# Patient Record
Sex: FEMALE | Race: BLACK OR AFRICAN AMERICAN | Employment: OTHER | URBAN - METROPOLITAN AREA
[De-identification: names, ages, dates, MRNs, and addresses within clinical notes are randomized per-mention and may not be internally consistent; named-entity substitution may affect disease eponyms.]

---

## 2017-01-05 ENCOUNTER — OFFICE VISIT (OUTPATIENT)
Dept: PAIN MANAGEMENT | Age: 62
End: 2017-01-05

## 2017-01-05 VITALS
WEIGHT: 166 LBS | BODY MASS INDEX: 30.36 KG/M2 | DIASTOLIC BLOOD PRESSURE: 75 MMHG | SYSTOLIC BLOOD PRESSURE: 118 MMHG | HEART RATE: 62 BPM

## 2017-01-05 DIAGNOSIS — M96.1 POSTLAMINECTOMY SYNDROME, CERVICAL REGION: ICD-10-CM

## 2017-01-05 DIAGNOSIS — M25.50 PAIN IN JOINT, MULTIPLE SITES: ICD-10-CM

## 2017-01-05 DIAGNOSIS — M54.2 CERVICALGIA: ICD-10-CM

## 2017-01-05 DIAGNOSIS — M51.26 DISPLACEMENT OF LUMBAR INTERVERTEBRAL DISC WITHOUT MYELOPATHY: ICD-10-CM

## 2017-01-05 DIAGNOSIS — M51.36 ANNULAR TEAR OF LUMBAR DISC: Primary | ICD-10-CM

## 2017-01-05 DIAGNOSIS — G89.4 CHRONIC PAIN SYNDROME: ICD-10-CM

## 2017-01-05 DIAGNOSIS — M51.37 DEGENERATION OF LUMBAR OR LUMBOSACRAL INTERVERTEBRAL DISC: ICD-10-CM

## 2017-01-05 RX ORDER — OXYCODONE HYDROCHLORIDE 10 MG/1
10 TABLET ORAL
Qty: 90 TAB | Refills: 0 | Status: SHIPPED | OUTPATIENT
Start: 2017-01-06 | End: 2017-02-23 | Stop reason: SDUPTHER

## 2017-01-05 RX ORDER — MORPHINE SULFATE 60 MG/1
60 TABLET, FILM COATED, EXTENDED RELEASE ORAL EVERY 12 HOURS
Qty: 60 TAB | Refills: 0 | Status: SHIPPED | OUTPATIENT
Start: 2017-01-06 | End: 2017-02-23 | Stop reason: SDUPTHER

## 2017-01-05 RX ORDER — OXYCODONE HYDROCHLORIDE 10 MG/1
10 TABLET ORAL
Qty: 90 TAB | Refills: 0 | Status: SHIPPED | OUTPATIENT
Start: 2017-02-05 | End: 2017-02-23 | Stop reason: SDUPTHER

## 2017-01-05 RX ORDER — MORPHINE SULFATE 60 MG/1
60 TABLET, FILM COATED, EXTENDED RELEASE ORAL EVERY 12 HOURS
Qty: 60 TAB | Refills: 0 | Status: SHIPPED | OUTPATIENT
Start: 2017-02-05 | End: 2017-02-23 | Stop reason: SDUPTHER

## 2017-01-05 NOTE — PATIENT INSTRUCTIONS
1. Continue current plan. Stable on current medication without adverse events. 2. Refill morphine ER 60 mg 2 times daily. 3. Refill oxycodone 10 mg up to 3 times daily as needed for pain. 4. Continue baclofen 10 mg. Up to 3 times daily as needed. 5. Discussed risks of addiction, dependency, and opioid induced hyperalgesia.    6. Return to clinic in 2 months

## 2017-01-05 NOTE — PROGRESS NOTES
HISTORY OF PRESENT ILLNESS  Ammon Caldera is a 57 y.o. female    HPI: Ms. Champ Muse  returns today for f/u of chronic severe pain involving the lumbar spine, which has been attributed to a lumbar degenerative disc disease with spondylosis. In addition, she has neck pain attributed to a post cervical laminectomy syndrome. No h/o lumbar surgery. PT and injections in the past with no improvement. She reports some mild improvement since her last visit with myself. She recently switched her morphine prescription to Opana but was disrupting her sleep. She has been switched back to morphine. She reports that she is using her morphine more on a set schedule and her oxycodone on an as-needed basis only which has been a moderate improvement for her. She continues to follow-up with Dr. Rosa Bal regarding her left knee. She is otherwise doing well with no other complaints today. I will have her follow-up in 2 months for further evaluation and recommendation. She was recently seen in the emergency room due to a UTI and received a prescription for hydrocodone filled 12/22/2016 that was disclosed to our practice. She has recovered well and feeling much better today. Current medication management consists of MS Contin 60 mg BID,  Oxycodone IR 10 mg BID PRN, and Baclofen 10 mg TID PRN. Medications are helping with pain control and quality of life. Her pain is 2-3/10 with medication and 10/10 without. Pt describes pain as aching. Aggravating factors include standing, sitting, walking. Relieved with rest, sitting, lying down, and avoiding painful activities. Current treatment is helping to improve general activity, mood, walking, sleep, enjoyment of life    She  is otherwise doing well with no other complaints today. She denies any adverse events including nausea, vomiting, dizziness, constipation, hallucinations, or seizures. Medications were brought to visit today.  Pill counts were appropriate Allergies   Allergen Reactions    Aspirin Other (comments)     Stomach bleeding    Macrobid [Nitrofurantoin Monohyd/M-Cryst] Nausea and Vomiting    Nsaids (Non-Steroidal Anti-Inflammatory Drug) Nausea and Vomiting    Opana [Oxymorphone] Other (comments)     Insomnia, weight loss, nightmares    Pcn [Penicillins] Hives       Past Surgical History   Procedure Laterality Date    Hx cervical fusion      Hx hysterectomy      Hx orthopaedic       ganglion cyst removed         Review of Systems   Constitutional: Negative for chills, fever and weight loss. HENT: Negative for congestion, ear discharge and sore throat. Eyes: Negative for blurred vision and double vision. Respiratory: Negative for cough, shortness of breath and wheezing. Cardiovascular: Negative for chest pain and palpitations. Gastrointestinal: Negative for abdominal pain, constipation, diarrhea, heartburn, nausea and vomiting. Genitourinary: Positive for dysuria, frequency, hematuria and urgency. Musculoskeletal: Positive for back pain and joint pain (left knee  ). Negative for falls and neck pain. Skin: Negative for itching and rash. Neurological: Negative for dizziness, seizures, loss of consciousness and headaches. Endo/Heme/Allergies: Does not bruise/bleed easily. Psychiatric/Behavioral: Positive for depression. Negative for suicidal ideas. The patient has insomnia. The patient is not nervous/anxious. Physical Exam   Constitutional: She is oriented to person, place, and time and well-developed, well-nourished, and in no distress. No distress. HENT:   Head: Normocephalic and atraumatic. Eyes: EOM are normal.   Neck: Normal range of motion. Pulmonary/Chest: Effort normal.   Musculoskeletal: Normal range of motion. Well healing surgical incision on left knee from recent surgery. Neurological: She is alert and oriented to person, place, and time. Gait abnormal.   Skin: Skin is warm and dry. No rash noted. She is not diaphoretic. No erythema. Psychiatric: Memory and judgment normal.   Nursing note and vitals reviewed. ASSESSMENT:    1. Annular tear of lumbar disc    2. Postlaminectomy syndrome, cervical region    3. Cervicalgia    4. Pain in joint, multiple sites    5. Displacement of lumbar intervertebral disc without myelopathy    6. Degeneration of lumbar or lumbosacral intervertebral disc    7. Chronic pain syndrome           [place] Prescription Monitoring Program was reviewed which does not demonstrate aberrancies and/or inconsistencies with regard to the historical prescribing of controlled medications to this patient by other providers. NOTE: Disclosed hydrocodone from ER visit filled 12/22/2016    PLAN / Pt Instructions:  1. Continue current plan. Stable on current medication without adverse events. 2. Refill morphine ER 60 mg 2 times daily. 3. Refill oxycodone 10 mg up to 3 times daily as needed for pain. 4. Continue baclofen 10 mg. Up to 3 times daily as needed. 5. Discussed risks of addiction, dependency, and opioid induced hyperalgesia. 6. Return to clinic in 2 months     Medications Ordered Today   Medications    morphine CR (MS CONTIN) 60 mg CR tablet     Sig: Take 1 Tab by mouth every twelve (12) hours. Max Daily Amount: 120 mg. Dispense:  60 Tab     Refill:  0    morphine CR (MS CONTIN) 60 mg CR tablet     Sig: Take 1 Tab by mouth every twelve (12) hours. Max Daily Amount: 120 mg. Dispense:  60 Tab     Refill:  0    oxyCODONE IR (ROXICODONE) 10 mg tab immediate release tablet     Sig: Take 1 Tab by mouth three (3) times daily as needed. Max Daily Amount: 30 mg. Dispense:  90 Tab     Refill:  0    oxyCODONE IR (ROXICODONE) 10 mg tab immediate release tablet     Sig: Take 1 Tab by mouth three (3) times daily as needed. Max Daily Amount: 30 mg.      Dispense:  90 Tab     Refill:  0       Pain medications prescribed with the objective of pain relief and improved physical and psychosocial function in this patient. Spent 25 minutes with patient today reviewing the treatment plan, goals of treatment plan, and limitations of the treatment plan, to include the potential for side effects from medications and procedures. Merton Kanner, [address] 1/5/2017      Note: Please excuse any typographical errors. Voice recognition software was used for this note and may cause mistakes.

## 2017-01-05 NOTE — MR AVS SNAPSHOT
Visit Information Date & Time Provider Department Dept. Phone Encounter #  
 1/5/2017 10:00 AM Tarik Lawson [address] [facility] [phone] Follow-up Instructions Return in about 2 months (around 3/5/2017). Your Appointments 3/2/2017  9:20 AM  
Follow Up with BETO Lawson [address] [facility] (RICHAR SCHEDULING) Appt Note: return in 2  
 [address] [address]  
[phone] [facility] [address] [id number] Upcoming Health Maintenance Date Due DTaP/Tdap/Td series (1 - Tdap) 3/30/1975 BREAST CANCER SCRN MAMMOGRAM 12/2/2017 COLONOSCOPY 6/6/2019 PAP AKA CERVICAL CYTOLOGY 6/21/2019 Allergies as of 1/5/2017  Review Complete On: 1/5/2017 By: BETO Lawson Severity Noted Reaction Type Reactions Aspirin    Other (comments) Stomach bleeding Macrobid [Nitrofurantoin Monohyd/m-cryst]  11/25/2015    Nausea and Vomiting Nsaids (Non-steroidal Anti-inflammatory Drug)    Nausea and Vomiting Opana [Oxymorphone]  11/29/2016    Other (comments) Insomnia, weight loss, nightmares Pcn [Penicillins]    Hives Current Immunizations  Never Reviewed Name Date Pneumococcal Polysaccharide (PPSV-23) 3/7/2014 Not reviewed this visit You Were Diagnosed With   
  
 Codes Comments Annular tear of lumbar disc    -  Primary ICD-10-CM: M51.36 
ICD-9-CM: 722.52 Postlaminectomy syndrome, cervical region     ICD-10-CM: M96.1 ICD-9-CM: 722.81 Cervicalgia     ICD-10-CM: M54.2 ICD-9-CM: 723.1 Pain in joint, multiple sites     ICD-10-CM: M25.50 ICD-9-CM: 719.49 Displacement of lumbar intervertebral disc without myelopathy     ICD-10-CM: M51.26 
ICD-9-CM: 722.10  Degeneration of lumbar or lumbosacral intervertebral disc     ICD-10-CM: M51.37 
ICD-9-CM: 722.52   
 Chronic pain syndrome     ICD-10-CM: G89.4 ICD-9-CM: 338. 4 Vitals BP Pulse Weight(growth percentile) BMI OB Status Smoking Status 118/75 (BP 1 Location: Left arm, BP Patient Position: Sitting) 62 166 lb (75.3 kg) 30.36 kg/m2 Hysterectomy Former Smoker Vitals History BMI and BSA Data Body Mass Index Body Surface Area  
 30.36 kg/m 2 1.81 m 2 Preferred Pharmacy Pharmacy Name Phone [id number] - [address] [phone] Your Updated Medication List  
  
   
This list is accurate as of: 1/5/17 10:19 AM.  Always use your most recent med list.  
  
  
  
  
 albuterol 2.5 mg /3 mL (0.083 %) nebulizer solution Commonly known as:  PROVENTIL VENTOLIN  
2.5 mg.  
  
 atorvastatin 40 mg tablet Commonly known as:  LIPITOR Take 1 Tab by mouth daily. baclofen 10 mg tablet Commonly known as:  LIORESAL  
TAKE ONE TABLET BY MOUTH THREE TIMES A DAY  
  
 clindamycin 300 mg capsule Commonly known as:  CLEOCIN  
  
 DEXILANT 60 mg Cpdb Generic drug:  Dexlansoprazole 60 mg daily. ergocalciferol 50,000 unit capsule Commonly known as:  ERGOCALCIFEROL Take 1 Cap by mouth every seven (7) days. FLONASE 50 mcg/actuation nasal spray Generic drug:  fluticasone 2 Sprays by Both Nostrils route as needed. gabapentin 400 mg capsule Commonly known as:  NEURONTIN  
400 mg two (2) times a day. GEODON 80 mg capsule Generic drug:  ziprasidone Take 160 mg by mouth two (2) times daily (with meals). HYDROcodone-acetaminophen 5-325 mg per tablet Commonly known as:  NORCO  
  
 ipratropium 0.02 % nebulizer solution Commonly known as:  ATROVENT  
0.5 mg. LORazepam 1 mg tablet Commonly known as:  ATIVAN Take 1 mg by mouth two (2) times daily as needed. losartan 25 mg tablet Commonly known as:  COZAAR  
TAKE ONE TABLET BY MOUTH EVERY DAY  
  
 * morphine CR 60 mg CR tablet Commonly known as:  MS CONTIN Take 1 Tab by mouth every twelve (12) hours. Max Daily Amount: 120 mg.  
Start taking on:  1/6/2017 * morphine CR 60 mg CR tablet Commonly known as:  MS CONTIN Take 1 Tab by mouth every twelve (12) hours. Max Daily Amount: 120 mg.  
Start taking on:  2/5/2017  
  
 neomycin-colist-hydrocortisone-thonzonium otic suspension Commonly known as:  CORTISPORIN TC,COLY-MYCIN S Administer 2 Drops into each ear four (4) times daily. neomycin-polymyxin-hydrocortisone (buffered) 3.5-10,000-1 mg/mL-unit/mL-% otic suspension Commonly known as:  Myra [place] Administer 3 Drops in left ear four (4) times daily. * oxyCODONE IR 10 mg Tab immediate release tablet Commonly known as:  Dondra Latisha Take 1 Tab by mouth three (3) times daily as needed. Max Daily Amount: 30 mg. Start taking on:  1/6/2017 * oxyCODONE IR 10 mg Tab immediate release tablet Commonly known as:  Dondra Latisha Take 1 Tab by mouth three (3) times daily as needed. Max Daily Amount: 30 mg. Start taking on:  2/5/2017  
  
 oxyMORphone 10 mg tablet Commonly known as:  OPANA SR  
  
 polyethylene glycol 17 gram packet Commonly known as:  Shameka Kamla Take 1 Packet by mouth daily. sertraline 100 mg tablet Commonly known as:  ZOLOFT Take  by mouth daily. sucralfate 1 gram tablet Commonly known as:  CARAFATE  
  
 temazepam 15 mg capsule Commonly known as:  RESTORIL Take  by mouth nightly as needed for Sleep.  
  
 traZODone 100 mg tablet Commonly known as:  [place] Take 100 mg by mouth nightly. * Notice: This list has 4 medication(s) that are the same as other medications prescribed for you. Read the directions carefully, and ask your doctor or other care provider to review them with you. Prescriptions Printed Refills  
 morphine CR (MS CONTIN) 60 mg CR tablet 0 Starting on: 1/6/2017 Sig: Take 1 Tab by mouth every twelve (12) hours. Max Daily Amount: 120 mg.  
 Class: Print Route: Oral  
 morphine CR (MS CONTIN) 60 mg CR tablet 0 Starting on: 2/5/2017 Sig: Take 1 Tab by mouth every twelve (12) hours. Max Daily Amount: 120 mg.  
 Class: Print Route: Oral  
 oxyCODONE IR (ROXICODONE) 10 mg tab immediate release tablet 0 Starting on: 1/6/2017 Sig: Take 1 Tab by mouth three (3) times daily as needed. Max Daily Amount: 30 mg.  
 Class: Print Route: Oral  
 oxyCODONE IR (ROXICODONE) 10 mg tab immediate release tablet 0 Starting on: 2/5/2017 Sig: Take 1 Tab by mouth three (3) times daily as needed. Max Daily Amount: 30 mg.  
 Class: Print Route: Oral  
  
Follow-up Instructions Return in about 2 months (around 3/5/2017). Patient Instructions 1. Continue current plan. Stable on current medication without adverse events. 2. Refill morphine ER 60 mg 2 times daily. 3. Refill oxycodone 10 mg up to 3 times daily as needed for pain. 4. Continue baclofen 10 mg. Up to 3 times daily as needed. 5. Discussed risks of addiction, dependency, and opioid induced hyperalgesia. 6. Return to clinic in 2 months Introducing [facility] SERVICES! Adelina Azar introduces [employer] patient portal. Now you can access parts of your medical record, email your doctor's office, and request medication refills online. 1. In your internet browser, go to [email or web address] 2. Click on the First Time User? Click Here link in the Sign In box. You will see the New Member Sign Up page. 3. Enter your [employer] Access Code exactly as it appears below. You will not need to use this code after youve completed the sign-up process. If you do not sign up before the expiration date, you must request a new code. · [employer] Access Code: [email or web address] Expires: 2/28/2017  3:55 PM 
 
4.  Enter the last four digits of your Social Security Number (xxxx) and Date of Birth (mm/dd/yyyy) as indicated and click Submit. You will be taken to the next sign-up page. 5. Create a [employer] ID. This will be your [employer] login ID and cannot be changed, so think of one that is secure and easy to remember. 6. Create a [employer] password. You can change your password at any time. 7. Enter your Password Reset Question and Answer. This can be used at a later time if you forget your password. 8. Enter your e-mail address. You will receive e-mail notification when new information is available in [address] 9. Click Sign Up. You can now view and download portions of your medical record. 10. Click the Download Summary menu link to download a portable copy of your medical information. If you have questions, please visit the Frequently Asked Questions section of the [employer] website. Remember, [employer] is NOT to be used for urgent needs. For medical emergencies, dial 911. Now available from your iPhone and Android! Please provide this summary of care documentation to your next provider. Your primary care clinician is listed as [address]. If you have any questions after today's visit, please call [phone].

## 2017-01-05 NOTE — PROGRESS NOTES
Nursing Notes    Patient presents to the office today in follow-up. Patient rates her pain at 8/10 on the numerical pain scale. Reviewed medications with counts as follows:    Rx Date filled Qty Dispensed Pill Count Last Dose Short     Morphine sulfate 60mg ER 12/7/16 60 6 This am  No    Oxycodone 10mg 12/7/16 90 13 Last night  No                                     Comments:     POC UDS was not performed in office today    Any new labs or imaging since last appointment? YES; labwork at emergency dept : MRI of leg     Have you been to an emergency room (ER) or urgent care clinic since your last visit? YES  ; [facility] emergency dept           Have you been hospitalized since your last visit? NO     If yes, where, when, and reason for visit? Have you seen or consulted any other health care providers outside of the [address] [place] [address]  since your last visit? YES     If yes, where, when, and reason for visit? Emergency dept physicians       HM deferred to pcp.

## 2017-01-20 ENCOUNTER — OFFICE VISIT (OUTPATIENT)
Dept: FAMILY MEDICINE CLINIC | Age: 62
End: 2017-01-20

## 2017-01-20 VITALS
SYSTOLIC BLOOD PRESSURE: 132 MMHG | HEIGHT: 62 IN | BODY MASS INDEX: 30.55 KG/M2 | DIASTOLIC BLOOD PRESSURE: 80 MMHG | WEIGHT: 166 LBS | HEART RATE: 71 BPM | RESPIRATION RATE: 18 BRPM | OXYGEN SATURATION: 97 % | TEMPERATURE: 98.1 F

## 2017-01-20 DIAGNOSIS — R10.13 EPIGASTRIC PAIN: Primary | ICD-10-CM

## 2017-01-20 DIAGNOSIS — K80.20 CALCULUS OF GALLBLADDER WITHOUT CHOLECYSTITIS WITHOUT OBSTRUCTION: ICD-10-CM

## 2017-01-20 RX ORDER — SUCRALFATE 1 G/1
1 TABLET ORAL
COMMUNITY
Start: 2017-01-13 | End: 2018-02-21

## 2017-01-20 NOTE — PATIENT INSTRUCTIONS

## 2017-01-20 NOTE — PROGRESS NOTES
Laure Gr is a 57 y.o. female  presents for follow up from ER. Allergies   Allergen Reactions    Aspirin Other (comments)     Stomach bleeding    Macrobid [Nitrofurantoin Monohyd/M-Cryst] Nausea and Vomiting    Nsaids (Non-Steroidal Anti-Inflammatory Drug) Nausea and Vomiting    Opana [Oxymorphone] Other (comments)     Insomnia, weight loss, nightmares    Pcn [Penicillins] Hives     Outpatient Prescriptions Marked as Taking for the 1/20/17 encounter (Office Visit) with Alton Farnsworth MD   Medication Sig Dispense Refill    morphine CR (MS CONTIN) 60 mg CR tablet Take 1 Tab by mouth every twelve (12) hours. Max Daily Amount: 120 mg. 60 Tab 0    [START ON 2/5/2017] morphine CR (MS CONTIN) 60 mg CR tablet Take 1 Tab by mouth every twelve (12) hours. Max Daily Amount: 120 mg. 60 Tab 0    oxyCODONE IR (ROXICODONE) 10 mg tab immediate release tablet Take 1 Tab by mouth three (3) times daily as needed. Max Daily Amount: 30 mg. 90 Tab 0    [START ON 2/5/2017] oxyCODONE IR (ROXICODONE) 10 mg tab immediate release tablet Take 1 Tab by mouth three (3) times daily as needed. Max Daily Amount: 30 mg. 90 Tab 0    polyethylene glycol (MIRALAX) 17 gram packet Take 1 Packet by mouth daily. 1 Packet 3    ergocalciferol (ERGOCALCIFEROL) 50,000 unit capsule Take 1 Cap by mouth every seven (7) days. 4 Cap 1    losartan (COZAAR) 25 mg tablet TAKE ONE TABLET BY MOUTH EVERY DAY 30 Tab 1    baclofen (LIORESAL) 10 mg tablet TAKE ONE TABLET BY MOUTH THREE TIMES A DAY 90 Tab 3    atorvastatin (LIPITOR) 40 mg tablet Take 1 Tab by mouth daily. 30 Tab 5    neomycin-polymyxin-hydrocortisone, buffered, (PEDIOTIC) 3.5-10,000-1 mg/mL-unit/mL-% otic suspension Administer 3 Drops in left ear four (4) times daily.  3 mL 0    clindamycin (CLEOCIN) 300 mg capsule   0    HYDROcodone-acetaminophen (NORCO) 5-325 mg per tablet   0    OxyMORPhone (OPANA SR) 10 mg tablet   0    neomycin-colist-hydrocortisone-thonzonium (CORTISPORIN TC,COLY-MYCIN S) otic suspension Administer 2 Drops into each ear four (4) times daily. 5 mL 0    albuterol (PROVENTIL VENTOLIN) 2.5 mg /3 mL (0.083 %) nebulizer solution 2.5 mg.      Dexlansoprazole (DEXILANT) 60 mg CpDB 60 mg daily.  gabapentin (NEURONTIN) 400 mg capsule 400 mg two (2) times a day.  ipratropium (ATROVENT) 0.02 % nebulizer solution 0.5 mg.      temazepam (RESTORIL) 15 mg capsule Take  by mouth nightly as needed for Sleep.  fluticasone (FLONASE) 50 mcg/actuation nasal spray 2 Sprays by Both Nostrils route as needed.  trazodone (DESYREL) 100 mg tablet Take 100 mg by mouth nightly.  ziprasidone hcl (GEODON) 80 mg capsule Take 160 mg by mouth two (2) times daily (with meals).  lorazepam (ATIVAN) 1 mg tablet Take 1 mg by mouth two (2) times daily as needed.  sertraline (ZOLOFT) 100 mg tablet Take  by mouth daily.        Patient Active Problem List   Diagnosis Code    Myalgia and myositis [id number]    Lumbago M54.5    Other affections of shoulder region, not elsewhere classified M75.80    Other chronic pain G89.29    Bipolar 1 disorder ([facility] [address] F31.9    Chronic pain syndrome G89.4    Carpal tunnel syndrome G56.00    Degeneration of lumbar or lumbosacral intervertebral disc M51.37    Displacement of lumbar intervertebral disc without myelopathy M51.26    Pain in joint, multiple sites M25.50    Cervicalgia M54.2    Postlaminectomy syndrome, cervical region M96.1    Annular tear of lumbar disc M51.36    Lung nodule seen on imaging study R91.1    ACP (advance care planning) Z71.89    Hypercholesterolemia E78.00     Past Medical History   Diagnosis Date    Annular tear of lumbar disc 11/10/2014    Asthma      Bronchitis    Benign tumor of throat     Bone spur      right ankle    Cervicalgia 11/10/2014    Chronic pain      back    Degeneration of lumbar or lumbosacral intervertebral disc 11/10/2014    Degenerative disc disease     Displacement of lumbar intervertebral disc without myelopathy 11/10/2014    Elevated white blood cell count     Fibromyalgia     GERD (gastroesophageal reflux disease)     Hypertension     Insomnia     Nausea & vomiting     Pain in joint, multiple sites 11/10/2014    Postlaminectomy syndrome, cervical region 11/10/2014    Sinus infection 10/5/15    Ulcer ([facility] [address]      Social History     Social History    Marital status:      Spouse name: N/A    Number of children: N/A    Years of education: N/A     Social History Main Topics    Smoking status: Former Smoker    Smokeless tobacco: Never Used    Alcohol use No      Comment: none in 24 years    Drug use: No      Comment: last smoked in 1993    Sexual activity: Not Asked     Other Topics Concern    None     Social History Narrative     Family History   Problem Relation Age of Onset    Hypertension Other     Heart Attack Other     Heart Disease Other     Stroke Other     Headache Other     Seizures Other         Review of Systems   Constitutional: Negative for chills and fever. Cardiovascular: Negative for chest pain and palpitations. Gastrointestinal: Negative for constipation, diarrhea, nausea and vomiting. Vitals:    01/20/17 1124   BP: 132/80   Pulse: 71   Resp: 18   Temp: 98.1 °F (36.7 °C)   TempSrc: Temporal   SpO2: 97%   Weight: 166 lb (75.3 kg)   Height: 5' 2\" (1.575 m)   PainSc:   4   PainLoc: Abdomen       Physical Exam   Constitutional: She is oriented to person, place, and time and well-developed, well-nourished, and in no distress. Cardiovascular: Normal rate, regular rhythm and normal heart sounds. Pulmonary/Chest: Effort normal and breath sounds normal.   Abdominal: Soft. Bowel sounds are normal. She exhibits no distension and no mass. There is no tenderness. There is no rebound and no guarding. Neurological: She is alert and oriented to person, place, and time. Gait normal.   Skin: Skin is warm and dry.    Nursing note and vitals reviewed. Assessment/Plan      ICD-10-CM ICD-9-CM    1. Epigastric pain R10.13 789.06    2. Calculus of gallbladder without cholecystitis without obstruction K80.20 574.20 REFERRAL TO GENERAL SURGERY     Will continue current meds and follow up with surgery    I have discussed the diagnosis with the patient and the intended plan of care as seen in the above orders. The patient has received an after-visit summary and questions were answered concerning future plans. I have discussed medication, side effects, and warnings with the patient in detail. The patient verbalized understanding and is in agreement with the plan of care. The patient will contact the office with any additional concerns. Follow-up Disposition:  Return in about 1 month (around 2/20/2017).   lab results and schedule of future lab studies reviewed with patient    Nichole Diaz MD

## 2017-01-20 NOTE — PROGRESS NOTES
Chief Complaint   Patient presents with    Ulcer     was seen at [facility]      1. Have you been to the ER, urgent care clinic since your last visit? Hospitalized since your last visit?see note    2. Have you seen or consulted any other health care providers outside of the [address] [place] [address] since your last visit? Include any pap smears or colon screening.  No

## 2017-01-20 NOTE — MR AVS SNAPSHOT
Visit Information Date & Time Provider Department Dept. Phone Encounter #  
 1/20/2017 11:00 AM Tomas Sellers MD [facility] [phone] [id number] Follow-up Instructions Return in about 1 month (around 2/20/2017). Your Appointments 3/2/2017  9:20 AM  
Follow Up with BETO Estrada [address] [place] [address] for Pain Management (RICHAR SCHEDULING) Appt Note: return in 2 30 [facility] [id number] [phone]  [phone] Upcoming Health Maintenance Date Due DTaP/Tdap/Td series (1 - Tdap) 3/30/1975 BREAST CANCER SCRN MAMMOGRAM 12/2/2017 COLONOSCOPY 6/6/2019 PAP AKA CERVICAL CYTOLOGY 6/21/2019 Allergies as of 1/20/2017  Review Complete On: 1/20/2017 By: Tomas Sellers MD  
  
 Severity Noted Reaction Type Reactions Aspirin    Other (comments) Stomach bleeding Macrobid [Nitrofurantoin Monohyd/m-cryst]  11/25/2015    Nausea and Vomiting Nsaids (Non-steroidal Anti-inflammatory Drug)    Nausea and Vomiting Opana [Oxymorphone]  11/29/2016    Other (comments) Insomnia, weight loss, nightmares Pcn [Penicillins]    Hives Current Immunizations  Never Reviewed Name Date Pneumococcal Polysaccharide (PPSV-23) 3/7/2014 Not reviewed this visit You Were Diagnosed With   
  
 Codes Comments Epigastric pain    -  Primary ICD-10-CM: R10.13 ICD-9-CM: 789.06 Calculus of gallbladder without cholecystitis without obstruction     ICD-10-CM: K80.20 ICD-9-CM: 574.20 Vitals BP Pulse Temp Resp Height(growth percentile) 132/80 (BP 1 Location: Left arm, BP Patient Position: Sitting) 71 98.1 °F (36.7 °C) (Temporal) 18 5' 2\" (1.575 m) Weight(growth percentile) SpO2 BMI OB Status Smoking Status 166 lb (75.3 kg) 97% 30.36 kg/m2 Hysterectomy Former Smoker Vitals History BMI and BSA Data Body Mass Index Body Surface Area 30.36 kg/m 2 1.81 m 2 Preferred Pharmacy Pharmacy Name Phone [id number] - [address] [address], [address] [phone] Your Updated Medication List  
  
   
This list is accurate as of: 1/20/17 12:12 PM.  Always use your most recent med list.  
  
  
  
  
 albuterol 2.5 mg /3 mL (0.083 %) nebulizer solution Commonly known as:  PROVENTIL VENTOLIN  
2.5 mg.  
  
 atorvastatin 40 mg tablet Commonly known as:  LIPITOR Take 1 Tab by mouth daily. baclofen 10 mg tablet Commonly known as:  LIORESAL  
TAKE ONE TABLET BY MOUTH THREE TIMES A DAY  
  
 clindamycin 300 mg capsule Commonly known as:  CLEOCIN  
  
 DEXILANT 60 mg Cpdb Generic drug:  Dexlansoprazole 60 mg daily. ergocalciferol 50,000 unit capsule Commonly known as:  ERGOCALCIFEROL Take 1 Cap by mouth every seven (7) days. FLONASE 50 mcg/actuation nasal spray Generic drug:  fluticasone 2 Sprays by Both Nostrils route as needed. gabapentin 400 mg capsule Commonly known as:  NEURONTIN  
400 mg two (2) times a day. GEODON 80 mg capsule Generic drug:  ziprasidone Take 160 mg by mouth two (2) times daily (with meals). HYDROcodone-acetaminophen 5-325 mg per tablet Commonly known as:  NORCO  
  
 ipratropium 0.02 % nebulizer solution Commonly known as:  ATROVENT  
0.5 mg. LORazepam 1 mg tablet Commonly known as:  ATIVAN Take 1 mg by mouth two (2) times daily as needed. losartan 25 mg tablet Commonly known as:  COZAAR  
TAKE ONE TABLET BY MOUTH EVERY DAY  
  
 * morphine CR 60 mg CR tablet Commonly known as:  MS CONTIN Take 1 Tab by mouth every twelve (12) hours. Max Daily Amount: 120 mg.  
  
 * morphine CR 60 mg CR tablet Commonly known as:  MS CONTIN Take 1 Tab by mouth every twelve (12) hours. Max Daily Amount: 120 mg.  
Start taking on:  2/5/2017  
  
 neomycin-colist-hydrocortisone-thonzonium otic suspension Commonly known as:  CORTISPORIN TC,COLY-MYCIN S Administer 2 Drops into each ear four (4) times daily. neomycin-polymyxin-hydrocortisone (buffered) 3.5-10,000-1 mg/mL-unit/mL-% otic suspension Commonly known as:  Kathrene Sep Administer 3 Drops in left ear four (4) times daily. * oxyCODONE IR 10 mg Tab immediate release tablet Commonly known as:  Chares Betzy Take 1 Tab by mouth three (3) times daily as needed. Max Daily Amount: 30 mg.  
  
 * oxyCODONE IR 10 mg Tab immediate release tablet Commonly known as:  Chares Betzy Take 1 Tab by mouth three (3) times daily as needed. Max Daily Amount: 30 mg. Start taking on:  2/5/2017  
  
 oxyMORphone 10 mg tablet Commonly known as:  OPANA SR  
  
 polyethylene glycol 17 gram packet Commonly known as:  Tonna Nidhi Take 1 Packet by mouth daily. sertraline 100 mg tablet Commonly known as:  ZOLOFT Take  by mouth daily. * sucralfate 1 gram tablet Commonly known as:  Raisa [place] * sucralfate 1 gram tablet Commonly known as:  CARAFATE  
1 g.  
  
 temazepam 15 mg capsule Commonly known as:  RESTORIL Take  by mouth nightly as needed for Sleep.  
  
 traZODone 100 mg tablet Commonly known as:  Cornefrances Haymaker Take 100 mg by mouth nightly. * Notice: This list has 6 medication(s) that are the same as other medications prescribed for you. Read the directions carefully, and ask your doctor or other care provider to review them with you. We Performed the Following REFERRAL TO GENERAL SURGERY [REF27 Custom] Comments:  
 Please evaluate patient for gallstones Follow-up Instructions Return in about 1 month (around 2/20/2017). Referral Information Referral ID Referred By Referred To  
  
 [id number] Trent Wong, [address] [facility] MD [place]   
   [address] [place] [address] [address] [place], [address] Phone: [phone] Fax: [phone] Visits Status Start Date End Date 1 New Request 1/20/17 1/20/18 If your referral has a status of pending review or denied, additional information will be sent to support the outcome of this decision. Patient Instructions Abdominal Pain: Care Instructions Your Care Instructions Abdominal pain has many possible causes. Some aren't serious and get better on their own in a few days. Others need more testing and treatment. If your pain continues or gets worse, you need to be rechecked and may need more tests to find out what is wrong. You may need surgery to correct the problem. Don't ignore new symptoms, such as fever, nausea and vomiting, urination problems, pain that gets worse, and dizziness. These may be signs of a more serious problem. Your doctor may have recommended a follow-up visit in the next 8 to 12 hours. If you are not getting better, you may need more tests or treatment. The doctor has checked you carefully, but problems can develop later. If you notice any problems or new symptoms, get medical treatment right away. Follow-up care is a key part of your treatment and safety. Be sure to make and go to all appointments, and call your doctor if you are having problems. It's also a good idea to know your test results and keep a list of the medicines you take. How can you care for yourself at home? · Rest until you feel better. · To prevent dehydration, drink plenty of fluids, enough so that your urine is light yellow or clear like water. Choose water and other caffeine-free clear liquids until you feel better. If you have kidney, heart, or liver disease and have to limit fluids, talk with your doctor before you increase the amount of fluids you drink. · If your stomach is upset, eat mild foods, such as rice, dry toast or crackers, bananas, and applesauce. Try eating several small meals instead of two or three large ones.  
· Wait until 48 hours after all symptoms have gone away before you have spicy foods, alcohol, and drinks that contain caffeine. · Do not eat foods that are high in fat. · Avoid anti-inflammatory medicines such as aspirin, ibuprofen (Advil, Motrin), and naproxen (Aleve). These can cause stomach upset. Talk to your doctor if you take daily aspirin for another health problem. When should you call for help? Call 911 anytime you think you may need emergency care. For example, call if: 
· You passed out (lost consciousness). · You pass maroon or very bloody stools. · You vomit blood or what looks like coffee grounds. · You have new, severe belly pain. Call your doctor now or seek immediate medical care if: 
· Your pain gets worse, especially if it becomes focused in one area of your belly. · You have a new or higher fever. · Your stools are black and look like tar, or they have streaks of blood. · You have unexpected vaginal bleeding. · You have symptoms of a urinary tract infection. These may include: 
¨ Pain when you urinate. ¨ Urinating more often than usual. 
¨ Blood in your urine. · You are dizzy or lightheaded, or you feel like you may faint. Watch closely for changes in your health, and be sure to contact your doctor if: 
· You are not getting better after 1 day (24 hours). Where can you learn more? Go to http://diana-lashonda.info/. Enter M010 in the search box to learn more about \"Abdominal Pain: Care Instructions. \" Current as of: May 27, 2016 Content Version: 11.1 © 1044-3230 Healthwise, Incorporated. Care instructions adapted under license by [employer] (which disclaims liability or warranty for this information). If you have questions about a medical condition or this instruction, always ask your healthcare professional. [address] any warranty or liability for your use of this information. Introducing [facility]!    
 Malachi Shanks introduces [email or web address] patient portal. Now you can access parts of your medical record, email your doctor's office, and request medication refills online. 1. In your internet browser, go to [email or web address] 2. Click on the First Time User? Click Here link in the Sign In box. You will see the New Member Sign Up page. 3. Enter your [employer] Access Code exactly as it appears below. You will not need to use this code after youve completed the sign-up process. If you do not sign up before the expiration date, you must request a new code. · [employer] Access Code: [email or web address] Expires: 2/28/2017  3:55 PM 
 
4. Enter the last four digits of your Social Security Number (xxxx) and Date of Birth (mm/dd/yyyy) as indicated and click Submit. You will be taken to the next sign-up page. 5. Create a [employer] ID. This will be your [employer] login ID and cannot be changed, so think of one that is secure and easy to remember. 6. Create a [employer] password. You can change your password at any time. 7. Enter your Password Reset Question and Answer. This can be used at a later time if you forget your password. 8. Enter your e-mail address. You will receive e-mail notification when new information is available in [address] 9. Click Sign Up. You can now view and download portions of your medical record. 10. Click the Download Summary menu link to download a portable copy of your medical information. If you have questions, please visit the Frequently Asked Questions section of the [employer] website. Remember, [employer] is NOT to be used for urgent needs. For medical emergencies, dial 911. Now available from your iPhone and Android! Please provide this summary of care documentation to your next provider. Your primary care clinician is listed as [address]. If you have any questions after today's visit, please call [phone].

## 2017-02-01 DIAGNOSIS — R82.90 BAD ODOR OF URINE: ICD-10-CM

## 2017-02-02 RX ORDER — LOSARTAN POTASSIUM 25 MG/1
TABLET ORAL
Qty: 30 TAB | Refills: 0 | Status: SHIPPED | OUTPATIENT
Start: 2017-02-02 | End: 2017-03-02 | Stop reason: SDUPTHER

## 2017-02-02 RX ORDER — ERGOCALCIFEROL 1.25 MG/1
50000 CAPSULE ORAL
Qty: 4 CAP | Refills: 1 | Status: SHIPPED | OUTPATIENT
Start: 2017-02-02 | End: 2017-04-14 | Stop reason: SDUPTHER

## 2017-02-08 ENCOUNTER — OFFICE VISIT (OUTPATIENT)
Dept: SURGERY | Age: 62
End: 2017-02-08

## 2017-02-08 VITALS
BODY MASS INDEX: 28 KG/M2 | WEIGHT: 164 LBS | DIASTOLIC BLOOD PRESSURE: 80 MMHG | TEMPERATURE: 98.5 F | HEART RATE: 68 BPM | HEIGHT: 64 IN | RESPIRATION RATE: 16 BRPM | SYSTOLIC BLOOD PRESSURE: 128 MMHG

## 2017-02-08 DIAGNOSIS — K80.20 GALLSTONES: Primary | ICD-10-CM

## 2017-02-08 DIAGNOSIS — K80.10 CHRONIC CHOLECYSTITIS WITH CALCULUS: ICD-10-CM

## 2017-02-08 RX ORDER — ESZOPICLONE 1 MG/1
TABLET, FILM COATED ORAL
COMMUNITY
End: 2017-03-17 | Stop reason: ALTCHOICE

## 2017-02-08 NOTE — PATIENT INSTRUCTIONS
Pt may shower. Allow soap and water to run over the incision. No driving or operating heavy machinery while on narcotic pain medications. No strenuous activity or contact sports for two weeks. No lifting greater than 15 lbs for 2 weeks. Call MD for any redness, swelling, bleeding or pus at the incision. Also call for any nausea, vomiting, increased pain or pain uncontrolled by pain medicine. Low-Fat Diet for Gallbladder Disease: After Your Visit  Your Care Instructions  When you eat, the gallbladder releases bile, which helps you digest the fat in food. If you have an inflamed gallbladder, this may cause pain. A low-fat diet may give your gallbladder a rest so you can start to heal. Your doctor and dietitian can help you make an eating plan that does not irritate your digestive system. Always talk with your doctor or dietitian before you make changes in your diet. Follow-up care is a key part of your treatment and safety. Be sure to make and go to all appointments, and call your doctor if you are having problems. Its also a good idea to know your test results and keep a list of the medicines you take. How can you care for yourself at home? · Eat many small meals and snacks each day instead of three large meals. · Choose lean meats. ¨ Eat no more than 5 to 6½ ounces of meat a day. ¨ Cut off all fat you can see. ¨ Eat chicken and turkey without the skin. ¨ Many types of fish, such as salmon, lake trout, tuna, and herring, provide healthy omega-3 fat. But, avoid fish canned in oil, such as sardines in olive oil. ¨ Bake, broil, or grill meats, fowl, or fish instead of frying them in butter or fat. · Drink or eat nonfat or low-fat milk, yogurt, cheese, or other milk products each day. ¨ Read the labels on cheeses, and choose those with less than 5 grams of fat an ounce. ¨ Try fat-free sour cream, cream cheese, or yogurt. ¨ Avoid cream soups and cream sauces on pasta.   ¨ Eat low-fat ice cream, frozen yogurt, or sorbet. Avoid regular ice cream.  · Eat whole-grain cereals, breads, crackers, rice, or pasta. Avoid high-fat foods such as croissants, scones, biscuits, waffles, doughnuts, muffins, granola, and high-fat breads. · Flavor your foods with herbs and spices (such as basil, tarragon, or mint), fat-free sauces, or lemon juice instead of butter. You can also use butter substitutes, fat-free mayonnaise, or fat-free dressing. · Try applesauce, prune puree, or mashed bananas to replace some or all of the fat when you bake. · Limit fats and oils, such as butter, margarine, mayonnaise, and salad dressing, to no more than 1 tablespoon a meal.  · Avoid high-fat foods, such as:  ¨ Chocolate, whole milk, ice cream, processed cheese, and egg yolks. ¨ Fried or buttered foods. ¨ Ham, salami, and hernandez. ¨ Cinnamon rolls, cakes, pies, cookies, and other pastries. ¨ Prepared snack foods, such as potato chips, nut and granola bars, and mixed nuts. ¨ Coconut and avocado. · Learn how to read food labels for serving sizes and ingredients. Fast-food and convenience-food meals often have lots of fat. Where can you learn more? Go to [employer].be  Enter N679 in the search box to learn more about \"Low-Fat Diet for Gallbladder Disease: After Your Visit. \"   © 5954-8598 Healthwise, Incorporated. Care instructions adapted under license by [employer] (which disclaims liability or warranty for this information). This care instruction is for use with your licensed healthcare professional. If you have questions about a medical condition or this instruction, always ask your healthcare professional. [place] [address] any warranty or liability for your use of this information. Content Version: 9.4.406989; Last Revised: August 31, 2011                 Cholecystectomy: Before Your Surgery  What is cholecystectomy? Cholecystectomy (gx-ugi-loj-EDA-tuh-salome) is a type of surgery.  It removes a diseased gallbladder. This surgery is usually done as a laparoscopic surgery. The doctor puts a lighted tube and other surgical tools through small cuts (incisions) in your belly. The tube is called a scope. It lets your doctor see your organs so he or she can do the surgery. The incisions leave scars that fade with time. Most people go home the same day. You probably will feel better each day. Most people have only a small amount of pain after 1 week. If you have a desk job, you can probably go back to work in 1 to 2 weeks. If you lift heavy objects or have a very active job, it may take up to 4 weeks. In some cases, open surgery is the best choice. Your doctor may choose open surgery in advance. Or he or she may choose it in the middle of laparoscopic surgery. In open surgery, the doctor makes a larger incision in your upper belly. If you have open surgery, you will probably stay in the hospital for 2 to 4 days. And it may take 4 to 6 weeks to get back to your normal routine. Follow-up care is a key part of your treatment and safety. Be sure to make and go to all appointments, and call your doctor if you are having problems. It's also a good idea to know your test results and keep a list of the medicines you take. What happens before surgery? Surgery can be stressful. This information will help you understand what you can expect. And it will help you safely prepare for surgery. Preparing for surgery  · Understand exactly what surgery is planned, along with the risks, benefits, and other options. · Tell your doctors ALL the medicines, vitamins, supplements, and herbal remedies you take. Some of these can increase the risk of bleeding or interact with anesthesia. · If you take blood thinners, such as warfarin (Coumadin), clopidogrel (Plavix), or aspirin, be sure to talk to your doctor. He or she will tell you if you should stop taking these medicines before your surgery.  Make sure that you understand exactly what your doctor wants you to do. · Your doctor will tell you which medicines to take or stop before your surgery. You may need to stop taking certain medicines a week or more before surgery. So talk to your doctor as soon as you can. · If you have an advance directive, let your doctor know. It may include a living will and a durable power of  for health care. Bring a copy to the hospital. If you don't have one, you may want to prepare one. It lets your doctor and loved ones know your health care wishes. Doctors advise that everyone prepare these papers before any type of surgery or procedure. · You may need to empty your colon with an enema or laxative. Your doctor will tell you how to do this. What happens on the day of surgery? · Follow the instructions exactly about when to stop eating and drinking. If you don't, your surgery may be canceled. If your doctor told you to take your medicines on the day of surgery, take them with only a sip of water. · Take a bath or shower before you come in for your surgery. Do not apply lotions, perfumes, deodorants, or nail polish. · Do not shave the surgical site yourself. · Take off all jewelry and piercings. And take out contact lenses, if you wear them. At the hospital or surgery center  · Bring a picture ID. · The area for surgery is often marked to make sure there are no errors. · You will be kept comfortable and safe by your anesthesia provider. You will be asleep during the surgery. · The surgery usually takes 1 to 2 hours. Going home  · Be sure you have someone to drive you home. Anesthesia and pain medicine make it unsafe for you to drive. · You will be given more specific instructions about recovering from your surgery. They will cover things like diet, wound care, follow-up care, driving, and getting back to your normal routine. When should you call your doctor? · You have questions or concerns.   · You don't understand how to prepare for your surgery. · You become ill before the surgery (such as fever, flu, or a cold). · You need to reschedule or have changed your mind about having the surgery. Where can you learn more? Go to http://diana-lashonda.info/. Enter K193 in the search box to learn more about \"Cholecystectomy: Before Your Surgery. \"  Current as of: August 9, 2016  Content Version: 11.1  © [id number] [employer]. Care instructions adapted under license by [employer] (which disclaims liability or warranty for this information). If you have questions about a medical condition or this instruction, always ask your healthcare professional. [address] any warranty or liability for your use of this information.

## 2017-02-08 NOTE — PROGRESS NOTES
HISTORY OF PRESENT ILLNESS  Nicky Sheffield is a 57 y.o. female. HPI    Review of Systems   Constitutional: Negative for chills, diaphoresis, fever, malaise/fatigue and weight loss. HENT: Negative for congestion, ear discharge, ear pain, hearing loss, nosebleeds, sore throat and tinnitus. Eyes: Negative for blurred vision, double vision, photophobia, pain, discharge and redness. Respiratory: Negative for cough, hemoptysis, sputum production, shortness of breath, wheezing and stridor. Cardiovascular: Negative for chest pain, palpitations, orthopnea, claudication, leg swelling and PND. Gastrointestinal: Positive for abdominal pain. Negative for blood in stool, constipation, diarrhea, heartburn, melena, nausea and vomiting. Genitourinary: Negative for dysuria, flank pain, frequency, hematuria and urgency. Musculoskeletal: Positive for back pain, joint pain and myalgias. Negative for falls and neck pain. Skin: Negative for itching and rash. Neurological: Positive for tremors. Negative for dizziness, tingling, sensory change, speech change, focal weakness, seizures, loss of consciousness, weakness and headaches. Endo/Heme/Allergies: Negative for environmental allergies and polydipsia. Does not bruise/bleed easily. Psychiatric/Behavioral: Positive for depression. Negative for hallucinations, memory loss, substance abuse and suicidal ideas. The patient has insomnia. The patient is not nervous/anxious.         Physical Exam

## 2017-02-09 NOTE — PROGRESS NOTES
General Surgery Consult    Subjective:      HPI: Patient is a very pleasant 57-year-old female with past medical history that is remarkable for hypercholesterolemia, hypertension, degenerative lumbar intervertebral discs, cervicalgia, post laminectomy syndrome in the cervical region, myalgia and myositis and bipolar 1 disorder. She is referred by Dr. Tabatha Fishman for evaluation and management of the patient had an ultrasound of the right upper quadrant was performed at [facility] which reveals gallstones without gallbladder wall thickening. This was performed on January 13, 2017. The patient gives a history of being treated for reflux disease of the past 5 years. Over the past year she has been suffering with pain in the right upper quadrant which does not radiate. She describes as a crampy pain which is present in the evenings after fatty meals. It is sometimes accompanied by nausea. She does eat a lot of fatty food. We discussed the option of taking fatty food out of her diet as a first line management but she stated that this would be very difficult. She has had dry heaves but no emesis.     Patient Active Problem List    Diagnosis Date Noted    Hypercholesterolemia 11/07/2016    ACP (advance care planning) 10/05/2016    Lung nodule seen on imaging study 05/20/2016    Degeneration of lumbar or lumbosacral intervertebral disc 11/10/2014    Displacement of lumbar intervertebral disc without myelopathy 11/10/2014    Pain in joint, multiple sites 11/10/2014    Cervicalgia 11/10/2014    Postlaminectomy syndrome, cervical region 11/10/2014    Annular tear of lumbar disc 11/10/2014    Chronic pain syndrome 02/21/2014    Carpal tunnel syndrome 02/21/2014    [facility] 01/04/2012    Myalgia and myositis     Lumbago     Other affections of shoulder region, not elsewhere classified     Other chronic pain      Past Medical History   Diagnosis Date    Annular tear of lumbar disc 11/10/2014    Asthma      Bronchitis    Benign tumor of throat     Bone spur      right ankle    Cervicalgia 11/10/2014    Chronic pain      back    Degeneration of lumbar or lumbosacral intervertebral disc 11/10/2014    Degenerative disc disease     Displacement of lumbar intervertebral disc without myelopathy 11/10/2014    Elevated white blood cell count     Fibromyalgia     GERD (gastroesophageal reflux disease)     Hypertension     Insomnia     Nausea & vomiting     Pain in joint, multiple sites 11/10/2014    Postlaminectomy syndrome, cervical region 11/10/2014    Sinus infection 10/5/15    Ulcer ([address]       Past Surgical History   Procedure Laterality Date    Hx cervical fusion      Hx hysterectomy      Hx orthopaedic       ganglion cyst removed      Family History   Problem Relation Age of Onset    Hypertension Other     Heart Attack Other     Heart Disease Other     Stroke Other     Headache Other     Seizures Other       Social History   Substance Use Topics    Smoking status: Former Smoker    Smokeless tobacco: Never Used    Alcohol use No      Comment: none in 24 years      Allergies   Allergen Reactions    Aspirin Other (comments)     Stomach bleeding    Macrobid [Nitrofurantoin Monohyd/M-Cryst] Nausea and Vomiting    Nsaids (Non-Steroidal Anti-Inflammatory Drug) Nausea and Vomiting    Opana [Oxymorphone] Other (comments)     Insomnia, weight loss, nightmares    Pcn [Penicillins] Hives       Prior to Admission medications    Medication Sig Start Date End Date Taking? Authorizing Provider   eszopiclone (LUNESTA) 1 mg tablet Take  by mouth nightly. Yes Historical Provider   losartan (COZAAR) 25 mg tablet TAKE 1 TABLET BY MOUTH EVERY DAY 2/2/17  Yes Gail Abernathy MD   ergocalciferol (ERGOCALCIFEROL) 50,000 unit capsule Take 1 Cap by mouth every seven (7) days.  2/2/17  Yes Gail Abernathy MD   morphine CR (MS CONTIN) 60 mg CR tablet Take 1 Tab by mouth every twelve (12) hours. Max Daily Amount: 120 mg. 1/6/17  Yes BETO Jackson   oxyCODONE IR (ROXICODONE) 10 mg tab immediate release tablet Take 1 Tab by mouth three (3) times daily as needed. Max Daily Amount: 30 mg. 1/6/17  Yes BETO Jackson   oxyCODONE IR (ROXICODONE) 10 mg tab immediate release tablet Take 1 Tab by mouth three (3) times daily as needed. Max Daily Amount: 30 mg. 2/5/17  Yes BETO Jackson   polyethylene glycol [facility]) 17 gram packet Take 1 Packet by mouth daily. 12/2/16  Yes Nichole Diaz MD   baclofen (LIORESAL) 10 mg tablet TAKE ONE TABLET BY MOUTH THREE TIMES A DAY 11/8/16  Yes Pili Burgos MD   atorvastatin (LIPITOR) 40 mg tablet Take 1 Tab by mouth daily. 11/7/16  Yes Nichole Diaz MD   Dexlansoprazole (DEXILANT) 60 mg CpDB 60 mg daily. 2/13/13  Yes Historical Provider   gabapentin (NEURONTIN) 400 mg capsule 400 mg two (2) times a day. 7/7/13  Yes Historical Provider   ipratropium (ATROVENT) 0.02 % nebulizer solution 0.5 mg. 1/14/13  Yes Historical Provider   fluticasone (FLONASE) 50 mcg/actuation nasal spray 2 Sprays by Both Nostrils route as needed. Yes Historical Provider   lorazepam (ATIVAN) 1 mg tablet Take 1 mg by mouth two (2) times daily as needed. Yes Historical Provider   sertraline (ZOLOFT) 100 mg tablet Take  by mouth daily. Yes Historical Provider   sucralfate (CARAFATE) 1 gram tablet 1 g. 1/13/17   Historical Provider   morphine CR (MS CONTIN) 60 mg CR tablet Take 1 Tab by mouth every twelve (12) hours. Max Daily Amount: 120 mg. 2/5/17   BETO Jackson   neomycin-polymyxin-hydrocortisone, buffered, (PEDIOTIC) 3.5-10,000-1 mg/mL-unit/mL-% otic suspension Administer 3 Drops in left ear four (4) times daily.  11/7/16   Nichole Diaz MD   clindamycin (CLEOCIN) 300 mg capsule  9/30/16   Historical Provider   HYDROcodone-acetaminophen (NORCO) 5-325 mg per tablet  9/30/16   Historical Provider   OxyMORPhone (OPANA SR) 10 mg tablet  9/15/16   Historical Provider sucralfate (CARAFATE) 1 gram tablet  8/15/16   Historical Provider   neomycin-colist-hydrocortisone-thonzonium (CORTISPORIN TC,COLY-MYCIN S) otic suspension Administer 2 Drops into each ear four (4) times daily. 7/19/16   Octavio Valladares MD   albuterol (PROVENTIL VENTOLIN) 2.5 mg /3 mL (0.083 %) nebulizer solution 2.5 mg. 1/14/13   Historical Provider   temazepam (RESTORIL) 15 mg capsule Take  by mouth nightly as needed for Sleep. Historical Provider   trazodone (DESYREL) 100 mg tablet Take 100 mg by mouth nightly. Historical Provider   ziprasidone hcl (GEODON) 80 mg capsule Take 160 mg by mouth two (2) times daily (with meals). Historical Provider       Review of Systems:    14 systems were reviewed. The results are as above in the HPI and otherwise negative. Objective:     Vitals:    02/08/17 0948   BP: 128/80   Pulse: 68   Resp: 16   Temp: 98.5 °F (36.9 °C)   TempSrc: Oral   Weight: 74.4 kg (164 lb)   Height: 5' 4\" (1.626 m)       Physical Exam:  GENERAL: alert, cooperative, no distress, appears stated age,   EYE: conjunctivae/corneas clear. PERRL, EOM's intact. Fundi benign,  THROAT & NECK: normal and no erythema or exudates noted. ,    LYMPHATIC: Cervical, supraclavicular, and axillary nodes normal. ,   LUNG: clear to auscultation bilaterally,   HEART: regular rate and rhythm, S1, S2 normal, no murmur, click, rub or gallop,   ABDOMEN: soft, non-distended, right upper quadrant tender with negative Gallardo sign. No rebound or guarding. . Bowel sounds normal. No masses,  no organomegaly,   EXTREMITIES:  extremities normal, atraumatic, no cyanosis or edema,   SKIN: Normal.,   NEUROLOGIC: AOx3. Gait normal. Reflexes and motor strength normal and symmetric. Cranial nerves 2-12 and sensation grossly intact. ,     Data Review:  to be done    Impression:     · Patient with chronic calculus cholecystitis.     Plan:     · Robot-assisted laparoscopic cholecystectomy  · Consent on chart  · Preoperative orders written  · The patient was given information on low-fat diet verbally and in a handout form and her after visit summary.     Signed By: Garima Morillo MD     February 9, 2017

## 2017-02-14 RX ORDER — FLUTICASONE PROPIONATE 50 MCG
2 SPRAY, SUSPENSION (ML) NASAL AS NEEDED
Qty: 1 BOTTLE | Refills: 3 | Status: SHIPPED | OUTPATIENT
Start: 2017-02-14 | End: 2017-10-12 | Stop reason: SDUPTHER

## 2017-02-14 NOTE — TELEPHONE ENCOUNTER
LOV 01/20/2017  F/U 02/17/2017  Please send Flonase in to [facility] [place] as stated above.

## 2017-02-15 RX ORDER — METRONIDAZOLE 500 MG/1
500 TABLET ORAL 3 TIMES DAILY
Qty: 42 TAB | Refills: 0 | Status: SHIPPED | OUTPATIENT
Start: 2017-02-15 | End: 2017-03-17 | Stop reason: ALTCHOICE

## 2017-02-15 RX ORDER — LEVOFLOXACIN 500 MG/1
500 TABLET, FILM COATED ORAL DAILY
Qty: 14 TAB | Refills: 0 | Status: SHIPPED | OUTPATIENT
Start: 2017-02-15 | End: 2017-03-17 | Stop reason: ALTCHOICE

## 2017-02-22 ENCOUNTER — HOSPITAL ENCOUNTER (OUTPATIENT)
Dept: PREADMISSION TESTING | Age: 62
Discharge: HOME | End: 2017-02-22
Payer: MEDICARE

## 2017-02-22 ENCOUNTER — HOSPITAL ENCOUNTER (OUTPATIENT)
Dept: GENERAL RADIOLOGY | Age: 62
Discharge: HOME | End: 2017-02-22
Payer: MEDICARE

## 2017-02-22 DIAGNOSIS — K80.20 GALLSTONES: ICD-10-CM

## 2017-02-22 DIAGNOSIS — K80.10 CHRONIC CHOLECYSTITIS WITH CALCULUS: ICD-10-CM

## 2017-02-22 LAB
ALBUMIN SERPL BCP-MCNC: 4 G/DL (ref 3.4–5)
ALBUMIN/GLOB SERPL: 1.3 {RATIO} (ref 0.8–1.7)
ALP SERPL-CCNC: 99 U/L (ref 45–117)
ALT SERPL-CCNC: 32 U/L (ref 13–56)
ANION GAP BLD CALC-SCNC: 4 MMOL/L (ref 3–18)
AST SERPL W P-5'-P-CCNC: 33 U/L (ref 15–37)
ATRIAL RATE: 60 BPM
BASOPHILS # BLD AUTO: 0 K/UL (ref 0–0.06)
BASOPHILS # BLD: 0 % (ref 0–2)
BILIRUB DIRECT SERPL-MCNC: 0.2 MG/DL (ref 0–0.2)
BILIRUB SERPL-MCNC: 0.5 MG/DL (ref 0.2–1)
BUN SERPL-MCNC: 9 MG/DL (ref 7–18)
BUN/CREAT SERPL: 10 (ref 12–20)
CALCIUM SERPL-MCNC: 9.7 MG/DL (ref 8.5–10.1)
CALCULATED P AXIS, ECG09: 31 DEGREES
CALCULATED R AXIS, ECG10: 30 DEGREES
CALCULATED T AXIS, ECG11: -5 DEGREES
CHLORIDE SERPL-SCNC: 105 MMOL/L (ref 100–108)
CO2 SERPL-SCNC: 31 MMOL/L (ref 21–32)
CREAT SERPL-MCNC: 0.9 MG/DL (ref 0.6–1.3)
DIAGNOSIS, 93000: NORMAL
DIFFERENTIAL METHOD BLD: NORMAL
EOSINOPHIL # BLD: 0.1 K/UL (ref 0–0.4)
EOSINOPHIL NFR BLD: 1 % (ref 0–5)
ERYTHROCYTE [DISTWIDTH] IN BLOOD BY AUTOMATED COUNT: 13.3 % (ref 11.6–14.5)
GLOBULIN SER CALC-MCNC: 3.1 G/DL (ref 2–4)
GLUCOSE SERPL-MCNC: 105 MG/DL (ref 74–99)
HCT VFR BLD AUTO: 38.6 % (ref 35–45)
HGB BLD-MCNC: 12.7 G/DL (ref 12–16)
LYMPHOCYTES # BLD AUTO: 47 % (ref 21–52)
LYMPHOCYTES # BLD: 3.1 K/UL (ref 0.9–3.6)
MCH RBC QN AUTO: 28.7 PG (ref 24–34)
MCHC RBC AUTO-ENTMCNC: 32.9 G/DL (ref 31–37)
MCV RBC AUTO: 87.1 FL (ref 74–97)
MONOCYTES # BLD: 0.3 K/UL (ref 0.05–1.2)
MONOCYTES NFR BLD AUTO: 4 % (ref 3–10)
NEUTS SEG # BLD: 3.1 K/UL (ref 1.8–8)
NEUTS SEG NFR BLD AUTO: 48 % (ref 40–73)
P-R INTERVAL, ECG05: 116 MS
PLATELET # BLD AUTO: 243 K/UL (ref 135–420)
PMV BLD AUTO: 10.1 FL (ref 9.2–11.8)
POTASSIUM SERPL-SCNC: 4.4 MMOL/L (ref 3.5–5.5)
PROT SERPL-MCNC: 7.1 G/DL (ref 6.4–8.2)
Q-T INTERVAL, ECG07: 424 MS
QRS DURATION, ECG06: 78 MS
QTC CALCULATION (BEZET), ECG08: 424 MS
RBC # BLD AUTO: 4.43 M/UL (ref 4.2–5.3)
SODIUM SERPL-SCNC: 140 MMOL/L (ref 136–145)
VENTRICULAR RATE, ECG03: 60 BPM
WBC # BLD AUTO: 6.6 K/UL (ref 4.6–13.2)

## 2017-02-22 PROCEDURE — 80076 HEPATIC FUNCTION PANEL: CPT | Performed by: SURGERY

## 2017-02-22 PROCEDURE — 71020 XR CHEST PA LAT: CPT

## 2017-02-22 PROCEDURE — 85025 COMPLETE CBC W/AUTO DIFF WBC: CPT | Performed by: SURGERY

## 2017-02-22 PROCEDURE — 80048 BASIC METABOLIC PNL TOTAL CA: CPT | Performed by: SURGERY

## 2017-02-22 PROCEDURE — 36415 COLL VENOUS BLD VENIPUNCTURE: CPT | Performed by: SURGERY

## 2017-02-22 PROCEDURE — 93005 ELECTROCARDIOGRAM TRACING: CPT

## 2017-02-23 ENCOUNTER — OFFICE VISIT (OUTPATIENT)
Dept: PAIN MANAGEMENT | Age: 62
End: 2017-02-23

## 2017-02-23 VITALS — DIASTOLIC BLOOD PRESSURE: 81 MMHG | RESPIRATION RATE: 17 BRPM | SYSTOLIC BLOOD PRESSURE: 124 MMHG | HEART RATE: 87 BPM

## 2017-02-23 DIAGNOSIS — M51.26 DISPLACEMENT OF LUMBAR INTERVERTEBRAL DISC WITHOUT MYELOPATHY: ICD-10-CM

## 2017-02-23 DIAGNOSIS — M51.37 DEGENERATION OF LUMBAR OR LUMBOSACRAL INTERVERTEBRAL DISC: ICD-10-CM

## 2017-02-23 DIAGNOSIS — M51.36 ANNULAR TEAR OF LUMBAR DISC: ICD-10-CM

## 2017-02-23 DIAGNOSIS — Z79.899 ENCOUNTER FOR LONG-TERM (CURRENT) DRUG USE: Primary | ICD-10-CM

## 2017-02-23 DIAGNOSIS — M96.1 POSTLAMINECTOMY SYNDROME, CERVICAL REGION: ICD-10-CM

## 2017-02-23 DIAGNOSIS — G89.4 CHRONIC PAIN SYNDROME: ICD-10-CM

## 2017-02-23 DIAGNOSIS — M54.2 CERVICALGIA: ICD-10-CM

## 2017-02-23 DIAGNOSIS — M25.50 PAIN IN JOINT, MULTIPLE SITES: ICD-10-CM

## 2017-02-23 DIAGNOSIS — G89.29 CHRONIC BILATERAL LOW BACK PAIN WITHOUT SCIATICA: ICD-10-CM

## 2017-02-23 DIAGNOSIS — G89.29 OTHER CHRONIC PAIN: ICD-10-CM

## 2017-02-23 DIAGNOSIS — M54.50 CHRONIC BILATERAL LOW BACK PAIN WITHOUT SCIATICA: ICD-10-CM

## 2017-02-23 LAB
ALCOHOL UR POC: NORMAL
AMPHETAMINES UR POC: NEGATIVE
BARBITURATES UR POC: NEGATIVE
BENZODIAZEPINES UR POC: NORMAL
BUPRENORPHINE UR POC: NORMAL
CANNABINOIDS UR POC: NEGATIVE
CARISOPRODOL UR POC: NORMAL
COCAINE UR POC: NEGATIVE
FENTANYL UR POC: NORMAL
MDMA/ECSTASY UR POC: NEGATIVE
METHADONE UR POC: NEGATIVE
METHAMPHETAMINE UR POC: NEGATIVE
METHYLPHENIDATE UR POC: NEGATIVE
OPIATES UR POC: NORMAL
OXYCODONE UR POC: NORMAL
PHENCYCLIDINE UR POC: NEGATIVE
PROPOXYPHENE UR POC: NORMAL
TRAMADOL UR POC: NORMAL
TRICYCLICS UR POC: NEGATIVE

## 2017-02-23 RX ORDER — MORPHINE SULFATE 60 MG/1
60 TABLET, FILM COATED, EXTENDED RELEASE ORAL EVERY 12 HOURS
Qty: 60 TAB | Refills: 0 | Status: SHIPPED | OUTPATIENT
Start: 2017-04-06 | End: 2017-02-24 | Stop reason: SDUPTHER

## 2017-02-23 RX ORDER — MORPHINE SULFATE 60 MG/1
60 TABLET, FILM COATED, EXTENDED RELEASE ORAL EVERY 12 HOURS
Qty: 60 TAB | Refills: 0 | Status: SHIPPED | OUTPATIENT
Start: 2017-03-07 | End: 2017-04-24 | Stop reason: SDUPTHER

## 2017-02-23 RX ORDER — OXYCODONE HYDROCHLORIDE 10 MG/1
10 TABLET ORAL
Qty: 90 TAB | Refills: 0 | Status: SHIPPED | OUTPATIENT
Start: 2017-04-06 | End: 2017-02-27

## 2017-02-23 RX ORDER — OXYCODONE HYDROCHLORIDE 10 MG/1
10 TABLET ORAL
Qty: 90 TAB | Refills: 0 | Status: SHIPPED | OUTPATIENT
Start: 2017-03-07 | End: 2017-02-27

## 2017-02-23 NOTE — PROGRESS NOTES
Nursing Notes    Patient presents to the office today in follow-up. Reviewed medications with counts as follows:    Rx Date filled Qty Dispensed Pill Count Last Dose Short   Oxycodone 10 mg 2/5/17 90 36 Last night no   Morphine er 60 mg 2/5/17 60 20 This am. 1 dose no   Ms. Hutchinson has a reminder for a \"due or due soon\" health maintenance. I have asked that she contact her primary care provider for follow-up on this health maintenance. Comments: reports she will have her gallbladder removed 2/27/17 at [facility]     POC UDS was performed in office today    Any new labs or imaging since last appointment? YES  Labs  Chest xray   EKG    Have you been to an emergency room (ER) or urgent care clinic since your last visit? NO            Have you been hospitalized since your last visit? NO     If yes, where, when, and reason for visit? Have you seen or consulted any other health care providers outside of the [address] [place] [address]  since your last visit? NO     If yes, where, when, and reason for visit?

## 2017-02-23 NOTE — MR AVS SNAPSHOT
Visit Information Date & Time Provider Department Dept. Phone Encounter #  
 2/23/2017 12:00 PM Natali Mcmahon, [facility] [phone] [id number] Follow-up Instructions Return in about 2 months (around 4/23/2017). Your Appointments 3/28/2017 10:00 AM  
POST OP with MD PEDRO [facility] ([facility]) Appt Note: Re: Post-op [address] [place] [id number] [address] [facility] Upcoming Health Maintenance Date Due DTaP/Tdap/Td series (1 - Tdap) 3/30/1975 BREAST CANCER SCRN MAMMOGRAM 12/2/2017 COLONOSCOPY 6/6/2019 PAP AKA CERVICAL CYTOLOGY 6/21/2019 Allergies as of 2/23/2017  Review Complete On: 2/23/2017 By: BETO Crockett Severity Noted Reaction Type Reactions Aspirin    Other (comments) Stomach bleeding Macrobid [Nitrofurantoin Monohyd/m-cryst]  11/25/2015    Nausea and Vomiting Nsaids (Non-steroidal Anti-inflammatory Drug)    Nausea and Vomiting Opana [Oxymorphone]  11/29/2016    Other (comments) Insomnia, weight loss, nightmares Pcn [Penicillins]    Hives Current Immunizations  Never Reviewed Name Date Pneumococcal Polysaccharide (PPSV-23) 3/7/2014 Not reviewed this visit You Were Diagnosed With   
  
 Codes Comments Encounter for long-term (current) drug use    -  Primary ICD-10-CM: M15.670 ICD-9-CM: V58.69 Annular tear of lumbar disc     ICD-10-CM: M51.36 
ICD-9-CM: 722.52 Cervicalgia     ICD-10-CM: M54.2 ICD-9-CM: 723.1 Chronic pain syndrome     ICD-10-CM: G89.4 ICD-9-CM: 338.4 Degeneration of lumbar or lumbosacral intervertebral disc     ICD-10-CM: M51.37 
ICD-9-CM: 722.52 Displacement of lumbar intervertebral disc without myelopathy     ICD-10-CM: M51.26 
ICD-9-CM: 722.10 Chronic bilateral low back pain without sciatica     ICD-10-CM: M54.5, G89.29 ICD-9-CM: 724.2, 338.29 Pain in joint, multiple sites     ICD-10-CM: M25.50 ICD-9-CM: 719.49 Other chronic pain     ICD-10-CM: G89.29 ICD-9-CM: 338.29 Postlaminectomy syndrome, cervical region     ICD-10-CM: M96.1 ICD-9-CM: 722.81 Vitals BP  
  
  
  
  
  
 124/81 Vitals History Preferred Pharmacy Pharmacy Name Phone [id number] - [address] [phone] Your Updated Medication List  
  
   
This list is accurate as of: 2/23/17 12:18 PM.  Always use your most recent med list.  
  
  
  
  
 albuterol 2.5 mg /3 mL (0.083 %) nebulizer solution Commonly known as:  PROVENTIL VENTOLIN  
2.5 mg.  
  
 atorvastatin 40 mg tablet Commonly known as:  LIPITOR Take 1 Tab by mouth daily. baclofen 10 mg tablet Commonly known as:  LIORESAL  
TAKE ONE TABLET BY MOUTH THREE TIMES A DAY  
  
 clindamycin 300 mg capsule Commonly known as:  CLEOCIN  
  
 DEXILANT 60 mg Cpdb Generic drug:  Dexlansoprazole 60 mg daily. ergocalciferol 50,000 unit capsule Commonly known as:  ERGOCALCIFEROL Take 1 Cap by mouth every seven (7) days. fluticasone 50 mcg/actuation nasal spray Commonly known as:  Cathlyn Huong 2 Sprays by Both Nostrils route as needed. gabapentin 400 mg capsule Commonly known as:  NEURONTIN  
400 mg two (2) times a day. GEODON 80 mg capsule Generic drug:  ziprasidone Take 160 mg by mouth two (2) times daily (with meals). HYDROcodone-acetaminophen 5-325 mg per tablet Commonly known as:  NORCO  
  
 ipratropium 0.02 % nebulizer solution Commonly known as:  ATROVENT  
0.5 mg.  
  
 levoFLOXacin 500 mg tablet Commonly known as:  Thersia Bring Take 1 Tab by mouth daily. LORazepam 1 mg tablet Commonly known as:  ATIVAN Take 1 mg by mouth two (2) times daily as needed. losartan 25 mg tablet Commonly known as:  COZAAR  
TAKE 1 TABLET BY MOUTH EVERY DAY  
  
 LUNESTA 1 mg tablet Generic drug:  eszopiclone Take  by mouth nightly. metroNIDAZOLE 500 mg tablet Commonly known as:  FLAGYL Take 1 Tab by mouth three (3) times daily. * morphine CR 60 mg CR tablet Commonly known as:  MS CONTIN Take 1 Tab by mouth every twelve (12) hours. Max Daily Amount: 120 mg.  
Start taking on:  3/7/2017 * morphine CR 60 mg CR tablet Commonly known as:  MS CONTIN Take 1 Tab by mouth every twelve (12) hours. Max Daily Amount: 120 mg.  
Start taking on:  4/6/2017  
  
 neomycin-colist-hydrocortisone-thonzonium otic suspension Commonly known as:  CORTISPORIN TC,COLY-MYCIN S Administer 2 Drops into each ear four (4) times daily. neomycin-polymyxin-hydrocortisone (buffered) 3.5-10,000-1 mg/mL-unit/mL-% otic suspension Commonly known as:  Christelle Petersen Administer 3 Drops in left ear four (4) times daily. * oxyCODONE IR 10 mg Tab immediate release tablet Commonly known as:  [place] Take 1 Tab by mouth three (3) times daily as needed. Max Daily Amount: 30 mg. Start taking on:  3/7/2017 * oxyCODONE IR 10 mg Tab immediate release tablet Commonly known as:  [place] Take 1 Tab by mouth three (3) times daily as needed. Max Daily Amount: 30 mg. Start taking on:  4/6/2017  
  
 oxyMORphone 10 mg tablet Commonly known as:  OPANA SR  
  
 polyethylene glycol 17 gram packet Commonly known as:  Ronne Clotilde Take 1 Packet by mouth daily. sertraline 100 mg tablet Commonly known as:  ZOLOFT Take  by mouth daily. * sucralfate 1 gram tablet Commonly known as:  Benna Merlin * sucralfate 1 gram tablet Commonly known as:  CARAFATE  
1 g.  
  
 temazepam 15 mg capsule Commonly known as:  RESTORIL Take  by mouth nightly as needed for Sleep.  
  
 traZODone 100 mg tablet Commonly known as:  Luis F Lewis Take 100 mg by mouth nightly. * Notice: This list has 6 medication(s) that are the same as other medications prescribed for you. Read the directions carefully, and ask your doctor or other care provider to review them with you. Prescriptions Printed Refills  
 morphine CR (MS CONTIN) 60 mg CR tablet 0 Starting on: 3/7/2017 Sig: Take 1 Tab by mouth every twelve (12) hours. Max Daily Amount: 120 mg.  
 Class: Print Route: Oral  
 morphine CR (MS CONTIN) 60 mg CR tablet 0 Starting on: 4/6/2017 Sig: Take 1 Tab by mouth every twelve (12) hours. Max Daily Amount: 120 mg.  
 Class: Print Route: Oral  
 oxyCODONE IR (ROXICODONE) 10 mg tab immediate release tablet 0 Starting on: 3/7/2017 Sig: Take 1 Tab by mouth three (3) times daily as needed. Max Daily Amount: 30 mg.  
 Class: Print Route: Oral  
 oxyCODONE IR (ROXICODONE) 10 mg tab immediate release tablet 0 Starting on: 4/6/2017 Sig: Take 1 Tab by mouth three (3) times daily as needed. Max Daily Amount: 30 mg.  
 Class: Print Route: Oral  
  
We Performed the Following AMB POC DRUG SCREEN () [ \[facility]\""] DRUG SCREEN [[id number] Custom] Follow-up Instructions Return in about 2 months (around 4/23/2017). Patient Instructions 1. Continue current plan. Stable on current medication without adverse events. 2. Refill morphine ER 60 mg 2 times daily. 3. Refill oxycodone 10 mg up to 3 times daily as needed for pain. 4. Continue baclofen 10 mg. Up to 3 times daily as needed. 5. Discussed risks of addiction, dependency, and opioid induced hyperalgesia. 6. Return to clinic in 2 months Please provide this summary of care documentation to your next provider. Your primary care clinician is listed as [id number] [facility]. If you have any questions after today's visit, please call [phone].

## 2017-02-23 NOTE — PROGRESS NOTES
HISTORY OF PRESENT ILLNESS  Salome Talamantes is a 57 y.o. female    HPI: Ms. Glen Seth  returns today for f/u of chronic severe pain involving the lumbar spine, which has been attributed to a lumbar degenerative disc disease with spondylosis. In addition, she has neck pain attributed to a post cervical laminectomy syndrome. No h/o lumbar surgery. PT and injections in the past with no improvement. She reports some mild worsening back pain. She says that she has been doing well since last visit but bent forward while she was at the lab today and exacerbated her back pain. I have recommended that we add home exercise program.  Exercise/stretching were demonstrated in office today. She reports that she is otherwise doing well with no other complaints today. We will have her follow-up in 2 months for further assessment. Current medication management consists of MS Contin 60 mg BID,  Oxycodone IR 10 mg BID PRN, and Baclofen 10 mg TID PRN. Medications are helping with pain control and quality of life. Her pain is 2-3/10 with medication and 10/10 without. Pt describes pain as aching. Aggravating factors include standing, sitting, walking. Relieved with rest, sitting, lying down, and avoiding painful activities. Current treatment is helping to improve general activity, mood, walking, sleep, enjoyment of life    She  is otherwise doing well with no other complaints today. She denies any adverse events including nausea, vomiting, dizziness, constipation, hallucinations, or seizures. POC UDS today. Confirmation pending.          Allergies   Allergen Reactions    Aspirin Other (comments)     Stomach bleeding    Macrobid [Nitrofurantoin Monohyd/M-Cryst] Nausea and Vomiting    Nsaids (Non-Steroidal Anti-Inflammatory Drug) Nausea and Vomiting    Opana [Oxymorphone] Other (comments)     Insomnia, weight loss, nightmares    Pcn [Penicillins] Hives       Past Surgical History:   Procedure Laterality Date    HX CERVICAL FUSION      HX HYSTERECTOMY      HX ORTHOPAEDIC      ganglion cyst removed         Review of Systems   Constitutional: Negative for chills, fever and weight loss. HENT: Negative for congestion, ear discharge and sore throat. Eyes: Negative for blurred vision and double vision. Respiratory: Negative for cough, shortness of breath and wheezing. Cardiovascular: Negative for chest pain and palpitations. Gastrointestinal: Negative for abdominal pain, constipation, diarrhea, heartburn, nausea and vomiting. Genitourinary: Positive for dysuria, frequency, hematuria and urgency. Musculoskeletal: Positive for back pain and joint pain (left knee  ). Negative for falls and neck pain. Skin: Negative for itching and rash. Neurological: Negative for dizziness, seizures, loss of consciousness and headaches. Endo/Heme/Allergies: Does not bruise/bleed easily. Psychiatric/Behavioral: Positive for depression. Negative for suicidal ideas. The patient has insomnia. The patient is not nervous/anxious. Physical Exam   Constitutional: She is oriented to person, place, and time and well-developed, well-nourished, and in no distress. No distress. HENT:   Head: Normocephalic and atraumatic. Eyes: EOM are normal.   Neck: Normal range of motion. Pulmonary/Chest: Effort normal.   Musculoskeletal: Normal range of motion. surgical scar on left knee from prior surgery. Neurological: She is alert and oriented to person, place, and time. Gait abnormal.   Skin: Skin is warm and dry. No rash noted. She is not diaphoretic. No erythema. Psychiatric: Memory and judgment normal.   Nursing note and vitals reviewed. ASSESSMENT:    1. Encounter for long-term (current) drug use    2. Annular tear of lumbar disc    3. Cervicalgia    4. Chronic pain syndrome    5. Degeneration of lumbar or lumbosacral intervertebral disc    6. Displacement of lumbar intervertebral disc without myelopathy    7.  Chronic bilateral low back pain without sciatica    8. Pain in joint, multiple sites    9. Other chronic pain    10. Postlaminectomy syndrome, cervical region           [facility] Program was reviewed which does not demonstrate aberrancies and/or inconsistencies with regard to the historical prescribing of controlled medications to this patient by other providers. NOTE: Disclosed hydrocodone from ER visit filled 12/22/2016    PLAN / Pt Instructions:  1. Continue current plan. Stable on current medication without adverse events. 2. Refill morphine ER 60 mg 2 times daily. 3. Refill oxycodone 10 mg up to 3 times daily as needed for pain. 4. Continue baclofen 10 mg. Up to 3 times daily as needed. 5. Add home exercise program.  Exercise/stretching were demonstrated in office today. 6. Discussed risks of addiction, dependency, and opioid induced hyperalgesia. 7. Return to clinic in 2 months     Medications Ordered Today   Medications    morphine CR (MS CONTIN) 60 mg CR tablet     Sig: Take 1 Tab by mouth every twelve (12) hours. Max Daily Amount: 120 mg. Dispense:  60 Tab     Refill:  0    morphine CR (MS CONTIN) 60 mg CR tablet     Sig: Take 1 Tab by mouth every twelve (12) hours. Max Daily Amount: 120 mg. Dispense:  60 Tab     Refill:  0    oxyCODONE IR (ROXICODONE) 10 mg tab immediate release tablet     Sig: Take 1 Tab by mouth three (3) times daily as needed. Max Daily Amount: 30 mg. Dispense:  90 Tab     Refill:  0    oxyCODONE IR (ROXICODONE) 10 mg tab immediate release tablet     Sig: Take 1 Tab by mouth three (3) times daily as needed. Max Daily Amount: 30 mg. Dispense:  90 Tab     Refill:  0       Pain medications prescribed with the objective of pain relief and improved physical and psychosocial function in this patient.     Spent 25 minutes with patient today reviewing the treatment plan, goals of treatment plan, and limitations of the treatment plan, to include the potential for side effects from medications and procedures. Issa Tinoco, [address] 2/23/2017      Note: Please excuse any typographical errors. Voice recognition software was used for this note and may cause mistakes.

## 2017-02-23 NOTE — PATIENT INSTRUCTIONS
1. Continue current plan. Stable on current medication without adverse events. 2. Refill morphine ER 60 mg 2 times daily. 3. Refill oxycodone 10 mg up to 3 times daily as needed for pain. 4. Continue baclofen 10 mg. Up to 3 times daily as needed. 5. Add home exercise program.  Exercise/stretching were demonstrated in office today. 6. Discussed risks of addiction, dependency, and opioid induced hyperalgesia.    7. Return to clinic in 2 months

## 2017-02-24 ENCOUNTER — ANESTHESIA EVENT (OUTPATIENT)
Dept: SURGERY | Age: 62
End: 2017-02-24
Payer: MEDICARE

## 2017-02-27 ENCOUNTER — HOSPITAL ENCOUNTER (OUTPATIENT)
Age: 62
Setting detail: OUTPATIENT SURGERY
Discharge: HOME | End: 2017-02-27
Attending: SURGERY | Admitting: SURGERY
Payer: MEDICARE

## 2017-02-27 ENCOUNTER — TELEPHONE (OUTPATIENT)
Dept: PAIN MANAGEMENT | Age: 62
End: 2017-02-27

## 2017-02-27 ENCOUNTER — SURGERY (OUTPATIENT)
Age: 62
End: 2017-02-27

## 2017-02-27 ENCOUNTER — DOCUMENTATION ONLY (OUTPATIENT)
Dept: PAIN MANAGEMENT | Age: 62
End: 2017-02-27

## 2017-02-27 ENCOUNTER — ANESTHESIA (OUTPATIENT)
Dept: SURGERY | Age: 62
End: 2017-02-27
Payer: MEDICARE

## 2017-02-27 VITALS
HEIGHT: 64 IN | HEART RATE: 75 BPM | WEIGHT: 162.13 LBS | BODY MASS INDEX: 27.68 KG/M2 | RESPIRATION RATE: 20 BRPM | OXYGEN SATURATION: 98 % | TEMPERATURE: 97.6 F | SYSTOLIC BLOOD PRESSURE: 124 MMHG | DIASTOLIC BLOOD PRESSURE: 77 MMHG

## 2017-02-27 LAB
AMPHET UR QL SCN: NEGATIVE
BARBITURATES UR QL SCN: NEGATIVE
BENZODIAZ UR QL: POSITIVE
CANNABINOIDS UR QL SCN: NEGATIVE
COCAINE UR QL SCN: NEGATIVE
HDSCOM,HDSCOM: ABNORMAL
METHADONE UR QL: NEGATIVE
OPIATES UR QL: POSITIVE
PCP UR QL: NEGATIVE

## 2017-02-27 PROCEDURE — 74011250636 HC RX REV CODE- 250/636: Performed by: NURSE ANESTHETIST, CERTIFIED REGISTERED

## 2017-02-27 PROCEDURE — 77030035488 HC SEAL UNIV DISP INTU -C: Performed by: SURGERY

## 2017-02-27 PROCEDURE — 74011250636 HC RX REV CODE- 250/636: Performed by: ANESTHESIOLOGY

## 2017-02-27 PROCEDURE — 74011000250 HC RX REV CODE- 250

## 2017-02-27 PROCEDURE — 74011000250 HC RX REV CODE- 250: Performed by: SURGERY

## 2017-02-27 PROCEDURE — 74011250636 HC RX REV CODE- 250/636

## 2017-02-27 PROCEDURE — 77030008683 HC TU ET CUF COVD -A: Performed by: ANESTHESIOLOGY

## 2017-02-27 PROCEDURE — 88304 TISSUE EXAM BY PATHOLOGIST: CPT | Performed by: SURGERY

## 2017-02-27 PROCEDURE — 77030035048 HC TRCR ENDOSC OPTCL COVD -B: Performed by: SURGERY

## 2017-02-27 PROCEDURE — 74011000254 HC RX REV CODE- 254: Performed by: SURGERY

## 2017-02-27 PROCEDURE — 77030032490 HC SLV COMPR SCD KNE COVD -B: Performed by: SURGERY

## 2017-02-27 PROCEDURE — 77030010939 HC CLP LIG TELE -B: Performed by: SURGERY

## 2017-02-27 PROCEDURE — 77030003028 HC SUT VCRL J&J -A: Performed by: SURGERY

## 2017-02-27 PROCEDURE — 77030008771 HC TU NG SALEM SUMP -A: Performed by: ANESTHESIOLOGY

## 2017-02-27 PROCEDURE — 77030010507 HC ADH SKN DERMBND J&J -B: Performed by: SURGERY

## 2017-02-27 PROCEDURE — 77030011256 HC DRSG MEPILEX <16IN NO BORD MOLN -A: Performed by: SURGERY

## 2017-02-27 PROCEDURE — 74011250636 HC RX REV CODE- 250/636: Performed by: SURGERY

## 2017-02-27 PROCEDURE — 77030020782 HC GWN BAIR PAWS FLX 3M -B: Performed by: SURGERY

## 2017-02-27 PROCEDURE — 74011250637 HC RX REV CODE- 250/637: Performed by: NURSE ANESTHETIST, CERTIFIED REGISTERED

## 2017-02-27 PROCEDURE — 76210000016 HC OR PH I REC 1 TO 1.5 HR: Performed by: SURGERY

## 2017-02-27 PROCEDURE — 77030011640 HC PAD GRND REM COVD -A: Performed by: SURGERY

## 2017-02-27 PROCEDURE — 76210000021 HC REC RM PH II 0.5 TO 1 HR: Performed by: SURGERY

## 2017-02-27 PROCEDURE — 76060000033 HC ANESTHESIA 1 TO 1.5 HR: Performed by: SURGERY

## 2017-02-27 PROCEDURE — 77030031492 HC PRT ACC BLNT AIRSEAL CNMD -B: Performed by: SURGERY

## 2017-02-27 PROCEDURE — 77030033188 HC TBNG FLTRD BIIFUR DISP CNMD -C: Performed by: SURGERY

## 2017-02-27 PROCEDURE — 76010000934 HC OR TIME 1 TO 1.5HR INTENSV - TIER 2: Performed by: SURGERY

## 2017-02-27 PROCEDURE — 77030018836 HC SOL IRR NACL ICUM -A: Performed by: SURGERY

## 2017-02-27 PROCEDURE — 77030018706 HC CORD MPLR COVD -A: Performed by: SURGERY

## 2017-02-27 PROCEDURE — 77030002933 HC SUT MCRYL J&J -A: Performed by: SURGERY

## 2017-02-27 PROCEDURE — 80307 DRUG TEST PRSMV CHEM ANLYZR: CPT | Performed by: ANESTHESIOLOGY

## 2017-02-27 RX ORDER — LIDOCAINE HYDROCHLORIDE 20 MG/ML
INJECTION, SOLUTION EPIDURAL; INFILTRATION; INTRACAUDAL; PERINEURAL AS NEEDED
Status: DISCONTINUED | OUTPATIENT
Start: 2017-02-27 | End: 2017-02-27 | Stop reason: HOSPADM

## 2017-02-27 RX ORDER — DEXAMETHASONE SODIUM PHOSPHATE 4 MG/ML
INJECTION, SOLUTION INTRA-ARTICULAR; INTRALESIONAL; INTRAMUSCULAR; INTRAVENOUS; SOFT TISSUE AS NEEDED
Status: DISCONTINUED | OUTPATIENT
Start: 2017-02-27 | End: 2017-02-27 | Stop reason: HOSPADM

## 2017-02-27 RX ORDER — ROCURONIUM BROMIDE 10 MG/ML
INJECTION, SOLUTION INTRAVENOUS AS NEEDED
Status: DISCONTINUED | OUTPATIENT
Start: 2017-02-27 | End: 2017-02-27 | Stop reason: HOSPADM

## 2017-02-27 RX ORDER — METRONIDAZOLE 500 MG/100ML
500 INJECTION, SOLUTION INTRAVENOUS
Status: COMPLETED | OUTPATIENT
Start: 2017-02-27 | End: 2017-02-27

## 2017-02-27 RX ORDER — PROPOFOL 10 MG/ML
INJECTION, EMULSION INTRAVENOUS AS NEEDED
Status: DISCONTINUED | OUTPATIENT
Start: 2017-02-27 | End: 2017-02-27 | Stop reason: HOSPADM

## 2017-02-27 RX ORDER — SODIUM CHLORIDE, SODIUM LACTATE, POTASSIUM CHLORIDE, CALCIUM CHLORIDE 600; 310; 30; 20 MG/100ML; MG/100ML; MG/100ML; MG/100ML
25 INJECTION, SOLUTION INTRAVENOUS CONTINUOUS
Status: DISCONTINUED | OUTPATIENT
Start: 2017-02-27 | End: 2017-02-27 | Stop reason: HOSPADM

## 2017-02-27 RX ORDER — SODIUM CHLORIDE, SODIUM LACTATE, POTASSIUM CHLORIDE, CALCIUM CHLORIDE 600; 310; 30; 20 MG/100ML; MG/100ML; MG/100ML; MG/100ML
50 INJECTION, SOLUTION INTRAVENOUS CONTINUOUS
Status: DISCONTINUED | OUTPATIENT
Start: 2017-02-27 | End: 2017-02-27 | Stop reason: HOSPADM

## 2017-02-27 RX ORDER — ONDANSETRON 2 MG/ML
4 INJECTION INTRAMUSCULAR; INTRAVENOUS AS NEEDED
Status: DISCONTINUED | OUTPATIENT
Start: 2017-02-27 | End: 2017-02-27 | Stop reason: HOSPADM

## 2017-02-27 RX ORDER — MIDAZOLAM HYDROCHLORIDE 1 MG/ML
INJECTION, SOLUTION INTRAMUSCULAR; INTRAVENOUS AS NEEDED
Status: DISCONTINUED | OUTPATIENT
Start: 2017-02-27 | End: 2017-02-27 | Stop reason: HOSPADM

## 2017-02-27 RX ORDER — OXYCODONE AND ACETAMINOPHEN 10; 325 MG/1; MG/1
1 TABLET ORAL
Status: DISCONTINUED | OUTPATIENT
Start: 2017-02-27 | End: 2017-02-27 | Stop reason: HOSPADM

## 2017-02-27 RX ORDER — HYDROMORPHONE HYDROCHLORIDE 2 MG/ML
0.5 INJECTION, SOLUTION INTRAMUSCULAR; INTRAVENOUS; SUBCUTANEOUS
Status: COMPLETED | OUTPATIENT
Start: 2017-02-27 | End: 2017-02-27

## 2017-02-27 RX ORDER — DOCUSATE SODIUM 100 MG/1
100 CAPSULE, LIQUID FILLED ORAL 2 TIMES DAILY
Qty: 60 CAP | Refills: 2 | Status: SHIPPED | OUTPATIENT
Start: 2017-02-27 | End: 2017-03-29

## 2017-02-27 RX ORDER — INDOCYANINE GREEN AND WATER 25 MG
3 KIT INJECTION
Status: COMPLETED | OUTPATIENT
Start: 2017-02-27 | End: 2017-02-27

## 2017-02-27 RX ORDER — FAMOTIDINE 20 MG/1
20 TABLET, FILM COATED ORAL ONCE
Status: COMPLETED | OUTPATIENT
Start: 2017-02-27 | End: 2017-02-27

## 2017-02-27 RX ORDER — SUCCINYLCHOLINE CHLORIDE 20 MG/ML
INJECTION INTRAMUSCULAR; INTRAVENOUS AS NEEDED
Status: DISCONTINUED | OUTPATIENT
Start: 2017-02-27 | End: 2017-02-27 | Stop reason: HOSPADM

## 2017-02-27 RX ORDER — GLYCOPYRROLATE 0.2 MG/ML
INJECTION INTRAMUSCULAR; INTRAVENOUS AS NEEDED
Status: DISCONTINUED | OUTPATIENT
Start: 2017-02-27 | End: 2017-02-27 | Stop reason: HOSPADM

## 2017-02-27 RX ORDER — SODIUM CHLORIDE, SODIUM LACTATE, POTASSIUM CHLORIDE, CALCIUM CHLORIDE 600; 310; 30; 20 MG/100ML; MG/100ML; MG/100ML; MG/100ML
75 INJECTION, SOLUTION INTRAVENOUS CONTINUOUS
Status: DISCONTINUED | OUTPATIENT
Start: 2017-02-27 | End: 2017-02-27 | Stop reason: HOSPADM

## 2017-02-27 RX ORDER — LEVOFLOXACIN 5 MG/ML
500 INJECTION, SOLUTION INTRAVENOUS
Status: COMPLETED | OUTPATIENT
Start: 2017-02-27 | End: 2017-02-27

## 2017-02-27 RX ORDER — FENTANYL CITRATE 50 UG/ML
INJECTION, SOLUTION INTRAMUSCULAR; INTRAVENOUS AS NEEDED
Status: DISCONTINUED | OUTPATIENT
Start: 2017-02-27 | End: 2017-02-27 | Stop reason: HOSPADM

## 2017-02-27 RX ORDER — FAMOTIDINE 10 MG/ML
20 INJECTION INTRAVENOUS EVERY 12 HOURS
Status: DISCONTINUED | OUTPATIENT
Start: 2017-02-27 | End: 2017-02-27 | Stop reason: HOSPADM

## 2017-02-27 RX ORDER — SODIUM CHLORIDE 0.9 % (FLUSH) 0.9 %
5-10 SYRINGE (ML) INJECTION AS NEEDED
Status: DISCONTINUED | OUTPATIENT
Start: 2017-02-27 | End: 2017-02-27 | Stop reason: HOSPADM

## 2017-02-27 RX ORDER — OXYCODONE AND ACETAMINOPHEN 10; 325 MG/1; MG/1
1 TABLET ORAL
Qty: 50 TAB | Refills: 0 | Status: SHIPPED | OUTPATIENT
Start: 2017-02-27 | End: 2017-04-10 | Stop reason: ALTCHOICE

## 2017-02-27 RX ORDER — ONDANSETRON 2 MG/ML
INJECTION INTRAMUSCULAR; INTRAVENOUS AS NEEDED
Status: DISCONTINUED | OUTPATIENT
Start: 2017-02-27 | End: 2017-02-27 | Stop reason: HOSPADM

## 2017-02-27 RX ORDER — NEOSTIGMINE METHYLSULFATE 5 MG/5 ML
SYRINGE (ML) INTRAVENOUS AS NEEDED
Status: DISCONTINUED | OUTPATIENT
Start: 2017-02-27 | End: 2017-02-27 | Stop reason: HOSPADM

## 2017-02-27 RX ORDER — BUPIVACAINE HYDROCHLORIDE AND EPINEPHRINE 2.5; 5 MG/ML; UG/ML
INJECTION, SOLUTION EPIDURAL; INFILTRATION; INTRACAUDAL; PERINEURAL AS NEEDED
Status: DISCONTINUED | OUTPATIENT
Start: 2017-02-27 | End: 2017-02-27 | Stop reason: HOSPADM

## 2017-02-27 RX ADMIN — Medication 3 MG: at 08:53

## 2017-02-27 RX ADMIN — MEPERIDINE HYDROCHLORIDE 12.5 MG: 25 INJECTION INTRAMUSCULAR; INTRAVENOUS; SUBCUTANEOUS at 09:28

## 2017-02-27 RX ADMIN — HYDROMORPHONE HYDROCHLORIDE 0.5 MG: 2 INJECTION, SOLUTION INTRAMUSCULAR; INTRAVENOUS; SUBCUTANEOUS at 09:30

## 2017-02-27 RX ADMIN — SODIUM CHLORIDE, SODIUM LACTATE, POTASSIUM CHLORIDE, AND CALCIUM CHLORIDE: 600; 310; 30; 20 INJECTION, SOLUTION INTRAVENOUS at 07:13

## 2017-02-27 RX ADMIN — INDOCYANINE GREEN AND WATER 3 MG: KIT at 03:00

## 2017-02-27 RX ADMIN — FAMOTIDINE 20 MG: 20 TABLET ORAL at 06:59

## 2017-02-27 RX ADMIN — SUCCINYLCHOLINE CHLORIDE 120 MG: 20 INJECTION INTRAMUSCULAR; INTRAVENOUS at 07:54

## 2017-02-27 RX ADMIN — ONDANSETRON 4 MG: 2 INJECTION INTRAMUSCULAR; INTRAVENOUS at 09:19

## 2017-02-27 RX ADMIN — SODIUM CHLORIDE, SODIUM LACTATE, POTASSIUM CHLORIDE, AND CALCIUM CHLORIDE 50 ML/HR: 600; 310; 30; 20 INJECTION, SOLUTION INTRAVENOUS at 07:13

## 2017-02-27 RX ADMIN — PROPOFOL 150 MG: 10 INJECTION, EMULSION INTRAVENOUS at 07:54

## 2017-02-27 RX ADMIN — GLYCOPYRROLATE 0.2 MG: 0.2 INJECTION INTRAMUSCULAR; INTRAVENOUS at 08:05

## 2017-02-27 RX ADMIN — METRONIDAZOLE 500 MG: 500 SOLUTION INTRAVENOUS at 07:43

## 2017-02-27 RX ADMIN — ROCURONIUM BROMIDE 10 MG: 10 INJECTION, SOLUTION INTRAVENOUS at 07:54

## 2017-02-27 RX ADMIN — ROCURONIUM BROMIDE 20 MG: 10 INJECTION, SOLUTION INTRAVENOUS at 08:00

## 2017-02-27 RX ADMIN — HYDROMORPHONE HYDROCHLORIDE 0.5 MG: 2 INJECTION, SOLUTION INTRAMUSCULAR; INTRAVENOUS; SUBCUTANEOUS at 09:40

## 2017-02-27 RX ADMIN — LEVOFLOXACIN 500 MG: 500 INJECTION, SOLUTION INTRAVENOUS at 07:50

## 2017-02-27 RX ADMIN — HYDROMORPHONE HYDROCHLORIDE 0.5 MG: 2 INJECTION, SOLUTION INTRAMUSCULAR; INTRAVENOUS; SUBCUTANEOUS at 09:50

## 2017-02-27 RX ADMIN — LIDOCAINE HYDROCHLORIDE 60 MG: 20 INJECTION, SOLUTION EPIDURAL; INFILTRATION; INTRACAUDAL; PERINEURAL at 07:54

## 2017-02-27 RX ADMIN — FENTANYL CITRATE 100 MCG: 50 INJECTION, SOLUTION INTRAMUSCULAR; INTRAVENOUS at 07:54

## 2017-02-27 RX ADMIN — DEXAMETHASONE SODIUM PHOSPHATE 4 MG: 4 INJECTION, SOLUTION INTRA-ARTICULAR; INTRALESIONAL; INTRAMUSCULAR; INTRAVENOUS; SOFT TISSUE at 08:05

## 2017-02-27 RX ADMIN — GLYCOPYRROLATE 0.6 MG: 0.2 INJECTION INTRAMUSCULAR; INTRAVENOUS at 08:53

## 2017-02-27 RX ADMIN — HYDROMORPHONE HYDROCHLORIDE 0.5 MG: 2 INJECTION, SOLUTION INTRAMUSCULAR; INTRAVENOUS; SUBCUTANEOUS at 09:20

## 2017-02-27 RX ADMIN — FENTANYL CITRATE 25 MCG: 50 INJECTION, SOLUTION INTRAMUSCULAR; INTRAVENOUS at 09:03

## 2017-02-27 RX ADMIN — MIDAZOLAM HYDROCHLORIDE 1 MG: 1 INJECTION, SOLUTION INTRAMUSCULAR; INTRAVENOUS at 07:43

## 2017-02-27 RX ADMIN — ONDANSETRON 4 MG: 2 INJECTION INTRAMUSCULAR; INTRAVENOUS at 08:58

## 2017-02-27 RX ADMIN — BUPIVACAINE HYDROCHLORIDE AND EPINEPHRINE BITARTRATE 30 ML: 2.5; .0091 INJECTION, SOLUTION EPIDURAL; INFILTRATION; INTRACAUDAL; PERINEURAL at 08:48

## 2017-02-27 RX ADMIN — MEPERIDINE HYDROCHLORIDE 12.5 MG: 25 INJECTION INTRAMUSCULAR; INTRAVENOUS; SUBCUTANEOUS at 09:18

## 2017-02-27 NOTE — PERIOP NOTES
I have reviewed discharge instructions with the patient and daughter. The patient and daughter verbalized understanding. Patient armband removed and shredded.

## 2017-02-27 NOTE — ANESTHESIA PREPROCEDURE EVALUATION
Anesthetic History     PONV          Review of Systems / Medical History  Patient summary reviewed, nursing notes reviewed and pertinent labs reviewed    Pulmonary            Asthma : well controlled       Neuro/Psych   Within defined limits           Cardiovascular    Hypertension: well controlled                   GI/Hepatic/Renal     GERD           Endo/Other        Arthritis     Other Findings   Comments: Current Smoker? NO       Elective Surgery? Yes       Abstained from smoking 24 hours prior to anesthesia? N/A    Risk Factors for Postoperative nausea/vomiting:       History of postoperative nausea/vomiting? YES       Female? YES       Motion sickness? NO       Intended opioid administration for postoperative analgesia?   YES           Physical Exam    Airway  Mallampati: II  TM Distance: 4 - 6 cm  Neck ROM: normal range of motion   Mouth opening: Normal     Cardiovascular  Regular rate and rhythm,  S1 and S2 normal,  no murmur, click, rub, or gallop             Dental    Dentition: Poor dentition     Pulmonary  Breath sounds clear to auscultation               Abdominal  GI exam deferred       Other Findings            Anesthetic Plan    ASA: 3  Anesthesia type: general          Induction: Intravenous  Anesthetic plan and risks discussed with: Patient

## 2017-02-27 NOTE — OP NOTES
[place] [address] [address] [facility]                                 OPERATIVE REPORT    PATIENT:    Navin Aldana  MRN:           [id number]   DATE:   2017  BILLIN  ROOM:       OR/PL  ATTENDING:   Kym Avery MD  DICTATING:   Kym Avery MD      PREOPERATIVE DIAGNOSIS:  Chronic calculus cholecystitis    POSTOPERATIVE DIAGNOSIS: same    PROCEDURES PERFORMED: Robotic assisted laparoscopic cholecystectomy    SURGEON: Fay Burgos MD    ASSISTANT: Landon Rincon SA    ANESTHESIA: General and local 0.25% Marcaine plain. FINDINGS: cholecystitis    SPECIMENS REMOVED: Gallbladder. ESTIMATED BLOOD LOSS: 10 mL    FLUIDS:  600 mL    OPERATIVE INDICATION: chronic calculus cholecystitis    DESCRIPTION OF PROCEDURE: The patient was identified in the holding area, where consent for laparoscopic, possible open, cholecystectomy was verified. Once in the operating room, the patient was placed supine and general anesthesia was induced. The abdomen was prepped and draped in sterile fashion using chlorhexidine solution and sterile drapes. Patient had 3 mg of indocyanine green injected in the holding area. The robotic and laparoscopic equipment were assembled and proper functioning was visualized prior to making the initial incision. The local anesthetic was infiltrated into the skin and deep dermal tissues at each incision prior to the incision being made. The initial trocar was placed at the umbilicus using the optiview technique. The 5 mm 0 degree scope was assembled to the 8 mm blunt tipped trocar. Safe entry into the peritoneal cavity was visualized. The insufflation was obtained using carbon dioxide gas to 15 mmHg. The 8 mm blunt tipped trocar was then placed in the left upper quadrant. An 8 mm trocar in the right lower quadrant.   A 5 mm trocar was placed in the right subcostal position. The patient was placed in reverse Trendelenburg with the right side up. The surgical cart was guided into position and the trocars were docked. The fundus of the gallbladder was identified and retracted anterior,  cephalad, and lateral.  Blunt dissection was employed to visualize the cystic duct infundibulum junction. Firefly was used visualize the cystic duct and common bile duct. The cystic duct was dissected free circumferentially and clipped and ligated in standard fashion. The cystic artery was identified and clipped and ligated in standard fashion. The gallbladder was removed from the gallbladder fossa using electrocautery for both hemostasis and dissection. The gallbladder was removed from the peritoneal cavity at the umbilical trocar. Trocar was then reinserted. The area of the clips was inspected and found to be free of any bleeding or bile leak. The pneumoperitoneum was allowed to deflate. 4-0 Monocryl suture was used to close the incision sites in interrupted stitches. Dermabond was used as a wound sealant. The patient tolerated the procedure very well. DISPOSITION: She was extubated and stable upon transport to the recovery  room.       Cari Alcala MD, FACS

## 2017-02-27 NOTE — PROGRESS NOTES
conducted a pre-surgery visit [facility], who is a 57 y.o.,female. The  provided the following Interventions:  Initiated a relationship of care and support. Plan:  Chaplains will continue to follow and will provide pastoral care on an as needed/requested basis.  recommends bedside caregivers page  on duty if patient shows signs of acute spiritual or emotional distress.     [address] [facility] Certified [address] [place] [address]   [phone]

## 2017-02-27 NOTE — TELEPHONE ENCOUNTER
Patient left a voicemail reporting that she had surgery today. Was discharged home with hydrocodone.  Was advised to stop her roxicodone from her surgeon while taking hydrocodone

## 2017-02-27 NOTE — H&P (VIEW-ONLY)
General Surgery Consult    Subjective:      HPI: Patient is a very pleasant 50-year-old female with past medical history that is remarkable for hypercholesterolemia, hypertension, degenerative lumbar intervertebral discs, cervicalgia, post laminectomy syndrome in the cervical region, myalgia and myositis and bipolar 1 disorder. She is referred by Dr. Aura Ocampo for evaluation and management of the patient had an ultrasound of the right upper quadrant was performed at [facility] which reveals gallstones without gallbladder wall thickening. This was performed on January 13, 2017. The patient gives a history of being treated for reflux disease of the past 5 years. Over the past year she has been suffering with pain in the right upper quadrant which does not radiate. She describes as a crampy pain which is present in the evenings after fatty meals. It is sometimes accompanied by nausea. She does eat a lot of fatty food. We discussed the option of taking fatty food out of her diet as a first line management but she stated that this would be very difficult. She has had dry heaves but no emesis.     Patient Active Problem List    Diagnosis Date Noted    Hypercholesterolemia 11/07/2016    ACP (advance care planning) 10/05/2016    Lung nodule seen on imaging study 05/20/2016    Degeneration of lumbar or lumbosacral intervertebral disc 11/10/2014    Displacement of lumbar intervertebral disc without myelopathy 11/10/2014    Pain in joint, multiple sites 11/10/2014    Cervicalgia 11/10/2014    Postlaminectomy syndrome, cervical region 11/10/2014    Annular tear of lumbar disc 11/10/2014    Chronic pain syndrome 02/21/2014    Carpal tunnel syndrome 02/21/2014    [facility] 01/04/2012    Myalgia and myositis     Lumbago     Other affections of shoulder region, not elsewhere classified     Other chronic pain      Past Medical History   Diagnosis Date    Annular tear of lumbar disc 11/10/2014    Asthma      Bronchitis    Benign tumor of throat     Bone spur      right ankle    Cervicalgia 11/10/2014    Chronic pain      back    Degeneration of lumbar or lumbosacral intervertebral disc 11/10/2014    Degenerative disc disease     Displacement of lumbar intervertebral disc without myelopathy 11/10/2014    Elevated white blood cell count     Fibromyalgia     GERD (gastroesophageal reflux disease)     Hypertension     Insomnia     Nausea & vomiting     Pain in joint, multiple sites 11/10/2014    Postlaminectomy syndrome, cervical region 11/10/2014    Sinus infection 10/5/15    Ulcer ([address]       Past Surgical History   Procedure Laterality Date    Hx cervical fusion      Hx hysterectomy      Hx orthopaedic       ganglion cyst removed      Family History   Problem Relation Age of Onset    Hypertension Other     Heart Attack Other     Heart Disease Other     Stroke Other     Headache Other     Seizures Other       Social History   Substance Use Topics    Smoking status: Former Smoker    Smokeless tobacco: Never Used    Alcohol use No      Comment: none in 24 years      Allergies   Allergen Reactions    Aspirin Other (comments)     Stomach bleeding    Macrobid [Nitrofurantoin Monohyd/M-Cryst] Nausea and Vomiting    Nsaids (Non-Steroidal Anti-Inflammatory Drug) Nausea and Vomiting    Opana [Oxymorphone] Other (comments)     Insomnia, weight loss, nightmares    Pcn [Penicillins] Hives       Prior to Admission medications    Medication Sig Start Date End Date Taking? Authorizing Provider   eszopiclone (LUNESTA) 1 mg tablet Take  by mouth nightly. Yes Historical Provider   losartan (COZAAR) 25 mg tablet TAKE 1 TABLET BY MOUTH EVERY DAY 2/2/17  Yes Indira Lilly MD   ergocalciferol (ERGOCALCIFEROL) 50,000 unit capsule Take 1 Cap by mouth every seven (7) days.  2/2/17  Yes Indira Lilly MD   morphine CR (MS CONTIN) 60 mg CR tablet Take 1 Tab by mouth every twelve (12) hours. Max Daily Amount: 120 mg. 1/6/17  Yes BETO Cotto   oxyCODONE IR (ROXICODONE) 10 mg tab immediate release tablet Take 1 Tab by mouth three (3) times daily as needed. Max Daily Amount: 30 mg. 1/6/17  Yes BETO Cotto   oxyCODONE IR (ROXICODONE) 10 mg tab immediate release tablet Take 1 Tab by mouth three (3) times daily as needed. Max Daily Amount: 30 mg. 2/5/17  Yes BETO Cotto   polyethylene glycol [facility]) 17 gram packet Take 1 Packet by mouth daily. 12/2/16  Yes Citlali Vargas MD   baclofen (LIORESAL) 10 mg tablet TAKE ONE TABLET BY MOUTH THREE TIMES A DAY 11/8/16  Yes Kory Grfifin MD   atorvastatin (LIPITOR) 40 mg tablet Take 1 Tab by mouth daily. 11/7/16  Yes Citlali Vargas MD   Dexlansoprazole (DEXILANT) 60 mg CpDB 60 mg daily. 2/13/13  Yes Historical Provider   gabapentin (NEURONTIN) 400 mg capsule 400 mg two (2) times a day. 7/7/13  Yes Historical Provider   ipratropium (ATROVENT) 0.02 % nebulizer solution 0.5 mg. 1/14/13  Yes Historical Provider   fluticasone (FLONASE) 50 mcg/actuation nasal spray 2 Sprays by Both Nostrils route as needed. Yes Historical Provider   lorazepam (ATIVAN) 1 mg tablet Take 1 mg by mouth two (2) times daily as needed. Yes Historical Provider   sertraline (ZOLOFT) 100 mg tablet Take  by mouth daily. Yes Historical Provider   sucralfate (CARAFATE) 1 gram tablet 1 g. 1/13/17   Historical Provider   morphine CR (MS CONTIN) 60 mg CR tablet Take 1 Tab by mouth every twelve (12) hours. Max Daily Amount: 120 mg. 2/5/17   BETO Cotto   neomycin-polymyxin-hydrocortisone, buffered, (PEDIOTIC) 3.5-10,000-1 mg/mL-unit/mL-% otic suspension Administer 3 Drops in left ear four (4) times daily.  11/7/16   Citlali Vargas MD   clindamycin (CLEOCIN) 300 mg capsule  9/30/16   Historical Provider   HYDROcodone-acetaminophen (NORCO) 5-325 mg per tablet  9/30/16   Historical Provider   OxyMORPhone (OPANA SR) 10 mg tablet  9/15/16   Historical Provider sucralfate (CARAFATE) 1 gram tablet  8/15/16   Historical Provider   neomycin-colist-hydrocortisone-thonzonium (CORTISPORIN TC,COLY-MYCIN S) otic suspension Administer 2 Drops into each ear four (4) times daily. 7/19/16   Nel Gutiérrez MD   albuterol (PROVENTIL VENTOLIN) 2.5 mg /3 mL (0.083 %) nebulizer solution 2.5 mg. 1/14/13   Historical Provider   temazepam (RESTORIL) 15 mg capsule Take  by mouth nightly as needed for Sleep. Historical Provider   trazodone (DESYREL) 100 mg tablet Take 100 mg by mouth nightly. Historical Provider   ziprasidone hcl (GEODON) 80 mg capsule Take 160 mg by mouth two (2) times daily (with meals). Historical Provider       Review of Systems:    14 systems were reviewed. The results are as above in the HPI and otherwise negative. Objective:     Vitals:    02/08/17 0948   BP: 128/80   Pulse: 68   Resp: 16   Temp: 98.5 °F (36.9 °C)   TempSrc: Oral   Weight: 74.4 kg (164 lb)   Height: 5' 4\" (1.626 m)       Physical Exam:  GENERAL: alert, cooperative, no distress, appears stated age,   EYE: conjunctivae/corneas clear. PERRL, EOM's intact. Fundi benign,  THROAT & NECK: normal and no erythema or exudates noted. ,    LYMPHATIC: Cervical, supraclavicular, and axillary nodes normal. ,   LUNG: clear to auscultation bilaterally,   HEART: regular rate and rhythm, S1, S2 normal, no murmur, click, rub or gallop,   ABDOMEN: soft, non-distended, right upper quadrant tender with negative Gallardo sign. No rebound or guarding. . Bowel sounds normal. No masses,  no organomegaly,   EXTREMITIES:  extremities normal, atraumatic, no cyanosis or edema,   SKIN: Normal.,   NEUROLOGIC: AOx3. Gait normal. Reflexes and motor strength normal and symmetric. Cranial nerves 2-12 and sensation grossly intact. ,     Data Review:  to be done    Impression:     · Patient with chronic calculus cholecystitis.     Plan:     · Robot-assisted laparoscopic cholecystectomy  · Consent on chart  · Preoperative orders written  · The patient was given information on low-fat diet verbally and in a handout form and her after visit summary.     Signed By: Cande Bess MD     February 9, 2017

## 2017-02-27 NOTE — ANESTHESIA POSTPROCEDURE EVALUATION
Post-Anesthesia Evaluation & Assessment    Visit Vitals    /57    Pulse 66    Temp 37.2 °C (98.9 °F)    Resp 19    Ht 5' 4\" (1.626 m)    Wt 73.5 kg (162 lb 2 oz)    SpO2 100%    BMI 27.83 kg/m2       Post-operative hydration adequate. Pain score (VAS): 0 Pain Scale 1: Numeric (0 - 10) (02/27/17 0958)  Pain Intensity 1: 4 (States pain is tolerable) (02/27/17 0958)   Managed. Mental status & Level of consciousness: alert and oriented x 3    Neurological status: moves all extremities, sensation grossly intact    Pulmonary status: airway patent, no supplemental oxygen required    Complications related to anesthesia: none    Patient has met all discharge requirements.     Additional comments:        Tomy Ferreira MD  February 27, 2017

## 2017-02-27 NOTE — DISCHARGE INSTRUCTIONS
Cholecystectomy: What to Expect at [address] [place] [address]  After your surgery, it is normal to feel weak and tired for several days after you return home. Your belly may be swollen. If you had laparoscopic surgery, you may also have pain in your shoulder for about 24 hours. You may have gas or need to burp a lot at first, and a few people get diarrhea. The diarrhea usually goes away in 2 to 4 weeks, but it may last longer. How quickly you recover depends on whether you had a laparoscopic or open surgery. · For a laparoscopic surgery, most people can go back to work or their normal routine in 1 to 2 weeks, but it may take longer, depending on the type of work you do. · For an open surgery, it will probably take 4 to 6 weeks before you get back to your normal routine. This care sheet gives you a general idea about how long it will take for you to recover. However, each person recovers at a different pace. Follow the steps below to get better as quickly as possible. How can you care for yourself at home? Activity  · Rest when you feel tired. Getting enough sleep will help you recover. · Try to walk each day. Start out by walking a little more than you did the day before. Gradually increase the amount you walk. Walking boosts blood flow and helps prevent pneumonia and constipation. · For about 2 to 4 weeks, avoid lifting anything that would make you strain. This may include a child, heavy grocery bags and milk containers, a heavy briefcase or backpack, cat litter or dog food bags, or a vacuum . · Avoid strenuous activities, such as biking, jogging, weightlifting, and aerobic exercise, until your doctor says it is okay. · You may shower 24 to 48 hours after surgery, if your doctor okays it. Pat the cut (incision) dry. Do not take a bath for the first 2 weeks, or until your doctor tells you it is okay.   · You may drive when you are no longer taking pain medicine and can quickly move your foot from the gas pedal to the brake. You must also be able to sit comfortably for a long period of time, even if you do not plan to go far. You might get caught in traffic. · For a laparoscopic surgery, most people can go back to work or their normal routine in 1 to 2 weeks, but it may take longer. For an open surgery, it will probably take 4 to 6 weeks before you get back to your normal routine. · Your doctor will tell you when you can have sex again. Diet  · Eat smaller meals more often instead of fewer larger meals. You can eat a normal diet, but avoid eating fatty foods for about 1 month. Fatty foods include hamburger, whole milk, cheese, and many snack foods. If your stomach is upset, try bland, low-fat foods like plain rice, broiled chicken, toast, and yogurt. · Drink plenty of fluids (unless your doctor tells you not to). · If you have diarrhea, try avoiding spicy foods, dairy products, fatty foods, and alcohol. You can also watch to see if specific foods cause it, and stop eating them. If the diarrhea continues for more than 2 weeks, talk to your doctor. · You may notice that your bowel movements are not regular right after your surgery. This is common. Try to avoid constipation and straining with bowel movements. You may want to take a fiber supplement every day. If you have not had a bowel movement after a couple of days, ask your doctor about taking a mild laxative. Medicines  · Your doctor will tell you if and when you can restart your medicines. He or she will also give you instructions about taking any new medicines. · If you take blood thinners, such as warfarin (Coumadin), clopidogrel (Plavix), or aspirin, be sure to talk to your doctor. He or she will tell you if and when to start taking those medicines again. Make sure that you understand exactly what your doctor wants you to do. · Take pain medicines exactly as directed.   ¨ If the doctor gave you a prescription medicine for pain, take it as prescribed. ¨ If you are not taking a prescription pain medicine, take an over-the-counter medicine such as acetaminophen (Tylenol), ibuprofen (Advil, Motrin), or naproxen (Aleve). Read and follow all instructions on the label. ¨ Do not take two or more pain medicines at the same time unless the doctor told you to. Many pain medicines contain acetaminophen, which is Tylenol. Too much Tylenol can be harmful. · If you think your pain medicine is making you sick to your stomach:  ¨ Take your medicine after meals (unless your doctor tells you not to). ¨ Ask your doctor for a different pain medicine. · If your doctor prescribed antibiotics, take them as directed. Do not stop taking them just because you feel better. You need to take the full course of antibiotics. Incision care  · If you have strips of tape on the incision, or cut, leave the tape on for a week or until it falls off. · After 24 to 48 hours, wash the area daily with warm, soapy water, and pat it dry. · You may have staples to hold the cut together. Keep them dry until your doctor takes them out. This is usually in 7 to 10 days. · Keep the area clean and dry. You may cover it with a gauze bandage if it weeps or rubs against clothing. Change the bandage every day. Ice  · To reduce swelling and pain, put ice or a cold pack on your belly for 10 to 20 minutes at a time. Do this every 1 to 2 hours. Put a thin cloth between the ice and your skin. Follow-up care is a key part of your treatment and safety. Be sure to make and go to all appointments, and call your doctor if you are having problems. It's also a good idea to know your test results and keep a list of the medicines you take. When should you call for help? Call 911 anytime you think you may need emergency care. For example, call if:  · You passed out (lost consciousness). · You have severe trouble breathing. · You have sudden chest pain and shortness of breath, or you cough up blood.   Call your doctor now or seek immediate medical care if:  · You are sick to your stomach and cannot drink fluids. · You have pain that does not get better when you take your pain medicine. · You have signs of infection, such as:  ¨ Increased pain, swelling, warmth, or redness. ¨ Red streaks leading from the incision. ¨ Pus draining from the incision. ¨ Swollen lymph nodes in your neck, armpits, or groin. ¨ A fever. · Your urine turns dark brown or your stool is light-colored or mona-colored. · Your skin or the whites of your eyes turn yellow. · Bright red blood has soaked through a large bandage over your incision. · You have signs of a blood clot, such as:  ¨ Pain in your calf, back of knee, thigh, or groin. ¨ Redness and swelling in your leg or groin. · You have trouble passing urine or stool, especially if you have mild pain or swelling in your lower belly. Watch closely for any changes in your health, and be sure to contact your doctor if:  · You had a laparoscopic surgery and your shoulder pain lasts more than 24 hours. · You do not have a bowel movement after taking a laxative. Where can you learn more? Go to http://diana-lashonda.info/. Enter [phone] in the search box to learn more about \"Cholecystectomy: What to Expect at Home. \"  Current as of: August 9, 2016  Content Version: 11.1  © 2325-1969 [employer]. Care instructions adapted under license by [employer] (which disclaims liability or warranty for this information). If you have questions about a medical condition or this instruction, always ask your healthcare professional. [place] [address] any warranty or liability for your use of this information.     DISCHARGE SUMMARY from Nurse    The following personal items are in your possession at time of discharge:    Dental Appliances: None  Visual Aid: None     Home Medications: None  Jewelry: None  Clothing: Jacket/Coat, Footwear, Pants, Shirt, Socks, Undergarments  Other Valuables: None   PATIENT INSTRUCTIONS:    After general anesthesia or intravenous sedation, for 24 hours or while taking prescription Narcotics:  · Limit your activities  · Do not drive and operate hazardous machinery  · Do not make important personal or business decisions  · Do  not drink alcoholic beverages  · If you have not urinated within 8 hours after discharge, please contact your surgeon on call. Report the following to your surgeon:  · Excessive pain, swelling, redness or odor of or around the surgical area  · Temperature over 100.5  · Nausea and vomiting lasting longer than 4 hours or if unable to take medications  · Any signs of decreased circulation or nerve impairment to extremity: change in color, persistent  numbness, tingling, coldness or increase pain  · Any questions    *  Please give a list of your current medications to your Primary Care Provider. *  Please update this list whenever your medications are discontinued, doses are      changed, or new medications (including over-the-counter products) are added. *  Please carry medication information at all times in case of emergency situations. These are general instructions for a healthy lifestyle:    No smoking/ No tobacco products/ Avoid exposure to second hand smoke    Surgeon General's Warning:  Quitting smoking now greatly reduces serious risk to your health. Obesity, smoking, and sedentary lifestyle greatly increases your risk for illness    A healthy diet, regular physical exercise & weight monitoring are important for maintaining a healthy lifestyle    You may be retaining fluid if you have a history of heart failure or if you experience any of the following symptoms:  Weight gain of 3 pounds or more overnight or 5 pounds in a week, increased swelling in our hands or feet or shortness of breath while lying flat in bed.   Please call your doctor as soon as you notice any of these symptoms; do not wait until your next office visit. Recognize signs and symptoms of STROKE:    F-face looks uneven    A-arms unable to move or move unevenly    S-speech slurred or non-existent    T-time-call 911 as soon as signs and symptoms begin-DO NOT go       Back to bed or wait to see if you get better-TIME IS BRAIN. Warning Signs of HEART ATTACK     Call 911 if you have these symptoms:   Chest discomfort. Most heart attacks involve discomfort in the center of the chest that lasts more than a few minutes, or that goes away and comes back. It can feel like uncomfortable pressure, squeezing, fullness, or pain.  Discomfort in other areas of the upper body. Symptoms can include pain or discomfort in one or both arms, the back, neck, jaw, or stomach.  Shortness of breath with or without chest discomfort.  Other signs may include breaking out in a cold sweat, nausea, or lightheadedness. Don't wait more than five minutes to call 911 - MINUTES MATTER! Fast action can save your life. Calling 911 is almost always the fastest way to get lifesaving treatment. Emergency Medical Services staff can begin treatment when they arrive -- up to an hour sooner than if someone gets to the hospital by car. The discharge information has been reviewed with the patient and family. The patient and family verbalized understanding. Discharge medications reviewed with the patient and family and appropriate educational materials and side effects teaching were provided. Oxycodone/Acetaminophen (By mouth)   Acetaminophen (l-sbto-v-MIN-oh-fen), Oxycodone Hydrochloride (aj-z-YEM-done heather-droe-KLOR-keyshawn)  Treats moderate to moderately severe pain. This medicine is a narcotic pain reliever. Brand Name(s):Endocet, Percocet, Primlev, Roxicet, Xartemis XR   There may be other brand names for this medicine. When This Medicine Should Not Be Used: This medicine is not right for everyone.  Do not use it if you had an allergic reaction to acetaminophen or oxycodone, or if you have serious breathing problems (such as severe asthma, hypercarbia, respiratory depression) or paralytic ileus. How to Use This Medicine:   Capsule, Liquid, Tablet, Long Acting Tablet  · Your doctor will tell you how much medicine to use. Do not use more than directed. · Measure the oral liquid medicine with a marked measuring spoon, oral syringe, or medicine cup. · Swallow the extended-release tablet whole. Do not crush, break, or chew it. Do not lick or wet the tablet before placing it in your mouth. Do not give this medicine through a feeding tube. · This medicine should come with a Medication Guide. Ask your pharmacist for a copy if you do not have one. · Store the medicine in a closed container at room temperature, away from heat, moisture, and direct light. Ask your pharmacist about the best way to dispose of medicine you do not use. Drugs and Foods to Avoid:   Ask your doctor or pharmacist before using any other medicine, including over-the-counter medicines, vitamins, and herbal products. · Do not use Xartemis XR if you have used an MAO inhibitor in the past 14 days. · Some medicines and foods can affect how this medicine works. Tell your doctor if you are taking any of the following:   ¨ Butorphanol, lamotrigine, nalbuphine, naltrexone, pentazocine, propranolol, zidovudine  ¨ Birth control pills, a diuretic (water pill), muscle relaxants, a phenothiazine medicine (chlorpromazine, perphenazine, promethazine, prochlorperazine, thioridazine)  · Do not drink alcohol while you are using this medicine. Acetaminophen can damage your liver, and alcohol can increase this risk. Do not take acetaminophen without asking your doctor if you have 3 or more drinks of alcohol every day. · Tell your doctor if you are using any medicine that makes you sleepy, such as allergy medicine or other narcotic pain medicine.   Warnings While Using This Medicine:   · Tell your doctor if you are pregnant, or if you have kidney disease, liver disease, heart disease, low blood pressure, breathing problems or lung disease, especially if you have asthma, COPD, cor pulmonale, or kyphoscoliosis (curved spine that causes breathing trouble). Tell your doctor if you have urination problems, an underactive thyroid, Rai disease, pancreas or prostate problems, or a stomach disorder. Tell your doctor if you have a history of head injury or brain damage, seizures, or alcohol or drug abuse. Tell your doctor if you are allergic to codeine. · Do not breastfeed while you are using this medicine, unless otherwise directed by your doctor. · This medicine may cause the following problems:  ¨ Liver problems  ¨ Serious skin reactions  ¨ Low blood pressure  · If you take this medicine for more than a few weeks, do not stop taking it suddenly. Your doctor will need to slowly decrease your dose before you stop it completely. · Get emergency help immediately if you think you may have taken too much of this medicine. Signs of an overdose include shallow breathing, fainting, confusion, nausea, vomiting, pinpoint pupils of the eyes, or pale or blue lips, fingernails, or skin. · This medicine may make you dizzy or drowsy. Do not drive or do anything that could be dangerous until you know how this medicine affects you. · This medicine contains acetaminophen. Read the labels of all other medicines you are using to see if they also contain acetaminophen, or ask your doctor or pharmacist. Kalyani Amel not use more than 4 grams (4,000 milligrams) total of acetaminophen in one day. · This medicine can be habit-forming. Do not use more than your prescribed dose. Call your doctor if you think your medicine is not working. · This medicine may cause constipation, especially with long-term use. Ask your doctor if you should use a laxative to prevent and treat constipation. · Keep all medicine out of the reach of children.  Never share your medicine with anyone. Possible Side Effects While Using This Medicine:   Call your doctor right away if you notice any of these side effects:  · Allergic reaction: Itching or hives, swelling in your face or hands, swelling or tingling in your mouth or throat, chest tightness, trouble breathing  · Dark urine or pale stools, nausea, vomiting, loss of appetite, stomach pain, yellow skin or eyes  · Extreme weakness, shallow breathing, uneven heartbeat, seizures, sweating, or cold or clammy skin  · Lightheadedness, dizziness, or fainting  · Trouble breathing  If you notice these less serious side effects, talk with your doctor:   · Constipation  · Headache  · Mild lightheadedness, sleepiness, or drowsiness  · Mild nausea or vomiting  If you notice other side effects that you think are caused by this medicine, tell your doctor. Call your doctor for medical advice about side effects. You may report side effects to FDA at 5-470-FDA-9566  © 2016 [address] is for End User's use only and may not be sold, redistributed or otherwise used for commercial purposes. The above information is an  only. It is not intended as medical advice for individual conditions or treatments. Talk to your doctor, nurse or pharmacist before following any medical regimen to see if it is safe and effective for you. Laxative, Stool Softeners (By mouth)   Treats constipation by helping you have a bowel movement. Brand Name(s):Col-Rite, Colace, Colace Clear, DSS, Diocto, Diocto Liquid, Doc-Q-Lace, Docu Liquid, Docu-Soft, Docucal, Doculax, Docuprene, Docusil, Dok, Dulcolax   There may be other brand names for this medicine. When This Medicine Should Not Be Used: You should not use this medicine if you have severe stomach pain, nausea, or vomiting. Stool softeners should not be used if you have severe stomach pain and do not know the cause.    How to Use This Medicine:   Capsule, Tablet, Liquid, Liquid Filled Capsule  · Your doctor will tell you how much medicine to use. Do not use more than directed. · Follow the instructions on the medicine label if you are using this medicine without a prescription. · Drink 6 to 8 glasses of water daily while using any laxative. · To make the oral liquid taste better, you may mix it with one-half glass of milk or fruit juice. · Measure the oral liquid medicine with a marked measuring spoon, oral syringe, medicine cup, or medicine dropper. If a dose is missed:   · Use the missed dose as soon as possible. · If you do not remember the missed dose until the next day, skip the missed dose and go back to your regular dosing schedule. · You should not use two doses at the same time. How to Store and Dispose of This Medicine:   · Store the medicine in a tightly closed container at room temperature, away from heat and moisture. Do not store liquid-filled capsules in the refrigerator. · Keep all medicine out of the reach of children. Drugs and Foods to Avoid:   Ask your doctor or pharmacist before using any other medicine, including over-the-counter medicines, vitamins, and herbal products. · You should not use mineral oil while you are using a stool softener. · You should not use a stool softener within 2 hours before or after taking any other medicines. Laxatives can keep other medicines from working correctly. Warnings While Using This Medicine:   · If you are pregnant or breastfeeding, talk to your doctor before taking this medicine. · Do not give laxatives to children under 10years old unless you talk to your doctor. · You should not use this laxative for longer than 1 week unless approved by your doctor. Laxatives may be habit-forming and can harm your bowels if you use them too long. · Stool softeners usually work in 1 to 2 days, but for some people, results can take as long as 3 to 5 days. Possible Side Effects While Using This Medicine:    If you notice these less serious side effects, talk with your doctor:  · Nausea  · Sore throat  · Skin rash  If you notice other side effects that you think are caused by this medicine, tell your doctor. Call your doctor for medical advice about side effects. You may report side effects to FDA at 3-481-BHQ-0933  © 2016 [address] is for End User's use only and may not be sold, redistributed or otherwise used for commercial purposes. The above information is an  only. It is not intended as medical advice for individual conditions or treatments. Talk to your doctor, nurse or pharmacist before following any medical regimen to see if it is safe and effective for you.

## 2017-02-27 NOTE — INTERVAL H&P NOTE
H&P Update:  Denise Rios was seen and examined. History and physical has been reviewed. The patient has been examined.  There have been no significant clinical changes since the completion of the originally dated History and Physical.    Signed By: Cande Bess MD     February 27, 2017 7:26 AM

## 2017-02-27 NOTE — IP AVS SNAPSHOT
Heather Siu 
 
 
 [address] Patient: Kobe Bryant MRN: [id number] GPA:[id number] You are allergic to the following Allergen Reactions Aspirin Other (comments) Stomach bleeding Macrobid (Nitrofurantoin Monohyd/M-Cryst) Nausea and Vomiting Nsaids (Non-Steroidal Anti-Inflammatory Drug) Nausea and Vomiting Opana (Oxymorphone) Other (comments) Insomnia, weight loss, nightmares Pcn (Penicillins) Hives Recent Documentation Height  
  
  
  
  
  
 1.626 m Emergency Contacts Name Discharge Info Relation Home Work Mobile Joe Hutchinson DISCHARGE CAREGIVER [3] Child [2] [phone] GregoryRyan DISCHARGE CAREGIVER [3] Child [2] [phone] About your hospitalization You were admitted on:  February 27, 2017 You last received care in the:  [facility] PAC[facility] You were discharged on:  February 27, 2017 Unit phone number:  [phone] Why you were hospitalized Your primary diagnosis was:  Not on File Providers Seen During Your Hospitalizations Provider Role Specialty Primary office phone Valeria Becker MD Attending Provider General Surgery [phone] Your Primary Care Physician (PCP) Primary Care Physician Office Phone Office Fax Giuliano Shelton [phone] Follow-up Information Follow up With Details Comments Contact Info Dayanna Seth MD   [address] [facility] [address] [facility] 
[phone] Valeria Becker MD Schedule an appointment as soon as possible for a visit in 2 week(s)  [address] [facility] [address] 266 [facility] 
[phone] Your Appointments Tuesday March 28, 2017 10:00 AM EDT  
POST OP with Valeria Becker MD  
[facility] ([address]) [address] [facility] 240 986 [facility]  
[phone] Thursday April 13, 2017 COLONOSCOPY with Perla Lorenzo MD  
[facility] ([facility]) [address] [address] [phone] Current Discharge Medication List  
  
START taking these medications Dose & Instructions Dispensing Information Comments Morning Noon Evening Bedtime  
 docusate sodium 100 mg capsule Commonly known as:  Dorthey Matsu Your next dose is: Today, Tomorrow Other:  _________ Dose:  100 mg Take 1 Cap by mouth two (2) times a day for 30 days. Quantity:  60 Cap Refills:  2  
     
   
   
   
  
 oxyCODONE-acetaminophen  mg per tablet Commonly known as:  PERCOCET 10 Your next dose is: Today, Tomorrow Other:  _________ Dose:  1 Tab Take 1 Tab by mouth every six (6) hours as needed for Pain. Max Daily Amount: 4 Tabs. Quantity:  50 Tab Refills:  0 CONTINUE these medications which have NOT CHANGED Dose & Instructions Dispensing Information Comments Morning Noon Evening Bedtime  
 albuterol 2.5 mg /3 mL (0.083 %) nebulizer solution Commonly known as:  PROVENTIL VENTOLIN Your next dose is: Today, Tomorrow Other:  _________ Dose:  2.5 mg  
2.5 mg. Refills:  0  
     
   
   
   
  
 atorvastatin 40 mg tablet Commonly known as:  LIPITOR Your next dose is: Today, Tomorrow Other:  _________ Dose:  40 mg Take 1 Tab by mouth daily. Quantity:  30 Tab Refills:  5  
     
   
   
   
  
 baclofen 10 mg tablet Commonly known as:  LIORESAL Your next dose is: Today, Tomorrow Other:  _________ TAKE ONE TABLET BY MOUTH THREE TIMES A DAY Quantity:  90 Tab Refills:  3 DEXILANT 60 mg Cpdb Generic drug:  Dexlansoprazole Your next dose is: Today, Tomorrow Other:  _________ Dose:  60 mg  
60 mg daily. Refills:  0  
     
   
   
   
  
 ergocalciferol 50,000 unit capsule Commonly known as:  ERGOCALCIFEROL Your next dose is: Today, Tomorrow Other:  _________ Dose:  18606 Units Take 1 Cap by mouth every seven (7) days. Quantity:  4 Cap Refills:  1  
     
   
   
   
  
 fluticasone 50 mcg/actuation nasal spray Commonly known as:  Russel Hatch Your next dose is: Today, Tomorrow Other:  _________ Dose:  2 Spray 2 Sprays by Both Nostrils route as needed. Quantity:  1 Bottle Refills:  3  
     
   
   
   
  
 gabapentin 400 mg capsule Commonly known as:  NEURONTIN Your next dose is: Today, Tomorrow Other:  _________ Dose:  400 mg  
400 mg two (2) times a day. Refills:  0  
     
   
   
   
  
 GEODON 80 mg capsule Generic drug:  ziprasidone Your next dose is: Today, Tomorrow Other:  _________ Dose:  160 mg Take 160 mg by mouth two (2) times daily (with meals). Refills:  0  
     
   
   
   
  
 ipratropium 0.02 % nebulizer solution Commonly known as:  ATROVENT Your next dose is: Today, Tomorrow Other:  _________ Dose:  0.5 mg  
0.5 mg. Refills:  0  
     
   
   
   
  
 levoFLOXacin 500 mg tablet Commonly known as:  Ghanshyam Back Your next dose is: Today, Tomorrow Other:  _________ Dose:  500 mg Take 1 Tab by mouth daily. Quantity:  14 Tab Refills:  0 LORazepam 1 mg tablet Commonly known as:  ATIVAN Your next dose is: Today, Tomorrow Other:  _________ Dose:  1 mg Take 1 mg by mouth two (2) times daily as needed. Refills:  0  
     
   
   
   
  
 losartan 25 mg tablet Commonly known as:  COZAAR Your next dose is: Today, Tomorrow Other:  _________ TAKE 1 TABLET BY MOUTH EVERY DAY Quantity:  30 Tab Refills:  0 LUNESTA 1 mg tablet Generic drug:  eszopiclone Your next dose is: Today, Tomorrow Other:  _________ Take  by mouth nightly. Refills:  0  
     
   
   
   
  
 metroNIDAZOLE 500 mg tablet Commonly known as:  FLAGYL Your next dose is: Today, Tomorrow Other:  _________ Dose:  500 mg Take 1 Tab by mouth three (3) times daily. Quantity:  42 Tab Refills:  0  
     
   
   
   
  
 morphine CR 60 mg CR tablet Commonly known as:  MS CONTIN Start taking on:  3/7/2017 Your next dose is: Today, Tomorrow Other:  _________ Dose:  60 mg Take 1 Tab by mouth every twelve (12) hours. Max Daily Amount: 120 mg.  
 Quantity:  60 Tab Refills:  0  
     
   
   
   
  
 neomycin-colist-hydrocortisone-thonzonium otic suspension Commonly known as:  CORTISPORIN TC,COLY-MYCIN S Your next dose is: Today, Tomorrow Other:  _________ Dose:  2 Drop Administer 2 Drops into each ear four (4) times daily. Quantity:  5 mL Refills:  0  
     
   
   
   
  
 neomycin-polymyxin-hydrocortisone (buffered) 3.5-10,000-1 mg/mL-unit/mL-% otic suspension Commonly known as:  Sonazekim Newman Your next dose is: Today, Tomorrow Other:  _________ Dose:  3 Drop Administer 3 Drops in left ear four (4) times daily. Quantity:  3 mL Refills:  0  
     
   
   
   
  
 polyethylene glycol 17 gram packet Commonly known as:  Beola Athens Your next dose is: Today, Tomorrow Other:  _________ Dose:  17 g Take 1 Packet by mouth daily. Quantity:  1 Packet Refills:  3  
     
   
   
   
  
 sertraline 100 mg tablet Commonly known as:  ZOLOFT Your next dose is: Today, Tomorrow Other:  _________ Take  by mouth daily. Refills:  0  
     
   
   
   
  
 sucralfate 1 gram tablet Commonly known as:  Noé Bryan Your next dose is: Today, Tomorrow Other:  _________ Dose:  1 g  
1 g. Refills:  0  
     
   
   
   
  
 traZODone 100 mg tablet Commonly known as:  Saniya Mercer Your next dose is: Today, Tomorrow Other:  _________ Dose:  100 mg Take 100 mg by mouth nightly. Refills:  0 STOP taking these medications   
 oxyCODONE IR 10 mg Tab immediate release tablet Commonly known as:  Ramona Rivas Where to Get Your Medications Information on where to get these meds will be given to you by the nurse or doctor. ! Ask your nurse or doctor about these medications  
  docusate sodium 100 mg capsule  
 oxyCODONE-acetaminophen  mg per tablet Discharge Instructions Cholecystectomy: What to Expect at [facility] Your Recovery After your surgery, it is normal to feel weak and tired for several days after you return home. Your belly may be swollen. If you had laparoscopic surgery, you may also have pain in your shoulder for about 24 hours. You may have gas or need to burp a lot at first, and a few people get diarrhea. The diarrhea usually goes away in 2 to 4 weeks, but it may last longer. How quickly you recover depends on whether you had a laparoscopic or open surgery. · For a laparoscopic surgery, most people can go back to work or their normal routine in 1 to 2 weeks, but it may take longer, depending on the type of work you do. · For an open surgery, it will probably take 4 to 6 weeks before you get back to your normal routine. This care sheet gives you a general idea about how long it will take for you to recover. However, each person recovers at a different pace. Follow the steps below to get better as quickly as possible. How can you care for yourself at home? Activity · Rest when you feel tired. Getting enough sleep will help you recover. · Try to walk each day. Start out by walking a little more than you did the day before. Gradually increase the amount you walk. Walking boosts blood flow and helps prevent pneumonia and constipation. · For about 2 to 4 weeks, avoid lifting anything that would make you strain. This may include a child, heavy grocery bags and milk containers, a heavy briefcase or backpack, cat litter or dog food bags, or a vacuum . · Avoid strenuous activities, such as biking, jogging, weightlifting, and aerobic exercise, until your doctor says it is okay. · You may shower 24 to 48 hours after surgery, if your doctor okays it. Pat the cut (incision) dry. Do not take a bath for the first 2 weeks, or until your doctor tells you it is okay. · You may drive when you are no longer taking pain medicine and can quickly move your foot from the gas pedal to the brake. You must also be able to sit comfortably for a long period of time, even if you do not plan to go far. You might get caught in traffic. · For a laparoscopic surgery, most people can go back to work or their normal routine in 1 to 2 weeks, but it may take longer. For an open surgery, it will probably take 4 to 6 weeks before you get back to your normal routine. · Your doctor will tell you when you can have sex again. Diet · Eat smaller meals more often instead of fewer larger meals. You can eat a normal diet, but avoid eating fatty foods for about 1 month. Fatty foods include hamburger, whole milk, cheese, and many snack foods. If your stomach is upset, try bland, low-fat foods like plain rice, broiled chicken, toast, and yogurt. · Drink plenty of fluids (unless your doctor tells you not to). · If you have diarrhea, try avoiding spicy foods, dairy products, fatty foods, and alcohol. You can also watch to see if specific foods cause it, and stop eating them. If the diarrhea continues for more than 2 weeks, talk to your doctor. · You may notice that your bowel movements are not regular right after your surgery. This is common. Try to avoid constipation and straining with bowel movements. You may want to take a fiber supplement every day. If you have not had a bowel movement after a couple of days, ask your doctor about taking a mild laxative. Medicines · Your doctor will tell you if and when you can restart your medicines. He or she will also give you instructions about taking any new medicines. · If you take blood thinners, such as warfarin (Coumadin), clopidogrel (Plavix), or aspirin, be sure to talk to your doctor. He or she will tell you if and when to start taking those medicines again. Make sure that you understand exactly what your doctor wants you to do. · Take pain medicines exactly as directed. ¨ If the doctor gave you a prescription medicine for pain, take it as prescribed. ¨ If you are not taking a prescription pain medicine, take an over-the-counter medicine such as acetaminophen (Tylenol), ibuprofen (Advil, Motrin), or naproxen (Aleve). Read and follow all instructions on the label. ¨ Do not take two or more pain medicines at the same time unless the doctor told you to. Many pain medicines contain acetaminophen, which is Tylenol. Too much Tylenol can be harmful. · If you think your pain medicine is making you sick to your stomach: 
¨ Take your medicine after meals (unless your doctor tells you not to). ¨ Ask your doctor for a different pain medicine. · If your doctor prescribed antibiotics, take them as directed. Do not stop taking them just because you feel better. You need to take the full course of antibiotics. Incision care · If you have strips of tape on the incision, or cut, leave the tape on for a week or until it falls off. · After 24 to 48 hours, wash the area daily with warm, soapy water, and pat it dry. · You may have staples to hold the cut together.  Keep them dry until your doctor takes them out. This is usually in 7 to 10 days. · Keep the area clean and dry. You may cover it with a gauze bandage if it weeps or rubs against clothing. Change the bandage every day. Ice · To reduce swelling and pain, put ice or a cold pack on your belly for 10 to 20 minutes at a time. Do this every 1 to 2 hours. Put a thin cloth between the ice and your skin. Follow-up care is a key part of your treatment and safety. Be sure to make and go to all appointments, and call your doctor if you are having problems. It's also a good idea to know your test results and keep a list of the medicines you take. When should you call for help? Call 911 anytime you think you may need emergency care. For example, call if: 
· You passed out (lost consciousness). · You have severe trouble breathing. · You have sudden chest pain and shortness of breath, or you cough up blood. Call your doctor now or seek immediate medical care if: 
· You are sick to your stomach and cannot drink fluids. · You have pain that does not get better when you take your pain medicine. · You have signs of infection, such as: 
¨ Increased pain, swelling, warmth, or redness. ¨ Red streaks leading from the incision. ¨ Pus draining from the incision. ¨ Swollen lymph nodes in your neck, armpits, or groin. ¨ A fever. · Your urine turns dark brown or your stool is light-colored or mona-colored. · Your skin or the whites of your eyes turn yellow. · Bright red blood has soaked through a large bandage over your incision. · You have signs of a blood clot, such as: 
¨ Pain in your calf, back of knee, thigh, or groin. ¨ Redness and swelling in your leg or groin. · You have trouble passing urine or stool, especially if you have mild pain or swelling in your lower belly. Watch closely for any changes in your health, and be sure to contact your doctor if: 
· You had a laparoscopic surgery and your shoulder pain lasts more than 24 hours. · You do not have a bowel movement after taking a laxative. Where can you learn more? Go to http://diana-lashonda.info/. Enter 802 93 653 in the search box to learn more about \"Cholecystectomy: What to Expect at Home. \" Current as of: August 9, 2016 Content Version: 11.1 © 6250-4382 [employer]. Care instructions adapted under license by [employer] (which disclaims liability or warranty for this information). If you have questions about a medical condition or this instruction, always ask your healthcare professional. [place] [address] any warranty or liability for your use of this information. DISCHARGE SUMMARY from Nurse The following personal items are in your possession at time of discharge: 
 
Dental Appliances: None Visual Aid: None Home Medications: None Jewelry: None Clothing: Jacket/Coat, Footwear, Pants, Shirt, Socks, Undergarments Other Valuables: None PATIENT INSTRUCTIONS: 
 
After general anesthesia or intravenous sedation, for 24 hours or while taking prescription Narcotics: · Limit your activities · Do not drive and operate hazardous machinery · Do not make important personal or business decisions · Do  not drink alcoholic beverages · If you have not urinated within 8 hours after discharge, please contact your surgeon on call. Report the following to your surgeon: 
· Excessive pain, swelling, redness or odor of or around the surgical area · Temperature over 100.5 · Nausea and vomiting lasting longer than 4 hours or if unable to take medications · Any signs of decreased circulation or nerve impairment to extremity: change in color, persistent  numbness, tingling, coldness or increase pain · Any questions *  Please give a list of your current medications to your Primary Care Provider.  
 
*  Please update this list whenever your medications are discontinued, doses are 
 changed, or new medications (including over-the-counter products) are added. *  Please carry medication information at all times in case of emergency situations. These are general instructions for a healthy lifestyle: No smoking/ No tobacco products/ Avoid exposure to second hand smoke Surgeon General's Warning:  Quitting smoking now greatly reduces serious risk to your health. Obesity, smoking, and sedentary lifestyle greatly increases your risk for illness A healthy diet, regular physical exercise & weight monitoring are important for maintaining a healthy lifestyle You may be retaining fluid if you have a history of heart failure or if you experience any of the following symptoms:  Weight gain of 3 pounds or more overnight or 5 pounds in a week, increased swelling in our hands or feet or shortness of breath while lying flat in bed. Please call your doctor as soon as you notice any of these symptoms; do not wait until your next office visit. Recognize signs and symptoms of STROKE: 
 
F-face looks uneven A-arms unable to move or move unevenly S-speech slurred or non-existent T-time-call 911 as soon as signs and symptoms begin-DO NOT go Back to bed or wait to see if you get better-TIME IS BRAIN. Warning Signs of HEART ATTACK Call 911 if you have these symptoms: 
? Chest discomfort. Most heart attacks involve discomfort in the center of the chest that lasts more than a few minutes, or that goes away and comes back. It can feel like uncomfortable pressure, squeezing, fullness, or pain. ? Discomfort in other areas of the upper body. Symptoms can include pain or discomfort in one or both arms, the back, neck, jaw, or stomach. ? Shortness of breath with or without chest discomfort. ? Other signs may include breaking out in a cold sweat, nausea, or lightheadedness. Don't wait more than five minutes to call [address]!  Fast action can save your life. Calling 911 is almost always the fastest way to get lifesaving treatment. Emergency Medical Services staff can begin treatment when they arrive  up to an hour sooner than if someone gets to the hospital by car. The discharge information has been reviewed with the patient and family. The patient and family verbalized understanding. Discharge medications reviewed with the patient and family and appropriate educational materials and side effects teaching were provided. Oxycodone/Acetaminophen (By mouth) Acetaminophen (p-aktq-s-MIN-oh-fen), Oxycodone Hydrochloride (fz-r-YOB-done heather-droe-KLOR-keyshawn) Treats moderate to moderately severe pain. This medicine is a narcotic pain reliever. Brand Name(s):Endocet, Percocet, Primlev, Roxicet, Xartemis XR There may be other brand names for this medicine. When This Medicine Should Not Be Used: This medicine is not right for everyone. Do not use it if you had an allergic reaction to acetaminophen or oxycodone, or if you have serious breathing problems (such as severe asthma, hypercarbia, respiratory depression) or paralytic ileus. How to Use This Medicine:  
Capsule, Liquid, Tablet, Long Acting Tablet · Your doctor will tell you how much medicine to use. Do not use more than directed. · Measure the oral liquid medicine with a marked measuring spoon, oral syringe, or medicine cup. · Swallow the extended-release tablet whole. Do not crush, break, or chew it. Do not lick or wet the tablet before placing it in your mouth. Do not give this medicine through a feeding tube. · This medicine should come with a Medication Guide. Ask your pharmacist for a copy if you do not have one. · Store the medicine in a closed container at room temperature, away from heat, moisture, and direct light. Ask your pharmacist about the best way to dispose of medicine you do not use. Drugs and Foods to Avoid: Ask your doctor or pharmacist before using any other medicine, including over-the-counter medicines, vitamins, and herbal products. · Do not use Xartemis XR if you have used an MAO inhibitor in the past 14 days. · Some medicines and foods can affect how this medicine works. Tell your doctor if you are taking any of the following: ¨ Butorphanol, lamotrigine, nalbuphine, naltrexone, pentazocine, propranolol, zidovudine ¨ Birth control pills, a diuretic (water pill), muscle relaxants, a phenothiazine medicine (chlorpromazine, perphenazine, promethazine, prochlorperazine, thioridazine) · Do not drink alcohol while you are using this medicine. Acetaminophen can damage your liver, and alcohol can increase this risk. Do not take acetaminophen without asking your doctor if you have 3 or more drinks of alcohol every day. · Tell your doctor if you are using any medicine that makes you sleepy, such as allergy medicine or other narcotic pain medicine. Warnings While Using This Medicine: · Tell your doctor if you are pregnant, or if you have kidney disease, liver disease, heart disease, low blood pressure, breathing problems or lung disease, especially if you have asthma, COPD, cor pulmonale, or kyphoscoliosis (curved spine that causes breathing trouble). Tell your doctor if you have urination problems, an underactive thyroid, Rai disease, pancreas or prostate problems, or a stomach disorder. Tell your doctor if you have a history of head injury or brain damage, seizures, or alcohol or drug abuse. Tell your doctor if you are allergic to codeine. · Do not breastfeed while you are using this medicine, unless otherwise directed by your doctor. · This medicine may cause the following problems: 
¨ Liver problems ¨ Serious skin reactions ¨ Low blood pressure · If you take this medicine for more than a few weeks, do not stop taking it suddenly.  Your doctor will need to slowly decrease your dose before you stop it completely. · Get emergency help immediately if you think you may have taken too much of this medicine. Signs of an overdose include shallow breathing, fainting, confusion, nausea, vomiting, pinpoint pupils of the eyes, or pale or blue lips, fingernails, or skin. · This medicine may make you dizzy or drowsy. Do not drive or do anything that could be dangerous until you know how this medicine affects you. · This medicine contains acetaminophen. Read the labels of all other medicines you are using to see if they also contain acetaminophen, or ask your doctor or pharmacist. Alyce Zapien not use more than 4 grams (4,000 milligrams) total of acetaminophen in one day. · This medicine can be habit-forming. Do not use more than your prescribed dose. Call your doctor if you think your medicine is not working. · This medicine may cause constipation, especially with long-term use. Ask your doctor if you should use a laxative to prevent and treat constipation. · Keep all medicine out of the reach of children. Never share your medicine with anyone. Possible Side Effects While Using This Medicine:  
Call your doctor right away if you notice any of these side effects: · Allergic reaction: Itching or hives, swelling in your face or hands, swelling or tingling in your mouth or throat, chest tightness, trouble breathing · Dark urine or pale stools, nausea, vomiting, loss of appetite, stomach pain, yellow skin or eyes · Extreme weakness, shallow breathing, uneven heartbeat, seizures, sweating, or cold or clammy skin · Lightheadedness, dizziness, or fainting · Trouble breathing If you notice these less serious side effects, talk with your doctor: · Constipation · Headache · Mild lightheadedness, sleepiness, or drowsiness · Mild nausea or vomiting If you notice other side effects that you think are caused by this medicine, tell your doctor. Call your doctor for medical advice about side effects.  You may report side effects to FDA at 5-139-FDA-9948 © 2016 [address] is for End User's use only and may not be sold, redistributed or otherwise used for commercial purposes. The above information is an  only. It is not intended as medical advice for individual conditions or treatments. Talk to your doctor, nurse or pharmacist before following any medical regimen to see if it is safe and effective for you. Laxative, Stool Softeners (By mouth) Treats constipation by helping you have a bowel movement. Brand Name(s):Col-Rite, Colace, Colace Clear, DSS, Diocto, Diocto Liquid, Doc-Q-Lace, Docu Liquid, Docu-Soft, Docucal, Doculax, Docuprene, Docusil, Dok, Dulcolax There may be other brand names for this medicine. When This Medicine Should Not Be Used: You should not use this medicine if you have severe stomach pain, nausea, or vomiting. Stool softeners should not be used if you have severe stomach pain and do not know the cause. How to Use This Medicine:  
Capsule, Tablet, Liquid, Liquid Filled Capsule · Your doctor will tell you how much medicine to use. Do not use more than directed. · Follow the instructions on the medicine label if you are using this medicine without a prescription. · Drink 6 to 8 glasses of water daily while using any laxative. · To make the oral liquid taste better, you may mix it with one-half glass of milk or fruit juice. · Measure the oral liquid medicine with a marked measuring spoon, oral syringe, medicine cup, or medicine dropper. If a dose is missed: · Use the missed dose as soon as possible. · If you do not remember the missed dose until the next day, skip the missed dose and go back to your regular dosing schedule. · You should not use two doses at the same time. How to Store and Dispose of This Medicine: · Store the medicine in a tightly closed container at room temperature, away from heat and moisture. Do not store liquid-filled capsules in the refrigerator. · Keep all medicine out of the reach of children. Drugs and Foods to Avoid: Ask your doctor or pharmacist before using any other medicine, including over-the-counter medicines, vitamins, and herbal products. · You should not use mineral oil while you are using a stool softener. · You should not use a stool softener within 2 hours before or after taking any other medicines. Laxatives can keep other medicines from working correctly. Warnings While Using This Medicine: · If you are pregnant or breastfeeding, talk to your doctor before taking this medicine. · Do not give laxatives to children under 10years old unless you talk to your doctor. · You should not use this laxative for longer than 1 week unless approved by your doctor. Laxatives may be habit-forming and can harm your bowels if you use them too long. · Stool softeners usually work in 1 to 2 days, but for some people, results can take as long as 3 to 5 days. Possible Side Effects While Using This Medicine: If you notice these less serious side effects, talk with your doctor: · Nausea · Sore throat · Skin rash If you notice other side effects that you think are caused by this medicine, tell your doctor. Call your doctor for medical advice about side effects. You may report side effects to FDA at 5-112-FDA-1299 © 2016 [address] is for End User's use only and may not be sold, redistributed or otherwise used for commercial purposes. The above information is an  only. It is not intended as medical advice for individual conditions or treatments. Talk to your doctor, nurse or pharmacist before following any medical regimen to see if it is safe and effective for you. Discharge Orders None General Information Please provide this summary of care documentation to your next provider. Patient Signature:  ____________________________________________________________ Date:  ____________________________________________________________  
  
Jacqlyn Newcomer Provider Signature:  ____________________________________________________________ Date:  ____________________________________________________________

## 2017-03-02 DIAGNOSIS — R82.90 BAD ODOR OF URINE: ICD-10-CM

## 2017-03-02 RX ORDER — LOSARTAN POTASSIUM 25 MG/1
TABLET ORAL
Qty: 30 TAB | Refills: 0 | Status: SHIPPED | OUTPATIENT
Start: 2017-03-02 | End: 2017-04-01 | Stop reason: SDUPTHER

## 2017-03-02 NOTE — TELEPHONE ENCOUNTER
This patient contacted office for the following prescriptions to be filled:    Medication requested :   Requested Prescriptions     Pending Prescriptions Disp Refills    losartan (COZAAR) 25 mg tablet 30 Tab 0       PCP: Dr. Shaniqua Puckett or Print: Pharmacy  Mail order or Local pharmacy: [address] [place] [address]. Scheduled appointment if not seen by current providers in office: last seen 1/20/17, no future appointment has been scheduled with PCP at this time. Documentation:  Patient also wanted to inform Dr. Alejandro Rico that she has had her gallbladder surgery done.

## 2017-03-09 ENCOUNTER — TELEPHONE (OUTPATIENT)
Dept: FAMILY MEDICINE CLINIC | Age: 62
End: 2017-03-09

## 2017-03-09 NOTE — TELEPHONE ENCOUNTER
Temazepam 15mg, take 1-2 capsules by mouth at bedtime. Original Presriber: Ilene Vanegas 336. She cannot get in touch with the doctors office. She would like to know if you could fill this, this time? She states she has been calling them for three days, still has no answer.

## 2017-03-14 NOTE — TELEPHONE ENCOUNTER
Contacted patient and informed her that she needs to schedule an appointment. She stated she is still recovering from surgery, and she will make one following that. She finally got in touch with [facility], they advised the same, to schedule an appointment. Patient expressed understanding and will call back.

## 2017-03-15 ENCOUNTER — OFFICE VISIT (OUTPATIENT)
Dept: SURGERY | Age: 62
End: 2017-03-15

## 2017-03-15 VITALS
RESPIRATION RATE: 17 BRPM | DIASTOLIC BLOOD PRESSURE: 64 MMHG | OXYGEN SATURATION: 93 % | TEMPERATURE: 98.8 F | HEART RATE: 79 BPM | SYSTOLIC BLOOD PRESSURE: 106 MMHG | BODY MASS INDEX: 27.31 KG/M2 | HEIGHT: 64 IN | WEIGHT: 160 LBS

## 2017-03-15 DIAGNOSIS — Z09 POSTOPERATIVE EXAMINATION: ICD-10-CM

## 2017-03-15 DIAGNOSIS — R11.0 NAUSEA: Primary | ICD-10-CM

## 2017-03-15 RX ORDER — ONDANSETRON 4 MG/1
4 TABLET, ORALLY DISINTEGRATING ORAL
Qty: 30 TAB | Refills: 1 | Status: SHIPPED | OUTPATIENT
Start: 2017-03-15 | End: 2017-03-25

## 2017-03-15 NOTE — PATIENT INSTRUCTIONS
Please take zofran as needed and make appointment to be seen in our office in 2 weeks. Nausea and Vomiting: Care Instructions  Your Care Instructions    When you are nauseated, you may feel weak and sweaty and notice a lot of saliva in your mouth. Nausea often leads to vomiting. Most of the time you do not need to worry about nausea and vomiting, but they can be signs of other illnesses. Two common causes of nausea and vomiting are stomach flu and food poisoning. Nausea and vomiting from viral stomach flu will usually start to improve within 24 hours. Nausea and vomiting from food poisoning may last from 12 to 48 hours. The doctor has checked you carefully, but problems can develop later. If you notice any problems or new symptoms, get medical treatment right away. Follow-up care is a key part of your treatment and safety. Be sure to make and go to all appointments, and call your doctor if you are having problems. It's also a good idea to know your test results and keep a list of the medicines you take. How can you care for yourself at home? · To prevent dehydration, drink plenty of fluids, enough so that your urine is light yellow or clear like water. Choose water and other caffeine-free clear liquids until you feel better. If you have kidney, heart, or liver disease and have to limit fluids, talk with your doctor before you increase the amount of fluids you drink. · Rest in bed until you feel better. · When you are able to eat, try clear soups, mild foods, and liquids until all symptoms are gone for 12 to 48 hours. Other good choices include dry toast, crackers, cooked cereal, and gelatin dessert, such as Jell-O. When should you call for help? Call 911 anytime you think you may need emergency care. For example, call if:  · You passed out (lost consciousness).   Call your doctor now or seek immediate medical care if:  · You have symptoms of dehydration, such as:  ¨ Dry eyes and a dry mouth.  ¨ Passing only a little dark urine. ¨ Feeling thirstier than usual.  · You have new or worsening belly pain. · You have a new or higher fever. · You vomit blood or what looks like coffee grounds. Watch closely for changes in your health, and be sure to contact your doctor if:  · You have ongoing nausea and vomiting. · Your vomiting is getting worse. · Your vomiting lasts longer than 2 days. · You are not getting better as expected. Where can you learn more? Go to http://diana-lashnoda.info/. Enter 25 [id number] in the search box to learn more about \"Nausea and Vomiting: Care Instructions. \"  Current as of: May 27, 2016  Content Version: 11.1  © 2083-2899 [employer]. Care instructions adapted under license by [employer] (which disclaims liability or warranty for this information). If you have questions about a medical condition or this instruction, always ask your healthcare professional. [place] [address] any warranty or liability for your use of this information.

## 2017-03-15 NOTE — MR AVS SNAPSHOT
Visit Information Date & Time Provider Department Dept. Phone Encounter #  
 3/15/2017 10:45 AM MD PEDRO Bernard [facility] [phone] [id number] Follow-up Instructions Return in about 2 weeks (around 3/29/2017). Your Appointments 3/17/2017  1:00 PM  
Follow Up with Patsy Mercer MD  
[facility] [address]) Appt Note: f/u for medication refill [facility] [address] [facility] [address] [phone]  
  
   
 [address] [address]  
  
    
 3/29/2017 10:45 AM  
Follow Up with MD PEDRO Bernard [facility] ([address]) Appt Note: 2 wk f/up [address] [facility] [address] [id number]  
  
    
 4/20/2017  9:40 AM  
Follow Up with BETO Malave [facility] (RICHAR SCHEDULING) Appt Note: return in 2 months [address] [id number]  
[phone] [facility] [id number] Upcoming Health Maintenance Date Due DTaP/Tdap/Td series (1 - Tdap) 3/30/1975 BREAST CANCER SCRN MAMMOGRAM 12/2/2017 COLONOSCOPY 6/6/2019 PAP AKA CERVICAL CYTOLOGY 6/21/2019 Allergies as of 3/15/2017  Review Complete On: 3/15/2017 By: Neri Deaalondra, NP Severity Noted Reaction Type Reactions Aspirin    Other (comments) Stomach bleeding Macrobid [Nitrofurantoin Monohyd/m-cryst]  11/25/2015    Nausea and Vomiting Nsaids (Non-steroidal Anti-inflammatory Drug)    Nausea and Vomiting Opana [Oxymorphone]  11/29/2016    Other (comments) Insomnia, weight loss, nightmares Pcn [Penicillins]    Hives Current Immunizations  Never Reviewed Name Date Pneumococcal Polysaccharide (PPSV-23) 3/7/2014 Not reviewed this visit You Were Diagnosed With   
  
 Codes Comments Nausea    -  Primary ICD-10-CM: R11.0 ICD-9-CM: 787.02 Vitals BP Pulse Temp Resp Height(growth percentile) Weight(growth percentile) 106/64 (BP 1 Location: Left arm, BP Patient Position: Sitting) 79 98.8 °F (37.1 °C) (Oral) 17 5' 4\" (1.626 m) 160 lb (72.6 kg) SpO2 BMI OB Status Smoking Status 93% 27.46 kg/m2 Hysterectomy Former Smoker Vitals History BMI and BSA Data Body Mass Index Body Surface Area  
 27.46 kg/m 2 1.81 m 2 Preferred Pharmacy Pharmacy Name Phone [id number] - [facility], [address] [phone] Your Updated Medication List  
  
   
This list is accurate as of: 3/15/17 12:14 PM.  Always use your most recent med list.  
  
  
  
  
 albuterol 2.5 mg /3 mL (0.083 %) nebulizer solution Commonly known as:  PROVENTIL VENTOLIN  
2.5 mg.  
  
 atorvastatin 40 mg tablet Commonly known as:  LIPITOR Take 1 Tab by mouth daily. baclofen 10 mg tablet Commonly known as:  LIORESAL  
TAKE ONE TABLET BY MOUTH THREE TIMES A DAY DEXILANT 60 mg Cpdb Generic drug:  Dexlansoprazole 60 mg daily. docusate sodium 100 mg capsule Commonly known as:  Ethyl Hoit Take 1 Cap by mouth two (2) times a day for 30 days. ergocalciferol 50,000 unit capsule Commonly known as:  ERGOCALCIFEROL Take 1 Cap by mouth every seven (7) days. fluticasone 50 mcg/actuation nasal spray Commonly known as:  Alric Eaves 2 Sprays by Both Nostrils route as needed. gabapentin 400 mg capsule Commonly known as:  NEURONTIN  
400 mg two (2) times a day. GEODON 80 mg capsule Generic drug:  ziprasidone Take 160 mg by mouth two (2) times daily (with meals). ipratropium 0.02 % nebulizer solution Commonly known as:  ATROVENT  
0.5 mg.  
  
 levoFLOXacin 500 mg tablet Commonly known as:  Colton Solis  
 Take 1 Tab by mouth daily. LORazepam 1 mg tablet Commonly known as:  ATIVAN Take 1 mg by mouth two (2) times daily as needed. losartan 25 mg tablet Commonly known as:  COZAAR  
TAKE 1 TABLET BY MOUTH EVERY DAY  
  
 LUNESTA 1 mg tablet Generic drug:  eszopiclone Take  by mouth nightly. metroNIDAZOLE 500 mg tablet Commonly known as:  FLAGYL Take 1 Tab by mouth three (3) times daily. morphine CR 60 mg CR tablet Commonly known as:  MS CONTIN Take 1 Tab by mouth every twelve (12) hours. Max Daily Amount: 120 mg.  
  
 neomycin-colist-hydrocortisone-thonzonium otic suspension Commonly known as:  CORTISPORIN TC,COLY-MYCIN S Administer 2 Drops into each ear four (4) times daily. neomycin-polymyxin-hydrocortisone (buffered) 3.5-10,000-1 mg/mL-unit/mL-% otic suspension Commonly known as:  Allayne Kandis Administer 3 Drops in left ear four (4) times daily. ondansetron 4 mg disintegrating tablet Commonly known as:  ZOFRAN ODT Take 1 Tab by mouth every eight (8) hours as needed for Nausea for up to 10 days. oxyCODONE-acetaminophen  mg per tablet Commonly known as:  PERCOCET 10 Take 1 Tab by mouth every six (6) hours as needed for Pain. Max Daily Amount: 4 Tabs. polyethylene glycol 17 gram packet Commonly known as:  Jenna Mt Take 1 Packet by mouth daily. sertraline 100 mg tablet Commonly known as:  ZOLOFT Take  by mouth daily. sucralfate 1 gram tablet Commonly known as:  CARAFATE  
1 g.  
  
 traZODone 100 mg tablet Commonly known as:  Madolyn Saas Take 100 mg by mouth nightly. Prescriptions Printed Refills  
 ondansetron (ZOFRAN ODT) 4 mg disintegrating tablet 1 Sig: Take 1 Tab by mouth every eight (8) hours as needed for Nausea for up to 10 days. Class: Print Route: Oral  
  
Follow-up Instructions Return in about 2 weeks (around 3/29/2017). Please provide this summary of care documentation to your next provider. Your primary care clinician is listed as [id number] [facility]. If you have any questions after today's visit, please call [phone].

## 2017-03-15 NOTE — PROGRESS NOTES
Ramin Lofton. Anila Quinn, FAIZAN-C  PROGRESS NOTE    Name: Karina Montanez MRN: [id number]   : 1955 Hospital: [facility]   Date: 3/15/2017 Admission Date: No admission date for patient encounter. Subjective:  Ms. Porsche Madsen is a very pleasant AA female presenting today for a post operative evaluation. She is also under the care of pain management. She is complaining today of nausea and pain with no appetite. She has no complaints about her wounds and states they have healed with no drainage or complaints. During our visit, she admits to not eating before she takes her morphine and oxycodone from pain management. She also states that she has maintained her normal routine of cleaning her house and cooking for multiple family members since her surgery . Objective:  Vitals:    03/15/17 1056   BP: 106/64   Pulse: 79   Resp: 17   Temp: 98.8 °F (37.1 °C)   TempSrc: Oral   SpO2: 93%   Weight: 72.6 kg (160 lb)   Height: 5' 4\" (1.626 m)        Physical Exam:   General:  Well developed well nourished female in no apparent distress. She is quiet and appears to be in some pain, but also cooperative. Abdomen: abdomen is soft with no tenderness. No masses, organomegaly or   guarding    Incision:   healing well, no drainage, no erythema, no hernia, no seroma, no swelling, , no dehiscence, incision well approximated. Healed with some adhesive remaining from dermabond. Current Medications:  Current Outpatient Prescriptions   Medication Sig Dispense Refill    ondansetron (ZOFRAN ODT) 4 mg disintegrating tablet Take 1 Tab by mouth every eight (8) hours as needed for Nausea for up to 10 days. 30 Tab 1    losartan (COZAAR) 25 mg tablet TAKE 1 TABLET BY MOUTH EVERY DAY 30 Tab 0    oxyCODONE-acetaminophen (PERCOCET 10)  mg per tablet Take 1 Tab by mouth every six (6) hours as needed for Pain. Max Daily Amount: 4 Tabs.  50 Tab 0    docusate sodium (COLACE) 100 mg capsule Take 1 Cap by mouth two (2) times a day for 30 days. 60 Cap 2    morphine CR (MS CONTIN) 60 mg CR tablet Take 1 Tab by mouth every twelve (12) hours. Max Daily Amount: 120 mg. 60 Tab 0    levoFLOXacin (LEVAQUIN) 500 mg tablet Take 1 Tab by mouth daily. 14 Tab 0    metroNIDAZOLE (FLAGYL) 500 mg tablet Take 1 Tab by mouth three (3) times daily. 42 Tab 0    fluticasone (FLONASE) 50 mcg/actuation nasal spray 2 Sprays by Both Nostrils route as needed. 1 Bottle 3    eszopiclone (LUNESTA) 1 mg tablet Take  by mouth nightly.  ergocalciferol (ERGOCALCIFEROL) 50,000 unit capsule Take 1 Cap by mouth every seven (7) days. 4 Cap 1    sucralfate (CARAFATE) 1 gram tablet 1 g.  polyethylene glycol (MIRALAX) 17 gram packet Take 1 Packet by mouth daily. 1 Packet 3    baclofen (LIORESAL) 10 mg tablet TAKE ONE TABLET BY MOUTH THREE TIMES A DAY 90 Tab 3    atorvastatin (LIPITOR) 40 mg tablet Take 1 Tab by mouth daily. 30 Tab 5    neomycin-polymyxin-hydrocortisone, buffered, (PEDIOTIC) 3.5-10,000-1 mg/mL-unit/mL-% otic suspension Administer 3 Drops in left ear four (4) times daily. 3 mL 0    neomycin-colist-hydrocortisone-thonzonium (CORTISPORIN TC,COLY-MYCIN S) otic suspension Administer 2 Drops into each ear four (4) times daily. 5 mL 0    albuterol (PROVENTIL VENTOLIN) 2.5 mg /3 mL (0.083 %) nebulizer solution 2.5 mg.      Dexlansoprazole (DEXILANT) 60 mg CpDB 60 mg daily.  gabapentin (NEURONTIN) 400 mg capsule 400 mg two (2) times a day.  ipratropium (ATROVENT) 0.02 % nebulizer solution 0.5 mg.      trazodone (DESYREL) 100 mg tablet Take 100 mg by mouth nightly.  ziprasidone hcl (GEODON) 80 mg capsule Take 160 mg by mouth two (2) times daily (with meals).  lorazepam (ATIVAN) 1 mg tablet Take 1 mg by mouth two (2) times daily as needed.  sertraline (ZOLOFT) 100 mg tablet Take  by mouth daily. Chart and notes reviewed. Data reviewed. I have evaluated and examined the patient.         IMPRESSION:   · Patient is 2 weeks out from lap cholecystectomy and is doing well from a surgical standpoint, but she does complain of severe nausea related to taking high dose narcotics from pain management without eating. PLAN:/DISCUSION:   · Zofran given for nausea. Strict instructions provided to rest and recover adequately from surgery. Will see her back in 2 weeks for evaluation of nausea. Ms. Yokasta Patel has a reminder for a \"due or due soon\" health maintenance. I have asked that she contact her primary care provider for follow-up on this health maintenance. Birtha Ink.  Wes Mary, FNP-C

## 2017-03-17 ENCOUNTER — OFFICE VISIT (OUTPATIENT)
Dept: FAMILY MEDICINE CLINIC | Age: 62
End: 2017-03-17

## 2017-03-17 VITALS
TEMPERATURE: 98.1 F | HEIGHT: 64 IN | RESPIRATION RATE: 18 BRPM | OXYGEN SATURATION: 98 % | WEIGHT: 161 LBS | HEART RATE: 71 BPM | SYSTOLIC BLOOD PRESSURE: 138 MMHG | DIASTOLIC BLOOD PRESSURE: 84 MMHG | BODY MASS INDEX: 27.49 KG/M2

## 2017-03-17 DIAGNOSIS — G47.09 OTHER INSOMNIA: Primary | ICD-10-CM

## 2017-03-17 RX ORDER — TEMAZEPAM 15 MG/1
15 CAPSULE ORAL
Qty: 30 CAP | Refills: 1 | Status: SHIPPED | OUTPATIENT
Start: 2017-03-17 | End: 2019-01-04

## 2017-03-17 NOTE — MR AVS SNAPSHOT
Visit Information Date & Time Provider Department Dept. Phone Encounter #  
 3/17/2017  1:00 PM Melina Tolliver MD [facility] [phone] [id number] Follow-up Instructions Return in about 2 months (around 5/17/2017), or if symptoms worsen or fail to improve. Your Appointments 3/29/2017 10:45 AM  
Follow Up with MD PEDRO Zheng [facility] (Annie Cox) Appt Note: 2 wk f/up [address] [facility] [address] [facility] [address] [id number]  
  
    
 4/20/2017  9:40 AM  
Follow Up with BETO Morillo [facility] (RICHAR SCHEDULING) Appt Note: return in 2 months [address] [facility] [address]  
[phone]  [id number] Upcoming Health Maintenance Date Due DTaP/Tdap/Td series (1 - Tdap) 3/30/1975 BREAST CANCER SCRN MAMMOGRAM 12/2/2017 COLONOSCOPY 6/6/2019 PAP AKA CERVICAL CYTOLOGY 6/21/2019 Allergies as of 3/17/2017  Review Complete On: 3/17/2017 By: Edwin Galicia LPN Severity Noted Reaction Type Reactions Aspirin    Other (comments) Stomach bleeding Macrobid [Nitrofurantoin Monohyd/m-cryst]  11/25/2015    Nausea and Vomiting Nsaids (Non-steroidal Anti-inflammatory Drug)    Nausea and Vomiting Opana [Oxymorphone]  11/29/2016    Other (comments) Insomnia, weight loss, nightmares Pcn [Penicillins]    Hives Current Immunizations  Never Reviewed Name Date Pneumococcal Polysaccharide (PPSV-23) 3/7/2014 Not reviewed this visit You Were Diagnosed With   
  
 Codes Comments Other insomnia    -  Primary ICD-10-CM: G47.09 
ICD-9-CM: 780.52 Vitals BP Pulse Temp Resp Height(growth percentile) Weight(growth percentile) 138/84 71 98.1 °F (36.7 °C) (Temporal) 18 5' 4\" (1.626 m) 161 lb (73 kg) SpO2 BMI OB Status Smoking Status 98% 27.64 kg/m2 Hysterectomy Former Smoker BMI and BSA Data Body Mass Index Body Surface Area  
 27.64 kg/m 2 1.82 m 2 Preferred Pharmacy Pharmacy Name Phone FARM [id number] - [address], [phone] [phone] Your Updated Medication List  
  
   
This list is accurate as of: 3/17/17  1:33 PM.  Always use your most recent med list.  
  
  
  
  
 albuterol 2.5 mg /3 mL (0.083 %) nebulizer solution Commonly known as:  PROVENTIL VENTOLIN  
2.5 mg.  
  
 atorvastatin 40 mg tablet Commonly known as:  LIPITOR Take 1 Tab by mouth daily. baclofen 10 mg tablet Commonly known as:  LIORESAL  
TAKE ONE TABLET BY MOUTH THREE TIMES A DAY DEXILANT 60 mg Cpdb Generic drug:  Dexlansoprazole 60 mg daily. docusate sodium 100 mg capsule Commonly known as:  Myriam Naqvi Take 1 Cap by mouth two (2) times a day for 30 days. ergocalciferol 50,000 unit capsule Commonly known as:  ERGOCALCIFEROL Take 1 Cap by mouth every seven (7) days. fluticasone 50 mcg/actuation nasal spray Commonly known as:  Lella Solum 2 Sprays by Both Nostrils route as needed. gabapentin 400 mg capsule Commonly known as:  NEURONTIN  
400 mg two (2) times a day. GEODON 80 mg capsule Generic drug:  ziprasidone Take 160 mg by mouth two (2) times daily (with meals). ipratropium 0.02 % nebulizer solution Commonly known as:  ATROVENT  
0.5 mg.  
  
 losartan 25 mg tablet Commonly known as:  COZAAR  
TAKE 1 TABLET BY MOUTH EVERY DAY  
  
 morphine CR 60 mg CR tablet Commonly known as:  MS CONTIN Take 1 Tab by mouth every twelve (12) hours. Max Daily Amount: 120 mg.  
  
 neomycin-colist-hydrocortisone-thonzonium otic suspension Commonly known as:  CORTISPORIN TC,COLY-MYCIN S  
 Administer 2 Drops into each ear four (4) times daily. neomycin-polymyxin-hydrocortisone (buffered) 3.5-10,000-1 mg/mL-unit/mL-% otic suspension Commonly known as:  Andriy Trent Administer 3 Drops in left ear four (4) times daily. ondansetron 4 mg disintegrating tablet Commonly known as:  ZOFRAN ODT Take 1 Tab by mouth every eight (8) hours as needed for Nausea for up to 10 days. oxyCODONE-acetaminophen  mg per tablet Commonly known as:  PERCOCET 10 Take 1 Tab by mouth every six (6) hours as needed for Pain. Max Daily Amount: 4 Tabs. polyethylene glycol 17 gram packet Commonly known as:  Beola Miami Take 1 Packet by mouth daily. sertraline 100 mg tablet Commonly known as:  ZOLOFT Take  by mouth daily. sucralfate 1 gram tablet Commonly known as:  CARAFATE  
1 g.  
  
 temazepam 15 mg capsule Commonly known as:  RESTORIL Take 1 Cap by mouth nightly as needed for Sleep. Max Daily Amount: 15 mg. Indications: INSOMNIA  
  
 traZODone 100 mg tablet Commonly known as:  Alma Dessert Take 100 mg by mouth nightly. Prescriptions Printed Refills  
 temazepam (RESTORIL) 15 mg capsule 1 Sig: Take 1 Cap by mouth nightly as needed for Sleep. Max Daily Amount: 15 mg. Indications: INSOMNIA Class: Print Route: Oral  
  
Follow-up Instructions Return in about 2 months (around 5/17/2017), or if symptoms worsen or fail to improve. Patient Instructions Insomnia: Care Instructions Your Care Instructions Insomnia is the inability to sleep well. It is a common problem for most people at some time. Insomnia may make it hard for you to get to sleep, stay asleep, or sleep as long as you need to. This can make you tired and grouchy during the day. It can also make you forgetful, less effective at work, and unhappy. Insomnia can be caused by conditions such as depression or anxiety.  Pain can also affect your ability to sleep. When these problems are solved, the insomnia usually clears up. But sometimes bad sleep habits can cause insomnia. If insomnia is affecting your work or your enjoyment of life, you can take steps to improve your sleep. Follow-up care is a key part of your treatment and safety. Be sure to make and go to all appointments, and call your doctor if you are having problems. It's also a good idea to know your test results and keep a list of the medicines you take. How can you care for yourself at home? What to avoid · Do not have drinks with caffeine, such as coffee or black tea, for 8 hours before bed. · Do not smoke or use other types of tobacco near bedtime. Nicotine is a stimulant and can keep you awake. · Avoid drinking alcohol late in the evening, because it can cause you to wake in the middle of the night. · Do not eat a big meal close to bedtime. If you are hungry, eat a light snack. · Do not drink a lot of water close to bedtime, because the need to urinate may wake you up during the night. · Do not read or watch TV in bed. Use the bed only for sleeping and sexual activity. What to try · Go to bed at the same time every night, and wake up at the same time every morning. Do not take naps during the day. · Keep your bedroom quiet, dark, and cool. · Sleep on a comfortable pillow and mattress. · If watching the clock makes you anxious, turn it facing away from you so you cannot see the time. · If you worry when you lie down, start a worry book. Well before bedtime, write down your worries, and then set the book and your concerns aside. · Try meditation or other relaxation techniques before you go to bed. · If you cannot fall asleep, get up and go to another room until you feel sleepy. Do something relaxing. Repeat your bedtime routine before you go to bed again. · Make your house quiet and calm about an hour before bedtime.  Turn down the lights, turn off the TV, log off the computer, and turn down the volume on music. This can help you relax after a busy day. When should you call for help? Watch closely for changes in your health, and be sure to contact your doctor if: 
· Your efforts to improve your sleep do not work. · Your insomnia gets worse. · You have been feeling down, depressed, or hopeless or have lost interest in things that you usually enjoy. Where can you learn more? Go to http://diana-lashonda.info/. Enter P513 in the search box to learn more about \"Insomnia: Care Instructions. \" Current as of: July 26, 2016 Content Version: 11.1 © 8575-4185 [employer]. Care instructions adapted under license by [employer] (which disclaims liability or warranty for this information). If you have questions about a medical condition or this instruction, always ask your healthcare professional. [address] any warranty or liability for your use of this information. Please provide this summary of care documentation to your next provider. Your primary care clinician is listed as [address]. If you have any questions after today's visit, please call [phone].

## 2017-03-17 NOTE — PROGRESS NOTES
Kobe Bryant is a 57 y.o. female  presents for follow up. She has had gallbladder surgery. She is having insomnia. She needs to be restarted on Restoril. Allergies   Allergen Reactions    Aspirin Other (comments)     Stomach bleeding    Macrobid [Nitrofurantoin Monohyd/M-Cryst] Nausea and Vomiting    Nsaids (Non-Steroidal Anti-Inflammatory Drug) Nausea and Vomiting    Opana [Oxymorphone] Other (comments)     Insomnia, weight loss, nightmares    Pcn [Penicillins] Hives     Outpatient Prescriptions Marked as Taking for the 3/17/17 encounter (Office Visit) with Dayanna Seth MD   Medication Sig Dispense Refill    temazepam (RESTORIL) 15 mg capsule Take 1 Cap by mouth nightly as needed for Sleep. Max Daily Amount: 15 mg. Indications: INSOMNIA 30 Cap 1    ondansetron (ZOFRAN ODT) 4 mg disintegrating tablet Take 1 Tab by mouth every eight (8) hours as needed for Nausea for up to 10 days. 30 Tab 1    losartan (COZAAR) 25 mg tablet TAKE 1 TABLET BY MOUTH EVERY DAY 30 Tab 0    oxyCODONE-acetaminophen (PERCOCET 10)  mg per tablet Take 1 Tab by mouth every six (6) hours as needed for Pain. Max Daily Amount: 4 Tabs. 50 Tab 0    docusate sodium (COLACE) 100 mg capsule Take 1 Cap by mouth two (2) times a day for 30 days. 60 Cap 2    morphine CR (MS CONTIN) 60 mg CR tablet Take 1 Tab by mouth every twelve (12) hours. Max Daily Amount: 120 mg. 60 Tab 0    fluticasone (FLONASE) 50 mcg/actuation nasal spray 2 Sprays by Both Nostrils route as needed. 1 Bottle 3    ergocalciferol (ERGOCALCIFEROL) 50,000 unit capsule Take 1 Cap by mouth every seven (7) days. 4 Cap 1    sucralfate (CARAFATE) 1 gram tablet 1 g.  polyethylene glycol (MIRALAX) 17 gram packet Take 1 Packet by mouth daily. 1 Packet 3    baclofen (LIORESAL) 10 mg tablet TAKE ONE TABLET BY MOUTH THREE TIMES A DAY 90 Tab 3    atorvastatin (LIPITOR) 40 mg tablet Take 1 Tab by mouth daily.  30 Tab 5    neomycin-polymyxin-hydrocortisone, buffered, (PEDIOTIC) 3.5-10,000-1 mg/mL-unit/mL-% otic suspension Administer 3 Drops in left ear four (4) times daily. 3 mL 0    neomycin-colist-hydrocortisone-thonzonium (CORTISPORIN TC,COLY-MYCIN S) otic suspension Administer 2 Drops into each ear four (4) times daily. 5 mL 0    albuterol (PROVENTIL VENTOLIN) 2.5 mg /3 mL (0.083 %) nebulizer solution 2.5 mg.      Dexlansoprazole (DEXILANT) 60 mg CpDB 60 mg daily.  gabapentin (NEURONTIN) 400 mg capsule 400 mg two (2) times a day.  ipratropium (ATROVENT) 0.02 % nebulizer solution 0.5 mg.      trazodone (DESYREL) 100 mg tablet Take 100 mg by mouth nightly.  ziprasidone hcl (GEODON) 80 mg capsule Take 160 mg by mouth two (2) times daily (with meals).  sertraline (ZOLOFT) 100 mg tablet Take  by mouth daily.        Patient Active Problem List   Diagnosis Code    Myalgia and myositis [id number]    Lumbago M54.5    Other affections of shoulder region, not elsewhere classified M75.80    Other chronic pain G89.29    Bipolar 1 disorder (HCC) F31.9    Chronic pain syndrome G89.4    Carpal tunnel syndrome G56.00    Degeneration of lumbar or lumbosacral intervertebral disc M51.37    Displacement of lumbar intervertebral disc without myelopathy M51.26    Pain in joint, multiple sites M25.50    Cervicalgia M54.2    Postlaminectomy syndrome, cervical region M96.1    Annular tear of lumbar disc M51.36    Lung nodule seen on imaging study R91.1    ACP (advance care planning) Z71.89    Hypercholesterolemia E78.00    Nausea R11.0     Past Medical History:   Diagnosis Date    Annular tear of lumbar disc 11/10/2014    Asthma     Bronchitis    Benign tumor of throat     Bone spur     right ankle    Cervicalgia 11/10/2014    Chronic pain     back    Degeneration of lumbar or lumbosacral intervertebral disc 11/10/2014    Degenerative disc disease     Depression     Displacement of lumbar intervertebral disc without myelopathy 11/10/2014    Elevated white blood cell count     Fibromyalgia     GERD (gastroesophageal reflux disease)     Hypertension     Insomnia     Nausea & vomiting     Pain in joint, multiple sites 11/10/2014    Postlaminectomy syndrome, cervical region 11/10/2014    Sinus infection 10/5/15    Ulcer ([facility]      Social History     Social History    Marital status:      Spouse name: N/A    Number of children: N/A    Years of education: N/A     Social History Main Topics    Smoking status: Former Smoker    Smokeless tobacco: Never Used    Alcohol use No      Comment: none in 24 years    Drug use: No      Comment: last smoked in 1993    Sexual activity: Not Asked     Other Topics Concern    None     Social History Narrative     Family History   Problem Relation Age of Onset    Hypertension Other     Heart Attack Other     Heart Disease Other     Stroke Other     Headache Other     Seizures Other         Review of Systems   Constitutional: Negative for chills and fever. Cardiovascular: Negative for chest pain and palpitations. Gastrointestinal: Negative for constipation, diarrhea, nausea and vomiting. Psychiatric/Behavioral: Negative for depression, hallucinations, memory loss, substance abuse and suicidal ideas. The patient has insomnia. The patient is not nervous/anxious. Vitals:    03/17/17 1314   BP: 138/84   Pulse: 71   Resp: 18   Temp: 98.1 °F (36.7 °C)   TempSrc: Temporal   SpO2: 98%   Weight: 161 lb (73 kg)   Height: 5' 4\" (1.626 m)   PainSc:   0 - No pain       Physical Exam   Constitutional: She is oriented to person, place, and time and well-developed, well-nourished, and in no distress. Cardiovascular: Normal rate, regular rhythm and normal heart sounds. Pulmonary/Chest: Effort normal and breath sounds normal.   Neurological: She is alert and oriented to person, place, and time. Skin: Skin is warm and dry.    Psychiatric: Mood, memory, affect and judgment normal.   Nursing note and vitals reviewed. Assessment/Plan      ICD-10-CM ICD-9-CM    1. Other insomnia G47.09 780.52 temazepam (RESTORIL) 15 mg capsule     I have discussed the diagnosis with the patient and the intended plan of care as seen in the above orders. The patient has received an after-visit summary and questions were answered concerning future plans. I have discussed medication, side effects, and warnings with the patient in detail. The patient verbalized understanding and is in agreement with the plan of care. The patient will contact the office with any additional concerns.       Follow-up Disposition:  Return in about 1 month (around 4/17/2017), or if symptoms worsen or fail to improve.  lab results and schedule of future lab studies reviewed with patient    Christie Reynolds MD

## 2017-03-17 NOTE — PATIENT INSTRUCTIONS

## 2017-03-29 ENCOUNTER — HOSPITAL ENCOUNTER (OUTPATIENT)
Dept: LAB | Age: 62
Discharge: HOME | End: 2017-03-29
Payer: MEDICARE

## 2017-03-29 ENCOUNTER — OFFICE VISIT (OUTPATIENT)
Dept: SURGERY | Age: 62
End: 2017-03-29

## 2017-03-29 VITALS
TEMPERATURE: 99.6 F | BODY MASS INDEX: 27.49 KG/M2 | DIASTOLIC BLOOD PRESSURE: 70 MMHG | WEIGHT: 161 LBS | HEART RATE: 61 BPM | OXYGEN SATURATION: 99 % | RESPIRATION RATE: 16 BRPM | SYSTOLIC BLOOD PRESSURE: 118 MMHG | HEIGHT: 64 IN

## 2017-03-29 DIAGNOSIS — R10.11 ABDOMINAL PAIN, RIGHT UPPER QUADRANT: ICD-10-CM

## 2017-03-29 DIAGNOSIS — R10.11 ABDOMINAL PAIN, RIGHT UPPER QUADRANT: Primary | ICD-10-CM

## 2017-03-29 LAB
ALBUMIN SERPL BCP-MCNC: 3.8 G/DL (ref 3.4–5)
ALBUMIN/GLOB SERPL: 1 {RATIO} (ref 0.8–1.7)
ALP SERPL-CCNC: 98 U/L (ref 45–117)
ALT SERPL-CCNC: 32 U/L (ref 13–56)
ANION GAP BLD CALC-SCNC: 12 MMOL/L (ref 3–18)
AST SERPL W P-5'-P-CCNC: 30 U/L (ref 15–37)
BASOPHILS # BLD AUTO: 0 K/UL (ref 0–0.06)
BASOPHILS # BLD: 1 % (ref 0–2)
BILIRUB DIRECT SERPL-MCNC: 0.1 MG/DL (ref 0–0.2)
BILIRUB SERPL-MCNC: 0.4 MG/DL (ref 0.2–1)
BUN SERPL-MCNC: 10 MG/DL (ref 7–18)
BUN/CREAT SERPL: 11 (ref 12–20)
CALCIUM SERPL-MCNC: 9.4 MG/DL (ref 8.5–10.1)
CHLORIDE SERPL-SCNC: 106 MMOL/L (ref 100–108)
CO2 SERPL-SCNC: 26 MMOL/L (ref 21–32)
CREAT SERPL-MCNC: 0.92 MG/DL (ref 0.6–1.3)
DIFFERENTIAL METHOD BLD: NORMAL
EOSINOPHIL # BLD: 0 K/UL (ref 0–0.4)
EOSINOPHIL NFR BLD: 0 % (ref 0–5)
ERYTHROCYTE [DISTWIDTH] IN BLOOD BY AUTOMATED COUNT: 12.9 % (ref 11.6–14.5)
GLOBULIN SER CALC-MCNC: 3.7 G/DL (ref 2–4)
GLUCOSE SERPL-MCNC: 118 MG/DL (ref 74–99)
HCT VFR BLD AUTO: 38.2 % (ref 35–45)
HGB BLD-MCNC: 12.5 G/DL (ref 12–16)
LYMPHOCYTES # BLD AUTO: 39 % (ref 21–52)
LYMPHOCYTES # BLD: 3.3 K/UL (ref 0.9–3.6)
MCH RBC QN AUTO: 28.2 PG (ref 24–34)
MCHC RBC AUTO-ENTMCNC: 32.7 G/DL (ref 31–37)
MCV RBC AUTO: 86 FL (ref 74–97)
MONOCYTES # BLD: 0.3 K/UL (ref 0.05–1.2)
MONOCYTES NFR BLD AUTO: 4 % (ref 3–10)
NEUTS SEG # BLD: 4.7 K/UL (ref 1.8–8)
NEUTS SEG NFR BLD AUTO: 56 % (ref 40–73)
PLATELET # BLD AUTO: 247 K/UL (ref 135–420)
PMV BLD AUTO: 9.9 FL (ref 9.2–11.8)
POTASSIUM SERPL-SCNC: 3.8 MMOL/L (ref 3.5–5.5)
PROT SERPL-MCNC: 7.5 G/DL (ref 6.4–8.2)
RBC # BLD AUTO: 4.44 M/UL (ref 4.2–5.3)
SODIUM SERPL-SCNC: 144 MMOL/L (ref 136–145)
WBC # BLD AUTO: 8.4 K/UL (ref 4.6–13.2)

## 2017-03-29 PROCEDURE — 36415 COLL VENOUS BLD VENIPUNCTURE: CPT | Performed by: NURSE PRACTITIONER

## 2017-03-29 PROCEDURE — 85025 COMPLETE CBC W/AUTO DIFF WBC: CPT | Performed by: NURSE PRACTITIONER

## 2017-03-29 PROCEDURE — 80048 BASIC METABOLIC PNL TOTAL CA: CPT | Performed by: NURSE PRACTITIONER

## 2017-03-29 PROCEDURE — 80076 HEPATIC FUNCTION PANEL: CPT | Performed by: NURSE PRACTITIONER

## 2017-03-29 NOTE — PATIENT INSTRUCTIONS
· CBC w/ diff, LFT's, BMP, Ultrasound of RUQ-STAT  · Continue pain meds and zofran as needed  Follow up in 1 week     Ultrasound of the Abdomen: About This Test  What is it? An abdominal ultrasound uses reflected sound waves to produce a picture of the organs and blood vessels in the upper belly. These include your liver, gallbladder, spleen, pancreas, kidneys, and major blood vessels like the aorta. The sound waves create a picture on a video monitor. Why is this test done? An ultrasound can be done to:  · Find out what's causing belly pain--a gallstone, for example. · Look at a lump or mass that was found in a prior exam and, in some cases, tell what it is. · Check for problems in the liver and kidneys. · Look for changes in an aortic aneurysm, a bulging section in the wall of the large artery that carries blood out of the heart. · Find fluid in the belly. · Guide needles or other medical instruments in treatment. How can you prepare for the test?  · Depending on the part of your belly your doctor will be looking at, you may need to eat a fat-free meal the night before your test. Or you may need to avoid eating for 4 to 12 hours before the test. Your doctor will tell you what to do. What happens before the test?  · You will need to take off any jewelry that might interfere with the ultrasound test.  · You will need to take off your clothes around the area to be scanned. You will get a cloth or paper covering to use during the test.  What happens during the test?  · You lie down on your back or your side on an exam table. · The doctor or technologist spreads some warm gel on your belly to improve the transmission of the sound waves. A small handheld unit called a transducer is pressed against your belly and is moved back and forth. A picture of the organs and blood vessels can be seen on a video monitor. · You need to lie still.  You may be asked to take a breath and hold it for several seconds during the scanning. What else should you know about the test?  · The ultrasound is a very safe procedure. · There is no discomfort from the test itself. If your belly hurts already, you will feel some pain from the probe being pressed down on it. · You will not hear or feel the sound waves. How long does the test take? · The test will take about 30 minutes. · You may be asked to wait until the radiologist has reviewed the scan. He or she may want to do more ultrasound views of some areas of your belly. What happens after the test?  · You will probably be able to go home right away. · You can go back to your usual activities right away. Follow-up care is a key part of your treatment and safety. Be sure to make and go to all appointments, and call your doctor if you are having problems. It's also a good idea to keep a list of the medicines you take. Ask your doctor when you can expect to have your test results. Where can you learn more? Go to http://diana-lashonda.info/. Enter T376 in the search box to learn more about \"Ultrasound of the Abdomen: About This Test.\"  Current as of: October 14, 2016  Content Version: 11.2  © 8065-9150 [employer]. Care instructions adapted under license by [employer] (which disclaims liability or warranty for this information). If you have questions about a medical condition or this instruction, always ask your healthcare professional. [address] [address] any warranty or liability for your use of this information.

## 2017-03-29 NOTE — PROGRESS NOTES
Ramin Lofton. Render Kai, FNP-C  PROGRESS NOTE    Name: Karina Montanez MRN: [id number]   : 1955 Hospital: [facility]   Date: 3/29/2017 Admission Date: No admission date for patient encounter. Subjective:  Ms. Porsche Madsen is a very pleasant 58 yo AA female who is one month out from robot assisted laparoscopic cholecystectomy. She complained of severe nausea with little appetite at our last visit 2 weeks ago. She is currently taking morphine and oxycodone from another provider for chronic pain and today she states that these are doing nothing to control her pain. She has a new onset pain in the right upper quadrant that started yesterday. She describes it as nagging with intermittent sharp, shooting pain. She denies any fever or chills at home. She does endorse having children in the home who have been sick as well as a visiting neighbor who had the flu. She continues to take the zofran daily for nausea and states this is ongoing. She does not need more zofran at this time. She is making herself eat and reports having normal bowel movements. Objective:  Vitals:    17 1104   BP: 118/70   Pulse: 61   Resp: 16   Temp: 99.6 °F (37.6 °C)   TempSrc: Oral   SpO2: 99%   Weight: 73 kg (161 lb)   Height: 5' 4\" (1.626 m)        Physical Exam:   General:  Well developed well nourished female in no apparent distress   Abdomen: abdomen is soft with severe tenderness to RUQ. No masses, organomegaly. Severe guarding with palpation. Incisions: C/D/I    Labs:  No results found for this or any previous visit (from the past 24 hour(s)). Current Medications:  Current Outpatient Prescriptions   Medication Sig Dispense Refill    temazepam (RESTORIL) 15 mg capsule Take 1 Cap by mouth nightly as needed for Sleep. Max Daily Amount: 15 mg.  Indications: INSOMNIA 30 Cap 1    losartan (COZAAR) 25 mg tablet TAKE 1 TABLET BY MOUTH EVERY DAY 30 Tab 0    oxyCODONE-acetaminophen (PERCOCET 10)  mg per tablet Take 1 Tab by mouth every six (6) hours as needed for Pain. Max Daily Amount: 4 Tabs. 50 Tab 0    docusate sodium (COLACE) 100 mg capsule Take 1 Cap by mouth two (2) times a day for 30 days. 60 Cap 2    morphine CR (MS CONTIN) 60 mg CR tablet Take 1 Tab by mouth every twelve (12) hours. Max Daily Amount: 120 mg. 60 Tab 0    fluticasone (FLONASE) 50 mcg/actuation nasal spray 2 Sprays by Both Nostrils route as needed. 1 Bottle 3    ergocalciferol (ERGOCALCIFEROL) 50,000 unit capsule Take 1 Cap by mouth every seven (7) days. 4 Cap 1    sucralfate (CARAFATE) 1 gram tablet 1 g.  polyethylene glycol (MIRALAX) 17 gram packet Take 1 Packet by mouth daily. 1 Packet 3    baclofen (LIORESAL) 10 mg tablet TAKE ONE TABLET BY MOUTH THREE TIMES A DAY 90 Tab 3    atorvastatin (LIPITOR) 40 mg tablet Take 1 Tab by mouth daily. 30 Tab 5    neomycin-polymyxin-hydrocortisone, buffered, (PEDIOTIC) 3.5-10,000-1 mg/mL-unit/mL-% otic suspension Administer 3 Drops in left ear four (4) times daily. 3 mL 0    neomycin-colist-hydrocortisone-thonzonium (CORTISPORIN TC,COLY-MYCIN S) otic suspension Administer 2 Drops into each ear four (4) times daily. 5 mL 0    albuterol (PROVENTIL VENTOLIN) 2.5 mg /3 mL (0.083 %) nebulizer solution 2.5 mg.      Dexlansoprazole (DEXILANT) 60 mg CpDB 60 mg daily.  gabapentin (NEURONTIN) 400 mg capsule 400 mg two (2) times a day.  ipratropium (ATROVENT) 0.02 % nebulizer solution 0.5 mg.      trazodone (DESYREL) 100 mg tablet Take 100 mg by mouth nightly.  ziprasidone hcl (GEODON) 80 mg capsule Take 160 mg by mouth two (2) times daily (with meals).  sertraline (ZOLOFT) 100 mg tablet Take  by mouth daily. Chart and notes reviewed. Data reviewed. I have evaluated and examined the patient. IMPRESSION:   · Patient who is one month out from robot assisted laparoscopic cholecystectomy complaining of new onset RUQ pain 10/10.            PLAN:/DISCUSION:   · CBC w/ diff, LFT's, BMP, Ultrasound of RUQ-STAT  · Continue pain meds and zofran as needed  · Follow up in 1 week     Ms. Hutchinson has a reminder for a \"due or due soon\" health maintenance. I have asked that she contact her primary care provider for follow-up on this health maintenance. Cloteal Pillar.  Chelita Gomez, FNP-C

## 2017-03-29 NOTE — MR AVS SNAPSHOT
Visit Information Date & Time Provider Department Dept. Phone Encounter #  
 3/29/2017 10:45 AM Therese Hagan  [address] [id number] Your Appointments 4/5/2017 10:45 AM  
POST OP with [facility] ([facility]) Appt Note: 1 week f/up//2nd [address] [place] [address] [phone]  
  
    
 4/20/2017  9:40 AM  
Follow Up with BETO Morillo [facility] (RICHAR SCHEDULING) Appt Note: return in 2 months 30 [facility] [address] [phone] [facility] [phone] [id number] Upcoming Health Maintenance Date Due DTaP/Tdap/Td series (1 - Tdap) 3/30/1975 BREAST CANCER SCRN MAMMOGRAM 12/2/2017 COLONOSCOPY 6/6/2019 PAP AKA CERVICAL CYTOLOGY 6/21/2019 Allergies as of 3/29/2017  Review Complete On: 3/29/2017 By: Alicia Tolliver LPN Severity Noted Reaction Type Reactions Aspirin    Other (comments) Stomach bleeding Macrobid [Nitrofurantoin Monohyd/m-cryst]  11/25/2015    Nausea and Vomiting Nsaids (Non-steroidal Anti-inflammatory Drug)    Nausea and Vomiting Opana [Oxymorphone]  11/29/2016    Other (comments) Insomnia, weight loss, nightmares Pcn [Penicillins]    Hives Current Immunizations  Never Reviewed Name Date Pneumococcal Polysaccharide (PPSV-23) 3/7/2014 Not reviewed this visit Vitals BP Pulse Temp Resp Height(growth percentile) Weight(growth percentile) 118/70 61 99.6 °F (37.6 °C) (Oral) 16 5' 4\" (1.626 m) 161 lb (73 kg) SpO2 BMI OB Status Smoking Status 99% 27.64 kg/m2 Hysterectomy Former Smoker Vitals History BMI and BSA Data  Body Mass Index Body Surface Area  
 27.64 kg/m 2 1.82 m 2  
  
 Preferred Pharmacy Pharmacy Name Phone [id number] - [address] [phone] Your Updated Medication List  
  
   
This list is accurate as of: 3/29/17 11:49 AM.  Always use your most recent med list.  
  
  
  
  
 albuterol 2.5 mg /3 mL (0.083 %) nebulizer solution Commonly known as:  PROVENTIL VENTOLIN  
2.5 mg.  
  
 atorvastatin 40 mg tablet Commonly known as:  LIPITOR Take 1 Tab by mouth daily. baclofen 10 mg tablet Commonly known as:  LIORESAL  
TAKE ONE TABLET BY MOUTH THREE TIMES A DAY DEXILANT 60 mg Cpdb Generic drug:  Dexlansoprazole 60 mg daily. docusate sodium 100 mg capsule Commonly known as:  Clydie Pedersen Take 1 Cap by mouth two (2) times a day for 30 days. ergocalciferol 50,000 unit capsule Commonly known as:  ERGOCALCIFEROL Take 1 Cap by mouth every seven (7) days. fluticasone 50 mcg/actuation nasal spray Commonly known as:  Marsha Princeizzard 2 Sprays by Both Nostrils route as needed. gabapentin 400 mg capsule Commonly known as:  NEURONTIN  
400 mg two (2) times a day. GEODON 80 mg capsule Generic drug:  ziprasidone Take 160 mg by mouth two (2) times daily (with meals). ipratropium 0.02 % nebulizer solution Commonly known as:  ATROVENT  
0.5 mg.  
  
 losartan 25 mg tablet Commonly known as:  COZAAR  
TAKE 1 TABLET BY MOUTH EVERY DAY  
  
 morphine CR 60 mg CR tablet Commonly known as:  MS CONTIN Take 1 Tab by mouth every twelve (12) hours. Max Daily Amount: 120 mg.  
  
 neomycin-colist-hydrocortisone-thonzonium otic suspension Commonly known as:  CORTISPORIN TC,COLY-MYCIN S Administer 2 Drops into each ear four (4) times daily. neomycin-polymyxin-hydrocortisone (buffered) 3.5-10,000-1 mg/mL-unit/mL-% otic suspension Commonly known as:  Marv Whitney Administer 3 Drops in left ear four (4) times daily. oxyCODONE-acetaminophen  mg per tablet Commonly known as:  PERCOCET 10 Take 1 Tab by mouth every six (6) hours as needed for Pain. Max Daily Amount: 4 Tabs. polyethylene glycol 17 gram packet Commonly known as:  Tonna Nidhi Take 1 Packet by mouth daily. sertraline 100 mg tablet Commonly known as:  ZOLOFT Take  by mouth daily. sucralfate 1 gram tablet Commonly known as:  CARAFATE  
1 g.  
  
 temazepam 15 mg capsule Commonly known as:  RESTORIL Take 1 Cap by mouth nightly as needed for Sleep. Max Daily Amount: 15 mg. Indications: INSOMNIA  
  
 traZODone 100 mg tablet Commonly known as:  Alex Haymaker Take 100 mg by mouth nightly. Please provide this summary of care documentation to your next provider. Your primary care clinician is listed as [id number] [facility]. If you have any questions after today's visit, please call [phone].

## 2017-03-30 ENCOUNTER — HOSPITAL ENCOUNTER (OUTPATIENT)
Dept: ULTRASOUND IMAGING | Age: 62
Discharge: HOME | End: 2017-03-30
Attending: NURSE PRACTITIONER
Payer: MEDICARE

## 2017-03-30 DIAGNOSIS — R10.11 ABDOMINAL PAIN, RIGHT UPPER QUADRANT: ICD-10-CM

## 2017-03-30 PROCEDURE — 76705 ECHO EXAM OF ABDOMEN: CPT

## 2017-04-01 DIAGNOSIS — R82.90 BAD ODOR OF URINE: ICD-10-CM

## 2017-04-03 ENCOUNTER — APPOINTMENT (OUTPATIENT)
Dept: CT IMAGING | Age: 62
End: 2017-04-03
Attending: EMERGENCY MEDICINE
Payer: MEDICARE

## 2017-04-03 ENCOUNTER — HOSPITAL ENCOUNTER (EMERGENCY)
Age: 62
Discharge: HOME | End: 2017-04-03
Attending: EMERGENCY MEDICINE
Payer: MEDICARE

## 2017-04-03 VITALS
OXYGEN SATURATION: 98 % | RESPIRATION RATE: 16 BRPM | DIASTOLIC BLOOD PRESSURE: 69 MMHG | WEIGHT: 158 LBS | TEMPERATURE: 100 F | HEART RATE: 53 BPM | HEIGHT: 64 IN | SYSTOLIC BLOOD PRESSURE: 146 MMHG | BODY MASS INDEX: 26.98 KG/M2

## 2017-04-03 DIAGNOSIS — R10.31 ABDOMINAL PAIN, RIGHT LOWER QUADRANT: Primary | ICD-10-CM

## 2017-04-03 DIAGNOSIS — K59.00 CONSTIPATION, UNSPECIFIED CONSTIPATION TYPE: ICD-10-CM

## 2017-04-03 DIAGNOSIS — K52.9 ENTERITIS: ICD-10-CM

## 2017-04-03 LAB
ALBUMIN SERPL BCP-MCNC: 3.5 G/DL (ref 3.4–5)
ALBUMIN/GLOB SERPL: 1 {RATIO} (ref 0.8–1.7)
ALP SERPL-CCNC: 87 U/L (ref 45–117)
ALT SERPL-CCNC: 29 U/L (ref 13–56)
ANION GAP BLD CALC-SCNC: 7 MMOL/L (ref 3–18)
APPEARANCE UR: CLEAR
AST SERPL W P-5'-P-CCNC: 22 U/L (ref 15–37)
BACTERIA URNS QL MICRO: NEGATIVE /HPF
BASOPHILS # BLD AUTO: 0 K/UL (ref 0–0.06)
BASOPHILS # BLD: 0 % (ref 0–2)
BILIRUB SERPL-MCNC: 0.4 MG/DL (ref 0.2–1)
BILIRUB UR QL: NEGATIVE
BUN SERPL-MCNC: 7 MG/DL (ref 7–18)
BUN/CREAT SERPL: 9 (ref 12–20)
CALCIUM SERPL-MCNC: 9 MG/DL (ref 8.5–10.1)
CHLORIDE SERPL-SCNC: 106 MMOL/L (ref 100–108)
CO2 SERPL-SCNC: 30 MMOL/L (ref 21–32)
COLOR UR: YELLOW
CREAT SERPL-MCNC: 0.75 MG/DL (ref 0.6–1.3)
DIFFERENTIAL METHOD BLD: NORMAL
EOSINOPHIL # BLD: 0 K/UL (ref 0–0.4)
EOSINOPHIL NFR BLD: 0 % (ref 0–5)
EPITH CASTS URNS QL MICRO: NORMAL /LPF (ref 0–5)
ERYTHROCYTE [DISTWIDTH] IN BLOOD BY AUTOMATED COUNT: 12.7 % (ref 11.6–14.5)
GLOBULIN SER CALC-MCNC: 3.5 G/DL (ref 2–4)
GLUCOSE SERPL-MCNC: 111 MG/DL (ref 74–99)
GLUCOSE UR STRIP.AUTO-MCNC: NEGATIVE MG/DL
HCT VFR BLD AUTO: 36.4 % (ref 35–45)
HGB BLD-MCNC: 12 G/DL (ref 12–16)
HGB UR QL STRIP: NEGATIVE
KETONES UR QL STRIP.AUTO: NEGATIVE MG/DL
LEUKOCYTE ESTERASE UR QL STRIP.AUTO: ABNORMAL
LYMPHOCYTES # BLD AUTO: 40 % (ref 21–52)
LYMPHOCYTES # BLD: 3.4 K/UL (ref 0.9–3.6)
MCH RBC QN AUTO: 28.2 PG (ref 24–34)
MCHC RBC AUTO-ENTMCNC: 33 G/DL (ref 31–37)
MCV RBC AUTO: 85.6 FL (ref 74–97)
MONOCYTES # BLD: 0.5 K/UL (ref 0.05–1.2)
MONOCYTES NFR BLD AUTO: 6 % (ref 3–10)
NEUTS SEG # BLD: 4.5 K/UL (ref 1.8–8)
NEUTS SEG NFR BLD AUTO: 54 % (ref 40–73)
NITRITE UR QL STRIP.AUTO: NEGATIVE
PH UR STRIP: 8 [PH] (ref 5–8)
PLATELET # BLD AUTO: 233 K/UL (ref 135–420)
PMV BLD AUTO: 9.9 FL (ref 9.2–11.8)
POTASSIUM SERPL-SCNC: 3.9 MMOL/L (ref 3.5–5.5)
PROT SERPL-MCNC: 7 G/DL (ref 6.4–8.2)
PROT UR STRIP-MCNC: NEGATIVE MG/DL
RBC # BLD AUTO: 4.25 M/UL (ref 4.2–5.3)
RBC #/AREA URNS HPF: NEGATIVE /HPF (ref 0–5)
SODIUM SERPL-SCNC: 143 MMOL/L (ref 136–145)
SP GR UR REFRACTOMETRY: 1.01 (ref 1–1.03)
UROBILINOGEN UR QL STRIP.AUTO: 1 EU/DL (ref 0.2–1)
WBC # BLD AUTO: 8.4 K/UL (ref 4.6–13.2)
WBC URNS QL MICRO: NORMAL /HPF (ref 0–4)

## 2017-04-03 PROCEDURE — 74011250636 HC RX REV CODE- 250/636: Performed by: EMERGENCY MEDICINE

## 2017-04-03 PROCEDURE — 85025 COMPLETE CBC W/AUTO DIFF WBC: CPT | Performed by: EMERGENCY MEDICINE

## 2017-04-03 PROCEDURE — 74177 CT ABD & PELVIS W/CONTRAST: CPT

## 2017-04-03 PROCEDURE — 74011636320 HC RX REV CODE- 636/320: Performed by: EMERGENCY MEDICINE

## 2017-04-03 PROCEDURE — 80053 COMPREHEN METABOLIC PANEL: CPT | Performed by: EMERGENCY MEDICINE

## 2017-04-03 PROCEDURE — 96374 THER/PROPH/DIAG INJ IV PUSH: CPT

## 2017-04-03 PROCEDURE — 99284 EMERGENCY DEPT VISIT MOD MDM: CPT

## 2017-04-03 PROCEDURE — 81001 URINALYSIS AUTO W/SCOPE: CPT | Performed by: EMERGENCY MEDICINE

## 2017-04-03 RX ORDER — MAGNESIUM CITRATE
SOLUTION, ORAL ORAL
Qty: 1 BOTTLE | Refills: 0 | Status: SHIPPED | OUTPATIENT
Start: 2017-04-03 | End: 2017-10-18

## 2017-04-03 RX ORDER — MORPHINE SULFATE 4 MG/ML
4 INJECTION, SOLUTION INTRAMUSCULAR; INTRAVENOUS
Status: COMPLETED | OUTPATIENT
Start: 2017-04-03 | End: 2017-04-03

## 2017-04-03 RX ORDER — POLYETHYLENE GLYCOL 3350 17 G/17G
POWDER, FOR SOLUTION ORAL
Qty: 30 PACKET | Refills: 2 | Status: SHIPPED | OUTPATIENT
Start: 2017-04-03 | End: 2017-06-27 | Stop reason: SDUPTHER

## 2017-04-03 RX ORDER — LOSARTAN POTASSIUM 25 MG/1
TABLET ORAL
Qty: 30 TAB | Refills: 0 | Status: SHIPPED | OUTPATIENT
Start: 2017-04-03 | End: 2017-04-30 | Stop reason: SDUPTHER

## 2017-04-03 RX ADMIN — IOPAMIDOL 100 ML: 612 INJECTION, SOLUTION INTRAVENOUS at 13:41

## 2017-04-03 RX ADMIN — Medication 4 MG: at 12:23

## 2017-04-03 NOTE — ED PROVIDER NOTES
HPI Comments: 11:40 AM Sharmila Moya is a 60 y.o. female with a h/o Fibromyalgia, GERD, HTN, HYST, JESSE, and Smoking presents to the ED via EMS with c/o aching RLQ abd pain onset 1 week ago. Per daughter states that the pt saw PCP last week where blood work, and a US was done. Pt states she took her Morphine, and Oxycodone today. Pt reports chest spasms, and right flank pain. Pt denies dysuria, constipation, nausea, vomiting, diarrhea, chest pain, or cough. All other sx denied. No other complaints at this time. Daniel Lucero MD      Patient is a 60 y.o. female presenting with flank pain and groin pain. The history is provided by the patient. Flank Pain    Associated symptoms include abdominal pain (RLQ). Pertinent negatives include no chest pain, no fever, no headaches, no dysuria and no weakness. Groin Pain   Associated symptoms include abdominal pain (RLQ). Pertinent negatives include no chest pain, no headaches and no shortness of breath.         Past Medical History:   Diagnosis Date    Annular tear of lumbar disc 11/10/2014    Asthma     Bronchitis    Benign tumor of throat     Bone spur     right ankle    Cervicalgia 11/10/2014    Chronic pain     back    Degeneration of lumbar or lumbosacral intervertebral disc 11/10/2014    Degenerative disc disease     Depression     Displacement of lumbar intervertebral disc without myelopathy 11/10/2014    Elevated white blood cell count     Fibromyalgia     GERD (gastroesophageal reflux disease)     Hypertension     Insomnia     Nausea & vomiting     Pain in joint, multiple sites 11/10/2014    Postlaminectomy syndrome, cervical region 11/10/2014    Sinus infection 10/5/15    Ulcer ([address]        Past Surgical History:   Procedure Laterality Date    HX CERVICAL FUSION      HX HYSTERECTOMY      HX ORTHOPAEDIC      ganglion cyst removed, right hand    HX OTHER SURGICAL      cyst left thigh removed    LAP,CHOLECYSTECTOMY N/A 02/27/2017 Dr. Paresh Guthrie         Family History:   Problem Relation Age of Onset    Hypertension Other     Heart Attack Other     Heart Disease Other     Stroke Other     Headache Other     Seizures Other        Social History     Social History    Marital status:      Spouse name: N/A    Number of children: N/A    Years of education: N/A     Occupational History    Not on file. Social History Main Topics    Smoking status: Former Smoker    Smokeless tobacco: Never Used    Alcohol use No      Comment: none in 24 years    Drug use: No      Comment: last smoked in 1993    Sexual activity: Not on file     Other Topics Concern    Not on file     Social History Narrative         ALLERGIES: Aspirin; Macrobid [nitrofurantoin monohyd/m-cryst]; Nsaids (non-steroidal anti-inflammatory drug); Opana [oxymorphone]; and Pcn [penicillins]    Review of Systems   Constitutional: Negative for chills and fever. HENT: Negative for congestion and sneezing. Eyes: Negative for visual disturbance. Respiratory: Negative for cough and shortness of breath. Cardiovascular: Negative for chest pain. Gastrointestinal: Positive for abdominal pain (RLQ). Negative for nausea and vomiting. Genitourinary: Positive for flank pain. Negative for difficulty urinating, dysuria and vaginal discharge. Musculoskeletal: Negative for back pain. Skin: Negative for rash. Neurological: Negative for weakness and headaches. All other systems reviewed and are negative. Vitals:    04/03/17 1113 04/03/17 1200 04/03/17 1230   BP: 140/78 139/70 135/65   Pulse: (!) 53     Resp: 16     Temp: 100 °F (37.8 °C)     SpO2: 98% 97% 96%   Weight: 71.7 kg (158 lb)     Height: 5' 4\" (1.626 m)              Physical Exam   Constitutional: She is oriented to person, place, and time. She appears well-developed and well-nourished. HENT:   Head: Normocephalic and atraumatic. Neck: Neck supple. No JVD present.    Cardiovascular: Normal rate and regular rhythm. Pulmonary/Chest: Effort normal and breath sounds normal. No respiratory distress. Abdominal: Soft. She exhibits no distension. There is tenderness. There is no rebound and no guarding. Mild RLQ and RUQ tenderness   Musculoskeletal: She exhibits no edema. No CVA tenderness   Neurological: She is alert and oriented to person, place, and time. Skin: Skin is warm and dry. No erythema. Psychiatric: Judgment normal.   Nursing note and vitals reviewed.        MDM  Number of Diagnoses or Management Options  Abdominal pain, right lower quadrant:   Constipation, unspecified constipation type:   Enteritis:   Diagnosis management comments: 61 y/o female presents with abd and flank pain  Check ua and basic labs    ua unremarkable, ct to eval for appe, ureteral stone  Hx does not seem consistent with dissection       Amount and/or Complexity of Data Reviewed  Clinical lab tests: ordered and reviewed  Tests in the radiology section of CPT®: ordered and reviewed      ED Course       Procedures      Vitals:  Patient Vitals for the past 12 hrs:   Temp Pulse Resp BP SpO2   04/03/17 1230 - - - 135/65 96 %   04/03/17 1200 - - - 139/70 97 %   04/03/17 1113 100 °F (37.8 °C) (!) 53 16 140/78 98 %         Medications ordered:   Medications   morphine injection 4 mg (4 mg IntraVENous Given 4/3/17 1223)   iopamidol (ISOVUE 300) 61 % contrast injection 100-500 mL (100 mL IntraVENous Given 4/3/17 1341)         Lab findings:  Recent Results (from the past 12 hour(s))   URINALYSIS W/ RFLX MICROSCOPIC    Collection Time: 04/03/17 11:40 AM   Result Value Ref Range    Color YELLOW      Appearance CLEAR      Specific gravity 1.013 1.005 - 1.030      pH (UA) 8.0 5.0 - 8.0      Protein NEGATIVE  NEG mg/dL    Glucose NEGATIVE  NEG mg/dL    Ketone NEGATIVE  NEG mg/dL    Bilirubin NEGATIVE  NEG      Blood NEGATIVE  NEG      Urobilinogen 1.0 0.2 - 1.0 EU/dL    Nitrites NEGATIVE  NEG      Leukocyte Esterase TRACE (A) NEG URINE MICROSCOPIC ONLY    Collection Time: 04/03/17 11:40 AM   Result Value Ref Range    WBC 0 to 3 0 - 4 /hpf    RBC NEGATIVE  0 - 5 /hpf    Epithelial cells FEW 0 - 5 /lpf    Bacteria NEGATIVE  NEG /hpf   CBC WITH AUTOMATED DIFF    Collection Time: 04/03/17  1:06 PM   Result Value Ref Range    WBC 8.4 4.6 - 13.2 K/uL    RBC 4.25 4.20 - 5.30 M/uL    HGB 12.0 12.0 - 16.0 g/dL    HCT 36.4 35.0 - 45.0 %    MCV 85.6 74.0 - 97.0 FL    MCH 28.2 24.0 - 34.0 PG    MCHC 33.0 31.0 - 37.0 g/dL    RDW 12.7 11.6 - 14.5 %    PLATELET 797 509 - 191 K/uL    MPV 9.9 9.2 - 11.8 FL    NEUTROPHILS 54 40 - 73 %    LYMPHOCYTES 40 21 - 52 %    MONOCYTES 6 3 - 10 %    EOSINOPHILS 0 0 - 5 %    BASOPHILS 0 0 - 2 %    ABS. NEUTROPHILS 4.5 1.8 - 8.0 K/UL    ABS. LYMPHOCYTES 3.4 0.9 - 3.6 K/UL    ABS. MONOCYTES 0.5 0.05 - 1.2 K/UL    ABS. EOSINOPHILS 0.0 0.0 - 0.4 K/UL    ABS. BASOPHILS 0.0 0.0 - 0.06 K/UL    DF AUTOMATED     METABOLIC PANEL, COMPREHENSIVE    Collection Time: 04/03/17  1:06 PM   Result Value Ref Range    Sodium 143 136 - 145 mmol/L    Potassium 3.9 3.5 - 5.5 mmol/L    Chloride 106 100 - 108 mmol/L    CO2 30 21 - 32 mmol/L    Anion gap 7 3.0 - 18 mmol/L    Glucose 111 (H) 74 - 99 mg/dL    BUN 7 7.0 - 18 MG/DL    Creatinine 0.75 0.6 - 1.3 MG/DL    BUN/Creatinine ratio 9 (L) 12 - 20      GFR est AA >60 >60 ml/min/1.73m2    GFR est non-AA >60 >60 ml/min/1.73m2    Calcium 9.0 8.5 - 10.1 MG/DL    Bilirubin, total 0.4 0.2 - 1.0 MG/DL    ALT (SGPT) 29 13 - 56 U/L    AST (SGOT) 22 15 - 37 U/L    Alk.  phosphatase 87 45 - 117 U/L    Protein, total 7.0 6.4 - 8.2 g/dL    Albumin 3.5 3.4 - 5.0 g/dL    Globulin 3.5 2.0 - 4.0 g/dL    A-G Ratio 1.0 0.8 - 1.7         EKG interpretation by ED Physician:      X-Ray, CT or other radiology findings or impressions:  CT ABD PELV W CONT   Final Result   IMPRESSION:     Moderately prominent retrocardiac hiatal hernia.     Status post cholecystectomy and status post hysterectomy.     Common bile duct is mildly prominent in size with a diameter of 9 mm due to  reservoir effect, secondary to previous cholecystectomy.     No evidence of urinary calculus or obstructive uropathy.     Mild diverticulosis of descending colon and moderate diverticulosis of sigmoid  colon without evidence of diverticulitis. Moderate stool present in proximal and  mid colon.     Nonspecific small amount of gas scattered in small bowel without surrounding  inflammatory process. Acute enteropathy is not excluded       Progress notes, Consult notes or additional Procedure notes:   Pt noted to have elevated BP. Pt is asymptomatic and was referred to PCP for follow up. Reevaluation of patient:   Discussed results with pt, possible enteritis and constipation. Pt on chronic pain meds at home, will give short course of laxatives. Discussed return precautions with pt. Stable for dc home    Disposition:  Diagnosis:   1. Abdominal pain, right lower quadrant    2. Constipation, unspecified constipation type    3. Enteritis        Disposition: discharge    Follow-up Information     Follow up With Details Comments Ania Alexis MD Go in 2 days For follow up [address] [place] [address]  [phone]      [facility] EMERGENCY DEPT Go to As needed, If symptoms worsen [address] [place] [id number] [phone]            Patient's Medications   Start Taking    No medications on file   Continue Taking    ALBUTEROL (PROVENTIL VENTOLIN) 2.5 MG /3 ML (0.083 %) NEBULIZER SOLUTION    2.5 mg.    ATORVASTATIN (LIPITOR) 40 MG TABLET    Take 1 Tab by mouth daily. BACLOFEN (LIORESAL) 10 MG TABLET    TAKE ONE TABLET BY MOUTH THREE TIMES A DAY    DEXLANSOPRAZOLE (DEXILANT) 60 MG CPDB    60 mg daily. ERGOCALCIFEROL (ERGOCALCIFEROL) 50,000 UNIT CAPSULE    Take 1 Cap by mouth every seven (7) days. FLUTICASONE (FLONASE) 50 MCG/ACTUATION NASAL SPRAY    2 Sprays by Both Nostrils route as needed. GABAPENTIN (NEURONTIN) 400 MG CAPSULE    400 mg two (2) times a day. IPRATROPIUM (ATROVENT) 0.02 % NEBULIZER SOLUTION    0.5 mg. LOSARTAN (COZAAR) 25 MG TABLET    TAKE 1 TABLET BY MOUTH EVERY DAY    MORPHINE CR (MS CONTIN) 60 MG CR TABLET    Take 1 Tab by mouth every twelve (12) hours. Max Daily Amount: 120 mg. NEOMYCIN-COLIST-HYDROCORTISONE-THONZONIUM (CORTISPORIN TC,COLY-MYCIN S) OTIC SUSPENSION    Administer 2 Drops into each ear four (4) times daily. NEOMYCIN-POLYMYXIN-HYDROCORTISONE, BUFFERED, (PEDIOTIC) 3.5-10,000-1 MG/ML-UNIT/ML-% OTIC SUSPENSION    Administer 3 Drops in left ear four (4) times daily. OXYCODONE-ACETAMINOPHEN (PERCOCET 10)  MG PER TABLET    Take 1 Tab by mouth every six (6) hours as needed for Pain. Max Daily Amount: 4 Tabs. POLYETHYLENE GLYCOL (MIRALAX) 17 GRAM PACKET    Take 1 Packet by mouth daily. SERTRALINE (ZOLOFT) 100 MG TABLET    Take  by mouth daily. SUCRALFATE (CARAFATE) 1 GRAM TABLET    1 g.    TEMAZEPAM (RESTORIL) 15 MG CAPSULE    Take 1 Cap by mouth nightly as needed for Sleep. Max Daily Amount: 15 mg. Indications: INSOMNIA    TRAZODONE (DESYREL) 100 MG TABLET    Take 100 mg by mouth nightly. ZIPRASIDONE HCL (GEODON) 80 MG CAPSULE    Take 160 mg by mouth two (2) times daily (with meals). These Medications have changed    No medications on file   Stop Taking    No medications on file     [address] for and in the presence of Armstead Merlin, DO 11:49 AM, 04/03/17. Physician Attestation  I personally performed the services described in the documentation, reviewed the documentation, as recorded by the scribe in my presence, and it accurately and completely records my words and actions.     Armstead Merlin, DO  04/03/17        Signed by : Logan Mckinney, 04/03/17 at 11:49 AM

## 2017-04-03 NOTE — TELEPHONE ENCOUNTER
This patient contacted office for the following prescriptions to be filled: (Patient also states she has been released from the doctor who did her gallbladder surgery but is requesting something to help boost her appetite. I told her that if it is a new medication he will need to see her for this and scheduled her an appt for 4/10 but I told her i would let him know she is requesting this)  Medication requested :   Requested Prescriptions     Pending Prescriptions Disp Refills    losartan (COZAAR) 25 mg tablet [Pharmacy Med Name: Losartan Potassium Oral Tablet 25 MG] 30 Tab 0     Sig: TAKE ONE TABLET BY MOUTH ONE TIME DAILY    polyethylene glycol (MIRALAX) 17 gram packet [Pharmacy Med Name: Polyethylene Glycol 3350 Oral Packet]  2     Sig: TAKE 1 PACKET BY MOUTH DAILY.      PCP: Dr. Lauren Costello or [employer] [employer]  Mail order or Local pharmacy: [phone]    Scheduled appointment if not seen by current providers in office: LOV 3/17/17, next appt 4/10/17

## 2017-04-03 NOTE — ED NOTES
Ruiz Ware is a 60 y.o. female that was discharged in stable condition. The patients diagnosis, condition and treatment were explained to  patient and aftercare instructions were given. The patient verbalized understanding. Patient armband removed and shredded.

## 2017-04-03 NOTE — DISCHARGE INSTRUCTIONS
Abdominal Pain: Care Instructions  Your Care Instructions    Abdominal pain has many possible causes. Some aren't serious and get better on their own in a few days. Others need more testing and treatment. If your pain continues or gets worse, you need to be rechecked and may need more tests to find out what is wrong. You may need surgery to correct the problem. Don't ignore new symptoms, such as fever, nausea and vomiting, urination problems, pain that gets worse, and dizziness. These may be signs of a more serious problem. Your doctor may have recommended a follow-up visit in the next 8 to 12 hours. If you are not getting better, you may need more tests or treatment. The doctor has checked you carefully, but problems can develop later. If you notice any problems or new symptoms, get medical treatment right away. Follow-up care is a key part of your treatment and safety. Be sure to make and go to all appointments, and call your doctor if you are having problems. It's also a good idea to know your test results and keep a list of the medicines you take. How can you care for yourself at home? · Rest until you feel better. · To prevent dehydration, drink plenty of fluids, enough so that your urine is light yellow or clear like water. Choose water and other caffeine-free clear liquids until you feel better. If you have kidney, heart, or liver disease and have to limit fluids, talk with your doctor before you increase the amount of fluids you drink. · If your stomach is upset, eat mild foods, such as rice, dry toast or crackers, bananas, and applesauce. Try eating several small meals instead of two or three large ones. · Wait until 48 hours after all symptoms have gone away before you have spicy foods, alcohol, and drinks that contain caffeine. · Do not eat foods that are high in fat. · Avoid anti-inflammatory medicines such as aspirin, ibuprofen (Advil, Motrin), and naproxen (Aleve).  These can cause stomach upset. Talk to your doctor if you take daily aspirin for another health problem. When should you call for help? Call 911 anytime you think you may need emergency care. For example, call if:  · You passed out (lost consciousness). · You pass maroon or very bloody stools. · You vomit blood or what looks like coffee grounds. · You have new, severe belly pain. Call your doctor now or seek immediate medical care if:  · Your pain gets worse, especially if it becomes focused in one area of your belly. · You have a new or higher fever. · Your stools are black and look like tar, or they have streaks of blood. · You have unexpected vaginal bleeding. · You have symptoms of a urinary tract infection. These may include:  ¨ Pain when you urinate. ¨ Urinating more often than usual.  ¨ Blood in your urine. · You are dizzy or lightheaded, or you feel like you may faint. Watch closely for changes in your health, and be sure to contact your doctor if:  · You are not getting better after 1 day (24 hours). Where can you learn more? Go to http://dianaHobzylashonda.info/. Enter Q752 in the search box to learn more about \"Abdominal Pain: Care Instructions. \"  Current as of: May 27, 2016  Content Version: 11.2  © 1288-9918 [employer]. Care instructions adapted under license by [employer] (which disclaims liability or warranty for this information). If you have questions about a medical condition or this instruction, always ask your healthcare professional. [place] [address] any warranty or liability for your use of this information. Constipation: Care Instructions  Your Care Instructions  Constipation means that you have a hard time passing stools (bowel movements). People pass stools from 3 times a day to once every 3 days. What is normal for you may be different. Constipation may occur with pain in the rectum and cramping.  The pain may get worse when you try to pass stools. Sometimes there are small amounts of bright red blood on toilet paper or the surface of stools. This is because of enlarged veins near the rectum (hemorrhoids). A few changes in your diet and lifestyle may help you avoid ongoing constipation. Your doctor may also prescribe medicine to help loosen your stool. Some medicines can cause constipation. These include pain medicines and antidepressants. Tell your doctor about all the medicines you take. Your doctor may want to make a medicine change to ease your symptoms. Follow-up care is a key part of your treatment and safety. Be sure to make and go to all appointments, and call your doctor if you are having problems. It's also a good idea to know your test results and keep a list of the medicines you take. How can you care for yourself at home? · Drink plenty of fluids, enough so that your urine is light yellow or clear like water. If you have kidney, heart, or liver disease and have to limit fluids, talk with your doctor before you increase the amount of fluids you drink. · Include high-fiber foods in your diet each day. These include fruits, vegetables, beans, and whole grains. · Get at least 30 minutes of exercise on most days of the week. Walking is a good choice. You also may want to do other activities, such as running, swimming, cycling, or playing tennis or team sports. · Take a fiber supplement, such as Citrucel or Metamucil, every day. Read and follow all instructions on the label. · Schedule time each day for a bowel movement. A daily routine may help. Take your time having your bowel movement. · Support your feet with a small step stool when you sit on the toilet. This helps flex your hips and places your pelvis in a squatting position. · Your doctor may recommend an over-the-counter laxative to relieve your constipation. Examples are Milk of Magnesia and MiraLax. Read and follow all instructions on the label.  Do not use laxatives on a long-term basis. When should you call for help? Call your doctor now or seek immediate medical care if:  · You have new or worse belly pain. · You have new or worse nausea or vomiting. · You have blood in your stools. Watch closely for changes in your health, and be sure to contact your doctor if:  · Your constipation is getting worse. · You do not get better as expected. Where can you learn more? Go to http://diana-lashonda.info/. Enter 21 [id number] in the search box to learn more about \"Constipation: Care Instructions. \"  Current as of: May 27, 2016  Content Version: 11.2  © 3213-7240 [employer]. Care instructions adapted under license by [employer] (which disclaims liability or warranty for this information). If you have questions about a medical condition or this instruction, always ask your healthcare professional. [address] any warranty or liability for your use of this information.

## 2017-04-04 ENCOUNTER — TELEPHONE (OUTPATIENT)
Dept: PAIN MANAGEMENT | Age: 62
End: 2017-04-04

## 2017-04-04 ENCOUNTER — PATIENT OUTREACH (OUTPATIENT)
Dept: FAMILY MEDICINE CLINIC | Age: 62
End: 2017-04-04

## 2017-04-04 NOTE — PATIENT INSTRUCTIONS
Constipation: Care Instructions, Abdominal Pain: Care Instructions reviewed with patient; she verbalizes understanding.

## 2017-04-04 NOTE — TELEPHONE ENCOUNTER
The pt called the office to report that she had been to the ER yesterday at [address] [place] [address]. She was given a shot of morphine while she was there. No take home prescriptions. Encounter documented in [facility].

## 2017-04-04 NOTE — PROGRESS NOTES
NNTOCED        Date/Time:  4/4/2017 1:19 PM    Patient listed on discharge (Daily Census) report on 4/4/17. Patient discharged from [facility] for RLQ abdominal pain. .    ED Provider Notes by Althea Pryor DO at 04/03/17 1140   Expand All Collapse All    HPI Comments: 11:40 AM Carson Spears is a 60 y.o. female with a h/o Fibromyalgia, GERD, HTN, HYST, JESSE, and Smoking presents to the ED via EMS with c/o aching RLQ abd pain onset 1 week ago. Per daughter states that the pt saw PCP last week where blood work, and a US was done. Pt states she took her Morphine, and Oxycodone today. Pt reports chest spasms, and right flank pain. Pt denies dysuria, constipation, nausea, vomiting, diarrhea, chest pain, or cough. All other sx denied. No other complaints at this time.   China Burrell MD     Patient is a 60 y.o. female presenting with flank pain and groin pain. The history is provided by the patient. Flank Pain    Associated symptoms include abdominal pain (RLQ). Pertinent negatives include no chest pain, no fever, no headaches, no dysuria and no weakness. Groin Pain   Associated symptoms include abdominal pain (RLQ). Pertinent negatives include no chest pain, no headaches and no shortness of breath.               RRAT score: 9 Low    Medical History:     Past Medical History:   Diagnosis Date    Annular tear of lumbar disc 11/10/2014    Asthma     Bronchitis    Benign tumor of throat     Bone spur     right ankle    Cervicalgia 11/10/2014    Chronic pain     back    Degeneration of lumbar or lumbosacral intervertebral disc 11/10/2014    Degenerative disc disease     Depression     Displacement of lumbar intervertebral disc without myelopathy 11/10/2014    Elevated white blood cell count     Fibromyalgia     GERD (gastroesophageal reflux disease)     Hypertension     Insomnia     Nausea & vomiting     Pain in joint, multiple sites 11/10/2014    Postlaminectomy syndrome, cervical region 11/10/2014    Sinus infection 10/5/15    Ulcer ([facility] [address]        Nurse Navigator(NN) contacted the patient by telephone to perform post ED discharge assessment. Verified  and address with patient as identifiers. Provided introduction to self, and explanation of the Nurses Navigator role. Diet:   Patient reports: Regular Diet    Activity:    Patient reports: as toloerated    Medication:   Performed medication reconciliation with patient, and patient verbalizes understanding of administration of home medications. There were no barriers to obtaining medications identified at this time. Support system:  Children; daughter is her personal care aide    Discharge Instructions :  Reviewed discharge instructions with patient. Patient verbalizes understanding of discharge instructions and follow-up care. Red Flags:  Bloody stool  Nausea/vomiting  Severe abdominal pain      Labs Reviewed:  Recent Labs      17   1306   WBC  8.4   HGB  12.0   HCT  36.4   PLT  233     Recent Labs      17   1306   NA  143   K  3.9   CL  106   CO2  30   GLU  111*   BUN  7   CREA  0.75   CA  9.0   ALB  3.5   SGOT  22   ALT  29       PCP/Specialist follow up: Patient scheduled to follow up with Karen Hernandez MD on 4/10/17. Reviewed red flags with patient, and patient verbalizes understanding. Patient given an opportunity to ask questions. No other clinical/social/functional needs noted. The patient agrees to contact the PCP office for questions related to their healthcare. The patient expressed thanks, offered no additional questions and ended the call. Case management plan: Will continue to follow as necessary for the next 30 days. Will reassess for case management needs prior to discharge from case management service on or about 30 days. Will continue to teach/ review ways to prevent constipation.         Linda Davey RN / Virtual Nurse Navigator

## 2017-04-10 ENCOUNTER — OFFICE VISIT (OUTPATIENT)
Dept: FAMILY MEDICINE CLINIC | Age: 62
End: 2017-04-10

## 2017-04-10 VITALS
RESPIRATION RATE: 18 BRPM | HEIGHT: 64 IN | HEART RATE: 79 BPM | OXYGEN SATURATION: 98 % | DIASTOLIC BLOOD PRESSURE: 70 MMHG | WEIGHT: 154 LBS | TEMPERATURE: 98.3 F | BODY MASS INDEX: 26.29 KG/M2 | SYSTOLIC BLOOD PRESSURE: 126 MMHG

## 2017-04-10 DIAGNOSIS — R10.11 RIGHT UPPER QUADRANT ABDOMINAL PAIN: Primary | ICD-10-CM

## 2017-04-10 RX ORDER — TRAMADOL HYDROCHLORIDE 50 MG/1
50 TABLET ORAL
Qty: 40 TAB | Refills: 0 | Status: SHIPPED | OUTPATIENT
Start: 2017-04-10 | End: 2017-10-18

## 2017-04-10 RX ORDER — MEGESTROL ACETATE 20 MG/1
20 TABLET ORAL DAILY
Qty: 30 TAB | Refills: 0 | Status: SHIPPED | OUTPATIENT
Start: 2017-04-10 | End: 2017-05-08 | Stop reason: SDUPTHER

## 2017-04-10 NOTE — PROGRESS NOTES
Verónica Jefferson is a 60 y.o. female  presents for follow up after gall bladder surgery. She continues to have RUQ pain. No fever or chills. She has assoc decrease in appetite. No weakness or numbness. No nausea or vomiting. Allergies   Allergen Reactions    Aspirin Other (comments)     Stomach bleeding    Macrobid [Nitrofurantoin Monohyd/M-Cryst] Nausea and Vomiting    Nsaids (Non-Steroidal Anti-Inflammatory Drug) Nausea and Vomiting    Opana [Oxymorphone] Other (comments)     Insomnia, weight loss, nightmares    Pcn [Penicillins] Hives     Outpatient Prescriptions Marked as Taking for the 4/10/17 encounter (Office Visit) with Karuna Gama MD   Medication Sig Dispense Refill    losartan (COZAAR) 25 mg tablet TAKE ONE TABLET BY MOUTH ONE TIME DAILY  30 Tab 0    polyethylene glycol (MIRALAX) 17 gram packet TAKE 1 PACKET BY MOUTH DAILY. 30 Packet 2    magnesium citrate solution Take 1/3 of bottle every 8 hours until finished or until you have a bowel movement. 1 Bottle 0    temazepam (RESTORIL) 15 mg capsule Take 1 Cap by mouth nightly as needed for Sleep. Max Daily Amount: 15 mg. Indications: INSOMNIA 30 Cap 1    morphine CR (MS CONTIN) 60 mg CR tablet Take 1 Tab by mouth every twelve (12) hours. Max Daily Amount: 120 mg. 60 Tab 0    fluticasone (FLONASE) 50 mcg/actuation nasal spray 2 Sprays by Both Nostrils route as needed. 1 Bottle 3    ergocalciferol (ERGOCALCIFEROL) 50,000 unit capsule Take 1 Cap by mouth every seven (7) days. 4 Cap 1    sucralfate (CARAFATE) 1 gram tablet 1 g.      baclofen (LIORESAL) 10 mg tablet TAKE ONE TABLET BY MOUTH THREE TIMES A DAY 90 Tab 3    atorvastatin (LIPITOR) 40 mg tablet Take 1 Tab by mouth daily. 30 Tab 5    neomycin-polymyxin-hydrocortisone, buffered, (PEDIOTIC) 3.5-10,000-1 mg/mL-unit/mL-% otic suspension Administer 3 Drops in left ear four (4) times daily.  3 mL 0    neomycin-colist-hydrocortisone-thonzonium (CORTISPORIN TC,COLY-MYCIN S) otic suspension Administer 2 Drops into each ear four (4) times daily. 5 mL 0    albuterol (PROVENTIL VENTOLIN) 2.5 mg /3 mL (0.083 %) nebulizer solution 2.5 mg.      Dexlansoprazole (DEXILANT) 60 mg CpDB 60 mg daily.  gabapentin (NEURONTIN) 400 mg capsule 400 mg two (2) times a day.  ipratropium (ATROVENT) 0.02 % nebulizer solution 0.5 mg.      trazodone (DESYREL) 100 mg tablet Take 100 mg by mouth nightly.  sertraline (ZOLOFT) 100 mg tablet Take  by mouth daily.        Patient Active Problem List   Diagnosis Code    Myalgia and myositis [id number]    Lumbago M54.5    Other affections of shoulder region, not elsewhere classified M75.80    Other chronic pain G89.29    [facility] F31.9    Chronic pain syndrome G89.4    Carpal tunnel syndrome G56.00    Degeneration of lumbar or lumbosacral intervertebral disc M51.37    Displacement of lumbar intervertebral disc without myelopathy M51.26    Pain in joint, multiple sites M25.50    Cervicalgia M54.2    Postlaminectomy syndrome, cervical region M96.1    Annular tear of lumbar disc M51.36    Lung nodule seen on imaging study R91.1    ACP (advance care planning) Z71.89    Hypercholesterolemia E78.00    Nausea R11.0     Past Medical History:   Diagnosis Date    Annular tear of lumbar disc 11/10/2014    Asthma     Bronchitis    Benign tumor of throat     Bone spur     right ankle    Cervicalgia 11/10/2014    Chronic pain     back    Degeneration of lumbar or lumbosacral intervertebral disc 11/10/2014    Degenerative disc disease     Depression     Displacement of lumbar intervertebral disc without myelopathy 11/10/2014    Elevated white blood cell count     Fibromyalgia     GERD (gastroesophageal reflux disease)     Hypertension     Insomnia     Nausea & vomiting     Pain in joint, multiple sites 11/10/2014    Postlaminectomy syndrome, cervical region 11/10/2014    Sinus infection 10/5/15    Ulcer ([facility]      Social History     Social History    Marital status:      Spouse name: N/A    Number of children: N/A    Years of education: N/A     Social History Main Topics    Smoking status: Former Smoker    Smokeless tobacco: Never Used    Alcohol use No      Comment: none in 24 years    Drug use: No      Comment: last smoked in 1993    Sexual activity: Not Asked     Other Topics Concern    None     Social History Narrative     Family History   Problem Relation Age of Onset    Hypertension Other     Heart Attack Other     Heart Disease Other     Stroke Other     Headache Other     Seizures Other         Review of Systems   Constitutional: Negative for chills and fever. Respiratory: Negative for cough, hemoptysis, shortness of breath and wheezing. Cardiovascular: Negative for chest pain and palpitations. Gastrointestinal: Positive for abdominal pain. Negative for constipation, diarrhea, nausea and vomiting. Vitals:    04/10/17 1315   BP: 126/70   Pulse: 79   Resp: 18   Temp: 98.3 °F (36.8 °C)   TempSrc: Temporal   SpO2: 98%   Weight: 154 lb (69.9 kg)   Height: 5' 4\" (1.626 m)   PainSc:   5   PainLoc: Abdomen       Physical Exam   Constitutional: She is oriented to person, place, and time and well-developed, well-nourished, and in no distress. Neck: Normal range of motion. Neck supple. Cardiovascular: Normal rate, regular rhythm and normal heart sounds. Pulmonary/Chest: Effort normal and breath sounds normal.   Abdominal: Soft. Bowel sounds are normal. She exhibits no distension and no mass. There is tenderness. There is no rebound and no guarding. Neurological: She is alert and oriented to person, place, and time. Skin: Skin is warm and dry. Nursing note and vitals reviewed. Assessment/Plan      ICD-10-CM ICD-9-CM    1. Right upper quadrant abdominal pain R10.11 789.01 traMADol (ULTRAM) 50 mg tablet      megestrol (MEGACE) 20 mg tablet     She is to use Ultram for mild pain.   She has Oxycodone for mod to severe pain    I have discussed the diagnosis with the patient and the intended plan of care as seen in the above orders. The patient has received an after-visit summary and questions were answered concerning future plans. I have discussed medication, side effects, and warnings with the patient in detail. The patient verbalized understanding and is in agreement with the plan of care. The patient will contact the office with any additional concerns.  ran and no concerns. Follow-up Disposition:  Return in about 10 days (around 4/20/2017).   lab results and schedule of future lab studies reviewed with patient    Ameya Breswter MD

## 2017-04-10 NOTE — PROGRESS NOTES
Chief Complaint   Patient presents with    Abdominal Pain     discomfort in abdomen and would like to have an appetite enhancer to help her 'eat'. 1. Have you been to the ER, urgent care clinic since your last visit? Hospitalized since your last visit? No    2. Have you seen or consulted any other health care providers outside of the [address] [place] [address] since your last visit? Include any pap smears or colon screening.  No

## 2017-04-10 NOTE — MR AVS SNAPSHOT
Visit Information Date & Time Provider Department Dept. Phone Encounter #  
 4/10/2017  1:15 PM Mine Thakkar MD [facility] [phone] [id number] Follow-up Instructions Return in about 10 days (around 4/20/2017). Your Appointments 4/20/2017  9:40 AM  
Follow Up with BETO Lujan [address] [place] [address] for Pain Management (RICHAR SCHEDULING) Appt Note: return in 2 months [address] [place] [address]  
[phone] [facility] [phone] Upcoming Health Maintenance Date Due DTaP/Tdap/Td series (1 - Tdap) 3/30/1975 BREAST CANCER SCRN MAMMOGRAM 12/2/2017 COLONOSCOPY 6/6/2019 PAP AKA CERVICAL CYTOLOGY 6/21/2019 Allergies as of 4/10/2017  Review Complete On: 4/10/2017 By: Mine Thakkar MD  
  
 Severity Noted Reaction Type Reactions Aspirin    Other (comments) Stomach bleeding Macrobid [Nitrofurantoin Monohyd/m-cryst]  11/25/2015    Nausea and Vomiting Nsaids (Non-steroidal Anti-inflammatory Drug)    Nausea and Vomiting Opana [Oxymorphone]  11/29/2016    Other (comments) Insomnia, weight loss, nightmares Pcn [Penicillins]    Hives Current Immunizations  Never Reviewed Name Date Pneumococcal Polysaccharide (PPSV-23) 3/7/2014 Not reviewed this visit You Were Diagnosed With   
  
 Codes Comments Right upper quadrant abdominal pain    -  Primary ICD-10-CM: R10.11 ICD-9-CM: 789.01 Vitals BP Pulse Temp Resp Height(growth percentile) Weight(growth percentile) 126/70 79 98.3 °F (36.8 °C) (Temporal) 18 5' 4\" (1.626 m) 154 lb (69.9 kg) SpO2 BMI OB Status Smoking Status 98% 26.43 kg/m2 Hysterectomy Former Smoker Vitals History BMI and BSA Data Body Mass Index Body Surface Area  
 26.43 kg/m 2 1.78 m 2 Preferred Pharmacy Pharmacy Name Phone [id number] - [address], [phone] [phone] Your Updated Medication List  
  
   
This list is accurate as of: 4/10/17  1:32 PM.  Always use your most recent med list.  
  
  
  
  
 albuterol 2.5 mg /3 mL (0.083 %) nebulizer solution Commonly known as:  PROVENTIL VENTOLIN  
2.5 mg.  
  
 atorvastatin 40 mg tablet Commonly known as:  LIPITOR Take 1 Tab by mouth daily. baclofen 10 mg tablet Commonly known as:  LIORESAL  
TAKE ONE TABLET BY MOUTH THREE TIMES A DAY DEXILANT 60 mg Cpdb Generic drug:  Dexlansoprazole 60 mg daily. ergocalciferol 50,000 unit capsule Commonly known as:  ERGOCALCIFEROL Take 1 Cap by mouth every seven (7) days. fluticasone 50 mcg/actuation nasal spray Commonly known as:  Angelita Six 2 Sprays by Both Nostrils route as needed. gabapentin 400 mg capsule Commonly known as:  NEURONTIN  
400 mg two (2) times a day. GEODON 80 mg capsule Generic drug:  ziprasidone Take 160 mg by mouth two (2) times daily (with meals). ipratropium 0.02 % nebulizer solution Commonly known as:  ATROVENT  
0.5 mg.  
  
 losartan 25 mg tablet Commonly known as:  COZAAR  
TAKE ONE TABLET BY MOUTH ONE TIME DAILY  
  
 magnesium citrate solution Take 1/3 of bottle every 8 hours until finished or until you have a bowel movement. megestrol 20 mg tablet Commonly known as:  MEGACE Take 1 Tab by mouth daily. morphine CR 60 mg CR tablet Commonly known as:  MS CONTIN Take 1 Tab by mouth every twelve (12) hours. Max Daily Amount: 120 mg.  
  
 neomycin-colist-hydrocortisone-thonzonium otic suspension Commonly known as:  CORTISPORIN TC,COLY-MYCIN S Administer 2 Drops into each ear four (4) times daily. neomycin-polymyxin-hydrocortisone (buffered) 3.5-10,000-1 mg/mL-unit/mL-% otic suspension Commonly known as:  Ne Dilling Administer 3 Drops in left ear four (4) times daily. polyethylene glycol 17 gram packet Commonly known as:  Harriet Alfonso TAKE 1 PACKET BY MOUTH DAILY. sertraline 100 mg tablet Commonly known as:  ZOLOFT Take  by mouth daily. sucralfate 1 gram tablet Commonly known as:  CARAFATE  
1 g.  
  
 temazepam 15 mg capsule Commonly known as:  RESTORIL Take 1 Cap by mouth nightly as needed for Sleep. Max Daily Amount: 15 mg. Indications: INSOMNIA  
  
 traMADol 50 mg tablet Commonly known as:  ULTRAM  
Take 1 Tab by mouth every six (6) hours as needed for Pain. Max Daily Amount: 200 mg.  
  
 traZODone 100 mg tablet Commonly known as:  Luellen Loft Take 100 mg by mouth nightly. Prescriptions Printed Refills  
 traMADol (ULTRAM) 50 mg tablet 0 Sig: Take 1 Tab by mouth every six (6) hours as needed for Pain. Max Daily Amount: 200 mg. Class: Print Route: Oral  
 megestrol (MEGACE) 20 mg tablet 0 Sig: Take 1 Tab by mouth daily. Class: Print Route: Oral  
  
Follow-up Instructions Return in about 10 days (around 4/20/2017). Patient Instructions Abdominal Pain: Care Instructions Your Care Instructions Abdominal pain has many possible causes. Some aren't serious and get better on their own in a few days. Others need more testing and treatment. If your pain continues or gets worse, you need to be rechecked and may need more tests to find out what is wrong. You may need surgery to correct the problem. Don't ignore new symptoms, such as fever, nausea and vomiting, urination problems, pain that gets worse, and dizziness. These may be signs of a more serious problem. Your doctor may have recommended a follow-up visit in the next 8 to 12 hours. If you are not getting better, you may need more tests or treatment. The doctor has checked you carefully, but problems can develop later. If you notice any problems or new symptoms, get medical treatment right away. Follow-up care is a key part of your treatment and safety. Be sure to make and go to all appointments, and call your doctor if you are having problems. It's also a good idea to know your test results and keep a list of the medicines you take. How can you care for yourself at home? · Rest until you feel better. · To prevent dehydration, drink plenty of fluids, enough so that your urine is light yellow or clear like water. Choose water and other caffeine-free clear liquids until you feel better. If you have kidney, heart, or liver disease and have to limit fluids, talk with your doctor before you increase the amount of fluids you drink. · If your stomach is upset, eat mild foods, such as rice, dry toast or crackers, bananas, and applesauce. Try eating several small meals instead of two or three large ones. · Wait until 48 hours after all symptoms have gone away before you have spicy foods, alcohol, and drinks that contain caffeine. · Do not eat foods that are high in fat. · Avoid anti-inflammatory medicines such as aspirin, ibuprofen (Advil, Motrin), and naproxen (Aleve). These can cause stomach upset. Talk to your doctor if you take daily aspirin for another health problem. When should you call for help? Call 911 anytime you think you may need emergency care. For example, call if: 
· You passed out (lost consciousness). · You pass maroon or very bloody stools. · You vomit blood or what looks like coffee grounds. · You have new, severe belly pain. Call your doctor now or seek immediate medical care if: 
· Your pain gets worse, especially if it becomes focused in one area of your belly. · You have a new or higher fever. · Your stools are black and look like tar, or they have streaks of blood. · You have unexpected vaginal bleeding. · You have symptoms of a urinary tract infection. These may include: 
¨ Pain when you urinate. ¨ Urinating more often than usual. 
¨ Blood in your urine. · You are dizzy or lightheaded, or you feel like you may faint. Watch closely for changes in your health, and be sure to contact your doctor if: 
· You are not getting better after 1 day (24 hours). Where can you learn more? Go to http://diana-lashonda.info/. Enter L460 in the search box to learn more about \"Abdominal Pain: Care Instructions. \" Current as of: May 27, 2016 Content Version: 11.2 © 8440-6670 [employer]. Care instructions adapted under license by [employer] (which disclaims liability or warranty for this information). If you have questions about a medical condition or this instruction, always ask your healthcare professional. [place] [address] any warranty or liability for your use of this information. Please provide this summary of care documentation to your next provider. Your primary care clinician is listed as [id number] [facility]. If you have any questions after today's visit, please call [phone].

## 2017-04-12 ENCOUNTER — ANESTHESIA EVENT (OUTPATIENT)
Dept: ENDOSCOPY | Age: 62
End: 2017-04-12
Payer: MEDICARE

## 2017-04-13 ENCOUNTER — SURGERY (OUTPATIENT)
Age: 62
End: 2017-04-13

## 2017-04-13 ENCOUNTER — ANESTHESIA (OUTPATIENT)
Dept: ENDOSCOPY | Age: 62
End: 2017-04-13
Payer: MEDICARE

## 2017-04-13 ENCOUNTER — DOCUMENTATION ONLY (OUTPATIENT)
Dept: PAIN MANAGEMENT | Age: 62
End: 2017-04-13

## 2017-04-13 ENCOUNTER — HOSPITAL ENCOUNTER (OUTPATIENT)
Age: 62
Setting detail: OUTPATIENT SURGERY
Discharge: HOME | End: 2017-04-13
Attending: INTERNAL MEDICINE | Admitting: INTERNAL MEDICINE
Payer: MEDICARE

## 2017-04-13 VITALS
TEMPERATURE: 98.7 F | HEIGHT: 64 IN | OXYGEN SATURATION: 100 % | SYSTOLIC BLOOD PRESSURE: 126 MMHG | RESPIRATION RATE: 15 BRPM | HEART RATE: 59 BPM | BODY MASS INDEX: 26.29 KG/M2 | WEIGHT: 154 LBS | DIASTOLIC BLOOD PRESSURE: 66 MMHG

## 2017-04-13 PROCEDURE — 74011000250 HC RX REV CODE- 250

## 2017-04-13 PROCEDURE — 76040000019: Performed by: INTERNAL MEDICINE

## 2017-04-13 PROCEDURE — 76060000031 HC ANESTHESIA FIRST 0.5 HR: Performed by: INTERNAL MEDICINE

## 2017-04-13 PROCEDURE — 74011250636 HC RX REV CODE- 250/636

## 2017-04-13 PROCEDURE — 74011250636 HC RX REV CODE- 250/636: Performed by: NURSE ANESTHETIST, CERTIFIED REGISTERED

## 2017-04-13 RX ORDER — FAMOTIDINE 10 MG/ML
20 INJECTION INTRAVENOUS ONCE
Status: DISCONTINUED | OUTPATIENT
Start: 2017-04-13 | End: 2017-04-13 | Stop reason: HOSPADM

## 2017-04-13 RX ORDER — SODIUM CHLORIDE, SODIUM LACTATE, POTASSIUM CHLORIDE, CALCIUM CHLORIDE 600; 310; 30; 20 MG/100ML; MG/100ML; MG/100ML; MG/100ML
50 INJECTION, SOLUTION INTRAVENOUS CONTINUOUS
Status: DISCONTINUED | OUTPATIENT
Start: 2017-04-13 | End: 2017-04-13 | Stop reason: HOSPADM

## 2017-04-13 RX ORDER — LIDOCAINE HYDROCHLORIDE 20 MG/ML
INJECTION, SOLUTION EPIDURAL; INFILTRATION; INTRACAUDAL; PERINEURAL AS NEEDED
Status: DISCONTINUED | OUTPATIENT
Start: 2017-04-13 | End: 2017-04-13 | Stop reason: HOSPADM

## 2017-04-13 RX ORDER — PROPOFOL 10 MG/ML
INJECTION, EMULSION INTRAVENOUS AS NEEDED
Status: DISCONTINUED | OUTPATIENT
Start: 2017-04-13 | End: 2017-04-13 | Stop reason: HOSPADM

## 2017-04-13 RX ADMIN — PROPOFOL 100 MG: 10 INJECTION, EMULSION INTRAVENOUS at 09:10

## 2017-04-13 RX ADMIN — LIDOCAINE HYDROCHLORIDE 50 MG: 20 INJECTION, SOLUTION EPIDURAL; INFILTRATION; INTRACAUDAL; PERINEURAL at 09:10

## 2017-04-13 RX ADMIN — PROPOFOL 100 MG: 10 INJECTION, EMULSION INTRAVENOUS at 09:15

## 2017-04-13 RX ADMIN — LIDOCAINE HYDROCHLORIDE 50 MG: 20 INJECTION, SOLUTION EPIDURAL; INFILTRATION; INTRACAUDAL; PERINEURAL at 09:15

## 2017-04-13 RX ADMIN — SODIUM CHLORIDE, SODIUM LACTATE, POTASSIUM CHLORIDE, AND CALCIUM CHLORIDE 50 ML/HR: 600; 310; 30; 20 INJECTION, SOLUTION INTRAVENOUS at 08:52

## 2017-04-13 NOTE — DISCHARGE INSTRUCTIONS
Hiatal Hernia: Care Instructions  Your Care Instructions  A hiatal hernia occurs when part of the stomach bulges into the chest cavity. A hiatal hernia may allow stomach acid and juices to back up into the esophagus (acid reflux). This can cause a feeling of burning, warmth, heat, or pain behind the breastbone. This feeling may often occur after you eat, soon after you lie down, or when you bend forward, and it may come and go. You also may have a sour taste in your mouth. These symptoms are commonly known as heartburn or reflux. But not all hiatal hernias cause symptoms. Follow-up care is a key part of your treatment and safety. Be sure to make and go to all appointments, and call your doctor if you are having problems. Its also a good idea to know your test results and keep a list of the medicines you take. How can you care for yourself at home? · Take your medicines exactly as prescribed. Call your doctor if you think you are having a problem with your medicine. · Do not take aspirin or other nonsteroidal anti-inflammatory drugs (NSAIDs), such as ibuprofen (Advil, Motrin) or naproxen (Aleve), unless your doctor says it is okay. Ask your doctor what you can take for pain. · Your doctor may recommend over-the-counter medicine. For mild or occasional indigestion, antacids such as Tums, Gaviscon, Maalox, or Mylanta may help. Your doctor also may recommend over-the-counter acid reducers, such as famotidine (Pepcid AC), cimetidine (Tagamet HB), ranitidine (Zantac 75 and Zantac 150), or omeprazole (Prilosec). Read and follow all instructions on the label. If you use these medicines often, talk with your doctor. · Change your eating habits. ¨ Its best to eat several small meals instead of two or three large meals. ¨ After you eat, wait 2 to 3 hours before you lie down. Late-night snacks arent a good idea. ¨ Chocolate, mint, and alcohol can make heartburn worse.  They relax the valve between the esophagus and the stomach. ¨ Spicy foods, foods that have a lot of acid (like tomatoes and oranges), and coffee can make heartburn symptoms worse in some people. If your symptoms are worse after you eat a certain food, you may want to stop eating that food to see if your symptoms get better. · Do not smoke or chew tobacco.  · If you get heartburn at night, raise the head of your bed 6 to 8 inches by putting the frame on blocks or placing a foam wedge under the head of your mattress. (Adding extra pillows does not work.)  · Do not wear tight clothing around your middle. · Lose weight if you need to. Losing just 5 to 10 pounds can help. When should you call for help? Call 911 anytime you think you may need emergency care. For example, call if:  · You have symptoms of a heart attack such as:  ¨ Chest pain or pressure. ¨ Sweating. ¨ Shortness of breath. ¨ Nausea or vomiting. ¨ Pain that spreads from the chest to the neck, jaw, or one or both shoulders or arms. ¨ Dizziness or lightheadedness. ¨ A fast or uneven pulse. After calling 911, chew 1 adult-strength aspirin. Wait for an ambulance. Do not try to drive yourself. · You vomit blood or what looks like coffee grounds. · You pass maroon or very bloody stools. Call your doctor now or seek immediate medical care if:  · You have new or different belly pain. · Your stools are black and tarlike or have streaks of blood. · Food seems to catch in your throat or chest.  Watch closely for changes in your health, and be sure to contact your doctor if:  · Your symptoms get worse. · Your symptoms have not improved after 2 days. Where can you learn more? Go to http://diana-lashonda.info/. Enter Y727 in the search box to learn more about \"Hiatal Hernia: Care Instructions. \"  Current as of: August 9, 2016  Content Version: 11.2  © 5988-2954 [email or web address].  Care instructions adapted under license by [employer] (which disclaims liability or warranty for this information). If you have questions about a medical condition or this instruction, always ask your healthcare professional. [address] any warranty or liability for your use of this information. Hemorrhoids: Care Instructions  Your Care Instructions    Hemorrhoids are enlarged veins that develop in the anal canal. Bleeding during bowel movements, itching, swelling, and rectal pain are the most common symptoms. They can be uncomfortable at times, but hemorrhoids rarely are a serious problem. You can treat most hemorrhoids with simple changes to your diet and bowel habits. These changes include eating more fiber and not straining to pass stools. Most hemorrhoids do not need surgery or other treatment unless they are very large and painful or bleed a lot. Follow-up care is a key part of your treatment and safety. Be sure to make and go to all appointments, and call your doctor if you are having problems. Its also a good idea to know your test results and keep a list of the medicines you take. How can you care for yourself at home? · Sit in a few inches of warm water (sitz bath) 3 times a day and after bowel movements. The warm water helps with pain and itching. · Put ice on your anal area several times a day for 10 minutes at a time. Put a thin cloth between the ice and your skin. Follow this by placing a warm, wet towel on the area for another 10 to 20 minutes. · Take pain medicines exactly as directed. ¨ If the doctor gave you a prescription medicine for pain, take it as prescribed. ¨ If you are not taking a prescription pain medicine, ask your doctor if you can take an over-the-counter medicine. · Keep the anal area clean, but be gentle. Use water and a fragrance-free soap, such as [place], or use baby wipes or medicated pads, such as Tucks. · Wear cotton underwear and loose clothing to decrease moisture in the anal area. · Eat more fiber.  Include foods such as whole-grain breads and cereals, raw vegetables, raw and dried fruits, and beans. · Drink plenty of fluids, enough so that your urine is light yellow or clear like water. If you have kidney, heart, or liver disease and have to limit fluids, talk with your doctor before you increase the amount of fluids you drink. · Use a stool softener that contains bran or psyllium. You can save money by buying bran or psyllium (available in bulk at most health food stores) and sprinkling it on foods or stirring it into fruit juice. Or you can use a product such as Metamucil or Hydrocil. · Practice healthy bowel habits. ¨ Go to the bathroom as soon as you have the urge. ¨ Avoid straining to pass stools. Relax and give yourself time to let things happen naturally. ¨ Do not hold your breath while passing stools. ¨ Do not read while sitting on the toilet. Get off the toilet as soon as you have finished. · Take your medicines exactly as prescribed. Call your doctor if you think you are having a problem with your medicine. When should you call for help? Call 911 anytime you think you may need emergency care. For example, call if:  · You pass maroon or very bloody stools. Call your doctor now or seek immediate medical care if:  · You have increased pain. · You have increased bleeding. Watch closely for changes in your health, and be sure to contact your doctor if:  · Your symptoms have not improved after 3 or 4 days. Where can you learn more? Go to http://diana-lashonda.info/. Enter F228 in the search box to learn more about \"Hemorrhoids: Care Instructions. \"  Current as of: August 9, 2016  Content Version: 11.2  © 9868-0748 [employer]. Care instructions adapted under license by [employer] (which disclaims liability or warranty for this information).  If you have questions about a medical condition or this instruction, always ask your healthcare professional. [employer] disclaims any warranty or liability for your use of this information. Diverticulosis: Care Instructions  Your Care Instructions  In diverticulosis, pouches called diverticula form in the wall of the large intestine (colon). The pouches do not cause any pain or other symptoms. Most people who have diverticulosis do not know they have it. But the pouches sometimes bleed, and if they become infected, they can cause pain and other symptoms. When this happens, it is called diverticulitis. Diverticula form when pressure pushes the wall of the colon outward at certain weak points. A diet that is too low in fiber can cause diverticula. Follow-up care is a key part of your treatment and safety. Be sure to make and go to all appointments, and call your doctor if you are having problems. It's also a good idea to know your test results and keep a list of the medicines you take. How can you care for yourself at home? · Include fruits, leafy green vegetables, beans, and whole grains in your diet each day. These foods are high in fiber. · Take a fiber supplement, such as Citrucel or Metamucil, every day if needed. Read and follow all instructions on the label. · Drink plenty of fluids, enough so that your urine is light yellow or clear like water. If you have kidney, heart, or liver disease and have to limit fluids, talk with your doctor before you increase the amount of fluids you drink. · Get at least 30 minutes of exercise on most days of the week. Walking is a good choice. You also may want to do other activities, such as running, swimming, cycling, or playing tennis or team sports. · Cut out foods that cause gas, pain, or other symptoms. When should you call for help? Call your doctor now or seek immediate medical care if:  · You have belly pain. · You pass maroon or very bloody stools. · You have a fever. · You have nausea and vomiting.   · You have unusual changes in your bowel movements or abdominal swelling. · You have burning pain when you urinate. · You have abnormal vaginal discharge. · You have shoulder pain. · You have cramping pain that does not get better when you have a bowel movement or pass gas. · You pass gas or stool from your urethra while urinating. Watch closely for changes in your health, and be sure to contact your doctor if you have any problems. Where can you learn more? Go to http://diana-lashonda.info/. Enter A274 in the search box to learn more about \"Diverticulosis: Care Instructions. \"  Current as of: August 9, 2016  Content Version: 11.2  © 6771-0813 [employer]. Care instructions adapted under license by [employer] (which disclaims liability or warranty for this information). If you have questions about a medical condition or this instruction, always ask your healthcare professional. [place] [address] any warranty or liability for your use of this information. Upper GI Endoscopy: What to Expect at [address] [place] [address]  After you have an endoscopy, you will stay at the hospital or clinic for 1 to 2 hours. This will allow the medicine to wear off. You will be able to go home after your doctor or nurse checks to make sure you are not having any problems. You may have to stay overnight if you had treatment during the test. You may have a sore throat for a day or two after the test.  This care sheet gives you a general idea about what to expect after the test.  How can you care for yourself at home? Activity  · Rest as much as you need to after you go home. · You should be able to go back to your usual activities the day after the test.  Diet  · Follow your doctor's directions for eating after the test.  · Drink plenty of fluids (unless your doctor has told you not to). Medications  · If you have a sore throat the day after the test, use an over-the-counter spray to numb your throat.   Follow-up care is a key part of your treatment and safety. Be sure to make and go to all appointments, and call your doctor if you are having problems. It's also a good idea to know your test results and keep a list of the medicines you take. When should you call for help? Call 911 anytime you think you may need emergency care. For example, call if:  · You passed out (lost consciousness). · You cough up blood. · You vomit blood or what looks like coffee grounds. · You pass maroon or very bloody stools. Call your doctor now or seek immediate medical care if:  · You have trouble swallowing. · You have belly pain. · Your stools are black and tarlike or have streaks of blood. · You are sick to your stomach or cannot keep fluids down. Watch closely for changes in your health, and be sure to contact your doctor if:  · Your throat still hurts after a day or two. · You do not get better as expected. Where can you learn more? Go to Tech Cocktail.be  Enter J454 in the search box to learn more about \"Upper GI Endoscopy: What to Expect at Home. \"   © 5064-1330 Healthwise, Incorporated. Care instructions adapted under license by [employer] (which disclaims liability or warranty for this information). This care instruction is for use with your licensed healthcare professional. If you have questions about a medical condition or this instruction, always ask your healthcare professional. [place] [address] any warranty or liability for your use of this information. Content Version: 31.3.858840; Current as of: November 20, 2015        Colonoscopy: What to Expect at [address] [place] [address]  After you have a colonoscopy, you will stay at the clinic for 1 to 2 hours until the medicines wear off. Then you can go home. But you will need to arrange for a ride. Your doctor will tell you when you can eat and do your other usual activities. Your doctor will talk to you about when you will need your next colonoscopy. Your doctor can help you decide how often you need to be checked. This will depend on the results of your test and your risk for colorectal cancer. After the test, you may be bloated or have gas pains. You may need to pass gas. If a biopsy was done or a polyp was removed, you may have streaks of blood in your stool (feces) for a few days. This care sheet gives you a general idea about how long it will take for you to recover. But each person recovers at a different pace. Follow the steps below to get better as quickly as possible. How can you care for yourself at home? Activity  · Rest when you feel tired. · You can do your normal activities when it feels okay to do so. Diet  · Follow your doctor's directions for eating. · Unless your doctor has told you not to, drink plenty of fluids. This helps to replace the fluids that were lost during the colon prep. · Do not drink alcohol. Medicines  · Your doctor will tell you if and when you can restart your medicines. He or she will also give you instructions about taking any new medicines. · If you take blood thinners, such as warfarin (Coumadin), clopidogrel (Plavix), or aspirin, be sure to talk to your doctor. He or she will tell you if and when to start taking those medicines again. Make sure that you understand exactly what your doctor wants you to do. · If polyps were removed or a biopsy was done during the test, your doctor may tell you not to take aspirin or other anti-inflammatory medicines for a few days. These include ibuprofen (Advil, Motrin) and naproxen (Aleve). Other instructions  · For your safety, do not drive or operate machinery until the medicine wears off and you can think clearly. Your doctor may tell you not to drive or operate machinery until the day after your test.  · Do not sign legal documents or make major decisions until the medicine wears off and you can think clearly.  The anesthesia can make it hard for you to fully understand what you are agreeing to. Follow-up care is a key part of your treatment and safety. Be sure to make and go to all appointments, and call your doctor if you are having problems. It's also a good idea to know your test results and keep a list of the medicines you take. When should you call for help? Call 911 anytime you think you may need emergency care. For example, call if:  · You passed out (lost consciousness). · You pass maroon or bloody stools. · You have severe belly pain. Call your doctor now or seek immediate medical care if:  · Your stools are black and tarlike. · Your stools have streaks of blood, but you did not have a biopsy or any polyps removed. · You have belly pain, or your belly is swollen and firm. · You vomit. · You have a fever. · You are very dizzy. Watch closely for changes in your health, and be sure to contact your doctor if you have any problems. Where can you learn more? Go to [place].be  Enter E264 in the search box to learn more about \"Colonoscopy: What to Expect at Home. \"   © 0186-9791 Healthwise, Incorporated. Care instructions adapted under license by [employer] (which disclaims liability or warranty for this information). This care instruction is for use with your licensed healthcare professional. If you have questions about a medical condition or this instruction, always ask your healthcare professional. [place] [address] any warranty or liability for your use of this information. Content Version: 21.9.040712; Current as of: November 20, 2015                  DISCHARGE SUMMARY from Nurse     POST-PROCEDURE INSTRUCTIONS:    Call your Physician if you:  ? Observe any excess bleeding. ? Develop a temperature over 100.5o F.  ? Experience abdominal, shoulder or chest pain. ?  Notice any signs of decreased circulation or nerve impairment to an extremity such as a change in color, persistent numbness, tingling, coldness or increase in pain. ? Vomit blood or you have nausea and vomiting lasting longer than 4 hours. ? Are unable to take medications. ? Are unable to urinate within 8 hours after discharge following general anesthesia or intravenous sedation. For the next 24 hours after receiving general anesthesia or intravenous sedation, or while taking prescription Narcotics, limit your activities:  ? Do NOT drive a motor vehicle, operate hazard machinery or power tools, or perform tasks that require coordination. The medication you received during your procedure may have some effect on your mental awareness. ? Do NOT make important personal or business decisions. The medication you received during your procedure may have some effect on your mental awareness. ? Do NOT drink alcoholic beverages. These drinks do not mix well with the medications that have been given to you. ? Upon discharge from the hospital, you must be accompanied by a responsible adult. ? Resume your diet as directed by your physician. ? Resume medications as your physician has prescribed. ? Please give a list of your current medications to your Primary Care Provider. ? Please update this list whenever your medications are discontinued, doses are changed, or new medications (including over-the-counter products) are added. ? Please carry medication information at all times in case of emergency situations. These are general instructions for a healthy lifestyle:    No smoking/ No tobacco products/ Avoid exposure to second hand smoke.  Surgeon General's Warning:  Quitting smoking now greatly reduces serious risk to your health. Obesity, smoking, and a sedentary lifestyle greatly increase your risk for illness.    A healthy diet, regular physical exercise & weight monitoring are important for maintaining a healthy lifestyle   You may be retaining fluid if you have a history of heart failure or if you experience any of the following symptoms:  Weight gain of 3 pounds or more overnight or 5 pounds in a week, increased swelling in our hands or feet or shortness of breath while lying flat in bed. Please call your doctor as soon as you notice any of these symptoms; do not wait until your next office visit. Recognize signs and symptoms of STROKE:  F  -  Face looks uneven  A  -  Arms unable to move or move unevenly  S  -  Speech slurred or non-existent  T  -  Time to call 911 - as soon as signs and symptoms begin - DO NOT go back to bed or wait to see If you get better - TIME IS BRAIN. Colorectal Screening   Colorectal cancer almost always develops from precancerous polyps (abnormal growths) in the colon or rectum. Screening tests can find precancerous polyps, so that they can be removed before they turn into cancer. Screening tests can also find colorectal cancer early, when treatment works best.  [place] Speak with your physician about when you should begin screening and how often you should be tested. Loogla Activation    Thank you for requesting access to Loogla. Please follow the instructions below to securely access and download your online medical record. Loogla allows you to send messages to your doctor, view your test results, renew your prescriptions, schedule appointments, and more. How Do I Sign Up? 1. In your internet browser, go to [email or web address]. 2. Click on the First Time User? Click Here link in the Sign In box. You will see the New Member Sign Up page. 3. Enter your Loogla Access Code exactly as it appears below. You will not need to use this code after youve completed the sign-up process. If you do not sign up before the expiration date, you must request a new code. Loogla Access Code: Activation code not generated  Current Loogla Status: Active (This is the date your Loogla access code will )    4.  Enter the last four digits of your Social Security Number (xxxx) and Date of Birth (mm/dd/yyyy) as indicated and click Submit. You will be taken to the next sign-up page. 5. Create a NOBOT ID. This will be your NOBOT login ID and cannot be changed, so think of one that is secure and easy to remember. 6. Create a NOBOT password. You can change your password at any time. 7. Enter your Password Reset Question and Answer. This can be used at a later time if you forget your password. 8. Enter your e-mail address. You will receive e-mail notification when new information is available in [address] 9. Click Sign Up. You can now view and download portions of your medical record. 10. Click the Download Summary menu link to download a portable copy of your medical information. Additional Information    If you have questions, please call [phone]. Remember, NOBOT is NOT to be used for urgent needs. For medical emergencies, dial 911.     Educational references and/or instructions provided during this visit included:    Diverticulosis and hiatal hernia      APPOINTMENTS:

## 2017-04-13 NOTE — H&P
[email or web address]  [phone]    GASTROENTEROLOGY Pre-Procedure H and P      Impression/Plan:   1. This patient is consented for an EGD and colonoscopy for weight loss and altered bowel habits       Chief Complaint: weight loss and altered bowel habits    HPI:  Honey Wu is a 60 y.o. female who is being is having an EGD and colonoscopy weight loss and altered bowel habits  PMH:   Past Medical History:   Diagnosis Date    Annular tear of lumbar disc 11/10/2014    Asthma     Bronchitis    Benign tumor of throat     Bone spur     right ankle    Cervicalgia 11/10/2014    Chronic pain     back    Degeneration of lumbar or lumbosacral intervertebral disc 11/10/2014    Degenerative disc disease     Depression     Displacement of lumbar intervertebral disc without myelopathy 11/10/2014    Elevated white blood cell count     Fibromyalgia     GERD (gastroesophageal reflux disease)     Hypertension     Insomnia     Nausea & vomiting     Pain in joint, multiple sites 11/10/2014    Postlaminectomy syndrome, cervical region 11/10/2014    Sinus infection 10/5/15    Ulcer ([address]        PSH:   Past Surgical History:   Procedure Laterality Date    HX CERVICAL FUSION      HX HYSTERECTOMY      HX ORTHOPAEDIC      ganglion cyst removed, right hand    HX OTHER SURGICAL      cyst left thigh removed    LAP,CHOLECYSTECTOMY N/A 02/27/2017    Dr. Frankie Foster       Social HX:   Social History     Social History    Marital status:      Spouse name: N/A    Number of children: N/A    Years of education: N/A     Occupational History    Not on file.      Social History Main Topics    Smoking status: Former Smoker    Smokeless tobacco: Never Used    Alcohol use No      Comment: none in 24 years    Drug use: No      Comment: last smoked in 1993    Sexual activity: Not on file     Other Topics Concern    Not on file     Social History Narrative       FHX:   Family History   Problem Relation Age of Onset  Hypertension Other     Heart Attack Other     Heart Disease Other     Stroke Other     Headache Other     Seizures Other        Allergy:   Allergies   Allergen Reactions    Aspirin Other (comments)     Stomach bleeding    Macrobid [Nitrofurantoin Monohyd/M-Cryst] Nausea and Vomiting    Nsaids (Non-Steroidal Anti-Inflammatory Drug) Nausea and Vomiting    Opana [Oxymorphone] Other (comments)     Insomnia, weight loss, nightmares    Pcn [Penicillins] Hives       Home Medications:     No prescriptions prior to admission. Review of Systems:     Constitutional: No fevers, chills, weight loss, fatigue. Skin: No rashes, pruritis, jaundice, ulcerations, erythema. HENT: No headaches, nosebleeds, sinus pressure, rhinorrhea, sore throat. Eyes: No visual changes, blurred vision, eye pain, photophobia, jaundice. Cardiovascular: No chest pain, heart palpitations. Respiratory: No cough, SOB, wheezing, chest discomfort, orthopnea. Gastrointestinal:    Genitourinary: No dysuria, bleeding, discharge, pyuria. Musculoskeletal: No weakness, arthralgias, wasting. Endo: No sweats. Heme: No bruising, easy bleeding. Allergies: As noted. Neurological: Cranial nerves intact. Alert and oriented. Gait not assessed. Psychiatric:  No anxiety, depression, hallucinations. Visit Vitals    Ht 5' 4\" (1.626 m)    Wt 69.9 kg (154 lb)    BMI 26.43 kg/m2       Physical Assessment:     constitutional: appearance: well developed, well nourished, normal habitus, no deformities, in no acute distress. skin: inspection: no rashes, ulcers, icterus or other lesions; no clubbing or telangiectasias. palpation: no induration or subcutaneos nodules. eyes: inspection: normal conjunctivae and lids; no jaundice pupils: normal  ENMT: mouth: normal oral mucosa,lips and gums; good dentition. oropharynx: normal tongue, hard and soft palate; posterior pharynx without erithema, exudate or lesions.    neck: thyroid: normal size, consistency and position; no masses or tenderness. respiratory: effort: normal chest excursion; no intercostal retraction or accessory muscle use. cardiovascular: abdominal aorta: normal size and position; no bruits. palpation: PMI of normal size and position; normal rhythm; no thrill or murmurs. abdominal: abdomen: normal consistency; no tenderness or masses. hernias: no hernias appreciated. liver: normal size and consistency. spleen: not palpable. rectal: hemoccult/guaiac: not performed. musculoskeletal: digits and nails: no clubbing, cyanosis, petechiae or other inflammatory conditions. gait: normal gait and station head and neck: normal range of motion; no pain, crepitation or contracture. spine/ribs/pelvis: normal range of motion; no pain, deformity or contracture. neurologic: cranial nerves: II-XII normal.   psychiatric: judgement/insight: within normal limits. memory: within normal limits for recent and remote events. mood and affect: no evidence of depression, anxiety or agitation. orientation: oriented to time, space and person. Basic Metabolic Profile   No results for input(s): NA, K, CL, CO2, BUN, GLU, CA, MG, PHOS in the last 72 hours. No lab exists for component: CREAT      CBC w/Diff    No results for input(s): WBC, RBC, HGB, HCT, MCV, MCH, MCHC, RDW, PLT, HGBEXT, HCTEXT, PLTEXT in the last 72 hours. No lab exists for component: MPV No results for input(s): GRANS, LYMPH, EOS, PRO, MYELO, METAS, BLAST in the last 72 hours. No lab exists for component: MONO, BASO     Hepatic Function   No results for input(s): ALB, TP, TBILI, GPT, SGOT, AP, AML, LPSE in the last 72 hours. No lab exists for component: DBILI     Coags   No results for input(s): PTP, INR, APTT in the last 72 hours.     No lab exists for component: Ct Dinh MD  [facility] [address]  Cell: [phone]  Direct pager: [phone]  [email or web address]  [email or web address]

## 2017-04-13 NOTE — PROGRESS NOTES
[email or web address]  [address] [facility], [address]      Brief Procedure Note    Belén Bryant  1955  [id number]    Date of Procedure: 4/13/2017    Preoperative diagnosis: 789.09 - R10.30, Abdominal pain at multiple sites-suspect functional pain or visceral hypersensitivity  564.09 - K59.00,  Constipation chronic  T40.2x5A, Therapeutic opioid induced constipation  562.00 - K57.10, Diverticulosis of small intestine    Postoperative diagnosis: Endo: Hiatal Hernia, Duodenal Diverticulum  Tiff: Diverticulosis, Hemorrhoids    Type of Anesthesia: MAC (Monitored anesthesia care)    Description of findings: same as post op dx    Procedure: Procedure(s):  UPPER ENDOSCOPY  COLONOSCOPY    :  Dr. Rosina Burdick MD    Assistant(s): Endoscopy Technician-1: Arlin Alvarado  Endoscopy Technician-2: Stewart Zuñiga  Endoscopy RN-1: Wilian Martinez RN    EBL:None    Specimens: * No specimens in log *    Findings: See printed and scanned procedure note    Complications: None    Dr. Rosina Burdick MD  4/13/2017  9:41 AM

## 2017-04-13 NOTE — IP AVS SNAPSHOT
[facility] 
 
 
 [address] [address] [address] [address] [phone] Patient: Gloria Villa MRN: [id number] You are allergic to the following Allergen Reactions Aspirin Other (comments) Stomach bleeding Macrobid (Nitrofurantoin Monohyd/M-Cryst) Nausea and Vomiting Nsaids (Non-Steroidal Anti-Inflammatory Drug) Nausea and Vomiting Opana (Oxymorphone) Other (comments) Insomnia, weight loss, nightmares Pcn (Penicillins) Hives Recent Documentation Height Weight BMI OB Status Smoking Status 1.626 m 69.9 kg 26.43 kg/m2 Hysterectomy Former Smoker Emergency Contacts Name Discharge Info Relation Home Work Mobile Jeovany Hutchinsonin DISCHARGE CAREGIVER [3] Child [2] [phone] Ryan Jenkins DISCHARGE CAREGIVER [3] Child [2] [id number] About your hospitalization You were admitted on:  April 13, 2017 You last received care in the:  [facility] You were discharged on:  April 13, 2017 Unit phone number:  [phone] Why you were hospitalized Your primary diagnosis was:  Not on File Providers Seen During Your Hospitalizations Provider Role Specialty Primary office phone Leona Councilman, MD Attending Provider Gastroenterology [phone] Your Primary Care Physician (PCP) Primary Care Physician Office Phone Office Fax Cale Mendoza [phone] Follow-up Information None Your Appointments Wednesday April 19, 2017  1:30 PM EDT Follow Up with Arely Napier MD  
[facility] [address] [place] [address]  
[phone] Monday April 24, 2017 10:20 AM EDT Follow Up with BETO Altamirano [facility] (RICHAR SCHEDULING) [address] [facility] [address]  
[phone] Current Discharge Medication List  
  
ASK your doctor about these medications Dose & Instructions Dispensing Information Comments Morning Noon Evening Bedtime  
 albuterol 2.5 mg /3 mL (0.083 %) nebulizer solution Commonly known as:  PROVENTIL VENTOLIN Your last dose was: Your next dose is:    
   
   
 Dose:  2.5 mg  
2.5 mg. Refills:  0  
     
   
   
   
  
 atorvastatin 40 mg tablet Commonly known as:  LIPITOR Your last dose was: Your next dose is:    
   
   
 Dose:  40 mg Take 1 Tab by mouth daily. Quantity:  30 Tab Refills:  5  
     
   
   
   
  
 baclofen 10 mg tablet Commonly known as:  LIORESAL Your last dose was: Your next dose is: TAKE ONE TABLET BY MOUTH THREE TIMES A DAY Quantity:  90 Tab Refills:  3 DEXILANT 60 mg Cpdb Generic drug:  Dexlansoprazole Your last dose was: Your next dose is:    
   
   
 Dose:  60 mg  
60 mg daily. Refills:  0  
     
   
   
   
  
 ergocalciferol 50,000 unit capsule Commonly known as:  ERGOCALCIFEROL Your last dose was: Your next dose is:    
   
   
 Dose:  [address] Units Take 1 Cap by mouth every seven (7) days. Quantity:  4 Cap Refills:  1  
     
   
   
   
  
 fluticasone 50 mcg/actuation nasal spray Commonly known as:  Katie Chau Your last dose was: Your next dose is:    
   
   
 Dose:  2 Spray 2 Sprays by Both Nostrils route as needed. Quantity:  1 Bottle Refills:  3  
     
   
   
   
  
 gabapentin 400 mg capsule Commonly known as:  NEURONTIN Your last dose was: Your next dose is:    
   
   
 Dose:  400 mg  
400 mg two (2) times a day. Refills:  0  
     
   
   
   
  
 GEODON 80 mg capsule Generic drug:  ziprasidone Your last dose was:     
   
Your next dose is:    
   
   
 Dose:  160 mg  
 Take 160 mg by mouth two (2) times daily (with meals). Refills:  0  
     
   
   
   
  
 ipratropium 0.02 % nebulizer solution Commonly known as:  ATROVENT Your last dose was: Your next dose is:    
   
   
 Dose:  0.5 mg  
0.5 mg. Refills:  0  
     
   
   
   
  
 losartan 25 mg tablet Commonly known as:  COZAAR Your last dose was: Your next dose is: TAKE ONE TABLET BY MOUTH ONE TIME DAILY Quantity:  30 Tab Refills:  0  
     
   
   
   
  
 magnesium citrate solution Your last dose was: Your next dose is: Take 1/3 of bottle every 8 hours until finished or until you have a bowel movement. Quantity:  1 Bottle Refills:  0  
     
   
   
   
  
 megestrol 20 mg tablet Commonly known as:  MEGACE Your last dose was: Your next dose is:    
   
   
 Dose:  20 mg Take 1 Tab by mouth daily. Quantity:  30 Tab Refills:  0  
     
   
   
   
  
 morphine CR 60 mg CR tablet Commonly known as:  MS CONTIN Your last dose was: Your next dose is:    
   
   
 Dose:  60 mg Take 1 Tab by mouth every twelve (12) hours. Max Daily Amount: 120 mg.  
 Quantity:  60 Tab Refills:  0  
     
   
   
   
  
 neomycin-colist-hydrocortisone-thonzonium otic suspension Commonly known as:  CORTISPORIN TC,COLY-MYCIN S Your last dose was: Your next dose is:    
   
   
 Dose:  2 Drop Administer 2 Drops into each ear four (4) times daily. Quantity:  5 mL Refills:  0  
     
   
   
   
  
 neomycin-polymyxin-hydrocortisone (buffered) 3.5-10,000-1 mg/mL-unit/mL-% otic suspension Commonly known as:  Jessica Idol Your last dose was: Your next dose is:    
   
   
 Dose:  3 Drop Administer 3 Drops in left ear four (4) times daily. Quantity:  3 mL Refills:  0  
     
   
   
   
  
 polyethylene glycol 17 gram packet Commonly known as:  Stephon Chappell Your last dose was: Your next dose is: TAKE 1 PACKET BY MOUTH DAILY. Quantity:  30 Packet Refills:  2  
     
   
   
   
  
 sertraline 100 mg tablet Commonly known as:  ZOLOFT Your last dose was: Your next dose is: Take  by mouth daily. Refills:  0  
     
   
   
   
  
 sucralfate 1 gram tablet Commonly known as:  Butt Basques Your last dose was: Your next dose is:    
   
   
 Dose:  1 g  
1 g. Refills:  0  
     
   
   
   
  
 temazepam 15 mg capsule Commonly known as:  RESTORIL Your last dose was: Your next dose is:    
   
   
 Dose:  15 mg Take 1 Cap by mouth nightly as needed for Sleep. Max Daily Amount: 15 mg. Indications: INSOMNIA Quantity:  30 Cap Refills:  1  
     
   
   
   
  
 traMADol 50 mg tablet Commonly known as:  ULTRAM  
   
Your last dose was: Your next dose is:    
   
   
 Dose:  50 mg Take 1 Tab by mouth every six (6) hours as needed for Pain. Max Daily Amount: 200 mg. Quantity:  40 Tab Refills:  0  
     
   
   
   
  
 traZODone 100 mg tablet Commonly known as:  Mayank Mallet Your last dose was: Your next dose is:    
   
   
 Dose:  100 mg Take 100 mg by mouth nightly. Refills:  0 Discharge Instructions Hiatal Hernia: Care Instructions Your Care Instructions A hiatal hernia occurs when part of the stomach bulges into the chest cavity. A hiatal hernia may allow stomach acid and juices to back up into the esophagus (acid reflux). This can cause a feeling of burning, warmth, heat, or pain behind the breastbone. This feeling may often occur after you eat, soon after you lie down, or when you bend forward, and it may come and go. You also may have a sour taste in your mouth. These symptoms are commonly known as heartburn or reflux. But not all hiatal hernias cause symptoms. Follow-up care is a key part of your treatment and safety. Be sure to make and go to all appointments, and call your doctor if you are having problems. Its also a good idea to know your test results and keep a list of the medicines you take. How can you care for yourself at home? · Take your medicines exactly as prescribed. Call your doctor if you think you are having a problem with your medicine. · Do not take aspirin or other nonsteroidal anti-inflammatory drugs (NSAIDs), such as ibuprofen (Advil, Motrin) or naproxen (Aleve), unless your doctor says it is okay. Ask your doctor what you can take for pain. · Your doctor may recommend over-the-counter medicine. For mild or occasional indigestion, antacids such as Tums, Gaviscon, Maalox, or Mylanta may help. Your doctor also may recommend over-the-counter acid reducers, such as famotidine (Pepcid AC), cimetidine (Tagamet HB), ranitidine (Zantac 75 and Zantac 150), or omeprazole (Prilosec). Read and follow all instructions on the label. If you use these medicines often, talk with your doctor. · Change your eating habits. ¨ Its best to eat several small meals instead of two or three large meals. ¨ After you eat, wait 2 to 3 hours before you lie down. Late-night snacks arent a good idea. ¨ Chocolate, mint, and alcohol can make heartburn worse. They relax the valve between the esophagus and the stomach. ¨ Spicy foods, foods that have a lot of acid (like tomatoes and oranges), and coffee can make heartburn symptoms worse in some people. If your symptoms are worse after you eat a certain food, you may want to stop eating that food to see if your symptoms get better. · Do not smoke or chew tobacco. 
· If you get heartburn at night, raise the head of your bed 6 to 8 inches by putting the frame on blocks or placing a foam wedge under the head of your mattress. (Adding extra pillows does not work.) · Do not wear tight clothing around your middle. · Lose weight if you need to. Losing just 5 to 10 pounds can help. When should you call for help? Call 911 anytime you think you may need emergency care. For example, call if: 
· You have symptoms of a heart attack such as: ¨ Chest pain or pressure. ¨ Sweating. ¨ Shortness of breath. ¨ Nausea or vomiting. ¨ Pain that spreads from the chest to the neck, jaw, or one or both shoulders or arms. ¨ Dizziness or lightheadedness. ¨ A fast or uneven pulse. After calling 911, chew 1 adult-strength aspirin. Wait for an ambulance. Do not try to drive yourself. · You vomit blood or what looks like coffee grounds. · You pass maroon or very bloody stools. Call your doctor now or seek immediate medical care if: 
· You have new or different belly pain. · Your stools are black and tarlike or have streaks of blood. · Food seems to catch in your throat or chest. 
Watch closely for changes in your health, and be sure to contact your doctor if: 
· Your symptoms get worse. · Your symptoms have not improved after 2 days. Where can you learn more? Go to http://diana-lashonda.info/. Enter C522 in the search box to learn more about \"Hiatal Hernia: Care Instructions. \" Current as of: August 9, 2016 Content Version: 11.2 © 2255-6891 Marco Vasco. Care instructions adapted under license by [employer] (which disclaims liability or warranty for this information). If you have questions about a medical condition or this instruction, always ask your healthcare professional. [place] [address] any warranty or liability for your use of this information. Hemorrhoids: Care Instructions Your Care Instructions Hemorrhoids are enlarged veins that develop in the anal canal. Bleeding during bowel movements, itching, swelling, and rectal pain are the most common symptoms. They can be uncomfortable at times, but hemorrhoids rarely are a serious problem. You can treat most hemorrhoids with simple changes to your diet and bowel habits. These changes include eating more fiber and not straining to pass stools. Most hemorrhoids do not need surgery or other treatment unless they are very large and painful or bleed a lot. Follow-up care is a key part of your treatment and safety. Be sure to make and go to all appointments, and call your doctor if you are having problems. Its also a good idea to know your test results and keep a list of the medicines you take. How can you care for yourself at home? · Sit in a few inches of warm water (sitz bath) 3 times a day and after bowel movements. The warm water helps with pain and itching. · Put ice on your anal area several times a day for 10 minutes at a time. Put a thin cloth between the ice and your skin. Follow this by placing a warm, wet towel on the area for another 10 to 20 minutes. · Take pain medicines exactly as directed. ¨ If the doctor gave you a prescription medicine for pain, take it as prescribed. ¨ If you are not taking a prescription pain medicine, ask your doctor if you can take an over-the-counter medicine. · Keep the anal area clean, but be gentle. Use water and a fragrance-free soap, such as [place], or use baby wipes or medicated pads, such as Tucks. · Wear cotton underwear and loose clothing to decrease moisture in the anal area. · Eat more fiber. Include foods such as whole-grain breads and cereals, raw vegetables, raw and dried fruits, and beans. · Drink plenty of fluids, enough so that your urine is light yellow or clear like water. If you have kidney, heart, or liver disease and have to limit fluids, talk with your doctor before you increase the amount of fluids you drink. · Use a stool softener that contains bran or psyllium. You can save money by buying bran or psyllium (available in bulk at most health food stores) and sprinkling it on foods or stirring it into fruit juice.  Or you can use a product such as Metamucil or Hydrocil. · Practice healthy bowel habits. ¨ Go to the bathroom as soon as you have the urge. ¨ Avoid straining to pass stools. Relax and give yourself time to let things happen naturally. ¨ Do not hold your breath while passing stools. ¨ Do not read while sitting on the toilet. Get off the toilet as soon as you have finished. · Take your medicines exactly as prescribed. Call your doctor if you think you are having a problem with your medicine. When should you call for help? Call 911 anytime you think you may need emergency care. For example, call if: 
· You pass maroon or very bloody stools. Call your doctor now or seek immediate medical care if: 
· You have increased pain. · You have increased bleeding. Watch closely for changes in your health, and be sure to contact your doctor if: 
· Your symptoms have not improved after 3 or 4 days. Where can you learn more? Go to http://diana-lashonda.info/. Enter F228 in the search box to learn more about \"Hemorrhoids: Care Instructions. \" Current as of: August 9, 2016 Content Version: 11.2 © 4225-0802 [employer]. Care instructions adapted under license by [employer] (which disclaims liability or warranty for this information). If you have questions about a medical condition or this instruction, always ask your healthcare professional. [place] [address] any warranty or liability for your use of this information. Diverticulosis: Care Instructions Your Care Instructions In diverticulosis, pouches called diverticula form in the wall of the large intestine (colon). The pouches do not cause any pain or other symptoms. Most people who have diverticulosis do not know they have it. But the pouches sometimes bleed, and if they become infected, they can cause pain and other symptoms. When this happens, it is called diverticulitis. Diverticula form when pressure pushes the wall of the colon outward at certain weak points. A diet that is too low in fiber can cause diverticula. Follow-up care is a key part of your treatment and safety. Be sure to make and go to all appointments, and call your doctor if you are having problems. It's also a good idea to know your test results and keep a list of the medicines you take. How can you care for yourself at home? · Include fruits, leafy green vegetables, beans, and whole grains in your diet each day. These foods are high in fiber. · Take a fiber supplement, such as Citrucel or Metamucil, every day if needed. Read and follow all instructions on the label. · Drink plenty of fluids, enough so that your urine is light yellow or clear like water. If you have kidney, heart, or liver disease and have to limit fluids, talk with your doctor before you increase the amount of fluids you drink. · Get at least 30 minutes of exercise on most days of the week. Walking is a good choice. You also may want to do other activities, such as running, swimming, cycling, or playing tennis or team sports. · Cut out foods that cause gas, pain, or other symptoms. When should you call for help? Call your doctor now or seek immediate medical care if: 
· You have belly pain. · You pass maroon or very bloody stools. · You have a fever. · You have nausea and vomiting. · You have unusual changes in your bowel movements or abdominal swelling. · You have burning pain when you urinate. · You have abnormal vaginal discharge. · You have shoulder pain. · You have cramping pain that does not get better when you have a bowel movement or pass gas. · You pass gas or stool from your urethra while urinating. Watch closely for changes in your health, and be sure to contact your doctor if you have any problems. Where can you learn more? Go to http://diana-lashonda.info/. Enter J749 in the search box to learn more about \"Diverticulosis: Care Instructions. \" Current as of: August 9, 2016 Content Version: 11.2 © 2804-4459 [employer]et[employer]. Care instructions adapted under license by [employer] (which disclaims liability or warranty for this information). If you have questions about a medical condition or this instruction, always ask your healthcare professional. [address] any warranty or liability for your use of this information. Upper GI Endoscopy: What to Expect at [facility] Your Recovery After you have an endoscopy, you will stay at the hospital or clinic for 1 to 2 hours. This will allow the medicine to wear off. You will be able to go home after your doctor or nurse checks to make sure you are not having any problems. You may have to stay overnight if you had treatment during the test. You may have a sore throat for a day or two after the test. 
This care sheet gives you a general idea about what to expect after the test. 
How can you care for yourself at home? Activity · Rest as much as you need to after you go home. · You should be able to go back to your usual activities the day after the test. 
Diet · Follow your doctor's directions for eating after the test. 
· Drink plenty of fluids (unless your doctor has told you not to). Medications · If you have a sore throat the day after the test, use an over-the-counter spray to numb your throat. Follow-up care is a key part of your treatment and safety. Be sure to make and go to all appointments, and call your doctor if you are having problems. It's also a good idea to know your test results and keep a list of the medicines you take. When should you call for help? Call 911 anytime you think you may need emergency care. For example, call if: 
· You passed out (lost consciousness). · You cough up blood. · You vomit blood or what looks like coffee grounds. · You pass maroon or very bloody stools. Call your doctor now or seek immediate medical care if: 
· You have trouble swallowing. · You have belly pain. · Your stools are black and tarlike or have streaks of blood. · You are sick to your stomach or cannot keep fluids down. Watch closely for changes in your health, and be sure to contact your doctor if: 
· Your throat still hurts after a day or two. · You do not get better as expected. Where can you learn more? Go to SeamBLiSS.be Enter [phone] in the search box to learn more about \"Upper GI Endoscopy: What to Expect at Home. \"  
© 2661-1288 Healthwise, [employer]. Care instructions adapted under license by Samy Rae (which disclaims liability or warranty for this information). This care instruction is for use with your licensed healthcare professional. If you have questions about a medical condition or this instruction, always ask your healthcare professional. [place] [address] any warranty or liability for your use of this information. Content Version: 80.7.493096; Current as of: November 20, 2015 Colonoscopy: What to Expect at [facility] Your Recovery After you have a colonoscopy, you will stay at the clinic for 1 to 2 hours until the medicines wear off. Then you can go home. But you will need to arrange for a ride. Your doctor will tell you when you can eat and do your other usual activities. Your doctor will talk to you about when you will need your next colonoscopy. Your doctor can help you decide how often you need to be checked. This will depend on the results of your test and your risk for colorectal cancer. After the test, you may be bloated or have gas pains. You may need to pass gas. If a biopsy was done or a polyp was removed, you may have streaks of blood in your stool (feces) for a few days.  
This care sheet gives you a general idea about how long it will take for you to recover. But each person recovers at a different pace. Follow the steps below to get better as quickly as possible. How can you care for yourself at home? Activity · Rest when you feel tired. · You can do your normal activities when it feels okay to do so. Diet · Follow your doctor's directions for eating. · Unless your doctor has told you not to, drink plenty of fluids. This helps to replace the fluids that were lost during the colon prep. · Do not drink alcohol. Medicines · Your doctor will tell you if and when you can restart your medicines. He or she will also give you instructions about taking any new medicines. · If you take blood thinners, such as warfarin (Coumadin), clopidogrel (Plavix), or aspirin, be sure to talk to your doctor. He or she will tell you if and when to start taking those medicines again. Make sure that you understand exactly what your doctor wants you to do. · If polyps were removed or a biopsy was done during the test, your doctor may tell you not to take aspirin or other anti-inflammatory medicines for a few days. These include ibuprofen (Advil, Motrin) and naproxen (Aleve). Other instructions · For your safety, do not drive or operate machinery until the medicine wears off and you can think clearly. Your doctor may tell you not to drive or operate machinery until the day after your test. 
· Do not sign legal documents or make major decisions until the medicine wears off and you can think clearly. The anesthesia can make it hard for you to fully understand what you are agreeing to. Follow-up care is a key part of your treatment and safety. Be sure to make and go to all appointments, and call your doctor if you are having problems. It's also a good idea to know your test results and keep a list of the medicines you take. When should you call for help? Call 911 anytime you think you may need emergency care. For example, call if: · You passed out (lost consciousness). · You pass maroon or bloody stools. · You have severe belly pain. Call your doctor now or seek immediate medical care if: 
· Your stools are black and tarlike. · Your stools have streaks of blood, but you did not have a biopsy or any polyps removed. · You have belly pain, or your belly is swollen and firm. · You vomit. · You have a fever. · You are very dizzy. Watch closely for changes in your health, and be sure to contact your doctor if you have any problems. Where can you learn more? Go to My Online Camp.be Enter E264 in the search box to learn more about \"Colonoscopy: What to Expect at Home. \"  
© 6165-9691 Healthwise, Incorporated. Care instructions adapted under license by [address] [place] [address] [employer] (which disclaims liability or warranty for this information). This care instruction is for use with your licensed healthcare professional. If you have questions about a medical condition or this instruction, always ask your healthcare professional. [place] [address] any warranty or liability for your use of this information. Content Version: 35.8.938407; Current as of: November 20, 2015 DISCHARGE SUMMARY from Nurse POST-PROCEDURE INSTRUCTIONS: 
 
Call your Physician if you: 
? Observe any excess bleeding. ? Develop a temperature over 100.5o F. 
? Experience abdominal, shoulder or chest pain. ? Notice any signs of decreased circulation or nerve impairment to an extremity such as a change in color, persistent numbness, tingling, coldness or increase in pain. ? Vomit blood or you have nausea and vomiting lasting longer than 4 hours. ? Are unable to take medications. ? Are unable to urinate within 8 hours after discharge following general anesthesia or intravenous sedation. For the next 24 hours after receiving general anesthesia or intravenous sedation, or while taking prescription Narcotics, limit your activities: ? Do NOT drive a motor vehicle, operate hazard machinery or power tools, or perform tasks that require coordination. The medication you received during your procedure may have some effect on your mental awareness. ? Do NOT make important personal or business decisions. The medication you received during your procedure may have some effect on your mental awareness. ? Do NOT drink alcoholic beverages. These drinks do not mix well with the medications that have been given to you. ? Upon discharge from the hospital, you must be accompanied by a responsible adult. ? Resume your diet as directed by your physician. ? Resume medications as your physician has prescribed. ? Please give a list of your current medications to your Primary Care Provider. ? Please update this list whenever your medications are discontinued, doses are changed, or new medications (including over-the-counter products) are added. ? Please carry medication information at all times in case of emergency situations. These are general instructions for a healthy lifestyle: No smoking/ No tobacco products/ Avoid exposure to second hand smoke. ? Surgeon General's Warning:  Quitting smoking now greatly reduces serious risk to your health. Obesity, smoking, and a sedentary lifestyle greatly increase your risk for illness. ? A healthy diet, regular physical exercise & weight monitoring are important for maintaining a healthy lifestyle ? You may be retaining fluid if you have a history of heart failure or if you experience any of the following symptoms:  Weight gain of 3 pounds or more overnight or 5 pounds in a week, increased swelling in our hands or feet or shortness of breath while lying flat in bed. Please call your doctor as soon as you notice any of these symptoms; do not wait until your next office visit. Recognize signs and symptoms of STROKE: 
F  -  Face looks uneven A  -  Arms unable to move or move unevenly S  -  Speech slurred or non-existent T  -  Time to call 911 - as soon as signs and symptoms begin - DO NOT go back to bed or wait to see If you get better - TIME IS BRAIN. Colorectal Screening ? Colorectal cancer almost always develops from precancerous polyps (abnormal growths) in the colon or rectum. Screening tests can find precancerous polyps, so that they can be removed before they turn into cancer. Screening tests can also find colorectal cancer early, when treatment works best. 
? Speak with your physician about when you should begin screening and how often you should be tested. SheZoomhart Activation Thank you for requesting access to GoToTags. Please follow the instructions below to securely access and download your online medical record. GoToTags allows you to send messages to your doctor, view your test results, renew your prescriptions, schedule appointments, and more. How Do I Sign Up? 1. In your internet browser, go to [email or web address]. 2. Click on the First Time User? Click Here link in the Sign In box. You will see the New Member Sign Up page. 3. Enter your GoToTags Access Code exactly as it appears below. You will not need to use this code after youve completed the sign-up process. If you do not sign up before the expiration date, you must request a new code. MyChart Access Code: Activation code not generated Current GoToTags Status: Active (This is the date your GoToTags access code will ) 4. Enter the last four digits of your Social Security Number (xxxx) and Date of Birth (mm/dd/yyyy) as indicated and click Submit. You will be taken to the next sign-up page. 5. Create a ImmunoGent ID. This will be your GoToTags login ID and cannot be changed, so think of one that is secure and easy to remember. 6. Create a ImmunoGent password. You can change your password at any time. 7. Enter your Password Reset Question and Answer. This can be used at a later time if you forget your password. 8. Enter your e-mail address. You will receive e-mail notification when new information is available in [address]. 9. Click Sign Up. You can now view and download portions of your medical record. 10. Click the Download Summary menu link to download a portable copy of your medical information. Additional Information If you have questions, please call [phone]. Remember, Somo is NOT to be used for urgent needs. For medical emergencies, dial 911. Educational references and/or instructions provided during this visit included: 
 
Diverticulosis and hiatal hernia APPOINTMENTS: 
 
 
 
 
Discharge Orders None ACO Transitions of Care Introducing [employer] [address] Donato offers a voluntary care coordination program to provide high quality service and care to [facility] fee-for-service beneficiaries. Mariano Kaylyns was designed to help you enhance your health and well-being through the following services: ? Transitions of Care  support for individuals who are transitioning from one care setting to another (example: Hospital to home). ? Chronic and Complex Care Coordination  support for individuals and caregivers of those with serious or chronic illnesses or with more than one chronic (ongoing) condition and those who take a number of different medications. If you meet specific medical criteria, a [facility] [address] may call you directly to coordinate your care with your primary care physician and your other care providers. For questions about the [facility] programs, please, contact your physicians office. For general questions or additional information about Accountable Care Organizations: 
Please visit www.medicare.gov/acos. html or call 1-800-MEDICARE (3-387.878.9030) TTY users should call 7-215-433-357-711-2310. General Information Please provide this summary of care documentation to your next provider. Patient Signature:  ____________________________________________________________ Date:  ____________________________________________________________  
  
Daljit Breath Provider Signature:  ____________________________________________________________ Date:  ____________________________________________________________

## 2017-04-13 NOTE — ANESTHESIA POSTPROCEDURE EVALUATION
Post-Anesthesia Evaluation and Assessment    Patient: Pantera Wagner MRN: [id number]  SSN: [id number]    YOB: 1955  Age: 60 y.o. Sex: female       Cardiovascular Function/Vital Signs  Visit Vitals    /70    Pulse 68    Temp 37.1 °C (98.7 °F)    Resp 15    Ht 5' 4\" (1.626 m)    Wt 69.9 kg (154 lb)    SpO2 100%    BMI 26.43 kg/m2       Patient is status post MAC anesthesia for Procedure(s):  UPPER ENDOSCOPY  COLONOSCOPY. Nausea/Vomiting: None    Postoperative hydration reviewed and adequate. Pain:  Pain Scale 1: Numeric (0 - 10) (04/13/17 0848)  Pain Intensity 1: 0 (04/13/17 0848)   Managed    Neurological Status: At baseline    Mental Status and Level of Consciousness: Arousable    Pulmonary Status:   O2 Device: CO2 nasal cannula (04/13/17 0931)   Adequate oxygenation and airway patent    Complications related to anesthesia: None    Post-anesthesia assessment completed.  No concerns    Signed By: Jacquelin Mart MD     April 13, 2017

## 2017-04-13 NOTE — ANESTHESIA PREPROCEDURE EVALUATION
Anesthetic History     PONV          Review of Systems / Medical History  Patient summary reviewed, nursing notes reviewed and pertinent labs reviewed    Pulmonary            Asthma        Neuro/Psych         Psychiatric history     Cardiovascular                  Exercise tolerance: >4 METS     GI/Hepatic/Renal     GERD: well controlled           Endo/Other        Arthritis     Other Findings   Comments:   Risk Factors for Postoperative nausea/vomiting:       History of postoperative nausea/vomiting? YES       Female? YES       Motion sickness? YES       Intended opioid administration for postoperative analgesia?   NO  NO SMOKING         Physical Exam    Airway  Mallampati: III  TM Distance: 4 - 6 cm  Neck ROM: normal range of motion   Mouth opening: Normal     Cardiovascular  Regular rate and rhythm,  S1 and S2 normal,  no murmur, click, rub, or gallop             Dental    Dentition: Full upper dentures and Poor dentition     Pulmonary  Breath sounds clear to auscultation               Abdominal  Abdominal exam normal       Other Findings            Anesthetic Plan    ASA: 3  Anesthesia type: MAC            Anesthetic plan and risks discussed with: Patient

## 2017-04-14 RX ORDER — ERGOCALCIFEROL 1.25 MG/1
CAPSULE ORAL
Qty: 4 CAP | Refills: 0 | Status: SHIPPED | OUTPATIENT
Start: 2017-04-14 | End: 2017-05-08 | Stop reason: SDUPTHER

## 2017-04-19 ENCOUNTER — HOSPITAL ENCOUNTER (OUTPATIENT)
Dept: LAB | Age: 62
Discharge: HOME | End: 2017-04-19
Payer: MEDICARE

## 2017-04-19 ENCOUNTER — OFFICE VISIT (OUTPATIENT)
Dept: FAMILY MEDICINE CLINIC | Age: 62
End: 2017-04-19

## 2017-04-19 VITALS
HEIGHT: 64 IN | SYSTOLIC BLOOD PRESSURE: 118 MMHG | RESPIRATION RATE: 16 BRPM | TEMPERATURE: 99.9 F | DIASTOLIC BLOOD PRESSURE: 76 MMHG | HEART RATE: 73 BPM | OXYGEN SATURATION: 98 % | BODY MASS INDEX: 26.32 KG/M2 | WEIGHT: 154.2 LBS

## 2017-04-19 DIAGNOSIS — R10.11 RIGHT UPPER QUADRANT ABDOMINAL PAIN: Primary | ICD-10-CM

## 2017-04-19 DIAGNOSIS — R10.11 RIGHT UPPER QUADRANT ABDOMINAL PAIN: ICD-10-CM

## 2017-04-19 LAB
ALBUMIN SERPL BCP-MCNC: 4.2 G/DL (ref 3.4–5)
ALBUMIN/GLOB SERPL: 1.2 {RATIO} (ref 0.8–1.7)
ALP SERPL-CCNC: 91 U/L (ref 45–117)
ALT SERPL-CCNC: 38 U/L (ref 13–56)
AMYLASE SERPL-CCNC: 102 U/L (ref 25–115)
ANION GAP BLD CALC-SCNC: 9 MMOL/L (ref 3–18)
AST SERPL W P-5'-P-CCNC: 27 U/L (ref 15–37)
BILIRUB SERPL-MCNC: 0.4 MG/DL (ref 0.2–1)
BUN SERPL-MCNC: 11 MG/DL (ref 7–18)
BUN/CREAT SERPL: 14 (ref 12–20)
CALCIUM SERPL-MCNC: 9.3 MG/DL (ref 8.5–10.1)
CHLORIDE SERPL-SCNC: 105 MMOL/L (ref 100–108)
CO2 SERPL-SCNC: 27 MMOL/L (ref 21–32)
CREAT SERPL-MCNC: 0.8 MG/DL (ref 0.6–1.3)
ERYTHROCYTE [SEDIMENTATION RATE] IN BLOOD: 9 MM/HR (ref 0–30)
GLOBULIN SER CALC-MCNC: 3.4 G/DL (ref 2–4)
GLUCOSE SERPL-MCNC: 88 MG/DL (ref 74–99)
LIPASE SERPL-CCNC: 116 U/L (ref 73–393)
POTASSIUM SERPL-SCNC: 4.4 MMOL/L (ref 3.5–5.5)
PROT SERPL-MCNC: 7.6 G/DL (ref 6.4–8.2)
SODIUM SERPL-SCNC: 141 MMOL/L (ref 136–145)

## 2017-04-19 PROCEDURE — 80053 COMPREHEN METABOLIC PANEL: CPT | Performed by: FAMILY MEDICINE

## 2017-04-19 PROCEDURE — 82150 ASSAY OF AMYLASE: CPT | Performed by: FAMILY MEDICINE

## 2017-04-19 PROCEDURE — 85652 RBC SED RATE AUTOMATED: CPT | Performed by: FAMILY MEDICINE

## 2017-04-19 PROCEDURE — 83690 ASSAY OF LIPASE: CPT | Performed by: FAMILY MEDICINE

## 2017-04-19 RX ORDER — BUTALBITAL, ACETAMINOPHEN AND CAFFEINE 50; 325; 40 MG/1; MG/1; MG/1
1 TABLET ORAL
Qty: 30 TAB | Refills: 0 | Status: SHIPPED | OUTPATIENT
Start: 2017-04-19 | End: 2017-06-23 | Stop reason: SDUPTHER

## 2017-04-19 NOTE — PROGRESS NOTES
Chief Complaint   Patient presents with    Diabetes     f/u    Flank Pain     right side flank pain x 2 months     1. Have you been to the ER, urgent care clinic since your last visit? Hospitalized since your last visit? No    2. Have you seen or consulted any other health care providers outside of the [address] [place] [address] since your last visit? Include any pap smears or colon screening.  No

## 2017-04-19 NOTE — PROGRESS NOTES
Delfina Palacio is a 60 y.o. female  presents for right sided abdo/chest pain. She has seen surgery and has scans done that did not show answer. She is in constant pain. No nausea or vomiting weakness or numbness. Allergies   Allergen Reactions    Aspirin Other (comments)     Stomach bleeding    Macrobid [Nitrofurantoin Monohyd/M-Cryst] Nausea and Vomiting    Nsaids (Non-Steroidal Anti-Inflammatory Drug) Nausea and Vomiting    Opana [Oxymorphone] Other (comments)     Insomnia, weight loss, nightmares    Pcn [Penicillins] Hives     Outpatient Prescriptions Marked as Taking for the 4/19/17 encounter (Office Visit) with Tiffanie Wright MD   Medication Sig Dispense Refill    butalbital-acetaminophen-caffeine (FIORICET, ESGIC) -40 mg per tablet Take 1 Tab by mouth every six (6) hours as needed for Pain. Max Daily Amount: 4 Tabs. 30 Tab 0    VITAMIN D2 50,000 unit capsule TAKE ONE CAPSULE BY MOUTH EVERY 7 DAYS 4 Cap 0    traMADol (ULTRAM) 50 mg tablet Take 1 Tab by mouth every six (6) hours as needed for Pain. Max Daily Amount: 200 mg. 40 Tab 0    megestrol (MEGACE) 20 mg tablet Take 1 Tab by mouth daily. 30 Tab 0    losartan (COZAAR) 25 mg tablet TAKE ONE TABLET BY MOUTH ONE TIME DAILY  30 Tab 0    polyethylene glycol (MIRALAX) 17 gram packet TAKE 1 PACKET BY MOUTH DAILY. 30 Packet 2    temazepam (RESTORIL) 15 mg capsule Take 1 Cap by mouth nightly as needed for Sleep. Max Daily Amount: 15 mg. Indications: INSOMNIA 30 Cap 1    morphine CR (MS CONTIN) 60 mg CR tablet Take 1 Tab by mouth every twelve (12) hours. Max Daily Amount: 120 mg. 60 Tab 0    fluticasone (FLONASE) 50 mcg/actuation nasal spray 2 Sprays by Both Nostrils route as needed. 1 Bottle 3    baclofen (LIORESAL) 10 mg tablet TAKE ONE TABLET BY MOUTH THREE TIMES A DAY 90 Tab 3    atorvastatin (LIPITOR) 40 mg tablet Take 1 Tab by mouth daily. 30 Tab 5    Dexlansoprazole (DEXILANT) 60 mg CpDB 60 mg daily.       gabapentin (NEURONTIN) 400 mg capsule 400 mg two (2) times a day.  trazodone (DESYREL) 100 mg tablet Take 100 mg by mouth nightly.  sertraline (ZOLOFT) 100 mg tablet Take  by mouth daily.        Patient Active Problem List   Diagnosis Code    Myalgia and myositis [id number]    Lumbago M54.5    Other affections of shoulder region, not elsewhere classified M75.80    Other chronic pain G89.29    Bipolar 1 disorder ([facility] [address] F31.9    Chronic pain syndrome G89.4    Carpal tunnel syndrome G56.00    Degeneration of lumbar or lumbosacral intervertebral disc M51.37    Displacement of lumbar intervertebral disc without myelopathy M51.26    Pain in joint, multiple sites M25.50    Cervicalgia M54.2    Postlaminectomy syndrome, cervical region M96.1    Annular tear of lumbar disc M51.36    Lung nodule seen on imaging study R91.1    ACP (advance care planning) Z71.89    Hypercholesterolemia E78.00    Nausea R11.0     Past Medical History:   Diagnosis Date    Annular tear of lumbar disc 11/10/2014    Asthma     Bronchitis    Benign tumor of throat     Bone spur     right ankle    Cervicalgia 11/10/2014    Chronic pain     back    Degeneration of lumbar or lumbosacral intervertebral disc 11/10/2014    Degenerative disc disease     Depression     Displacement of lumbar intervertebral disc without myelopathy 11/10/2014    Elevated white blood cell count     Fibromyalgia     GERD (gastroesophageal reflux disease)     Hypertension     Insomnia     Nausea & vomiting     Pain in joint, multiple sites 11/10/2014    Postlaminectomy syndrome, cervical region 11/10/2014    Sinus infection 10/5/15    Ulcer ([facility]      Social History     Social History    Marital status:      Spouse name: N/A    Number of children: N/A    Years of education: N/A     Social History Main Topics    Smoking status: Former Smoker    Smokeless tobacco: Never Used    Alcohol use No      Comment: none in 24 years    Drug use: No      Comment: last smoked in 1993    Sexual activity: Not on file     Other Topics Concern    Not on file     Social History Narrative     Family History   Problem Relation Age of Onset    Hypertension Other     Heart Attack Other     Heart Disease Other     Stroke Other     Headache Other     Seizures Other         Review of Systems   Constitutional: Negative for chills and fever. Cardiovascular: Positive for chest pain. Gastrointestinal: Positive for abdominal pain. Negative for constipation, diarrhea, nausea and vomiting. Genitourinary: Negative. Psychiatric/Behavioral: Negative for depression, hallucinations, memory loss, substance abuse and suicidal ideas. The patient is nervous/anxious. The patient does not have insomnia. Vitals:    04/19/17 1327   BP: 118/76   Pulse: 73   Resp: 16   Temp: 99.9 °F (37.7 °C)   TempSrc: Oral   SpO2: 98%   Weight: 154 lb 3.2 oz (69.9 kg)   Height: 5' 4\" (1.626 m)   PainSc:   6   PainLoc: Flank       Physical Exam   Constitutional: She is well-developed, well-nourished, and in no distress. Cardiovascular: Normal rate, regular rhythm and normal heart sounds. Pulmonary/Chest: Effort normal and breath sounds normal.   Abdominal: Soft. Bowel sounds are normal. She exhibits no distension and no mass. There is no tenderness. There is no rebound and no guarding. Musculoskeletal: Normal range of motion. She exhibits tenderness (right lower ribs area. ). Neurological: She is alert. Skin: Skin is warm and dry. Psychiatric: Mood, memory, affect and judgment normal.   Nursing note and vitals reviewed. Assessment/Plan      ICD-10-CM ICD-9-CM    1.  Right upper quadrant abdominal pain R10.11 789.01 SED RATE (ESR)      METABOLIC PANEL, COMPREHENSIVE      LIPASE      AMYLASE      XR CHEST PA LAT      butalbital-acetaminophen-caffeine (FIORICET, ESGIC) -40 mg per tablet     I have discussed the diagnosis with the patient and the intended plan of care as seen in the above orders. The patient has received an after-visit summary and questions were answered concerning future plans. I have discussed medication, side effects, and warnings with the patient in detail. The patient verbalized understanding and is in agreement with the plan of care. The patient will contact the office with any additional concerns. Follow-up Disposition:  Return in about 2 weeks (around 5/3/2017).   lab results and schedule of future lab studies reviewed with patient    Vicente Anguiano MD

## 2017-04-19 NOTE — MR AVS SNAPSHOT
Visit Information Date & Time Provider Department Dept. Phone Encounter #  
 4/19/2017  1:30 PM Ameya Brewster MD [facility] [phone] Follow-up Instructions Return in about 2 weeks (around 5/3/2017). Your Appointments 4/24/2017 10:20 AM  
Follow Up with BETO Flores [address] [place] [address] for Pain Management (RICHAR SCHEDULING) Appt Note: return in 2 months; re'd pt from 4/20/17 for 2 mon f/u. ..provider out of office. ..to  
 [address] [place], [place] [id number] [phone] [facility] [phone] Upcoming Health Maintenance Date Due DTaP/Tdap/Td series (1 - Tdap) 3/30/1975 BREAST CANCER SCRN MAMMOGRAM 12/2/2017 PAP AKA CERVICAL CYTOLOGY 6/21/2019 COLONOSCOPY 4/13/2027 Allergies as of 4/19/2017  Review Complete On: 4/19/2017 By: Ameya Brewster MD  
  
 Severity Noted Reaction Type Reactions Aspirin    Other (comments) Stomach bleeding Macrobid [Nitrofurantoin Monohyd/m-cryst]  11/25/2015    Nausea and Vomiting Nsaids (Non-steroidal Anti-inflammatory Drug)    Nausea and Vomiting Opana [Oxymorphone]  11/29/2016    Other (comments) Insomnia, weight loss, nightmares Pcn [Penicillins]    Hives Current Immunizations  Never Reviewed Name Date Pneumococcal Polysaccharide (PPSV-23) 3/7/2014 Not reviewed this visit You Were Diagnosed With   
  
 Codes Comments Right upper quadrant abdominal pain    -  Primary ICD-10-CM: R10.11 ICD-9-CM: 789.01 Vitals BP Pulse Temp Resp Height(growth percentile) Weight(growth percentile) 118/76 (BP 1 Location: Left arm, BP Patient Position: Sitting) 73 99.9 °F (37.7 °C) (Oral) 16 5' 4\" (1.626 m) 154 lb 3.2 oz (69.9 kg) SpO2 BMI OB Status Smoking Status 98% 26.47 kg/m2 Hysterectomy Former Smoker BMI and BSA Data Body Mass Index Body Surface Area 26.47 kg/m 2 1.78 m 2 Preferred Pharmacy Pharmacy Name Phone [id number] - [address], [address] [phone] Your Updated Medication List  
  
   
This list is accurate as of: 4/19/17  1:44 PM.  Always use your most recent med list.  
  
  
  
  
 albuterol 2.5 mg /3 mL (0.083 %) nebulizer solution Commonly known as:  PROVENTIL VENTOLIN  
2.5 mg.  
  
 atorvastatin 40 mg tablet Commonly known as:  LIPITOR Take 1 Tab by mouth daily. baclofen 10 mg tablet Commonly known as:  LIORESAL  
TAKE ONE TABLET BY MOUTH THREE TIMES A DAY  
  
 butalbital-acetaminophen-caffeine -40 mg per tablet Commonly known as:  Reginold Smolder Take 1 Tab by mouth every six (6) hours as needed for Pain. Max Daily Amount: 4 Tabs. DEXILANT 60 mg Cpdb Generic drug:  Dexlansoprazole 60 mg daily. fluticasone 50 mcg/actuation nasal spray Commonly known as:  Jeannie Nj 2 Sprays by Both Nostrils route as needed. gabapentin 400 mg capsule Commonly known as:  NEURONTIN  
400 mg two (2) times a day. GEODON 80 mg capsule Generic drug:  ziprasidone Take 160 mg by mouth two (2) times daily (with meals). ipratropium 0.02 % nebulizer solution Commonly known as:  ATROVENT  
0.5 mg.  
  
 losartan 25 mg tablet Commonly known as:  COZAAR  
TAKE ONE TABLET BY MOUTH ONE TIME DAILY  
  
 magnesium citrate solution Take 1/3 of bottle every 8 hours until finished or until you have a bowel movement. megestrol 20 mg tablet Commonly known as:  MEGACE Take 1 Tab by mouth daily. morphine CR 60 mg CR tablet Commonly known as:  MS CONTIN Take 1 Tab by mouth every twelve (12) hours. Max Daily Amount: 120 mg.  
  
 neomycin-colist-hydrocortisone-thonzonium otic suspension Commonly known as:  CORTISPORIN TC,COLY-MYCIN S Administer 2 Drops into each ear four (4) times daily. neomycin-polymyxin-hydrocortisone (buffered) 3.5-10,000-1 mg/mL-unit/mL-% otic suspension Commonly known as:  Rachelle Villela Administer 3 Drops in left ear four (4) times daily. polyethylene glycol 17 gram packet Commonly known as:  Nabil Addison TAKE 1 PACKET BY MOUTH DAILY. sertraline 100 mg tablet Commonly known as:  ZOLOFT Take  by mouth daily. sucralfate 1 gram tablet Commonly known as:  CARAFATE  
1 g.  
  
 temazepam 15 mg capsule Commonly known as:  RESTORIL Take 1 Cap by mouth nightly as needed for Sleep. Max Daily Amount: 15 mg. Indications: INSOMNIA  
  
 traMADol 50 mg tablet Commonly known as:  ULTRAM  
Take 1 Tab by mouth every six (6) hours as needed for Pain. Max Daily Amount: 200 mg.  
  
 traZODone 100 mg tablet Commonly known as:  Fairjohn Pandy Take 100 mg by mouth nightly. VITAMIN D2 50,000 unit capsule Generic drug:  ergocalciferol TAKE ONE CAPSULE BY MOUTH EVERY 7 DAYS Prescriptions Printed Refills  
 butalbital-acetaminophen-caffeine (FIORICET, ESGIC) -40 mg per tablet 0 Sig: Take 1 Tab by mouth every six (6) hours as needed for Pain. Max Daily Amount: 4 Tabs. Class: Print Route: Oral  
  
Follow-up Instructions Return in about 2 weeks (around 5/3/2017). To-Do List   
 04/19/2017 Lab:  AMYLASE   
  
 04/19/2017 Lab:  LIPASE   
  
 04/19/2017 Lab:  METABOLIC PANEL, COMPREHENSIVE   
  
 04/19/2017 Lab:  SED RATE (ESR)   
  
 04/19/2017 Imaging:  XR CHEST PA LAT Patient Instructions Abdominal Pain: Care Instructions Your Care Instructions Abdominal pain has many possible causes. Some aren't serious and get better on their own in a few days. Others need more testing and treatment. If your pain continues or gets worse, you need to be rechecked and may need more tests to find out what is wrong. You may need surgery to correct the problem. Don't ignore new symptoms, such as fever, nausea and vomiting, urination problems, pain that gets worse, and dizziness. These may be signs of a more serious problem. Your doctor may have recommended a follow-up visit in the next 8 to 12 hours. If you are not getting better, you may need more tests or treatment. The doctor has checked you carefully, but problems can develop later. If you notice any problems or new symptoms, get medical treatment right away. Follow-up care is a key part of your treatment and safety. Be sure to make and go to all appointments, and call your doctor if you are having problems. It's also a good idea to know your test results and keep a list of the medicines you take. How can you care for yourself at home? · Rest until you feel better. · To prevent dehydration, drink plenty of fluids, enough so that your urine is light yellow or clear like water. Choose water and other caffeine-free clear liquids until you feel better. If you have kidney, heart, or liver disease and have to limit fluids, talk with your doctor before you increase the amount of fluids you drink. · If your stomach is upset, eat mild foods, such as rice, dry toast or crackers, bananas, and applesauce. Try eating several small meals instead of two or three large ones. · Wait until 48 hours after all symptoms have gone away before you have spicy foods, alcohol, and drinks that contain caffeine. · Do not eat foods that are high in fat. · Avoid anti-inflammatory medicines such as aspirin, ibuprofen (Advil, Motrin), and naproxen (Aleve). These can cause stomach upset. Talk to your doctor if you take daily aspirin for another health problem. When should you call for help? Call 911 anytime you think you may need emergency care. For example, call if: 
· You passed out (lost consciousness). · You pass maroon or very bloody stools. · You vomit blood or what looks like coffee grounds. · You have new, severe belly pain. Call your doctor now or seek immediate medical care if: 
· Your pain gets worse, especially if it becomes focused in one area of your belly. · You have a new or higher fever. · Your stools are black and look like tar, or they have streaks of blood. · You have unexpected vaginal bleeding. · You have symptoms of a urinary tract infection. These may include: 
¨ Pain when you urinate. ¨ Urinating more often than usual. 
¨ Blood in your urine. · You are dizzy or lightheaded, or you feel like you may faint. Watch closely for changes in your health, and be sure to contact your doctor if: 
· You are not getting better after 1 day (24 hours). Where can you learn more? Go to http://diana-lashonda.info/. Enter L259 in the search box to learn more about \"Abdominal Pain: Care Instructions. \" Current as of: May 27, 2016 Content Version: 11.2 © 1175-8431 [employer]. Care instructions adapted under license by [employer] (which disclaims liability or warranty for this information). If you have questions about a medical condition or this instruction, always ask your healthcare professional. [place] [address] any warranty or liability for your use of this information. Please provide this summary of care documentation to your next provider. Your primary care clinician is listed as [address]. If you have any questions after today's visit, please call [phone].

## 2017-04-20 ENCOUNTER — TELEPHONE (OUTPATIENT)
Dept: FAMILY MEDICINE CLINIC | Age: 62
End: 2017-04-20

## 2017-04-20 ENCOUNTER — TELEPHONE (OUTPATIENT)
Dept: PAIN MANAGEMENT | Age: 62
End: 2017-04-20

## 2017-04-24 ENCOUNTER — OFFICE VISIT (OUTPATIENT)
Dept: PAIN MANAGEMENT | Age: 62
End: 2017-04-24

## 2017-04-24 VITALS
DIASTOLIC BLOOD PRESSURE: 76 MMHG | HEIGHT: 64 IN | SYSTOLIC BLOOD PRESSURE: 128 MMHG | BODY MASS INDEX: 26.29 KG/M2 | HEART RATE: 66 BPM | WEIGHT: 154 LBS

## 2017-04-24 DIAGNOSIS — G89.4 CHRONIC PAIN SYNDROME: ICD-10-CM

## 2017-04-24 DIAGNOSIS — M79.10 MYALGIA: ICD-10-CM

## 2017-04-24 DIAGNOSIS — R10.11 RIGHT UPPER QUADRANT ABDOMINAL PAIN: Primary | ICD-10-CM

## 2017-04-24 DIAGNOSIS — M54.50 CHRONIC BILATERAL LOW BACK PAIN WITHOUT SCIATICA: ICD-10-CM

## 2017-04-24 DIAGNOSIS — M25.50 PAIN IN JOINT, MULTIPLE SITES: ICD-10-CM

## 2017-04-24 DIAGNOSIS — G89.29 CHRONIC BILATERAL LOW BACK PAIN WITHOUT SCIATICA: ICD-10-CM

## 2017-04-24 DIAGNOSIS — M96.1 POSTLAMINECTOMY SYNDROME, CERVICAL REGION: ICD-10-CM

## 2017-04-24 DIAGNOSIS — M54.2 CERVICALGIA: ICD-10-CM

## 2017-04-24 DIAGNOSIS — M51.26 DISPLACEMENT OF LUMBAR INTERVERTEBRAL DISC WITHOUT MYELOPATHY: ICD-10-CM

## 2017-04-24 DIAGNOSIS — M51.37 DEGENERATION OF LUMBAR OR LUMBOSACRAL INTERVERTEBRAL DISC: ICD-10-CM

## 2017-04-24 RX ORDER — MORPHINE SULFATE 60 MG/1
60 TABLET, FILM COATED, EXTENDED RELEASE ORAL EVERY 12 HOURS
Qty: 60 TAB | Refills: 0 | Status: SHIPPED | OUTPATIENT
Start: 2017-05-01 | End: 2017-06-21 | Stop reason: SDUPTHER

## 2017-04-24 RX ORDER — DICYCLOMINE HYDROCHLORIDE 10 MG/1
10 CAPSULE ORAL 4 TIMES DAILY
Qty: 40 CAP | Refills: 1 | Status: SHIPPED | OUTPATIENT
Start: 2017-04-24 | End: 2017-10-18

## 2017-04-24 RX ORDER — OXYCODONE HYDROCHLORIDE 10 MG/1
10 TABLET ORAL
Qty: 90 TAB | Refills: 0 | Status: SHIPPED | OUTPATIENT
Start: 2017-05-06 | End: 2017-06-21 | Stop reason: SDUPTHER

## 2017-04-24 RX ORDER — OXYCODONE HYDROCHLORIDE 10 MG/1
10 TABLET ORAL
Qty: 90 TAB | Refills: 0 | Status: SHIPPED | OUTPATIENT
Start: 2017-06-05 | End: 2017-06-21 | Stop reason: SDUPTHER

## 2017-04-24 RX ORDER — NALOXONE HYDROCHLORIDE 4 MG/.1ML
4 SPRAY NASAL AS NEEDED
Qty: 1 PACKAGE | Refills: 0 | Status: SHIPPED | OUTPATIENT
Start: 2017-04-24 | End: 2017-10-18

## 2017-04-24 RX ORDER — MORPHINE SULFATE 60 MG/1
60 TABLET, FILM COATED, EXTENDED RELEASE ORAL EVERY 12 HOURS
Qty: 60 TAB | Refills: 0 | Status: SHIPPED | OUTPATIENT
Start: 2017-05-03 | End: 2017-06-21 | Stop reason: SDUPTHER

## 2017-04-24 RX ORDER — BACLOFEN 10 MG/1
TABLET ORAL
Qty: 90 TAB | Refills: 3 | Status: SHIPPED | OUTPATIENT
Start: 2017-04-24 | End: 2017-06-21 | Stop reason: SDUPTHER

## 2017-04-24 NOTE — MR AVS SNAPSHOT
Visit Information Date & Time Provider Department Dept. Phone Encounter #  
 4/24/2017 10:20 AM BETO Fischer [facility] [id number] Follow-up Instructions Return in about 2 months (around 6/24/2017). Your Appointments 4/24/2017 10:20 AM  
Follow Up with BETO Fischer [facility] (RICHAR SCHEDULING) Appt Note: return in 2 months; re'd pt from 4/20/17 for 2 mon f/u. ..provider out of office. ..to  
 [address] [place], [place] [id number]  
[phone] [address]  
  
    
 5/5/2017  1:30 PM  
ROUTINE CARE with Pollo Bernard MD  
[facility]) Appt Note: 2 week f/u  
 [address] [facility] [address] [place] [phone]  
  
   
 [address] Upcoming Health Maintenance Date Due DTaP/Tdap/Td series (1 - Tdap) 3/30/1975 BREAST CANCER SCRN MAMMOGRAM 12/2/2017 PAP AKA CERVICAL CYTOLOGY 6/21/2019 COLONOSCOPY 4/13/2027 Allergies as of 4/24/2017  Review Complete On: 4/24/2017 By: BETO Fischer Severity Noted Reaction Type Reactions Aspirin    Other (comments) Stomach bleeding Macrobid [Nitrofurantoin Monohyd/m-cryst]  11/25/2015    Nausea and Vomiting Nsaids (Non-steroidal Anti-inflammatory Drug)    Nausea and Vomiting Opana [Oxymorphone]  11/29/2016    Other (comments) Insomnia, weight loss, nightmares Pcn [Penicillins]    Hives Current Immunizations  Never Reviewed Name Date Pneumococcal Polysaccharide (PPSV-23) 3/7/2014 Not reviewed this visit You Were Diagnosed With   
  
 Codes Comments Myalgia and myositis     ICD-10-CM: Lorene Spina ICD-9-CM: 729.1  Chronic bilateral low back pain without sciatica     ICD-10-CM: M54.5, G89.29 
 ICD-9-CM: 724.2, 338.29 Chronic pain syndrome     ICD-10-CM: G89.4 ICD-9-CM: 338.4 Degeneration of lumbar or lumbosacral intervertebral disc     ICD-10-CM: M51.37 
ICD-9-CM: 722.52 Displacement of lumbar intervertebral disc without myelopathy     ICD-10-CM: M51.26 
ICD-9-CM: 722.10 Pain in joint, multiple sites     ICD-10-CM: M25.50 ICD-9-CM: 719.49 Cervicalgia     ICD-10-CM: M54.2 ICD-9-CM: 723.1 Postlaminectomy syndrome, cervical region     ICD-10-CM: M96.1 ICD-9-CM: 722.81 Vitals BP Pulse Height(growth percentile) Weight(growth percentile) BMI OB Status 128/76 66 5' 4\" (1.626 m) 154 lb (69.9 kg) 26.43 kg/m2 Hysterectomy Smoking Status Former Smoker Vitals History BMI and BSA Data Body Mass Index Body Surface Area  
 26.43 kg/m 2 1.78 m 2 Preferred Pharmacy Pharmacy Name Phone [id number] - [place] [address] [phone] Your Updated Medication List  
  
   
This list is accurate as of: 4/24/17 10:16 AM.  Always use your most recent med list.  
  
  
  
  
 albuterol 2.5 mg /3 mL (0.083 %) nebulizer solution Commonly known as:  PROVENTIL VENTOLIN  
2.5 mg.  
  
 atorvastatin 40 mg tablet Commonly known as:  LIPITOR Take 1 Tab by mouth daily. baclofen 10 mg tablet Commonly known as:  LIORESAL  
TAKE ONE TABLET BY MOUTH THREE TIMES A DAY  
  
 butalbital-acetaminophen-caffeine -40 mg per tablet Commonly known as:  Waynetta Booze Take 1 Tab by mouth every six (6) hours as needed for Pain. Max Daily Amount: 4 Tabs. DEXILANT 60 mg Cpdb Generic drug:  Dexlansoprazole 60 mg daily. fluticasone 50 mcg/actuation nasal spray Commonly known as:  Emmette Settles 2 Sprays by Both Nostrils route as needed. gabapentin 400 mg capsule Commonly known as:  NEURONTIN  
400 mg two (2) times a day. GEODON 80 mg capsule Generic drug:  ziprasidone Take 160 mg by mouth two (2) times daily (with meals). ipratropium 0.02 % nebulizer solution Commonly known as:  ATROVENT  
0.5 mg.  
  
 losartan 25 mg tablet Commonly known as:  COZAAR  
TAKE ONE TABLET BY MOUTH ONE TIME DAILY  
  
 magnesium citrate solution Take 1/3 of bottle every 8 hours until finished or until you have a bowel movement. megestrol 20 mg tablet Commonly known as:  MEGACE Take 1 Tab by mouth daily. * morphine CR 60 mg CR tablet Commonly known as:  MS CONTIN Take 1 Tab by mouth every twelve (12) hours for 30 days. Max Daily Amount: 120 mg.  
Start taking on:  5/1/2017 * morphine CR 60 mg CR tablet Commonly known as:  MS CONTIN Take 1 Tab by mouth every twelve (12) hours. Max Daily Amount: 120 mg.  
Start taking on:  5/3/2017  
  
 naloxone 4 mg/actuation Spry 4 mg by Nasal route as needed. For emergency use only  Indications: OPIATE-INDUCED RESPIRATORY DEPRESSION  
  
 neomycin-colist-hydrocortisone-thonzonium otic suspension Commonly known as:  CORTISPORIN TC,COLY-MYCIN S Administer 2 Drops into each ear four (4) times daily. neomycin-polymyxin-hydrocortisone (buffered) 3.5-10,000-1 mg/mL-unit/mL-% otic suspension Commonly known as:  Maribeth Morillo Administer 3 Drops in left ear four (4) times daily. * oxyCODONE IR 10 mg Tab immediate release tablet Commonly known as:  Robb Maya Take 1 Tab by mouth three (3) times daily as needed. Max Daily Amount: 30 mg. Start taking on:  5/6/2017 * oxyCODONE IR 10 mg Tab immediate release tablet Commonly known as:  Robb Maya Take 1 Tab by mouth three (3) times daily as needed. Max Daily Amount: 30 mg. Start taking on:  6/5/2017 polyethylene glycol 17 gram packet Commonly known as:  Artice Daunt TAKE 1 PACKET BY MOUTH DAILY. sertraline 100 mg tablet Commonly known as:  ZOLOFT Take  by mouth daily. sucralfate 1 gram tablet Commonly known as:  CARAFATE  
1 g.  
  
 temazepam 15 mg capsule Commonly known as:  RESTORIL Take 1 Cap by mouth nightly as needed for Sleep. Max Daily Amount: 15 mg. Indications: INSOMNIA  
  
 traMADol 50 mg tablet Commonly known as:  ULTRAM  
Take 1 Tab by mouth every six (6) hours as needed for Pain. Max Daily Amount: 200 mg.  
  
 traZODone 100 mg tablet Commonly known as:  Luellen Loft Take 100 mg by mouth nightly. VITAMIN D2 50,000 unit capsule Generic drug:  ergocalciferol TAKE ONE CAPSULE BY MOUTH EVERY 7 DAYS * Notice: This list has 4 medication(s) that are the same as other medications prescribed for you. Read the directions carefully, and ask your doctor or other care provider to review them with you. Prescriptions Printed Refills  
 morphine CR (MS CONTIN) 60 mg CR tablet 0 Starting on: 5/3/2017 Sig: Take 1 Tab by mouth every twelve (12) hours. Max Daily Amount: 120 mg.  
 Class: Print Route: Oral  
 morphine CR (MS CONTIN) 60 mg CR tablet 0 Starting on: 2017 Sig: Take 1 Tab by mouth every twelve (12) hours for 30 days. Max Daily Amount: 120 mg.  
 Class: Print Route: Oral  
 oxyCODONE IR (ROXICODONE) 10 mg tab immediate release tablet 0 Starting on: 2017 Sig: Take 1 Tab by mouth three (3) times daily as needed. Max Daily Amount: 30 mg.  
 Class: Print Route: Oral  
 oxyCODONE IR (ROXICODONE) 10 mg tab immediate release tablet 0 Starting on: 2017 Sig: Take 1 Tab by mouth three (3) times daily as needed. Max Daily Amount: 30 mg.  
 Class: Print Route: Oral  
  
Prescriptions Sent to Pharmacy Refills  
 naloxone 4 mg/actuation spry 0 Si mg by Nasal route as needed. For emergency use only  Indications: OPIATE-INDUCED RESPIRATORY DEPRESSION Class: Normal  
 Pharmacy: [facility] [address], [address] [place] [address] #: [phone]  Route: Nasal  
 baclofen (LIORESAL) 10 mg tablet 3  
 Sig: TAKE ONE TABLET BY MOUTH THREE TIMES A DAY Class: Normal  
 Pharmacy: [facility] [address], [address] [place] [address] #: [phone] Follow-up Instructions Return in about 2 months (around 6/24/2017). Patient Instructions 1. Continue current plan. Stable on current medication without adverse events. 2. Refill morphine ER 60 mg 2 times daily. 3. Refill oxycodone 10 mg up to 3 times daily as needed for pain. 4. Refill baclofen 10 mg. Up to 3 times daily as needed. 5. Add naloxone 4 mg nasal spray for opioid induced respiratory depression emergency only. Extensive counseling was provided regarding this medication. Instructions were given to take home 6. Continue to f/u with gastroenterologist.  
7. Continue home exercise program.  Exercise/stretching were demonstrated in office today. 8. Discussed risks of addiction, dependency, and opioid induced hyperalgesia. 9. Return to clinic in 2 months Please provide this summary of care documentation to your next provider. Your primary care clinician is listed as [address] [facility]. If you have any questions after today's visit, please call [phone].

## 2017-04-24 NOTE — PROGRESS NOTES
Nursing Notes    Patient presents to the office today in follow-up. Patient rates her pain at 0/10 on the numerical pain scale. Reviewed medications with counts as follows:    Rx Date filled Qty Dispensed Pill Count Last Dose Short   Morphine sulfate ER 60 mg  04/04/17 60 22 This a.m. no   Oxycodone 10 mg  04/06/17 90 32 today no                           POC UDS was not performed in office today    Any new labs or imaging since last appointment? NO    Have you been to an emergency room (ER) or urgent care clinic since your last visit? NO            Have you been hospitalized since your last visit? NO     If yes, where, when, and reason for visit? Have you seen or consulted any other health care providers outside of the [address] [place] [address]  since your last visit? NO     If yes, where, when, and reason for visit? HM deferred to pcp.

## 2017-04-24 NOTE — PROGRESS NOTES
HISTORY OF PRESENT ILLNESS  Yadiel Horn is a 60 y.o. female    HPI: Ms. Shama Miller  returns today for f/u of chronic severe pain involving the lumbar spine, which has been attributed to a lumbar degenerative disc disease with spondylosis. In addition, she has neck pain attributed to a post cervical laminectomy syndrome. No h/o lumbar surgery. PT and injections in the past with no improvement. She continues with low back pain unchanged since last visit. She did have her gallbladder removed on 2/27/2017. She was given hydrocodone for postsurgical pain which was disclosed to our practice. She was improving since her surgery but then started having worsening right upper quadrant pain. She was seen in the emergency room on 4/4 why she was given IV morphine. Full workup was done with no findings. She was given no prescriptions during discharge. She has followed up with her PCP. PCP has tried tramadol and then switch her to Fioricet. Both of these medications were disclosed to our practice. She says that she is no longer taking either of these medication because they are not working and she does not like the idea of adding caffeine. She is continuing to follow-up with her gastroenterologist.  She is undergone colonoscopy and endoscopy recently with no findings. She is planning to follow-up with her gastroenterologist soon for further workup. She is otherwise doing well with no other complaints today. Because the patient's current regimen places him/her at increased risk for possible overdose, a prescription for naloxone nasal spray is being provided. The patient understands that this medication is only to be used in the setting of a possible overdose and that inadvertent use of this medication could precipitate overt withdrawal.    Current medication management consists of MS Contin 60 mg BID,  Oxycodone IR 10 mg BID PRN, and Baclofen 10 mg TID PRN. Lorazepam and Temazepam by her PCP.   Medications are helping with pain control and quality of life. Her pain is 2-3/10 with medication and 10/10 without. Pt describes pain as aching. Aggravating factors include standing, sitting, walking. Relieved with rest, sitting, lying down, and avoiding painful activities. Current treatment is helping to improve general activity, mood, walking, sleep, enjoyment of life    She  is otherwise doing well with no other complaints today. She denies any adverse events including nausea, vomiting, dizziness, constipation, hallucinations, or seizures. Allergies   Allergen Reactions    Aspirin Other (comments)     Stomach bleeding    Macrobid [Nitrofurantoin Monohyd/M-Cryst] Nausea and Vomiting    Nsaids (Non-Steroidal Anti-Inflammatory Drug) Nausea and Vomiting    Opana [Oxymorphone] Other (comments)     Insomnia, weight loss, nightmares    Pcn [Penicillins] Hives       Past Surgical History:   Procedure Laterality Date    COLONOSCOPY N/A 4/13/2017    COL[facility] Colonel Raymond [facility]    HX CERVICAL FUSION      HX HYSTERECTOMY      HX ORTHOPAEDIC      ganglion cyst removed, right hand    HX OTHER SURGICAL      cyst left thigh removed    LAP,CHOLECYSTECTOMY N/A 02/27/2017    Dr. Renny Benítez         Review of Systems   Constitutional: Negative for chills, fever and weight loss. HENT: Negative for congestion, ear discharge and sore throat. Eyes: Negative for blurred vision and double vision. Respiratory: Negative for cough, shortness of breath and wheezing. Cardiovascular: Negative for chest pain and palpitations. Gastrointestinal: Positive for abdominal pain ( Right upper quadrant pain). Negative for constipation, diarrhea, heartburn, nausea and vomiting. Genitourinary: Positive for dysuria, frequency, hematuria and urgency. Musculoskeletal: Positive for back pain and joint pain (left knee  ). Negative for falls and neck pain. Skin: Negative for itching and rash. Neurological: Negative for dizziness, seizures, loss of consciousness and headaches. Endo/Heme/Allergies: Does not bruise/bleed easily. Psychiatric/Behavioral: Positive for depression. Negative for suicidal ideas. The patient has insomnia. The patient is not nervous/anxious. Physical Exam   Constitutional: She is oriented to person, place, and time and well-developed, well-nourished, and in no distress. No distress. HENT:   Head: Normocephalic and atraumatic. Eyes: EOM are normal.   Neck: Normal range of motion. Pulmonary/Chest: Effort normal.   Musculoskeletal: Normal range of motion. surgical scar on left knee from prior surgery. Neurological: She is alert and oriented to person, place, and time. Gait abnormal.   Skin: Skin is warm and dry. No rash noted. She is not diaphoretic. No erythema. Psychiatric: Memory and judgment normal.   Nursing note and vitals reviewed. ASSESSMENT:    1. Myalgia    2. Chronic bilateral low back pain without sciatica    3. Chronic pain syndrome    4. Degeneration of lumbar or lumbosacral intervertebral disc    5. Displacement of lumbar intervertebral disc without myelopathy    6. Pain in joint, multiple sites    7. Cervicalgia    8. Postlaminectomy syndrome, cervical region           [facility] Program was reviewed which does not demonstrate aberrancies and/or inconsistencies with regard to the historical prescribing of controlled medications to this patient by other providers. NOTE: Disclosed hydrocodone from ER visit filled 12/22/2016   Hydrocodone for postsurgical pain disclosed on 2/27/2017, IV morphine disclosed on 4/4/2017 tramadol was disclosed for gallbladder pain  by her PCP on 4/13/2017. This medication was then switched to Fioricet on 4/20/2017. PLAN / Pt Instructions:  1. Continue current plan. Stable on current medication without adverse events. 2. Refill morphine ER 60 mg 2 times daily.   3. Refill oxycodone 10 mg up to 3 times daily as needed for pain. 4. Refill baclofen 10 mg. Up to 3 times daily as needed. 5. Add naloxone 4 mg nasal spray for opioid induced respiratory depression emergency only. Extensive counseling was provided regarding this medication. Instructions were given to take home  6. Continue to f/u with gastroenterologist.   7. Continue home exercise program.  Exercise/stretching were demonstrated in office today. 8. Discussed risks of addiction, dependency, and opioid induced hyperalgesia. 9. Return to clinic in 2 months     Medications Ordered Today   Medications    DISCONTD: morphine CR (MS CONTIN) 60 mg CR tablet     Sig: Take 1 Tab by mouth every twelve (12) hours. Max Daily Amount: 120 mg. Dispense:  60 Tab     Refill:  0    DISCONTD: morphine CR (MS CONTIN) 60 mg CR tablet     Sig: Take 1 Tab by mouth every twelve (12) hours for 30 days. Max Daily Amount: 120 mg. Dispense:  60 Tab     Refill:  0    DISCONTD: oxyCODONE IR (ROXICODONE) 10 mg tab immediate release tablet     Sig: Take 1 Tab by mouth three (3) times daily as needed. Max Daily Amount: 30 mg. Dispense:  90 Tab     Refill:  0    DISCONTD: oxyCODONE IR (ROXICODONE) 10 mg tab immediate release tablet     Sig: Take 1 Tab by mouth three (3) times daily as needed. Max Daily Amount: 30 mg. Dispense:  90 Tab     Refill:  0    naloxone 4 mg/actuation spry     Si mg by Nasal route as needed. For emergency use only  Indications: OPIATE-INDUCED RESPIRATORY DEPRESSION     Dispense:  1 Package     Refill:  0    DISCONTD: baclofen (LIORESAL) 10 mg tablet     Sig: TAKE ONE TABLET BY MOUTH THREE TIMES A DAY     Dispense:  90 Tab     Refill:  3       Pain medications prescribed with the objective of pain relief and improved physical and psychosocial function in this patient.     Spent 25 minutes with patient today reviewing the treatment plan, goals of treatment plan, and limitations of the treatment plan, to include the potential for side effects from medications and procedures. Eboni Walton 4/24/2017      Note: Please excuse any typographical errors. Voice recognition software was used for this note and may cause mistakes.

## 2017-04-24 NOTE — TELEPHONE ENCOUNTER
Patient calling again for results of xray and labs. Patient made aware that the nurse was in a room and I knew she had called previously for results. I assured her the nurse would call her back today.

## 2017-04-24 NOTE — TELEPHONE ENCOUNTER
Pt is aware of lab results. But she has questions about her xray. Would like to speak with dr Tressa Jose.

## 2017-04-24 NOTE — PATIENT INSTRUCTIONS
1. Continue current plan. Stable on current medication without adverse events. 2. Refill morphine ER 60 mg 2 times daily. 3. Refill oxycodone 10 mg up to 3 times daily as needed for pain. 4. Refill baclofen 10 mg. Up to 3 times daily as needed. 5. Add naloxone 4 mg nasal spray for opioid induced respiratory depression emergency only. Extensive counseling was provided regarding this medication. Instructions were given to take home  6. Continue to f/u with gastroenterologist.   7. Continue home exercise program.  Exercise/stretching were demonstrated in office today. 8. Discussed risks of addiction, dependency, and opioid induced hyperalgesia.    9. Return to clinic in 2 months

## 2017-04-25 ENCOUNTER — PATIENT OUTREACH (OUTPATIENT)
Dept: FAMILY MEDICINE CLINIC | Age: 62
End: 2017-04-25

## 2017-04-25 NOTE — PATIENT INSTRUCTIONS
Preventing constipation:   Increase fluids to 6-8 glasses per day (Avoid caffeine)  Introduce more fiber into diet  Walk 3 times a week as ability allows    Patient verbalizes understanding.

## 2017-04-25 NOTE — PROGRESS NOTES
[address]                      JORY Follow up        Follow up Dx:   [facility] ED on 4/3/17 for RLQ abdominal pain    Nurse Navigator(DILIP) contacted the patient by telephone to perform follow up assessment. Verified  and address with patient as identifiers. Patient reports that she is \"doing great\", having 'normal' bowel movements without difficulty even though she continues to have 'stomach' pain. Patient reports that she has had her colonoscopy and upper endo on 17. Patient attended appointments with Dr. Martita Valadez on 4/10/17, 17 and with [facility] on 17. Hospitalizations/ ED visits :  None    Diet:   Regular    Activity:    As tolerated    Medication:   Performed medication reconciliation with patient, and patient verbalizes understanding of administration of home medications. There were no barriers to obtaining medications identified at this time. Instructions :  Incorporate more fiber and force fluids to 6-8 glasses per day to prevent constipation        Red Flags:  Severe abdominal pain or pain not relieved with medications  Blood in stool  Nausea/vomiting  No bowel movement in > 2 days          PCP/Specialist follow up:  Dr. Martita Valadez 17    Reviewed red flags with patient, and patient verbalizes understanding. Patient given an opportunity to ask questions. No other clinical/social/functional needs noted. The patient agrees to contact the PCP office for questions related to their healthcare. The patient expressed thanks, offered no additional questions and ended the call. Case management plan Will continue to follow as necessary until 17 Will reassess for case management needs prior to discharge from case management service.            Parker Rosas RN / Virtual Nurse Navigator

## 2017-04-26 NOTE — TELEPHONE ENCOUNTER
Everlena Harts, MD Elvera Lombard, LPN        Caller: Unspecified (6 days ago, 10:08 AM)                     I discussed xray results with patient.

## 2017-04-30 DIAGNOSIS — E78.00 HYPERCHOLESTEROLEMIA: ICD-10-CM

## 2017-04-30 DIAGNOSIS — R82.90 BAD ODOR OF URINE: ICD-10-CM

## 2017-05-01 RX ORDER — ATORVASTATIN CALCIUM 40 MG/1
TABLET, FILM COATED ORAL
Qty: 30 TAB | Refills: 4 | Status: SHIPPED | OUTPATIENT
Start: 2017-05-01 | End: 2017-08-25 | Stop reason: SDUPTHER

## 2017-05-01 RX ORDER — LOSARTAN POTASSIUM 25 MG/1
TABLET ORAL
Qty: 30 TAB | Refills: 0 | Status: SHIPPED | OUTPATIENT
Start: 2017-05-01 | End: 2017-05-29 | Stop reason: SDUPTHER

## 2017-05-08 DIAGNOSIS — R10.11 RIGHT UPPER QUADRANT ABDOMINAL PAIN: ICD-10-CM

## 2017-05-08 RX ORDER — MEGESTROL ACETATE 20 MG/1
20 TABLET ORAL DAILY
Qty: 30 TAB | Refills: 0 | Status: SHIPPED | OUTPATIENT
Start: 2017-05-08 | End: 2017-05-22 | Stop reason: SDUPTHER

## 2017-05-08 RX ORDER — ERGOCALCIFEROL 1.25 MG/1
CAPSULE ORAL
Qty: 4 CAP | Refills: 0 | Status: SHIPPED | OUTPATIENT
Start: 2017-05-08 | End: 2017-06-04 | Stop reason: SDUPTHER

## 2017-05-08 NOTE — TELEPHONE ENCOUNTER
Patient called again. I let her know it had not yet been approved and that it is normally a 24 to 48 hrs turnaround time on medication. I asked if she was completely out and she stated no but it is difficult to get a ride to the pharmacy and she was going to have one this evening and could  the meds. I told her I would let the nurse know the situation and see if she could talk to Dr. Arnel Clifford.

## 2017-05-12 ENCOUNTER — OFFICE VISIT (OUTPATIENT)
Dept: FAMILY MEDICINE CLINIC | Age: 62
End: 2017-05-12

## 2017-05-12 VITALS
HEART RATE: 70 BPM | OXYGEN SATURATION: 98 % | SYSTOLIC BLOOD PRESSURE: 110 MMHG | HEIGHT: 64 IN | TEMPERATURE: 99.2 F | WEIGHT: 154 LBS | BODY MASS INDEX: 26.29 KG/M2 | RESPIRATION RATE: 12 BRPM | DIASTOLIC BLOOD PRESSURE: 80 MMHG

## 2017-05-12 DIAGNOSIS — R82.90 BAD ODOR OF URINE: Primary | ICD-10-CM

## 2017-05-12 DIAGNOSIS — R63.4 WEIGHT LOSS: ICD-10-CM

## 2017-05-12 LAB
BILIRUB UR QL STRIP: NEGATIVE
GLUCOSE UR-MCNC: NEGATIVE MG/DL
KETONES P FAST UR STRIP-MCNC: NEGATIVE MG/DL
PH UR STRIP: 7 [PH] (ref 4.6–8)
PROT UR QL STRIP: NEGATIVE MG/DL
SP GR UR STRIP: 1.01 (ref 1–1.03)
UA UROBILINOGEN AMB POC: NORMAL (ref 0.2–1)
URINALYSIS CLARITY POC: CLEAR
URINALYSIS COLOR POC: YELLOW
URINE BLOOD POC: NEGATIVE
URINE LEUKOCYTES POC: NEGATIVE
URINE NITRITES POC: NEGATIVE

## 2017-05-12 NOTE — PROGRESS NOTES
Bart Schilling is a 60 y.o. female  presents for follow up. She has vaginal odor. No discharge. No fever or chills. She states that she wants to gain her weight back to 168. She is at 154 now. Allergies   Allergen Reactions    Aspirin Other (comments)     Stomach bleeding    Macrobid [Nitrofurantoin Monohyd/M-Cryst] Nausea and Vomiting    Nsaids (Non-Steroidal Anti-Inflammatory Drug) Nausea and Vomiting    Opana [Oxymorphone] Other (comments)     Insomnia, weight loss, nightmares    Pcn [Penicillins] Hives     Outpatient Prescriptions Marked as Taking for the 5/12/17 encounter (Office Visit) with Pollo Bernard MD   Medication Sig Dispense Refill    ergocalciferol (VITAMIN D2) 50,000 unit capsule TAKE ONE CAPSULE BY MOUTH EVERY 7 DAYS 4 Cap 0    atorvastatin (LIPITOR) 40 mg tablet TAKE ONE TABLET BY MOUTH ONE TIME DAILY  30 Tab 4    losartan (COZAAR) 25 mg tablet TAKE ONE TABLET BY MOUTH ONE TIME DAILY  30 Tab 0    morphine CR (MS CONTIN) 60 mg CR tablet Take 1 Tab by mouth every twelve (12) hours. Max Daily Amount: 120 mg. 60 Tab 0    morphine CR (MS CONTIN) 60 mg CR tablet Take 1 Tab by mouth every twelve (12) hours for 30 days. Max Daily Amount: 120 mg. 60 Tab 0    oxyCODONE IR (ROXICODONE) 10 mg tab immediate release tablet Take 1 Tab by mouth three (3) times daily as needed. Max Daily Amount: 30 mg. 90 Tab 0    [START ON 6/5/2017] oxyCODONE IR (ROXICODONE) 10 mg tab immediate release tablet Take 1 Tab by mouth three (3) times daily as needed. Max Daily Amount: 30 mg. 90 Tab 0    baclofen (LIORESAL) 10 mg tablet TAKE ONE TABLET BY MOUTH THREE TIMES A DAY 90 Tab 3    traMADol (ULTRAM) 50 mg tablet Take 1 Tab by mouth every six (6) hours as needed for Pain. Max Daily Amount: 200 mg. 40 Tab 0    polyethylene glycol (MIRALAX) 17 gram packet TAKE 1 PACKET BY MOUTH DAILY. 30 Packet 2    temazepam (RESTORIL) 15 mg capsule Take 1 Cap by mouth nightly as needed for Sleep.  Max Daily Amount: 15 mg. Indications: INSOMNIA 30 Cap 1    fluticasone (FLONASE) 50 mcg/actuation nasal spray 2 Sprays by Both Nostrils route as needed. 1 Bottle 3    Dexlansoprazole (DEXILANT) 60 mg CpDB 60 mg daily.  gabapentin (NEURONTIN) 400 mg capsule 400 mg two (2) times a day.  trazodone (DESYREL) 100 mg tablet Take 100 mg by mouth nightly.  sertraline (ZOLOFT) 100 mg tablet Take  by mouth daily.        Patient Active Problem List   Diagnosis Code    Myalgia and myositis [id number]    Lumbago M54.5    Other affections of shoulder region, not elsewhere classified M75.80    Other chronic pain G89.29    Bipolar 1 disorder ([facility] [address] F31.9    Chronic pain syndrome G89.4    Carpal tunnel syndrome G56.00    Degeneration of lumbar or lumbosacral intervertebral disc M51.37    Displacement of lumbar intervertebral disc without myelopathy M51.26    Pain in joint, multiple sites M25.50    Cervicalgia M54.2    Postlaminectomy syndrome, cervical region M96.1    Annular tear of lumbar disc M51.36    Lung nodule seen on imaging study R91.1    ACP (advance care planning) Z71.89    Hypercholesterolemia E78.00    Nausea R11.0     Past Medical History:   Diagnosis Date    Annular tear of lumbar disc 11/10/2014    Asthma     Bronchitis    Benign tumor of throat     Bone spur     right ankle    Cervicalgia 11/10/2014    Chronic pain     back    Degeneration of lumbar or lumbosacral intervertebral disc 11/10/2014    Degenerative disc disease     Depression     Displacement of lumbar intervertebral disc without myelopathy 11/10/2014    Elevated white blood cell count     Fibromyalgia     GERD (gastroesophageal reflux disease)     Hypertension     Insomnia     Nausea & vomiting     Pain in joint, multiple sites 11/10/2014    Postlaminectomy syndrome, cervical region 11/10/2014    Sinus infection 10/5/15    Ulcer ([facility] [address]      Social History     Social History    Marital status:      Spouse name: N/A    Number of children: N/A    Years of education: N/A     Social History Main Topics    Smoking status: Former Smoker    Smokeless tobacco: Never Used    Alcohol use No      Comment: none in 24 years    Drug use: No      Comment: last smoked in 1993    Sexual activity: Not Asked     Other Topics Concern    None     Social History Narrative     Family History   Problem Relation Age of Onset    Hypertension Other     Heart Attack Other     Heart Disease Other     Stroke Other     Headache Other     Seizures Other         Review of Systems   Constitutional: Negative for chills, fever and weight loss. Gastrointestinal: Negative for nausea. Psychiatric/Behavioral: Positive for depression. Negative for hallucinations, memory loss, substance abuse and suicidal ideas. The patient is not nervous/anxious and does not have insomnia. Vitals:    05/12/17 1310   BP: 110/80   Pulse: 70   Resp: 12   Temp: 99.2 °F (37.3 °C)   TempSrc: Oral   SpO2: 98%   Weight: 154 lb (69.9 kg)   Height: 5' 4\" (1.626 m)   PainSc:   0 - No pain       Physical Exam   Constitutional: She is oriented to person, place, and time and well-developed, well-nourished, and in no distress. Cardiovascular: Normal rate, regular rhythm and normal heart sounds. Pulmonary/Chest: Effort normal and breath sounds normal.   Neurological: She is alert and oriented to person, place, and time. Gait normal.   Skin: Skin is warm and dry. Psychiatric: Mood, memory, affect and judgment normal.   Nursing note and vitals reviewed. Assessment/Plan      ICD-10-CM ICD-9-CM    1. Bad odor of urine R82.90 791.9 AMB POC URINALYSIS DIP STICK AUTO W/O MICRO   2. Weight loss R63.4 783.21      Will increase megace to 2 pills a day    I have discussed the diagnosis with the patient and the intended plan of care as seen in the above orders. The patient has received an after-visit summary and questions were answered concerning future plans.  I have discussed medication, side effects, and warnings with the patient in detail. The patient verbalized understanding and is in agreement with the plan of care. The patient will contact the office with any additional concerns. Follow-up Disposition:  Return in about 2 weeks (around 5/26/2017).   lab results and schedule of future lab studies reviewed with patient      Megan Alexis MD

## 2017-05-12 NOTE — MR AVS SNAPSHOT
Visit Information Date & Time Provider Department Dept. Phone Encounter #  
 5/12/2017  1:30 PM Eileen Costello MD [facility] [phone] [id number] Follow-up Instructions Return in about 2 weeks (around 5/26/2017). Your Appointments 6/21/2017 10:20 AM  
Follow Up with BETO Byrne [facility] (RICHAR SCHEDULING) Appt Note: return in 2 months [address] [id number] [phone] [facility] [id number] Upcoming Health Maintenance Date Due DTaP/Tdap/Td series (1 - Tdap) 3/30/1975 INFLUENZA AGE 9 TO ADULT 8/1/2017 BREAST CANCER SCRN MAMMOGRAM 12/2/2017 PAP AKA CERVICAL CYTOLOGY 6/21/2019 COLONOSCOPY 4/13/2027 Allergies as of 5/12/2017  Review Complete On: 5/12/2017 By: Eileen Costello MD  
  
 Severity Noted Reaction Type Reactions Aspirin    Other (comments) Stomach bleeding Macrobid [Nitrofurantoin Monohyd/m-cryst]  11/25/2015    Nausea and Vomiting Nsaids (Non-steroidal Anti-inflammatory Drug)    Nausea and Vomiting Opana [Oxymorphone]  11/29/2016    Other (comments) Insomnia, weight loss, nightmares Pcn [Penicillins]    Hives Current Immunizations  Never Reviewed Name Date Pneumococcal Polysaccharide (PPSV-23) 3/7/2014 Not reviewed this visit You Were Diagnosed With   
  
 Codes Comments Bad odor of urine    -  Primary ICD-10-CM: R82.90 ICD-9-CM: 791.9 Weight loss     ICD-10-CM: R63.4 ICD-9-CM: 783.21 Vitals BP Pulse Temp Resp Height(growth percentile) Weight(growth percentile) 110/80 (BP 1 Location: Left arm, BP Patient Position: Sitting) 70 99.2 °F (37.3 °C) (Oral) 12 5' 4\" (1.626 m) 154 lb (69.9 kg) SpO2 BMI OB Status Smoking Status 98% 26.43 kg/m2 Hysterectomy Former Smoker Vitals History BMI and BSA Data Body Mass Index Body Surface Area 26.43 kg/m 2 1.78 m 2 Preferred Pharmacy Pharmacy Name [id number] - [address] [phone] Your Updated Medication List  
  
   
This list is accurate as of: 5/12/17  1:38 PM.  Always use your most recent med list.  
  
  
  
  
 albuterol 2.5 mg /3 mL (0.083 %) nebulizer solution Commonly known as:  PROVENTIL VENTOLIN  
2.5 mg.  
  
 atorvastatin 40 mg tablet Commonly known as:  LIPITOR  
TAKE ONE TABLET BY MOUTH ONE TIME DAILY  
  
 baclofen 10 mg tablet Commonly known as:  LIORESAL  
TAKE ONE TABLET BY MOUTH THREE TIMES A DAY  
  
 butalbital-acetaminophen-caffeine -40 mg per tablet Commonly known as:  Bettyann Silvius Take 1 Tab by mouth every six (6) hours as needed for Pain. Max Daily Amount: 4 Tabs. DEXILANT 60 mg Cpdb Generic drug:  Dexlansoprazole 60 mg daily. dicyclomine 10 mg capsule Commonly known as:  BENTYL Take 1 Cap by mouth four (4) times daily. ergocalciferol 50,000 unit capsule Commonly known as:  VITAMIN D2  
TAKE ONE CAPSULE BY MOUTH EVERY 7 DAYS  
  
 fluticasone 50 mcg/actuation nasal spray Commonly known as:  Liana Rad 2 Sprays by Both Nostrils route as needed. gabapentin 400 mg capsule Commonly known as:  NEURONTIN  
400 mg two (2) times a day. GEODON 80 mg capsule Generic drug:  ziprasidone Take 160 mg by mouth two (2) times daily (with meals). ipratropium 0.02 % nebulizer solution Commonly known as:  ATROVENT  
0.5 mg.  
  
 losartan 25 mg tablet Commonly known as:  COZAAR  
TAKE ONE TABLET BY MOUTH ONE TIME DAILY  
  
 magnesium citrate solution Take 1/3 of bottle every 8 hours until finished or until you have a bowel movement. megestrol 20 mg tablet Commonly known as:  MEGACE Take 1 Tab by mouth daily. * morphine CR 60 mg CR tablet Commonly known as:  MS CONTIN  
 Take 1 Tab by mouth every twelve (12) hours for 30 days. Max Daily Amount: 120 mg.  
  
 * morphine CR 60 mg CR tablet Commonly known as:  MS CONTIN Take 1 Tab by mouth every twelve (12) hours. Max Daily Amount: 120 mg.  
  
 naloxone 4 mg/actuation Spry 4 mg by Nasal route as needed. For emergency use only  Indications: OPIATE-INDUCED RESPIRATORY DEPRESSION  
  
 * oxyCODONE IR 10 mg Tab immediate release tablet Commonly known as:  Shaggy Ivory Take 1 Tab by mouth three (3) times daily as needed. Max Daily Amount: 30 mg.  
  
 * oxyCODONE IR 10 mg Tab immediate release tablet Commonly known as:  Shaggy Ivory Take 1 Tab by mouth three (3) times daily as needed. Max Daily Amount: 30 mg. Start taking on:  6/5/2017 polyethylene glycol 17 gram packet Commonly known as:  Gwenyth Space TAKE 1 PACKET BY MOUTH DAILY. sertraline 100 mg tablet Commonly known as:  ZOLOFT Take  by mouth daily. sucralfate 1 gram tablet Commonly known as:  CARAFATE  
1 g.  
  
 temazepam 15 mg capsule Commonly known as:  RESTORIL Take 1 Cap by mouth nightly as needed for Sleep. Max Daily Amount: 15 mg. Indications: INSOMNIA  
  
 traMADol 50 mg tablet Commonly known as:  ULTRAM  
Take 1 Tab by mouth every six (6) hours as needed for Pain. Max Daily Amount: 200 mg.  
  
 traZODone 100 mg tablet Commonly known as:  Blondell Poet Take 100 mg by mouth nightly. * Notice: This list has 4 medication(s) that are the same as other medications prescribed for you. Read the directions carefully, and ask your doctor or other care provider to review them with you. We Performed the Following AMB POC URINALYSIS DIP STICK AUTO W/O MICRO [63585 CPT(R)] Follow-up Instructions Return in about 2 weeks (around 5/26/2017). Please provide this summary of care documentation to your next provider. Your primary care clinician is listed as [id number] [facility].  If you have any questions after today's visit, please call [phone].

## 2017-05-12 NOTE — PROGRESS NOTES
Patient here for f/u on her abdominal pain. She c/o odor to her urine ans is asking that her urine be checked. Continues to have abdominal pain. 1. Have you been to the ER, urgent care clinic since your last visit? Hospitalized since your last visit? No    2. Have you seen or consulted any other health care providers outside of the [address] [place] [address] since your last visit? Include any pap smears or colon screening.  No

## 2017-05-22 DIAGNOSIS — R10.11 RIGHT UPPER QUADRANT ABDOMINAL PAIN: ICD-10-CM

## 2017-05-22 RX ORDER — MEGESTROL ACETATE 20 MG/1
20 TABLET ORAL DAILY
Qty: 30 TAB | Refills: 0 | Status: SHIPPED | OUTPATIENT
Start: 2017-05-22 | End: 2017-10-18

## 2017-05-22 NOTE — TELEPHONE ENCOUNTER
This patient contacted office for the following prescriptions to be filled:    Medication requested :   Requested Prescriptions     Pending Prescriptions Disp Refills    megestrol (MEGACE) 20 mg tablet 30 Tab 0     Sig: Take 1 Tab by mouth daily.    (Patient states she takes 2 daily, a total of 40mg, she is requesting a 40mg tab be called in or if the 20mg tabs are called in to giver her enough to last a month taking 2 daily)  PCP: Dr. Ne Rivas or [facility]  Mail order or Local pharmacy: [phone]    Scheduled appointment if not seen by current providers in office: LOV 5/12/17

## 2017-05-23 ENCOUNTER — PATIENT OUTREACH (OUTPATIENT)
Dept: FAMILY MEDICINE CLINIC | Age: 62
End: 2017-05-23

## 2017-05-23 NOTE — PROGRESS NOTES
[address]                              Follow up Dx:   [facility] ED on 4/3/17 for RLQ abdominal pain    Nurse Navigator(NN) contacted the patient by telephone to perform follow up assessment. Verified  and address with patient as identifiers. Patient attended 17 appointment with Dr. Martita Valadez. Patient reports that she has abdominal pain \"every blue moon\". Patient states that she is going to ophthalmologist on 17 to discuss scheduling cataract surgery of both eyes. Hospitalizations/ ED visits :  None    Diet:  Regular, patient rports that she has increased fiber intake by increasing her fruit intake    Activity:   As tolerated. Patient states that she is walking more, has even begun taking stairs instead of elevator. Medication:   Performed medication reconciliation with patient, and patient verbalizes understanding of administration of home medications. There were no barriers to obtaining medications identified at this time. Red Flags:  Severe abdominal pain or pain not relieved with medications  Blood in stool  Nausea/vomiting  No bowel movement in > 2 days        Reviewed red flags with patient, and patient verbalizes understanding. Patient given an opportunity to ask questions. No other clinical/social/functional needs noted. The patient agrees to contact the PCP office for questions related to their healthcare. The patient expressed thanks, offered no additional questions and ended the call. Case management plan  Patient attended follow up appointments and has not had any ED/hospitalizations in past 30 days. Resolving this episode.      Parker Rosas RN / Virtual Nurse Navigator

## 2017-05-26 NOTE — TELEPHONE ENCOUNTER
This patient contacted office for the following prescriptions to be filled:    Medication requested : Dulcolax (not previously prescribed by Dr. Skylar Valentin)  PCP: Dr. Ayesha Marcano or [facility]  Mail order or Local pharmacy: [phone]    Scheduled appointment if not seen by current providers in office: LOV 5/12/17

## 2017-05-29 DIAGNOSIS — R82.90 BAD ODOR OF URINE: ICD-10-CM

## 2017-05-30 RX ORDER — LOSARTAN POTASSIUM 25 MG/1
TABLET ORAL
Qty: 30 TAB | Refills: 0 | Status: SHIPPED | OUTPATIENT
Start: 2017-05-30 | End: 2017-06-27 | Stop reason: SDUPTHER

## 2017-05-30 NOTE — TELEPHONE ENCOUNTER
Patient also called requesting a refill of the Cozaar. I let her know the pharmacy had already sent request, it was awaiting the doctor's approval. She is asking for refills so she doesn't have to call each month for refills. She was recently seen 5/12/17 but for an acute visit.

## 2017-06-05 RX ORDER — ERGOCALCIFEROL 1.25 MG/1
CAPSULE ORAL
Qty: 4 CAP | Refills: 0 | Status: SHIPPED | OUTPATIENT
Start: 2017-06-05 | End: 2017-07-03 | Stop reason: SDUPTHER

## 2017-06-09 ENCOUNTER — TELEPHONE (OUTPATIENT)
Dept: FAMILY MEDICINE CLINIC | Age: 62
End: 2017-06-09

## 2017-06-09 NOTE — TELEPHONE ENCOUNTER
This patient contacted office for the following prescriptions to be filled:    Medication requested : Valtrex (pt is requesting a script for this for a cold sore, she states she has taken this before many times)  PCP: Dr. Lauren Costello or [facility]  Mail order or Local pharmacy: [phone]    Scheduled appointment if not seen by current providers in office: LOV 5/12/17

## 2017-06-21 ENCOUNTER — OFFICE VISIT (OUTPATIENT)
Dept: PAIN MANAGEMENT | Age: 62
End: 2017-06-21

## 2017-06-21 VITALS
HEART RATE: 66 BPM | BODY MASS INDEX: 26.98 KG/M2 | DIASTOLIC BLOOD PRESSURE: 77 MMHG | SYSTOLIC BLOOD PRESSURE: 112 MMHG | HEIGHT: 64 IN | WEIGHT: 158 LBS

## 2017-06-21 DIAGNOSIS — M25.50 PAIN IN JOINT, MULTIPLE SITES: ICD-10-CM

## 2017-06-21 DIAGNOSIS — M96.1 POSTLAMINECTOMY SYNDROME, CERVICAL REGION: ICD-10-CM

## 2017-06-21 DIAGNOSIS — G89.4 CHRONIC PAIN SYNDROME: ICD-10-CM

## 2017-06-21 DIAGNOSIS — M54.2 CERVICALGIA: ICD-10-CM

## 2017-06-21 DIAGNOSIS — G89.29 CHRONIC BILATERAL LOW BACK PAIN WITHOUT SCIATICA: ICD-10-CM

## 2017-06-21 DIAGNOSIS — M54.50 CHRONIC BILATERAL LOW BACK PAIN WITHOUT SCIATICA: ICD-10-CM

## 2017-06-21 DIAGNOSIS — M51.37 DEGENERATION OF LUMBAR OR LUMBOSACRAL INTERVERTEBRAL DISC: ICD-10-CM

## 2017-06-21 DIAGNOSIS — M51.26 DISPLACEMENT OF LUMBAR INTERVERTEBRAL DISC WITHOUT MYELOPATHY: ICD-10-CM

## 2017-06-21 RX ORDER — OXYCODONE HYDROCHLORIDE 10 MG/1
10 TABLET ORAL
Qty: 90 TAB | Refills: 0 | Status: SHIPPED | OUTPATIENT
Start: 2017-08-03 | End: 2017-08-15 | Stop reason: SDUPTHER

## 2017-06-21 RX ORDER — OXYCODONE HYDROCHLORIDE 10 MG/1
10 TABLET ORAL
Qty: 90 TAB | Refills: 0 | Status: SHIPPED | OUTPATIENT
Start: 2017-07-04 | End: 2017-08-15 | Stop reason: SDUPTHER

## 2017-06-21 RX ORDER — MORPHINE SULFATE 60 MG/1
60 TABLET, FILM COATED, EXTENDED RELEASE ORAL EVERY 12 HOURS
Qty: 60 TAB | Refills: 0 | Status: SHIPPED | OUTPATIENT
Start: 2017-07-04 | End: 2017-08-15 | Stop reason: SDUPTHER

## 2017-06-21 RX ORDER — MORPHINE SULFATE 60 MG/1
60 TABLET, FILM COATED, EXTENDED RELEASE ORAL EVERY 12 HOURS
Qty: 60 TAB | Refills: 0 | Status: SHIPPED | OUTPATIENT
Start: 2017-08-03 | End: 2017-08-15 | Stop reason: SDUPTHER

## 2017-06-21 RX ORDER — BACLOFEN 10 MG/1
TABLET ORAL
Qty: 90 TAB | Refills: 5 | Status: SHIPPED | OUTPATIENT
Start: 2017-08-14 | End: 2018-01-09 | Stop reason: SDUPTHER

## 2017-06-21 NOTE — PROGRESS NOTES
HISTORY OF PRESENT ILLNESS  Malachi Stone is a 60 y.o. female    HPI: Ms. Sonu Schaffer  returns today for f/u of chronic severe pain involving the lumbar spine, which has been attributed to a lumbar degenerative disc disease with spondylosis. In addition, she has neck pain attributed to a post cervical laminectomy syndrome. No h/o lumbar surgery. PT and injections in the past with no improvement. She reports good improvement since her last visit. She had a gallbladder removed on 2/27/2017. She was reporting continued right upper quadrant pain last visit but has improved significantly since that time. She is doing well with her current treatment plan which is offering good pain control for her low back pain. We will continue with her current treatment plan with no changes today. Current medication management consists of MS Contin 60 mg BID,  Oxycodone IR 10 mg BID PRN, and Baclofen 10 mg TID PRN. Lorazepam and Temazepam by her PCP. Medications are helping with pain control and quality of life. Her pain is 2-3/10 with medication and 10/10 without. Pt describes pain as aching. Aggravating factors include standing, sitting, walking. Relieved with rest, sitting, lying down, and avoiding painful activities. Current treatment is helping to improve general activity, mood, walking, sleep, enjoyment of life    She  is otherwise doing well with no other complaints today. She denies any adverse events including nausea, vomiting, dizziness, constipation, hallucinations, or seizures. Because the patient's current regimen places him/her at increased risk for possible overdose, a prescription for naloxone nasal spray has been provided.   The patient understands that this medication is only to be used in the setting of a possible overdose and that inadvertent use of this medication could precipitate overt withdrawal.         Allergies   Allergen Reactions    Aspirin Other (comments)     Stomach bleeding    Macrobid [Nitrofurantoin Monohyd/M-Cryst] Nausea and Vomiting    Nsaids (Non-Steroidal Anti-Inflammatory Drug) Nausea and Vomiting    Opana [Oxymorphone] Other (comments)     Insomnia, weight loss, nightmares    Pcn [Penicillins] Hives       Past Surgical History:   Procedure Laterality Date    COLONOSCOPY N/A 4/13/2017    COLON[facility]COPY performed by [facility]key Caldwell [facility] at [facility] ENDOSCOPY    HX CERVICAL FUSION      HX HYSTERECTOMY      HX ORTHOPAEDIC      ganglion cyst removed, right hand    HX OTHER SURGICAL      cyst left thigh removed    LAP,CHOLECYSTECTOMY N/A 02/27/2017    Dr. Edouard Feeling         Review of Systems   Constitutional: Negative for chills, fever and weight loss. HENT: Negative for congestion, ear discharge and sore throat. Eyes: Negative for blurred vision and double vision. Respiratory: Negative for cough, shortness of breath and wheezing. Cardiovascular: Negative for chest pain and palpitations. Gastrointestinal: Positive for abdominal pain ( Right upper quadrant pain). Negative for constipation, diarrhea, heartburn, nausea and vomiting. Genitourinary: Positive for dysuria, frequency, hematuria and urgency. Musculoskeletal: Positive for back pain and joint pain (left knee  ). Negative for falls and neck pain. Skin: Negative for itching and rash. Neurological: Negative for dizziness, seizures, loss of consciousness and headaches. Endo/Heme/Allergies: Does not bruise/bleed easily. Psychiatric/Behavioral: Positive for depression. Negative for suicidal ideas. The patient has insomnia. The patient is not nervous/anxious. Physical Exam   Constitutional: She is oriented to person, place, and time and well-developed, well-nourished, and in no distress. No distress. HENT:   Head: Normocephalic and atraumatic. Eyes: EOM are normal.   Neck: Normal range of motion. Pulmonary/Chest: Effort normal.   Musculoskeletal: Normal range of motion.     surgical scar on left knee from prior surgery. Neurological: She is alert and oriented to person, place, and time. Gait abnormal.   Skin: Skin is warm and dry. No rash noted. She is not diaphoretic. No erythema. Psychiatric: Memory and judgment normal.   Nursing note and vitals reviewed. ASSESSMENT:    1. Chronic bilateral low back pain without sciatica    2. Chronic pain syndrome    3. Degeneration of lumbar or lumbosacral intervertebral disc    4. Displacement of lumbar intervertebral disc without myelopathy    5. Pain in joint, multiple sites    6. Cervicalgia    7. Postlaminectomy syndrome, cervical region           [facility] Program was reviewed which does not demonstrate aberrancies and/or inconsistencies with regard to the historical prescribing of controlled medications to this patient by other providers. NOTE: Disclosed hydrocodone from ER visit filled 12/22/2016   Hydrocodone for postsurgical pain disclosed on 2/27/2017, IV morphine disclosed on 4/4/2017 tramadol was disclosed for gallbladder pain  by her PCP on 4/13/2017. This medication was then switched to Fioricet on 4/20/2017. PLAN / Pt Instructions:  1. Continue current plan. Stable on current medication without adverse events. 2. Refill morphine ER 60 mg 2 times daily. 3. Refill oxycodone 10 mg up to 3 times daily as needed for pain. 4. Refill baclofen 10 mg. Up to 3 times daily as needed. 5. Naloxone 4 mg nasal spray for opioid induced respiratory depression emergency only. 6. Continue home exercise program.  Exercise/stretching were demonstrated in office today. 7. Discussed risks of addiction, dependency, and opioid induced hyperalgesia. 8. Return to clinic in 2 months     Medications Ordered Today   Medications    morphine CR (MS CONTIN) 60 mg CR tablet     Sig: Take 1 Tab by mouth every twelve (12) hours. Max Daily Amount: 120 mg.      Dispense:  60 Tab     Refill:  0    morphine CR (MS CONTIN) 60 mg CR tablet     Sig: Take 1 Tab by mouth every twelve (12) hours for 30 days. Max Daily Amount: 120 mg. Dispense:  60 Tab     Refill:  0    oxyCODONE IR (ROXICODONE) 10 mg tab immediate release tablet     Sig: Take 1 Tab by mouth three (3) times daily as needed. Max Daily Amount: 30 mg. Dispense:  90 Tab     Refill:  0    oxyCODONE IR (ROXICODONE) 10 mg tab immediate release tablet     Sig: Take 1 Tab by mouth three (3) times daily as needed. Max Daily Amount: 30 mg. Dispense:  90 Tab     Refill:  0    baclofen (LIORESAL) 10 mg tablet     Sig: TAKE ONE TABLET BY MOUTH THREE TIMES A DAY     Dispense:  90 Tab     Refill:  5       Pain medications prescribed with the objective of pain relief and improved physical and psychosocial function in this patient. Spent 25 minutes with patient today reviewing the treatment plan, goals of treatment plan, and limitations of the treatment plan, to include the potential for side effects from medications and procedures. Jeremias Cunningham, [address] 6/21/2017      Note: Please excuse any typographical errors. Voice recognition software was used for this note and may cause mistakes.

## 2017-06-21 NOTE — MR AVS SNAPSHOT
Visit Information Date & Time Provider Department Dept. Phone Encounter #  
 6/21/2017 10:20 AM Merline Maas, PA [facility] [id number] [phone] Upcoming Health Maintenance Date Due DTaP/Tdap/Td series (1 - Tdap) 3/30/1975 INFLUENZA AGE 9 TO ADULT 8/1/2017 BREAST CANCER SCRN MAMMOGRAM 12/2/2017 PAP AKA CERVICAL CYTOLOGY 6/21/2019 COLONOSCOPY 4/13/2027 Allergies as of 6/21/2017  Review Complete On: 6/21/2017 By: Merline Maas, PA Severity Noted Reaction Type Reactions Aspirin    Other (comments) Stomach bleeding Macrobid [Nitrofurantoin Monohyd/m-cryst]  11/25/2015    Nausea and Vomiting Nsaids (Non-steroidal Anti-inflammatory Drug)    Nausea and Vomiting Opana [Oxymorphone]  11/29/2016    Other (comments) Insomnia, weight loss, nightmares Pcn [Penicillins]    Hives Current Immunizations  Never Reviewed Name Date Pneumococcal Polysaccharide (PPSV-23) 3/7/2014 Not reviewed this visit You Were Diagnosed With   
  
 Codes Comments Chronic bilateral low back pain without sciatica     ICD-10-CM: M54.5, G89.29 ICD-9-CM: 724.2, 338.29 Chronic pain syndrome     ICD-10-CM: G89.4 ICD-9-CM: 338.4 Degeneration of lumbar or lumbosacral intervertebral disc     ICD-10-CM: M51.37 
ICD-9-CM: 722.52 Displacement of lumbar intervertebral disc without myelopathy     ICD-10-CM: M51.26 
ICD-9-CM: 722.10 Pain in joint, multiple sites     ICD-10-CM: M25.50 ICD-9-CM: 719.49 Cervicalgia     ICD-10-CM: M54.2 ICD-9-CM: 723.1 Postlaminectomy syndrome, cervical region     ICD-10-CM: M96.1 ICD-9-CM: 722.81 Vitals BP Pulse Height(growth percentile) Weight(growth percentile) BMI OB Status 112/77 66 5' 4\" (1.626 m) 158 lb (71.7 kg) 27.12 kg/m2 Hysterectomy Smoking Status Former Smoker Vitals History BMI and BSA Data Body Mass Index Body Surface Area  
 27.12 kg/m 2 1.8 m 2 Preferred Pharmacy Pharmacy Name Phone [id number] - [address], [address] [address] [phone] Your Updated Medication List  
  
   
This list is accurate as of: 6/21/17 10:55 AM.  Always use your most recent med list.  
  
  
  
  
 albuterol 2.5 mg /3 mL (0.083 %) nebulizer solution Commonly known as:  PROVENTIL VENTOLIN  
2.5 mg.  
  
 atorvastatin 40 mg tablet Commonly known as:  LIPITOR  
TAKE ONE TABLET BY MOUTH ONE TIME DAILY  
  
 baclofen 10 mg tablet Commonly known as:  LIORESAL  
TAKE ONE TABLET BY MOUTH THREE TIMES A DAY Start taking on:  8/14/2017  
  
 butalbital-acetaminophen-caffeine -40 mg per tablet Commonly known as:  Eulis Susana Take 1 Tab by mouth every six (6) hours as needed for Pain. Max Daily Amount: 4 Tabs. DEXILANT 60 mg Cpdb Generic drug:  Dexlansoprazole 60 mg daily. dicyclomine 10 mg capsule Commonly known as:  BENTYL Take 1 Cap by mouth four (4) times daily. fluticasone 50 mcg/actuation nasal spray Commonly known as:  Morena Smack 2 Sprays by Both Nostrils route as needed. gabapentin 400 mg capsule Commonly known as:  NEURONTIN  
400 mg two (2) times a day. GEODON 80 mg capsule Generic drug:  ziprasidone Take 160 mg by mouth two (2) times daily (with meals). ipratropium 0.02 % nebulizer solution Commonly known as:  ATROVENT  
0.5 mg.  
  
 losartan 25 mg tablet Commonly known as:  COZAAR  
TAKE ONE TABLET BY MOUTH ONE TIME DAILY  
  
 magnesium citrate solution Take 1/3 of bottle every 8 hours until finished or until you have a bowel movement. megestrol 20 mg tablet Commonly known as:  MEGACE Take 1 Tab by mouth daily. * morphine CR 60 mg CR tablet Commonly known as:  MS CONTIN Take 1 Tab by mouth every twelve (12) hours. Max Daily Amount: 120 mg.  
Start taking on:  7/4/2017 * morphine CR 60 mg CR tablet Commonly known as:  MS CONTIN Take 1 Tab by mouth every twelve (12) hours for 30 days. Max Daily Amount: 120 mg.  
Start taking on:  8/3/2017  
  
 naloxone 4 mg/actuation Spry 4 mg by Nasal route as needed. For emergency use only  Indications: OPIATE-INDUCED RESPIRATORY DEPRESSION  
  
 * oxyCODONE IR 10 mg Tab immediate release tablet Commonly known as:  Fay Comforth Take 1 Tab by mouth three (3) times daily as needed. Max Daily Amount: 30 mg. Start taking on:  7/4/2017 * oxyCODONE IR 10 mg Tab immediate release tablet Commonly known as:  Fay Beath Take 1 Tab by mouth three (3) times daily as needed. Max Daily Amount: 30 mg. Start taking on:  8/3/2017 polyethylene glycol 17 gram packet Commonly known as:  Rodriguez Spotted TAKE 1 PACKET BY MOUTH DAILY. sertraline 100 mg tablet Commonly known as:  ZOLOFT Take  by mouth daily. sucralfate 1 gram tablet Commonly known as:  CARAFATE  
1 g.  
  
 temazepam 15 mg capsule Commonly known as:  RESTORIL Take 1 Cap by mouth nightly as needed for Sleep. Max Daily Amount: 15 mg. Indications: INSOMNIA  
  
 traMADol 50 mg tablet Commonly known as:  ULTRAM  
Take 1 Tab by mouth every six (6) hours as needed for Pain. Max Daily Amount: 200 mg.  
  
 traZODone 100 mg tablet Commonly known as:  Ignacio Jasson Take 100 mg by mouth nightly. VITAMIN D2 50,000 unit capsule Generic drug:  ergocalciferol TAKE 1 CAPSULE BY MOUTH EVERY 7 DAYS * Notice: This list has 4 medication(s) that are the same as other medications prescribed for you. Read the directions carefully, and ask your doctor or other care provider to review them with you. Prescriptions Printed Refills  
 morphine CR (MS CONTIN) 60 mg CR tablet 0 Starting on: 7/4/2017 Sig: Take 1 Tab by mouth every twelve (12) hours. Max Daily Amount: 120 mg.  
 Class: Print  Route: Oral  
 morphine CR (MS CONTIN) 60 mg CR tablet 0 Starting on: 8/3/2017 Sig: Take 1 Tab by mouth every twelve (12) hours for 30 days. Max Daily Amount: 120 mg.  
 Class: Print Route: Oral  
 oxyCODONE IR (ROXICODONE) 10 mg tab immediate release tablet 0 Starting on: 7/4/2017 Sig: Take 1 Tab by mouth three (3) times daily as needed. Max Daily Amount: 30 mg.  
 Class: Print Route: Oral  
 oxyCODONE IR (ROXICODONE) 10 mg tab immediate release tablet 0 Starting on: 8/3/2017 Sig: Take 1 Tab by mouth three (3) times daily as needed. Max Daily Amount: 30 mg.  
 Class: Print Route: Oral  
  
Prescriptions Sent to Pharmacy Refills  
 baclofen (LIORESAL) 10 mg tablet 5 Starting on: 8/14/2017 Sig: TAKE ONE TABLET BY MOUTH THREE TIMES A DAY Class: Normal  
 Pharmacy: [address], [address] [place] [address] #: [phone] Patient Instructions 1. Continue current plan. Stable on current medication without adverse events. 2. Refill morphine ER 60 mg 2 times daily. 3. Refill oxycodone 10 mg up to 3 times daily as needed for pain. 4. Refill baclofen 10 mg. Up to 3 times daily as needed. 5. Naloxone 4 mg nasal spray for opioid induced respiratory depression emergency only. 6. Continue home exercise program.  Exercise/stretching were demonstrated in office today. 7. Discussed risks of addiction, dependency, and opioid induced hyperalgesia. 8. Return to clinic in 2 months Please provide this summary of care documentation to your next provider. Your primary care clinician is listed as [id number] [facility]. If you have any questions after today's visit, please call [phone].

## 2017-06-21 NOTE — PROGRESS NOTES
Nursing Notes    Patient presents to the office today in follow-up. Patient rates her pain at 5/10 on the numerical pain scale. Reviewed medications with counts as follows:    Rx Date filled Qty Dispensed Pill Count Last Dose Short   roxicodone 10mg 06/05/17 90 45 today no   MS Contin ER 60mg 06/05/17 60 29 today no                                  Comments:     POC UDS was not performed in office today    Any new labs or imaging since last appointment? NO    Have you been to an emergency room (ER) or urgent care clinic since your last visit? [address] for ear             Have you been hospitalized since your last visit? NO     If yes, where, when, and reason for visit? Have you seen or consulted any other health care providers outside of the [facility]  since your last visit? YES     If yes, where, when, and reason for visit? [facility] deferred to pcp.

## 2017-06-21 NOTE — PATIENT INSTRUCTIONS
1. Continue current plan. Stable on current medication without adverse events. 2. Refill morphine ER 60 mg 2 times daily. 3. Refill oxycodone 10 mg up to 3 times daily as needed for pain. 4. Refill baclofen 10 mg. Up to 3 times daily as needed. 5. Naloxone 4 mg nasal spray for opioid induced respiratory depression emergency only. 6. Continue home exercise program.  Exercise/stretching were demonstrated in office today. 7. Discussed risks of addiction, dependency, and opioid induced hyperalgesia.    8. Return to clinic in 2 months

## 2017-06-23 ENCOUNTER — OFFICE VISIT (OUTPATIENT)
Dept: FAMILY MEDICINE CLINIC | Age: 62
End: 2017-06-23

## 2017-06-23 ENCOUNTER — TELEPHONE (OUTPATIENT)
Dept: FAMILY MEDICINE CLINIC | Age: 62
End: 2017-06-23

## 2017-06-23 VITALS
DIASTOLIC BLOOD PRESSURE: 86 MMHG | HEART RATE: 73 BPM | TEMPERATURE: 98 F | OXYGEN SATURATION: 96 % | WEIGHT: 158 LBS | RESPIRATION RATE: 16 BRPM | BODY MASS INDEX: 26.98 KG/M2 | HEIGHT: 64 IN | SYSTOLIC BLOOD PRESSURE: 142 MMHG

## 2017-06-23 DIAGNOSIS — R10.11 RIGHT UPPER QUADRANT ABDOMINAL PAIN: ICD-10-CM

## 2017-06-23 DIAGNOSIS — B00.9 HERPES: Primary | ICD-10-CM

## 2017-06-23 DIAGNOSIS — R51.9 HEADACHE, UNSPECIFIED HEADACHE TYPE: ICD-10-CM

## 2017-06-23 DIAGNOSIS — H60.501 ACUTE OTITIS EXTERNA OF RIGHT EAR, UNSPECIFIED TYPE: Primary | ICD-10-CM

## 2017-06-23 RX ORDER — VALACYCLOVIR HYDROCHLORIDE 500 MG/1
500 TABLET, FILM COATED ORAL 2 TIMES DAILY
Qty: 20 TAB | Refills: 1 | Status: SHIPPED | OUTPATIENT
Start: 2017-06-23 | End: 2017-07-03

## 2017-06-23 RX ORDER — BUTALBITAL, ACETAMINOPHEN AND CAFFEINE 50; 325; 40 MG/1; MG/1; MG/1
1 TABLET ORAL
Qty: 24 TAB | Refills: 0 | Status: SHIPPED | OUTPATIENT
Start: 2017-06-23 | End: 2017-10-18

## 2017-06-23 RX ORDER — SULFAMETHOXAZOLE AND TRIMETHOPRIM 800; 160 MG/1; MG/1
1 TABLET ORAL 2 TIMES DAILY
Qty: 20 TAB | Refills: 0 | Status: SHIPPED | OUTPATIENT
Start: 2017-06-23 | End: 2017-07-03

## 2017-06-23 RX ORDER — DOXYCYCLINE 100 MG/1
100 TABLET ORAL 2 TIMES DAILY
Qty: 20 TAB | Refills: 0 | Status: SHIPPED | OUTPATIENT
Start: 2017-06-23 | End: 2017-07-03

## 2017-06-23 NOTE — PATIENT INSTRUCTIONS

## 2017-06-23 NOTE — PROGRESS NOTES
Alysia Barreto is a 60 y.o. female  presents for congestion and sinus drainage. She needs refills. Allergies   Allergen Reactions    Aspirin Other (comments)     Stomach bleeding    Macrobid [Nitrofurantoin Monohyd/M-Cryst] Nausea and Vomiting    Nsaids (Non-Steroidal Anti-Inflammatory Drug) Nausea and Vomiting    Opana [Oxymorphone] Other (comments)     Insomnia, weight loss, nightmares    Pcn [Penicillins] Hives     Outpatient Prescriptions Marked as Taking for the 6/23/17 encounter (Office Visit) with Pranav Hidalgo MD   Medication Sig Dispense Refill    valACYclovir (VALTREX) 500 mg tablet Take 1 Tab by mouth two (2) times a day for 10 days. 20 Tab 1    butalbital-acetaminophen-caffeine (FIORICET, ESGIC) -40 mg per tablet Take 1 Tab by mouth every six (6) hours as needed for Pain. Max Daily Amount: 4 Tabs. 24 Tab 0    doxycycline (ADOXA) 100 mg tablet Take 1 Tab by mouth two (2) times a day for 10 days. 20 Tab 0    [START ON 7/4/2017] morphine CR (MS CONTIN) 60 mg CR tablet Take 1 Tab by mouth every twelve (12) hours. Max Daily Amount: 120 mg. 60 Tab 0    [START ON 8/3/2017] morphine CR (MS CONTIN) 60 mg CR tablet Take 1 Tab by mouth every twelve (12) hours for 30 days. Max Daily Amount: 120 mg. 60 Tab 0    [START ON 7/4/2017] oxyCODONE IR (ROXICODONE) 10 mg tab immediate release tablet Take 1 Tab by mouth three (3) times daily as needed. Max Daily Amount: 30 mg. 90 Tab 0    [START ON 8/3/2017] oxyCODONE IR (ROXICODONE) 10 mg tab immediate release tablet Take 1 Tab by mouth three (3) times daily as needed. Max Daily Amount: 30 mg. 90 Tab 0    [START ON 8/14/2017] baclofen (LIORESAL) 10 mg tablet TAKE ONE TABLET BY MOUTH THREE TIMES A DAY 90 Tab 5    VITAMIN D2 50,000 unit capsule TAKE 1 CAPSULE BY MOUTH EVERY 7 DAYS 4 Cap 0    losartan (COZAAR) 25 mg tablet TAKE ONE TABLET BY MOUTH ONE TIME DAILY  30 Tab 0    megestrol (MEGACE) 20 mg tablet Take 1 Tab by mouth daily.  30 Tab 0    atorvastatin (LIPITOR) 40 mg tablet TAKE ONE TABLET BY MOUTH ONE TIME DAILY  30 Tab 4    naloxone 4 mg/actuation spry 4 mg by Nasal route as needed. For emergency use only  Indications: OPIATE-INDUCED RESPIRATORY DEPRESSION 1 Package 0    dicyclomine (BENTYL) 10 mg capsule Take 1 Cap by mouth four (4) times daily. 40 Cap 1    traMADol (ULTRAM) 50 mg tablet Take 1 Tab by mouth every six (6) hours as needed for Pain. Max Daily Amount: 200 mg. 40 Tab 0    polyethylene glycol (MIRALAX) 17 gram packet TAKE 1 PACKET BY MOUTH DAILY. 30 Packet 2    magnesium citrate solution Take 1/3 of bottle every 8 hours until finished or until you have a bowel movement. 1 Bottle 0    temazepam (RESTORIL) 15 mg capsule Take 1 Cap by mouth nightly as needed for Sleep. Max Daily Amount: 15 mg. Indications: INSOMNIA 30 Cap 1    fluticasone (FLONASE) 50 mcg/actuation nasal spray 2 Sprays by Both Nostrils route as needed. 1 Bottle 3    sucralfate (CARAFATE) 1 gram tablet 1 g.      albuterol (PROVENTIL VENTOLIN) 2.5 mg /3 mL (0.083 %) nebulizer solution 2.5 mg.      Dexlansoprazole (DEXILANT) 60 mg CpDB 60 mg daily.  gabapentin (NEURONTIN) 400 mg capsule 400 mg two (2) times a day.  ipratropium (ATROVENT) 0.02 % nebulizer solution 0.5 mg.      trazodone (DESYREL) 100 mg tablet Take 100 mg by mouth nightly.  ziprasidone hcl (GEODON) 80 mg capsule Take 160 mg by mouth two (2) times daily (with meals).  sertraline (ZOLOFT) 100 mg tablet Take  by mouth daily.        Patient Active Problem List   Diagnosis Code    Myalgia and myositis [id number]    Lumbago M54.5    Other affections of shoulder region, not elsewhere classified M75.80    Other chronic pain G89.29    [facility] F31.9    Chronic pain syndrome G89.4    Carpal tunnel syndrome G56.00    Degeneration of lumbar or lumbosacral intervertebral disc M51.37    Displacement of lumbar intervertebral disc without myelopathy M51.26    Pain in joint, multiple sites M25.50    Cervicalgia M54.2    Postlaminectomy syndrome, cervical region M96.1    Annular tear of lumbar disc M51.36    Lung nodule seen on imaging study R91.1    ACP (advance care planning) Z71.89    Hypercholesterolemia E78.00    Nausea R11.0     Past Medical History:   Diagnosis Date    Annular tear of lumbar disc 11/10/2014    Asthma     Bronchitis    Benign tumor of throat     Bone spur     right ankle    Cervicalgia 11/10/2014    Chronic pain     back    Degeneration of lumbar or lumbosacral intervertebral disc 11/10/2014    Degenerative disc disease     Depression     Displacement of lumbar intervertebral disc without myelopathy 11/10/2014    Elevated white blood cell count     Fibromyalgia     GERD (gastroesophageal reflux disease)     Hypertension     Insomnia     Nausea & vomiting     Pain in joint, multiple sites 11/10/2014    Postlaminectomy syndrome, cervical region 11/10/2014    Sinus infection 10/5/15    Ulcer ([address]      Social History     Social History    Marital status:      Spouse name: N/A    Number of children: N/A    Years of education: N/A     Social History Main Topics    Smoking status: Former Smoker    Smokeless tobacco: Never Used    Alcohol use No      Comment: none in 24 years    Drug use: No      Comment: last smoked in 1993    Sexual activity: Not on file     Other Topics Concern    Not on file     Social History Narrative     Family History   Problem Relation Age of Onset    Hypertension Other     Heart Attack Other     Heart Disease Other     Stroke Other     Headache Other     Seizures Other         Review of Systems   Constitutional: Negative for chills and fever. Respiratory: Positive for cough and shortness of breath. Cardiovascular: Negative for chest pain. Gastrointestinal: Negative for nausea and vomiting. Neurological: Positive for headaches. Negative for dizziness, tingling, tremors and sensory change. Vitals:    06/23/17 1537   BP: 142/86   Pulse: 73   Resp: 16   Temp: 98 °F (36.7 °C)   TempSrc: Temporal   SpO2: 96%   Weight: 158 lb (71.7 kg)   Height: 5' 4\" (1.626 m)   PainSc:   6   PainLoc: Head       Physical Exam   Constitutional: She is oriented to person, place, and time and well-developed, well-nourished, and in no distress. HENT:   Right Ear: External ear normal.   Left Ear: External ear normal.   Nose: Nose normal.   Mouth/Throat: Oropharynx is clear and moist.   Eyes: Conjunctivae are normal. Pupils are equal, round, and reactive to light. Neck: Normal range of motion. Neck supple. Cardiovascular: Normal rate, regular rhythm and normal heart sounds. Neurological: She is alert and oriented to person, place, and time. Skin: Skin is warm and dry. Nursing note and vitals reviewed. Assessment/Plan      ICD-10-CM ICD-9-CM    1. Acute otitis externa of right ear, unspecified type H60.501 380.10 doxycycline (ADOXA) 100 mg tablet   2. Headache, unspecified headache type R51 784.0    3. Right upper quadrant abdominal pain R10.11 789.01 butalbital-acetaminophen-caffeine (FIORICET, ESGIC) -40 mg per tablet     I have discussed the diagnosis with the patient and the intended plan of care as seen in the above orders. The patient has received an after-visit summary and questions were answered concerning future plans. I have discussed medication, side effects, and warnings with the patient in detail. The patient verbalized understanding and is in agreement with the plan of care. The patient will contact the office with any additional concerns.       Follow-up Disposition:  Return if symptoms worsen or fail to improve.  lab results and schedule of future lab studies reviewed with patient    Dimitris Hoyt MD

## 2017-06-23 NOTE — PROGRESS NOTES
Chief Complaint   Patient presents with    Headache    Ear Pain    Mouth Lesions     1. Have you been to the ER, urgent care clinic since your last visit? Hospitalized since your last visit? No    2. Have you seen or consulted any other health care providers outside of the [address] [place] [address] since your last visit? Include any pap smears or colon screening.  No

## 2017-06-23 NOTE — TELEPHONE ENCOUNTER
Patient states she can't take the Doxycycline, causes her heart to race and wants to know if something different can be called in. Call her at [phone] if there are any questions.

## 2017-06-27 DIAGNOSIS — R82.90 BAD ODOR OF URINE: ICD-10-CM

## 2017-06-27 RX ORDER — LOSARTAN POTASSIUM 25 MG/1
TABLET ORAL
Qty: 30 TAB | Refills: 0 | Status: SHIPPED | OUTPATIENT
Start: 2017-06-27 | End: 2017-07-25 | Stop reason: SDUPTHER

## 2017-06-28 RX ORDER — POLYETHYLENE GLYCOL 3350 17 G/17G
POWDER, FOR SOLUTION ORAL
Qty: 30 EACH | Refills: 1 | Status: SHIPPED | OUTPATIENT
Start: 2017-06-28 | End: 2017-08-22 | Stop reason: SDUPTHER

## 2017-07-05 RX ORDER — ERGOCALCIFEROL 1.25 MG/1
CAPSULE ORAL
Qty: 4 CAP | Refills: 0 | Status: SHIPPED | OUTPATIENT
Start: 2017-07-05 | End: 2017-10-11

## 2017-07-06 RX ORDER — ERGOCALCIFEROL 1.25 MG/1
CAPSULE ORAL
Qty: 4 CAP | Refills: 0 | Status: SHIPPED | OUTPATIENT
Start: 2017-07-06 | End: 2017-09-05 | Stop reason: SDUPTHER

## 2017-07-25 DIAGNOSIS — R82.90 BAD ODOR OF URINE: ICD-10-CM

## 2017-07-26 RX ORDER — LOSARTAN POTASSIUM 25 MG/1
TABLET ORAL
Qty: 30 TAB | Refills: 0 | Status: SHIPPED | OUTPATIENT
Start: 2017-07-26 | End: 2017-08-22 | Stop reason: SDUPTHER

## 2017-08-06 PROBLEM — M79.10 MYALGIA: Status: ACTIVE | Noted: 2017-08-06

## 2017-08-15 ENCOUNTER — OFFICE VISIT (OUTPATIENT)
Dept: PAIN MANAGEMENT | Age: 62
End: 2017-08-15

## 2017-08-15 ENCOUNTER — DOCUMENTATION ONLY (OUTPATIENT)
Dept: PAIN MANAGEMENT | Age: 62
End: 2017-08-15

## 2017-08-15 VITALS
TEMPERATURE: 98.3 F | DIASTOLIC BLOOD PRESSURE: 78 MMHG | SYSTOLIC BLOOD PRESSURE: 122 MMHG | RESPIRATION RATE: 20 BRPM | HEART RATE: 52 BPM | WEIGHT: 158 LBS | BODY MASS INDEX: 27.12 KG/M2

## 2017-08-15 DIAGNOSIS — G89.4 CHRONIC PAIN SYNDROME: ICD-10-CM

## 2017-08-15 DIAGNOSIS — G89.29 CHRONIC BILATERAL LOW BACK PAIN WITHOUT SCIATICA: ICD-10-CM

## 2017-08-15 DIAGNOSIS — M54.50 CHRONIC BILATERAL LOW BACK PAIN WITHOUT SCIATICA: ICD-10-CM

## 2017-08-15 DIAGNOSIS — M96.1 POSTLAMINECTOMY SYNDROME, CERVICAL REGION: ICD-10-CM

## 2017-08-15 DIAGNOSIS — M25.50 PAIN IN JOINT, MULTIPLE SITES: ICD-10-CM

## 2017-08-15 DIAGNOSIS — M54.2 CERVICALGIA: ICD-10-CM

## 2017-08-15 DIAGNOSIS — Z79.899 ENCOUNTER FOR LONG-TERM (CURRENT) USE OF HIGH-RISK MEDICATION: Primary | ICD-10-CM

## 2017-08-15 LAB
ALCOHOL UR POC: NORMAL
AMPHETAMINES UR POC: NEGATIVE
BARBITURATES UR POC: NEGATIVE
BENZODIAZEPINES UR POC: NORMAL
BUPRENORPHINE UR POC: NORMAL
CANNABINOIDS UR POC: NEGATIVE
CARISOPRODOL UR POC: NORMAL
COCAINE UR POC: NEGATIVE
FENTANYL UR POC: NORMAL
MDMA/ECSTASY UR POC: NEGATIVE
METHADONE UR POC: NEGATIVE
METHAMPHETAMINE UR POC: NEGATIVE
METHYLPHENIDATE UR POC: NEGATIVE
OPIATES UR POC: NORMAL
OXYCODONE UR POC: NORMAL
PHENCYCLIDINE UR POC: NORMAL
PROPOXYPHENE UR POC: NORMAL
TRAMADOL UR POC: NORMAL
TRICYCLICS UR POC: NEGATIVE

## 2017-08-15 RX ORDER — MORPHINE SULFATE 60 MG/1
60 TABLET, FILM COATED, EXTENDED RELEASE ORAL EVERY 12 HOURS
Qty: 60 TAB | Refills: 0 | Status: SHIPPED | OUTPATIENT
Start: 2017-10-01 | End: 2017-10-11 | Stop reason: SDUPTHER

## 2017-08-15 RX ORDER — OXYCODONE HYDROCHLORIDE 10 MG/1
10 TABLET ORAL
Qty: 90 TAB | Refills: 0 | Status: SHIPPED | OUTPATIENT
Start: 2017-09-02 | End: 2017-10-11 | Stop reason: SDUPTHER

## 2017-08-15 RX ORDER — OXYCODONE HYDROCHLORIDE 10 MG/1
10 TABLET ORAL
Qty: 90 TAB | Refills: 0 | Status: SHIPPED | OUTPATIENT
Start: 2017-10-01 | End: 2017-10-11

## 2017-08-15 RX ORDER — MORPHINE SULFATE 60 MG/1
60 TABLET, FILM COATED, EXTENDED RELEASE ORAL EVERY 12 HOURS
Qty: 60 TAB | Refills: 0 | Status: SHIPPED | OUTPATIENT
Start: 2017-09-02 | End: 2017-10-11 | Stop reason: SDUPTHER

## 2017-08-15 NOTE — MR AVS SNAPSHOT
Visit Information Date & Time Provider Department Dept. Phone Encounter #  
 8/15/2017 10:40 AM Georgi Marin, [facility] [phone] Follow-up Instructions Return in about 2 months (around 10/15/2017). Upcoming Health Maintenance Date Due DTaP/Tdap/Td series (1 - Tdap) 3/30/1975 INFLUENZA AGE 9 TO ADULT 8/1/2017 BREAST CANCER SCRN MAMMOGRAM 12/2/2017 PAP AKA CERVICAL CYTOLOGY 6/21/2019 COLONOSCOPY 4/13/2027 Allergies as of 8/15/2017  Review Complete On: 8/15/2017 By: BETO Aponte Severity Noted Reaction Type Reactions Aspirin    Other (comments) Stomach bleeding Macrobid [Nitrofurantoin Monohyd/m-cryst]  11/25/2015    Nausea and Vomiting Nsaids (Non-steroidal Anti-inflammatory Drug)    Nausea and Vomiting Opana [Oxymorphone]  11/29/2016    Other (comments) Insomnia, weight loss, nightmares Pcn [Penicillins]    Hives Current Immunizations  Never Reviewed Name Date Pneumococcal Polysaccharide (PPSV-23) 3/7/2014 Not reviewed this visit You Were Diagnosed With   
  
 Codes Comments Encounter for long-term (current) use of high-risk medication    -  Primary ICD-10-CM: Z47.955 ICD-9-CM: V58.69 Chronic pain syndrome     ICD-10-CM: G89.4 ICD-9-CM: 338.4 Cervicalgia     ICD-10-CM: M54.2 ICD-9-CM: 723.1 Chronic bilateral low back pain without sciatica     ICD-10-CM: M54.5, G89.29 ICD-9-CM: 724.2, 338.29 Pain in joint, multiple sites     ICD-10-CM: M25.50 ICD-9-CM: 719.49 Postlaminectomy syndrome, cervical region     ICD-10-CM: M96.1 ICD-9-CM: 722.81 Vitals BP Pulse Temp Resp Weight(growth percentile) BMI  
 122/78 (BP 1 Location: Left arm, BP Patient Position: Sitting) (!) 52 98.3 °F (36.8 °C) (Oral) 20 158 lb (71.7 kg) 27.12 kg/m2 OB Status Smoking Status Hysterectomy Former Smoker Vitals History BMI and BSA Data Body Mass Index Body Surface Area  
 27.12 kg/m 2 1.8 m 2 Preferred Pharmacy Pharmacy Name Phone [id number] - [place] [address], [address] [address] [phone] Your Updated Medication List  
  
   
This list is accurate as of: 8/15/17 11:41 AM.  Always use your most recent med list.  
  
  
  
  
 albuterol 2.5 mg /3 mL (0.083 %) nebulizer solution Commonly known as:  PROVENTIL VENTOLIN  
2.5 mg.  
  
 atorvastatin 40 mg tablet Commonly known as:  LIPITOR  
TAKE ONE TABLET BY MOUTH ONE TIME DAILY  
  
 baclofen 10 mg tablet Commonly known as:  LIORESAL  
TAKE ONE TABLET BY MOUTH THREE TIMES A DAY  
  
 butalbital-acetaminophen-caffeine -40 mg per tablet Commonly known as:  Vernette Agent Take 1 Tab by mouth every six (6) hours as needed for Pain. Max Daily Amount: 4 Tabs. DEXILANT 60 mg Cpdb Generic drug:  Dexlansoprazole 60 mg daily. dicyclomine 10 mg capsule Commonly known as:  BENTYL Take 1 Cap by mouth four (4) times daily. fluticasone 50 mcg/actuation nasal spray Commonly known as:  [employer] 2 Sprays by Both Nostrils route as needed. gabapentin 400 mg capsule Commonly known as:  NEURONTIN  
400 mg two (2) times a day. GEODON 80 mg capsule Generic drug:  ziprasidone Take 160 mg by mouth two (2) times daily (with meals). ipratropium 0.02 % nebulizer solution Commonly known as:  ATROVENT  
0.5 mg.  
  
 losartan 25 mg tablet Commonly known as:  COZAAR  
TAKE ONE TABLET BY MOUTH ONE TIME DAILY  
  
 magnesium citrate solution Take 1/3 of bottle every 8 hours until finished or until you have a bowel movement. megestrol 20 mg tablet Commonly known as:  MEGACE Take 1 Tab by mouth daily. * morphine CR 60 mg CR tablet Commonly known as:  MS CONTIN Take 1 Tab by mouth every twelve (12) hours for 30 days. Max Daily Amount: 120 mg.  
Start taking on:  9/2/2017 * morphine CR 60 mg CR tablet Commonly known as:  MS CONTIN Take 1 Tab by mouth every twelve (12) hours. Max Daily Amount: 120 mg.  
Start taking on:  10/1/2017  
  
 naloxone 4 mg/actuation Spry 4 mg by Nasal route as needed. For emergency use only  Indications: OPIATE-INDUCED RESPIRATORY DEPRESSION  
  
 * oxyCODONE IR 10 mg Tab immediate release tablet Commonly known as:  Wyvonnia Fletcher Take 1 Tab by mouth three (3) times daily as needed. Max Daily Amount: 30 mg. Start taking on:  9/2/2017 * oxyCODONE IR 10 mg Tab immediate release tablet Commonly known as:  Wyvonnia Fletcher Take 1 Tab by mouth three (3) times daily as needed. Max Daily Amount: 30 mg. Start taking on:  10/1/2017 polyethylene glycol 17 gram packet Commonly known as:  Dennis Malin DISSOLVE ONE PACKET IN BEVERAGE OF CHOICE AND DRINK BY MOUTH DAILY  
  
 sertraline 100 mg tablet Commonly known as:  ZOLOFT Take  by mouth daily. sucralfate 1 gram tablet Commonly known as:  CARAFATE  
1 g.  
  
 temazepam 15 mg capsule Commonly known as:  RESTORIL Take 1 Cap by mouth nightly as needed for Sleep. Max Daily Amount: 15 mg. Indications: INSOMNIA  
  
 traMADol 50 mg tablet Commonly known as:  ULTRAM  
Take 1 Tab by mouth every six (6) hours as needed for Pain. Max Daily Amount: 200 mg.  
  
 traZODone 100 mg tablet Commonly known as:  Ben Moron Take 100 mg by mouth nightly. * VITAMIN D2 50,000 unit capsule Generic drug:  ergocalciferol TAKE ONE CAPSULE BY MOUTH EVERY 7 DAYS  
  
 * VITAMIN D2 50,000 unit capsule Generic drug:  ergocalciferol TAKE ONE CAPSULE BY MOUTH EVERY 7 DAYS * Notice: This list has 6 medication(s) that are the same as other medications prescribed for you. Read the directions carefully, and ask your doctor or other care provider to review them with you. Prescriptions Printed Refills  
 morphine CR (MS CONTIN) 60 mg CR tablet 0 Starting on: 9/2/2017 Sig: Take 1 Tab by mouth every twelve (12) hours for 30 days. Max Daily Amount: 120 mg.  
 Class: Print Route: Oral  
 morphine CR (MS CONTIN) 60 mg CR tablet 0 Starting on: 10/1/2017 Sig: Take 1 Tab by mouth every twelve (12) hours. Max Daily Amount: 120 mg.  
 Class: Print Route: Oral  
 oxyCODONE IR (ROXICODONE) 10 mg tab immediate release tablet 0 Starting on: 9/2/2017 Sig: Take 1 Tab by mouth three (3) times daily as needed. Max Daily Amount: 30 mg.  
 Class: Print Route: Oral  
 oxyCODONE IR (ROXICODONE) 10 mg tab immediate release tablet 0 Starting on: 10/1/2017 Sig: Take 1 Tab by mouth three (3) times daily as needed. Max Daily Amount: 30 mg.  
 Class: Print Route: Oral  
  
We Performed the Following AMB POC DRUG SCREEN () [ [facility]] DRUG SCREEN [[id number] Custom] Follow-up Instructions Return in about 2 months (around 10/15/2017). To-Do List   
 08/16/2017 3:00 PM  
  Appointment with Renetta Saha PT at [facility]  [address] ([phone]) Patient Instructions 1. Continue current plan. Stable on current medication without adverse events. 2. Refill morphine ER 60 mg 2 times daily. 3. Refill oxycodone 10 mg up to 3 times daily as needed for pain. 4. Continue baclofen 10 mg. Up to 3 times daily as needed. 5. Naloxone 4 mg nasal spray for opioid induced respiratory depression emergency only. 6. Continue home exercise program.  Exercise/stretching were demonstrated in office today. 7. Discussed risks of addiction, dependency, and opioid induced hyperalgesia. 8. Return to clinic in 2 months Please provide this summary of care documentation to your next provider. Your primary care clinician is listed as [address]. If you have any questions after today's visit, please call [phone].

## 2017-08-15 NOTE — PATIENT INSTRUCTIONS
1. Continue current plan. Stable on current medication without adverse events. 2. Refill morphine ER 60 mg 2 times daily. 3. Refill oxycodone 10 mg up to 3 times daily as needed for pain. 4. Continue baclofen 10 mg. Up to 3 times daily as needed. 5. Naloxone 4 mg nasal spray for opioid induced respiratory depression emergency only. 6. Continue home exercise program.  Exercise/stretching were demonstrated in office today. 7. Discussed risks of addiction, dependency, and opioid induced hyperalgesia.    8. Return to clinic in 2 months

## 2017-08-15 NOTE — PROGRESS NOTES
HISTORY OF PRESENT ILLNESS  Pricilla Ayala is a 60 y.o. female    HPI: Ms. Carol Mejia  returns today for f/u of chronic severe pain involving the lumbar spine, which has been attributed to a lumbar degenerative disc disease with spondylosis. In addition, she has neck pain attributed to a post cervical laminectomy syndrome. No h/o lumbar surgery. PT and injections in the past with no improvement. She continues with back pain unchanged since last visit. She does report that she was in the emergency room yesterday for abdominal pain. She says she was diagnosed with a gastric ulcer. She was  treated and released and doing better currently. She does report that she was given a prescription of oxycodone 5/325 mg that was filled yesterday. She has the medication with her today to disclose the medication. She is otherwise doing well with no other complaints today. We will continue with her current treatment plan and have her follow-up in 2 months for further evaluation. Current medication management consists of MS Contin 60 mg BID,  Oxycodone IR 10 mg BID PRN, and Baclofen 10 mg TID PRN. Lorazepam and Temazepam by her PCP. Medications are helping with pain control and quality of life. Her pain is 2-3/10 with medication and 10/10 without. Pt describes pain as aching. Aggravating factors include standing, sitting, walking. Relieved with rest, sitting, lying down, and avoiding painful activities. Current treatment is helping to improve general activity, mood, walking, sleep, enjoyment of life    She  is otherwise doing well with no other complaints today. She denies any adverse events including nausea, vomiting, dizziness, constipation, hallucinations, or seizures. Because the patient's current regimen places him/her at increased risk for possible overdose, a prescription for naloxone nasal spray has been provided.   The patient understands that this medication is only to be used in the setting of a possible overdose and that inadvertent use of this medication could precipitate overt withdrawal.         Allergies   Allergen Reactions    Aspirin Other (comments)     Stomach bleeding    Macrobid [Nitrofurantoin Monohyd/M-Cryst] Nausea and Vomiting    Nsaids (Non-Steroidal Anti-Inflammatory Drug) Nausea and Vomiting    Opana [Oxymorphone] Other (comments)     Insomnia, weight loss, nightmares    Pcn [Penicillins] Hives       Past Surgical History:   Procedure Laterality Date    COLONOSCOPY N/A 4/13/2017    COLONOSCOPY performed by Marco Antonio Ruby MD at [facility] ENDOSCOPY    HX CERVICAL FUSION      HX HYSTERECTOMY      HX ORTHOPAEDIC      ganglion cyst removed, right hand    HX OTHER SURGICAL      cyst left thigh removed    LAP,CHOLECYSTECTOMY N/A 02/27/2017    Dr. [facility]         Review of Systems   Constitutional: Negative for chills, fever and weight loss. HENT: Negative for congestion, ear discharge and sore throat. Eyes: Negative for blurred vision and double vision. Respiratory: Negative for cough, shortness of breath and wheezing. Cardiovascular: Negative for chest pain and palpitations. Gastrointestinal: Positive for abdominal pain ( Right upper quadrant pain). Negative for constipation, diarrhea, heartburn, nausea and vomiting. Genitourinary: Positive for dysuria, frequency, hematuria and urgency. Musculoskeletal: Positive for back pain and joint pain (left knee  ). Negative for falls and neck pain. Skin: Negative for itching and rash. Neurological: Negative for dizziness, seizures, loss of consciousness and headaches. Endo/Heme/Allergies: Does not bruise/bleed easily. Psychiatric/Behavioral: Positive for depression. Negative for suicidal ideas. The patient has insomnia. The patient is not nervous/anxious. Physical Exam   Constitutional: She is oriented to person, place, and time and well-developed, well-nourished, and in no distress. No distress.    HENT:   Head: Normocephalic and atraumatic. Eyes: EOM are normal.   Neck: Normal range of motion. Pulmonary/Chest: Effort normal.   Musculoskeletal: Normal range of motion. surgical scar on left knee from prior surgery. Neurological: She is alert and oriented to person, place, and time. Gait abnormal.   Skin: Skin is warm and dry. No rash noted. She is not diaphoretic. No erythema. Psychiatric: Memory and judgment normal.   Nursing note and vitals reviewed. ASSESSMENT:    1. Encounter for long-term (current) use of high-risk medication    2. Chronic pain syndrome    3. Cervicalgia    4. Chronic bilateral low back pain without sciatica    5. Pain in joint, multiple sites    6. Postlaminectomy syndrome, cervical region           [facility] Program was reviewed which does not demonstrate aberrancies and/or inconsistencies with regard to the historical prescribing of controlled medications to this patient by other providers. NOTE: Disclosed hydrocodone from ER visit filled 12/22/2016   Hydrocodone for postsurgical pain disclosed on 2/27/2017, IV morphine disclosed on 4/4/2017 tramadol was disclosed for gallbladder pain  by her PCP on 4/13/2017. This medication was then switched to Fioricet on 4/20/2017. Disclosed oxycodone 5/325 mg received from the emergency room filled on 8/14/2017. PLAN / Pt Instructions:  1. Continue current plan. Stable on current medication without adverse events. 2. Refill morphine ER 60 mg 2 times daily. 3. Refill oxycodone 10 mg up to 3 times daily as needed for pain. 4. Continue baclofen 10 mg. Up to 3 times daily as needed. 5. Naloxone 4 mg nasal spray for opioid induced respiratory depression emergency only. 6. Continue home exercise program.  Exercise/stretching were demonstrated in office today. 7. Discussed risks of addiction, dependency, and opioid induced hyperalgesia.    8. Return to clinic in 2 months     Medications Ordered Today Medications    morphine CR (MS CONTIN) 60 mg CR tablet     Sig: Take 1 Tab by mouth every twelve (12) hours for 30 days. Max Daily Amount: 120 mg. Dispense:  60 Tab     Refill:  0    morphine CR (MS CONTIN) 60 mg CR tablet     Sig: Take 1 Tab by mouth every twelve (12) hours. Max Daily Amount: 120 mg. Dispense:  60 Tab     Refill:  0    oxyCODONE IR (ROXICODONE) 10 mg tab immediate release tablet     Sig: Take 1 Tab by mouth three (3) times daily as needed. Max Daily Amount: 30 mg. Dispense:  90 Tab     Refill:  0    oxyCODONE IR (ROXICODONE) 10 mg tab immediate release tablet     Sig: Take 1 Tab by mouth three (3) times daily as needed. Max Daily Amount: 30 mg. Dispense:  90 Tab     Refill:  0       Pain medications prescribed with the objective of pain relief and improved physical and psychosocial function in this patient. Spent 25 minutes with patient today reviewing the treatment plan, goals of treatment plan, and limitations of the treatment plan, to include the potential for side effects from medications and procedures. Eboni Diaz 8/15/2017      Note: Please excuse any typographical errors. Voice recognition software was used for this note and may cause mistakes.

## 2017-08-15 NOTE — PROGRESS NOTES
Nursing Notes    Patient presents to the office today in follow-up. Patient rates her pain at 9/10 on the numerical pain scale. Reviewed medications with counts as follows:    Rx Date filled Qty Dispensed Pill Count Last Dose Short     Oxycodone 10mg  08/03/17 90 58 This am  No    Morphine sulfate 60mg ER 08/03/17 60 36 This am  No                                     Comments: patient disclosed receiving percocet 5/325mg (#15) from emergency dept due to ulcers. POC UDS was performed in office today    Any new labs or imaging since last appointment? YES    Have you been to an emergency room (ER) or urgent care clinic since your last visit? YES            Have you been hospitalized since your last visit? NO     If yes, where, when, and reason for visit? Have you seen or consulted any other health care providers outside of the [address] [place] [address]  since your last visit? YES     If yes, where, when, and reason for visit? Emergency dept physicians and eye doctor     HM deferred to pcp.

## 2017-08-22 ENCOUNTER — HOSPITAL ENCOUNTER (OUTPATIENT)
Dept: PHYSICAL THERAPY | Age: 62
Discharge: HOME | End: 2017-08-22
Payer: COMMERCIAL

## 2017-08-22 DIAGNOSIS — R82.90 BAD ODOR OF URINE: ICD-10-CM

## 2017-08-22 PROCEDURE — 97110 THERAPEUTIC EXERCISES: CPT

## 2017-08-22 PROCEDURE — G8982 BODY POS GOAL STATUS: HCPCS

## 2017-08-22 PROCEDURE — 97535 SELF CARE MNGMENT TRAINING: CPT

## 2017-08-22 PROCEDURE — G8981 BODY POS CURRENT STATUS: HCPCS

## 2017-08-22 PROCEDURE — 97162 PT EVAL MOD COMPLEX 30 MIN: CPT

## 2017-08-22 RX ORDER — LOSARTAN POTASSIUM 25 MG/1
TABLET ORAL
Qty: 30 TAB | Refills: 0 | Status: SHIPPED | OUTPATIENT
Start: 2017-08-22 | End: 2017-08-25 | Stop reason: SDUPTHER

## 2017-08-22 RX ORDER — POLYETHYLENE GLYCOL 3350 17 G/17G
POWDER, FOR SOLUTION ORAL
Qty: 30 EACH | Refills: 1 | Status: SHIPPED | OUTPATIENT
Start: 2017-08-22 | End: 2017-10-18

## 2017-08-22 NOTE — PROGRESS NOTES
Request for use of Dry Needling/Intramuscular Manual Therapy  Patient: Meena Yoder     Referral Source: Neal Ramos MD  Diagnosis: TMJ arthritis [M26.69]      : 1955  Date of initial visit: 2017   Attended visits: 1  Missed Visits: 0    Based on findings from the physical therapy examination and evaluation, the evaluating therapist believes the patient, Meena Yoder  would benefit from including Dry Needling as part of the plan of care. Dry needling is a treatment technique utilized in conjunction with other PT interventions to inactivate myofascial trigger points and the pain and dysfunction they cause. Dry Needling is an advanced procedure that requires additional training including greater than 54 hours of intensive course work. Physical Therapists at [address] [place] [address] are trained and/or certified through [employer] for their education. PROCEDURE:   Solid filament sterile needle (typically 0.3mm/30 gauge) inserted into a trigger point   Repeated movements inactivate the trigger points, taking 30-60 seconds per site   Typically consists of 1 dry needling session per week and a possible second treatment including muscle re-education, flexibility, strengthening and other manual techniques to facilitate the benefits of dry needling     BENEFITS:   Inactivation of trigger points   Decreased pain   Increased muscle length   Improved movement patterns   Restoration of function POTENTIAL RISKS:   Post-needling soreness   Infection   Bruising/bleeding   Penetration of a nerve   Pneumothorax   All treating PTs have been thoroughly educated in avoiding adverse reactions    If you agree with this recommendation, please sign this form and fax it to us at [phone]. If you have questions or concerns regarding dry needling or any other treatment we may be providing, please contact us at [phone].     Thank you for allowing us to assist in the care of your patient. Nii Healy, PT, DPT, ATC, CSCS    8/22/2017 3:58 PM     NOTE TO PHYSICIAN:  PLEASE COMPLETE THE ORDERS BELOW AND   FAX TO [facility]: [phone]  If you are unable to process this request in 24 hours please contact our office:   [phone]    I have read the above request and AGREE to the recommendation of including dry needling as part of the plan of care.       Physicians signature: _________________________Date: _________Time:________

## 2017-08-22 NOTE — PROGRESS NOTES
PT DAILY TREATMENT NOTE/TMJ EVAL 3-16    Patient Name: Cherelle Vargas  Date:2017  : 1955  [x]  Patient  Verified  Payor: [place] MEDICARE / Plan: [place] MEDICARE PART A & B / Product Type: Medicare /    In time:12:03pm  Out time:12:55pm  Total Treatment Time (min): 52  Total Timed Codes (min): 24  1:1 Treatment Time ( [address] only): 52   Visit #: 1 of 8    Treatment Area: TMJ arthritis [M26.69]    SUBJECTIVE  Pain Level (0-10 scale): 5 R ear  []constant [x]intermittent []improving [x]worsening []no change since onset    Any medication changes, allergies to medications, adverse drug reactions, diagnosis change, or new procedure performed?: [x] No    [] Yes (see summary sheet for update)  Subjective functional status/changes:    Pt reports R sided ear pain that began 2-3 years ago, but this morning reports her L ear began hurting. Denies mechanism of injury. She reports 2 years ago she would have pain with clamping down when chewing, but that has abated and no longer occurs. She reports pain is sharp and occurs when she talks on the phone, loud noises, or walking in front of a fan (that blows air in her ears), but otherwise reports it tends to be random. She reports declining hearing function in her R ear per her ENT. She reports her PCP advised her of a possible ear infection and prescribed medication, but she was diagnosed with TMJ dysfunction per her ENT. Denies imaging, lab cultures, only physical exam. Denies pain with talking, eating. PLOF: sedentary d/t back pain  Limitations to PLOF: limited ease of talking on phone per pt report  Mechanism of Injury: insidious onset  Current symptoms/Complaints: see above  Previous Treatment/Compliance: antibiotics with relief until 4 days after completion, then it returned.   PMHx/Surgical Hx: chronic back pain, OA, hiatal hernia, cataracts, HTN, cervical surgery d/t herniated disc (pt unsure what was done) \"many years ago\"  Work Hx: disability d/t chronic back pain  Living Situation:   Pt Goals: see intake  Barriers: []pain []financial []time []transportation []other  Motivation: appears well motivated  Substance use: []Alcohol []Tobacco []other:   FABQ Score: []low []elevate  Cognition: A & O x 4    Other:    OBJECTIVE/EXAMINATION    28 min [x]Eval                  []Re-Eval     11 min Therapeutic Exercise:  [] See flow sheet : HEP creation and instruction   Rationale: increase ROM and improve coordination to improve the patients ability to improve ease of ADLs. Self Care: 13 minutes pt education regarding relevant anatomy, diagnosis, prognosis, plan of care, referral discussion to refer to ENT to r/o underlying possibility of ear pathology to facilitate pt compliance and self management.     With   [] TE   [] TA   [] neuro   [] other: Patient Education: [x] Review HEP    [] Progressed/Changed HEP based on:   [] positioning   [] body mechanics   [] transfers   [] heat/ice application    [] other:      Other Objective/Functional Measures:    TMJ Evaluation  History:  Chief Complaint: see above  Pain Location : R ear  []Constant [x]Intermittent  Pain increases with : talking on phone when listening on the R, air blowing in R ear, otherwise random  Pain decreases with: nothing  Onset: 2 years ago  [x]Difficulty opening mouth  [x]Any clicking/popping  [x]Does Jaw get stuck or locked (used to)  [x]Pain with chewing, yawning, wide opening   [x]Pain in ears or cheeks, ringing in ears  []Any injury to jaw, head, or neck: no  [x]Arthritis  Diet: []Hard [x]Soft  []Previous hx of TMJ problems: no    Objective:   TMJ resting position: neutral  []Mouth vs [x]Nose breather  Posture: lordotic cervical posture  C-Spine ROM: flex 50, ext 54 R ear discomfort, R rotation 63, L 53 with R ear pain, L SB 32 with pull on R neck, R SB 38  C-Spine Strength : 5/5 in all planes   UE ROM: WNL bilat  UE Strength: 4+/5 void of pain bilat  Mandible ROM: 4 cm opening with R sided ear/jaw pain, deviation 7 mm bilat with pain to B sides, protrusion 3 mm with R jaw pain, retrusion 2 mm  Resisted mandible motions  Loading of TMJ: pain in R ear with R loading  Dental Roll Bite     Palpation:  Posterior Capsule  Lateral capsule  Temporalis (+) R  Masseter (-)  Pterygoids  SCM (+) R  Scalenes (-)  Suboccipital (-)  Trapezius (-)    Pain Level (0-10 scale) post treatment: 8    ASSESSMENT/Changes in Function: Per POC. Patient will continue to benefit from skilled PT services to modify and progress therapeutic interventions, address functional mobility deficits, address ROM deficits, address strength deficits, analyze and address soft tissue restrictions, analyze and cue movement patterns, analyze and modify body mechanics/ergonomics, assess and modify postural abnormalities and instruct in home and community integration to attain remaining goals. [x]  See Plan of Care  []  See progress note/recertification  []  See Discharge Summary         Progress towards goals / Updated goals:  Per POC.     PLAN  []  Upgrade activities as tolerated     [x]  Continue plan of care  []  Update interventions per flow sheet       []  Discharge due to:_  []  Other:_      Jim Lange, PT, DPT, ATC, CSCS 8/22/2017  12:09 PM

## 2017-08-22 NOTE — PROGRESS NOTES
[facility]  [address] [facility] [address] [facility]  [place], [address]  [phone] fax    Plan of Care/ Statement of Necessity for Physical Therapy Services    Patient name: Jennifer Can Start of Care: 2017   Referral source: Chantal Hornre MD : [id number]    Medical Diagnosis: TMJ arthritis [M26.69]   Onset Date:2-3 years ago    Treatment Diagnosis: mechanical TMJ dysfunction   Prior Hospitalization: see medical history Provider#: [id number]   Medications: Verified on Patient summary List    Comorbidities: chronic back pain, OA, hiatal hernia, cataracts, HTN, cervical surgery d/t herniated disc (pt unsure what was done) \"many years ago\", R hearing loss   Prior Level of Function: sedentary d/t back pain, limited by R ear pain 2 years      The Plan of Care and following information is based on the information from the initial evaluation. Assessment/ key information: Pt is a 60year old female presenting with reports R sided ear pain that began 2-3 years ago, but this morning reports her L ear began hurting. Denies mechanism of injury. She reports 2 years ago she would have pain with clamping down when chewing, but that has abated and no longer occurs. She reports pain is sharp and occurs when she talks on the phone, loud noises, or walking in front of a fan (that blows air in her ears), but otherwise reports it tends to be random. She reports declining hearing function in her R ear per her ENT. She reports her PCP advised her of a possible ear infection and prescribed antibiotic medication, but she was diagnosed with TMJ dysfunction per her ENT, though she does report antibiotics made her pain ceasar for a short period of time (until 4 days post completing her prescription).  Pt presents with some signs and symptoms indicative of possible TMJ dysfunction with associated impairments consisting of pain with R sided TMJ compression, full jaw opening AROM but with pain towards end range, mild TMJ crepitation with movement, pain in R ear with bilat jaw deviation and protrusion, limited L cervical rotation and SB with R sided ear pain reports, and TTP R SCM and temporalis with reproduction of symptoms. She has been advised to discuss concerns of underlying R ear infection d/t her reports of prior relief with use of antibiotics to r/o this concern. Pt will benefit from skilled PT to include the use of dry needling at the discretion of the treating therapist to address her impairments and improve her level of function. Evaluation Complexity History MEDIUM  Complexity : 1-2 comorbidities / personal factors will impact the outcome/ POC ; Examination HIGH Complexity : 4+ Standardized tests and measures addressing body structure, function, activity limitation and / or participation in recreation  ;Presentation MEDIUM Complexity : Evolving with changing characteristics  ; Clinical Decision Making MEDIUM Complexity : FOTO score of 26-74  Overall Complexity Rating: MEDIUM  Problem List: pain affecting function, decrease ROM, decrease ADL/ functional abilitiies, decrease activity tolerance and decrease flexibility/ joint mobility   Treatment Plan may include any combination of the following: Therapeutic exercise, Therapeutic activities, Neuromuscular re-education, Physical agent/modality, Manual therapy, Patient education and Self Care training  Patient / Family readiness to learn indicated by: asking questions, trying to perform skills and interest  Persons(s) to be included in education: patient (P)  Barriers to Learning/Limitations: None  Patient Goal (s): Stop the pain and heal this TMJ  Patient Self Reported Health Status: fair  Rehabilitation Potential: fair    Short Term Goals: To be accomplished in 2 weeks:   1. Patient will report performance of HEP at least 2 times per day to facilitate improved outcomes and improved self management.    2. Pt will demonstrate cervical rotation AROM to 58 bilat or better to improve ease of ADLs. Long Term Goals: To be accomplished in 4 weeks:   1. Patient will report FOTO score of 53 or better to indicate significant improvement in functional status. 2. Pt will demonstrate ability to perform R TMJ compression with pain no grater than 2/10 to improve ease of ADLs and IADLs. 3. Pt will demonstrate ability to fully open and laterally deviate her jaw bilat with pain no greater than 2/10. Frequency / Duration: Patient to be seen 2 times per week for 4 weeks. Patient/ Caregiver education and instruction: Diagnosis, prognosis, self care, activity modification and exercises   [x]  Plan of care has been reviewed with DL    G-Codes (GP)    Position   Current  CK= 40-59%   Goal  CK= 40-59%    The severity rating is based on clinical judgment and the FOTO score. Certification Period: 8/22/2017 - 10/22/2017  Lily Antunez, PT, DPT, ATC, CSCS 8/22/2017 2:27 PM    ________________________________________________________________________    I certify that the above Therapy Services are being furnished while the patient is under my care. I agree with the treatment plan and certify that this therapy is necessary.     [de-identified] Signature:____________________  Date:____________Time: _________    Please sign and return to [facility] [address] [address] [facility]   [place], [address]  [phone] fax

## 2017-08-23 DIAGNOSIS — R82.90 BAD ODOR OF URINE: ICD-10-CM

## 2017-08-23 DIAGNOSIS — E78.00 HYPERCHOLESTEROLEMIA: ICD-10-CM

## 2017-08-25 RX ORDER — ATORVASTATIN CALCIUM 40 MG/1
TABLET, FILM COATED ORAL
Qty: 30 TAB | Refills: 3 | Status: SHIPPED | COMMUNITY
Start: 2017-08-25 | End: 2018-01-02 | Stop reason: SDUPTHER

## 2017-08-25 RX ORDER — LOSARTAN POTASSIUM 25 MG/1
TABLET ORAL
Qty: 30 TAB | Refills: 0 | Status: SHIPPED | COMMUNITY
Start: 2017-08-25 | End: 2017-10-15 | Stop reason: SDUPTHER

## 2017-09-06 ENCOUNTER — HOSPITAL ENCOUNTER (OUTPATIENT)
Age: 62
Discharge: HOME | End: 2017-09-06
Attending: OTOLARYNGOLOGY
Payer: MEDICARE

## 2017-09-06 DIAGNOSIS — H93.293 AUDITORY COMPLAINTS OF BOTH EARS: ICD-10-CM

## 2017-09-06 DIAGNOSIS — H93.3X1 DISORDER OF RIGHT ACOUSTIC NERVE: ICD-10-CM

## 2017-09-06 LAB — CREAT UR-MCNC: 0.9 MG/DL (ref 0.6–1.3)

## 2017-09-06 PROCEDURE — 70553 MRI BRAIN STEM W/O & W/DYE: CPT

## 2017-09-06 PROCEDURE — A9585 GADOBUTROL INJECTION: HCPCS | Performed by: OTOLARYNGOLOGY

## 2017-09-06 PROCEDURE — 82565 ASSAY OF CREATININE: CPT

## 2017-09-06 PROCEDURE — 74011250636 HC RX REV CODE- 250/636: Performed by: OTOLARYNGOLOGY

## 2017-09-06 RX ORDER — ERGOCALCIFEROL 1.25 MG/1
CAPSULE ORAL
Qty: 4 CAP | Refills: 0 | Status: SHIPPED | OUTPATIENT
Start: 2017-09-06 | End: 2017-10-07 | Stop reason: SDUPTHER

## 2017-09-06 RX ADMIN — GADOBUTROL 7.5 ML: 604.72 INJECTION INTRAVENOUS at 13:00

## 2017-09-07 ENCOUNTER — OFFICE VISIT (OUTPATIENT)
Dept: FAMILY MEDICINE CLINIC | Age: 62
End: 2017-09-07

## 2017-09-07 VITALS
SYSTOLIC BLOOD PRESSURE: 128 MMHG | HEIGHT: 64 IN | TEMPERATURE: 98 F | BODY MASS INDEX: 27.14 KG/M2 | OXYGEN SATURATION: 98 % | WEIGHT: 159 LBS | RESPIRATION RATE: 16 BRPM | DIASTOLIC BLOOD PRESSURE: 80 MMHG | HEART RATE: 87 BPM

## 2017-09-07 DIAGNOSIS — E78.00 HYPERCHOLESTEROLEMIA: ICD-10-CM

## 2017-09-07 DIAGNOSIS — F31.9 BIPOLAR 1 DISORDER (HCC): Primary | ICD-10-CM

## 2017-09-07 DIAGNOSIS — R82.90 ABNORMAL URINE ODOR: ICD-10-CM

## 2017-09-07 DIAGNOSIS — G89.29 OTHER CHRONIC PAIN: ICD-10-CM

## 2017-09-07 RX ORDER — METRONIDAZOLE 500 MG/1
500 TABLET ORAL 3 TIMES DAILY
Qty: 15 TAB | Refills: 0 | Status: SHIPPED | OUTPATIENT
Start: 2017-09-07 | End: 2017-09-12

## 2017-09-07 NOTE — PATIENT INSTRUCTIONS
Urine Test: About This Test  What is it? A urine test checks the color, clarity (clear or cloudy), odor, concentration, and acidity (pH) of your urine. It also checks your levels of protein, sugar, blood cells, or other substances in your urine. This test is sometimes called a urinalysis. Why is this test done? A urine test may be done:  · To check for a disease or infection of the urinary tract. The urinary tract includes the kidneys, the tubes that carry urine from the kidneys to the bladder (ureters), and the bladder. It also includes the tube that carries urine from the bladder to outside the body (urethra). · To check the treatment of conditions such as diabetes, kidney stones, a urinary tract infection (UTI), high blood pressure, or some kidney or liver diseases. How can you prepare for the test?  · Before the test, don't eat foods that can change the color of your urine. Examples of these include blackberries, beets, and rhubarb. · Don't do heavy exercise before the test.  · Tell your doctor if you are menstruating or close to starting your period. Your doctor may want to wait to do the test.  · Tell your doctor about all the nonprescription and prescription medicines and herbs or other supplements you take. Some of these can affect the results of this test.  What happens during the test?  A urine test can be done in your doctor's office, clinic, or lab. Or you may be asked to collect a urine sample at home. Then you can take it to the office or lab for testing. Clean-catch midstream urine collection  · Wash your hands before you start. · If the cup you are given has a lid, remove it carefully. Set it down with the inner surface up. Don't touch the inside of the cup with your fingers. · Clean the area around your genitals. ¨ For men: Pull back the foreskin, if present. Clean the head of your penis with medicated towelettes or swabs.   ¨ For women: Spread open the genital folds of skin with one hand. Then use medicated towelettes or swabs in your other hand to clean the area where urine comes out (the urethra). Wipe the area from front to back. · Start urinating into the toilet or urinal. A woman should hold apart the genital folds of skin while she urinates. · After the urine has flowed for several seconds, place the cup into the urine stream. Collect about 2 ounces of urine without stopping your flow of urine. · Don't touch the rim of the cup to your genital area. Don't get toilet paper, pubic hair, stool (feces), menstrual blood, or anything else in the urine sample. · Finish urinating into the toilet or urinal.  · Carefully replace and tighten the lid on the cup, and then return it to the lab. If you are collecting the urine at home and can't get it to the lab in an hour, refrigerate it. Double-voided urine sample collection  This method collects the urine your body is making right now. · Urinate into the toilet or urinal. Don't collect any of this urine. · Drink a large glass of water, and wait about 30 to 40 minutes. · Then get a urine sample. Follow the instructions above for collecting a clean-catch urine sample. · Take the urine sample to the lab. If you are collecting the urine at home and can't get it to the lab in an hour, refrigerate it. Follow-up care is a key part of your treatment and safety. Be sure to make and go to all appointments, and call your doctor if you are having problems. It's also a good idea to keep a list of the medicines you take. Ask your doctor when you can expect to have your test results. Where can you learn more? Go to http://diana-lashonda.info/. Enter R266 in the search box to learn more about \"Urine Test: About This Test.\"  Current as of: October 14, 2016  Content Version: 11.3  © 5548-0038 [employer].  Care instructions adapted under license by [employer] (which disclaims liability or warranty for this information). If you have questions about a medical condition or this instruction, always ask your healthcare professional. [place] [address] any warranty or liability for your use of this information.

## 2017-09-07 NOTE — MR AVS SNAPSHOT
Visit Information Date & Time Provider Department Dept. Phone Encounter #  
 9/7/2017  1:15 PM Esperanza Wolfe MD [facility] [phone] [id number] Follow-up Instructions Return in about 1 month (around 10/7/2017). Your Appointments 10/11/2017 10:20 AM  
Follow Up with BETO Darling [facility] (RICHAR SCHEDULING) Appt Note: return in 2 months [address] [address]  
[phone] [facility] [address] Upcoming Health Maintenance Date Due DTaP/Tdap/Td series (1 - Tdap) 3/30/1975 INFLUENZA AGE 9 TO ADULT 8/1/2017 BREAST CANCER SCRN MAMMOGRAM 12/2/2017 PAP AKA CERVICAL CYTOLOGY 6/21/2019 COLONOSCOPY 4/13/2027 Allergies as of 9/7/2017  Review Complete On: 9/7/2017 By: Esperanza Wolfe MD  
  
 Severity Noted Reaction Type Reactions Aspirin    Other (comments) Stomach bleeding Macrobid [Nitrofurantoin Monohyd/m-cryst]  11/25/2015    Nausea and Vomiting Nsaids (Non-steroidal Anti-inflammatory Drug)    Nausea and Vomiting Opana [Oxymorphone]  11/29/2016    Other (comments) Insomnia, weight loss, nightmares Pcn [Penicillins]    Hives Current Immunizations  Never Reviewed Name Date Pneumococcal Polysaccharide (PPSV-23) 3/7/2014 Not reviewed this visit You Were Diagnosed With   
  
 Codes Comments Bipolar 1 disorder ([facility]    -  Primary ICD-10-CM: F31.9 ICD-9-CM: 296.7 Other chronic pain     ICD-10-CM: G89.29 ICD-9-CM: 338.29 Hypercholesterolemia     ICD-10-CM: E78.00 ICD-9-CM: 272.0 Abnormal urine odor     ICD-10-CM: R82.90 ICD-9-CM: 791.9 Vitals BP Pulse Temp Resp Height(growth percentile) Weight(growth percentile) 128/80 (BP 1 Location: Left arm, BP Patient Position: Sitting) 87 98 °F (36.7 °C) 16 5' 4\" (1.626 m) 159 lb (72.1 kg) SpO2 BMI OB Status Smoking Status 98% 27.29 kg/m2 Hysterectomy Former Smoker BMI and BSA Data Body Mass Index Body Surface Area  
 27.29 kg/m 2 1.8 m 2 Preferred Pharmacy Pharmacy Name Phone [id number] - [address], [address] [phone] Your Updated Medication List  
  
   
This list is accurate as of: 9/7/17  1:36 PM.  Always use your most recent med list.  
  
  
  
  
 albuterol 2.5 mg /3 mL (0.083 %) nebulizer solution Commonly known as:  PROVENTIL VENTOLIN  
2.5 mg.  
  
 atorvastatin 40 mg tablet Commonly known as:  LIPITOR  
TAKE ONE TABLET BY MOUTH ONE TIME DAILY  
  
 baclofen 10 mg tablet Commonly known as:  LIORESAL  
TAKE ONE TABLET BY MOUTH THREE TIMES A DAY  
  
 butalbital-acetaminophen-caffeine -40 mg per tablet Commonly known as:  [place] Take 1 Tab by mouth every six (6) hours as needed for Pain. Max Daily Amount: 4 Tabs. DEXILANT 60 mg Cpdb Generic drug:  Dexlansoprazole 60 mg daily. dicyclomine 10 mg capsule Commonly known as:  BENTYL Take 1 Cap by mouth four (4) times daily. fluticasone 50 mcg/actuation nasal spray Commonly known as:  Ruthe Para 2 Sprays by Both Nostrils route as needed. gabapentin 400 mg capsule Commonly known as:  NEURONTIN  
400 mg two (2) times a day. GEODON 80 mg capsule Generic drug:  ziprasidone Take 160 mg by mouth two (2) times daily (with meals). ipratropium 0.02 % Soln Commonly known as:  ATROVENT  
0.5 mg.  
  
 losartan 25 mg tablet Commonly known as:  COZAAR  
TAKE ONE TABLET BY MOUTH ONE TIME DAILY  
  
 magnesium citrate solution Take 1/3 of bottle every 8 hours until finished or until you have a bowel movement. megestrol 20 mg tablet Commonly known as:  MEGACE Take 1 Tab by mouth daily. metroNIDAZOLE 500 mg tablet Commonly known as:  FLAGYL Take 1 Tab by mouth three (3) times daily for 5 days. * morphine CR 60 mg CR tablet Commonly known as:  MS CONTIN Take 1 Tab by mouth every twelve (12) hours for 30 days. Max Daily Amount: 120 mg.  
  
 * morphine CR 60 mg CR tablet Commonly known as:  MS CONTIN Take 1 Tab by mouth every twelve (12) hours. Max Daily Amount: 120 mg.  
Start taking on:  10/1/2017  
  
 naloxone 4 mg/actuation Spry 4 mg by Nasal route as needed. For emergency use only  Indications: OPIATE-INDUCED RESPIRATORY DEPRESSION  
  
 * oxyCODONE IR 10 mg Tab immediate release tablet Commonly known as:  Kamilah Magyar Take 1 Tab by mouth three (3) times daily as needed. Max Daily Amount: 30 mg.  
  
 * oxyCODONE IR 10 mg Tab immediate release tablet Commonly known as:  Kamilah Magyar Take 1 Tab by mouth three (3) times daily as needed. Max Daily Amount: 30 mg. Start taking on:  10/1/2017 polyethylene glycol 17 gram packet Commonly known as:  Orysia Lamar DISSOLVE 1 PACKET IN BEVERAGE OF CHOICE AND DRINK BY MOUTH DAILY  
  
 sertraline 100 mg tablet Commonly known as:  ZOLOFT Take  by mouth daily. sucralfate 1 gram tablet Commonly known as:  CARAFATE  
1 g.  
  
 temazepam 15 mg capsule Commonly known as:  RESTORIL Take 1 Cap by mouth nightly as needed for Sleep. Max Daily Amount: 15 mg. Indications: INSOMNIA  
  
 traMADol 50 mg tablet Commonly known as:  ULTRAM  
Take 1 Tab by mouth every six (6) hours as needed for Pain. Max Daily Amount: 200 mg.  
  
 traZODone 100 mg tablet Commonly known as:  Agueda Dart Take 100 mg by mouth nightly. * VITAMIN D2 50,000 unit capsule Generic drug:  ergocalciferol TAKE ONE CAPSULE BY MOUTH EVERY 7 DAYS  
  
 * VITAMIN D2 50,000 unit capsule Generic drug:  ergocalciferol TAKE ONE CAPSULE BY MOUTH EVERY 7 DAYS * Notice: This list has 6 medication(s) that are the same as other medications prescribed for you. Read the directions carefully, and ask your doctor or other care provider to review them with you. Prescriptions Sent to Pharmacy Refills  
 metroNIDAZOLE (FLAGYL) 500 mg tablet 0 Sig: Take 1 Tab by mouth three (3) times daily for 5 days. Class: Normal  
 Pharmacy: [place] [address] [address], [address] [place] [address] #: [phone] Route: Oral  
  
We Performed the Following AMB POC URINALYSIS DIP STICK AUTO W/O MICRO [94866 CPT(R)] Follow-up Instructions Return in about 1 month (around 10/7/2017). Patient Instructions Urine Test: About This Test 
What is it? A urine test checks the color, clarity (clear or cloudy), odor, concentration, and acidity (pH) of your urine. It also checks your levels of protein, sugar, blood cells, or other substances in your urine. This test is sometimes called a urinalysis. Why is this test done? A urine test may be done: · To check for a disease or infection of the urinary tract. The urinary tract includes the kidneys, the tubes that carry urine from the kidneys to the bladder (ureters), and the bladder. It also includes the tube that carries urine from the bladder to outside the body (urethra). · To check the treatment of conditions such as diabetes, kidney stones, a urinary tract infection (UTI), high blood pressure, or some kidney or liver diseases. How can you prepare for the test? 
· Before the test, don't eat foods that can change the color of your urine. Examples of these include blackberries, beets, and rhubarb. · Don't do heavy exercise before the test. 
· Tell your doctor if you are menstruating or close to starting your period. Your doctor may want to wait to do the test. 
· Tell your doctor about all the nonprescription and prescription medicines and herbs or other supplements you take. Some of these can affect the results of this test. 
What happens during the test? 
A urine test can be done in your doctor's office, clinic, or lab.  Or you may be asked to collect a urine sample at home. Then you can take it to the office or lab for testing. Clean-catch midstream urine collection · Wash your hands before you start. · If the cup you are given has a lid, remove it carefully. Set it down with the inner surface up. Don't touch the inside of the cup with your fingers. · Clean the area around your genitals. ¨ For men: Pull back the foreskin, if present. Clean the head of your penis with medicated towelettes or swabs. ¨ For women: Spread open the genital folds of skin with one hand. Then use medicated towelettes or swabs in your other hand to clean the area where urine comes out (the urethra). Wipe the area from front to back. · Start urinating into the toilet or urinal. A woman should hold apart the genital folds of skin while she urinates. · After the urine has flowed for several seconds, place the cup into the urine stream. Collect about 2 ounces of urine without stopping your flow of urine. · Don't touch the rim of the cup to your genital area. Don't get toilet paper, pubic hair, stool (feces), menstrual blood, or anything else in the urine sample. · Finish urinating into the toilet or urinal. 
· Carefully replace and tighten the lid on the cup, and then return it to the lab. If you are collecting the urine at home and can't get it to the lab in an hour, refrigerate it. Double-voided urine sample collection This method collects the urine your body is making right now. · Urinate into the toilet or urinal. Don't collect any of this urine. · Drink a large glass of water, and wait about 30 to 40 minutes. · Then get a urine sample. Follow the instructions above for collecting a clean-catch urine sample. · Take the urine sample to the lab. If you are collecting the urine at home and can't get it to the lab in an hour, refrigerate it. Follow-up care is a key part of your treatment and safety.  Be sure to make and go to all appointments, and call your doctor if you are having problems. It's also a good idea to keep a list of the medicines you take. Ask your doctor when you can expect to have your test results. Where can you learn more? Go to http://diana-lashonda.info/. Enter R266 in the search box to learn more about \"Urine Test: About This Test.\" Current as of: October 14, 2016 Content Version: 11.3 © 8791-2663 [employer]. Care instructions adapted under license by [employer] (which disclaims liability or warranty for this information). If you have questions about a medical condition or this instruction, always ask your healthcare professional. [address] any warranty or liability for your use of this information. Please provide this summary of care documentation to your next provider. Your primary care clinician is listed as [id number] [facility]. If you have any questions after today's visit, please call [phone].

## 2017-09-07 NOTE — PROGRESS NOTES
Rene Harris is a 60 y.o. female  presents for follow up. She has right sided ear pain. She had MRI that was normal.  She will follow up with specialist.     Allergies   Allergen Reactions    Aspirin Other (comments)     Stomach bleeding    Macrobid [Nitrofurantoin Monohyd/M-Cryst] Nausea and Vomiting    Nsaids (Non-Steroidal Anti-Inflammatory Drug) Nausea and Vomiting    Opana [Oxymorphone] Other (comments)     Insomnia, weight loss, nightmares    Pcn [Penicillins] Hives     Outpatient Prescriptions Marked as Taking for the 9/7/17 encounter (Office Visit) with Yanet Adams MD   Medication Sig Dispense Refill    VITAMIN D2 50,000 unit capsule TAKE ONE CAPSULE BY MOUTH EVERY 7 DAYS 4 Cap 0    losartan (COZAAR) 25 mg tablet TAKE ONE TABLET BY MOUTH ONE TIME DAILY  30 Tab 0    atorvastatin (LIPITOR) 40 mg tablet TAKE ONE TABLET BY MOUTH ONE TIME DAILY  30 Tab 3    polyethylene glycol (MIRALAX) 17 gram packet DISSOLVE 1 PACKET IN BEVERAGE OF CHOICE AND DRINK BY MOUTH DAILY 30 Each 1    morphine CR (MS CONTIN) 60 mg CR tablet Take 1 Tab by mouth every twelve (12) hours for 30 days. Max Daily Amount: 120 mg. 60 Tab 0    [START ON 10/1/2017] morphine CR (MS CONTIN) 60 mg CR tablet Take 1 Tab by mouth every twelve (12) hours. Max Daily Amount: 120 mg. 60 Tab 0    oxyCODONE IR (ROXICODONE) 10 mg tab immediate release tablet Take 1 Tab by mouth three (3) times daily as needed. Max Daily Amount: 30 mg. 90 Tab 0    [START ON 10/1/2017] oxyCODONE IR (ROXICODONE) 10 mg tab immediate release tablet Take 1 Tab by mouth three (3) times daily as needed. Max Daily Amount: 30 mg. 90 Tab 0    VITAMIN D2 50,000 unit capsule TAKE ONE CAPSULE BY MOUTH EVERY 7 DAYS 4 Cap 0    butalbital-acetaminophen-caffeine (FIORICET, ESGIC) -40 mg per tablet Take 1 Tab by mouth every six (6) hours as needed for Pain. Max Daily Amount: 4 Tabs.  24 Tab 0    baclofen (LIORESAL) 10 mg tablet TAKE ONE TABLET BY MOUTH THREE TIMES A DAY 90 Tab 5    megestrol (MEGACE) 20 mg tablet Take 1 Tab by mouth daily. 30 Tab 0    naloxone 4 mg/actuation spry 4 mg by Nasal route as needed. For emergency use only  Indications: OPIATE-INDUCED RESPIRATORY DEPRESSION 1 Package 0    dicyclomine (BENTYL) 10 mg capsule Take 1 Cap by mouth four (4) times daily. 40 Cap 1    magnesium citrate solution Take 1/3 of bottle every 8 hours until finished or until you have a bowel movement. 1 Bottle 0    temazepam (RESTORIL) 15 mg capsule Take 1 Cap by mouth nightly as needed for Sleep. Max Daily Amount: 15 mg. Indications: INSOMNIA 30 Cap 1    fluticasone (FLONASE) 50 mcg/actuation nasal spray 2 Sprays by Both Nostrils route as needed. 1 Bottle 3    sucralfate (CARAFATE) 1 gram tablet 1 g.      albuterol (PROVENTIL VENTOLIN) 2.5 mg /3 mL (0.083 %) nebulizer solution 2.5 mg.      Dexlansoprazole (DEXILANT) 60 mg CpDB 60 mg daily.  gabapentin (NEURONTIN) 400 mg capsule 400 mg two (2) times a day.  ipratropium (ATROVENT) 0.02 % nebulizer solution 0.5 mg.      trazodone (DESYREL) 100 mg tablet Take 100 mg by mouth nightly.  sertraline (ZOLOFT) 100 mg tablet Take  by mouth daily.        Patient Active Problem List   Diagnosis Code    Lumbago M54.5    Other affections of shoulder region, not elsewhere classified M75.80    Other chronic pain G89.29    Bipolar 1 disorder ([facility] [address] F31.9    Chronic pain syndrome G89.4    Carpal tunnel syndrome G56.00    Degeneration of lumbar or lumbosacral intervertebral disc M51.37    Displacement of lumbar intervertebral disc without myelopathy M51.26    Pain in joint, multiple sites M25.50    Cervicalgia M54.2    Postlaminectomy syndrome, cervical region M96.1    Annular tear of lumbar disc M51.36    Lung nodule seen on imaging study R91.1    ACP (advance care planning) Z71.89    Hypercholesterolemia E78.00    Nausea R11.0    Myalgia M79.1     Past Medical History:   Diagnosis Date    Annular tear of lumbar disc 11/10/2014    Asthma     Bronchitis    Benign tumor of throat     Bone spur     right ankle    Cervicalgia 11/10/2014    Chronic pain     back    Degeneration of lumbar or lumbosacral intervertebral disc 11/10/2014    Degenerative disc disease     Depression     Displacement of lumbar intervertebral disc without myelopathy 11/10/2014    Elevated white blood cell count     Fibromyalgia     GERD (gastroesophageal reflux disease)     Hypertension     Insomnia     Nausea & vomiting     Pain in joint, multiple sites 11/10/2014    Postlaminectomy syndrome, cervical region 11/10/2014    Sinus infection 10/5/15    Ulcer ([address]      Social History     Social History    Marital status:      Spouse name: N/A    Number of children: N/A    Years of education: N/A     Social History Main Topics    Smoking status: Former Smoker    Smokeless tobacco: Never Used    Alcohol use No      Comment: none in 24 years    Drug use: No      Comment: last smoked in 1993    Sexual activity: Not Asked     Other Topics Concern    None     Social History Narrative     Family History   Problem Relation Age of Onset    Hypertension Other     Heart Attack Other     Heart Disease Other     Stroke Other     Headache Other     Seizures Other         Review of Systems   Constitutional: Negative for chills and fever. Cardiovascular: Negative for chest pain and palpitations. Gastrointestinal: Negative for constipation, diarrhea, nausea and vomiting. Physical Exam   Constitutional: She is oriented to person, place, and time and well-developed, well-nourished, and in no distress. Neck: Normal range of motion. Neck supple. Cardiovascular: Normal rate and regular rhythm. Musculoskeletal: Normal range of motion. Neurological: She is alert and oriented to person, place, and time. Nursing note and vitals reviewed. Assessment/Plan      ICD-10-CM ICD-9-CM    1.  Bipolar 1 disorder (HCC) F31.9 296.7 Stable   2. Other chronic pain G89.29 338.29 Stable    3. Hypercholesterolemia E78.00 272.0    4. Abnormal urine odor R82.90 791.9 AMB POC URINALYSIS DIP STICK AUTO W/O MICRO      metroNIDAZOLE (FLAGYL) 500 mg tablet     I have discussed the diagnosis with the patient and the intended plan of care as seen in the above orders. The patient has received an after-visit summary and questions were answered concerning future plans. I have discussed medication, side effects, and warnings with the patient in detail. The patient verbalized understanding and is in agreement with the plan of care. The patient will contact the office with any additional concerns. Follow-up Disposition:  Return in about 1 month (around 10/7/2017).   lab results and schedule of future lab studies reviewed with patient    Dagmar Rodríguez MD

## 2017-09-28 ENCOUNTER — TELEPHONE (OUTPATIENT)
Dept: FAMILY MEDICINE CLINIC | Age: 62
End: 2017-09-28

## 2017-09-28 NOTE — TELEPHONE ENCOUNTER
Patient called stating she has an ulcer and needs a prescription for valtrex. She would like a return call with providers response.  Patient was advised provider is out of the office the rest of today and will return tomorrow morning after 8am.

## 2017-09-29 RX ORDER — VALACYCLOVIR HYDROCHLORIDE 500 MG/1
500 TABLET, FILM COATED ORAL 2 TIMES DAILY
Qty: 20 TAB | Refills: 0 | Status: SHIPPED | OUTPATIENT
Start: 2017-09-29 | End: 2018-03-07 | Stop reason: SDUPTHER

## 2017-09-29 NOTE — TELEPHONE ENCOUNTER
Message  Received: Today       MD Cinda Cosby, LPN       Caller: Unspecified (Yesterday,  3:34 PM)                     Please send in valtex 500 bid for 10 days.            Previous Messages          Above medication uploaded and pended for approval per dr Jean Matthew.

## 2017-09-29 NOTE — TELEPHONE ENCOUNTER
Message  Received: Today       MD Sherie Lua, LPN       Caller: Unspecified (Yesterday,  3:34 PM)                     Please send in valtex 500 bid for 10 days.            Previous Messages          Refill encounter sent to dr Mel Ceron.

## 2017-10-02 NOTE — PROGRESS NOTES
[facility]  [address]  [place], [address]  [phone] fax    Physical Therapy Discharge Summary  Patient name: Juana Fox Start of Care: 17   Referral source: Jimmie Tucker MD : [id number]   Medical/Treatment Diagnosis: TMJ arthritis [M26.69] Onset Date:2-3 years ago     Prior Hospitalization: see medical history Provider#: [id number]   Medications: Verified on Patient Summary List    Comorbidities: chronic back pain, OA, hiatal hernia, cataracts, HTN, cervical surgery d/t herniated disc (pt unsure what was done) \"many years ago\", R hearing loss   Prior Level of Function: sedentary d/t back pain, limited by R ear pain 2 years  Visits from Start of Care: 1    Missed Visits: 0  Reporting Period : 17 to 17      Summary of Care:  Short Term Goals: To be accomplished in 2 weeks:                        1. Patient will report performance of HEP at least 2 times per day to facilitate improved outcomes and improved self management. 2. Pt will demonstrate cervical rotation AROM to 62 bilat or better to improve ease of ADLs. Long Term Goals: To be accomplished in 4 weeks:                        1. Patient will report FOTO score of 53 or better to indicate significant improvement in functional status. 2. Pt will demonstrate ability to perform R TMJ compression with pain no grater than 2/10 to improve ease of ADLs and IADLs. 3. Pt will demonstrate ability to fully open and laterally deviate her jaw bilat with pain no greater than 2/10. Patient has not returned to PT since 17; unplanned D/C.   Attended eval only,      ASSESSMENT/RECOMMENDATIONS:  [x]Discontinue therapy: []Patient has reached or is progressing toward set goals      [x]Patient is non-compliant or has abdicated      []Due to lack of appreciable progress towards set goals    Kameron Soto, PT 10/2/2017 1:46 PM

## 2017-10-07 ENCOUNTER — HOSPITAL ENCOUNTER (EMERGENCY)
Age: 62
Discharge: HOME | End: 2017-10-07
Attending: EMERGENCY MEDICINE | Admitting: EMERGENCY MEDICINE
Payer: MEDICARE

## 2017-10-07 VITALS
WEIGHT: 166 LBS | SYSTOLIC BLOOD PRESSURE: 172 MMHG | RESPIRATION RATE: 20 BRPM | TEMPERATURE: 98.3 F | DIASTOLIC BLOOD PRESSURE: 92 MMHG | HEART RATE: 80 BPM | OXYGEN SATURATION: 99 % | HEIGHT: 64 IN | BODY MASS INDEX: 28.34 KG/M2

## 2017-10-07 DIAGNOSIS — F43.0 PANIC ATTACK AS REACTION TO STRESS: Primary | ICD-10-CM

## 2017-10-07 DIAGNOSIS — F41.0 PANIC ATTACK AS REACTION TO STRESS: Primary | ICD-10-CM

## 2017-10-07 PROCEDURE — 74011250637 HC RX REV CODE- 250/637: Performed by: EMERGENCY MEDICINE

## 2017-10-07 PROCEDURE — 99284 EMERGENCY DEPT VISIT MOD MDM: CPT

## 2017-10-07 RX ORDER — LORAZEPAM 1 MG/1
1 TABLET ORAL
Status: COMPLETED | OUTPATIENT
Start: 2017-10-07 | End: 2017-10-07

## 2017-10-07 RX ORDER — MAG HYDROX/ALUMINUM HYD/SIMETH 200-200-20
30 SUSPENSION, ORAL (FINAL DOSE FORM) ORAL
Status: DISCONTINUED | OUTPATIENT
Start: 2017-10-07 | End: 2017-10-08 | Stop reason: HOSPADM

## 2017-10-07 RX ADMIN — ALUMINUM HYDROXIDE, MAGNESIUM HYDROXIDE, AND SIMETHICONE 30 ML: 200; 200; 20 SUSPENSION ORAL at 23:12

## 2017-10-07 RX ADMIN — LORAZEPAM 1 MG: 1 TABLET ORAL at 22:29
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
## 2017-10-08 NOTE — DISCHARGE INSTRUCTIONS
Panic Attacks: Care Instructions  Your Care Instructions  During a panic attack, you may have a feeling of intense fear or terror, trouble breathing, chest pain or tightness, heartbeat changes, dizziness, sweating, and shaking. A panic attack starts suddenly and usually lasts from 5 to 20 minutes but may last even longer. You have the most anxiety about 10 minutes after the attack starts. An attack can begin with a stressful event, or it can happen without a cause. Although panic attacks can cause scary symptoms, you can learn to manage them with self-care, counseling, and medicine. Follow-up care is a key part of your treatment and safety. Be sure to make and go to all appointments, and call your doctor if you are having problems. It's also a good idea to know your test results and keep a list of the medicines you take. How can you care for yourself at home? · Take your medicine exactly as directed. Call your doctor if you think you are having a problem with your medicine. · Go to your counseling sessions and follow-up appointments. · Recognize and accept your anxiety. Then, when you are in a situation that makes you anxious, say to yourself, \"This is not an emergency. I feel uncomfortable, but I am not in danger. I can keep going even if I feel anxious. \"  · Be kind to your body:  ¨ Relieve tension with exercise or a massage. ¨ Get enough rest.  ¨ Avoid alcohol, caffeine, nicotine, and illegal drugs. They can increase your anxiety level, cause sleep problems, or trigger a panic attack. ¨ Learn and do relaxation techniques. See below for more about these techniques. · Engage your mind. Get out and do something you enjoy. Go to a funny movie, or take a walk or hike. Plan your day. Having too much or too little to do can make you anxious. · Keep a record of your symptoms. Discuss your fears with a good friend or family member, or join a support group for people with similar problems.  Talking to others sometimes relieves stress. · Get involved in social groups, or volunteer to help others. Being alone sometimes makes things seem worse than they are. · Get at least 30 minutes of exercise on most days of the week to relieve stress. Walking is a good choice. You also may want to do other activities, such as running, swimming, cycling, or playing tennis or team sports. Relaxation techniques  Do relaxation exercises for 10 to 20 minutes a day. You can play soothing, relaxing music while you do them, if you wish. · Tell others in your house that you are going to do your relaxation exercises. Ask them not to disturb you. · Find a comfortable place, away from all distractions and noise. · Lie down on your back, or sit with your back straight. · Focus on your breathing. Make it slow and steady. · Breathe in through your nose. Breathe out through either your nose or mouth. · Breathe deeply, filling up the area between your navel and your rib cage. Breathe so that your belly goes up and down. · Do not hold your breath. · Breathe like this for 5 to 10 minutes. Notice the feeling of calmness throughout your whole body. As you continue to breathe slowly and deeply, relax by doing the following for another 5 to 10 minutes:  · Tighten and relax each muscle group in your body. You can begin at your toes and work your way up to your head. · Imagine your muscle groups relaxing and becoming heavy. · Empty your mind of all thoughts. · Let yourself relax more and more deeply. · Become aware of the state of calmness that surrounds you. · When your relaxation time is over, you can bring yourself back to alertness by moving your fingers and toes and then your hands and feet and then stretching and moving your entire body. Sometimes people fall asleep during relaxation, but they usually wake up shortly afterward.   · Always give yourself time to return to full alertness before you drive a car or do anything that might cause an accident if you are not fully alert. Never play a relaxation tape while driving a car. When should you call for help? Call 911 anytime you think you may need emergency care. For example, call if:  · You feel you cannot stop from hurting yourself or someone else. Watch closely for changes in your health, and be sure to contact your doctor if:  · Your panic attacks get worse. · You have new or different anxiety. · You are not getting better as expected. Where can you learn more? Go to http://diana-lashonda.info/. Enter H601 in the search box to learn more about \"Panic Attacks: Care Instructions. \"  Current as of: July 26, 2016  Content Version: 11.3  © 1576-4349 [employer]. Care instructions adapted under license by [employer] (which disclaims liability or warranty for this information). If you have questions about a medical condition or this instruction, always ask your healthcare professional. [place] [address] any warranty or liability for your use of this information.

## 2017-10-08 NOTE — ED PROVIDER NOTES
HPI Comments: 10:21 PM Noni Hernandez is a 60 y.o. female presenting to the ED via EMS c/o anxiety attack after hearing her son had been hit by a car. Pt reports PMHx of anxiety and states that she usually takes Ativan and states that she has not taken it today. Pt also c/o headache and nausea. Denies any other symptoms or complaints. Patient is a 60 y.o. female presenting with anxiety and nausea. Anxiety    Associated symptoms include headaches and nausea. Nausea    Associated symptoms include headaches and headaches.         Past Medical History:   Diagnosis Date    Annular tear of lumbar disc 11/10/2014    Asthma     Bronchitis    Benign tumor of throat     Bone spur     right ankle    Cervicalgia 11/10/2014    Chronic pain     back    Degeneration of lumbar or lumbosacral intervertebral disc 11/10/2014    Degenerative disc disease     Depression     Displacement of lumbar intervertebral disc without myelopathy 11/10/2014    Elevated white blood cell count     Fibromyalgia     GERD (gastroesophageal reflux disease)     Hypertension     Insomnia     Nausea & vomiting     Pain in joint, multiple sites 11/10/2014    Postlaminectomy syndrome, cervical region 11/10/2014    Sinus infection 10/5/15    Ulcer ([address]        Past Surgical History:   Procedure Laterality Date    COLONOSCOPY N/A 4/13/2017    COLONOSCOPY performed by Vania Fothergill, MD at [facility] ENDOSCOPY    HX CERVICAL FUSION      HX HYSTERECTOMY      HX ORTHOPAEDIC      ganglion cyst removed, right hand    HX OTHER SURGICAL      cyst left thigh removed    LAP,CHOLECYSTECTOMY N/A 02/27/2017    Dr. Medeiros          Family History:   Problem Relation Age of Onset    Hypertension Other     Heart Attack Other     Heart Disease Other     Stroke Other     Headache Other     Seizures Other        Social History     Social History    Marital status:      Spouse name: N/A    Number of children: N/A    Years of education: N/A     Occupational History    Not on file. Social History Main Topics    Smoking status: Former Smoker    Smokeless tobacco: Never Used    Alcohol use No      Comment: none in 24 years    Drug use: No      Comment: last smoked in 1993    Sexual activity: Not on file     Other Topics Concern    Not on file     Social History Narrative         ALLERGIES: Aspirin; Macrobid [nitrofurantoin monohyd/m-cryst]; Nsaids (non-steroidal anti-inflammatory drug); Opana [oxymorphone]; and Pcn [penicillins]    Review of Systems   Constitutional: Negative. HENT: Negative. Eyes: Negative. Respiratory: Negative. Cardiovascular: Negative. Gastrointestinal: Positive for nausea. Endocrine: Negative. Genitourinary: Negative. Musculoskeletal: Negative. Skin: Negative. Allergic/Immunologic: Negative. Neurological: Positive for headaches. Hematological: Negative. Psychiatric/Behavioral: The patient is nervous/anxious. All other systems reviewed and are negative. Vitals:    10/07/17 2206 10/07/17 2238   BP: (!) 172/92    Pulse: 80    Resp: 20    Temp: 98.3 °F (36.8 °C)    SpO2: 99% 99%   Weight: 75.3 kg (166 lb)    Height: 5' 4\" (1.626 m)             Physical Exam   Constitutional: She is oriented to person, place, and time. She appears well-developed and well-nourished. No distress. Anxious. HENT:   Head: Normocephalic. Right Ear: External ear normal.   Left Ear: External ear normal.   Mouth/Throat: No oropharyngeal exudate. Eyes: Conjunctivae and EOM are normal. Pupils are equal, round, and reactive to light. Right eye exhibits no discharge. Left eye exhibits no discharge. No scleral icterus. Neck: Normal range of motion. Neck supple. No JVD present. No tracheal deviation present. No thyromegaly present. Cardiovascular: Normal rate, regular rhythm, normal heart sounds and intact distal pulses. Exam reveals no gallop and no friction rub.     No murmur heard.  Pulmonary/Chest: Effort normal and breath sounds normal. No stridor. No respiratory distress. She has no wheezes. She has no rales. She exhibits no tenderness. Abdominal: Soft. Bowel sounds are normal. She exhibits no distension and no mass. There is no tenderness. There is no rebound and no guarding. Musculoskeletal: Normal range of motion. She exhibits no edema or tenderness. Lymphadenopathy:     She has no cervical adenopathy. Neurological: She is alert and oriented to person, place, and time. She displays normal reflexes. No cranial nerve deficit. She exhibits normal muscle tone. Coordination normal.   Skin: Skin is warm and dry. No rash noted. She is not diaphoretic. No erythema. No pallor. Nursing note and vitals reviewed. MDM  Number of Diagnoses or Management Options  Panic attack as reaction to stress: minor  Risk of Complications, Morbidity, and/or Mortality  Presenting problems: low  Diagnostic procedures: low  Management options: low    Patient Progress  Patient progress: resolved    ED Course       Procedures      Vitals:  Patient Vitals for the past 12 hrs:   Temp Pulse Resp BP SpO2   10/07/17 2238 - - - - 99 %   10/07/17 2206 98.3 °F (36.8 °C) 80 20 (!) 172/92 99 %       Medications Ordered:  Medications   alum-mag hydroxide-simeth (MYLANTA) oral suspension 30 mL (30 mL Oral Given 10/7/17 2312)   LORazepam (ATIVAN) tablet 1 mg (1 mg Oral Given 10/7/17 2229)       Progress notes, consult notes, or additional procedure notes:  Patient remained stable during ER evaluation. Reevaluation of the patient: resolution of panic attack      Diagnosis:   1.  Panic attack as reaction to stress        Disposition: Discharged     Follow-up Information     Follow up With Details Comments Contact Jason Reynolds MD Call in 2 days For PCP follow up  [address] [facility]  [address] [address] [phone] [facility] EMERGENCY DEPT  As needed, If symptoms worsen [address] [facility] [address] [id number]  [phone]           Patient's Medications   Start Taking    No medications on file   Continue Taking    ALBUTEROL (PROVENTIL VENTOLIN) 2.5 MG /3 ML (0.083 %) NEBULIZER SOLUTION    2.5 mg.    ATORVASTATIN (LIPITOR) 40 MG TABLET    TAKE ONE TABLET BY MOUTH ONE TIME DAILY     BACLOFEN (LIORESAL) 10 MG TABLET    TAKE ONE TABLET BY MOUTH THREE TIMES A DAY    BUTALBITAL-ACETAMINOPHEN-CAFFEINE (FIORICET, ESGIC) -40 MG PER TABLET    Take 1 Tab by mouth every six (6) hours as needed for Pain. Max Daily Amount: 4 Tabs. DEXLANSOPRAZOLE (DEXILANT) 60 MG CPDB    60 mg daily. DICYCLOMINE (BENTYL) 10 MG CAPSULE    Take 1 Cap by mouth four (4) times daily. FLUTICASONE (FLONASE) 50 MCG/ACTUATION NASAL SPRAY    2 Sprays by Both Nostrils route as needed. GABAPENTIN (NEURONTIN) 400 MG CAPSULE    400 mg two (2) times a day. IPRATROPIUM (ATROVENT) 0.02 % NEBULIZER SOLUTION    0.5 mg. LOSARTAN (COZAAR) 25 MG TABLET    TAKE ONE TABLET BY MOUTH ONE TIME DAILY     MAGNESIUM CITRATE SOLUTION    Take 1/3 of bottle every 8 hours until finished or until you have a bowel movement. MEGESTROL (MEGACE) 20 MG TABLET    Take 1 Tab by mouth daily. MORPHINE CR (MS CONTIN) 60 MG CR TABLET    Take 1 Tab by mouth every twelve (12) hours. Max Daily Amount: 120 mg. NALOXONE 4 MG/ACTUATION SPRY    4 mg by Nasal route as needed. For emergency use only  Indications: OPIATE-INDUCED RESPIRATORY DEPRESSION    OXYCODONE IR (ROXICODONE) 10 MG TAB IMMEDIATE RELEASE TABLET    Take 1 Tab by mouth three (3) times daily as needed. Max Daily Amount: 30 mg. OXYCODONE IR (ROXICODONE) 10 MG TAB IMMEDIATE RELEASE TABLET    Take 1 Tab by mouth three (3) times daily as needed. Max Daily Amount: 30 mg. POLYETHYLENE GLYCOL (MIRALAX) 17 GRAM PACKET    DISSOLVE 1 PACKET IN BEVERAGE OF CHOICE AND DRINK BY MOUTH DAILY    SERTRALINE (ZOLOFT) 100 MG TABLET    Take  by mouth daily.     SUCRALFATE (CARAFATE) 1 GRAM TABLET    1 g.    TEMAZEPAM (RESTORIL) 15 MG CAPSULE    Take 1 Cap by mouth nightly as needed for Sleep. Max Daily Amount: 15 mg. Indications: INSOMNIA    TRAMADOL (ULTRAM) 50 MG TABLET    Take 1 Tab by mouth every six (6) hours as needed for Pain. Max Daily Amount: 200 mg. TRAZODONE (DESYREL) 100 MG TABLET    Take 100 mg by mouth nightly. VALACYCLOVIR (VALTREX) 500 MG TABLET    Take 1 Tab by mouth two (2) times a day. VITAMIN D2 50,000 UNIT CAPSULE    TAKE ONE CAPSULE BY MOUTH EVERY 7 DAYS    VITAMIN D2 50,000 UNIT CAPSULE    TAKE ONE CAPSULE BY MOUTH EVERY 7 DAYS    ZIPRASIDONE HCL (GEODON) 80 MG CAPSULE    Take 160 mg by mouth two (2) times daily (with meals). These Medications have changed    No medications on file   Stop Taking    No medications on file       Scribe Attestation     Masoud Castillo acting as a scribe for and in the presence of Louis Abraham MD      October 07, 2017 at 11:26 PM       Provider Attestation:      I personally performed the services described in the documentation, reviewed the documentation, as recorded by the scribe in my presence, and it accurately and completely records my words and actions.  October 07, 2017 at 11:26 PM - Louis Abraham MD

## 2017-10-09 RX ORDER — ERGOCALCIFEROL 1.25 MG/1
CAPSULE ORAL
Qty: 4 CAP | Refills: 0 | Status: SHIPPED | OUTPATIENT
Start: 2017-10-09 | End: 2017-11-08 | Stop reason: SDUPTHER

## 2017-10-10 ENCOUNTER — OFFICE VISIT (OUTPATIENT)
Dept: ORTHOPEDIC SURGERY | Age: 62
End: 2017-10-10

## 2017-10-10 VITALS
HEIGHT: 64 IN | DIASTOLIC BLOOD PRESSURE: 78 MMHG | RESPIRATION RATE: 16 BRPM | SYSTOLIC BLOOD PRESSURE: 130 MMHG | OXYGEN SATURATION: 97 % | BODY MASS INDEX: 28.48 KG/M2 | WEIGHT: 166.8 LBS | HEART RATE: 64 BPM

## 2017-10-10 DIAGNOSIS — M79.642 BILATERAL HAND PAIN: Primary | ICD-10-CM

## 2017-10-10 DIAGNOSIS — M79.641 BILATERAL HAND PAIN: Primary | ICD-10-CM

## 2017-10-10 DIAGNOSIS — M19.041 PRIMARY OSTEOARTHRITIS OF BOTH HANDS: ICD-10-CM

## 2017-10-10 DIAGNOSIS — M19.042 PRIMARY OSTEOARTHRITIS OF BOTH HANDS: ICD-10-CM

## 2017-10-10 DIAGNOSIS — M79.7 FIBROMYALGIA: ICD-10-CM

## 2017-10-10 NOTE — PATIENT INSTRUCTIONS
Please follow up in 4 weeks. You are advised to contact us if your condition worsens. Hand Pain: Care Instructions  Your Care Instructions  Common causes of hand pain are overuse and injuries, such as might happen during sports or home repair projects. Everyday wear and tear, especially as you get older, also can cause hand pain. Most minor hand injuries will heal on their own, and home treatment is usually all you need to do. If you have sudden and severe pain, you may need tests and treatment. Follow-up care is a key part of your treatment and safety. Be sure to make and go to all appointments, and call your doctor if you are having problems. Its also a good idea to know your test results and keep a list of the medicines you take. How can you care for yourself at home? · Take pain medicines exactly as directed. ¨ If the doctor gave you a prescription medicine for pain, take it as prescribed. ¨ If you are not taking a prescription pain medicine, ask your doctor if you can take an over-the-counter medicine. · Rest and protect your hand. Take a break from any activity that may cause pain. · Put ice or a cold pack on your hand for 10 to 20 minutes at a time. Put a thin cloth between the ice and your skin. · Prop up the sore hand on a pillow when you ice it or anytime you sit or lie down during the next 3 days. Try to keep it above the level of your heart. This will help reduce swelling. · If your doctor recommends a sling, splint, or elastic bandage to support your hand, wear it as directed. When should you call for help? Call 911 anytime you think you may need emergency care. For example, call if:  · Your hand turns cool or pale or changes color. Call your doctor now or seek immediate medical care if:  · You cannot move your hand. · Your hand pops, moves out of its normal position, and then returns to its normal position.   · You have signs of infection, such as:  ¨ Increased pain, swelling, warmth, or redness. ¨ Red streaks leading from the sore area. ¨ Pus draining from a place on your hand. ¨ A fever. · Your hand feels numb or tingly. Watch closely for changes in your health, and be sure to contact your doctor if:  · Your hand feels unstable when you try to use it. · You do not get better as expected. · You have any new symptoms, such as swelling. · Bruises from an injury to your hand last longer than 2 weeks. Where can you learn more? Go to http://diana-lashonda.info/. Enter R273 in the search box to learn more about \"Hand Pain: Care Instructions. \"  Current as of: March 20, 2017  Content Version: 11.3  © 1386-7139 [employer], [employer]. Care instructions adapted under license by [employer] (which disclaims liability or warranty for this information). If you have questions about a medical condition or this instruction, always ask your healthcare professional. [place] [address] any warranty or liability for your use of this information.

## 2017-10-10 NOTE — PROCEDURES
RADIOGRAPHIC INTERPRETATION    The impression for this/these radiograph(s) will be found in the office visit progress note for this encounter from 10/10/2017.     Wellington He MD  10/10/2017  5:57 PM

## 2017-10-10 NOTE — PROGRESS NOTES
Chief Complaint   Patient presents with    Hand Pain     bilateral     All orders verbally authorized by Dr Kd Sears

## 2017-10-10 NOTE — MR AVS SNAPSHOT
Visit Information Date & Time Provider Department Dept. Phone Encounter #  
 10/10/2017  3:30 PM Winifred Yusufsravanolamide, [address] [facility] [phone] Your Appointments 10/11/2017 10:20 AM  
Follow Up with BETO Cotto [address] for [facility] (RICHAR SCHEDULING) Appt Note: return in 2 months [address] [place] [address]  
[phone] [address]  
  
    
 10/12/2017  1:15 PM  
Office Visit with Citlali Vargas MD  
[facility]) Appt Note: MWV SUB   
 [facility] [address] [place] [address]  
[phone]  
  
   
 [facility] [address] [place] [address] Upcoming Health Maintenance Date Due DTaP/Tdap/Td series (1 - Tdap) 3/30/1975 INFLUENZA AGE 9 TO ADULT 8/1/2017 BREAST CANCER SCRN MAMMOGRAM 12/2/2017 PAP AKA CERVICAL CYTOLOGY 6/21/2019 COLONOSCOPY 4/13/2027 Allergies as of 10/10/2017  Review Complete On: 10/10/2017 By: Merlin Couch LPN Severity Noted Reaction Type Reactions Aspirin    Other (comments) Stomach bleeding Macrobid [Nitrofurantoin Monohyd/m-cryst]  11/25/2015    Nausea and Vomiting Nsaids (Non-steroidal Anti-inflammatory Drug)    Nausea and Vomiting Opana [Oxymorphone]  11/29/2016    Other (comments) Insomnia, weight loss, nightmares Pcn [Penicillins]    Hives Current Immunizations  Never Reviewed Name Date Pneumococcal Polysaccharide (PPSV-23) 3/7/2014 Not reviewed this visit You Were Diagnosed With   
  
 Codes Comments Bilateral hand pain    -  Primary ICD-10-CM: M79.641, [id number] ICD-9-CM: 729.5 Vitals  BP Pulse Resp Height(growth percentile) Weight(growth percentile) SpO2  
 130/78 (BP 1 Location: Left arm, BP Patient Position: Sitting) 64 16 5' 4\" (1.626 m) 166 lb 12.8 oz (75.7 kg) 97% BMI OB Status Smoking Status 28.63 kg/m2 Hysterectomy Former Smoker BMI and BSA Data Body Mass Index Body Surface Area  
 28.63 kg/m 2 1.85 m 2 Preferred Pharmacy Pharmacy Name Phone [id number] - [address] [phone] Your Updated Medication List  
  
   
This list is accurate as of: 10/10/17  5:19 PM.  Always use your most recent med list.  
  
  
  
  
 albuterol 2.5 mg /3 mL (0.083 %) nebulizer solution Commonly known as:  PROVENTIL VENTOLIN  
2.5 mg.  
  
 atorvastatin 40 mg tablet Commonly known as:  LIPITOR  
TAKE ONE TABLET BY MOUTH ONE TIME DAILY  
  
 baclofen 10 mg tablet Commonly known as:  LIORESAL  
TAKE ONE TABLET BY MOUTH THREE TIMES A DAY  
  
 butalbital-acetaminophen-caffeine -40 mg per tablet Commonly known as:  [place] Fresh Take 1 Tab by mouth every six (6) hours as needed for Pain. Max Daily Amount: 4 Tabs. DEXILANT 60 mg Cpdb Generic drug:  Dexlansoprazole 60 mg daily. dicyclomine 10 mg capsule Commonly known as:  BENTYL Take 1 Cap by mouth four (4) times daily. fluticasone 50 mcg/actuation nasal spray Commonly known as:  Alyssia Crowley 2 Sprays by Both Nostrils route as needed. gabapentin 400 mg capsule Commonly known as:  NEURONTIN  
400 mg two (2) times a day. GEODON 80 mg capsule Generic drug:  ziprasidone Take 160 mg by mouth two (2) times daily (with meals). ipratropium 0.02 % Soln Commonly known as:  ATROVENT  
0.5 mg.  
  
 losartan 25 mg tablet Commonly known as:  COZAAR  
TAKE ONE TABLET BY MOUTH ONE TIME DAILY  
  
 magnesium citrate solution Take 1/3 of bottle every 8 hours until finished or until you have a bowel movement. megestrol 20 mg tablet Commonly known as:  MEGACE Take 1 Tab by mouth daily. morphine CR 60 mg CR tablet Commonly known as:  MS CONTIN  
 Take 1 Tab by mouth every twelve (12) hours. Max Daily Amount: 120 mg.  
  
 naloxone 4 mg/actuation nasal spray Commonly known as:  NARCAN  
4 mg by Nasal route as needed. For emergency use only  Indications: OPIATE-INDUCED RESPIRATORY DEPRESSION  
  
 * oxyCODONE IR 10 mg Tab immediate release tablet Commonly known as:  Cheryln Records Take 1 Tab by mouth three (3) times daily as needed. Max Daily Amount: 30 mg.  
  
 * oxyCODONE IR 10 mg Tab immediate release tablet Commonly known as:  Cheryln Records Take 1 Tab by mouth three (3) times daily as needed. Max Daily Amount: 30 mg.  
  
 polyethylene glycol 17 gram packet Commonly known as:  Laura Look DISSOLVE 1 PACKET IN BEVERAGE OF CHOICE AND DRINK BY MOUTH DAILY  
  
 sertraline 100 mg tablet Commonly known as:  ZOLOFT Take  by mouth daily. sucralfate 1 gram tablet Commonly known as:  CARAFATE  
1 g.  
  
 temazepam 15 mg capsule Commonly known as:  RESTORIL Take 1 Cap by mouth nightly as needed for Sleep. Max Daily Amount: 15 mg. Indications: INSOMNIA  
  
 traMADol 50 mg tablet Commonly known as:  ULTRAM  
Take 1 Tab by mouth every six (6) hours as needed for Pain. Max Daily Amount: 200 mg.  
  
 traZODone 100 mg tablet Commonly known as:  Oletta Mehreen Take 100 mg by mouth nightly. valACYclovir 500 mg tablet Commonly known as:  VALTREX Take 1 Tab by mouth two (2) times a day. * VITAMIN D2 50,000 unit capsule Generic drug:  ergocalciferol TAKE ONE CAPSULE BY MOUTH EVERY 7 DAYS  
  
 * VITAMIN D2 50,000 unit capsule Generic drug:  ergocalciferol TAKE ONE CAPSULE BY MOUTH EVERY 7 DAYS * Notice: This list has 4 medication(s) that are the same as other medications prescribed for you. Read the directions carefully, and ask your doctor or other care provider to review them with you. We Performed the Following AMB POC XRAY, HAND; 3+ VIEWS [24639 CPT(R)] AMB POC XRAY, HAND; 3+ VIEWS [77587 CPT(R)] Patient Instructions Please follow up in 4 weeks. You are advised to contact us if your condition worsens. Hand Pain: Care Instructions Your Care Instructions Common causes of hand pain are overuse and injuries, such as might happen during sports or home repair projects. Everyday wear and tear, especially as you get older, also can cause hand pain. Most minor hand injuries will heal on their own, and home treatment is usually all you need to do. If you have sudden and severe pain, you may need tests and treatment. Follow-up care is a key part of your treatment and safety. Be sure to make and go to all appointments, and call your doctor if you are having problems. Its also a good idea to know your test results and keep a list of the medicines you take. How can you care for yourself at home? · Take pain medicines exactly as directed. ¨ If the doctor gave you a prescription medicine for pain, take it as prescribed. ¨ If you are not taking a prescription pain medicine, ask your doctor if you can take an over-the-counter medicine. · Rest and protect your hand. Take a break from any activity that may cause pain. · Put ice or a cold pack on your hand for 10 to 20 minutes at a time. Put a thin cloth between the ice and your skin. · Prop up the sore hand on a pillow when you ice it or anytime you sit or lie down during the next 3 days. Try to keep it above the level of your heart. This will help reduce swelling. · If your doctor recommends a sling, splint, or elastic bandage to support your hand, wear it as directed. When should you call for help? Call 911 anytime you think you may need emergency care. For example, call if: 
· Your hand turns cool or pale or changes color. Call your doctor now or seek immediate medical care if: 
· You cannot move your hand. · Your hand pops, moves out of its normal position, and then returns to its normal position. · You have signs of infection, such as: ¨ Increased pain, swelling, warmth, or redness. ¨ Red streaks leading from the sore area. ¨ Pus draining from a place on your hand. ¨ A fever. · Your hand feels numb or tingly. Watch closely for changes in your health, and be sure to contact your doctor if: 
· Your hand feels unstable when you try to use it. · You do not get better as expected. · You have any new symptoms, such as swelling. · Bruises from an injury to your hand last longer than 2 weeks. Where can you learn more? Go to http://diana-lashonda.info/. Enter R273 in the search box to learn more about \"Hand Pain: Care Instructions. \" Current as of: March 20, 2017 Content Version: 11.3 © 7761-1899 [employer]. Care instructions adapted under license by [employer] (which disclaims liability or warranty for this information). If you have questions about a medical condition or this instruction, always ask your healthcare professional. [place] [address] any warranty or liability for your use of this information. Please provide this summary of care documentation to your next provider. Your primary care clinician is listed as [id number] [facility]. If you have any questions after today's visit, please call [phone].

## 2017-10-10 NOTE — PROGRESS NOTES
AMBULATORY PROGRESS NOTE      Patient: Ruth Baires             MRN: [id number]     SSN: [id number] Body mass index is 28.63 kg/(m^2). YOB: 1955     AGE: 60 y.o. EX: female    PCP: Indira Lilly MD    IMPRESSION/DIAGNOSIS AND TREATMENT PLAN     DIAGNOSES  1. Bilateral hand pain    2. Primary osteoarthritis of both hands    3. Fibromyalgia        Orders Placed This Encounter    [99937] Hand Min 3V    [90246] Hand Min 3V    REFERRAL TO [address] [place] [address] understands her diagnoses and the proposed plan. Plan:    1) Referral for Occupational Therapy    RTO - 4 weeks    HPI AND R Prema 65 A 60 y.o. female who presents to my outpatient office complaining of bilateral hand pain. The patient, Ruth Baires, is a 69-year-old female who is seen here today complaining of right wrist pain and left wrist pain. She has had extensive occupational therapy in the remote past.  She states she has had different wrist guards and wrist splints in the past, and she is currently in pain management, for which she receives Morphine and Oxycodone, she states. She has a history of fibromyalgia, as well as degenerative osteoarthritic changes of the cervical spine. She reports having focal pain to the dorsal part of her left hand and right hand. She points to the dorsal part of each wrist.  She describes more of a catching and cramping to her hands, and she states she is currently on disability due to the severe pain and cramping of her hands. On examination, the patient is alert and follows commands. She is in no acute distress. Her affect and mood are appropriate. Each hand is examined. She has slow range of motion of her fingers of her right hand. She cannot fully bring down her ring finger into the palm of her hand. She has some slight tenderness to the A1 pulley of the right ring finger.   She has some mild tenderness to the dorsal part of the right intercarpal regions of her right wrist.  Her left hand is examined. She can move her left hand much better in terms of flexion and extension, although she moves it slowly, but she can still flex all of her fingers down to the distal palmar crease, proximal palmar crease. There is no clicking or popping of her left wrist and no instability of her left wrist.     X-rays of her left hand to include the wrist:  There appears to be some joint space narrowing at the index metacarpal to carpal CMC joint region. Otherwise, no fracture, subluxation, or dislocation. No osteolytic or osteoblastic lesions are seen. Three views of the right hand, AP, lateral, and oblique, shows some OA to the index metacarpal to carpsl joint. No instability and no osteolytic or osteoblastic lesions are seen to the right wrist.  No frx marlon seen to the right scaphoid. Cardiovascular/Peripheral Vascular: Normal Pulses to each hand and foot  Musculoskeletal:  LOCATION:   right wrist    HAND, WRIST, FOREARM: RIGHT     Integumentary: No rashes, skin patches, wounds, blisters, or abrasions    Warm and normal color. No regions of expressible drainage       Deformities:  Is not present       Swelling: Regions of abnormal swelling : Right wrist       Tenderness:  There is regions of tenderness : right wrist AT INDEX FINGER cmc region and A1 pulley ring finger (mild)       Motor/Strength/Tone Exam: WNL strength in all tested muscle groups: wrist extension / flexion       Sensory Exam:  no sensory deficits noted       Stability Testing: NONE       ROM:  full range of motion        Contractures:  None to affected region         Vascular : Normal capillary refill and digital coloration   No embolic phenomena to the toes or hands   Edema is not present         Neuro Exam:  Sensation : no focal            Motor function: WNL        CHART REVIEW     Past Medical History:   Diagnosis Date    Annular tear of lumbar disc 11/10/2014    Asthma     Bronchitis    Benign tumor of throat     Bone spur     right ankle    Cervicalgia 11/10/2014    Chronic pain     back    Degeneration of lumbar or lumbosacral intervertebral disc 11/10/2014    Degenerative disc disease     Depression     Displacement of lumbar intervertebral disc without myelopathy 11/10/2014    Elevated white blood cell count     Fibromyalgia     GERD (gastroesophageal reflux disease)     Hypertension     Insomnia     Nausea & vomiting     Pain in joint, multiple sites 11/10/2014    Postlaminectomy syndrome, cervical region 11/10/2014    Sinus infection 10/5/15    Ulcer ([address]      Current Outpatient Prescriptions   Medication Sig    VITAMIN D2 50,000 unit capsule TAKE ONE CAPSULE BY MOUTH EVERY 7 DAYS    valACYclovir (VALTREX) 500 mg tablet Take 1 Tab by mouth two (2) times a day.  losartan (COZAAR) 25 mg tablet TAKE ONE TABLET BY MOUTH ONE TIME DAILY     atorvastatin (LIPITOR) 40 mg tablet TAKE ONE TABLET BY MOUTH ONE TIME DAILY     polyethylene glycol (MIRALAX) 17 gram packet DISSOLVE 1 PACKET IN BEVERAGE OF CHOICE AND DRINK BY MOUTH DAILY    morphine CR (MS CONTIN) 60 mg CR tablet Take 1 Tab by mouth every twelve (12) hours. Max Daily Amount: 120 mg.    oxyCODONE IR (ROXICODONE) 10 mg tab immediate release tablet Take 1 Tab by mouth three (3) times daily as needed. Max Daily Amount: 30 mg.    oxyCODONE IR (ROXICODONE) 10 mg tab immediate release tablet Take 1 Tab by mouth three (3) times daily as needed. Max Daily Amount: 30 mg.    VITAMIN D2 50,000 unit capsule TAKE ONE CAPSULE BY MOUTH EVERY 7 DAYS    butalbital-acetaminophen-caffeine (FIORICET, ESGIC) -40 mg per tablet Take 1 Tab by mouth every six (6) hours as needed for Pain. Max Daily Amount: 4 Tabs.  baclofen (LIORESAL) 10 mg tablet TAKE ONE TABLET BY MOUTH THREE TIMES A DAY    megestrol (MEGACE) 20 mg tablet Take 1 Tab by mouth daily.     naloxone 4 mg/actuation spry 4 mg by Nasal route as needed. For emergency use only  Indications: OPIATE-INDUCED RESPIRATORY DEPRESSION    dicyclomine (BENTYL) 10 mg capsule Take 1 Cap by mouth four (4) times daily.  traMADol (ULTRAM) 50 mg tablet Take 1 Tab by mouth every six (6) hours as needed for Pain. Max Daily Amount: 200 mg.    magnesium citrate solution Take 1/3 of bottle every 8 hours until finished or until you have a bowel movement.  temazepam (RESTORIL) 15 mg capsule Take 1 Cap by mouth nightly as needed for Sleep. Max Daily Amount: 15 mg. Indications: INSOMNIA    fluticasone (FLONASE) 50 mcg/actuation nasal spray 2 Sprays by Both Nostrils route as needed.  sucralfate (CARAFATE) 1 gram tablet 1 g.    albuterol (PROVENTIL VENTOLIN) 2.5 mg /3 mL (0.083 %) nebulizer solution 2.5 mg.    Dexlansoprazole (DEXILANT) 60 mg CpDB 60 mg daily.  gabapentin (NEURONTIN) 400 mg capsule 400 mg two (2) times a day.  ipratropium (ATROVENT) 0.02 % nebulizer solution 0.5 mg.    trazodone (DESYREL) 100 mg tablet Take 100 mg by mouth nightly.  ziprasidone hcl (GEODON) 80 mg capsule Take 160 mg by mouth two (2) times daily (with meals).  sertraline (ZOLOFT) 100 mg tablet Take  by mouth daily. No current facility-administered medications for this visit.       Allergies   Allergen Reactions    Aspirin Other (comments)     Stomach bleeding    Macrobid [Nitrofurantoin Monohyd/M-Cryst] Nausea and Vomiting    Nsaids (Non-Steroidal Anti-Inflammatory Drug) Nausea and Vomiting    Opana [Oxymorphone] Other (comments)     Insomnia, weight loss, nightmares    Pcn [Penicillins] Hives     Past Surgical History:   Procedure Laterality Date    COLONOSCOPY N/A 4/13/2017    COLONOSCOPY performed [facility] Sydni [facility] at [facility] ENDOSCOPY    HX CATARACT REMOVAL      HX CERVICAL FUSION      HX HYSTERECTOMY      HX ORTHOPAEDIC      ganglion cyst removed, right hand    HX OTHER SURGICAL      cyst left thigh removed    LAP,CHOLECYSTECTOMY N/A 02/27/2017     Malika Poole     Social History     Occupational History    Not on file. Social History Main Topics    Smoking status: Former Smoker    Smokeless tobacco: Never Used    Alcohol use No      Comment: none in 24 years    Drug use: No      Comment: last smoked in 1993    Sexual activity: Not on file     Family History   Problem Relation Age of Onset    Hypertension Other     Heart Attack Other     Heart Disease Other     Stroke Other     Headache Other     Seizures Other        REVIEW OF SYSTEMS : 10/10/2017  ALL BELOW ARE Negative except : SEE HPI       Constitutional: Negative for fever, chills and weight loss. Neg Weigh Loss  Cardiovascular: Negative for chest pain, claudication and leg swelling. SOB, VILLAGOMEZ   Gastrointestinal: Negative for  pain, N/V/D/C, Blood in stool or urine,dysuria, hematuria,        Incontinence, pelvic pain  Musculoskeletal: see HPI. Neurological: Negative for dizziness and weakness. Negative for headaches,Visual Changes, Confusion, Seizures,   Psychiatric/Behavioral: Negative for depression, memory loss and substance abuse. Extremities:  Negative for  hair changes, rash or skin lesion changes. Hematologic: Negative for Bleeding problems, bruising, pallor or swollen lymph nodes. Peripheral Vascular: No calf pain, vascular vein tenderness to calf pain              No calf throbbing, posterior knee throbbing pain    DIAGNOSTIC IMAGING     RADIOGRAPHIC INTERPRETATION    The impression for this/these radiograph(s) will be found in the office visit progress note for this encounter from 10/10/2017. Mckenna Mauricio MD  10/10/2017  5:57 PM          Written by Shaniqua Puckett, as dictated by Martha Corbett MD. IDr., Martha Corbett MD, confirm that all documentation is accurate.

## 2017-10-11 ENCOUNTER — OFFICE VISIT (OUTPATIENT)
Dept: PAIN MANAGEMENT | Age: 62
End: 2017-10-11

## 2017-10-11 VITALS
BODY MASS INDEX: 28.34 KG/M2 | RESPIRATION RATE: 12 BRPM | HEART RATE: 63 BPM | WEIGHT: 166 LBS | SYSTOLIC BLOOD PRESSURE: 126 MMHG | DIASTOLIC BLOOD PRESSURE: 76 MMHG | HEIGHT: 64 IN | TEMPERATURE: 99 F

## 2017-10-11 DIAGNOSIS — G89.29 CHRONIC BILATERAL LOW BACK PAIN WITHOUT SCIATICA: ICD-10-CM

## 2017-10-11 DIAGNOSIS — G89.4 CHRONIC PAIN SYNDROME: ICD-10-CM

## 2017-10-11 DIAGNOSIS — M54.50 CHRONIC BILATERAL LOW BACK PAIN WITHOUT SCIATICA: ICD-10-CM

## 2017-10-11 DIAGNOSIS — M25.50 PAIN IN JOINT, MULTIPLE SITES: ICD-10-CM

## 2017-10-11 DIAGNOSIS — M51.37 DEGENERATION OF LUMBAR OR LUMBOSACRAL INTERVERTEBRAL DISC: ICD-10-CM

## 2017-10-11 DIAGNOSIS — M54.2 CERVICALGIA: ICD-10-CM

## 2017-10-11 DIAGNOSIS — M96.1 POSTLAMINECTOMY SYNDROME, CERVICAL REGION: ICD-10-CM

## 2017-10-11 RX ORDER — MORPHINE SULFATE 60 MG/1
60 TABLET, FILM COATED, EXTENDED RELEASE ORAL EVERY 12 HOURS
Qty: 60 TAB | Refills: 0 | Status: SHIPPED | OUTPATIENT
Start: 2017-10-30 | End: 2018-01-09 | Stop reason: SDUPTHER

## 2017-10-11 RX ORDER — MORPHINE SULFATE 60 MG/1
60 TABLET, FILM COATED, EXTENDED RELEASE ORAL EVERY 12 HOURS
Qty: 60 TAB | Refills: 0 | Status: SHIPPED | OUTPATIENT
Start: 2017-11-29 | End: 2018-01-09 | Stop reason: SDUPTHER

## 2017-10-11 RX ORDER — MORPHINE SULFATE 60 MG/1
60 TABLET, FILM COATED, EXTENDED RELEASE ORAL EVERY 12 HOURS
Qty: 60 TAB | Refills: 0 | Status: SHIPPED | OUTPATIENT
Start: 2017-12-28 | End: 2017-10-18

## 2017-10-11 RX ORDER — OXYCODONE HYDROCHLORIDE 10 MG/1
10 TABLET ORAL
Qty: 90 TAB | Refills: 0 | Status: SHIPPED | OUTPATIENT
Start: 2017-12-28 | End: 2017-10-18

## 2017-10-11 RX ORDER — OXYCODONE HYDROCHLORIDE 10 MG/1
10 TABLET ORAL
Qty: 90 TAB | Refills: 0 | Status: SHIPPED | OUTPATIENT
Start: 2017-11-29 | End: 2018-01-09 | Stop reason: SDUPTHER

## 2017-10-11 RX ORDER — OXYCODONE HYDROCHLORIDE 10 MG/1
10 TABLET ORAL
Qty: 90 TAB | Refills: 0 | Status: SHIPPED | OUTPATIENT
Start: 2017-10-30 | End: 2017-10-18

## 2017-10-11 NOTE — PROGRESS NOTES
Nursing Notes    Patient presents to the office today in follow-up. Patient rates her pain at 4/10 on the numerical pain scale. Reviewed medications with counts as follows:    Rx Date filled Qty Dispensed Pill Count Last Dose Short   Oxycodone 10mg 10/01/17 90 57 today    MS Contin ER 60mg 10/01/17 60 38 today                                   Comments:     POC UDS was not performed in office today    Any new labs or imaging since last appointment? NO    Have you been to an emergency room (ER) or urgent care clinic since your last visit? YES ,[facility] panic attach           Have you been hospitalized since your last visit? NO     If yes, where, when, and reason for visit? Have you seen or consulted any other health care providers outside of the [address] [place] [address]  since your last visit? NO     If yes, where, when, and reason for visit? HM deferred to pcp.

## 2017-10-11 NOTE — PROGRESS NOTES
HISTORY OF PRESENT ILLNESS  Ben Mcdonald is a 60 y.o. female    HPI: Ms. Benjamín Negro  returns today for f/u of chronic severe pain involving the lumbar spine, which has been attributed to a lumbar degenerative disc disease with spondylosis. In addition, she has neck pain attributed to a post cervical laminectomy syndrome. No h/o lumbar surgery. PT and injections in the past with no improvement. Prior treatment with Fentanyl and Opana with significant side effects. She reports a mild worsening back pain since her last visit. She reports no acute injury or fall. We discussed several different options today. She is not interested in switching or changing her treatment plan at this time. She says that her treatment plan has been offering significant pain control and she is afraid to change that at this time. She is not interested in updated MRI either as she is not interested in surgery or injections. She reports that her injections in the past were too painful. She does agree to MRI later if needed. I have recommended that she try glucosamine conjoint which she will also discuss with her family care doctor. She will add home exercises/stretching and moist heat as well. We will continue with her current treatment plan and have her follow-up in 3 months or sooner if needed. Current medication management consists of MS Contin 60 mg BID,  Oxycodone IR 10 mg TID PRN, and Baclofen 10 mg TID PRN. Lorazepam and Temazepam by her PCP. Medications are helping with pain control and quality of life. Her pain is 2-3/10 with medication and 10/10 without. Pt describes pain as aching. Aggravating factors include standing, sitting, walking. Relieved with rest, sitting, lying down, and avoiding painful activities. Current treatment is helping to improve general activity, mood, walking, sleep, enjoyment of life    She  is otherwise doing well with no other complaints today.  She denies any adverse events including nausea, vomiting, dizziness, constipation, hallucinations, or seizures. Because the patient's current regimen places him/her at increased risk for possible overdose, a prescription for naloxone nasal spray has been provided. The patient understands that this medication is only to be used in the setting of a possible overdose and that inadvertent use of this medication could precipitate overt withdrawal.         Allergies   Allergen Reactions    Aspirin Other (comments)     Stomach bleeding    Macrobid [Nitrofurantoin Monohyd/M-Cryst] Nausea and Vomiting    Nsaids (Non-Steroidal Anti-Inflammatory Drug) Nausea and Vomiting    Opana [Oxymorphone] Other (comments)     Insomnia, weight loss, nightmares    Pcn [Penicillins] Hives       Past Surgical History:   Procedure Laterality Date    COLONOSCOPY N/A 4/13/2017    COLONOSCOPY performed by Carlos Gutierrez [facility] at [facility] ENDOSCOPY    HX CATARACT REMOVAL      HX CERVICAL FUSION      HX HYSTERECTOMY      HX ORTHOPAEDIC      ganglion cyst removed, right hand    HX OTHER SURGICAL      cyst left thigh removed    LAP,CHOLECYSTECTOMY N/A 02/27/2017    Dr. Heriberto Jha         Review of Systems   Constitutional: Negative for chills, fever and weight loss. HENT: Negative for congestion, ear discharge and sore throat. Eyes: Negative for blurred vision and double vision. Respiratory: Negative for cough, shortness of breath and wheezing. Cardiovascular: Negative for chest pain and palpitations. Gastrointestinal: Positive for abdominal pain ( Right upper quadrant pain). Negative for constipation, diarrhea, heartburn, nausea and vomiting. Genitourinary: Positive for dysuria, frequency, hematuria and urgency. Musculoskeletal: Positive for back pain and joint pain (left knee  ). Negative for falls and neck pain. Skin: Negative for itching and rash. Neurological: Negative for dizziness, seizures, loss of consciousness and headaches.    Endo/Heme/Allergies: Does not bruise/bleed easily. Psychiatric/Behavioral: Positive for depression. Negative for suicidal ideas. The patient has insomnia. The patient is not nervous/anxious. Physical Exam   Constitutional: She is oriented to person, place, and time and well-developed, well-nourished, and in no distress. No distress. HENT:   Head: Normocephalic and atraumatic. Eyes: EOM are normal.   Neck: Normal range of motion. Pulmonary/Chest: Effort normal.   Musculoskeletal: Normal range of motion. surgical scar on left knee from prior surgery. Neurological: She is alert and oriented to person, place, and time. Gait abnormal.   Skin: Skin is warm and dry. No rash noted. She is not diaphoretic. No erythema. Psychiatric: Memory and judgment normal.   Nursing note and vitals reviewed. ASSESSMENT:    1. Chronic bilateral low back pain without sciatica    2. Chronic pain syndrome    3. Degeneration of lumbar or lumbosacral intervertebral disc    4. Pain in joint, multiple sites    5. Cervicalgia    6. Postlaminectomy syndrome, cervical region           [facility] Program was reviewed which does not demonstrate aberrancies and/or inconsistencies with regard to the historical prescribing of controlled medications to this patient by other providers. NOTE: Disclosed hydrocodone from ER visit filled 12/22/2016   Hydrocodone for postsurgical pain disclosed on 2/27/2017, IV morphine disclosed on 4/4/2017 tramadol was disclosed for gallbladder pain  by her PCP on 4/13/2017. This medication was then switched to Fioricet on 4/20/2017. Disclosed oxycodone 5/325 mg received from the emergency room filled on 8/14/2017. PLAN / Pt Instructions:  1. Continue current plan. Stable on current medication without adverse events. 2. Refill morphine ER 60 mg 2 times daily. 3. Refill oxycodone 10 mg up to 3 times daily as needed for pain. 4. Continue baclofen 10 mg. Up to 3 times daily as needed.   Has 3 refills left  5. Naloxone 4 mg nasal spray for opioid induced respiratory depression emergency only. 6. Continue home exercise program.  Exercise/stretching were demonstrated in office today. 7. Discussed risks of addiction, dependency, and opioid induced hyperalgesia. 8. Return to clinic in 3 months     Medications Ordered Today   Medications    morphine CR (MS CONTIN) 60 mg CR tablet     Sig: Take 1 Tab by mouth every twelve (12) hours. Max Daily Amount: 120 mg. Dispense:  60 Tab     Refill:  0    morphine CR (MS CONTIN) 60 mg CR tablet     Sig: Take 1 Tab by mouth every twelve (12) hours for 30 days. Max Daily Amount: 120 mg. Dispense:  60 Tab     Refill:  0    morphine CR (MS CONTIN) 60 mg CR tablet     Sig: Take 1 Tab by mouth every twelve (12) hours for 30 days. Max Daily Amount: 120 mg. Dispense:  60 Tab     Refill:  0    oxyCODONE IR (ROXICODONE) 10 mg tab immediate release tablet     Sig: Take 1 Tab by mouth three (3) times daily as needed. Max Daily Amount: 30 mg. Dispense:  90 Tab     Refill:  0    oxyCODONE IR (ROXICODONE) 10 mg tab immediate release tablet     Sig: Take 1 Tab by mouth three (3) times daily as needed. Max Daily Amount: 30 mg. Dispense:  90 Tab     Refill:  0    oxyCODONE IR (ROXICODONE) 10 mg tab immediate release tablet     Sig: Take 1 Tab by mouth three (3) times daily as needed. Max Daily Amount: 30 mg. Dispense:  90 Tab     Refill:  0       Pain medications prescribed with the objective of pain relief and improved physical and psychosocial function in this patient. Spent 25 minutes with patient today reviewing the treatment plan, goals of treatment plan, and limitations of the treatment plan, to include the potential for side effects from medications and procedures. Eboni Malave 10/11/2017      Note: Please excuse any typographical errors. Voice recognition software was used for this note and may cause mistakes.

## 2017-10-11 NOTE — MR AVS SNAPSHOT
Visit Information Date & Time Provider Department Dept. Phone Encounter #  
 10/11/2017 10:20 AM BETO Young [address] for [facility] [phone] Follow-up Instructions Return in about 3 months (around 1/11/2018). Your Appointments 10/12/2017  1:15 PM  
Office Visit with Susan Valle MD  
[facility]) Appt Note: MWV SUB   
 [facility] [address] [address]  
  
    
 1/9/2018 11:20 AM  
Follow Up with BETO Young [address] for Pain [facility] (RICHAR SCHEDULING) Appt Note: return in 3 months [address] [address]  
[phone] [facility] [phone] Upcoming Health Maintenance Date Due DTaP/Tdap/Td series (1 - Tdap) 3/30/1975 INFLUENZA AGE 9 TO ADULT 8/1/2017 BREAST CANCER SCRN MAMMOGRAM 12/2/2017 PAP AKA CERVICAL CYTOLOGY 6/21/2019 COLONOSCOPY 4/13/2027 Allergies as of 10/11/2017  Review Complete On: 10/11/2017 By: BETO Young Severity Noted Reaction Type Reactions Aspirin    Other (comments) Stomach bleeding Macrobid [Nitrofurantoin Monohyd/m-cryst]  11/25/2015    Nausea and Vomiting Nsaids (Non-steroidal Anti-inflammatory Drug)    Nausea and Vomiting Opana [Oxymorphone]  11/29/2016    Other (comments) Insomnia, weight loss, nightmares Pcn [Penicillins]    Hives Current Immunizations  Never Reviewed Name Date Pneumococcal Polysaccharide (PPSV-23) 3/7/2014 Not reviewed this visit Vitals BP Pulse Temp Resp Height(growth percentile) Weight(growth percentile) 126/76 63 99 °F (37.2 °C) 12 5' 4\" (1.626 m) 166 lb (75.3 kg) BMI OB Status Smoking Status 28.49 kg/m2 Hysterectomy Former Smoker BMI and BSA Data Body Mass Index Body Surface Area  
 28.49 kg/m 2 1.84 m 2 Preferred Pharmacy Pharmacy Name Phone [id number] - [address] [phone] Your Updated Medication List  
  
   
This list is accurate as of: 10/11/17 11:18 AM.  Always use your most recent med list.  
  
  
  
  
 albuterol 2.5 mg /3 mL (0.083 %) nebulizer solution Commonly known as:  PROVENTIL VENTOLIN  
2.5 mg.  
  
 atorvastatin 40 mg tablet Commonly known as:  LIPITOR  
TAKE ONE TABLET BY MOUTH ONE TIME DAILY  
  
 baclofen 10 mg tablet Commonly known as:  LIORESAL  
TAKE ONE TABLET BY MOUTH THREE TIMES A DAY  
  
 butalbital-acetaminophen-caffeine -40 mg per tablet Commonly known as:  Minerva Alaina Take 1 Tab by mouth every six (6) hours as needed for Pain. Max Daily Amount: 4 Tabs. DEXILANT 60 mg Cpdb Generic drug:  Dexlansoprazole 60 mg daily. dicyclomine 10 mg capsule Commonly known as:  BENTYL Take 1 Cap by mouth four (4) times daily. fluticasone 50 mcg/actuation nasal spray Commonly known as:  Pravin Riser 2 Sprays by Both Nostrils route as needed. gabapentin 400 mg capsule Commonly known as:  NEURONTIN  
400 mg two (2) times a day. GEODON 80 mg capsule Generic drug:  ziprasidone Take 160 mg by mouth two (2) times daily (with meals). ipratropium 0.02 % Soln Commonly known as:  ATROVENT  
0.5 mg.  
  
 losartan 25 mg tablet Commonly known as:  COZAAR  
TAKE ONE TABLET BY MOUTH ONE TIME DAILY  
  
 magnesium citrate solution Take 1/3 of bottle every 8 hours until finished or until you have a bowel movement. megestrol 20 mg tablet Commonly known as:  MEGACE Take 1 Tab by mouth daily. * morphine CR 60 mg CR tablet Commonly known as:  MS CONTIN Take 1 Tab by mouth every twelve (12) hours. Max Daily Amount: 120 mg. Start taking on:  10/30/2017 * morphine CR 60 mg CR tablet Commonly known as:  MS CONTIN Take 1 Tab by mouth every twelve (12) hours for 30 days. Max Daily Amount: 120 mg.  
Start taking on:  11/29/2017 * morphine CR 60 mg CR tablet Commonly known as:  MS CONTIN Take 1 Tab by mouth every twelve (12) hours for 30 days. Max Daily Amount: 120 mg.  
Start taking on:  12/28/2017  
  
 naloxone 4 mg/actuation nasal spray Commonly known as:  NARCAN  
4 mg by Nasal route as needed. For emergency use only  Indications: OPIATE-INDUCED RESPIRATORY DEPRESSION  
  
 * oxyCODONE IR 10 mg Tab immediate release tablet Commonly known as:  Jace Rattler Take 1 Tab by mouth three (3) times daily as needed. Max Daily Amount: 30 mg. Start taking on:  10/30/2017 * oxyCODONE IR 10 mg Tab immediate release tablet Commonly known as:  [place] Rattler Take 1 Tab by mouth three (3) times daily as needed. Max Daily Amount: 30 mg. Start taking on:  11/29/2017 * oxyCODONE IR 10 mg Tab immediate release tablet Commonly known as:  Jace Rattler Take 1 Tab by mouth three (3) times daily as needed. Max Daily Amount: 30 mg. Start taking on:  12/28/2017 polyethylene glycol 17 gram packet Commonly known as:  Kreg Patron DISSOLVE 1 PACKET IN BEVERAGE OF CHOICE AND DRINK BY MOUTH DAILY  
  
 sertraline 100 mg tablet Commonly known as:  ZOLOFT Take  by mouth daily. sucralfate 1 gram tablet Commonly known as:  CARAFATE  
1 g.  
  
 temazepam 15 mg capsule Commonly known as:  RESTORIL Take 1 Cap by mouth nightly as needed for Sleep. Max Daily Amount: 15 mg. Indications: INSOMNIA  
  
 traMADol 50 mg tablet Commonly known as:  ULTRAM  
Take 1 Tab by mouth every six (6) hours as needed for Pain. Max Daily Amount: 200 mg.  
  
 traZODone 100 mg tablet Commonly known as:  Rebbecca Bunde Take 100 mg by mouth nightly. valACYclovir 500 mg tablet Commonly known as:  VALTREX Take 1 Tab by mouth two (2) times a day. VITAMIN D2 50,000 unit capsule Generic drug:  ergocalciferol TAKE ONE CAPSULE BY MOUTH EVERY 7 DAYS * Notice: This list has 6 medication(s) that are the same as other medications prescribed for you. Read the directions carefully, and ask your doctor or other care provider to review them with you. Prescriptions Printed Refills  
 morphine CR (MS CONTIN) 60 mg CR tablet 0 Starting on: 10/30/2017 Sig: Take 1 Tab by mouth every twelve (12) hours. Max Daily Amount: 120 mg.  
 Class: Print Route: Oral  
 morphine CR (MS CONTIN) 60 mg CR tablet 0 Starting on: 11/29/2017 Sig: Take 1 Tab by mouth every twelve (12) hours for 30 days. Max Daily Amount: 120 mg.  
 Class: Print Route: Oral  
 morphine CR (MS CONTIN) 60 mg CR tablet 0 Starting on: 12/28/2017 Sig: Take 1 Tab by mouth every twelve (12) hours for 30 days. Max Daily Amount: 120 mg.  
 Class: Print Route: Oral  
 oxyCODONE IR (ROXICODONE) 10 mg tab immediate release tablet 0 Starting on: 10/30/2017 Sig: Take 1 Tab by mouth three (3) times daily as needed. Max Daily Amount: 30 mg.  
 Class: Print Route: Oral  
 oxyCODONE IR (ROXICODONE) 10 mg tab immediate release tablet 0 Starting on: 11/29/2017 Sig: Take 1 Tab by mouth three (3) times daily as needed. Max Daily Amount: 30 mg.  
 Class: Print Route: Oral  
 oxyCODONE IR (ROXICODONE) 10 mg tab immediate release tablet 0 Starting on: 12/28/2017 Sig: Take 1 Tab by mouth three (3) times daily as needed. Max Daily Amount: 30 mg.  
 Class: Print Route: Oral  
  
Follow-up Instructions Return in about 3 months (around 1/11/2018). Patient Instructions 1. Continue current plan. Stable on current medication without adverse events. 2. Refill morphine ER 60 mg 2 times daily. 3. Refill oxycodone 10 mg up to 3 times daily as needed for pain. 4. Continue baclofen 10 mg. Up to 3 times daily as needed. Has 3 refills left 5. Naloxone 4 mg nasal spray for opioid induced respiratory depression emergency only. 6. Continue home exercise program.  Exercise/stretching were demonstrated in office today. 7. Discussed risks of addiction, dependency, and opioid induced hyperalgesia. 8. Return to clinic in 3 months Please provide this summary of care documentation to your next provider. Your primary care clinician is listed as [id number] [facility]. If you have any questions after today's visit, please call [phone].

## 2017-10-11 NOTE — PATIENT INSTRUCTIONS
1. Continue current plan. Stable on current medication without adverse events. 2. Refill morphine ER 60 mg 2 times daily. 3. Refill oxycodone 10 mg up to 3 times daily as needed for pain. 4. Continue baclofen 10 mg. Up to 3 times daily as needed. Has 3 refills left  5. Naloxone 4 mg nasal spray for opioid induced respiratory depression emergency only. 6. Continue home exercise program.  Exercise/stretching were demonstrated in office today. 7. Discussed risks of addiction, dependency, and opioid induced hyperalgesia.    8. Return to clinic in 3 months

## 2017-10-12 ENCOUNTER — OFFICE VISIT (OUTPATIENT)
Dept: FAMILY MEDICINE CLINIC | Age: 62
End: 2017-10-12

## 2017-10-12 ENCOUNTER — HOSPITAL ENCOUNTER (OUTPATIENT)
Dept: LAB | Age: 62
Discharge: HOME | End: 2017-10-12
Payer: MEDICARE

## 2017-10-12 VITALS
OXYGEN SATURATION: 96 % | SYSTOLIC BLOOD PRESSURE: 100 MMHG | HEIGHT: 64 IN | RESPIRATION RATE: 10 BRPM | BODY MASS INDEX: 28.44 KG/M2 | DIASTOLIC BLOOD PRESSURE: 70 MMHG | HEART RATE: 78 BPM | WEIGHT: 166.6 LBS | TEMPERATURE: 98.1 F

## 2017-10-12 DIAGNOSIS — Z71.89 ACP (ADVANCE CARE PLANNING): ICD-10-CM

## 2017-10-12 DIAGNOSIS — Z00.00 MEDICARE ANNUAL WELLNESS VISIT, SUBSEQUENT: ICD-10-CM

## 2017-10-12 DIAGNOSIS — J30.89 CHRONIC NON-SEASONAL ALLERGIC RHINITIS, UNSPECIFIED TRIGGER: Primary | ICD-10-CM

## 2017-10-12 DIAGNOSIS — K13.70 ORAL MUCOSAL LESION: ICD-10-CM

## 2017-10-12 DIAGNOSIS — Z12.39 BREAST CANCER SCREENING: ICD-10-CM

## 2017-10-12 LAB
ALBUMIN SERPL-MCNC: 4 G/DL (ref 3.4–5)
ALBUMIN/GLOB SERPL: 1.1 {RATIO} (ref 0.8–1.7)
ALP SERPL-CCNC: 112 U/L (ref 45–117)
ALT SERPL-CCNC: 43 U/L (ref 13–56)
ANION GAP SERPL CALC-SCNC: 6 MMOL/L (ref 3–18)
AST SERPL-CCNC: 28 U/L (ref 15–37)
BASOPHILS # BLD: 0 K/UL (ref 0–0.06)
BASOPHILS NFR BLD: 0 % (ref 0–2)
BILIRUB SERPL-MCNC: 0.4 MG/DL (ref 0.2–1)
BUN SERPL-MCNC: 13 MG/DL (ref 7–18)
BUN/CREAT SERPL: 16 (ref 12–20)
CALCIUM SERPL-MCNC: 9.1 MG/DL (ref 8.5–10.1)
CHLORIDE SERPL-SCNC: 106 MMOL/L (ref 100–108)
CHOLEST SERPL-MCNC: 160 MG/DL
CO2 SERPL-SCNC: 29 MMOL/L (ref 21–32)
CREAT SERPL-MCNC: 0.8 MG/DL (ref 0.6–1.3)
DIFFERENTIAL METHOD BLD: ABNORMAL
EOSINOPHIL # BLD: 0.1 K/UL (ref 0–0.4)
EOSINOPHIL NFR BLD: 2 % (ref 0–5)
ERYTHROCYTE [DISTWIDTH] IN BLOOD BY AUTOMATED COUNT: 13.2 % (ref 11.6–14.5)
GLOBULIN SER CALC-MCNC: 3.6 G/DL (ref 2–4)
GLUCOSE SERPL-MCNC: 103 MG/DL (ref 74–99)
HCT VFR BLD AUTO: 39.3 % (ref 35–45)
HDLC SERPL-MCNC: 64 MG/DL (ref 40–60)
HDLC SERPL: 2.5 {RATIO} (ref 0–5)
HGB BLD-MCNC: 12.7 G/DL (ref 12–16)
LDLC SERPL CALC-MCNC: 72.4 MG/DL (ref 0–100)
LIPID PROFILE,FLP: ABNORMAL
LYMPHOCYTES # BLD: 3.8 K/UL (ref 0.9–3.6)
LYMPHOCYTES NFR BLD: 55 % (ref 21–52)
MCH RBC QN AUTO: 28.7 PG (ref 24–34)
MCHC RBC AUTO-ENTMCNC: 32.3 G/DL (ref 31–37)
MCV RBC AUTO: 88.7 FL (ref 74–97)
MONOCYTES # BLD: 0.4 K/UL (ref 0.05–1.2)
MONOCYTES NFR BLD: 6 % (ref 3–10)
NEUTS SEG # BLD: 2.5 K/UL (ref 1.8–8)
NEUTS SEG NFR BLD: 37 % (ref 40–73)
PLATELET # BLD AUTO: 272 K/UL (ref 135–420)
PMV BLD AUTO: 10.4 FL (ref 9.2–11.8)
POTASSIUM SERPL-SCNC: 4.3 MMOL/L (ref 3.5–5.5)
PROT SERPL-MCNC: 7.6 G/DL (ref 6.4–8.2)
RBC # BLD AUTO: 4.43 M/UL (ref 4.2–5.3)
SODIUM SERPL-SCNC: 141 MMOL/L (ref 136–145)
TRIGL SERPL-MCNC: 118 MG/DL (ref ?–150)
VLDLC SERPL CALC-MCNC: 23.6 MG/DL
WBC # BLD AUTO: 6.9 K/UL (ref 4.6–13.2)

## 2017-10-12 PROCEDURE — 85025 COMPLETE CBC W/AUTO DIFF WBC: CPT | Performed by: FAMILY MEDICINE

## 2017-10-12 PROCEDURE — 80061 LIPID PANEL: CPT | Performed by: FAMILY MEDICINE

## 2017-10-12 PROCEDURE — 80053 COMPREHEN METABOLIC PANEL: CPT | Performed by: FAMILY MEDICINE

## 2017-10-12 RX ORDER — FLUTICASONE PROPIONATE 50 MCG
2 SPRAY, SUSPENSION (ML) NASAL AS NEEDED
Qty: 1 BOTTLE | Refills: 3 | Status: SHIPPED | OUTPATIENT
Start: 2017-10-12 | End: 2017-12-19 | Stop reason: SDUPTHER

## 2017-10-12 RX ORDER — AZITHROMYCIN 250 MG/1
TABLET, FILM COATED ORAL
Qty: 6 TAB | Refills: 0 | Status: SHIPPED | OUTPATIENT
Start: 2017-10-12 | End: 2017-10-18

## 2017-10-12 NOTE — PROGRESS NOTES
Paresh Hassan is a 60 y.o. female  presents for lesion in mouth. She has had them about 2 weeks. She states that they are getting worse. No fever or chills. Allergies   Allergen Reactions    Aspirin Other (comments)     Stomach bleeding    Macrobid [Nitrofurantoin Monohyd/M-Cryst] Nausea and Vomiting    Nsaids (Non-Steroidal Anti-Inflammatory Drug) Nausea and Vomiting    Opana [Oxymorphone] Other (comments)     Insomnia, weight loss, nightmares    Pcn [Penicillins] Hives     Outpatient Prescriptions Marked as Taking for the 10/12/17 encounter (Office Visit) with Bernadine Tapia MD   Medication Sig Dispense Refill    fluticasone (FLONASE) 50 mcg/actuation nasal spray 2 Sprays by Both Nostrils route as needed. 1 Bottle 3    azithromycin (ZITHROMAX) 250 mg tablet Take 2 tablets today, then take 1 tablet daily 6 Tab 0    [START ON 10/30/2017] morphine CR (MS CONTIN) 60 mg CR tablet Take 1 Tab by mouth every twelve (12) hours. Max Daily Amount: 120 mg. 60 Tab 0    [START ON 11/29/2017] morphine CR (MS CONTIN) 60 mg CR tablet Take 1 Tab by mouth every twelve (12) hours for 30 days. Max Daily Amount: 120 mg. 60 Tab 0    [START ON 12/28/2017] morphine CR (MS CONTIN) 60 mg CR tablet Take 1 Tab by mouth every twelve (12) hours for 30 days. Max Daily Amount: 120 mg. 60 Tab 0    [START ON 10/30/2017] oxyCODONE IR (ROXICODONE) 10 mg tab immediate release tablet Take 1 Tab by mouth three (3) times daily as needed. Max Daily Amount: 30 mg. 90 Tab 0    [START ON 11/29/2017] oxyCODONE IR (ROXICODONE) 10 mg tab immediate release tablet Take 1 Tab by mouth three (3) times daily as needed. Max Daily Amount: 30 mg. 90 Tab 0    [START ON 12/28/2017] oxyCODONE IR (ROXICODONE) 10 mg tab immediate release tablet Take 1 Tab by mouth three (3) times daily as needed.  Max Daily Amount: 30 mg. 90 Tab 0    VITAMIN D2 50,000 unit capsule TAKE ONE CAPSULE BY MOUTH EVERY 7 DAYS 4 Cap 0    losartan (COZAAR) 25 mg tablet TAKE ONE TABLET BY MOUTH ONE TIME DAILY  30 Tab 0    atorvastatin (LIPITOR) 40 mg tablet TAKE ONE TABLET BY MOUTH ONE TIME DAILY  30 Tab 3    polyethylene glycol (MIRALAX) 17 gram packet DISSOLVE 1 PACKET IN BEVERAGE OF CHOICE AND DRINK BY MOUTH DAILY 30 Each 1    baclofen (LIORESAL) 10 mg tablet TAKE ONE TABLET BY MOUTH THREE TIMES A DAY 90 Tab 5    traMADol (ULTRAM) 50 mg tablet Take 1 Tab by mouth every six (6) hours as needed for Pain. Max Daily Amount: 200 mg. 40 Tab 0    temazepam (RESTORIL) 15 mg capsule Take 1 Cap by mouth nightly as needed for Sleep. Max Daily Amount: 15 mg. Indications: INSOMNIA 30 Cap 1    sucralfate (CARAFATE) 1 gram tablet 1 g.      Dexlansoprazole (DEXILANT) 60 mg CpDB 60 mg daily.  gabapentin (NEURONTIN) 400 mg capsule 400 mg two (2) times a day.  trazodone (DESYREL) 100 mg tablet Take 100 mg by mouth nightly.  sertraline (ZOLOFT) 100 mg tablet Take  by mouth daily.        Patient Active Problem List   Diagnosis Code    Lumbago M54.5    Other affections of shoulder region, not elsewhere classified M75.80    Other chronic pain G89.29    [facility] F31.9    Chronic pain syndrome G89.4    Carpal tunnel syndrome G56.00    Degeneration of lumbar or lumbosacral intervertebral disc M51.37    Displacement of lumbar intervertebral disc without myelopathy M51.26    Pain in joint, multiple sites M25.50    Cervicalgia M54.2    Postlaminectomy syndrome, cervical region M96.1    Annular tear of lumbar disc M51.36    Lung nodule seen on imaging study R91.1    ACP (advance care planning) Z71.89    Hypercholesterolemia E78.00    Nausea R11.0    Myalgia M79.1     Past Medical History:   Diagnosis Date    Annular tear of lumbar disc 11/10/2014    Asthma     Bronchitis    Benign tumor of throat     Bone spur     right ankle    Cervicalgia 11/10/2014    Chronic pain     back    Degeneration of lumbar or lumbosacral intervertebral disc 11/10/2014    Degenerative disc disease     Depression     Displacement of lumbar intervertebral disc without myelopathy 11/10/2014    Elevated white blood cell count     Fibromyalgia     GERD (gastroesophageal reflux disease)     Hypertension     Insomnia     Nausea & vomiting     Pain in joint, multiple sites 11/10/2014    Postlaminectomy syndrome, cervical region 11/10/2014    Sinus infection 10/5/15    Ulcer ([facility]      Social History     Social History    Marital status:      Spouse name: N/A    Number of children: N/A    Years of education: N/A     Social History Main Topics    Smoking status: Former Smoker    Smokeless tobacco: Never Used    Alcohol use No      Comment: none in 24 years    Drug use: No      Comment: last smoked in 1993    Sexual activity: Not Asked     Other Topics Concern    None     Social History Narrative     Family History   Problem Relation Age of Onset    Hypertension Other     Heart Attack Other     Heart Disease Other     Stroke Other     Headache Other     Seizures Other         Review of Systems   Constitutional: Negative for chills and fever. Gastrointestinal: Negative for constipation, diarrhea, nausea and vomiting. Skin: Negative for itching and rash. Vitals:    10/12/17 1307   BP: 100/70   Pulse: 78   Resp: 10   Temp: 98.1 °F (36.7 °C)   TempSrc: Oral   SpO2: 96%   Weight: 166 lb 9.6 oz (75.6 kg)   Height: 5' 4\" (1.626 m)   PainSc:   0 - No pain       Physical Exam   Constitutional: She is oriented to person, place, and time and well-developed, well-nourished, and in no distress. HENT:   Head: Normocephalic. Right Ear: External ear normal.   Nose: Nose normal.   Mouth/Throat: Oropharynx is clear and moist.   Small pinpoint lesion on buccal mucosa. Eyes: Conjunctivae are normal. Pupils are equal, round, and reactive to light. Neck: Normal range of motion. Neck supple. No thyromegaly present.    Cardiovascular: Normal rate, regular rhythm and normal heart sounds. Pulmonary/Chest: Effort normal and breath sounds normal.   Musculoskeletal: Normal range of motion. Neurological: She is alert and oriented to person, place, and time. Gait normal.   Skin: Skin is warm and dry. Psychiatric: Mood, memory, affect and judgment normal.   Nursing note and vitals reviewed. Assessment/Plan      ICD-10-CM ICD-9-CM    1. Chronic non-seasonal allergic rhinitis, unspecified trigger J30.89 477.9 fluticasone (FLONASE) 50 mcg/actuation nasal spray   2. Medicare annual wellness visit, subsequent F60.72 F39.5 METABOLIC PANEL, COMPREHENSIVE      CBC WITH AUTOMATED DIFF      LIPID PANEL   3. Oral mucosal lesion K13.70 528.9 azithromycin (ZITHROMAX) 250 mg tablet   4. ACP (advance care planning) Z71.89 V65.49    5. Breast cancer screening Z12.31 V76.10 [facility] BI SCREENING INCL CAD     I have discussed the diagnosis with the patient and the intended plan of care as seen in the above orders. The patient has received an after-visit summary and questions were answered concerning future plans. I have discussed medication, side effects, and warnings with the patient in detail. The patient verbalized understanding and is in agreement with the plan of care. The patient will contact the office with any additional concerns. Follow-up Disposition:  Return in about 2 months (around 12/12/2017).   lab results and schedule of future lab studies reviewed with patient    Patsy Mercer MD

## 2017-10-12 NOTE — ACP (ADVANCE CARE PLANNING)
Advance Care Planning (ACP) Provider Conversation Snapshot    Date of ACP Conversation: 10/12/17  Persons included in Conversation:  patient  Length of ACP Conversation in minutes:  16 minutes    Authorized Decision Maker (if patient is incapable of making informed decisions):    This person is:   Healthcare Agent/Medical Power of  under Advance Directive          For Patients with Decision Making Capacity:   Values/Goals: Exploration of values, goals, and preferences if recovery is not expected, even with continued medical treatment in the event of:  Imminent death  Severe, permanent brain injury    Conversation Outcomes / Follow-Up Plan:   Recommended completion of Advance Directive form after review of ACP materials and conversation with prospective healthcare agent

## 2017-10-12 NOTE — PROGRESS NOTES
1. Have you been to the ER, urgent care clinic since your last visit? Hospitalized since your last visit? Yes When: 10/7/2017 Where:  ER Reason for visit: panic attack    2. Have you seen or consulted any other health care providers outside of the [address] [place] [address] since your last visit? Include any pap smears or colon screening. Yes When: 10/5/17 Where: [address] Reason for visit: routine. This is a Subsequent Medicare Annual Wellness Exam (AWV) (Performed 12 months after IPPE or effective date of Medicare Part B enrollment, Once in a lifetime)    I have reviewed the patient's medical history in detail and updated the computerized patient record.      History     Past Medical History:   Diagnosis Date    Annular tear of lumbar disc 11/10/2014    Asthma     Bronchitis    Benign tumor of throat     Bone spur     right ankle    Cervicalgia 11/10/2014    Chronic pain     back    Degeneration of lumbar or lumbosacral intervertebral disc 11/10/2014    Degenerative disc disease     Depression     Displacement of lumbar intervertebral disc without myelopathy 11/10/2014    Elevated white blood cell count     Fibromyalgia     GERD (gastroesophageal reflux disease)     Hypertension     Insomnia     Nausea & vomiting     Pain in joint, multiple sites 11/10/2014    Postlaminectomy syndrome, cervical region 11/10/2014    Sinus infection 10/5/15    Ulcer ([address]       Past Surgical History:   Procedure Laterality Date    COLONOSCOPY N/A 4/13/2017    COLONOSCOPY performed by Maddie [facility] at [facility] HX CATARACT REMOVAL      HX CERVICAL FUSION      HX HYSTERECTOMY      HX ORTHOPAEDIC      ganglion cyst removed, right hand    HX OTHER SURGICAL      cyst left thigh removed    LAP,CHOLECYSTECTOMY N/A 02/27/2017    Dr. Malika Poole     Current Outpatient Prescriptions   Medication Sig Dispense Refill    [START ON 10/30/2017] morphine CR (MS CONTIN) 60 mg CR tablet Take 1 Tab by mouth every twelve (12) hours. Max Daily Amount: 120 mg. 60 Tab 0    [START ON 11/29/2017] morphine CR (MS CONTIN) 60 mg CR tablet Take 1 Tab by mouth every twelve (12) hours for 30 days. Max Daily Amount: 120 mg. 60 Tab 0    [START ON 12/28/2017] morphine CR (MS CONTIN) 60 mg CR tablet Take 1 Tab by mouth every twelve (12) hours for 30 days. Max Daily Amount: 120 mg. 60 Tab 0    [START ON 10/30/2017] oxyCODONE IR (ROXICODONE) 10 mg tab immediate release tablet Take 1 Tab by mouth three (3) times daily as needed. Max Daily Amount: 30 mg. 90 Tab 0    [START ON 11/29/2017] oxyCODONE IR (ROXICODONE) 10 mg tab immediate release tablet Take 1 Tab by mouth three (3) times daily as needed. Max Daily Amount: 30 mg. 90 Tab 0    [START ON 12/28/2017] oxyCODONE IR (ROXICODONE) 10 mg tab immediate release tablet Take 1 Tab by mouth three (3) times daily as needed. Max Daily Amount: 30 mg. 90 Tab 0    VITAMIN D2 50,000 unit capsule TAKE ONE CAPSULE BY MOUTH EVERY 7 DAYS 4 Cap 0    losartan (COZAAR) 25 mg tablet TAKE ONE TABLET BY MOUTH ONE TIME DAILY  30 Tab 0    atorvastatin (LIPITOR) 40 mg tablet TAKE ONE TABLET BY MOUTH ONE TIME DAILY  30 Tab 3    polyethylene glycol (MIRALAX) 17 gram packet DISSOLVE 1 PACKET IN BEVERAGE OF CHOICE AND DRINK BY MOUTH DAILY 30 Each 1    baclofen (LIORESAL) 10 mg tablet TAKE ONE TABLET BY MOUTH THREE TIMES A DAY 90 Tab 5    traMADol (ULTRAM) 50 mg tablet Take 1 Tab by mouth every six (6) hours as needed for Pain. Max Daily Amount: 200 mg. 40 Tab 0    temazepam (RESTORIL) 15 mg capsule Take 1 Cap by mouth nightly as needed for Sleep. Max Daily Amount: 15 mg. Indications: INSOMNIA 30 Cap 1    fluticasone (FLONASE) 50 mcg/actuation nasal spray 2 Sprays by Both Nostrils route as needed. 1 Bottle 3    sucralfate (CARAFATE) 1 gram tablet 1 g.      Dexlansoprazole (DEXILANT) 60 mg CpDB 60 mg daily.  gabapentin (NEURONTIN) 400 mg capsule 400 mg two (2) times a day.       trazodone (DESYREL) 100 mg tablet Take 100 mg by mouth nightly.  sertraline (ZOLOFT) 100 mg tablet Take  by mouth daily.  valACYclovir (VALTREX) 500 mg tablet Take 1 Tab by mouth two (2) times a day. 20 Tab 0    butalbital-acetaminophen-caffeine (FIORICET, ESGIC) -40 mg per tablet Take 1 Tab by mouth every six (6) hours as needed for Pain. Max Daily Amount: 4 Tabs. 24 Tab 0    megestrol (MEGACE) 20 mg tablet Take 1 Tab by mouth daily. 30 Tab 0    naloxone 4 mg/actuation spry 4 mg by Nasal route as needed. For emergency use only  Indications: OPIATE-INDUCED RESPIRATORY DEPRESSION 1 Package 0    dicyclomine (BENTYL) 10 mg capsule Take 1 Cap by mouth four (4) times daily. 40 Cap 1    magnesium citrate solution Take 1/3 of bottle every 8 hours until finished or until you have a bowel movement. 1 Bottle 0    albuterol (PROVENTIL VENTOLIN) 2.5 mg /3 mL (0.083 %) nebulizer solution 2.5 mg.      ipratropium (ATROVENT) 0.02 % nebulizer solution 0.5 mg.      ziprasidone hcl (GEODON) 80 mg capsule Take 160 mg by mouth two (2) times daily (with meals).        Allergies   Allergen Reactions    Aspirin Other (comments)     Stomach bleeding    Macrobid [Nitrofurantoin Monohyd/M-Cryst] Nausea and Vomiting    Nsaids (Non-Steroidal Anti-Inflammatory Drug) Nausea and Vomiting    Opana [Oxymorphone] Other (comments)     Insomnia, weight loss, nightmares    Pcn [Penicillins] Hives     Family History   Problem Relation Age of Onset    Hypertension Other     Heart Attack Other     Heart Disease Other     Stroke Other     Headache Other     Seizures Other      Social History   Substance Use Topics    Smoking status: Former Smoker    Smokeless tobacco: Never Used    Alcohol use No      Comment: none in 24 years     Patient Active Problem List   Diagnosis Code    Lumbago M54.5    Other affections of shoulder region, not elsewhere classified M75.80    Other chronic pain G89.29    Bipolar 1 disorder (HCC) F31.9    Chronic pain syndrome G89.4    Carpal tunnel syndrome G56.00    Degeneration of lumbar or lumbosacral intervertebral disc M51.37    Displacement of lumbar intervertebral disc without myelopathy M51.26    Pain in joint, multiple sites M25.50    Cervicalgia M54.2    Postlaminectomy syndrome, cervical region M96.1    Annular tear of lumbar disc M51.36    Lung nodule seen on imaging study R91.1    ACP (advance care planning) Z71.89    Hypercholesterolemia E78.00    Nausea R11.0    Myalgia M79.1       Depression Risk Factor Screening:     PHQ over the last two weeks 9/15/2014   PHQ Not Done -   Little interest or pleasure in doing things Not at all   Feeling down, depressed or hopeless Several days   Total Score PHQ 2 1     Alcohol Risk Factor Screening: You do not drink alcohol or very rarely. Functional Ability and Level of Safety:   Hearing LossHearing is good. Whisper Test done with normal results. Activities of Daily Living  The home contains: no safety equipment. Patient does total self care    Fall Risk  Fall Risk Assessment, last 12 mths 10/11/2017   Able to walk? Yes   Fall in past 12 months? Yes   Fall with injury? No   Number of falls in past 12 months 2   Fall Risk Score 2       Abuse Screen  Patient is not abused    Cognitive Screening   Evaluation of Cognitive Function:  Has your family/caregiver stated any concerns about your memory: no  Normal    Patient Care Team   Patient Care Team:  Carly Maria MD as PCP - General (Family Practice)  Neisha Cobb MD as Consulting Provider (Neurosurgery)  ROBYN Ball MD (Gastroenterology)  Rebecca Owen MD as Surgeon (General Surgery)  Chanell Herrera RN as Ambulatory Care Navigator    Assessment/Plan   Education and counseling provided:  Patient has declined information on ACP. Diagnoses and all orders for this visit:    1.  Chronic non-seasonal allergic rhinitis, unspecified trigger  -     fluticasone (FLONASE) 50 mcg/actuation nasal spray; 2 Sprays by Both Nostrils route as needed. 2. Medicare annual wellness visit, subsequent  -     METABOLIC PANEL, COMPREHENSIVE; Future  -     CBC WITH AUTOMATED DIFF; Future  -     LIPID PANEL; Future    3. Oral mucosal lesion  -     azithromycin (ZITHROMAX) 250 mg tablet; Take 2 tablets today, then take 1 tablet daily    4. ACP (advance care planning)    5. Breast cancer screening  -     [facility] MAMMO BI SCREENING INCL CAD;  Future        Health Maintenance Due   Topic Date Due    DTaP/Tdap/Td series (1 - Tdap) 03/30/1975    BREAST CANCER SCRN MAMMOGRAM  12/02/2017       Bernadine Tapia MD

## 2017-10-12 NOTE — MR AVS SNAPSHOT
Visit Information Date & Time Provider Department Dept. Phone Encounter #  
 10/12/2017  1:15 PM Nel Gutiérrez MD [facility] [phone] [id number] Follow-up Instructions Return in about 2 months (around 12/12/2017). Your Appointments 1/9/2018 11:20 AM  
Follow Up with BETO Crespo [facility] (RICHAR SCHEDULING) Appt Note: return in 3 months [address] [place] [id number]  
[phone] [facility] [phone] Upcoming Health Maintenance Date Due DTaP/Tdap/Td series (1 - Tdap) 3/30/1975 BREAST CANCER SCRN MAMMOGRAM 12/2/2017 PAP AKA CERVICAL CYTOLOGY 6/21/2019 COLONOSCOPY 4/13/2027 Allergies as of 10/12/2017  Review Complete On: 10/12/2017 By: Nel Gutiérrez MD  
  
 Severity Noted Reaction Type Reactions Aspirin    Other (comments) Stomach bleeding Macrobid [Nitrofurantoin Monohyd/m-cryst]  11/25/2015    Nausea and Vomiting Nsaids (Non-steroidal Anti-inflammatory Drug)    Nausea and Vomiting Opana [Oxymorphone]  11/29/2016    Other (comments) Insomnia, weight loss, nightmares Pcn [Penicillins]    Hives Current Immunizations  Never Reviewed Name Date Pneumococcal Polysaccharide (PPSV-23) 3/7/2014 Not reviewed this visit You Were Diagnosed With   
  
 Codes Comments Chronic non-seasonal allergic rhinitis, unspecified trigger    -  Primary ICD-10-CM: J30.89 ICD-9-CM: 477.9 Medicare annual wellness visit, subsequent     ICD-10-CM: Z00.00 ICD-9-CM: V70.0 Oral mucosal lesion     ICD-10-CM: K13.70 ICD-9-CM: 528.9 ACP (advance care planning)     ICD-10-CM: Z71.89 ICD-9-CM: V65.49 Vitals BP Pulse Temp Resp Height(growth percentile) Weight(growth percentile)  100/70 (BP 1 Location: Left arm, BP Patient Position: Sitting) 78 98.1 °F (36.7 °C) (Oral) 10 5' 4\" (1.626 m) 166 lb 9.6 oz (75.6 kg) SpO2 BMI OB Status Smoking Status 96% 28.6 kg/m2 Hysterectomy Former Smoker Vitals History BMI and BSA Data Body Mass Index Body Surface Area  
 28.6 kg/m 2 1.85 m 2 Preferred Pharmacy Pharmacy Name Phone FARM [id number] - [address] [phone] Your Updated Medication List  
  
   
This list is accurate as of: 10/12/17  1:33 PM.  Always use your most recent med list.  
  
  
  
  
 albuterol 2.5 mg /3 mL (0.083 %) nebulizer solution Commonly known as:  PROVENTIL VENTOLIN  
2.5 mg.  
  
 atorvastatin 40 mg tablet Commonly known as:  LIPITOR  
TAKE ONE TABLET BY MOUTH ONE TIME DAILY  
  
 azithromycin 250 mg tablet Commonly known as:  Elsworth Heedwin Take 2 tablets today, then take 1 tablet daily  
  
 baclofen 10 mg tablet Commonly known as:  LIORESAL  
TAKE ONE TABLET BY MOUTH THREE TIMES A DAY  
  
 butalbital-acetaminophen-caffeine -40 mg per tablet Commonly known as:  Gennett Madura Take 1 Tab by mouth every six (6) hours as needed for Pain. Max Daily Amount: 4 Tabs. DEXILANT 60 mg Cpdb Generic drug:  Dexlansoprazole 60 mg daily. dicyclomine 10 mg capsule Commonly known as:  BENTYL Take 1 Cap by mouth four (4) times daily. fluticasone 50 mcg/actuation nasal spray Commonly known as:  Russel Hatch 2 Sprays by Both Nostrils route as needed. gabapentin 400 mg capsule Commonly known as:  NEURONTIN  
400 mg two (2) times a day. ipratropium 0.02 % Soln Commonly known as:  ATROVENT  
0.5 mg.  
  
 losartan 25 mg tablet Commonly known as:  COZAAR  
TAKE ONE TABLET BY MOUTH ONE TIME DAILY  
  
 magnesium citrate solution Take 1/3 of bottle every 8 hours until finished or until you have a bowel movement. megestrol 20 mg tablet Commonly known as:  MEGACE Take 1 Tab by mouth daily. * morphine CR 60 mg CR tablet Commonly known as:  MS CONTIN Take 1 Tab by mouth every twelve (12) hours. Max Daily Amount: 120 mg.  
Start taking on:  10/30/2017 * morphine CR 60 mg CR tablet Commonly known as:  MS CONTIN Take 1 Tab by mouth every twelve (12) hours for 30 days. Max Daily Amount: 120 mg.  
Start taking on:  11/29/2017 * morphine CR 60 mg CR tablet Commonly known as:  MS CONTIN Take 1 Tab by mouth every twelve (12) hours for 30 days. Max Daily Amount: 120 mg.  
Start taking on:  12/28/2017  
  
 naloxone 4 mg/actuation nasal spray Commonly known as:  NARCAN  
4 mg by Nasal route as needed. For emergency use only  Indications: OPIATE-INDUCED RESPIRATORY DEPRESSION  
  
 * oxyCODONE IR 10 mg Tab immediate release tablet Commonly known as:  Franceen Ly Take 1 Tab by mouth three (3) times daily as needed. Max Daily Amount: 30 mg. Start taking on:  10/30/2017 * oxyCODONE IR 10 mg Tab immediate release tablet Commonly known as:  Franceen Ly Take 1 Tab by mouth three (3) times daily as needed. Max Daily Amount: 30 mg. Start taking on:  11/29/2017 * oxyCODONE IR 10 mg Tab immediate release tablet Commonly known as:  Franceen Ly Take 1 Tab by mouth three (3) times daily as needed. Max Daily Amount: 30 mg. Start taking on:  12/28/2017 polyethylene glycol 17 gram packet Commonly known as:  Scot Juan DISSOLVE 1 PACKET IN BEVERAGE OF CHOICE AND DRINK BY MOUTH DAILY  
  
 sertraline 100 mg tablet Commonly known as:  ZOLOFT Take  by mouth daily. sucralfate 1 gram tablet Commonly known as:  CARAFATE  
1 g.  
  
 temazepam 15 mg capsule Commonly known as:  RESTORIL Take 1 Cap by mouth nightly as needed for Sleep. Max Daily Amount: 15 mg. Indications: INSOMNIA  
  
 traMADol 50 mg tablet Commonly known as:  ULTRAM  
Take 1 Tab by mouth every six (6) hours as needed for Pain. Max Daily Amount: 200 mg.  
  
 traZODone 100 mg tablet Commonly known as:  Mac Fray Take 100 mg by mouth nightly. valACYclovir 500 mg tablet Commonly known as:  VALTREX Take 1 Tab by mouth two (2) times a day. VITAMIN D2 50,000 unit capsule Generic drug:  ergocalciferol TAKE ONE CAPSULE BY MOUTH EVERY 7 DAYS * Notice: This list has 6 medication(s) that are the same as other medications prescribed for you. Read the directions carefully, and ask your doctor or other care provider to review them with you. Prescriptions Sent to Pharmacy Refills  
 fluticasone (FLONASE) 50 mcg/actuation nasal spray 3 Si Sprays by Both Nostrils route as needed. Class: Normal  
 Pharmacy: [address] Ph #: [phone] Route: Both Nostrils  
 azithromycin (ZITHROMAX) 250 mg tablet 0 Sig: Take 2 tablets today, then take 1 tablet daily Class: Normal  
 Pharmacy: [address] Ph #: [phone] Follow-up Instructions Return in about 2 months (around 2017). To-Do List   
 10/12/2017 Lab:  CBC WITH AUTOMATED DIFF   
  
 10/12/2017 Lab:  LIPID PANEL   
  
 10/12/2017 Lab:  METABOLIC PANEL, COMPREHENSIVE Patient Instructions Medicare Wellness Visit, Female The best way to live healthy is to have a healthy lifestyle by eating a well-balanced diet, exercising regularly, limiting alcohol and stopping smoking. Regular physical exams and screening tests are another way to keep healthy. Preventive exams provided by your health care provider can find health problems before they become diseases or illnesses. Preventive services including immunizations, screening tests, monitoring and exams can help you take care of your own health. All people over age 72 should have a pneumovax  and and a prevnar shot to prevent pneumonia.  These are once in a lifetime unless you and your provider decide differently. All people over 65 should have a yearly flu shot and a tetanus vaccine every 10 years. A bone mass density to screen for osteoporosis or thinning of the bones should be done every 2 years after 65. Screening for diabetes mellitus with a blood sugar test should be done every year. Glaucoma is a disease of the eye due to increased ocular pressure that can lead to blindness and it should be done every year by an eye professional. 
 
Cardiovascular screening tests that check for elevated lipids (fatty part of blood) which can lead to heart disease and strokes should be done every 5 years. Colorectal screening that evaluates for blood or polyps in your colon should be done yearly as a stool test or every five years as a flexible sigmoidoscope or every 10 years as a colonoscopy up to age 76. Breast cancer screening with a mammogram is recommended biennially  for women age 54-69. Screening for cervical cancer with a pap smear and pelvic exam is recommended for women after age 72 years every 2 years up to age 79 or when the provider and patient decide to stop. If there is a history of cervical abnormalities or other increased risk for cancer then the test is recommended yearly. Hepatitis C screening is also recommended for anyone born between [birth year] through [address]. A shingles vaccine is also recommended once in a lifetime after age 61. Your Medicare Wellness Exam is recommended annually. Here is a list of your current Health Maintenance items with a due date: 
Health Maintenance Due Topic Date Due  
 DTaP/Tdap/Td  (1 - Tdap) 03/30/1975  Breast Cancer Screening  12/02/2017 Advance Directives: Care Instructions Your Care Instructions An advance directive is a legal way to state your wishes at the end of your life. It tells your family and your doctor what to do if you can no longer say what you want. There are two main types of advance directives. You can change them any time that your wishes change. · A living will tells your family and your doctor your wishes about life support and other treatment. · A durable power of  for health care lets you name a person to make treatment decisions for you when you can't speak for yourself. This person is called a health care agent. If you do not have an advance directive, decisions about your medical care may be made by a doctor or a  who doesn't know you. It may help to think of an advance directive as a gift to the people who care for you. If you have one, they won't have to make tough decisions by themselves. Follow-up care is a key part of your treatment and safety. Be sure to make and go to all appointments, and call your doctor if you are having problems. It's also a good idea to know your test results and keep a list of the medicines you take. How can you care for yourself at home? · Discuss your wishes with your loved ones and your doctor. This way, there are no surprises. · Many states have a unique form. Or you might use a universal form that has been approved by many states. This kind of form can sometimes be completed and stored online. Your electronic copy will then be available wherever you have a connection to the Internet. In most cases, doctors will respect your wishes even if you have a form from a different state. · You don't need a  to do an advance directive. But you may want to get legal advice. · Think about these questions when you prepare an advance directive: ¨ Who do you want to make decisions about your medical care if you are not able to? Many people choose a family member or close friend. ¨ Do you know enough about life support methods that might be used? If not, talk to your doctor so you understand. ¨ What are you most afraid of that might happen?  You might be afraid of having pain, losing your independence, or being kept alive by machines. ¨ Where would you prefer to die? Choices include your home, a hospital, or a nursing home. ¨ Would you like to have information about hospice care to support you and your family? ¨ Do you want to donate organs when you die? ¨ Do you want certain Jainism practices performed before you die? If so, put your wishes in the advance directive. · Read your advance directive every year, and make changes as needed. When should you call for help? Be sure to contact your doctor if you have any questions. Where can you learn more? Go to http://diana-lashonda.info/. Enter R264 in the search box to learn more about \"Advance Directives: Care Instructions. \" Current as of: November 17, 2016 Content Version: 11.3 © 1844-3332 [employer]. Care instructions adapted under license by [employer] (which disclaims liability or warranty for this information). If you have questions about a medical condition or this instruction, always ask your healthcare professional. [place] [address] any warranty or liability for your use of this information. Please provide this summary of care documentation to your next provider. Your primary care clinician is listed as [address]. If you have any questions after today's visit, please call [phone].

## 2017-10-12 NOTE — PATIENT INSTRUCTIONS
Medicare Wellness Visit, Female    The best way to live healthy is to have a healthy lifestyle by eating a well-balanced diet, exercising regularly, limiting alcohol and stopping smoking. Regular physical exams and screening tests are another way to keep healthy. Preventive exams provided by your health care provider can find health problems before they become diseases or illnesses. Preventive services including immunizations, screening tests, monitoring and exams can help you take care of your own health. All people over age 72 should have a pneumovax  and and a prevnar shot to prevent pneumonia. These are once in a lifetime unless you and your provider decide differently. All people over 65 should have a yearly flu shot and a tetanus vaccine every 10 years. A bone mass density to screen for osteoporosis or thinning of the bones should be done every 2 years after 65. Screening for diabetes mellitus with a blood sugar test should be done every year. Glaucoma is a disease of the eye due to increased ocular pressure that can lead to blindness and it should be done every year by an eye professional.    Cardiovascular screening tests that check for elevated lipids (fatty part of blood) which can lead to heart disease and strokes should be done every 5 years. Colorectal screening that evaluates for blood or polyps in your colon should be done yearly as a stool test or every five years as a flexible sigmoidoscope or every 10 years as a colonoscopy up to age 76. Breast cancer screening with a mammogram is recommended biennially  for women age 54-69. Screening for cervical cancer with a pap smear and pelvic exam is recommended for women after age 72 years every 2 years up to age 79 or when the provider and patient decide to stop. If there is a history of cervical abnormalities or other increased risk for cancer then the test is recommended yearly.     Hepatitis C screening is also recommended for anyone born between [birth year] through [address]. A shingles vaccine is also recommended once in a lifetime after age 61. Your Medicare Wellness Exam is recommended annually. Here is a list of your current Health Maintenance items with a due date:  Health Maintenance Due   Topic Date Due    DTaP/Tdap/Td  (1 - Tdap) 03/30/1975    Breast Cancer Screening  12/02/2017          Advance Directives: Care Instructions  Your Care Instructions  An advance directive is a legal way to state your wishes at the end of your life. It tells your family and your doctor what to do if you can no longer say what you want. There are two main types of advance directives. You can change them any time that your wishes change. · A living will tells your family and your doctor your wishes about life support and other treatment. · A durable power of  for health care lets you name a person to make treatment decisions for you when you can't speak for yourself. This person is called a health care agent. If you do not have an advance directive, decisions about your medical care may be made by a doctor or a  who doesn't know you. It may help to think of an advance directive as a gift to the people who care for you. If you have one, they won't have to make tough decisions by themselves. Follow-up care is a key part of your treatment and safety. Be sure to make and go to all appointments, and call your doctor if you are having problems. It's also a good idea to know your test results and keep a list of the medicines you take. How can you care for yourself at home? · Discuss your wishes with your loved ones and your doctor. This way, there are no surprises. · Many states have a unique form. Or you might use a universal form that has been approved by many states. This kind of form can sometimes be completed and stored online. Your electronic copy will then be available wherever you have a connection to the Internet.  In most cases, doctors will respect your wishes even if you have a form from a different state. · You don't need a  to do an advance directive. But you may want to get legal advice. · Think about these questions when you prepare an advance directive:  ¨ Who do you want to make decisions about your medical care if you are not able to? Many people choose a family member or close friend. ¨ Do you know enough about life support methods that might be used? If not, talk to your doctor so you understand. ¨ What are you most afraid of that might happen? You might be afraid of having pain, losing your independence, or being kept alive by machines. ¨ Where would you prefer to die? Choices include your home, a hospital, or a nursing home. ¨ Would you like to have information about hospice care to support you and your family? ¨ Do you want to donate organs when you die? ¨ Do you want certain Rastafarian practices performed before you die? If so, put your wishes in the advance directive. · Read your advance directive every year, and make changes as needed. When should you call for help? Be sure to contact your doctor if you have any questions. Where can you learn more? Go to http://diana-lashonda.info/. Enter R264 in the search box to learn more about \"Advance Directives: Care Instructions. \"  Current as of: November 17, 2016  Content Version: 11.3  © 3663-5401 [employer]. Care instructions adapted under license by [employer] (which disclaims liability or warranty for this information). If you have questions about a medical condition or this instruction, always ask your healthcare professional. [place] [address] any warranty or liability for your use of this information.

## 2017-10-15 DIAGNOSIS — R82.90 BAD ODOR OF URINE: ICD-10-CM

## 2017-10-16 RX ORDER — LOSARTAN POTASSIUM 25 MG/1
TABLET ORAL
Qty: 30 TAB | Refills: 0 | Status: SHIPPED | OUTPATIENT
Start: 2017-10-16 | End: 2017-11-08 | Stop reason: SDUPTHER

## 2017-10-18 ENCOUNTER — TELEPHONE (OUTPATIENT)
Dept: FAMILY MEDICINE CLINIC | Age: 62
End: 2017-10-18

## 2017-10-18 ENCOUNTER — HOSPITAL ENCOUNTER (EMERGENCY)
Age: 62
Discharge: HOME | End: 2017-10-18
Attending: EMERGENCY MEDICINE
Payer: COMMERCIAL

## 2017-10-18 VITALS
BODY MASS INDEX: 28.34 KG/M2 | TEMPERATURE: 98.5 F | HEART RATE: 62 BPM | OXYGEN SATURATION: 98 % | RESPIRATION RATE: 16 BRPM | SYSTOLIC BLOOD PRESSURE: 138 MMHG | DIASTOLIC BLOOD PRESSURE: 88 MMHG | WEIGHT: 166 LBS | HEIGHT: 64 IN

## 2017-10-18 DIAGNOSIS — H92.01 EARACHE ON RIGHT: Primary | ICD-10-CM

## 2017-10-18 DIAGNOSIS — H61.21 IMPACTED CERUMEN OF RIGHT EAR: ICD-10-CM

## 2017-10-18 PROCEDURE — 74011250637 HC RX REV CODE- 250/637: Performed by: EMERGENCY MEDICINE

## 2017-10-18 PROCEDURE — 99283 EMERGENCY DEPT VISIT LOW MDM: CPT

## 2017-10-18 RX ORDER — NALOXONE HYDROCHLORIDE 4 MG/.1ML
1 SPRAY NASAL
COMMUNITY

## 2017-10-18 RX ORDER — POLYETHYLENE GLYCOL 3350 17 G/17G
17 POWDER, FOR SOLUTION ORAL DAILY
COMMUNITY
End: 2018-07-23 | Stop reason: SDUPTHER

## 2017-10-18 RX ORDER — ACETAMINOPHEN 500 MG
1000 TABLET ORAL
Status: COMPLETED | OUTPATIENT
Start: 2017-10-18 | End: 2017-10-18

## 2017-10-18 RX ORDER — CLINDAMYCIN HYDROCHLORIDE 150 MG/1
300 CAPSULE ORAL 4 TIMES DAILY
Qty: 80 CAP | Refills: 0 | Status: SHIPPED | OUTPATIENT
Start: 2017-10-18 | End: 2017-11-08 | Stop reason: ALTCHOICE

## 2017-10-18 RX ADMIN — ACETAMINOPHEN 1000 MG: 500 TABLET ORAL at 17:37

## 2017-10-18 NOTE — ED TRIAGE NOTES
Patient states right ear pain and headache x 2 days. Patient states being dropped to ER by her daughter.

## 2017-10-18 NOTE — DISCHARGE INSTRUCTIONS
Earache: Care Instructions  Your Care Instructions  Even though infection is a common cause of ear pain, not all ear pain means an infection. If you have ear pain and don't have an infection, it could be because of a jaw problem, such as temporomandibular joint (TMJ) pain. Or it could be because of a neck problem. When ear discomfort or pain is mild or comes and goes without other symptoms, home treatment may be all you need. Follow-up care is a key part of your treatment and safety. Be sure to make and go to all appointments, and call your doctor if you are having problems. It's also a good idea to know your test results and keep a list of the medicines you take. How can you care for yourself at home? · Apply heat on the ear to ease pain. To apply heat, put a warm water bottle, a heating pad set on low, or a warm cloth on your ear. Do not go to sleep with a heating pad on your skin. · Take an over-the-counter pain medicine, such as acetaminophen (Tylenol), ibuprofen (Advil, Motrin), or naproxen (Aleve). Be safe with medicines. Read and follow all instructions on the label. · Do not take two or more pain medicines at the same time unless the doctor told you to. Many pain medicines have acetaminophen, which is Tylenol. Too much acetaminophen (Tylenol) can be harmful. · Never insert anything, such as a cotton swab or a lorena pin, into the ear. When should you call for help? Call your doctor now or seek immediate medical care if:  · You have new or worse symptoms of infection, such as:  ¨ Increased pain, swelling, warmth, or redness. ¨ Red streaks leading from the area. ¨ Pus draining from the area. ¨ A fever. Watch closely for changes in your health, and be sure to contact your doctor if:  · You have new or worse discharge coming from the ear. · You do not get better as expected. Where can you learn more? Go to http://diana-lashonda.info/.   Enter C833 in the search box to learn more about \"Earache: Care Instructions. \"  Current as of: July 29, 2016  Content Version: 11.3  © 8705-4126 [employer]. Care instructions adapted under license by [employer] (which disclaims liability or warranty for this information). If you have questions about a medical condition or this instruction, always ask your healthcare professional. [address] any warranty or liability for your use of this information.

## 2017-10-18 NOTE — ED NOTES
Devon Ozuna is a 60 y.o. female that was discharged in stable. Pt was accompanied by son. Pt is not driving. The patients diagnosis, condition and treatment were explained to  patient and aftercare instructions were given. The patient verbalized understanding. Patient armband removed and shredded.

## 2017-10-18 NOTE — ED PROVIDER NOTES
HPI Comments: 4:35 PM Karina Montanez is a 60 y.o. female with a history of HTN, chronic pain, and fibromyalgiawho presents to ED for the evaluation of constant right ear pain radiating down the right side of her neck that began two days. Rates pain 10/10. Associated Sx include severe HA. States taking Morphine and Oxycodone for her Sx with no relief. States was supposed to receive second opinion for her ear pain today but was unable to attend her appointment. Says her first episode of ear pain began three years ago. Denies swimming, fever, runny nose, cough, and pain while eating. Drug allergies include Asprin, PCN, N-saids, and Macrobid. No other complaints, associated symptoms or modifying factors at this time. PCP: Briseida Robles MD      The history is provided by the patient.         Past Medical History:   Diagnosis Date    Annular tear of lumbar disc 11/10/2014    Asthma     Bronchitis    Benign tumor of throat     Bone spur     right ankle    Cervicalgia 11/10/2014    Chronic pain     back    Degeneration of lumbar or lumbosacral intervertebral disc 11/10/2014    Degenerative disc disease     Depression     Displacement of lumbar intervertebral disc without myelopathy 11/10/2014    Elevated white blood cell count     Fibromyalgia     GERD (gastroesophageal reflux disease)     Hypertension     Insomnia     Nausea & vomiting     Pain in joint, multiple sites 11/10/2014    Postlaminectomy syndrome, cervical region 11/10/2014    Sinus infection 10/5/15    Ulcer ([address]        Past Surgical History:   Procedure Laterality Date    COLONOSCOPY N/A 4/13/2017    COLON[facility] ENDOSCOPY    HX CATARACT REMOVAL      HX CERVICAL FUSION      HX HYSTERECTOMY      HX ORTHOPAEDIC      ganglion cyst removed, right hand    HX OTHER SURGICAL      cyst left thigh removed    LAP,CHOLECYSTECTOMY N/A 02/27/2017    Dr. Alvaro Kruse         Family History:   Problem Relation Age of Onset    Hypertension Other     Heart Attack Other     Heart Disease Other     Stroke Other     Headache Other     Seizures Other        Social History     Social History    Marital status:      Spouse name: N/A    Number of children: N/A    Years of education: N/A     Occupational History    Not on file. Social History Main Topics    Smoking status: Former Smoker    Smokeless tobacco: Never Used    Alcohol use No      Comment: none in 24 years    Drug use: No      Comment: last smoked in 1993    Sexual activity: Not on file     Other Topics Concern    Not on file     Social History Narrative         ALLERGIES: Aspirin; Macrobid [nitrofurantoin monohyd/m-cryst]; Nsaids (non-steroidal anti-inflammatory drug); Opana [oxymorphone]; and Pcn [penicillins]    Review of Systems   Constitutional: Negative for chills and fever. HENT: Positive for ear pain. Negative for congestion and sore throat. Respiratory: Negative for cough and shortness of breath. Cardiovascular: Negative for chest pain. Gastrointestinal: Negative for diarrhea and vomiting. Musculoskeletal: Positive for arthralgias, back pain and myalgias. Skin: Negative for rash. Neurological: Positive for headaches. All other systems reviewed and are negative. Vitals:    10/18/17 1541   BP: 138/88   Pulse: 62   Resp: 16   Temp: 98.5 °F (36.9 °C)   SpO2: 98%   Weight: 75.3 kg (166 lb)   Height: 5' 4\" (1.626 m)            Physical Exam   Constitutional: She is oriented to person, place, and time. She appears well-developed and well-nourished. No distress. HENT:   Head: Normocephalic and atraumatic. R TM obscured by cerumen impaction. Tenderness TMJ, R mandible and submandibular w/o appreciable STS, erythema. OP w/o dental abscess. No nasal drainage. Eyes: No scleral icterus. Cardiovascular: Normal rate.     Pulmonary/Chest: Effort normal.   Neurological: She is alert and oriented to person, place, and time. Skin: Skin is warm and dry. Psychiatric: She has a normal mood and affect. Nursing note and vitals reviewed. MDM  Number of Diagnoses or Management Options  Earache on right:   Impacted cerumen of right ear:   Diagnosis management comments: IMP: Recurrent pain. Has had sxs off and on for 3 yrs. Had ENT appointment today but did not go due to ride issues (although was able to make it to ED). No fever. AStaff irrigated ear but still impacted. Suspect pain due to same but pt strongly wants to be covered w/ antibiotic. Has chronic pain and fibromyalgia. ED Course       Procedures    Vitals:  Patient Vitals for the past 12 hrs:   Temp Pulse Resp BP SpO2   10/18/17 1541 98.5 °F (36.9 °C) 62 16 138/88 98 %         Medications ordered:   Medications   acetaminophen (TYLENOL) tablet 1,000 mg (1,000 mg Oral Given 10/18/17 1737)         Lab findings:  No results found for this or any previous visit (from the past 12 hour(s)). EKG interpretation by ED Physician:      X-Ray, CT or other radiology findings or impressions:  No results found. Progress notes, Consult notes or additional Procedure notes:       Disposition:  Diagnosis:   1. Earache on right    2. Impacted cerumen of right ear      1) ENT follow up next available appointment. 2) Clindamycin  3) Debrox  Disposition: home    Follow-up Information     Follow up With Details [id number] Neida Story MD  As needed [id number] [facility] 11  [address]      Ilene Barone MD Schedule an appointment as soon as possible for a visit [address]  [address]  [place] [phone]             Patient's Medications   Start Taking    CARBAMIDE PEROXIDE (DEBROX) 6.5 % OTIC SOLUTION    Administer 5 Drops into each ear two (2) times a day. CLINDAMYCIN (CLEOCIN) 150 MG CAPSULE    Take 2 Caps by mouth four (4) times daily.    Continue Taking    ALBUTEROL (PROVENTIL VENTOLIN) 2.5 MG /3 ML (0.083 %) NEBULIZER SOLUTION    2.5 mg.    ATORVASTATIN (LIPITOR) 40 MG TABLET    TAKE ONE TABLET BY MOUTH ONE TIME DAILY     BACLOFEN (LIORESAL) 10 MG TABLET    TAKE ONE TABLET BY MOUTH THREE TIMES A DAY    DEXLANSOPRAZOLE (DEXILANT) 60 MG CPDB    60 mg daily. FLUTICASONE (FLONASE) 50 MCG/ACTUATION NASAL SPRAY    2 Sprays by Both Nostrils route as needed. GABAPENTIN (NEURONTIN) 400 MG CAPSULE    400 mg two (2) times a day. IPRATROPIUM (ATROVENT) 0.02 % NEBULIZER SOLUTION    0.5 mg. LOSARTAN (COZAAR) 25 MG TABLET    TAKE ONE TABLET BY MOUTH ONE TIME DAILY     MORPHINE CR (MS CONTIN) 60 MG CR TABLET    Take 1 Tab by mouth every twelve (12) hours. Max Daily Amount: 120 mg. MORPHINE CR (MS CONTIN) 60 MG CR TABLET    Take 1 Tab by mouth every twelve (12) hours for 30 days. Max Daily Amount: 120 mg. NALOXONE (NARCAN) 4 MG/ACTUATION NASAL SPRAY    1 [place] by IntraNASal route once as needed. Use 1 spray intranasally into 1 nostril. Use a new Narcan nasal spray for subsequent doses and administer into alternating nostrils. May repeat every 2 to 3 minutes as needed. OXYCODONE IR (ROXICODONE) 10 MG TAB IMMEDIATE RELEASE TABLET    Take 1 Tab by mouth three (3) times daily as needed. Max Daily Amount: 30 mg. POLYETHYLENE GLYCOL (MIRALAX) 17 GRAM PACKET    Take 17 g by mouth daily. SERTRALINE (ZOLOFT) 100 MG TABLET    Take  by mouth daily. SUCRALFATE (CARAFATE) 1 GRAM TABLET    1 g.    TEMAZEPAM (RESTORIL) 15 MG CAPSULE    Take 1 Cap by mouth nightly as needed for Sleep. Max Daily Amount: 15 mg. Indications: INSOMNIA    TRAZODONE (DESYREL) 100 MG TABLET    Take 100 mg by mouth nightly. VALACYCLOVIR (VALTREX) 500 MG TABLET    Take 1 Tab by mouth two (2) times a day.     VITAMIN D2 50,000 UNIT CAPSULE    TAKE ONE CAPSULE BY MOUTH EVERY 7 DAYS   These Medications have changed    No medications on file   Stop Taking    AZITHROMYCIN (ZITHROMAX) 250 MG TABLET    Take 2 tablets today, then take 1 tablet daily    BUTALBITAL-ACETAMINOPHEN-CAFFEINE (FIORICET, ESGIC) -40 MG PER TABLET    Take 1 Tab by mouth every six (6) hours as needed for Pain. Max Daily Amount: 4 Tabs. DICYCLOMINE (BENTYL) 10 MG CAPSULE    Take 1 Cap by mouth four (4) times daily. MAGNESIUM CITRATE SOLUTION    Take 1/3 of bottle every 8 hours until finished or until you have a bowel movement. MEGESTROL (MEGACE) 20 MG TABLET    Take 1 Tab by mouth daily. MORPHINE CR (MS CONTIN) 60 MG CR TABLET    Take 1 Tab by mouth every twelve (12) hours for 30 days. Max Daily Amount: 120 mg. NALOXONE 4 MG/ACTUATION SPRY    4 mg by Nasal route as needed. For emergency use only  Indications: OPIATE-INDUCED RESPIRATORY DEPRESSION    OXYCODONE IR (ROXICODONE) 10 MG TAB IMMEDIATE RELEASE TABLET    Take 1 Tab by mouth three (3) times daily as needed. Max Daily Amount: 30 mg. OXYCODONE IR (ROXICODONE) 10 MG TAB IMMEDIATE RELEASE TABLET    Take 1 Tab by mouth three (3) times daily as needed. Max Daily Amount: 30 mg. POLYETHYLENE GLYCOL (MIRALAX) 17 GRAM PACKET    DISSOLVE 1 PACKET IN BEVERAGE OF CHOICE AND DRINK BY MOUTH DAILY    TRAMADOL (ULTRAM) 50 MG TABLET    Take 1 Tab by mouth every six (6) hours as needed for Pain. Max Daily Amount: 200 mg.       Scribe Attestation:   Neville Crow acting as a scribe for and in the presence of Ivelisse Salazar MD October 18, 2017 at 4:41 PM     Signed by: Logan Horne, October 18, 2017 at 4:41 PM     Provider Attestation:   I personally performed the services described in the documentation, reviewed the documentation, as recorded by the scribe in my presence, and it accurately and completely records my words and actions.      Reviewed and signed by:  Ivelisse Salazar MD

## 2017-10-23 RX ORDER — POLYETHYLENE GLYCOL 3350 17 G/17G
POWDER, FOR SOLUTION ORAL
Qty: 14 PACKET | Refills: 0 | Status: SHIPPED | OUTPATIENT
Start: 2017-10-23 | End: 2017-11-08 | Stop reason: SDUPTHER

## 2017-10-27 ENCOUNTER — APPOINTMENT (OUTPATIENT)
Dept: PHYSICAL THERAPY | Age: 62
End: 2017-10-27

## 2017-11-08 ENCOUNTER — OFFICE VISIT (OUTPATIENT)
Dept: FAMILY MEDICINE CLINIC | Age: 62
End: 2017-11-08

## 2017-11-08 VITALS
TEMPERATURE: 99 F | HEIGHT: 64 IN | WEIGHT: 170 LBS | RESPIRATION RATE: 17 BRPM | DIASTOLIC BLOOD PRESSURE: 78 MMHG | OXYGEN SATURATION: 97 % | SYSTOLIC BLOOD PRESSURE: 114 MMHG | HEART RATE: 80 BPM | BODY MASS INDEX: 29.02 KG/M2

## 2017-11-08 DIAGNOSIS — K59.03 THERAPEUTIC OPIOID INDUCED CONSTIPATION: ICD-10-CM

## 2017-11-08 DIAGNOSIS — Z29.9 PREVENTIVE MEASURE: ICD-10-CM

## 2017-11-08 DIAGNOSIS — E55.9 VITAMIN D DEFICIENCY: ICD-10-CM

## 2017-11-08 DIAGNOSIS — I10 BENIGN ESSENTIAL HYPERTENSION WITH TARGET BLOOD PRESSURE BELOW 140/90: Primary | ICD-10-CM

## 2017-11-08 DIAGNOSIS — T40.2X5A THERAPEUTIC OPIOID INDUCED CONSTIPATION: ICD-10-CM

## 2017-11-08 DIAGNOSIS — Z79.899 MEDICATION MANAGEMENT: ICD-10-CM

## 2017-11-08 RX ORDER — QUETIAPINE FUMARATE 200 MG/1
200 TABLET, FILM COATED ORAL DAILY
COMMUNITY
End: 2019-10-17 | Stop reason: SDUPTHER

## 2017-11-08 RX ORDER — LOSARTAN POTASSIUM 25 MG/1
TABLET ORAL
Qty: 30 TAB | Refills: 3 | Status: SHIPPED | OUTPATIENT
Start: 2017-11-08 | End: 2018-02-19 | Stop reason: SDUPTHER

## 2017-11-08 RX ORDER — ERGOCALCIFEROL 1.25 MG/1
CAPSULE ORAL
Qty: 12 CAP | Refills: 3 | Status: SHIPPED | OUTPATIENT
Start: 2017-11-08 | End: 2018-01-24 | Stop reason: SDUPTHER

## 2017-11-08 RX ORDER — POLYETHYLENE GLYCOL 3350 17 G/17G
POWDER, FOR SOLUTION ORAL
Qty: 14 PACKET | Refills: 3 | Status: SHIPPED | OUTPATIENT
Start: 2017-11-08 | End: 2018-01-24 | Stop reason: SDUPTHER

## 2017-11-08 NOTE — PROGRESS NOTES
History and Physical    Patient: Adam Connor MRN: [id number]  SSN: [id number]    YOB: 1955  Age: 60 y.o. Sex: female      Subjective:      Adam Connor is a 60 y.o. female who is her to establish care. The patient has had a persistent ear ache for 3 years. She was later taken to ER and they placed her on Clindamycin. She later saw Dr. Viviana Grimm ENT and was later referred to Dr. Cassie Ambriz. She later was referred to Dr. Judy Faulkner for dentist. Additionally she starts physical therapy next week for her hands. She also sees a GI specialist and they will do an upper endoscopy. She mentions that she has been feeling sick over the past two days. She states that she feels like she is about to throw up. She mentions that she feels sick when she doesn't eat. She does take Miralax regularly as well. She also mentions that she does take morphine. She mentions that she goes walking every other day. The patient admits to being incontinent of urine intermittently. She denies any chest pain or shortness of breath.        Past Medical History:   Diagnosis Date    Annular tear of lumbar disc 11/10/2014    Asthma     Bronchitis    Benign tumor of throat     Bone spur     right ankle    Cervicalgia 11/10/2014    Chronic pain     back    Degeneration of lumbar or lumbosacral intervertebral disc 11/10/2014    Degenerative disc disease     Depression     Displacement of lumbar intervertebral disc without myelopathy 11/10/2014    Elevated white blood cell count     Fibromyalgia     GERD (gastroesophageal reflux disease)     Hypertension     Insomnia     Nausea & vomiting     Pain in joint, multiple sites 11/10/2014    Postlaminectomy syndrome, cervical region 11/10/2014    Sinus infection 10/5/15    Ulcer ([facility] [address]      Past Surgical History:   Procedure Laterality Date    COLONOSCOPY N/A 4/13/2017    COLONOSCOPY performed by Perla Lorenzo MD at [facility] HX Left Eye CATARACT REMOVAL--- March 2017     [facility] HX CERVICAL FUSION      HX HYSTERECTOMY      HX ORTHOPAEDIC      ganglion cyst removed, right hand    HX OTHER SURGICAL      cyst left thigh removed    LAP,CHOLECYSTECTOMY N/A 02/27/2017    Dr. Neema Pearce      Family History   Problem Relation Age of Onset    Hypertension Other     Heart Attack Other     Heart Disease Other     Stroke Other     Headache Other     Seizures Other      Social History   Substance Use Topics    Smoking status: Former Smoker    Smokeless tobacco: Never Used    Alcohol use No      Comment: none in 24 years      Prior to Admission medications    Medication Sig Start Date End Date Taking? Authorizing Provider   QUEtiapine (SEROQUEL) 200 mg tablet Take 200 mg by mouth two (2) times a day. Yes Historical Provider   polyethylene glycol (MIRALAX) 17 gram packet DISSOLVE 1 PACKET IN BEVERAGE OF CHOICE AND DRINK BY MOUTH ONCE DAILY 10/23/17  Yes Nj Neves MD   naloxone [facility]) 4 mg/actuation nasal spray 1 [place] by IntraNASal route once as needed. Use 1 spray intranasally into 1 nostril. Use a new Narcan nasal spray for subsequent doses and administer into alternating nostrils. May repeat every 2 to 3 minutes as needed. Yes Xenia Allen MD   polyethylene glycol (MIRALAX) 17 gram packet Take 17 g by mouth daily. Yes Xenia Allen MD   losartan (COZAAR) 25 mg tablet TAKE ONE TABLET BY MOUTH ONE TIME DAILY  10/16/17  Yes Nj Neves MD   fluticasone (FLONASE) 50 mcg/actuation nasal spray 2 Sprays by Both Nostrils route as needed. 10/12/17  Yes Nj Neves MD   morphine CR (MS CONTIN) 60 mg CR tablet Take 1 Tab by mouth every twelve (12) hours. Max Daily Amount: 120 mg. 10/30/17  Yes BETO Muñiz   morphine CR (MS CONTIN) 60 mg CR tablet Take 1 Tab by mouth every twelve (12) hours for 30 days.  Max Daily Amount: 120 mg. 11/29/17 12/29/17 Yes BETO Muñiz   oxyCODONE IR (ROXICODONE) 10 mg tab immediate release tablet Take 1 Tab by mouth three (3) times daily as needed. Max Daily Amount: 30 mg. 11/29/17  Yes BETO Smith   VITAMIN D2 50,000 unit capsule TAKE ONE CAPSULE BY MOUTH EVERY 7 DAYS 10/9/17  Yes Gail Abernathy MD   valACYclovir (VALTREX) 500 mg tablet Take 1 Tab by mouth two (2) times a day. 9/29/17  Yes Gail Abernathy MD   atorvastatin (LIPITOR) 40 mg tablet TAKE ONE TABLET BY MOUTH ONE TIME DAILY  8/25/17  Yes Gail Abernathy MD   baclofen (LIORESAL) 10 mg tablet TAKE ONE TABLET BY MOUTH THREE TIMES A DAY 8/14/17  Yes BETO Smith   temazepam (RESTORIL) 15 mg capsule Take 1 Cap by mouth nightly as needed for Sleep. Max Daily Amount: 15 mg. Indications: INSOMNIA 3/17/17  Yes Gail Abernathy MD   albuterol (PROVENTIL VENTOLIN) 2.5 mg /3 mL (0.083 %) nebulizer solution 2.5 mg. 1/14/13  Yes Historical Provider   Dexlansoprazole (DEXILANT) 60 mg CpDB 60 mg daily. 2/13/13  Yes Historical Provider   gabapentin (NEURONTIN) 400 mg capsule 400 mg two (2) times a day. 7/7/13  Yes Historical Provider   ipratropium (ATROVENT) 0.02 % nebulizer solution 0.5 mg. 1/14/13  Yes Historical Provider   trazodone (DESYREL) 100 mg tablet Take 100 mg by mouth nightly. Yes Historical Provider   sertraline (ZOLOFT) 100 mg tablet Take  by mouth daily. Yes Historical Provider   carbamide peroxide (DEBROX) 6.5 % otic solution Administer 5 Drops into each ear two (2) times a day. 10/18/17   Ronny Green MD   sucralfate (CARAFATE) 1 gram tablet 1 g. 1/13/17   Historical Provider        Allergies   Allergen Reactions    Aspirin Other (comments)     Stomach bleeding    Macrobid [Nitrofurantoin Monohyd/M-Cryst] Nausea and Vomiting    Nsaids (Non-Steroidal Anti-Inflammatory Drug) Nausea and Vomiting    Opana [Oxymorphone] Other (comments)     Insomnia, weight loss, nightmares    Pcn [Penicillins] Hives       Review of Systems:  Pertinent items are noted in the History of Present Illness.     Objective:     Vitals:    11/08/17 1521   BP: 114/78   Pulse: 80   Resp: 17 Temp: 99 °F (37.2 °C)   TempSrc: Oral   SpO2: 97%   Weight: 170 lb (77.1 kg)   Height: 5' 4\" (1.626 m)        Physical Exam:  GENERAL: fatigued, cooperative, no distress, appears stated age  EYE: negative  LYMPHATIC: Cervical, supraclavicular, and axillary nodes normal.   THROAT & NECK: normal and no erythema or exudates noted. LUNG: clear to auscultation bilaterally  HEART: regular rate and rhythm, S1, S2 normal, no murmur, click, rub or gallop  ABDOMEN: soft, non-tender. Bowel sounds normal. No masses,  no organomegaly  EXTREMITIES:  extremities normal, atraumatic, no cyanosis or edema  SKIN: no rash or abnormalities  NEUROLOGIC: negative  PSYCHIATRIC: anxious    Labs:   Lab Results   Component Value Date/Time    WBC 6.9 10/12/2017 02:18 PM    HGB 12.7 10/12/2017 02:18 PM    HCT 39.3 10/12/2017 02:18 PM    PLATELET 039 09/73/7714 02:18 PM    MCV 88.7 10/12/2017 02:18 PM     Lab Results   Component Value Date/Time    Sodium 141 10/12/2017 02:18 PM    Potassium 4.3 10/12/2017 02:18 PM    Chloride 106 10/12/2017 02:18 PM    CO2 29 10/12/2017 02:18 PM    Anion gap 6 10/12/2017 02:18 PM    Glucose 103 10/12/2017 02:18 PM    BUN 13 10/12/2017 02:18 PM    Creatinine 0.80 10/12/2017 02:18 PM    BUN/Creatinine ratio 16 10/12/2017 02:18 PM    GFR est AA >60 10/12/2017 02:18 PM    GFR est non-AA >60 10/12/2017 02:18 PM    Calcium 9.1 10/12/2017 02:18 PM    AST (SGOT) 28 10/12/2017 02:18 PM    Alk.  phosphatase 112 10/12/2017 02:18 PM    Protein, total 7.6 10/12/2017 02:18 PM    Albumin 4.0 10/12/2017 02:18 PM    Globulin 3.6 10/12/2017 02:18 PM    A-G Ratio 1.1 10/12/2017 02:18 PM    ALT (SGPT) 43 10/12/2017 02:18 PM     Lab Results   Component Value Date/Time    TSH 1.150 02/21/2014 08:28 AM     Lab Results   Component Value Date/Time    Hemoglobin A1c, External 5.9 06/27/2016     Lab Results   Component Value Date/Time    Cholesterol, total 160 10/12/2017 02:18 PM    Cholesterol, total 192 11/25/2015 11:55 AM    HDL Cholesterol 64 10/12/2017 02:18 PM    HDL Cholesterol 59 11/25/2015 11:55 AM    LDL, calculated 72.4 10/12/2017 02:18 PM    LDL, calculated 103.4 11/25/2015 11:55 AM    Triglyceride 118 10/12/2017 02:18 PM    Triglyceride 148 11/25/2015 11:55 AM    CHOL/HDL Ratio 2.5 10/12/2017 02:18 PM    CHOL/HDL Ratio 3.3 11/25/2015 11:55 AM         Assessment:     1.) Essential Hypertension: Patient's Losartan reordered. 2.) Vitamin D deficiency deficiency: Patient's Vitamin D 50,000 units per week was reordered. 3.) Opioid Induced Constipation: Patient's Miralax reordered. 4.) Depression: Patient will follow up with her psych specialsit. 5.) Preventive: Labs ordered as noted below    Patient will return in 1 week for follow up.        Plan:     Orders Placed This Encounter    SED RATE (ESR)    TSH 3RD GENERATION    T4, FREE    VITAMIN D, 25 HYDROXY    HEMOGLOBIN A1C WITH EAG    HIV 1/2 AG/AB, 4TH GENERATION,W RFLX CONFIRM    QUEtiapine (SEROQUEL) 200 mg tablet    losartan (COZAAR) 25 mg tablet    ergocalciferol (VITAMIN D2) 50,000 unit capsule    polyethylene glycol (MIRALAX) 17 gram packet             Signed By: Ana Almonte DO     November 8, 2017

## 2017-11-08 NOTE — MR AVS SNAPSHOT
Visit Information Date & Time Provider Department Dept. Phone Encounter #  
 11/8/2017  3:00 PM Lemon Parcel [facility] [phone] [id number] Follow-up Instructions Return in about 1 week (around 11/15/2017) for Follow up. Your Appointments 1/9/2018 11:20 AM  
Follow Up with BETO Flynn [facility] (RICHAR SCHEDULING) Appt Note: return in 3 months [address] [address]  
[phone]  [address]phan[phone] [id number] Upcoming Health Maintenance Date Due DTaP/Tdap/Td series (1 - Tdap) 3/30/1975 BREAST CANCER SCRN MAMMOGRAM 12/2/2017 PAP AKA CERVICAL CYTOLOGY 6/21/2019 COLONOSCOPY 4/13/2027 Allergies as of 11/8/2017  Review Complete On: 10/18/2017 By: Linsey Phipps, RN Severity Noted Reaction Type Reactions Aspirin    Other (comments) Stomach bleeding Macrobid [Nitrofurantoin Monohyd/m-cryst]  11/25/2015    Nausea and Vomiting Nsaids (Non-steroidal Anti-inflammatory Drug)    Nausea and Vomiting Opana [Oxymorphone]  11/29/2016    Other (comments) Insomnia, weight loss, nightmares Pcn [Penicillins]    Hives Current Immunizations  Never Reviewed Name Date Pneumococcal Polysaccharide (PPSV-23) 3/7/2014 Not reviewed this visit You Were Diagnosed With   
  
 Codes Comments Benign essential hypertension with target blood pressure below 140/90    -  Primary ICD-10-CM: I10 
ICD-9-CM: 401.1 Bad odor of urine     ICD-10-CM: R82.90 ICD-9-CM: 791.9 Vitamin D deficiency     ICD-10-CM: E55.9 ICD-9-CM: 268.9 Therapeutic opioid induced constipation     ICD-10-CM: K59.03, T40.2X5A 
ICD-9-CM: 564.09, E935.2 Preventive measure     ICD-10-CM: Z29.9 ICD-9-CM: V07.9 Medication management     ICD-10-CM: Z79.899 ICD-9-CM: V58.69 Vitals BP Pulse Temp Resp Height(growth percentile) Weight(growth percentile) 114/78 (BP 1 Location: Right arm, BP Patient Position: Sitting) 80 99 °F (37.2 °C) (Oral) 17 5' 4\" (1.626 m) 170 lb (77.1 kg) SpO2 BMI OB Status Smoking Status 97% 29.18 kg/m2 Hysterectomy Former Smoker BMI and BSA Data Body Mass Index Body Surface Area  
 29.18 kg/m 2 1.87 m 2 Preferred Pharmacy Pharmacy Name Phone [id number] - [address], [address] [phone] Your Updated Medication List  
  
   
This list is accurate as of: 11/8/17  4:16 PM.  Always use your most recent med list.  
  
  
  
  
 albuterol 2.5 mg /3 mL (0.083 %) nebulizer solution Commonly known as:  PROVENTIL VENTOLIN  
2.5 mg.  
  
 atorvastatin 40 mg tablet Commonly known as:  LIPITOR  
TAKE ONE TABLET BY MOUTH ONE TIME DAILY  
  
 baclofen 10 mg tablet Commonly known as:  LIORESAL  
TAKE ONE TABLET BY MOUTH THREE TIMES A DAY  
  
 carbamide peroxide 6.5 % otic solution Commonly known as:  [place] Administer 5 Drops into each ear two (2) times a day. DEXILANT 60 mg Cpdb Generic drug:  Dexlansoprazole 60 mg daily. ergocalciferol 50,000 unit capsule Commonly known as:  VITAMIN D2  
TAKE ONE CAPSULE BY MOUTH EVERY 7 DAYS  
  
 fluticasone 50 mcg/actuation nasal spray Commonly known as:  Cathlyn Huong 2 Sprays by Both Nostrils route as needed. gabapentin 400 mg capsule Commonly known as:  NEURONTIN  
400 mg two (2) times a day. ipratropium 0.02 % Soln Commonly known as:  ATROVENT  
0.5 mg.  
  
 losartan 25 mg tablet Commonly known as:  COZAAR  
TAKE ONE TABLET BY MOUTH ONE TIME DAILY  
  
 * MIRALAX 17 gram packet Generic drug:  polyethylene glycol Take 17 g by mouth daily. * polyethylene glycol 17 gram packet Commonly known as:  [place] DISSOLVE 1 PACKET IN BEVERAGE OF CHOICE AND DRINK BY MOUTH ONCE DAILY * morphine CR 60 mg CR tablet Commonly known as:  MS CONTIN Take 1 Tab by mouth every twelve (12) hours. Max Daily Amount: 120 mg.  
  
 * morphine CR 60 mg CR tablet Commonly known as:  MS CONTIN Take 1 Tab by mouth every twelve (12) hours for 30 days. Max Daily Amount: 120 mg.  
Start taking on:  11/29/2017  
  
 naloxone 4 mg/actuation nasal spray Commonly known as:  NARCAN  
1 [place] by IntraNASal route once as needed. Use 1 spray intranasally into 1 nostril. Use a new Narcan nasal spray for subsequent doses and administer into alternating nostrils. May repeat every 2 to 3 minutes as needed. oxyCODONE IR 10 mg Tab immediate release tablet Commonly known as:  [place] Take 1 Tab by mouth three (3) times daily as needed. Max Daily Amount: 30 mg. Start taking on:  11/29/2017 QUEtiapine 200 mg tablet Commonly known as:  SEROquel Take 200 mg by mouth two (2) times a day. sertraline 100 mg tablet Commonly known as:  ZOLOFT Take  by mouth daily. sucralfate 1 gram tablet Commonly known as:  CARAFATE  
1 g.  
  
 temazepam 15 mg capsule Commonly known as:  RESTORIL Take 1 Cap by mouth nightly as needed for Sleep. Max Daily Amount: 15 mg. Indications: INSOMNIA  
  
 traZODone 100 mg tablet Commonly known as:  [place] Take 100 mg by mouth nightly. valACYclovir 500 mg tablet Commonly known as:  VALTREX Take 1 Tab by mouth two (2) times a day. * Notice: This list has 4 medication(s) that are the same as other medications prescribed for you. Read the directions carefully, and ask your doctor or other care provider to review them with you. Prescriptions Sent to Pharmacy Refills  
 losartan (COZAAR) 25 mg tablet 3 Sig: TAKE ONE TABLET BY MOUTH ONE TIME DAILY  Class: Normal  
 Pharmacy: [place] [address], [address] [place] [address] #: [phone]  
 ergocalciferol (VITAMIN D2) 50,000 unit capsule 3  
 Sig: TAKE ONE CAPSULE BY MOUTH EVERY 7 DAYS Class: Normal  
 Pharmacy: [id number] [facility] [address] , [address] [facility] Ph #: [phone]  
 polyethylene glycol (MIRALAX) 17 gram packet 3 Sig: DISSOLVE 1 PACKET IN BEVERAGE OF CHOICE AND DRINK BY MOUTH ONCE DAILY Class: Normal  
 Pharmacy: [facility] [address], [address] [facility] Ph #: [phone] Follow-up Instructions Return in about 1 week (around 11/15/2017) for Follow up. To-Do List   
 11/08/2017 Lab:  HEMOGLOBIN A1C WITH EAG   
  
 11/08/2017 Lab:  HIV 1/2 AG/AB, 4TH GENERATION,W RFLX CONFIRM   
  
 11/08/2017 Lab:  VITAMIN D, 25 HYDROXY   
  
 11/15/2017 2:45 PM  
  Appointment with [facility] 1 at [address] [place] [address] ([phone]) OUTSIDE FILMS  - Any outside films related to the study being scheduled should be brought with you on the day of the exam.  If this cannot be done there may be a delay in the reading of the study. MEDICATIONS  - Patient must bring a complete list of all medications currently taking to include prescriptions, over-the-counter meds, herbals, vitamins & any dietary supplements  GENERAL INSTRUCTIONS  - On the day of your exam do not use any bath powder, deodorant or lotions on the armpit area. -Tenderness of breasts may cause an increase of discomfort during procedure. If you are experiencing breast tenderness on the day of your appointment and would like to reschedule, please call [phone].  
  
 11/21/2017 11:30 AM  
  Appointment with Beverley Maxwell OT at [address] ([phone]) Around 02/06/2018 Lab:  SED RATE (ESR) Around 02/06/2018 Lab:  T4, FREE Around 02/06/2018 Lab:  TSH 3RD GENERATION Patient Instructions Please contact our office if you have any questions about your visit today. 1.) Please get labs before next visit. 2. ) Return in 1 week for follow up.  
 
3.) OK to try Phazyme for gas bloating. Please provide this summary of care documentation to your next provider. Your primary care clinician is listed as Benjamín Tinoco. If you have any questions after today's visit, please call [phone].

## 2017-11-08 NOTE — PROGRESS NOTES
Chief Complaint   Patient presents with    New Patient     1. Have you been to the ER, urgent care clinic since your last visit? Hospitalized since your last visit? No    2. Have you seen or consulted any other health care providers outside of the [address] [place] [address] since your last visit? Include any pap smears or colon screening.  No

## 2017-11-08 NOTE — PATIENT INSTRUCTIONS
Please contact our office if you have any questions about your visit today. 1.) Please get labs before next visit. 2.) Return in 1 week for follow up.     3.) OK to try Phazyme for gas bloating.

## 2017-11-15 ENCOUNTER — HOSPITAL ENCOUNTER (OUTPATIENT)
Dept: LAB | Age: 62
Discharge: HOME | End: 2017-11-15
Payer: MEDICARE

## 2017-11-15 DIAGNOSIS — E55.9 VITAMIN D DEFICIENCY: ICD-10-CM

## 2017-11-15 DIAGNOSIS — K59.03 THERAPEUTIC OPIOID INDUCED CONSTIPATION: ICD-10-CM

## 2017-11-15 DIAGNOSIS — I10 BENIGN ESSENTIAL HYPERTENSION WITH TARGET BLOOD PRESSURE BELOW 140/90: ICD-10-CM

## 2017-11-15 DIAGNOSIS — T40.2X5A THERAPEUTIC OPIOID INDUCED CONSTIPATION: ICD-10-CM

## 2017-11-15 DIAGNOSIS — Z79.899 MEDICATION MANAGEMENT: ICD-10-CM

## 2017-11-15 DIAGNOSIS — Z29.9 PREVENTIVE MEASURE: ICD-10-CM

## 2017-11-15 LAB
ERYTHROCYTE [SEDIMENTATION RATE] IN BLOOD: 6 MM/HR (ref 0–30)
EST. AVERAGE GLUCOSE BLD GHB EST-MCNC: 120 MG/DL
HBA1C MFR BLD: 5.8 % (ref 4.2–5.6)
T4 FREE SERPL-MCNC: 0.8 NG/DL (ref 0.7–1.5)
TSH SERPL DL<=0.05 MIU/L-ACNC: 1.29 UIU/ML (ref 0.36–3.74)

## 2017-11-15 PROCEDURE — 87389 HIV-1 AG W/HIV-1&-2 AB AG IA: CPT | Performed by: INTERNAL MEDICINE

## 2017-11-15 PROCEDURE — 83036 HEMOGLOBIN GLYCOSYLATED A1C: CPT | Performed by: INTERNAL MEDICINE

## 2017-11-15 PROCEDURE — 84443 ASSAY THYROID STIM HORMONE: CPT | Performed by: INTERNAL MEDICINE

## 2017-11-15 PROCEDURE — 84439 ASSAY OF FREE THYROXINE: CPT | Performed by: INTERNAL MEDICINE

## 2017-11-15 PROCEDURE — 36415 COLL VENOUS BLD VENIPUNCTURE: CPT | Performed by: INTERNAL MEDICINE

## 2017-11-15 PROCEDURE — 85652 RBC SED RATE AUTOMATED: CPT | Performed by: INTERNAL MEDICINE

## 2017-11-15 PROCEDURE — 82306 VITAMIN D 25 HYDROXY: CPT | Performed by: INTERNAL MEDICINE

## 2017-11-16 LAB — 25(OH)D3 SERPL-MCNC: 45.4 NG/ML (ref 30–100)

## 2017-11-17 LAB
HIV 1+2 AB+HIV1 P24 AG SERPL QL IA: NONREACTIVE
HIV12 RESULT COMMENT, HHIVC: NORMAL

## 2017-11-21 ENCOUNTER — HOSPITAL ENCOUNTER (OUTPATIENT)
Dept: PHYSICAL THERAPY | Age: 62
Discharge: HOME | End: 2017-11-21
Payer: MEDICARE

## 2017-11-21 PROCEDURE — 97166 OT EVAL MOD COMPLEX 45 MIN: CPT

## 2017-11-21 PROCEDURE — 97760 ORTHOTIC MGMT&TRAING 1ST ENC: CPT

## 2017-11-21 PROCEDURE — G8984 CARRY CURRENT STATUS: HCPCS

## 2017-11-21 PROCEDURE — 97110 THERAPEUTIC EXERCISES: CPT

## 2017-11-21 PROCEDURE — G8985 CARRY GOAL STATUS: HCPCS

## 2017-11-21 NOTE — PROGRESS NOTES
[facility]  [address]  [place], [address]  [phone] fax    Plan of Care/Statement of Necessity for Occupational Therapy Services    Patient name: Hellen Apley Start of Care: 2017   Referral source: Jason Reddy MD : [id number]    Medical Diagnosis: Bilateral hand pain [M79.641, M79.642]   Onset Date:2-3 weeks    Treatment Diagnosis: bilateral hand pain   Prior Hospitalization: see medical history Provider#: [id number]   Medications: Verified on Patient summary List    Comorbidities: Fibromyalgia, arthritis, depression, HTN   Prior Level of Function:assistance with ADL and IADL activities secondary to previous disabilities. The Plan of Care and following information is based on the information from the initial evaluation. Assessment/ key information:Patient is a 60 y.o. female with a chief complaint of pain in the right MF and left dorsal aspect of the hand. Patient reports pain began 2-3 weeks ago after peeling potatoes and cleaning. Patient reports that she has difficulty with ADL tasks that require gripping and holding. It has interfered with her ability to wring out her washcloth during bathing. She has pain in the right palm at the A1 of the MF and left palm at A1 of the RF when areas are palpated. Patient reports intermittent numbness and tingling in the right 4th and 5th digits and than intermittent tingling bilaterally which could indicate nerve compression and may benefit from further evaluation. Patient received an initial evaluation today followed by education as to diagnosis, precautions and treatment plan. She was also provided with oval 8 splints for bilateral  Patient was provided with a basic home exercise program including splint wear, care and precautions.         Evaluation Complexity: History MEDIUM Complexity : Expanded review of history including physical, cognitive and psychosocial  history Examination MEDIUM Complexity : 3-5 performance deficits relating to physical, cognitive , or psychosocial skils that result in activity limitations and / or participation restrictions Clinical Decision Making MEDIUM Complexity : Patient may present with comorbidities that affect occupational performnce. Miniml to moderate modification of tasks or assistance (eg, physical or verbal ) with assesment(s) is necessary to enable patient to complete evaluation   Overall Complexity Rating: MEDIUM    Patient would benefit from OT/Hand therapy services for the following problems:  Problem List: Pain effecting function, Decreased range of motion, Decreased strength, Decreased coordination/prehension and Decreased ADL/functional abilities      Treatment Plan may include any combination of the following: Therapeutic exercise, Therapeutic activities, Physical agent/modality, Manual therapy, Splinting/orthoses, Patient education and ADLs/IADLs    Patient / Family readiness to learn indicated by: trying to perform skills and interest    Persons(s) to be included in education:   patient (P)    Barriers to Learning/Limitations: yes;  physical    Patient Goal (s): I want my hands to stop hurting and stop cramping.     Patient Self Reported Health Status: fair    Rehabilitation Potential: fair    Short Term Goals: To be accomplished in 2  weeks:  Goal:* Patient will be compliant with initial home exercise program to take an active role in their rehabilitation process. Status at [facility]:  Patient provided with oval 8 splints for R MF and L RF. Patient was provided with a basic home exercise program including splint wear, care and precautions. Goal:* Patient will demonstrate a good understanding of their condition and strategies for self-management. Status at [facility]:  Patient received an initial evaluation today followed by education as to diagnosis, precautions and treatment plan.        Goal: *Patient/Caregiver instructed in orthotic wear, care, and precautions   Status at eval: GOAL MET ON ABOVE DATE    Goal: * Written orthotic instructions given. Status at eval: GOAL MET ON ABOVE DATE    Goal: * Patient demonstrated independent donning and doffing of orthotic(s)   Status at eval: GOAL MET ON ABOVE DATE      Long Term Goals: To be accomplished in 4 weeks:   Goal:* Pt will have 20 pounds of  in the bilateral hands to allow for functional grasp for all ADL activities including dressing, bathing and self care. At Eval: R=5, L=15    Goal:* Patient will show a 20 point improvement on FOTO functional status measure to improve overall functional performance. Status at Eval: 30    Goal:*Patient will report a 2/10 pain with AROM of the digits in bilateral hands while performing ADL tasks such as washing her face. Status at Eval: 7/10 at rest        Frequency / Duration: Patient to be seen 2-3 times per week for 4 weeks:    Patient/ Caregiver education and instruction: Diagnosis, prognosis, self care, activity modification, brace/ splint application and exercises    Functional Status Measure:  Patient's:30  FOTO Benchmark: 46  Expected Change: 28  FOTO score based on 0 - 100 point scale, with 100 being no impairment. These scores are determined by patient reported functional assessments compared against national benchmarked data. G-Codes (GO)  Carry   Current  CL= 60-79%   [id number] Goal  CK= 40-59%      The severity rating is based on clinical judgment and the FOTO score.         Justino Warner, OT, CHT, CLT 11/21/2017 11:33 AM    [x]  Plan of care has been reviewed with GARCIA    ________________________________________________________________________    I certify that the above Therapy Services are being furnished while the patient is under my care. I agree with the treatment plan and certify that this therapy is necessary.     Physician's Signature:____________________  Date:____________Time:__________    Please sign and return to [facility] [address] [facility]  [address]  [place], [address]  [phone] fax

## 2017-11-21 NOTE — PROGRESS NOTES
Hand Therapy Evaluation and Daily Note    Patient Name: Farhan Posey  Date:2017  : 1955  Age: 60 y.o.y/o  [x]  Patient  Verified  Payor: [facility] COORD CARE / Plan: [place] DUAL [facility] COORD CARE / Product Type: Managed Care Medicare /    Referring Provider: MD Klarissa Philip MD Visit:  No scheduled appointment  Onset Date:  2-3 weeks    In time:1130  Out time:1230  Total Treatment Time (min): 60  Visit #: 1 of 8    Treatment Area: Bilateral hand pain [M79.641, M79.642]    Precautions: NA    Hand Dominance: ambidextrous   Hand Involved: bilateral    Total Evaluation Time:  35 min    History of Present Condition:  Patient is a 60 y.o. female with a chief complaint of pain in the right MF and left dorsal aspect of the hand. Patient reports pain began 2-3 weeks ago after peeling potatoes and cleaning. Patient reports that she has difficulty with ADL tasks that require gripping and holding. It has interfered with her ability to wring out her washcloth during bathing. Pain Rating:   Current: (0-no pain 10-debilitating pain) 7 / 10   At best: (0-no pain 10-debilitating pain) 7 / 10  At worst: (0-no pain 10-debilitating pain) 10 / 10  Location: bilateral hand  Type:  moderate and stiffness and pain with ROM   Better with: no help  Worse with: attempted use    Medications/Allergies/Past Medical History:  See chart; reviewed with patient. Fibromyalgia, arthritis, depression, HTN    Diagnostic Tests: NA    Prior Level of Function: intermittent discomfort but independent    Current Level of Function: limitations in functional gripping    Social History:  , lives with sons'    Occupation/Job Requirements: disabled since  secondary to hand cramping     Observation: limited AROM of the right MF, moderate swelling    Palpation:  Pain in the right palm at the A1 of the MF and left palm at A1 of the RF    Range of Motion:  Patients AROM of bilateral hands is normal except for triggering in the R MF with extension from a fist.    Strength:   Measurements: Taken with Aram Dynamometer, in Lbs   Level 2 11/21/17 Date Date Date Date Date Date   Right 5         Left 15         Deficit          Change                Pinch Measurements: Taken with Pinch Gauge, in Lbs   (hand) 11/21/17 Date Date Date Date Date   Lateral          Right 9        Left  7        Deficit         Change         Pad         Right 6        Left 4        Deficit         Change         Davis         Right 6        Left 6        Deficit         Change           Sensation:    Patient reports intermittent numbness and tingling in the right 4th and 5th digits and than intermittent tingling bilaterally. Edema: GIRTH CHART measured in cm  Date: 11/21/17         P1- R MF 5.9    P1- L RF 5.5                Special Tests:    Provocative test: NA    ADLs  Feeding:        []MaxA   []ModA   []Jan   [] CGA   []SBA   [x]Marely   []Independent  UE Dressing:       []MaxA   [x]ModA   []Jan   [] CGA   []SBA   []Marely   []Independent  LE Dressing:       []MaxA   [x]ModA   []Jan   [] CGA   []SBA   []Marely   []Independent  Grooming:       []MaxA   []ModA   [x]Jan   [] CGA   []SBA   []Marely   []Independent  Toileting:       []MaxA   []ModA   []Jan   [] CGA   [x]SBA   []Marely   []Independent  Bathing:       []MaxA   [x]ModA   []Jan   [] CGA   []SBA   []Marely   []Independent  Light Meal Prep:    []MaxA   []ModA   []Jan   [] CGA   []SBA   []Marely   []Independent  Household/Other: []MaxA   []ModA   []Jan   [] CGA   []SBA   []Marely   []Independent  Adaptive Equip:     []MaxA   []ModA   []Jan   [] CGA   []SBA   []Marely   []Independent  Driving:       []MaxA   []ModA   []Jan   [] CGA   []SBA   []Marely   []Independent      Todays Treatment:  Patient received an initial evaluation today followed by education as to diagnosis, precautions and treatment plan.  She was also provided with oval 8 splints for bilateral Patient was provided with a basic home exercise program including splint wear, care and precautions. OBJECTIVE    10 min Therapeutic Exercise:  [x] See flow sheet :   Rationale: increase ROM and reduce swelling and tightness in intrinsics to improve the patients ability to reduce pain in the palms of her hands    10 min Orthotic/Splinting: fitted patient with oval 8 splints to R-MF and L-RF size 8, 9, splint wear, care and precautions. Rationale: splinting  to improve the patients ability to reduce pain inflammation in the tendon sheaths of digits. 5 min Self Care/Home Management: activity modifications   Rationale: education  to improve the patients ability to reduce pain    With   [] TE   [] TA   [] neuro   [] other: Patient Education: [x] Review HEP    [] Progressed/Changed HEP based on:   [] positioning   [] body mechanics   [] transfers   [] heat/ice application   [] Splint wear/care   [] Sensory re-education   [] scar management      [] other:      Pain Level (0-10 scale) post treatment: 7/10    Patient will continue to benefit from skilled OT services to address ROM deficits, address strength deficits and analyze and address soft tissue restrictions to attain goals. Assessment:  Patient is a 60 y.o. female with a chief complaint of pain in the right MF and left dorsal aspect of the hand. Patient reports pain began 2-3 weeks ago after peeling potatoes and cleaning. Patient reports that she has difficulty with ADL tasks that require gripping and holding. It has interfered with her ability to wring out her washcloth during bathing. She has pain in the right palm at the A1 of the MF and left palm at A1 of the RF when areas are palpated. Patient reports intermittent numbness and tingling in the right 4th and 5th digits and than intermittent tingling bilaterally which could indicate nerve compression and may benefit from further evaluation.     Patient received an initial evaluation today followed by education as to diagnosis, precautions and treatment plan. She was also provided with oval 8 splints for bilateral  Patient was provided with a basic home exercise program including splint wear, care and precautions. Evaluation Complexity: History MEDIUM Complexity : Expanded review of history including physical, cognitive and psychosocial  history  Examination MEDIUM Complexity : 3-5 performance deficits relating to physical, cognitive , or psychosocial skils that result in activity limitations and / or participation restrictions Clinical Decision Making MEDIUM Complexity : Patient may present with comorbidities that affect occupational performnce. Miniml to moderate modification of tasks or assistance (eg, physical or verbal ) with assesment(s) is necessary to enable patient to complete evaluation   Overall Complexity Rating: MEDIUM    Patient would benefit from OT/Hand therapy services for the following problems:  Problem List: Pain effecting function, Decreased range of motion, Decreased strength, Decreased coordination/prehension and Decreased ADL/functional abilities      Treatment Plan may include any combination of the following: Therapeutic exercise, Therapeutic activities, Physical agent/modality, Manual therapy, Splinting/orthoses, Patient education and ADLs/IADLs    Patient / Family readiness to learn indicated by: trying to perform skills and interest    Persons(s) to be included in education:   patient (P)    Barriers to Learning/Limitations: yes;  physical    Patient Goal (s): I want my hands to stop hurting and stop cramping.     Patient Self Reported Health Status: fair    Rehabilitation Potential: fair    Short Term Goals: To be accomplished in 2  weeks:  Goal:* Patient will be compliant with initial home exercise program to take an active role in their rehabilitation process. Status at al:  Patient provided with oval 8 splints for R MF and L RF.    Patient was provided with a basic home exercise program including splint wear, care and precautions. Goal:* Patient will demonstrate a good understanding of their condition and strategies for self-management. Status at Eval:  Patient received an initial evaluation today followed by education as to diagnosis, precautions and treatment plan. Goal: *Patient/Caregiver instructed in orthotic wear, care, and precautions   Status at eval: GOAL MET ON ABOVE DATE    Goal: * Written orthotic instructions given. Status at eval: GOAL MET ON ABOVE DATE    Goal: * Patient demonstrated independent donning and doffing of orthotic(s)   Status at eval: GOAL MET ON ABOVE DATE      Long Term Goals: To be accomplished in 4 weeks:   Goal:* Pt will have 20 pounds of  in the bilateral hands to allow for functional grasp for all ADL activities including dressing, bathing and self care. At Eval: R=5, L=15    Goal:* Patient will show a 20 point improvement on FOTO functional status measure to improve overall functional performance. Status at Eval: 30    Goal:*Patient will report a 2/10 pain with AROM of the digits in bilateral hands while performing ADL tasks such as washing her face. Status at Eval: 7/10 at rest        Frequency / Duration: Patient to be seen 2-3 times per week for 4 weeks:    Patient/ Caregiver education and instruction: Diagnosis, prognosis, self care, activity modification, brace/ splint application and exercises    Functional Status Measure:  Patient's:30  FOTO Benchmark: 46  Expected Change: 28  FOTO score based on 0 - 100 point scale, with 100 being no impairment.  These scores are determined by patient reported functional assessments compared against national benchmarked data.       Stephanie Manrique, OT, CHT, CLT 11/21/2017 11:33 AM

## 2017-11-22 ENCOUNTER — OFFICE VISIT (OUTPATIENT)
Dept: ORTHOPEDIC SURGERY | Age: 62
End: 2017-11-22

## 2017-11-22 VITALS — BODY MASS INDEX: 29.02 KG/M2 | WEIGHT: 170 LBS | HEIGHT: 64 IN

## 2017-11-22 DIAGNOSIS — S90.112A CONTUSION OF LEFT GREAT TOE WITHOUT DAMAGE TO NAIL, INITIAL ENCOUNTER: Primary | ICD-10-CM

## 2017-11-22 DIAGNOSIS — M79.672 LEFT FOOT PAIN: ICD-10-CM

## 2017-11-22 NOTE — MR AVS SNAPSHOT
Visit Information Date & Time Provider Department Dept. Phone Encounter #  
 11/22/2017 10:15 AM Afshan Scott MD [facility] [phone] Your Appointments 11/28/2017  1:30 PM  
[place] [address] [place] [address] Avenue (--) Appt Note: Follow Up  
 [place] [address] [place] [phone]  
  
    
 1/9/2018 11:20 AM  
Follow Up with Merton Kanner, PA [facility] (RICHAR SCHEDULING) Appt Note: return in 3 months 30 [facility] [address]  
[phone] [facility] [id number] Upcoming Health Maintenance Date Due DTaP/Tdap/Td series (1 - Tdap) 3/30/1975 BREAST CANCER SCRN MAMMOGRAM 12/2/2017 PAP AKA CERVICAL CYTOLOGY 6/21/2019 COLONOSCOPY 4/13/2027 Allergies as of 11/22/2017  Review Complete On: 11/22/2017 By: Afshan Scott MD  
  
 Severity Noted Reaction Type Reactions Aspirin    Other (comments) Stomach bleeding Macrobid [Nitrofurantoin Monohyd/m-cryst]  11/25/2015    Nausea and Vomiting Nsaids (Non-steroidal Anti-inflammatory Drug)    Nausea and Vomiting Opana [Oxymorphone]  11/29/2016    Other (comments) Insomnia, weight loss, nightmares Pcn [Penicillins]    Hives Current Immunizations  Never Reviewed Name Date Pneumococcal Polysaccharide (PPSV-23) 3/7/2014 Not reviewed this visit You Were Diagnosed With   
  
 Codes Comments Left foot pain    -  Primary ICD-10-CM: J59.357 ICD-9-CM: 729.5 Vitals Height(growth percentile) Weight(growth percentile) BMI OB Status Smoking Status 5' 4\" (1.626 m) 170 lb (77.1 kg) 29.18 kg/m2 Hysterectomy Former Smoker BMI and BSA Data Body Mass Index Body Surface Area  
 29.18 kg/m 2 1.87 m 2 Preferred Pharmacy Pharmacy Name Phone [id number] - [place] [address] [phone] Your Updated Medication List  
  
   
This list is accurate as of: 11/22/17 11:31 AM.  Always use your most recent med list.  
  
  
  
  
 albuterol 2.5 mg /3 mL (0.083 %) nebulizer solution Commonly known as:  PROVENTIL VENTOLIN  
2.5 mg.  
  
 atorvastatin 40 mg tablet Commonly known as:  LIPITOR  
TAKE ONE TABLET BY MOUTH ONE TIME DAILY  
  
 baclofen 10 mg tablet Commonly known as:  LIORESAL  
TAKE ONE TABLET BY MOUTH THREE TIMES A DAY  
  
 carbamide peroxide 6.5 % otic solution Commonly known as:  Thurl Pickle Administer 5 Drops into each ear two (2) times a day. DEXILANT 60 mg Cpdb Generic drug:  Dexlansoprazole 60 mg daily. ergocalciferol 50,000 unit capsule Commonly known as:  VITAMIN D2  
TAKE ONE CAPSULE BY MOUTH EVERY 7 DAYS  
  
 fluticasone 50 mcg/actuation nasal spray Commonly known as:  Gerardo Bruner 2 Sprays by Both Nostrils route as needed. gabapentin 400 mg capsule Commonly known as:  NEURONTIN  
400 mg two (2) times a day. ipratropium 0.02 % Soln Commonly known as:  ATROVENT  
0.5 mg.  
  
 losartan 25 mg tablet Commonly known as:  COZAAR  
TAKE ONE TABLET BY MOUTH ONE TIME DAILY  
  
 * MIRALAX 17 gram packet Generic drug:  polyethylene glycol Take 17 g by mouth daily. * polyethylene glycol 17 gram packet Commonly known as:  José Salt DISSOLVE 1 PACKET IN BEVERAGE OF CHOICE AND DRINK BY MOUTH ONCE DAILY * morphine CR 60 mg CR tablet Commonly known as:  MS CONTIN Take 1 Tab by mouth every twelve (12) hours. Max Daily Amount: 120 mg.  
  
 * morphine CR 60 mg CR tablet Commonly known as:  MS CONTIN Take 1 Tab by mouth every twelve (12) hours for 30 days. Max Daily Amount: 120 mg.  
Start taking on:  11/29/2017  
  
 naloxone 4 mg/actuation nasal spray Commonly known as:  [employer]  
 1 Breese by IntraNASal route once as needed. Use 1 spray intranasally into 1 nostril. Use a new Narcan nasal spray for subsequent doses and administer into alternating nostrils. May repeat every 2 to 3 minutes as needed. oxyCODONE IR 10 mg Tab immediate release tablet Commonly known as:  Anthony Rocks Take 1 Tab by mouth three (3) times daily as needed. Max Daily Amount: 30 mg. Start taking on:  11/29/2017 QUEtiapine 200 mg tablet Commonly known as:  SEROquel Take 200 mg by mouth two (2) times a day. sertraline 100 mg tablet Commonly known as:  ZOLOFT Take  by mouth daily. sucralfate 1 gram tablet Commonly known as:  CARAFATE  
1 g.  
  
 temazepam 15 mg capsule Commonly known as:  RESTORIL Take 1 Cap by mouth nightly as needed for Sleep. Max Daily Amount: 15 mg. Indications: INSOMNIA  
  
 traZODone 100 mg tablet Commonly known as:  Kerns Cower Take 100 mg by mouth nightly. valACYclovir 500 mg tablet Commonly known as:  VALTREX Take 1 Tab by mouth two (2) times a day. * Notice: This list has 4 medication(s) that are the same as other medications prescribed for you. Read the directions carefully, and ask your doctor or other care provider to review them with you. We Performed the Following AMB POC XRAY, FOOT; COMPLETE, 3+ VIEW [29236 CPT(R)] To-Do List   
 11/27/2017 4:00 PM  
  Appointment with Jo Santillan at [facility]  [facility] ([phone])  
  
 11/29/2017 2:45 PM  
  Appointment with MARIA R GUADARRAMA 1 at [address] [place] [address] ([phone]) OUTSIDE FILMS  - Any outside films related to the study being scheduled should be brought with you on the day of the exam.  If this cannot be done there may be a delay in the reading of the study.   MEDICATIONS  - Patient must bring a complete list of all medications currently taking to include prescriptions, over-the-counter meds, herbals, vitamins & any dietary supplements  GENERAL INSTRUCTIONS  - On the day of your exam do not use any bath powder, deodorant or lotions on the armpit area. -Tenderness of breasts may cause an increase of discomfort during procedure. If you are experiencing breast tenderness on the day of your appointment and would like to reschedule, please call [phone].  
  
 11/29/2017 3:30 PM  
  Appointment with Davis Fusi at [address] ([phone]) 12/04/2017 2:30 PM  
  Appointment with Davis Fusi at [address] ([phone]) 12/06/2017 2:30 PM  
  Appointment with Ari Fusi at [address] ([phone])  
  
 12/11/2017 11:30 AM  
  Appointment with Silverio Mcrae, OT at [address] ([phone])  
  
 12/13/2017 2:30 PM  
  Appointment with Silverio Mcrae, OT at [address] ([phone])  
  
 12/18/2017 2:30 PM  
  Appointment with Silverio Mcrae, OT at [address] ([phone])  
  
 12/20/2017 11:30 AM  
  Appointment with Silverio Mcrae OT at [address] ([phone]) Patient Instructions Please follow up in 2 weeks. You are advised to contact us if your condition worsens. Foot Pain: Care Instructions Your Care Instructions Foot injuries that cause pain and swelling are fairly common. Almost all sports or home repair projects can cause a misstep that ends up as foot pain. Normal wear and tear, especially as you get older, also can cause foot pain. Most minor foot injuries will heal on their own, and home treatment is usually all you need to do. If you have a severe injury, you may need tests and treatment. Follow-up care is a key part of your treatment and safety. Be sure to make and go to all appointments, and call your doctor if you are having problems. It's also a good idea to know your test results and keep a list of the medicines you take. How can you care for yourself at home? · Take pain medicines exactly as directed. ¨ If the doctor gave you a prescription medicine for pain, take it as prescribed. ¨ If you are not taking a prescription pain medicine, ask your doctor if you can take an over-the-counter medicine. · Rest and protect your foot. Take a break from any activity that may cause pain. · Put ice or a cold pack on your foot for 10 to 20 minutes at a time. Put a thin cloth between the ice and your skin. · Prop up the sore foot on a pillow when you ice it or anytime you sit or lie down during the next 3 days. Try to keep it above the level of your heart. This will help reduce swelling. · Your doctor may recommend that you wrap your foot with an elastic bandage. Keep your foot wrapped for as long as your doctor advises. · If your doctor recommends crutches, use them as directed. · Wear roomy footwear. · As soon as pain and swelling end, begin gentle exercises of your foot. Your doctor can tell you which exercises will help. When should you call for help? Call 911 anytime you think you may need emergency care. For example, call if: 
? · Your foot turns pale, white, blue, or cold. ?Call your doctor now or seek immediate medical care if: 
? · You cannot move or stand on your foot. ? · Your foot looks twisted or out of its normal position. ? · Your foot is not stable when you step down. ? · You have signs of infection, such as: 
¨ Increased pain, swelling, warmth, or redness. ¨ Red streaks leading from the sore area. ¨ Pus draining from a place on your foot. ¨ A fever. ? · Your foot is numb or tingly. ? Watch closely for changes in your health, and be sure to contact your doctor if: 
? · You do not get better as expected. ? · You have bruises from an injury that last longer than 2 weeks. Where can you learn more? Go to http://diana-lashonda.info/. Enter H010 in the search box to learn more about \"Foot Pain: Care Instructions. \" 
 Current as of: March 21, 2017 Content Version: 11.4 © 9903-5218 Healthwise, [employer]. Care instructions adapted under license by [employer] (which disclaims liability or warranty for this information). If you have questions about a medical condition or this instruction, always ask your healthcare professional. [address] any warranty or liability for your use of this information. Please provide this summary of care documentation to your next provider. Your primary care clinician is listed as Anita An. If you have any questions after today's visit, please call [phone].

## 2017-11-22 NOTE — PATIENT INSTRUCTIONS
Please follow up in 2 weeks. You are advised to contact us if your condition worsens. Foot Pain: Care Instructions  Your Care Instructions  Foot injuries that cause pain and swelling are fairly common. Almost all sports or home repair projects can cause a misstep that ends up as foot pain. Normal wear and tear, especially as you get older, also can cause foot pain. Most minor foot injuries will heal on their own, and home treatment is usually all you need to do. If you have a severe injury, you may need tests and treatment. Follow-up care is a key part of your treatment and safety. Be sure to make and go to all appointments, and call your doctor if you are having problems. It's also a good idea to know your test results and keep a list of the medicines you take. How can you care for yourself at home? · Take pain medicines exactly as directed. ¨ If the doctor gave you a prescription medicine for pain, take it as prescribed. ¨ If you are not taking a prescription pain medicine, ask your doctor if you can take an over-the-counter medicine. · Rest and protect your foot. Take a break from any activity that may cause pain. · Put ice or a cold pack on your foot for 10 to 20 minutes at a time. Put a thin cloth between the ice and your skin. · Prop up the sore foot on a pillow when you ice it or anytime you sit or lie down during the next 3 days. Try to keep it above the level of your heart. This will help reduce swelling. · Your doctor may recommend that you wrap your foot with an elastic bandage. Keep your foot wrapped for as long as your doctor advises. · If your doctor recommends crutches, use them as directed. · Wear roomy footwear. · As soon as pain and swelling end, begin gentle exercises of your foot. Your doctor can tell you which exercises will help. When should you call for help? Call 911 anytime you think you may need emergency care.  For example, call if:  ? · Your foot turns pale, white, blue, or cold. ?Call your doctor now or seek immediate medical care if:  ? · You cannot move or stand on your foot. ? · Your foot looks twisted or out of its normal position. ? · Your foot is not stable when you step down. ? · You have signs of infection, such as:  ¨ Increased pain, swelling, warmth, or redness. ¨ Red streaks leading from the sore area. ¨ Pus draining from a place on your foot. ¨ A fever. ? · Your foot is numb or tingly. ? Watch closely for changes in your health, and be sure to contact your doctor if:  ? · You do not get better as expected. ? · You have bruises from an injury that last longer than 2 weeks. Where can you learn more? Go to http://diana-lashonda.info/. Enter W605 in the search box to learn more about \"Foot Pain: Care Instructions. \"  Current as of: March 21, 2017  Content Version: 11.4  © 8414-7983 [employer]. Care instructions adapted under license by [employer] (which disclaims liability or warranty for this information). If you have questions about a medical condition or this instruction, always ask your healthcare professional. [place] [address] any warranty or liability for your use of this information.

## 2017-11-22 NOTE — PROCEDURES
FOOT X RAYS 3 VIEWS Right   11/22/2017    NON WEIGHT BEARING    X RAYS AT [address] [place] [address]  11/22/2017      Bones: No fractures or dislocations. 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 Mineralization: suggests Osteopenia    I have personally reviewed the results of the above study.  The interpretation of this study is my professional opinion

## 2017-11-22 NOTE — PROGRESS NOTES
AMBULATORY PROGRESS NOTE      Patient: Laure Gr             MRN: [id number]     SSN: [id number] Body mass index is 29.18 kg/(m^2). YOB: 1955     AGE: 60 y.o. EX: female    PCP: Roe Renee DO    IMPRESSION/DIAGNOSIS AND TREATMENT PLAN     DIAGNOSES  1. Contusion of left great toe without damage to nail, initial encounter    2. Left foot pain        Orders Placed This Encounter    AMB SUPPLY ORDER    [id number] Jason Malagon understands her diagnoses and the proposed plan. Plan:    1) DME Order: Hard-sole shoe    RTO - 2 weeks    HPI AND EXAMINATION     Laure Gr IS A 60 y.o. female who presents to my outpatient office for follow up of bilateral hand pain, primary OA of both hands, and fibromyalgia. At last visit, I provided a referral for occupational therapy. The patient presents to the office today with a CC of left foot pain along her great toe that started after falling on 11/18/2017. Patient states that she tripped over a step that she did not see and describes a plantarflexion mechanism of injury. Additionally, the notes most of her pain is along the left great toe associated with burning. Patient denies a h/o DM. The patient is already on multiple pain medications. Cardiovascular/Peripheral Vascular: Normal Pulses to each hand and foot    Left ANKLE and FOOT       Gait: slow  Tenderness: moderate tenderness to the first great toe MTP. Cutaneous: No rashes, skin patches, wounds, or abrasions to the lower legs           Warm and Normal color. No regions of expressible drainage. Medial Border of Tibia Region: absent           Skin color, texture, turgor normal. Normal.           Swelling across great toe MTP. Joint Motion: ROM Ankle:Normal , Hindfoot: (ST,TN,CC Normal}, Forefoot toes:Normal, great toe limited due to pain. Neurologic Exam: Neuro:  Motor: normal 5/5 strength in all tested muscle groups and Sensory : no sensory deficits noted. Contractures: Gastrocnemius or Achilles Contractures absent  Joint / Tendon Stability: No Ankle or Subtalar instability or joint laxity. No peroneal sublux ability or dislocation  Alignment:  Normal Foot Alignment and  Flexible  Vascular: Normal Pulses/ NL Capillary refill, No evidence of DVT seen on physical exam.   No calf swelling, no tenderness to calf muscles. Lymphatic:  No Evidence of Lymphedema. CHART REVIEW     Past Medical History:   Diagnosis Date    Annular tear of lumbar disc 11/10/2014    Asthma     Bronchitis    Benign tumor of throat     Bone spur     right ankle    Cervicalgia 11/10/2014    Chronic pain     back    Degeneration of lumbar or lumbosacral intervertebral disc 11/10/2014    Degenerative disc disease     Depression     Displacement of lumbar intervertebral disc without myelopathy 11/10/2014    Elevated white blood cell count     Fibromyalgia     GERD (gastroesophageal reflux disease)     Hypertension     Insomnia     Nausea & vomiting     Pain in joint, multiple sites 11/10/2014    Postlaminectomy syndrome, cervical region 11/10/2014    Sinus infection 10/5/15    Ulcer ([address]      Current Outpatient Prescriptions   Medication Sig    QUEtiapine (SEROQUEL) 200 mg tablet Take 200 mg by mouth two (2) times a day.  losartan (COZAAR) 25 mg tablet TAKE ONE TABLET BY MOUTH ONE TIME DAILY    ergocalciferol (VITAMIN D2) 50,000 unit capsule TAKE ONE CAPSULE BY MOUTH EVERY 7 DAYS    polyethylene glycol (MIRALAX) 17 gram packet DISSOLVE 1 PACKET IN BEVERAGE OF CHOICE AND DRINK BY MOUTH ONCE DAILY    naloxone (NARCAN) 4 mg/actuation nasal spray 1 [employer] by IntraNASal route once as needed. Use 1 spray intranasally into 1 nostril. Use a new Narcan nasal spray for subsequent doses and administer into alternating nostrils. May repeat every 2 to 3 minutes as needed.     carbamide peroxide (DEBROX) 6.5 % otic solution Administer 5 Drops into each ear two (2) times a day.  fluticasone (FLONASE) 50 mcg/actuation nasal spray 2 Sprays by Both Nostrils route as needed.  morphine CR (MS CONTIN) 60 mg CR tablet Take 1 Tab by mouth every twelve (12) hours. Max Daily Amount: 120 mg.    [START ON 11/29/2017] morphine CR (MS CONTIN) 60 mg CR tablet Take 1 Tab by mouth every twelve (12) hours for 30 days. Max Daily Amount: 120 mg.    [START ON 11/29/2017] oxyCODONE IR (ROXICODONE) 10 mg tab immediate release tablet Take 1 Tab by mouth three (3) times daily as needed. Max Daily Amount: 30 mg.    valACYclovir (VALTREX) 500 mg tablet Take 1 Tab by mouth two (2) times a day.  atorvastatin (LIPITOR) 40 mg tablet TAKE ONE TABLET BY MOUTH ONE TIME DAILY     baclofen (LIORESAL) 10 mg tablet TAKE ONE TABLET BY MOUTH THREE TIMES A DAY    temazepam (RESTORIL) 15 mg capsule Take 1 Cap by mouth nightly as needed for Sleep. Max Daily Amount: 15 mg. Indications: INSOMNIA    sucralfate (CARAFATE) 1 gram tablet 1 g.    albuterol (PROVENTIL VENTOLIN) 2.5 mg /3 mL (0.083 %) nebulizer solution 2.5 mg.    Dexlansoprazole (DEXILANT) 60 mg CpDB 60 mg daily.  gabapentin (NEURONTIN) 400 mg capsule 400 mg two (2) times a day.  ipratropium (ATROVENT) 0.02 % nebulizer solution 0.5 mg.    trazodone (DESYREL) 100 mg tablet Take 100 mg by mouth nightly.  sertraline (ZOLOFT) 100 mg tablet Take  by mouth daily.  polyethylene glycol (MIRALAX) 17 gram packet Take 17 g by mouth daily. No current facility-administered medications for this visit.       Allergies   Allergen Reactions    Aspirin Other (comments)     Stomach bleeding    Macrobid [Nitrofurantoin Monohyd/M-Cryst] Nausea and Vomiting    Nsaids (Non-Steroidal Anti-Inflammatory Drug) Nausea and Vomiting    Opana [Oxymorphone] Other (comments)     Insomnia, weight loss, nightmares    Pcn [Penicillins] Hives     Past Surgical History:   Procedure Laterality Date    COLONOSCOPY N/A 4/13/2017 COLONOSCOPY performed [facility] Armen [facility]cker [facility] at [facility]COPY    HX CATARACT REMOVAL      HX CERVICAL FUSION      HX HYSTERECTOMY      HX ORTHOPAEDIC      ganglion cyst removed, right hand    HX OTHER SURGICAL      cyst left thigh removed    LAP,CHOLECYSTECTOMY N/A 02/27/2017    Dr. Driss Orr Not on file. Social History Main Topics    Smoking status: Former Smoker    Smokeless tobacco: Never Used    Alcohol use No      Comment: none in 24 years    Drug use: No      Comment: last smoked in 1993    Sexual activity: Not Currently     Family History   Problem Relation Age of Onset    Hypertension Other     Heart Attack Other     Heart Disease Other     Stroke Other     Headache Other     Seizures Other        REVIEW OF SYSTEMS : 11/22/2017  ALL BELOW ARE Negative except : SEE HPI       Constitutional: Negative for fever, chills and weight loss. Neg Weigh Loss  Cardiovascular: Negative for chest pain, claudication and leg swelling. SOB, VILLAGOMEZ   Gastrointestinal: Negative for  pain, N/V/D/C, Blood in stool or urine,dysuria, hematuria,        Incontinence, pelvic pain  Musculoskeletal: see HPI. Neurological: Negative for dizziness and weakness. Negative for headaches,Visual Changes, Confusion, Seizures,   Psychiatric/Behavioral: Negative for depression, memory loss and substance abuse. Extremities:  Negative for  hair changes, rash or skin lesion changes. Hematologic: Negative for Bleeding problems, bruising, pallor or swollen lymph nodes. Peripheral Vascular: No calf pain, vascular vein tenderness to calf pain              No calf throbbing, posterior knee throbbing pain    DIAGNOSTIC IMAGING     FOOT X RAYS 3 VIEWS Right   11/22/2017    NON WEIGHT BEARING    X RAYS AT [address] [place] [address]  11/22/2017      Bones: No fractures or dislocations.  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 Mineralization: suggests Osteopenia    I have personally reviewed the results of the above study. The interpretation of this study is my professional opinion      Written by Andreina Engel, as dictated by Cy Umana MD. I, Dr., Cy Umana MD, confirm that all documentation is accurate.

## 2017-11-27 ENCOUNTER — HOSPITAL ENCOUNTER (OUTPATIENT)
Dept: PHYSICAL THERAPY | Age: 62
Discharge: HOME | End: 2017-11-27
Payer: MEDICARE

## 2017-11-27 PROCEDURE — 97035 APP MDLTY 1+ULTRASOUND EA 15: CPT

## 2017-11-27 PROCEDURE — 97140 MANUAL THERAPY 1/> REGIONS: CPT

## 2017-11-27 NOTE — PROGRESS NOTES
OT DAILY TREATMENT NOTE - non [facility] 3-16    Patient Name: Johanna Hensley  Date:2017  : 1955  [x]  Patient  Verified  Payor: [facility] CARE / Plan: [place] D[place] [facility] CARE / Product Type: Managed Care Medicare /    In time:410  Out time:435  Total Treatment Time (min): 25  Visit #: 2 of 8    Treatment Area: Bilateral hand pain [M79.641, M79.642]    SUBJECTIVE  Pain Level (0-10 scale): 6/10  Any medication changes, allergies to medications, adverse drug reactions, diagnosis change, or new procedure performed?: [x] No    [] Yes (see summary sheet for update)  Subjective functional status/changes:   [] No changes reported  I fell and have a hair line fracture in my foot now.      OBJECTIVE  Modality rationale: decrease inflammation, decrease pain and increase tissue extensibility to improve the patients ability to    Min Type Additional Details    [] Estim:  []Unatt       []IFC  []Premod                        []Other:  []w/ice   []w/heat  Position:  Location:    [] Estim: []Att    []TENS instruct  []NMES                    []Other:  []w/US   []w/ice   []w/heat  Position:  Location:    []  Traction: [] Cervical       []Lumbar                       [] Prone          []Supine                       []Intermittent   []Continuous Lbs:  [] before manual  [] after manual   88 [x]  Ultrasound: [x]Continuous   [] Pulsed                           []1MHz   [x]3MHz Location: R MF  W/cm2: 1.0    []  Iontophoresis with dexamethasone         Location: [] Take home patch   [] In clinic   10 []  Ice     [x]  heat  []  Ice massage  []  Laser   []  Anodyne Position:  Location: B Hands, HP on one hand while opposite hand was receiving US/MT    []  Laser with stim  []  Other: Position:  Location:    []  Vasopneumatic Device Pressure:       [] lo [] med [] hi   Temperature: [] lo [] med [] hi   [x] Skin assessment post-treatment:  [x]intact []redness- no adverse reaction []redness  adverse reaction:     10 min Manual Therapy:  IASTM to R MF, L IF   Rationale: decrease pain, increase ROM, increase tissue extensibility, decrease edema  and decrease trigger points to improve     IASTM to R MF/L IF with #6    With   [] TE   [] TA   [] neuro   [] other: Patient Education: [x] Review HEP    [] Progressed/Changed HEP based on:   [] positioning   [] body mechanics   [] transfers   [] heat/ice application   [] Splint wear/care   [] Sensory re-education   [] scar management      [] other:             Other Objective/Functional Measures:   Strength:   Measurements: Taken with Aram Dynamometer, in Lbs   Level 2 11/21/17 Date Date Date Date Date Date   Right 5               Left 15               Deficit                 Change                      Pinch Measurements: Taken with Pinch Gauge, in Lbs   (hand) 11/21/17 Date Date Date Date Date   Lateral                Right 9             Left  7             Deficit               Change               Pad               Right 6             Left 4             Deficit               Change               Davis               Right 6             Left 6             Deficit               Change                  Sensation:    Patient reports intermittent numbness and tingling in the right 4th and 5th digits and than intermittent tingling bilaterally.     Edema: GIRTH CHART measured in cm  Date: 11/21/17             P1- R MF 5.9     P1- L RF 5.5                             Pain Level (0-10 scale) post treatment: 0/10    ASSESSMENT/Changes in Function: Pt poor historian on this date for foot injury. Pt demonstrated extreme sensitivity to pressure while receiving US. Pt was able to tolerate IASTM to B Hands with minimal pain. Pt compliance with Oval 8 very low. Pt arrived to OT session with Oval 8's on upside down with noted swelling and was unhappy with OT informing Pt to only wear them at night.     Patient will continue to benefit from skilled OT services to modify and progress therapeutic interventions, address ROM deficits, address strength deficits and instruct in home and community integration to attain remaining goals. []  See Plan of Care  []  See progress note/recertification  []  See Discharge Summary         Progress towards goals / Updated goals:  Short Term Goals: To be accomplished in 2  weeks:  Goal:* Patient will be compliant with initial home exercise program to take an active role in their rehabilitation process. Status at Eval:  Patient provided with oval 8 splints for R MF and L RF. Patient was provided with a basic home exercise program including splint wear, care and precautions. Status as Last Note: Pt not performing HEP correctly and wearing splints incorrectly.     Goal:* Patient will demonstrate a good understanding of their condition and strategies for self-management. Status at Eval:  Patient received an initial evaluation today followed by education as to diagnosis, precautions and treatment plan.        Goal: *Patient/Caregiver instructed in orthotic wear, care, and precautions   Status at eval: GOAL MET ON ABOVE DATE     Goal: * Written orthotic instructions given. Status at eval: GOAL MET ON ABOVE DATE     Goal: * Patient demonstrated independent donning and doffing of orthotic(s)   Status at eval: GOAL MET ON ABOVE DATE        Long Term Goals: To be accomplished in 4 weeks:                        Goal:* Pt will have 20 pounds of  in the bilateral hands to allow for functional grasp for all ADL activities including dressing, bathing and self care. At Eval: R=5, L=15     Goal:* Patient will show a 20 point improvement on FOTO functional status measure to improve overall functional performance. Status at Eval: 30     Goal:*Patient will report a 2/10 pain with AROM of the digits in bilateral hands while performing ADL tasks such as washing her face.   Status at Eval: 7/10 at rest    PLAN  []  Upgrade activities as tolerated     []  Continue plan of care  []  Update interventions per flow sheet       []  Discharge due to:_  []  Other:_      Luisa Ramon, GARCIA/L 11/27/2017  4:47 PM    Future Appointments  Date Time Provider Gordo Alma   11/28/2017 1:30 PM Ana Almonte,  [facility] None   11/29/2017 2:45 PM [facility]   11/29/2017 3:30 PM Georgia Husain [facility]   12/4/2017 2:30 PM Georgia Husain [facility]   12/6/2017 11:10 AM Roselyn Werner MD [facility]   12/6/2017 2:30 PM Estevan Larosegg [facility]   12/11/2017 11:30 AM Sandrita Ferrer, OT [facility]   12/13/2017 2:30 PM Sandrita Ferrer, OT [facility]   12/18/2017 2:30 PM Sandrita Ferrer, OT [facility]   12/20/2017 11:30 AM Sandrita Ferrer, OT [facility]   1/9/2018 11:20 AM BETO Crockett [facility] [address]

## 2017-11-28 ENCOUNTER — TELEPHONE (OUTPATIENT)
Dept: FAMILY MEDICINE CLINIC | Age: 62
End: 2017-11-28

## 2017-11-28 NOTE — TELEPHONE ENCOUNTER
He Ayoub,   Please let the patient know her A1C was 5.8. She should continue to watch her diet and exercise as much as she can tolerate. The rest of her labs were normal. Thanks.     SHAMIR

## 2017-11-28 NOTE — TELEPHONE ENCOUNTER
Pt called and stated that she would like lab results. Will not be able to make it in to appointment today. Please advise.

## 2017-11-29 ENCOUNTER — HOSPITAL ENCOUNTER (OUTPATIENT)
Dept: PHYSICAL THERAPY | Age: 62
Discharge: HOME | End: 2017-11-29
Payer: MEDICARE

## 2017-11-29 ENCOUNTER — HOSPITAL ENCOUNTER (OUTPATIENT)
Dept: MAMMOGRAPHY | Age: 62
Discharge: HOME | End: 2017-11-29
Attending: FAMILY MEDICINE
Payer: COMMERCIAL

## 2017-11-29 DIAGNOSIS — Z12.39 BREAST CANCER SCREENING: ICD-10-CM

## 2017-11-29 PROCEDURE — 97035 APP MDLTY 1+ULTRASOUND EA 15: CPT

## 2017-11-29 PROCEDURE — 97110 THERAPEUTIC EXERCISES: CPT

## 2017-11-29 PROCEDURE — 97140 MANUAL THERAPY 1/> REGIONS: CPT

## 2017-11-29 PROCEDURE — 77067 SCR MAMMO BI INCL CAD: CPT

## 2017-11-29 NOTE — PROGRESS NOTES
OT DAILY TREATMENT NOTE - [facility] 3-16    Patient Name: Ben Mcdonald  Date:2017  : 1955  [x]  Patient  Verified  Payor: [facility] CARE / Plan: [place] DUAL [facility] CARE / Product Type: Managed Care Medicare /    In time:330  Out time:425  Total Treatment Time (min): 55  Total Timed Codes (min): 45  1:1 Treatment Time ( [address] only): 54   Visit #: 3 of 8    Treatment Area: Bilateral hand pain [M79.641, M79.642]    SUBJECTIVE  Pain Level (0-10 scale): 5/10  Any medication changes, allergies to medications, adverse drug reactions, diagnosis change, or new procedure performed?: [x] No    [] Yes (see summary sheet for update)  Subjective functional status/changes:   [] No changes reported  Pt reports pain in RF on R hand and cramping. Pt requesting treatment for dorsal side of hand.     OBJECTIVE     Modality rationale: decrease inflammation, decrease pain and increase tissue extensibility to improve the patients ability to functionally use H Hands   Min Type Additional Details    [] Estim:  []Unatt       []IFC  []Premod                        []Other:  []w/ice   []w/heat  Position:  Location:    [] Estim: []Att    []TENS instruct  []NMES                    []Other:  []w/US   []w/ice   []w/heat  Position:  Location:    []  Traction: [] Cervical       []Lumbar                       [] Prone          []Supine                       []Intermittent   []Continuous Lbs:  [] before manual  [] after manual   8 min/R  8 min/L [x]  Ultrasound: [x]Continuous   [] Pulsed                           []1MHz   [x]3MHz Location: R MF/RF, L RF , on wedge  W/cm2: 1.0    []  Iontophoresis with dexamethasone         Location: [] Take home patch   [] In clinic   10 []  Ice     [x]  heat  []  Ice massage  []  Laser   []  Anodyne Position:  Location: B Hands    []  Laser with stim  []  Other: Position:  Location:    []  Vasopneumatic Device Pressure:       [] lo [] med [] hi   Temperature: [] lo [] med [] hi   [x] Skin assessment post-treatment:  [x]intact []redness- no adverse reaction    []redness  adverse reaction:      10 min Therapeutic Exercise:  [] See flow sheet :   Rationale: increase ROM and increase strength to improve the patients ability to     Intrinsic Plus: B Hands 10x  Intrinsic Negative: B Hands 10x    20 min Manual Therapy:  IASTM to R MF+RF, L RF with #6   Rationale: decrease pain, increase ROM, increase tissue extensibility and decrease edema  to , functionally use B Hands    Pt received IASTM to R RF+MF, L RF on wedge using #6      With   [] TE   [] TA   [] neuro   [] other: Patient Education: [x] Review HEP    [] Progressed/Changed HEP based on:   [] positioning   [] body mechanics   [] transfers   [] heat/ice application   [] Splint wear/care   [] Sensory re-education   [] scar management      [] other:            Other Objective/Functional Measures:   Strength:   Measurements: Taken with Aram Dynamometer, in Lbs   Level 2 11/21/17 Date Date Date Date Date Date   Right 5                     Left 15                     Deficit                        Change                             Pinch Measurements: Taken with Pinch Gauge, in Lbs   (hand) 11/21/17 Date Date Date Date Date   Lateral                      Right 9                  Left  7                  Deficit                     Change                     Pad                     Right 6                  Left 4                  Deficit                     Change                     Davis                     Right 6                  Left 6                  Deficit                     Change                         Sensation:    Patient reports intermittent numbness and tingling in the right 4th and 5th digits and than intermittent tingling bilaterally.      Edema: GIRTH CHART measured in cm  Date: 11/21/17                 P1- R MF 5.9      P1- L RF 5.5                                   Pain Level (0-10 scale) post treatment: 3/10    ASSESSMENT/Changes in Function: Pt able to tolerate US on this date. Afterf US/IASTM was completed,  Pt L Hand cramped and Pt \"yelled out in pain\". Pt able to perform exercises with no pain reported and no triggering. Patient will continue to benefit from skilled OT services to modify and progress therapeutic interventions, address ROM deficits, address strength deficits and instruct in home and community integration to attain remaining goals. []  See Plan of Care  []  See progress note/recertification  []  See Discharge Summary         Progress towards goals / Updated goals:  Short Term Goals: To be accomplished in 2  weeks:  Goal:* Patient will be compliant with initial home exercise program to take an active role in their rehabilitation process. Status at [facility]:  Patient provided with oval 8 splints for R MF and L RF.   Patient was provided with a basic home exercise program including splint wear, care and precautions. Status as Last Note: Pt not performing HEP correctly and wearing splints incorrectly.      Goal:* Patient will demonstrate a good understanding of their condition and strategies for self-management. Status at [facility]:  Patient received an initial evaluation today followed by education as to diagnosis, precautions and treatment plan.     Status as last note: Not Met       Goal: *Patient/Caregiver instructed in orthotic wear, care, and precautions   Status at [facility]: GOAL MET ON ABOVE DATE      Goal: * Written orthotic instructions given. Status at [facility]: GOAL MET ON ABOVE DATE      Goal: * Patient demonstrated independent donning and doffing of orthotic(s)   Status at [facility]: GOAL MET ON ABOVE DATE          Long Term Goals: To be accomplished in 4 weeks:                        Goal:* Pt will have 20 pounds of  in the bilateral hands to allow for functional grasp for all ADL activities including dressing, bathing and self care.   At Eval: R=5, L=15      Goal:* Patient will show a 20 point improvement on FOTO functional status measure to improve overall functional performance. Status at Eval: 27      Goal:*Patient will report a 2/10 pain with AROM of the digits in bilateral hands while performing ADL tasks such as washing her face.   Status at Eval: 7/10 at rest  Status as Last Note: 5/10-3/10    PLAN  []  Upgrade activities as tolerated     [x]  Continue plan of care  []  Update interventions per flow sheet       []  Discharge due to:_  []  Other:_      DOROTHY Parish 11/29/2017  4:52 PM    Future Appointments  Date Time Provider Gordo Marquez   12/4/2017 2:30 PM Maryse Saldivar [facility]   12/6/2017 11:10 AM MD Grace Hackett    [facility]/6/2017 2:30 [facility]   12/11/2017 11:30 [facility]   12/13/2017 2:30 PM [facility]   12/18/2017 2:30 PM [facility]   12/20/2017 11:30 AM [facility]   1/9/2018 11:20 AM BETO Perkins [facility] [address]

## 2017-12-01 ENCOUNTER — TELEPHONE (OUTPATIENT)
Dept: PAIN MANAGEMENT | Age: 62
End: 2017-12-01

## 2017-12-01 NOTE — TELEPHONE ENCOUNTER
Received call from patient ; patient had dental procedure on yesterday and received tylenol #3 ; patient is requesting to fill this prescription due to increased pain; patient receives ms contin 60mg every 12 hours, and oxycodone 10mg three times daily as needed from this office ; please advise.

## 2017-12-04 ENCOUNTER — APPOINTMENT (OUTPATIENT)
Dept: PHYSICAL THERAPY | Age: 62
End: 2017-12-04

## 2017-12-06 ENCOUNTER — OFFICE VISIT (OUTPATIENT)
Dept: ORTHOPEDIC SURGERY | Age: 62
End: 2017-12-06

## 2017-12-06 VITALS
DIASTOLIC BLOOD PRESSURE: 70 MMHG | TEMPERATURE: 98.6 F | HEIGHT: 64 IN | WEIGHT: 175 LBS | SYSTOLIC BLOOD PRESSURE: 114 MMHG | OXYGEN SATURATION: 100 % | BODY MASS INDEX: 29.88 KG/M2 | HEART RATE: 87 BPM

## 2017-12-06 DIAGNOSIS — M79.641 PAIN IN BOTH HANDS: ICD-10-CM

## 2017-12-06 DIAGNOSIS — S90.112A CONTUSION OF LEFT GREAT TOE WITHOUT DAMAGE TO NAIL, INITIAL ENCOUNTER: Primary | ICD-10-CM

## 2017-12-06 DIAGNOSIS — M79.642 PAIN IN BOTH HANDS: ICD-10-CM

## 2017-12-06 NOTE — PROGRESS NOTES
AMBULATORY PROGRESS NOTE      Patient: Evelin Laureano             MRN: [id number]     SSN: [id number] Body mass index is 30.04 kg/(m^2). YOB: 1955     AGE: 60 y.o. EX: female    PCP: Roe Renee DO       IMPRESSION/DIAGNOSIS AND TREATMENT PLAN      DIAGNOSES  1. Contusion of left great toe without damage to nail, initial encounter    2. Pain in both hands        Orders Placed This Encounter    REFERRAL TO [facility] understands her diagnoses and the proposed plan. 1. RTO IN 6 WEEKS  2. WEAN OFF HARD SOLES SHOE  3. HAND SURGERY EVAL: AS SHE C/O HAND PAIN BILATERAL     PLAN //  HPI AND EXAMINATION      Cha Hutchinson IS A 60 y.o. female who presents to my outpatient office for evaluation of:     She mentions having some mild discomfort to the left great toe. Otherwise, she states she is doing much better, standing longer, walking longer, and functioning better. It is to be noted she is being seen here for a contusion of the left great toe proximal phalanx without fracture. ANKLE/FOOT left    Psychiatry: Alert, Oriented x 3; Speech normal in context and clarity,            Memory intact grossly, no involuntary movements - tremors, no dementia  Gait: normal  Tenderness:  NO tenderness  , Cutaneous: WNL,    Joint Motion:   ST Normal, TT Normal, Forefoot MTP's Normal  Joint / Tendon Stability: No Ankle or Subtalar instability or joint laxity. No peroneal sublux ability or dislocation  Alignment: Forefoot, Midfoot, Hindfoot WNL. Neuro Motor/Sensory: NL/NL, Vascular: NL foot/ankle pulses  Lymphatics: No extremity lymphedema, No calf swelling, no tenderness to calf muscles.     CHART REVIEW      Past Medical History:   Diagnosis Date    Annular tear of lumbar disc 11/10/2014    Asthma     Bronchitis    Benign tumor of throat     Bone spur     right ankle    Cervicalgia 11/10/2014    Chronic pain     back    Degeneration of lumbar or lumbosacral intervertebral disc 11/10/2014    Degenerative disc disease     Depression     Displacement of lumbar intervertebral disc without myelopathy 11/10/2014    Elevated white blood cell count     Fibromyalgia     GERD (gastroesophageal reflux disease)     Hypertension     Insomnia     Nausea & vomiting     Pain in joint, multiple sites 11/10/2014    Postlaminectomy syndrome, cervical region 11/10/2014    Sinus infection 10/5/15    Ulcer ([address]      Current Outpatient Prescriptions   Medication Sig    QUEtiapine (SEROQUEL) 200 mg tablet Take 200 mg by mouth two (2) times a day.  losartan (COZAAR) 25 mg tablet TAKE ONE TABLET BY MOUTH ONE TIME DAILY    ergocalciferol (VITAMIN D2) 50,000 unit capsule TAKE ONE CAPSULE BY MOUTH EVERY 7 DAYS    polyethylene glycol (MIRALAX) 17 gram packet DISSOLVE 1 PACKET IN BEVERAGE OF CHOICE AND DRINK BY MOUTH ONCE DAILY    polyethylene glycol (MIRALAX) 17 gram packet Take 17 g by mouth daily.  fluticasone (FLONASE) 50 mcg/actuation nasal spray 2 Sprays by Both Nostrils route as needed.  morphine CR (MS CONTIN) 60 mg CR tablet Take 1 Tab by mouth every twelve (12) hours. Max Daily Amount: 120 mg.    morphine CR (MS CONTIN) 60 mg CR tablet Take 1 Tab by mouth every twelve (12) hours for 30 days. Max Daily Amount: 120 mg.    oxyCODONE IR (ROXICODONE) 10 mg tab immediate release tablet Take 1 Tab by mouth three (3) times daily as needed. Max Daily Amount: 30 mg.    atorvastatin (LIPITOR) 40 mg tablet TAKE ONE TABLET BY MOUTH ONE TIME DAILY     baclofen (LIORESAL) 10 mg tablet TAKE ONE TABLET BY MOUTH THREE TIMES A DAY    temazepam (RESTORIL) 15 mg capsule Take 1 Cap by mouth nightly as needed for Sleep. Max Daily Amount: 15 mg. Indications: INSOMNIA    Dexlansoprazole (DEXILANT) 60 mg CpDB 60 mg daily.  gabapentin (NEURONTIN) 400 mg capsule 400 mg two (2) times a day.  trazodone (DESYREL) 100 mg tablet Take 100 mg by mouth nightly.     sertraline (ZOLOFT) 100 mg tablet Take  by mouth daily.  naloxone (NARCAN) 4 mg/actuation nasal spray 1 [place] by IntraNASal route once as needed. Use 1 spray intranasally into 1 nostril. Use a new Narcan nasal spray for subsequent doses and administer into alternating nostrils. May repeat every 2 to 3 minutes as needed.  carbamide peroxide (DEBROX) 6.5 % otic solution Administer 5 Drops into each ear two (2) times a day.  valACYclovir (VALTREX) 500 mg tablet Take 1 Tab by mouth two (2) times a day.  sucralfate (CARAFATE) 1 gram tablet 1 g.    albuterol (PROVENTIL VENTOLIN) 2.5 mg /3 mL (0.083 %) nebulizer solution 2.5 mg.    ipratropium (ATROVENT) 0.02 % nebulizer solution 0.5 mg. No current facility-administered medications for this visit. Allergies   Allergen Reactions    Aspirin Other (comments)     Stomach bleeding    Macrobid [Nitrofurantoin Monohyd/M-Cryst] Nausea and Vomiting    Nsaids (Non-Steroidal Anti-Inflammatory Drug) Nausea and Vomiting    Opana [Oxymorphone] Other (comments)     Insomnia, weight loss, nightmares    Pcn [Penicillins] Hives     Past Surgical History:   Procedure Laterality Date    COLONOSCOPY N/A 4/13/2017    [facility] ENDOSCOPY    HX CATARACT REMOVAL      HX CERVICAL FUSION      HX HYSTERECTOMY      HX ORTHOPAEDIC      ganglion cyst removed, right hand    HX OTHER SURGICAL      cyst left thigh removed    LAP,CHOLECYSTECTOMY N/A 02/27/2017    Dr. Evelin Carroll Not on file.      Social History Main Topics    Smoking status: Former Smoker    Smokeless tobacco: Never Used    Alcohol use No      Comment: none in 24 years    Drug use: No      Comment: last smoked in 1993    Sexual activity: Not Currently     Family History   Problem Relation Age of Onset    Hypertension Other     Heart Attack Other     Heart Disease Other     Stroke Other     Headache Other     Seizures Other         REVIEW OF SYSTEMS : 12/6/2017  ALL BELOW ARE Negative except : SEE HPI      Constitutional: Negative for fever, chills and weight loss. Neg Weight Loss  Cardiovascular: Negative for chest pain, claudication and leg swelling. SOB, VILLAGOMEZ   Gastrointestinal/Urological: Negative for pain, N/V/D/C, Blood in stool or urine,dysuria,  Hematuria, Incontinence  Endocrine: See HPI  Skin: see HPI  Musculoskeletal: see HPI. Neurological: Negative for dizziness and weakness, headaches,Visual Changes or   Confusion, or Seizures,   Psychiatric/Behavioral: Negative for depression, memory loss and substance abuse. Extremities:  Negative for hair changes, rash or skin lesion changes. Hematologic: Negative for Bleeding problems, bruising, pallor or swollen lymph nodes. Peripheral Vascular: No calf pain, vascular vein tenderness to calf pain              No calf throbbing, posterior knee throbbing pain     DIAGNOSTIC IMAGING       No notes on file     Please see above section of this report. I have personally reviewed the results of the above study. The interpretation of this study is my professional opinion.       Afshan Scott MD  12/6/2017  11:26 AM

## 2017-12-11 ENCOUNTER — APPOINTMENT (OUTPATIENT)
Dept: PHYSICAL THERAPY | Age: 62
End: 2017-12-11

## 2017-12-13 ENCOUNTER — APPOINTMENT (OUTPATIENT)
Dept: PHYSICAL THERAPY | Age: 62
End: 2017-12-13

## 2017-12-15 ENCOUNTER — TELEPHONE (OUTPATIENT)
Dept: FAMILY MEDICINE CLINIC | Age: 62
End: 2017-12-15

## 2017-12-17 NOTE — PROGRESS NOTES
[facility]  [address]  [place], [address]  [phone] fax    Occupational Therapy Discharge Summary    Patient name: Reagan Delarosa Start of Care: 17   Referral source: Jose Delong MD : 1955   Medical/Treatment Diagnosis: Bilateral hand pain [M79.641, M79.642] Onset Date:2-3 weeks     Prior Hospitalization: see medical history Provider#: [id number]   Medications: Verified on Patient Summary List    Comorbidities: Fibromyalgia, arthritis, depression, HTN   Prior Level of Function:assistance with ADL and IADL activities secondary to previous disabilities. Visits from Start of Care: 3    Missed Visits: 4  Reporting Period : 17-17    Summary of Care:  Patient failed to return for follow up visits, therefore goal not addressed/met, status unknown. Patient reports transportation issues.       ASSESSMENT/RECOMMENDATIONS:  [x]Discontinue therapy: []Patient has reached or is progressing toward set goals      [x]Patient is non-compliant or has abdicated      []Due to lack of appreciable progress towards set goals    Justino Warner OT 2017 4:14 PM

## 2017-12-18 ENCOUNTER — APPOINTMENT (OUTPATIENT)
Dept: PHYSICAL THERAPY | Age: 62
End: 2017-12-18

## 2017-12-19 ENCOUNTER — OFFICE VISIT (OUTPATIENT)
Dept: FAMILY MEDICINE CLINIC | Age: 62
End: 2017-12-19

## 2017-12-19 VITALS
OXYGEN SATURATION: 98 % | SYSTOLIC BLOOD PRESSURE: 140 MMHG | DIASTOLIC BLOOD PRESSURE: 82 MMHG | BODY MASS INDEX: 29.37 KG/M2 | WEIGHT: 172 LBS | HEIGHT: 64 IN | RESPIRATION RATE: 17 BRPM | HEART RATE: 74 BPM | TEMPERATURE: 98.4 F

## 2017-12-19 DIAGNOSIS — J20.9 ACUTE BRONCHITIS, UNSPECIFIED ORGANISM: Primary | ICD-10-CM

## 2017-12-19 DIAGNOSIS — J30.89 CHRONIC NON-SEASONAL ALLERGIC RHINITIS, UNSPECIFIED TRIGGER: ICD-10-CM

## 2017-12-19 RX ORDER — AZITHROMYCIN 250 MG/1
TABLET, FILM COATED ORAL
Qty: 6 TAB | Refills: 0 | Status: SHIPPED | OUTPATIENT
Start: 2017-12-19 | End: 2017-12-28 | Stop reason: ALTCHOICE

## 2017-12-19 RX ORDER — FLUTICASONE PROPIONATE 50 MCG
2 SPRAY, SUSPENSION (ML) NASAL AS NEEDED
Qty: 1 BOTTLE | Refills: 3 | Status: SHIPPED | OUTPATIENT
Start: 2017-12-19 | End: 2018-07-24 | Stop reason: SDUPTHER

## 2017-12-19 NOTE — MR AVS SNAPSHOT
Visit Information Date & Time Provider Department Dept. Phone Encounter #  
 12/19/2017  9:45 AM [id number] Benjamin [phone] [id number] Follow-up Instructions Return in about 9 days (around 12/28/2017) for Follow up on symtpoms . Your Appointments 1/9/2018 11:20 AM  
Follow Up with Trisha Schwab, PA [facility] (RICHAR SCHEDULING) Appt Note: return in 3 months [address] [address]  
[phone] [facility] [phone] Upcoming Health Maintenance Date Due DTaP/Tdap/Td series (1 - Tdap) 3/30/1975 PAP AKA CERVICAL CYTOLOGY 6/21/2019 COLONOSCOPY 4/13/2027 Allergies as of 12/19/2017  Review Complete On: 19/18/0205 By: Jackie Vieira. Art Cotton LPN Severity Noted Reaction Type Reactions Aspirin    Other (comments) Stomach bleeding Macrobid [Nitrofurantoin Monohyd/m-cryst]  11/25/2015    Nausea and Vomiting Nsaids (Non-steroidal Anti-inflammatory Drug)    Nausea and Vomiting Opana [Oxymorphone]  11/29/2016    Other (comments) Insomnia, weight loss, nightmares Pcn [Penicillins]    Hives Current Immunizations  Never Reviewed Name Date Pneumococcal Polysaccharide (PPSV-23) 3/7/2014 Not reviewed this visit You Were Diagnosed With   
  
 Codes Comments Acute bronchitis, unspecified organism    -  Primary ICD-10-CM: J20.9 ICD-9-CM: 466.0 Chronic non-seasonal allergic rhinitis, unspecified trigger     ICD-10-CM: J30.89 ICD-9-CM: 477.9 Vitals BP Pulse Temp Resp Height(growth percentile) Weight(growth percentile) 140/82 (BP 1 Location: Right arm, BP Patient Position: Sitting) 74 98.4 °F (36.9 °C) (Oral) 17 5' 4\" (1.626 m) 172 lb (78 kg) SpO2 BMI OB Status Smoking Status 98% 29.52 kg/m2 Hysterectomy Former Smoker BMI and BSA Data Body Mass Index Body Surface Area  
 29.52 kg/m 2 1.88 m 2 Preferred Pharmacy Pharmacy Name Phone [facility] [address] [phone] Your Updated Medication List  
  
   
This list is accurate as of: 12/19/17 10:22 AM.  Always use your most recent med list.  
  
  
  
  
 albuterol 2.5 mg /3 mL (0.083 %) nebulizer solution Commonly known as:  PROVENTIL VENTOLIN  
2.5 mg.  
  
 atorvastatin 40 mg tablet Commonly known as:  LIPITOR  
TAKE ONE TABLET BY MOUTH ONE TIME DAILY  
  
 azithromycin 250 mg tablet Commonly known as:  Elham Galvan Take two tablets today then one tablet daily  
  
 baclofen 10 mg tablet Commonly known as:  LIORESAL  
TAKE ONE TABLET BY MOUTH THREE TIMES A DAY  
  
 carbamide peroxide 6.5 % otic solution Commonly known as:  Kim Diaz Administer 5 Drops into each ear two (2) times a day. DEXILANT 60 mg Cpdb Generic drug:  Dexlansoprazole 60 mg daily. ergocalciferol 50,000 unit capsule Commonly known as:  VITAMIN D2  
TAKE ONE CAPSULE BY MOUTH EVERY 7 DAYS  
  
 fluticasone 50 mcg/actuation nasal spray Commonly known as:  [place] 2 Sprays by Both Nostrils route as needed. gabapentin 400 mg capsule Commonly known as:  NEURONTIN  
400 mg two (2) times a day. ipratropium 0.02 % Soln Commonly known as:  ATROVENT  
0.5 mg.  
  
 losartan 25 mg tablet Commonly known as:  COZAAR  
TAKE ONE TABLET BY MOUTH ONE TIME DAILY  
  
 * MIRALAX 17 gram packet Generic drug:  polyethylene glycol Take 17 g by mouth daily. * polyethylene glycol 17 gram packet Commonly known as:  Luis Hernandes DISSOLVE 1 PACKET IN BEVERAGE OF CHOICE AND DRINK BY MOUTH ONCE DAILY * morphine CR 60 mg CR tablet Commonly known as:  MS CONTIN Take 1 Tab by mouth every twelve (12) hours. Max Daily Amount: 120 mg.  
  
 * morphine CR 60 mg CR tablet Commonly known as:  MS CONTIN  
 Take 1 Tab by mouth every twelve (12) hours for 30 days. Max Daily Amount: 120 mg.  
  
 naloxone 4 mg/actuation nasal spray Commonly known as:  NARCAN  
1 [place] by IntraNASal route once as needed. Use 1 spray intranasally into 1 nostril. Use a new Narcan nasal spray for subsequent doses and administer into alternating nostrils. May repeat every 2 to 3 minutes as needed. oxyCODONE IR 10 mg Tab immediate release tablet Commonly known as:  Deepthi Marilynn Take 1 Tab by mouth three (3) times daily as needed. Max Daily Amount: 30 mg. QUEtiapine 200 mg tablet Commonly known as:  SEROquel Take 200 mg by mouth two (2) times a day. sertraline 100 mg tablet Commonly known as:  ZOLOFT Take  by mouth daily. sucralfate 1 gram tablet Commonly known as:  CARAFATE  
1 g.  
  
 temazepam 15 mg capsule Commonly known as:  RESTORIL Take 1 Cap by mouth nightly as needed for Sleep. Max Daily Amount: 15 mg. Indications: INSOMNIA  
  
 traZODone 100 mg tablet Commonly known as:  Bales Courtney Take 100 mg by mouth nightly. valACYclovir 500 mg tablet Commonly known as:  VALTREX Take 1 Tab by mouth two (2) times a day. * Notice: This list has 4 medication(s) that are the same as other medications prescribed for you. Read the directions carefully, and ask your doctor or other care provider to review them with you. Prescriptions Sent to Pharmacy Refills  
 fluticasone (FLONASE) 50 mcg/actuation nasal spray 3 Si Sprays by Both Nostrils route as needed. Class: Normal  
 Pharmacy: [facility],  [facility] Ph #: [phone] Route: Both Nostrils  
 azithromycin (ZITHROMAX Z-FAWN) 250 mg tablet 0 Sig: Take two tablets today then one tablet daily Class: Normal  
 Pharmacy: [facility],  [facility] Ph #: [phone] Follow-up Instructions Return in about 9 days (around 12/28/2017) for Follow up on symtpoms . Patient Instructions Please contact our office if you have any questions about your visit today. Please provide this summary of care documentation to your next provider. Your primary care clinician is listed as Tylor Malagon. If you have any questions after today's visit, please call [phone].

## 2017-12-19 NOTE — PROGRESS NOTES
Chief Complaint   Patient presents with    ED Follow-up     was seen Sunday for bronchitis , at [facility]. luparag    Allergic Reaction     states that she has felt sick to her stomach after taking the medications given in ED- states that it made her vomit. 1. Have you been to the ER, urgent care clinic since your last visit? Hospitalized since your last visit? Yes When: 12/17/17 Where: [facility] Reason for visit: bronchitis    2. Have you seen or consulted any other health care providers outside of the [address] [place] [address] since your last visit? Include any pap smears or colon screening.  No

## 2017-12-19 NOTE — PROGRESS NOTES
History and Physical    Patient: Nicky Sheffield MRN: [id number]  SSN: [id number]    YOB: 1955  Age: 60 y.o. Sex: female      Subjective:      Nicky Sheffield is a 60 y.o. female who is her for follow up. She was seen in the Urgent Care for bronchitis. She mentions that her symptoms began on Friday. She denies any sick contacts prior to the symptoms occurring. She states that she was placed on doxycycline and she threw up. She mentions that she had a similar reaction with doxycycline. She also requests a refill of her Flonase.           Past Medical History:   Diagnosis Date    Annular tear of lumbar disc 11/10/2014    Asthma     Bronchitis    Benign tumor of throat     Bone spur     right ankle    Cervicalgia 11/10/2014    Chronic pain     back    Degeneration of lumbar or lumbosacral intervertebral disc 11/10/2014    Degenerative disc disease     Depression     Displacement of lumbar intervertebral disc without myelopathy 11/10/2014    Elevated white blood cell count     Fibromyalgia     GERD (gastroesophageal reflux disease)     Hypertension     Insomnia     Nausea & vomiting     Pain in joint, multiple sites 11/10/2014    Postlaminectomy syndrome, cervical region 11/10/2014    Sinus infection 10/5/15    Ulcer ([facility] [address]      Past Surgical History:   Procedure Laterality Date    COLONOSCOPY N/A 4/13/2017    COLONOSCOPY performed by Lilo Marr MD at [facility] HX Left Eye CATARACT REMOVAL--- March 2017      HX CERVICAL FUSION      HX HYSTERECTOMY      HX ORTHOPAEDIC      ganglion cyst removed, right hand    HX OTHER SURGICAL      cyst left thigh removed    LAP,CHOLECYSTECTOMY N/A 02/27/2017    Dr. Himanshu Fitzpatrick      Family History   Problem Relation Age of Onset    Hypertension Other     Heart Attack Other     Heart Disease Other     Stroke Other     Headache Other     Seizures Other      Social History   Substance Use Topics    Smoking status: Former Smoker    Smokeless tobacco: Never Used    Alcohol use No      Comment: none in 24 years      Prior to Admission medications    Medication Sig Start Date End Date Taking? Authorizing Provider   QUEtiapine (SEROQUEL) 200 mg tablet Take 200 mg by mouth two (2) times a day. Yes Historical Provider   losartan (COZAAR) 25 mg tablet TAKE ONE TABLET BY MOUTH ONE TIME DAILY 11/8/17  Yes Roe Renee, DO   ergocalciferol (VITAMIN D2) 50,000 unit capsule TAKE ONE CAPSULE BY MOUTH EVERY 7 DAYS 11/8/17  Yes Roe CLINE Thelma, DO   polyethylene glycol (MIRALAX) 17 gram packet DISSOLVE 1 PACKET IN BEVERAGE OF CHOICE AND DRINK BY MOUTH ONCE DAILY 11/8/17  Yes Roe Renee DO   naloxone (NARCAN) 4 mg/actuation nasal spray 1 [place] by IntraNASal route once as needed. Use 1 spray intranasally into 1 nostril. Use a new Narcan nasal spray for subsequent doses and administer into alternating nostrils. May repeat every 2 to 3 minutes as needed. Yes Xenia Allen MD   polyethylene glycol (MIRALAX) 17 gram packet Take 17 g by mouth daily. Yes Xenia Allen MD   carbamide peroxide (DEBROX) 6.5 % otic solution Administer 5 Drops into each ear two (2) times a day. 10/18/17  Yes Carmen Riley MD   fluticasone (FLONASE) 50 mcg/actuation nasal spray 2 Sprays by Both Nostrils route as needed. 10/12/17  Yes Nichole Diaz MD   morphine CR (MS CONTIN) 60 mg CR tablet Take 1 Tab by mouth every twelve (12) hours. Max Daily Amount: 120 mg. 10/30/17  Yes BETO Jackson   morphine CR (MS CONTIN) 60 mg CR tablet Take 1 Tab by mouth every twelve (12) hours for 30 days. Max Daily Amount: 120 mg. 11/29/17 12/29/17 Yes BETO Jackson   oxyCODONE IR (ROXICODONE) 10 mg tab immediate release tablet Take 1 Tab by mouth three (3) times daily as needed. Max Daily Amount: 30 mg. 11/29/17  Yes BETO Jackson   valACYclovir (VALTREX) 500 mg tablet Take 1 Tab by mouth two (2) times a day.  9/29/17  Yes Nichole Diaz MD   atorvastatin (LIPITOR) 40 mg tablet TAKE ONE TABLET BY MOUTH ONE TIME DAILY  8/25/17  Yes Gui Rice MD   baclofen (LIORESAL) 10 mg tablet TAKE ONE TABLET BY MOUTH THREE TIMES A DAY 8/14/17  Yes BETO Soriano   temazepam (RESTORIL) 15 mg capsule Take 1 Cap by mouth nightly as needed for Sleep. Max Daily Amount: 15 mg. Indications: INSOMNIA 3/17/17  Yes Gui Rice MD   sucralfate (CARAFATE) 1 gram tablet 1 g. 1/13/17  Yes Historical Provider   albuterol (PROVENTIL VENTOLIN) 2.5 mg /3 mL (0.083 %) nebulizer solution 2.5 mg. 1/14/13  Yes Historical Provider   Dexlansoprazole (DEXILANT) 60 mg CpDB 60 mg daily. 2/13/13  Yes Historical Provider   gabapentin (NEURONTIN) 400 mg capsule 400 mg two (2) times a day. 7/7/13  Yes Historical Provider   ipratropium (ATROVENT) 0.02 % nebulizer solution 0.5 mg. 1/14/13  Yes Historical Provider   trazodone (DESYREL) 100 mg tablet Take 100 mg by mouth nightly. Yes Historical Provider   sertraline (ZOLOFT) 100 mg tablet Take  by mouth daily. Yes Historical Provider        Allergies   Allergen Reactions    Aspirin Other (comments)     Stomach bleeding    Macrobid [Nitrofurantoin Monohyd/M-Cryst] Nausea and Vomiting    Nsaids (Non-Steroidal Anti-Inflammatory Drug) Nausea and Vomiting    Opana [Oxymorphone] Other (comments)     Insomnia, weight loss, nightmares    Pcn [Penicillins] Hives       Review of Systems:  Pertinent items are noted in the History of Present Illness. Objective:     Vitals:    12/19/17 1004   BP: 140/82   Pulse: 74   Resp: 17   Temp: 98.4 °F (36.9 °C)   TempSrc: Oral   SpO2: 98%   Weight: 172 lb (78 kg)   Height: 5' 4\" (1.626 m)        Physical Exam:  GENERAL: fatigued, cooperative, no distress, appears stated age  EYE: negative  LYMPHATIC: Cervical, supraclavicular, and axillary nodes normal.   EARS: No fluid behind tympanic membranes. THROAT & NECK: mild erythema, normal and no erythema or exudates noted.    LUNG: clear to auscultation bilaterally  HEART: regular rate and rhythm, S1, S2 normal, no murmur, click, rub or gallop  ABDOMEN: soft, non-tender. Bowel sounds normal. No masses,  no organomegaly  EXTREMITIES:  extremities normal, atraumatic, no cyanosis or edema      Labs:   No new labs to review     Assessment:     1.) Acute Bronchitis: Patient switched to azithromycin from doxycycline. 2.) Allergic Rhinitis: Patient will be placed on Flonase for use as needed. Patient will return in 1 week for follow up.      Plan:     Orders Placed This Encounter    fluticasone (FLONASE) 50 mcg/actuation nasal spray    azithromycin (ZITHROMAX Z-FAWN) 250 mg tablet           Signed By: Scalret Burnett DO     December 19, 2017

## 2017-12-20 ENCOUNTER — APPOINTMENT (OUTPATIENT)
Dept: PHYSICAL THERAPY | Age: 62
End: 2017-12-20

## 2017-12-21 DIAGNOSIS — R05.9 COUGH: Primary | ICD-10-CM

## 2017-12-21 RX ORDER — CODEINE PHOSPHATE AND GUAIFENESIN 10; 100 MG/5ML; MG/5ML
5 SOLUTION ORAL
Qty: 118 ML | Refills: 0 | Status: SHIPPED | OUTPATIENT
Start: 2017-12-21 | End: 2018-01-09 | Stop reason: ALTCHOICE

## 2017-12-28 ENCOUNTER — OFFICE VISIT (OUTPATIENT)
Dept: FAMILY MEDICINE CLINIC | Age: 62
End: 2017-12-28

## 2017-12-28 VITALS
BODY MASS INDEX: 29.71 KG/M2 | SYSTOLIC BLOOD PRESSURE: 112 MMHG | OXYGEN SATURATION: 98 % | TEMPERATURE: 98.4 F | HEIGHT: 64 IN | RESPIRATION RATE: 17 BRPM | HEART RATE: 73 BPM | DIASTOLIC BLOOD PRESSURE: 75 MMHG | WEIGHT: 174 LBS

## 2017-12-28 DIAGNOSIS — R73.03 PREDIABETES: ICD-10-CM

## 2017-12-28 DIAGNOSIS — K21.00 GASTROESOPHAGEAL REFLUX DISEASE WITH ESOPHAGITIS: Primary | ICD-10-CM

## 2017-12-28 DIAGNOSIS — R06.2 WHEEZING: ICD-10-CM

## 2017-12-28 DIAGNOSIS — G56.03 BILATERAL CARPAL TUNNEL SYNDROME: ICD-10-CM

## 2017-12-28 RX ORDER — ALBUTEROL SULFATE 90 UG/1
2 AEROSOL, METERED RESPIRATORY (INHALATION)
Qty: 1 INHALER | Refills: 3 | Status: SHIPPED | OUTPATIENT
Start: 2017-12-28 | End: 2018-07-24 | Stop reason: SDUPTHER

## 2017-12-28 RX ORDER — ALBUTEROL SULFATE 90 UG/1
AEROSOL, METERED RESPIRATORY (INHALATION)
COMMUNITY
End: 2017-12-28 | Stop reason: SDUPTHER

## 2017-12-28 RX ORDER — DEXLANSOPRAZOLE 60 MG/1
60 CAPSULE, DELAYED RELEASE ORAL DAILY
Qty: 30 CAP | Refills: 1 | Status: SHIPPED | OUTPATIENT
Start: 2017-12-28 | End: 2018-02-19 | Stop reason: SDUPTHER

## 2017-12-28 NOTE — PROGRESS NOTES
History and Physical    Patient: Yahaira Dodson MRN: [id number]  SSN: [id number]    YOB: 1955  Age: 60 y.o. Sex: female      Subjective:      Yahaira Dodson is a 60 y.o. female who is her for follow up on her bronchitis. She states that she completed her antibiotic. She mentions that her cough has essentially resolved. Currently, she states that she is having spasms in her chest. She was seen in the ER and they placed her on Dexilant. She states that she also started on carafate for ulcers as well. The patient mentions that she has not used any Prilosec, ranitidine, or Nexium or Pepcid. Visit from 12/19/17  She was seen in the Urgent Care for bronchitis. She mentions that her symptoms began on Friday. She denies any sick contacts prior to the symptoms occurring. She states that she was placed on doxycycline and she threw up. She mentions that she had a similar reaction with doxycycline. She also requests a refill of her Flonase.           Past Medical History:   Diagnosis Date    Annular tear of lumbar disc 11/10/2014    Asthma     Bronchitis    Benign tumor of throat     Bone spur     right ankle    Cervicalgia 11/10/2014    Chronic pain     back    Degeneration of lumbar or lumbosacral intervertebral disc 11/10/2014    Degenerative disc disease     Depression     Displacement of lumbar intervertebral disc without myelopathy 11/10/2014    Elevated white blood cell count     Fibromyalgia     GERD (gastroesophageal reflux disease)     Hypertension     Insomnia     Nausea & vomiting     Pain in joint, multiple sites 11/10/2014    Postlaminectomy syndrome, cervical region 11/10/2014    Sinus infection 10/5/15    Ulcer ([address]      Past Surgical History:   Procedure Laterality Date    COLONOSCOPY N/A 4/13/2017    COL[facility] performed by Ed Dobbs [facility] at [facility] HX Left Eye CATARACT REMOVAL--- March 2017      HX CERVICAL FUSION      HX HYSTERECTOMY      HX ORTHOPAEDIC      ganglion cyst removed, right hand    HX OTHER SURGICAL      cyst left thigh removed    LAP,CHOLECYSTECTOMY N/A 02/27/2017    Dr. Jemima Meyer      Family History   Problem Relation Age of Onset    Hypertension Other     Heart Attack Other     Heart Disease Other     Stroke Other     Headache Other     Seizures Other      Social History   Substance Use Topics    Smoking status: Former Smoker    Smokeless tobacco: Never Used    Alcohol use No      Comment: none in 24 years      Prior to Admission medications    Medication Sig Start Date End Date Taking? Authorizing Provider   albuterol (PROAIR HFA) 90 mcg/actuation inhaler Take  by inhalation. Yes Historical Provider   guaiFENesin-codeine (ROBITUSSIN AC) 100-10 mg/5 mL solution Take 5 mL by mouth three (3) times daily as needed for Cough. Max Daily Amount: 15 mL. 12/21/17  Yes Roe Renee DO   fluticasone (FLONASE) 50 mcg/actuation nasal spray 2 Sprays by Both Nostrils route as needed. 12/19/17  Yes Roe Renee DO   QUEtiapine (SEROQUEL) 200 mg tablet Take 200 mg by mouth two (2) times a day. Yes Historical Provider   losartan (COZAAR) 25 mg tablet TAKE ONE TABLET BY MOUTH ONE TIME DAILY 11/8/17  Yes Roe Renee DO   ergocalciferol (VITAMIN D2) 50,000 unit capsule TAKE ONE CAPSULE BY MOUTH EVERY 7 DAYS 11/8/17  Yes Roe Renee DO   polyethylene glycol (MIRALAX) 17 gram packet DISSOLVE 1 PACKET IN BEVERAGE OF CHOICE AND DRINK BY MOUTH ONCE DAILY 11/8/17  Yes Roe Renee DO   naloxone (NARCAN) 4 mg/actuation nasal spray 1 [place] by IntraNASal route once as needed. Use 1 spray intranasally into 1 nostril. Use a new Narcan nasal spray for subsequent doses and administer into alternating nostrils. May repeat every 2 to 3 minutes as needed. Yes Xenia Allen MD   polyethylene glycol (MIRALAX) 17 gram packet Take 17 g by mouth daily.    Yes Xenia Allen MD   carbamide peroxide (DEBROX) 6.5 % otic solution Administer 5 Drops into each ear two (2) times a day. 10/18/17  Yes Joe Coronado MD   morphine CR (MS CONTIN) 60 mg CR tablet Take 1 Tab by mouth every twelve (12) hours. Max Daily Amount: 120 mg. 10/30/17  Yes BETO Payne   morphine CR (MS CONTIN) 60 mg CR tablet Take 1 Tab by mouth every twelve (12) hours for 30 days. Max Daily Amount: 120 mg. 11/29/17 12/29/17 Yes BETO Payne   oxyCODONE IR (ROXICODONE) 10 mg tab immediate release tablet Take 1 Tab by mouth three (3) times daily as needed. Max Daily Amount: 30 mg. 11/29/17  Yes BETO Payne   atorvastatin (LIPITOR) 40 mg tablet TAKE ONE TABLET BY MOUTH ONE TIME DAILY  8/25/17  Yes Tom Logan MD   baclofen (LIORESAL) 10 mg tablet TAKE ONE TABLET BY MOUTH THREE TIMES A DAY 8/14/17  Yes BETO Payne   temazepam (RESTORIL) 15 mg capsule Take 1 Cap by mouth nightly as needed for Sleep. Max Daily Amount: 15 mg. Indications: INSOMNIA 3/17/17  Yes Tom Logan MD   sucralfate (CARAFATE) 1 gram tablet 1 g. 1/13/17  Yes Historical Provider   albuterol (PROVENTIL VENTOLIN) 2.5 mg /3 mL (0.083 %) nebulizer solution 2.5 mg. 1/14/13  Yes Historical Provider   Dexlansoprazole (DEXILANT) 60 mg CpDB 60 mg daily. 2/13/13  Yes Historical Provider   gabapentin (NEURONTIN) 400 mg capsule 400 mg two (2) times a day. 7/7/13  Yes Historical Provider   ipratropium (ATROVENT) 0.02 % nebulizer solution 0.5 mg. 1/14/13  Yes Historical Provider   trazodone (DESYREL) 100 mg tablet Take 100 mg by mouth nightly. Yes Historical Provider   sertraline (ZOLOFT) 100 mg tablet Take  by mouth daily. Yes Historical Provider   valACYclovir (VALTREX) 500 mg tablet Take 1 Tab by mouth two (2) times a day.  9/29/17   Tom Logan MD        Allergies   Allergen Reactions    Aspirin Other (comments)     Stomach bleeding    Macrobid [Nitrofurantoin Monohyd/M-Cryst] Nausea and Vomiting    Nsaids (Non-Steroidal Anti-Inflammatory Drug) Nausea and Vomiting    Opana [Oxymorphone] Other (comments)     Insomnia, weight loss, nightmares    Pcn [Penicillins] Hives       Review of Systems:  Pertinent items are noted in the History of Present Illness. Objective:     Vitals:    12/28/17 1417   BP: 112/75   Pulse: 73   Resp: 17   Temp: 98.4 °F (36.9 °C)   TempSrc: Oral   SpO2: 98%   Weight: 174 lb (78.9 kg)   Height: 5' 4\" (1.626 m)        Physical Exam:  GENERAL: fatigued, cooperative, no distress, appears stated age  EYE: negative  LYMPHATIC: Cervical, supraclavicular, and axillary nodes normal.   EARS: No fluid behind tympanic membranes. THROAT & NECK: mild erythema, normal and no erythema or exudates noted. LUNG: clear to auscultation bilaterally  HEART: regular rate and rhythm, S1, S2 normal, no murmur, click, rub or gallop  ABDOMEN: soft, non-tender. Bowel sounds normal. No masses,  no organomegaly  EXTREMITIES:  extremities normal, atraumatic, no cyanosis or edema      Labs:   No new labs to review     Assessment:     1.) GERD: Patient ordered Dexilant     2.) Wheezing: Albuterol ordered for use as needed. 3.) Prediabetes: HgbA1C and CMP ordered for next visit. Patient will return in 3 months for follow up.      Plan:     Orders Placed This Encounter    HEMOGLOBIN A1C WITH EAG    METABOLIC PANEL, COMPREHENSIVE    DISCONTD: albuterol (PROAIR HFA) 90 mcg/actuation inhaler    albuterol (PROAIR HFA) 90 mcg/actuation inhaler    Dexlansoprazole (DEXILANT) 60 mg CpDB             Signed By: Mansi Venegas DO     December 28, 2017

## 2017-12-28 NOTE — PROGRESS NOTES
Chief Complaint   Patient presents with    Follow-up    Cough     states that the cough has more or less disappeared    Cyst     back of the left hand/wrist, states that the pain is with activity and is a 10 when at its worst     1. Have you been to the ER, urgent care clinic since your last visit? Hospitalized since your last visit? No    2. Have you seen or consulted any other health care providers outside of the [address] [place] [address] since your last visit? Include any pap smears or colon screening.  No

## 2017-12-28 NOTE — MR AVS SNAPSHOT
Visit Information Date & Time Provider Department Dept. Phone Encounter #  
 12/28/2017  2:15 PM Veronica Tamezggartemio 77 [id number] Follow-up Instructions Return in about 3 months (around 3/28/2018) for Regular Follow Up . Your Appointments 1/9/2018 11:20 AM  
Follow Up with BETO Loo [address] [place] [address] for Pain Management (RICHAR SCHEDULING) Appt Note: return in 3 months [address] [address]  
[phone] [facility] [id number] Upcoming Health Maintenance Date Due DTaP/Tdap/Td series (1 - Tdap) 3/30/1975 PAP AKA CERVICAL CYTOLOGY 6/21/2019 COLONOSCOPY 4/13/2027 Allergies as of 12/28/2017  Review Complete On: 93/26/9234 By: Sage Wise. Sudha Lange LPN Severity Noted Reaction Type Reactions Aspirin    Other (comments) Stomach bleeding Macrobid [Nitrofurantoin Monohyd/m-cryst]  11/25/2015    Nausea and Vomiting Nsaids (Non-steroidal Anti-inflammatory Drug)    Nausea and Vomiting Opana [Oxymorphone]  11/29/2016    Other (comments) Insomnia, weight loss, nightmares Pcn [Penicillins]    Hives Current Immunizations  Never Reviewed Name Date Pneumococcal Polysaccharide (PPSV-23) 3/7/2014 Not reviewed this visit You Were Diagnosed With   
  
 Codes Comments Gastroesophageal reflux disease with esophagitis    -  Primary ICD-10-CM: K21.0 ICD-9-CM: 530.11 Bilateral carpal tunnel syndrome     ICD-10-CM: G56.03 
ICD-9-CM: 354.0 Wheezing     ICD-10-CM: R06.2 ICD-9-CM: 786.07   
 Prediabetes     ICD-10-CM: R73.03 
ICD-9-CM: 790.29 Vitals BP Pulse Temp Resp Height(growth percentile) Weight(growth percentile) 112/75 (BP 1 Location: Right arm, BP Patient Position: Sitting) 73 98.4 °F (36.9 °C) (Oral) 17 5' 4\" (1.626 m) 174 lb (78.9 kg) SpO2 BMI OB Status Smoking Status 98% 29.87 kg/m2 Hysterectomy Former Smoker BMI and BSA Data Body Mass Index Body Surface Area  
 29.87 kg/m 2 1.89 m 2 Preferred Pharmacy Pharmacy Name [id number] - [address] [phone] Your Updated Medication List  
  
   
This list is accurate as of: 12/28/17  2:55 PM.  Always use your most recent med list.  
  
  
  
  
 * albuterol 2.5 mg /3 mL (0.083 %) nebulizer solution Commonly known as:  PROVENTIL VENTOLIN  
2.5 mg.  
  
 * albuterol 90 mcg/actuation inhaler Commonly known as:  PROAIR HFA Take 2 Puffs by inhalation every four (4) hours as needed for Wheezing. atorvastatin 40 mg tablet Commonly known as:  LIPITOR  
TAKE ONE TABLET BY MOUTH ONE TIME DAILY  
  
 baclofen 10 mg tablet Commonly known as:  LIORESAL  
TAKE ONE TABLET BY MOUTH THREE TIMES A DAY  
  
 carbamide peroxide 6.5 % otic solution Commonly known as:  [place] Levo Administer 5 Drops into each ear two (2) times a day. Dexlansoprazole 60 mg Cpdb Commonly known as:  DEXILANT Take 1 Cap by mouth daily. ergocalciferol 50,000 unit capsule Commonly known as:  VITAMIN D2  
TAKE ONE CAPSULE BY MOUTH EVERY 7 DAYS  
  
 fluticasone 50 mcg/actuation nasal spray Commonly known as:  [place] 2 Sprays by Both Nostrils route as needed. gabapentin 400 mg capsule Commonly known as:  NEURONTIN  
400 mg two (2) times a day. guaiFENesin-codeine 100-10 mg/5 mL solution Commonly known as:  ROBITUSSIN AC Take 5 mL by mouth three (3) times daily as needed for Cough. Max Daily Amount: 15 mL. ipratropium 0.02 % Soln Commonly known as:  ATROVENT  
0.5 mg.  
  
 losartan 25 mg tablet Commonly known as:  COZAAR  
TAKE ONE TABLET BY MOUTH ONE TIME DAILY  
  
 * MIRALAX 17 gram packet Generic drug:  polyethylene glycol Take 17 g by mouth daily. * polyethylene glycol 17 gram packet Commonly known as:  Estevan Zavala DISSOLVE 1 PACKET IN BEVERAGE OF CHOICE AND DRINK BY MOUTH ONCE DAILY * morphine CR 60 mg CR tablet Commonly known as:  MS CONTIN Take 1 Tab by mouth every twelve (12) hours. Max Daily Amount: 120 mg.  
  
 * morphine CR 60 mg CR tablet Commonly known as:  MS CONTIN Take 1 Tab by mouth every twelve (12) hours for 30 days. Max Daily Amount: 120 mg.  
  
 naloxone 4 mg/actuation nasal spray Commonly known as:  NARCAN  
1 [place] by IntraNASal route once as needed. Use 1 spray intranasally into 1 nostril. Use a new Narcan nasal spray for subsequent doses and administer into alternating nostrils. May repeat every 2 to 3 minutes as needed. oxyCODONE IR 10 mg Tab immediate release tablet Commonly known as:  Dorthea Mccreedy Take 1 Tab by mouth three (3) times daily as needed. Max Daily Amount: 30 mg. QUEtiapine 200 mg tablet Commonly known as:  SEROquel Take 200 mg by mouth two (2) times a day. sertraline 100 mg tablet Commonly known as:  ZOLOFT Take  by mouth daily. sucralfate 1 gram tablet Commonly known as:  CARAFATE  
1 g.  
  
 temazepam 15 mg capsule Commonly known as:  RESTORIL Take 1 Cap by mouth nightly as needed for Sleep. Max Daily Amount: 15 mg. Indications: INSOMNIA  
  
 traZODone 100 mg tablet Commonly known as:  Nasir Usman Take 100 mg by mouth nightly. valACYclovir 500 mg tablet Commonly known as:  VALTREX Take 1 Tab by mouth two (2) times a day. * Notice: This list has 6 medication(s) that are the same as other medications prescribed for you. Read the directions carefully, and ask your doctor or other care provider to review them with you. Prescriptions Sent to Pharmacy Refills  
 albuterol (PROAIR HFA) 90 mcg/actuation inhaler 3 Sig: Take 2 Puffs by inhalation every four (4) hours as needed for Wheezing.   
 Class: Normal  
 Pharmacy: [place] [address], [address] [facility] #: [phone] Route: Inhalation Dexlansoprazole (DEXILANT) 60 mg CpDB 1 Sig: Take 1 Cap by mouth daily. Class: Normal  
 Pharmacy: [address] #: [phone] Route: Oral  
  
Follow-up Instructions Return in about 3 months (around 3/28/2018) for Regular Follow Up . To-Do List   
 12/28/2017 Lab:  HEMOGLOBIN A1C WITH EAG Around 03/28/2018 Lab:  METABOLIC PANEL, COMPREHENSIVE Patient Instructions Please contact our office if you have any questions about your visit today. 1.) Please get labs a week before next visit 2.) Return in 3 months for follow up Please provide this summary of care documentation to your next provider. Your primary care clinician is listed as Criselda Corbett. If you have any questions after today's visit, please call [phone].

## 2018-01-02 DIAGNOSIS — E78.00 HYPERCHOLESTEROLEMIA: ICD-10-CM

## 2018-01-02 RX ORDER — ATORVASTATIN CALCIUM 40 MG/1
TABLET, FILM COATED ORAL
Qty: 30 TAB | Refills: 3 | Status: SHIPPED | OUTPATIENT
Start: 2018-01-02 | End: 2018-04-23 | Stop reason: SDUPTHER

## 2018-01-02 NOTE — TELEPHONE ENCOUNTER
Pt called requesting refill for medication .   Requested Prescriptions     Pending Prescriptions Disp Refills    atorvastatin (LIPITOR) 40 mg tablet 30 Tab 3

## 2018-01-03 ENCOUNTER — TELEPHONE (OUTPATIENT)
Dept: PAIN MANAGEMENT | Age: 63
End: 2018-01-03

## 2018-01-03 NOTE — TELEPHONE ENCOUNTER
The pt called the office to ask if she can get prescriptions for her opioid medications. She wanted to know how this would effect her. I called the pt and was not able to reach her. The phone just rang and there were no voice mail options. She is scheduled to come in for a follow up appt on 01/09/17.

## 2018-01-09 ENCOUNTER — OFFICE VISIT (OUTPATIENT)
Dept: PAIN MANAGEMENT | Age: 63
End: 2018-01-09

## 2018-01-09 VITALS
HEART RATE: 80 BPM | TEMPERATURE: 98.3 F | DIASTOLIC BLOOD PRESSURE: 70 MMHG | BODY MASS INDEX: 29.71 KG/M2 | HEIGHT: 64 IN | WEIGHT: 174 LBS | SYSTOLIC BLOOD PRESSURE: 108 MMHG | RESPIRATION RATE: 14 BRPM

## 2018-01-09 DIAGNOSIS — M25.50 PAIN IN JOINT, MULTIPLE SITES: ICD-10-CM

## 2018-01-09 DIAGNOSIS — M51.26 DISPLACEMENT OF LUMBAR INTERVERTEBRAL DISC WITHOUT MYELOPATHY: ICD-10-CM

## 2018-01-09 DIAGNOSIS — G89.4 CHRONIC PAIN SYNDROME: ICD-10-CM

## 2018-01-09 DIAGNOSIS — G89.29 CHRONIC BILATERAL LOW BACK PAIN WITHOUT SCIATICA: ICD-10-CM

## 2018-01-09 DIAGNOSIS — M51.37 DEGENERATION OF LUMBAR OR LUMBOSACRAL INTERVERTEBRAL DISC: ICD-10-CM

## 2018-01-09 DIAGNOSIS — M54.50 CHRONIC BILATERAL LOW BACK PAIN WITHOUT SCIATICA: ICD-10-CM

## 2018-01-09 DIAGNOSIS — M54.2 CERVICALGIA: ICD-10-CM

## 2018-01-09 DIAGNOSIS — M96.1 POSTLAMINECTOMY SYNDROME, CERVICAL REGION: ICD-10-CM

## 2018-01-09 RX ORDER — BACLOFEN 10 MG/1
TABLET ORAL
Qty: 90 TAB | Refills: 5 | Status: SHIPPED | OUTPATIENT
Start: 2018-01-25 | End: 2019-01-04

## 2018-01-09 RX ORDER — MORPHINE SULFATE 60 MG/1
60 TABLET, FILM COATED, EXTENDED RELEASE ORAL EVERY 12 HOURS
Qty: 60 TAB | Refills: 0 | Status: SHIPPED | OUTPATIENT
Start: 2018-02-25 | End: 2018-02-21 | Stop reason: CLARIF

## 2018-01-09 RX ORDER — OXYCODONE HYDROCHLORIDE 10 MG/1
10 TABLET ORAL
Qty: 90 TAB | Refills: 0 | Status: SHIPPED | OUTPATIENT
Start: 2018-03-26 | End: 2018-02-21 | Stop reason: CLARIF

## 2018-01-09 RX ORDER — OXYCODONE HYDROCHLORIDE 10 MG/1
10 TABLET ORAL
Qty: 90 TAB | Refills: 0 | Status: SHIPPED | OUTPATIENT
Start: 2018-01-26 | End: 2018-04-03 | Stop reason: SDUPTHER

## 2018-01-09 RX ORDER — OXYCODONE HYDROCHLORIDE 10 MG/1
10 TABLET ORAL
Qty: 90 TAB | Refills: 0 | Status: SHIPPED | OUTPATIENT
Start: 2018-02-25 | End: 2018-02-21 | Stop reason: CLARIF

## 2018-01-09 RX ORDER — MORPHINE SULFATE 60 MG/1
60 TABLET, FILM COATED, EXTENDED RELEASE ORAL EVERY 12 HOURS
Qty: 60 TAB | Refills: 0 | Status: SHIPPED | OUTPATIENT
Start: 2018-03-26 | End: 2018-02-21 | Stop reason: CLARIF

## 2018-01-09 RX ORDER — MORPHINE SULFATE 60 MG/1
60 TABLET, FILM COATED, EXTENDED RELEASE ORAL EVERY 12 HOURS
Qty: 60 TAB | Refills: 0 | Status: SHIPPED | OUTPATIENT
Start: 2018-01-26 | End: 2018-04-03 | Stop reason: SDUPTHER

## 2018-01-09 NOTE — PROGRESS NOTES
Nursing Notes    Patient presents to the office today in follow-up. Patient rates her pain at 0/10 on the numerical pain scale. Reviewed medications with counts as follows:    Rx Date filled Qty Dispensed Pill Count Last Dose Short   Oxycodone 10 mg  12/27/17 90 54 today no   Morphine sulfate ER 60 mg  12/27/17 60 36 today no                            POC UDS was not performed in office today. Any new labs or imaging since last appointment? YES. Pt states that she had a mammogram done     Have you been to an emergency room (ER) or urgent care clinic since your last visit? ER. Pt went to the ER because of bronchitis            Have you been hospitalized since your last visit? NO     If yes, where, when, and reason for visit? Have you seen or consulted any other health care providers outside of the [address] [place] [address]  since your last visit? YES     If yes, where, when, and reason for visit? OB/GYN, psychiatry  HM deferred to pcp.

## 2018-01-09 NOTE — PROGRESS NOTES
HISTORY OF PRESENT ILLNESS  Sherie Mathew is a 60 y.o. female    HPI: Ms. Franny Champion  returns today for f/u of chronic severe pain involving the lumbar spine, which has been attributed to a lumbar degenerative disc disease with spondylosis. In addition, she has neck pain attributed to a post cervical laminectomy syndrome. No h/o lumbar surgery. PT and injections in the past with no improvement. Prior treatment with Fentanyl and Opana with significant side effects. She reports that she has been doing well since her last visit. She reports that her treatment plan has been working well at controlling her pain. She is having some trouble at times when her pharmacy does not have her medication in stock. We discussed this at great length today. She is otherwise doing well with no new complaints today. We will continue with her current treatment plan with no changes. I will have her follow-up in 3 months or sooner if needed. Current medication management consists of MS Contin 60 mg BID,  Oxycodone IR 10 mg TID PRN, and Baclofen 10 mg TID PRN. Lorazepam and Temazepam by her PCP. Medications are helping with pain control and quality of life. Her pain is 2-3/10 with medication and 10/10 without. Pt describes pain as aching. Aggravating factors include standing, sitting, walking. Relieved with rest, sitting, lying down, and avoiding painful activities. Current treatment is helping to improve general activity, mood, walking, sleep, enjoyment of life    Pt does report constipation but is well controlled with Miralax  She  is otherwise doing well with no other complaints today. She denies any adverse events including nausea, vomiting, dizziness, constipation, hallucinations, or seizures. Because the patient's current regimen places him/her at increased risk for possible overdose, a prescription for naloxone nasal spray has been provided.   The patient understands that this medication is only to be used in the setting of a possible overdose and that inadvertent use of this medication could precipitate overt withdrawal.         Allergies   Allergen Reactions    Aspirin Other (comments)     Stomach bleeding    Macrobid [Nitrofurantoin Monohyd/M-Cryst] Nausea and Vomiting    Nsaids (Non-Steroidal Anti-Inflammatory Drug) Nausea and Vomiting    Opana [Oxymorphone] Other (comments)     Insomnia, weight loss, nightmares    Pcn [Penicillins] Hives       Past Surgical History:   Procedure Laterality Date    COLONOSCOPY N/A 4/13/2017    COLONOSCOPY performed by Laurita Palencia MD at [facility] ENDOSCOPY    HX CATARACT REMOVAL      HX CERVICAL FUSION      HX HYSTERECTOMY      HX ORTHOPAEDIC      ganglion cyst removed, right hand    HX OTHER SURGICAL      cyst left thigh removed    LAP,CHOLECYSTECTOMY N/A 02/27/2017    Dr. Charis French         Review of Systems   Constitutional: Negative for chills, fever and weight loss. HENT: Negative for congestion, ear discharge and sore throat. Eyes: Negative for blurred vision and double vision. Respiratory: Negative for cough, shortness of breath and wheezing. Cardiovascular: Negative for chest pain and palpitations. Gastrointestinal: Positive for constipation. Negative for diarrhea, heartburn, nausea and vomiting. Genitourinary: Positive for dysuria, frequency, hematuria and urgency. Musculoskeletal: Positive for back pain and joint pain (left knee  ). Negative for falls and neck pain. Skin: Negative for itching and rash. Neurological: Negative for dizziness, seizures, loss of consciousness and headaches. Endo/Heme/Allergies: Does not bruise/bleed easily. Psychiatric/Behavioral: Positive for depression. Negative for suicidal ideas. The patient has insomnia. The patient is not nervous/anxious. Physical Exam   Constitutional: She is oriented to person, place, and time and well-developed, well-nourished, and in no distress. No distress.    HENT:   Head: Normocephalic and atraumatic. Eyes: EOM are normal.   Neck: Normal range of motion. Pulmonary/Chest: Effort normal.   Musculoskeletal: Normal range of motion. surgical scar on left knee from prior surgery. Neurological: She is alert and oriented to person, place, and time. Gait abnormal.   Skin: Skin is warm and dry. No rash noted. She is not diaphoretic. No erythema. Psychiatric: Memory and judgment normal.   Nursing note and vitals reviewed. ASSESSMENT:    1. Chronic bilateral low back pain without sciatica    2. Chronic pain syndrome    3. Degeneration of lumbar or lumbosacral intervertebral disc    4. Displacement of lumbar intervertebral disc without myelopathy    5. Pain in joint, multiple sites    6. Cervicalgia    7. Postlaminectomy syndrome, cervical region           [facility] was reviewed which does not demonstrate aberrancies and/or inconsistencies with regard to the historical prescribing of controlled medications to this patient by other providers. NOTE: Disclosed hydrocodone from ER visit filled 12/22/2016   Hydrocodone for postsurgical pain disclosed on 2/27/2017, IV morphine disclosed on 4/4/2017 tramadol was disclosed for gallbladder pain  by her PCP on 4/13/2017. This medication was then switched to Fioricet on 4/20/2017. Disclosed oxycodone 5/325 mg received from the emergency room filled on 8/14/2017. PLAN / Pt Instructions:  1. Continue current plan. Stable on current medication without adverse events. 2. Refill morphine ER 60 mg 2 times daily. 3. Refill oxycodone 10 mg up to 3 times daily as needed for pain. 4. Refill baclofen 10 mg. Up to 3 times daily as needed. 5 refills  5. Naloxone 4 mg nasal spray for opioid induced respiratory depression emergency only. 6. Continue home exercise program.  Exercise/stretching were demonstrated in office today. 7. Discussed risks of addiction, dependency, and opioid induced hyperalgesia. 8. Return to clinic in 3 months     Medications Ordered Today   Medications    morphine CR (MS CONTIN) 60 mg CR tablet     Sig: Take 1 Tab by mouth every twelve (12) hours for 30 days. Max Daily Amount: 120 mg. Dispense:  60 Tab     Refill:  0    morphine CR (MS CONTIN) 60 mg CR tablet     Sig: Take 1 Tab by mouth every twelve (12) hours for 30 days. Max Daily Amount: 120 mg. Dispense:  60 Tab     Refill:  0    morphine CR (MS CONTIN) 60 mg CR tablet     Sig: Take 1 Tab by mouth every twelve (12) hours. Max Daily Amount: 120 mg. Dispense:  60 Tab     Refill:  0    oxyCODONE IR (ROXICODONE) 10 mg tab immediate release tablet     Sig: Take 1 Tab by mouth three (3) times daily as needed. Max Daily Amount: 30 mg. Dispense:  90 Tab     Refill:  0    oxyCODONE IR (ROXICODONE) 10 mg tab immediate release tablet     Sig: Take 1 Tab by mouth three (3) times daily as needed. Max Daily Amount: 30 mg. Dispense:  90 Tab     Refill:  0    oxyCODONE IR (ROXICODONE) 10 mg tab immediate release tablet     Sig: Take 1 Tab by mouth three (3) times daily as needed. Max Daily Amount: 30 mg. Dispense:  90 Tab     Refill:  0    baclofen (LIORESAL) 10 mg tablet     Sig: TAKE ONE TABLET BY MOUTH THREE TIMES A DAY     Dispense:  90 Tab     Refill:  5       Pain medications prescribed with the objective of pain relief and improved physical and psychosocial function in this patient. Spent 25 minutes with patient today reviewing the treatment plan, goals of treatment plan, and limitations of the treatment plan, to include the potential for side effects from medications and procedures. Eboni Young 1/9/2018      Note: Please excuse any typographical errors. Voice recognition software was used for this note and may cause mistakes.

## 2018-01-09 NOTE — MR AVS SNAPSHOT
Visit Information Date & Time Provider Department Dept. Phone Encounter #  
 1/9/2018 11:20 AM BETO Lockhart [address] for Pain Management [phone] [id number] Follow-up Instructions Return in about 3 months (around 4/9/2018). Your Appointments 1/10/2018 10:30 AM  
PROBLEM VISIT with Jolie Campoverde MD  
[address], [address] and [facility] - [facility] ([facility]) Appt Note: LT HAND CYST/PT REQ [address], [address] 200 [facility]  
[phone] [address], [address]  
  
    
 3/28/2018  1:45 PM  
Follow Up with Mireya Roth DO [address] [place] [address] (--) Appt Note: Follow up [place] [address] [id number] [address] [address] [phone]  
  
   
 [place] [address] [id number] [address] [address] Upcoming Health Maintenance Date Due DTaP/Tdap/Td series (1 - Tdap) 3/30/1975 PAP AKA CERVICAL CYTOLOGY 6/21/2019 BREAST CANCER SCRN MAMMOGRAM 11/29/2019 COLONOSCOPY 4/13/2027 Allergies as of 1/9/2018  Review Complete On: 1/9/2018 By: BETO Lockhart Severity Noted Reaction Type Reactions Aspirin    Other (comments) Stomach bleeding Macrobid [Nitrofurantoin Monohyd/m-cryst]  11/25/2015    Nausea and Vomiting Nsaids (Non-steroidal Anti-inflammatory Drug)    Nausea and Vomiting Opana [Oxymorphone]  11/29/2016    Other (comments) Insomnia, weight loss, nightmares Pcn [Penicillins]    Hives Current Immunizations  Never Reviewed Name Date Pneumococcal Polysaccharide (PPSV-23) 3/7/2014 Not reviewed this visit You Were Diagnosed With   
  
 Codes Comments Chronic bilateral low back pain without sciatica     ICD-10-CM: M54.5, G89.29 ICD-9-CM: 724.2, 338.29 Chronic pain syndrome     ICD-10-CM: G89.4 ICD-9-CM: 338.4  Degeneration of lumbar or lumbosacral intervertebral disc     ICD-10-CM: M51.37 
ICD-9-CM: 722.52 Displacement of lumbar intervertebral disc without myelopathy     ICD-10-CM: M51.26 
ICD-9-CM: 722.10 Pain in joint, multiple sites     ICD-10-CM: M25.50 ICD-9-CM: 719.49 Cervicalgia     ICD-10-CM: M54.2 ICD-9-CM: 723.1 Postlaminectomy syndrome, cervical region     ICD-10-CM: M96.1 ICD-9-CM: 722.81 Vitals BP Pulse Temp Resp Height(growth percentile) Weight(growth percentile) 108/70 (BP 1 Location: Right arm, BP Patient Position: Sitting) 80 98.3 °F (36.8 °C) (Oral) 14 5' 4\" (1.626 m) 174 lb (78.9 kg) BMI OB Status Smoking Status 29.87 kg/m2 Hysterectomy Former Smoker Vitals History BMI and BSA Data Body Mass Index Body Surface Area  
 29.87 kg/m 2 1.89 m 2 Preferred Pharmacy Pharmacy Name Phone [id number] - [facility], [address] [phone] Your Updated Medication List  
  
   
This list is accurate as of: 1/9/18 11:59 AM.  Always use your most recent med list.  
  
  
  
  
 * albuterol 2.5 mg /3 mL (0.083 %) nebulizer solution Commonly known as:  PROVENTIL VENTOLIN  
2.5 mg as needed. * albuterol 90 mcg/actuation inhaler Commonly known as:  PROAIR HFA Take 2 Puffs by inhalation every four (4) hours as needed for Wheezing. atorvastatin 40 mg tablet Commonly known as:  LIPITOR  
TAKE ONE TABLET BY MOUTH ONE TIME DAILY  
  
 baclofen 10 mg tablet Commonly known as:  LIORESAL  
TAKE ONE TABLET BY MOUTH THREE TIMES A DAY Start taking on:  1/25/2018  
  
 carbamide peroxide 6.5 % otic solution Commonly known as:  Jerica Coyleognjudy Administer 5 Drops into each ear two (2) times a day. Dexlansoprazole 60 mg Cpdb Commonly known as:  DEXILANT Take 1 Cap by mouth daily. ergocalciferol 50,000 unit capsule Commonly known as:  VITAMIN D2  
TAKE ONE CAPSULE BY MOUTH EVERY 7 DAYS  
  
 fluticasone 50 mcg/actuation nasal spray Commonly known as:  Juan A Thomason 2 Sprays by Both Nostrils route as needed. gabapentin 400 mg capsule Commonly known as:  NEURONTIN  
400 mg two (2) times a day. ipratropium 0.02 % Soln Commonly known as:  ATROVENT  
0.5 mg.  
  
 losartan 25 mg tablet Commonly known as:  COZAAR  
TAKE ONE TABLET BY MOUTH ONE TIME DAILY  
  
 * MIRALAX 17 gram packet Generic drug:  polyethylene glycol Take 17 g by mouth daily. * polyethylene glycol 17 gram packet Commonly known as:  Ernestene Score DISSOLVE 1 PACKET IN BEVERAGE OF CHOICE AND DRINK BY MOUTH ONCE DAILY * polyethylene glycol 17 gram packet Commonly known as:  Ernestene Score DISSOLVE CONTENTS OF ONE PACKET IN BEVERAGE ONCE A DAY * morphine CR 60 mg CR tablet Commonly known as:  MS CONTIN Take 1 Tab by mouth every twelve (12) hours for 30 days. Max Daily Amount: 120 mg.  
Start taking on:  1/26/2018 * morphine CR 60 mg CR tablet Commonly known as:  MS CONTIN Take 1 Tab by mouth every twelve (12) hours for 30 days. Max Daily Amount: 120 mg.  
Start taking on:  2/25/2018 * morphine CR 60 mg CR tablet Commonly known as:  MS CONTIN Take 1 Tab by mouth every twelve (12) hours. Max Daily Amount: 120 mg.  
Start taking on:  3/26/2018  
  
 naloxone 4 mg/actuation nasal spray Commonly known as:  NARCAN  
1 [place] by IntraNASal route once as needed. Use 1 spray intranasally into 1 nostril. Use a new Narcan nasal spray for subsequent doses and administer into alternating nostrils. May repeat every 2 to 3 minutes as needed. * oxyCODONE IR 10 mg Tab immediate release tablet Commonly known as:  Aron Phy Take 1 Tab by mouth three (3) times daily as needed. Max Daily Amount: 30 mg. Start taking on:  1/26/2018 * oxyCODONE IR 10 mg Tab immediate release tablet Commonly known as:  Aron Phy Take 1 Tab by mouth three (3) times daily as needed. Max Daily Amount: 30 mg. Start taking on:  2/25/2018 * oxyCODONE IR 10 mg Tab immediate release tablet Commonly known as:  Berry Leitz Take 1 Tab by mouth three (3) times daily as needed. Max Daily Amount: 30 mg. Start taking on:  3/26/2018 QUEtiapine 200 mg tablet Commonly known as:  SEROquel Take 200 mg by mouth daily. sertraline 100 mg tablet Commonly known as:  ZOLOFT Take 100 mg by mouth nightly as needed. sucralfate 1 gram tablet Commonly known as:  CARAFATE  
1 g.  
  
 temazepam 15 mg capsule Commonly known as:  RESTORIL Take 1 Cap by mouth nightly as needed for Sleep. Max Daily Amount: 15 mg. Indications: INSOMNIA  
  
 traZODone 100 mg tablet Commonly known as:  Luray Nancy Take 100 mg by mouth nightly. valACYclovir 500 mg tablet Commonly known as:  VALTREX Take 1 Tab by mouth two (2) times a day. * Notice: This list has 11 medication(s) that are the same as other medications prescribed for you. Read the directions carefully, and ask your doctor or other care provider to review them with you. Prescriptions Printed Refills  
 morphine CR (MS CONTIN) 60 mg CR tablet 0 Starting on: 1/26/2018 Sig: Take 1 Tab by mouth every twelve (12) hours for 30 days. Max Daily Amount: 120 mg.  
 Class: Print Route: Oral  
 morphine CR (MS CONTIN) 60 mg CR tablet 0 Starting on: 2/25/2018 Sig: Take 1 Tab by mouth every twelve (12) hours for 30 days. Max Daily Amount: 120 mg.  
 Class: Print Route: Oral  
 morphine CR (MS CONTIN) 60 mg CR tablet 0 Starting on: 3/26/2018 Sig: Take 1 Tab by mouth every twelve (12) hours. Max Daily Amount: 120 mg.  
 Class: Print Route: Oral  
 oxyCODONE IR (ROXICODONE) 10 mg tab immediate release tablet 0 Starting on: 1/26/2018 Sig: Take 1 Tab by mouth three (3) times daily as needed. Max Daily Amount: 30 mg.  
 Class: Print Route: Oral  
 oxyCODONE IR (ROXICODONE) 10 mg tab immediate release tablet 0 Starting on: 2/25/2018 Sig: Take 1 Tab by mouth three (3) times daily as needed. Max Daily Amount: 30 mg.  
 Class: Print Route: Oral  
 oxyCODONE IR (ROXICODONE) 10 mg tab immediate release tablet 0 Starting on: 3/26/2018 Sig: Take 1 Tab by mouth three (3) times daily as needed. Max Daily Amount: 30 mg.  
 Class: Print Route: Oral  
  
Prescriptions Sent to Pharmacy Refills  
 baclofen (LIORESAL) 10 mg tablet 5 Starting on: 1/25/2018 Sig: TAKE ONE TABLET BY MOUTH THREE TIMES A DAY Class: Normal  
 Pharmacy: [address], [address] [place] [address] #: [phone] Follow-up Instructions Return in about 3 months (around 4/9/2018). To-Do List   
 01/12/2018 1:00 PM  
  Appointment with [facility] 1 at [address] [place] [address] ([phone]) OUTSIDE FILMS  - Any outside films related to the study being scheduled should be brought with you on the day of the exam.  If this cannot be done there may be a delay in the reading of the study. MEDICATIONS  - Patient must bring a complete list of all medications currently taking to include prescriptions, over-the-counter meds, herbals, vitamins & any dietary supplements  GENERAL INSTRUCTIONS  - On the day of your exam do not use any bath powder, deodorant or lotions on the armpit area. 01/12/2018 1:45 PM  
  Appointment with [facility] 1 at [address] [place] [address] ([phone]) OUTSIDE FILMS  - Any outside films related to the study being scheduled should be brought with you on the day of the exam.  If this cannot be done there may be a delay in the reading of the study. MEDICATIONS  - Patient must bring a complete list of all medications currently taking to include prescriptions, over-the-counter meds, herbals, vitamins & any dietary supplements Patient Instructions 1. Continue current plan. Stable on current medication without adverse events. 2. Refill morphine ER 60 mg 2 times daily. 3. Refill oxycodone 10 mg up to 3 times daily as needed for pain. 4. Refill baclofen 10 mg. Up to 3 times daily as needed. 5 refills 5. Naloxone 4 mg nasal spray for opioid induced respiratory depression emergency only. 6. Continue home exercise program.  Exercise/stretching were demonstrated in office today. 7. Discussed risks of addiction, dependency, and opioid induced hyperalgesia. 8. Return to clinic in 3 months Please provide this summary of care documentation to your next provider. Your primary care clinician is listed as Olivia Mello. If you have any questions after today's visit, please call [phone].

## 2018-01-09 NOTE — PATIENT INSTRUCTIONS
1. Continue current plan. Stable on current medication without adverse events. 2. Refill morphine ER 60 mg 2 times daily. 3. Refill oxycodone 10 mg up to 3 times daily as needed for pain. 4. Refill baclofen 10 mg. Up to 3 times daily as needed. 5 refills  5. Naloxone 4 mg nasal spray for opioid induced respiratory depression emergency only. 6. Continue home exercise program.  Exercise/stretching were demonstrated in office today. 7. Discussed risks of addiction, dependency, and opioid induced hyperalgesia.    8. Return to clinic in 3 months

## 2018-01-10 ENCOUNTER — OFFICE VISIT (OUTPATIENT)
Dept: ORTHOPEDIC SURGERY | Age: 63
End: 2018-01-10

## 2018-01-10 VITALS — WEIGHT: 174 LBS | HEIGHT: 64 IN | BODY MASS INDEX: 29.71 KG/M2

## 2018-01-10 DIAGNOSIS — M67.432 GANGLION CYST OF WRIST, LEFT: Primary | ICD-10-CM

## 2018-01-10 DIAGNOSIS — M67.40 GANGLION CYST: ICD-10-CM

## 2018-01-10 NOTE — MR AVS SNAPSHOT
Visit Information Date & Time Provider Department Dept. Phone Encounter #  
 1/10/2018 10:30 AM Chantal Pickett, [address] [facility] [phone] Your Appointments 3/28/2018  1:45 PM  
Follow Up with Fiona Zamudio DO [address] [facility] (--) Appt Note: Follow up Alam Sanderson [facility] [address] [phone] [address] [facility] [address]  
  
    
 4/3/2018 11:20 AM  
Follow Up with BETO Wu [address] for Pain Management (RICHAR SCHEDULING) Appt Note: 3 mon f/u per rc. ..to  
 [address] [place] [address]  
[phone] [facility] [address]50 [id number] Upcoming Health Maintenance Date Due DTaP/Tdap/Td series (1 - Tdap) 3/30/1975 PAP AKA CERVICAL CYTOLOGY 6/21/2019 BREAST CANCER SCRN MAMMOGRAM 11/29/2019 COLONOSCOPY 4/13/2027 Allergies as of 1/10/2018  Review Complete On: 1/10/2018 By: Flip Gastelum. Deb Hathaway LPN Severity Noted Reaction Type Reactions Aspirin    Other (comments) Stomach bleeding Macrobid [Nitrofurantoin Monohyd/m-cryst]  11/25/2015    Nausea and Vomiting Nsaids (Non-steroidal Anti-inflammatory Drug)    Nausea and Vomiting Opana [Oxymorphone]  11/29/2016    Other (comments) Insomnia, weight loss, nightmares Pcn [Penicillins]    Hives Current Immunizations  Never Reviewed Name Date Pneumococcal Polysaccharide (PPSV-23) 3/7/2014 Not reviewed this visit You Were Diagnosed With   
  
 Codes Comments Ganglion cyst    -  Primary ICD-10-CM: M67.40 ICD-9-CM: 727.43 Vitals Height(growth percentile) Weight(growth percentile) BMI OB Status Smoking Status 5' 4\" (1.626 m) 174 lb (78.9 kg) 29.87 kg/m2 Hysterectomy Former Smoker BMI and BSA Data Body Mass Index Body Surface Area  
 29.87 kg/m 2 1.89 m 2 Preferred Pharmacy Pharmacy Name Phone [facility][id number] - [address], [address] [phone] Your Updated Medication List  
  
   
This list is accurate as of: 1/10/18 12:20 PM.  Always use your most recent med list.  
  
  
  
  
 * albuterol 2.5 mg /3 mL (0.083 %) nebulizer solution Commonly known as:  PROVENTIL VENTOLIN  
2.5 mg as needed. * albuterol 90 mcg/actuation inhaler Commonly known as:  PROAIR HFA Take 2 Puffs by inhalation every four (4) hours as needed for Wheezing. atorvastatin 40 mg tablet Commonly known as:  LIPITOR  
TAKE ONE TABLET BY MOUTH ONE TIME DAILY  
  
 baclofen 10 mg tablet Commonly known as:  LIORESAL  
TAKE ONE TABLET BY MOUTH THREE TIMES A DAY Start taking on:  1/25/2018  
  
 carbamide peroxide 6.5 % otic solution Commonly known as:  Tay Coma Administer 5 Drops into each ear two (2) times a day. Dexlansoprazole 60 mg Cpdb Commonly known as:  DEXILANT Take 1 Cap by mouth daily. ergocalciferol 50,000 unit capsule Commonly known as:  VITAMIN D2  
TAKE ONE CAPSULE BY MOUTH EVERY 7 DAYS  
  
 fluticasone 50 mcg/actuation nasal spray Commonly known as:  Kalpana Melquiades 2 Sprays by Both Nostrils route as needed. gabapentin 400 mg capsule Commonly known as:  NEURONTIN  
400 mg two (2) times a day. ipratropium 0.02 % Soln Commonly known as:  ATROVENT  
0.5 mg.  
  
 losartan 25 mg tablet Commonly known as:  COZAAR  
TAKE ONE TABLET BY MOUTH ONE TIME DAILY  
  
 * MIRALAX 17 gram packet Generic drug:  polyethylene glycol Take 17 g by mouth daily. * polyethylene glycol 17 gram packet Commonly known as:  Wynette Harps DISSOLVE 1 PACKET IN BEVERAGE OF CHOICE AND DRINK BY MOUTH ONCE DAILY * polyethylene glycol 17 gram packet Commonly known as:  Wynette Harps DISSOLVE CONTENTS OF ONE PACKET IN BEVERAGE ONCE A DAY * morphine CR 60 mg CR tablet Commonly known as:  MS CONTIN  
 Take 1 Tab by mouth every twelve (12) hours for 30 days. Max Daily Amount: 120 mg.  
Start taking on:  1/26/2018 * morphine CR 60 mg CR tablet Commonly known as:  MS CONTIN Take 1 Tab by mouth every twelve (12) hours for 30 days. Max Daily Amount: 120 mg.  
Start taking on:  2/25/2018 * morphine CR 60 mg CR tablet Commonly known as:  MS CONTIN Take 1 Tab by mouth every twelve (12) hours. Max Daily Amount: 120 mg.  
Start taking on:  3/26/2018  
  
 naloxone 4 mg/actuation nasal spray Commonly known as:  NARCAN  
1 [place] by IntraNASal route once as needed. Use 1 spray intranasally into 1 nostril. Use a new Narcan nasal spray for subsequent doses and administer into alternating nostrils. May repeat every 2 to 3 minutes as needed. * oxyCODONE IR 10 mg Tab immediate release tablet Commonly known as:  Berry Leitz Take 1 Tab by mouth three (3) times daily as needed. Max Daily Amount: 30 mg. Start taking on:  1/26/2018 * oxyCODONE IR 10 mg Tab immediate release tablet Commonly known as:  Berry Leitz Take 1 Tab by mouth three (3) times daily as needed. Max Daily Amount: 30 mg. Start taking on:  2/25/2018 * oxyCODONE IR 10 mg Tab immediate release tablet Commonly known as:  Berry Leitz Take 1 Tab by mouth three (3) times daily as needed. Max Daily Amount: 30 mg. Start taking on:  3/26/2018 QUEtiapine 200 mg tablet Commonly known as:  SEROquel Take 200 mg by mouth daily. sertraline 100 mg tablet Commonly known as:  ZOLOFT Take 100 mg by mouth nightly as needed. sucralfate 1 gram tablet Commonly known as:  CARAFATE  
1 g.  
  
 temazepam 15 mg capsule Commonly known as:  RESTORIL Take 1 Cap by mouth nightly as needed for Sleep. Max Daily Amount: 15 mg. Indications: INSOMNIA  
  
 traZODone 100 mg tablet Commonly known as:  [place] Nancy Take 100 mg by mouth nightly. valACYclovir 500 mg tablet Commonly known as:  VALTREX Take 1 Tab by mouth two (2) times a day. * Notice: This list has 11 medication(s) that are the same as other medications prescribed for you. Read the directions carefully, and ask your doctor or other care provider to review them with you. We Performed the Following AMB POC XRAY, WRIST; COMPLETE, 3+ VIE [33864 CPT(R)] AMB SUPPLY ORDER [[id number] Custom] Comments:  
 Left wrist splint To-Do List   
 01/10/2018 Imaging:  MRI WRIST LT W CONT   
  
 01/12/2018 1:00 PM  
  Appointment with [facility] 1 at [address] [place] [address] ([phone]) OUTSIDE FILMS  - Any outside films related to the study being scheduled should be brought with you on the day of the exam.  If this cannot be done there may be a delay in the reading of the study. MEDICATIONS  - Patient must bring a complete list of all medications currently taking to include prescriptions, over-the-counter meds, herbals, vitamins & any dietary supplements  GENERAL INSTRUCTIONS  - On the day of your exam do not use any bath powder, deodorant or lotions on the armpit area. 01/12/2018 1:45 PM  
  Appointment with [facility] RM 1 at [address] [place] [address] ([phone]) OUTSIDE FILMS  - Any outside films related to the study being scheduled should be brought with you on the day of the exam.  If this cannot be done there may be a delay in the reading of the study. MEDICATIONS  - Patient must bring a complete list of all medications currently taking to include prescriptions, over-the-counter meds, herbals, vitamins & any dietary supplements Please provide this summary of care documentation to your next provider. Your primary care clinician is listed as Barry Church. If you have any questions after today's visit, please call [phone].

## 2018-01-10 NOTE — PROCEDURES
DIAGNOSTIC STUDIES:  X-rays of the wrist, three views, shows normal alignment of the left wrist at the radiocarpal region. No significant OA is seen to the wrist.  There is some flexion alignment in the AP view (normal). There is flexion alignment of the digits, index, middle, long, and baby fingers on these x-rays when looking at the fingers. Otherwise, I see no fracture, subluxation, or dislocation.   There is some slight soft tissue swelling seen to the dorsal part of this left wrist.

## 2018-01-10 NOTE — PATIENT INSTRUCTIONS
Please follow up after MRI. You are advised to contact us if your condition worsens. An MRI or CT has been ordered for you. A [facility] will be contacting you to schedule the appointment as soon as it has been approved with your insurance company. Please schedule an appointment to follow up with the doctor or the physicians assistant after the MRI or CT has been conducted. Ganglions: Care Instructions  Your Care Instructions    A ganglion is a small sac, or cyst, filled with a clear fluid that is like jelly. A ganglion may look like a bump on the hand or wrist. It also can appear on your feet, ankles, knees, or shoulders. It is not cancer. A ganglion can grow out of the protective area, or capsule, around a joint. It also can grow on a tendon sheath, which covers the ropelike tendons that connect muscle to bone. A ganglion may hurt or cause numbness if it presses on a nerve. Many ganglions do not need treatment, and they often go away on their own. But if a ganglion hurts, becomes larger, causes numbness, or limits your activity, your doctor may want to drain it with a needle and syringe or remove it with minor surgery. Follow-up care is a key part of your treatment and safety. Be sure to make and go to all appointments, and call your doctor if you are having problems. It's also a good idea to know your test results and keep a list of the medicines you take. How can you care for yourself at home? · Wear a wrist or finger splint as directed by your doctor. It will keep your wrist or hand from moving and help reduce the fluid in the cyst. This may be all you need for the ganglion to shrink and go away. · Do not smash a ganglion with a book or other heavy object. You may break a bone or otherwise injure your wrist by trying this folk remedy, and the ganglion may return anyway. · Do not try to drain the fluid by poking the ganglion with a pin or any other sharp object.  You could cause an infection. When should you call for help? Call your doctor now or seek immediate medical care if:  ? · You have signs of infection, such as:  ¨ Increased pain, swelling, warmth, or redness. ¨ Red streaks leading from the cyst.  ¨ Pus draining from the cyst.  ¨ A fever. ? Watch closely for changes in your health, and be sure to contact your doctor if:  ? · You have increasing pain. ? · Your ganglion is getting larger. ? · You still have pain or numbness from a ganglion. Where can you learn more? Go to http://diana-lashonda.info/. Enter H363 in the search box to learn more about \"Ganglions: Care Instructions. \"  Current as of: March 21, 2017  Content Version: 11.4  © 9461-5191 [employer]. Care instructions adapted under license by [employer] (which disclaims liability or warranty for this information). If you have questions about a medical condition or this instruction, always ask your healthcare professional. [address] [place] [address] any warranty or liability for your use of this information.

## 2018-01-10 NOTE — PROGRESS NOTES
AMBULATORY PROGRESS NOTE      Patient: Sarah Melendez             MRN: [id number]     SSN: [id number] Body mass index is 29.87 kg/(m^2). YOB: 1955     AGE: 60 y.o. EX: female    PCP: Roe Renee DO    IMPRESSION/DIAGNOSIS AND TREATMENT PLAN     DIAGNOSES  1. Ganglion cyst        Orders Placed This Encounter    AMB SUPPLY ORDER    [[id number]] Wrist 3V    MRI WRIST LT [facility] understands her diagnoses and the proposed plan. Plan:    1) MRI without IV Contrast of the Left Wrist   2) DME Order: Left Wrist Guard    RTO - After MRI    HPI AND EXAMINATION     Cha Hutchinson IS A 60 y.o. female who presents to my outpatient office for a problem visit involving hand pain. At last visit, I treated her for a contusion to the left great toe without damage to the nail. I referred her to Hand surgery, and instructed to wean off the hard soles shoe. Patient states that she has experienced pain along the left wrist. She did not state if she went to the hand surgery referral as previously provided. She inquired about having the cyst aspirated but since it causes pain along the radial carpal joint I would like to assess the area properly before treating the cyst. She reports she is doing well otherwise. Cardiovascular/Peripheral Vascular: Normal Pulses to each hand and foot  Musculoskeletal:  LOCATION:   left hand    HAND, WRIST, FOREARM:  left    Integumentary: No rashes, skin patches, wounds, blisters, or abrasions    Warm and normal color. No regions of expressible drainage       Deformities:  Is not present        Swelling: Regions of abnormal swelling : along the dorsal left wrist close to the radial carpal joint. Tenderness:  There is regions of tenderness : along the dorsal left wrist along the site of the cyst.       Motor/Strength/Tone Exam: normal 5/5 strength in all tested muscle groups       Sensory Exam:  no sensory deficits noted       Stability Testing: NONE       ROM: full range with pain        Contractures:  Along fourth digit         Vascular : Normal capillary refill and digital coloration   No embolic phenomena to the toes or hands   Edema is not present        Neuro Exam:  Sensation : no focal            Motor function: WNL    CHART REVIEW     Past Medical History:   Diagnosis Date    Annular tear of lumbar disc 11/10/2014    Asthma     Bronchitis    Benign tumor of throat     Bone spur     right ankle    Cervicalgia 11/10/2014    Chronic pain     back    Degeneration of lumbar or lumbosacral intervertebral disc 11/10/2014    Degenerative disc disease     Depression     Displacement of lumbar intervertebral disc without myelopathy 11/10/2014    Elevated white blood cell count     Fibromyalgia     GERD (gastroesophageal reflux disease)     Hypertension     Insomnia     Nausea & vomiting     Pain in joint, multiple sites 11/10/2014    Postlaminectomy syndrome, cervical region 11/10/2014    Sinus infection 10/5/15    Ulcer ([facility] [address]      Current Outpatient Prescriptions   Medication Sig    [START ON 1/26/2018] morphine CR (MS CONTIN) 60 mg CR tablet Take 1 Tab by mouth every twelve (12) hours for 30 days. Max Daily Amount: 120 mg.    [START ON 2/25/2018] morphine CR (MS CONTIN) 60 mg CR tablet Take 1 Tab by mouth every twelve (12) hours for 30 days. Max Daily Amount: 120 mg.    [START ON 3/26/2018] morphine CR (MS CONTIN) 60 mg CR tablet Take 1 Tab by mouth every twelve (12) hours. Max Daily Amount: 120 mg.    [START ON 1/26/2018] oxyCODONE IR (ROXICODONE) 10 mg tab immediate release tablet Take 1 Tab by mouth three (3) times daily as needed. Max Daily Amount: 30 mg.    [START ON 2/25/2018] oxyCODONE IR (ROXICODONE) 10 mg tab immediate release tablet Take 1 Tab by mouth three (3) times daily as needed.  Max Daily Amount: 30 mg.    [START ON 3/26/2018] oxyCODONE IR (ROXICODONE) 10 mg tab immediate release tablet Take 1 Tab by mouth three (3) times daily as needed. Max Daily Amount: 30 mg.    [START ON 1/25/2018] baclofen (LIORESAL) 10 mg tablet TAKE ONE TABLET BY MOUTH THREE TIMES A DAY    atorvastatin (LIPITOR) 40 mg tablet TAKE ONE TABLET BY MOUTH ONE TIME DAILY    polyethylene glycol (MIRALAX) 17 gram packet DISSOLVE CONTENTS OF ONE PACKET IN BEVERAGE ONCE A DAY    albuterol (PROAIR HFA) 90 mcg/actuation inhaler Take 2 Puffs by inhalation every four (4) hours as needed for Wheezing.  Dexlansoprazole (DEXILANT) 60 mg CpDB Take 1 Cap by mouth daily.  fluticasone (FLONASE) 50 mcg/actuation nasal spray 2 Sprays by Both Nostrils route as needed.  QUEtiapine (SEROQUEL) 200 mg tablet Take 200 mg by mouth daily.  losartan (COZAAR) 25 mg tablet TAKE ONE TABLET BY MOUTH ONE TIME DAILY    ergocalciferol (VITAMIN D2) 50,000 unit capsule TAKE ONE CAPSULE BY MOUTH EVERY 7 DAYS    polyethylene glycol (MIRALAX) 17 gram packet DISSOLVE 1 PACKET IN BEVERAGE OF CHOICE AND DRINK BY MOUTH ONCE DAILY    naloxone (NARCAN) 4 mg/actuation nasal spray 1 [place] by IntraNASal route once as needed. Use 1 spray intranasally into 1 nostril. Use a new Narcan nasal spray for subsequent doses and administer into alternating nostrils. May repeat every 2 to 3 minutes as needed.  polyethylene glycol (MIRALAX) 17 gram packet Take 17 g by mouth daily.  carbamide peroxide (DEBROX) 6.5 % otic solution Administer 5 Drops into each ear two (2) times a day.  valACYclovir (VALTREX) 500 mg tablet Take 1 Tab by mouth two (2) times a day.  temazepam (RESTORIL) 15 mg capsule Take 1 Cap by mouth nightly as needed for Sleep. Max Daily Amount: 15 mg. Indications: INSOMNIA    sucralfate (CARAFATE) 1 gram tablet 1 g.    albuterol (PROVENTIL VENTOLIN) 2.5 mg /3 mL (0.083 %) nebulizer solution 2.5 mg as needed.  gabapentin (NEURONTIN) 400 mg capsule 400 mg two (2) times a day.     ipratropium (ATROVENT) 0.02 % nebulizer solution 0.5 mg.    trazodone (DESYREL) 100 mg tablet Take 100 mg by mouth nightly.  sertraline (ZOLOFT) 100 mg tablet Take 100 mg by mouth nightly as needed. No current facility-administered medications for this visit. Allergies   Allergen Reactions    Aspirin Other (comments)     Stomach bleeding    Macrobid [Nitrofurantoin Monohyd/M-Cryst] Nausea and Vomiting    Nsaids (Non-Steroidal Anti-Inflammatory Drug) Nausea and Vomiting    Opana [Oxymorphone] Other (comments)     Insomnia, weight loss, nightmares    Pcn [Penicillins] Hives     Past Surgical History:   Procedure Laterality Date    COLONOSCOPY N/A 4/13/2017    [facility] ENDOSCOPY    HX CATARACT REMOVAL      HX CERVICAL FUSION      HX HYSTERECTOMY      HX ORTHOPAEDIC      ganglion cyst removed, right hand    HX OTHER SURGICAL      cyst left thigh removed    LAP,CHOLECYSTECTOMY N/A 02/27/2017    Dr. Balbir Griffith Not on file. Social History Main Topics    Smoking status: Former Smoker    Smokeless tobacco: Never Used    Alcohol use No      Comment: none in 24 years    Drug use: No      Comment: last smoked in 1993    Sexual activity: Not Currently     Family History   Problem Relation Age of Onset    Hypertension Other     Heart Attack Other     Heart Disease Other     Stroke Other     Headache Other     Seizures Other        REVIEW OF SYSTEMS : 1/10/2018  ALL BELOW ARE Negative except : SEE HPI       Constitutional: Negative for fever, chills and weight loss. Neg Weigh Loss  Cardiovascular: Negative for chest pain, claudication and leg swelling. SOB, VILLAGOMEZ   Gastrointestinal: Negative for  pain, N/V/D/C, Blood in stool or urine,dysuria, hematuria,        Incontinence, pelvic pain  Musculoskeletal: see HPI. Neurological: Negative for dizziness and weakness.                  Negative for headaches,Visual Changes, Confusion, Seizures,   Psychiatric/Behavioral: Negative for depression, memory loss and substance abuse. Extremities:  Negative for  hair changes, rash or skin lesion changes. Hematologic: Negative for Bleeding problems, bruising, pallor or swollen lymph nodes. Peripheral Vascular: No calf pain, vascular vein tenderness to calf pain              No calf throbbing, posterior knee throbbing pain    DIAGNOSTIC IMAGING      Dictation on: 01/10/2018  1:06 PM by: Velma Alcantara [[id number]]         Written by Che Soler, as dictated by Jeanna Hobson MD. I, , Jeanna Hobson MD, confirm that all documentation is accurate.

## 2018-01-12 ENCOUNTER — HOSPITAL ENCOUNTER (OUTPATIENT)
Dept: MAMMOGRAPHY | Age: 63
Discharge: HOME | End: 2018-01-12
Attending: INTERNAL MEDICINE
Payer: MEDICARE

## 2018-01-12 ENCOUNTER — HOSPITAL ENCOUNTER (OUTPATIENT)
Dept: ULTRASOUND IMAGING | Age: 63
Discharge: HOME | End: 2018-01-12
Attending: INTERNAL MEDICINE
Payer: MEDICARE

## 2018-01-12 DIAGNOSIS — R92.8 ABNORMAL MAMMOGRAM: ICD-10-CM

## 2018-01-12 PROCEDURE — 77065 DX MAMMO INCL CAD UNI: CPT

## 2018-01-12 PROCEDURE — 77066 DX MAMMO INCL CAD BI: CPT

## 2018-01-15 ENCOUNTER — TELEPHONE (OUTPATIENT)
Dept: ORTHOPEDIC SURGERY | Age: 63
End: 2018-01-15

## 2018-01-15 ENCOUNTER — TELEPHONE (OUTPATIENT)
Dept: PAIN MANAGEMENT | Age: 63
End: 2018-01-15

## 2018-01-15 NOTE — TELEPHONE ENCOUNTER
Spoke with patient and notified her that the order is for an MRI wrist but will include the part of her hand with the cyst. Patient understood.

## 2018-01-15 NOTE — TELEPHONE ENCOUNTER
01/11/2018 I spoke to Ms. Jasper, she stated that Dr. Yamilex Topete had referred to Roge Bangura for an injection. Since I do not have a referral I called Dr. Zeinab Schroeder office and I left a message for Latonia Marin assistance to call me or fax a referral...

## 2018-01-15 NOTE — TELEPHONE ENCOUNTER
Patient is calling as she called  to schedule her MRI of hand. Was told MRI was of wrist. Patient just wants to make sure the MRI is of her hand. Please call her back to let her know MRI is of hand so she can schedule. She makes appts as she must make transportation arrangements.  She can be reached at [phone]

## 2018-01-22 ENCOUNTER — HOSPITAL ENCOUNTER (OUTPATIENT)
Age: 63
Discharge: HOME | End: 2018-01-22
Attending: ORTHOPAEDIC SURGERY
Payer: MEDICARE

## 2018-01-22 DIAGNOSIS — M67.40 GANGLION CYST: ICD-10-CM

## 2018-01-22 PROCEDURE — 73221 MRI JOINT UPR EXTREM W/O DYE: CPT

## 2018-01-24 ENCOUNTER — OFFICE VISIT (OUTPATIENT)
Dept: FAMILY MEDICINE CLINIC | Age: 63
End: 2018-01-24

## 2018-01-24 ENCOUNTER — HOSPITAL ENCOUNTER (OUTPATIENT)
Dept: LAB | Age: 63
Discharge: HOME | End: 2018-01-24
Payer: MEDICARE

## 2018-01-24 ENCOUNTER — TELEPHONE (OUTPATIENT)
Dept: FAMILY MEDICINE CLINIC | Age: 63
End: 2018-01-24

## 2018-01-24 VITALS
HEART RATE: 76 BPM | BODY MASS INDEX: 29.19 KG/M2 | RESPIRATION RATE: 16 BRPM | SYSTOLIC BLOOD PRESSURE: 106 MMHG | DIASTOLIC BLOOD PRESSURE: 77 MMHG | HEIGHT: 64 IN | OXYGEN SATURATION: 99 % | WEIGHT: 171 LBS | TEMPERATURE: 98.5 F

## 2018-01-24 DIAGNOSIS — R30.0 DYSURIA: ICD-10-CM

## 2018-01-24 DIAGNOSIS — T40.2X5A THERAPEUTIC OPIOID INDUCED CONSTIPATION: ICD-10-CM

## 2018-01-24 DIAGNOSIS — M79.18 MYOFASCIAL PAIN SYNDROME: Primary | ICD-10-CM

## 2018-01-24 DIAGNOSIS — Z29.9 PREVENTIVE MEASURE: ICD-10-CM

## 2018-01-24 DIAGNOSIS — K21.00 GASTROESOPHAGEAL REFLUX DISEASE WITH ESOPHAGITIS: ICD-10-CM

## 2018-01-24 DIAGNOSIS — Z79.899 MEDICATION MANAGEMENT: ICD-10-CM

## 2018-01-24 DIAGNOSIS — K59.03 THERAPEUTIC OPIOID INDUCED CONSTIPATION: ICD-10-CM

## 2018-01-24 DIAGNOSIS — R82.90 MALODOROUS URINE: ICD-10-CM

## 2018-01-24 DIAGNOSIS — I10 BENIGN ESSENTIAL HYPERTENSION WITH TARGET BLOOD PRESSURE BELOW 140/90: ICD-10-CM

## 2018-01-24 DIAGNOSIS — E55.9 VITAMIN D DEFICIENCY: ICD-10-CM

## 2018-01-24 DIAGNOSIS — E78.00 HYPERCHOLESTEROLEMIA: ICD-10-CM

## 2018-01-24 LAB
APPEARANCE UR: CLEAR
BILIRUB UR QL: NEGATIVE
COLOR UR: YELLOW
GLUCOSE UR STRIP.AUTO-MCNC: NEGATIVE MG/DL
HGB UR QL STRIP: NEGATIVE
KETONES UR QL STRIP.AUTO: NEGATIVE MG/DL
LEUKOCYTE ESTERASE UR QL STRIP.AUTO: NEGATIVE
NITRITE UR QL STRIP.AUTO: NEGATIVE
PH UR STRIP: 6 [PH] (ref 5–8)
PROT UR STRIP-MCNC: NEGATIVE MG/DL
SP GR UR REFRACTOMETRY: 1.01 (ref 1–1.03)
UROBILINOGEN UR QL STRIP.AUTO: 0.2 EU/DL (ref 0.2–1)

## 2018-01-24 PROCEDURE — 81003 URINALYSIS AUTO W/O SCOPE: CPT | Performed by: INTERNAL MEDICINE

## 2018-01-24 RX ORDER — ERGOCALCIFEROL 1.25 MG/1
CAPSULE ORAL
Qty: 12 CAP | Refills: 3 | Status: SHIPPED | OUTPATIENT
Start: 2018-01-24 | End: 2019-04-10

## 2018-01-24 RX ORDER — POLYETHYLENE GLYCOL 3350 17 G/17G
POWDER, FOR SOLUTION ORAL
Qty: 14 PACKET | Refills: 3 | Status: SHIPPED | OUTPATIENT
Start: 2018-01-24 | End: 2018-01-25 | Stop reason: SDUPTHER

## 2018-01-24 RX ORDER — PHENAZOPYRIDINE HYDROCHLORIDE 200 MG/1
200 TABLET, FILM COATED ORAL
Qty: 30 TAB | Refills: 0 | Status: SHIPPED | OUTPATIENT
Start: 2018-01-24 | End: 2018-01-27

## 2018-01-24 NOTE — PROGRESS NOTES
Chief Complaint   Patient presents with    Cough    Abdominal Pain     RUQ states that this started after gallbladder removal, has constant muscle spasms     1. Have you been to the ER, urgent care clinic since your last visit? Hospitalized since your last visit? No    2. Have you seen or consulted any other health care providers outside of the [address] [place] [address] since your last visit? Include any pap smears or colon screening.  No

## 2018-01-24 NOTE — PATIENT INSTRUCTIONS
Please contact our office if you have any questions about your visit today. 1.) Please confirm with [facility] about getting Trigger Point injections. 2.) Please keep regular March appointment. Please get labs before next visit. 3.) We will call if there are any abnormal urine results.

## 2018-01-24 NOTE — Clinical Note
He Ayoub,  Please let the patient know that her urinalysis was negative. She can also try Azo over the counter as needed. Thanks.  SHAMIR

## 2018-01-24 NOTE — MR AVS SNAPSHOT
[address] [facility] 
 
 
 [address] [address] 
[phone] Patient: Jimmy Chowdhury MRN: [id number] WH Visit Information Date & Time Provider Department Dept. Phone Encounter #  
 2018  1:30 PM Mireya Roth, Roberta Mathew [id number] Follow-up Instructions Return for Patient will keep March appointment . Your Appointments 2018  1:00 PM  
DIAG TEST F/U with Jolie Campoverde MD  
[address] [facility], [address] and [facility] ([facility]) Appt Note: MRI RESULTS IN CC 18/PAT TO [facility], [address] [facility]  
[phone] [address], [address]  
  
    
 3/28/2018  1:45 PM  
Follow Up with Mireya Roth, [address] (--) Appt Note: Follow up [place] [address] [facility] [id number] [phone]  
  
   
 [address] [place] [id number]  
  
    
 4/3/2018 11:20 AM  
Follow Up with BETO Lockhart [facility] (RICHAR SCHEDULING) Appt Note: 3 mon f/u per rc. ..to  
 [address] [facility] [address] [phone] [facility] [address] [id number] Upcoming Health Maintenance Date Due DTaP/Tdap/Td series (1 - Tdap) 3/30/1975 PAP AKA CERVICAL CYTOLOGY 2019 BREAST CANCER SCRN MAMMOGRAM 2020 COLONOSCOPY 2027 Allergies as of 2018  Review Complete On:  By: Yaniv Church. Keeley Lord LPN Severity Noted Reaction Type Reactions Aspirin    Other (comments) Stomach bleeding Macrobid [Nitrofurantoin Monohyd/m-cryst]  2015    Nausea and Vomiting Nsaids (Non-steroidal Anti-inflammatory Drug)    Nausea and Vomiting Opana [Oxymorphone]  2016    Other (comments) Insomnia, weight loss, nightmares Pcn [Penicillins]    Hives Current Immunizations  Never Reviewed Name Date Pneumococcal Polysaccharide (PPSV-23) 3/7/2014 Not reviewed this visit You Were Diagnosed With   
  
 Codes Comments Myofascial pain syndrome    -  Primary ICD-10-CM: M79.1 ICD-9-CM: 729.1 Gastroesophageal reflux disease with esophagitis     ICD-10-CM: K21.0 ICD-9-CM: 530.11 Benign essential hypertension with target blood pressure below 140/90     ICD-10-CM: I10 
ICD-9-CM: 401.1 Hypercholesterolemia     ICD-10-CM: E78.00 ICD-9-CM: 272.0 Therapeutic opioid induced constipation     ICD-10-CM: K59.03, T40.2X5A 
ICD-9-CM: 564.09, E935.2 Vitamin D deficiency     ICD-10-CM: E55.9 ICD-9-CM: 268.9 Preventive measure     ICD-10-CM: Z29.9 ICD-9-CM: V07.9 Medication management     ICD-10-CM: Z79.899 ICD-9-CM: V58.69 Dysuria     ICD-10-CM: R30.0 ICD-9-CM: 950. 1 Vitals BP Pulse Temp Resp Height(growth percentile) Weight(growth percentile) 106/77 (BP 1 Location: Right arm, BP Patient Position: Sitting) 76 98.5 °F (36.9 °C) (Oral) 16 5' 4\" (1.626 m) 171 lb (77.6 kg) SpO2 BMI OB Status Smoking Status 99% 29.35 kg/m2 Hysterectomy Former Smoker BMI and BSA Data Body Mass Index Body Surface Area  
 29.35 kg/m 2 1.87 m 2 Preferred Pharmacy Pharmacy Name Phone [id number] - [address] [phone] Your Updated Medication List  
  
   
This list is accurate as of: 1/24/18  2:21 PM.  Always use your most recent med list.  
  
  
  
  
 * albuterol 2.5 mg /3 mL (0.083 %) nebulizer solution Commonly known as:  PROVENTIL VENTOLIN  
2.5 mg as needed. * albuterol 90 mcg/actuation inhaler Commonly known as:  PROAIR HFA Take 2 Puffs by inhalation every four (4) hours as needed for Wheezing. atorvastatin 40 mg tablet Commonly known as:  LIPITOR  
TAKE ONE TABLET BY MOUTH ONE TIME DAILY baclofen 10 mg tablet Commonly known as:  LIORESAL  
TAKE ONE TABLET BY MOUTH THREE TIMES A DAY Start taking on:  1/25/2018  
  
 carbamide peroxide 6.5 % otic solution Commonly known as:  Julissa Pee Administer 5 Drops into each ear two (2) times a day. Dexlansoprazole 60 mg Cpdb Commonly known as:  DEXILANT Take 1 Cap by mouth daily. ergocalciferol 50,000 unit capsule Commonly known as:  VITAMIN D2  
TAKE ONE CAPSULE BY MOUTH EVERY 7 DAYS  
  
 fluticasone 50 mcg/actuation nasal spray Commonly known as:  Maryam Paget 2 Sprays by Both Nostrils route as needed. gabapentin 400 mg capsule Commonly known as:  NEURONTIN  
400 mg two (2) times a day. ipratropium 0.02 % Soln Commonly known as:  ATROVENT  
0.5 mg.  
  
 losartan 25 mg tablet Commonly known as:  COZAAR  
TAKE ONE TABLET BY MOUTH ONE TIME DAILY  
  
 * MIRALAX 17 gram packet Generic drug:  polyethylene glycol Take 17 g by mouth daily. * polyethylene glycol 17 gram packet Commonly known as:  Tiffany Nick DISSOLVE CONTENTS OF ONE PACKET IN BEVERAGE ONCE A DAY * polyethylene glycol 17 gram packet Commonly known as:  Tiffany Nick DISSOLVE 1 PACKET IN BEVERAGE OF CHOICE AND DRINK BY MOUTH ONCE DAILY * morphine CR 60 mg CR tablet Commonly known as:  MS CONTIN Take 1 Tab by mouth every twelve (12) hours for 30 days. Max Daily Amount: 120 mg.  
Start taking on:  1/26/2018 * morphine CR 60 mg CR tablet Commonly known as:  MS CONTIN Take 1 Tab by mouth every twelve (12) hours for 30 days. Max Daily Amount: 120 mg.  
Start taking on:  2/25/2018 * morphine CR 60 mg CR tablet Commonly known as:  MS CONTIN Take 1 Tab by mouth every twelve (12) hours. Max Daily Amount: 120 mg.  
Start taking on:  3/26/2018  
  
 naloxone 4 mg/actuation nasal spray Commonly known as:  NARCAN  
1 [place] by IntraNASal route once as needed.  Use 1 spray intranasally into 1 nostril. Use a new Narcan nasal spray for subsequent doses and administer into alternating nostrils. May repeat every 2 to 3 minutes as needed. * oxyCODONE IR 10 mg Tab immediate release tablet Commonly known as:  Sami Cam Take 1 Tab by mouth three (3) times daily as needed. Max Daily Amount: 30 mg. Start taking on:  1/26/2018 * oxyCODONE IR 10 mg Tab immediate release tablet Commonly known as:  Sami Cam Take 1 Tab by mouth three (3) times daily as needed. Max Daily Amount: 30 mg. Start taking on:  2/25/2018 * oxyCODONE IR 10 mg Tab immediate release tablet Commonly known as:  Sami Cam Take 1 Tab by mouth three (3) times daily as needed. Max Daily Amount: 30 mg. Start taking on:  3/26/2018 QUEtiapine 200 mg tablet Commonly known as:  SEROquel Take 200 mg by mouth daily. sertraline 100 mg tablet Commonly known as:  ZOLOFT Take 100 mg by mouth nightly as needed. sucralfate 1 gram tablet Commonly known as:  CARAFATE  
1 g.  
  
 temazepam 15 mg capsule Commonly known as:  RESTORIL Take 1 Cap by mouth nightly as needed for Sleep. Max Daily Amount: 15 mg. Indications: INSOMNIA  
  
 traZODone 100 mg tablet Commonly known as:  Gladystine Gemma Take 100 mg by mouth nightly. valACYclovir 500 mg tablet Commonly known as:  VALTREX Take 1 Tab by mouth two (2) times a day. * Notice: This list has 11 medication(s) that are the same as other medications prescribed for you. Read the directions carefully, and ask your doctor or other care provider to review them with you. Prescriptions Sent to Pharmacy Refills  
 polyethylene glycol (MIRALAX) 17 gram packet 3 Sig: DISSOLVE 1 PACKET IN BEVERAGE OF CHOICE AND DRINK BY MOUTH ONCE DAILY  Class: Normal  
 Pharmacy: [address], [facility] #: [phone]  
 ergocalciferol (VITAMIN D2) 50,000 unit capsule 3  
 Sig: TAKE ONE CAPSULE BY MOUTH EVERY 7 DAYS Class: Normal  
 Pharmacy: [facility] [address] [place] [address], [address] [place] [address] #: [phone] We Performed the Following AMB POC URINALYSIS DIP STICK AUTO W/ MICRO [49809 CPT(R)] REFERRAL TO ORTHOPEDIC SURGERY [REF62 Custom] Comments:  
 Please evaluate patient for Trigger point injections for myofascial pain syndrome. Follow-up Instructions Return for Patient will keep March appointment . To-Do List   
 01/24/2018 Lab:  HEMOGLOBIN A1C WITH EAG   
  
 01/24/2018 Lab:  VITAMIN D, 25 HYDROXY Around 04/24/2018 Lab:  CBC WITH AUTOMATED DIFF Around 04/24/2018 Lab:  LIPID PANEL Referral Information Referral ID Referred By Referred To  
  
 [id number] T[facility] [address], [address] [place], [address] [place] [address]. Phone: [phone] Fax: [phone] Visits Status Start Date End Date 1 New Request 1/24/18 1/24/19 If your referral has a status of pending review or denied, additional information will be sent to support the outcome of this decision. Patient Instructions Please contact our office if you have any questions about your visit today. 1.) Please confirm with [facility] about getting Trigger Point injections. 2.) Please keep regular March appointment. Please get labs before next visit. 3.) We will call if there are any abnormal urine results. Please provide this summary of care documentation to your next provider. Your primary care clinician is listed as Martha Corea. If you have any questions after today's visit, please call [phone].

## 2018-01-24 NOTE — PROGRESS NOTES
History and Physical    Patient: Rafy Sarmiento MRN: [id number]  SSN: [id number]    YOB: 1955  Age: 60 y.o. Sex: female      Subjective:      Rafy Sarmiento is a 60 y.o. female who is her for regular follow up. The patient recently saw her GI specialist, Dr. Karlie Bennett who recommended that she get a trigger point injection for Myofascial Pain Syndrome. The patient had previously been suffering from right flank pain as well as esophageal pain recently. She also mentions that she did get a colonoscopy as well as an upper endoscopy. She also mentions that she has a urinary odor for about two weeks. She also mentions that her right foot goes numb and her toes go numb. She mentions that the numbness occurs primarily at night. She mentions that she also has her fan blowing on her feet. 12/28/2017   Patient is here to follow up on her bronchitis. She states that she completed her antibiotic. She mentions that her cough has essentially resolved. Currently, she states that she is having spasms in her chest. She was seen in the ER and they placed her on Dexilant. She states that she also started on Carafate for ulcers as well. The patient mentions that she has not used any Prilosec, ranitidine, or Nexium or Pepcid. Visit from 12/19/17  She was seen in the Urgent Care for bronchitis. She mentions that her symptoms began on Friday. She denies any sick contacts prior to the symptoms occurring. She states that she was placed on doxycycline and she threw up. She mentions that she had a similar reaction with doxycycline. She also requests a refill of her Flonase.           Past Medical History:   Diagnosis Date    Annular tear of lumbar disc 11/10/2014    Asthma     Bronchitis    Benign tumor of throat     Bone spur     right ankle    Cervicalgia 11/10/2014    Chronic pain     back    Degeneration of lumbar or lumbosacral intervertebral disc 11/10/2014    Degenerative disc disease     Depression     Displacement of lumbar intervertebral disc without myelopathy 11/10/2014    Elevated white blood cell count     Fibromyalgia     GERD (gastroesophageal reflux disease)     Hypertension     Insomnia     Nausea & vomiting     Pain in joint, multiple sites 11/10/2014    Postlaminectomy syndrome, cervical region 11/10/2014    Sinus infection 10/5/15    Ulcer ([address]      Past Surgical History:   Procedure Laterality Date    COLONOSCOPY N/A 4/13/2017    COLONOSCOPY performed by Jitendra Donovan MD at [facility] HX Left Eye CATARACT REMOVAL--- March 2017      HX CERVICAL FUSION      HX HYSTERECTOMY      HX ORTHOPAEDIC      ganglion cyst removed, right hand    HX OTHER SURGICAL      cyst left thigh removed    LAP,CHOLECYSTECTOMY N/A 02/27/2017    Dr. Paige Lyons      Family History   Problem Relation Age of Onset    Hypertension Other     Heart Attack Other     Heart Disease Other     Stroke Other     Headache Other     Seizures Other      Social History   Substance Use Topics    Smoking status: Former Smoker    Smokeless tobacco: Never Used    Alcohol use No      Comment: none in 24 years      Prior to Admission medications    Medication Sig Start Date End Date Taking? Authorizing Provider   morphine CR (MS CONTIN) 60 mg CR tablet Take 1 Tab by mouth every twelve (12) hours for 30 days. Max Daily Amount: 120 mg. 1/26/18 2/25/18 Yes BETO Hughes   morphine CR (MS CONTIN) 60 mg CR tablet Take 1 Tab by mouth every twelve (12) hours for 30 days. Max Daily Amount: 120 mg. 2/25/18 3/27/18 Yes BETO Hughes   morphine CR (MS CONTIN) 60 mg CR tablet Take 1 Tab by mouth every twelve (12) hours. Max Daily Amount: 120 mg. 3/26/18  Yes BETO Hughes   oxyCODONE IR (ROXICODONE) 10 mg tab immediate release tablet Take 1 Tab by mouth three (3) times daily as needed.  Max Daily Amount: 30 mg. 1/26/18  Yes BETO Hughes   oxyCODONE IR (ROXICODONE) 10 mg tab immediate release tablet Take 1 Tab by mouth three (3) times daily as needed. Max Daily Amount: 30 mg. 2/25/18  Yes BETO Joe   oxyCODONE IR (ROXICODONE) 10 mg tab immediate release tablet Take 1 Tab by mouth three (3) times daily as needed. Max Daily Amount: 30 mg. 3/26/18  Yes BETO Joe   baclofen (LIORESAL) 10 mg tablet TAKE ONE TABLET BY MOUTH THREE TIMES A DAY 1/25/18  Yes BETO Joe   atorvastatin (LIPITOR) 40 mg tablet TAKE ONE TABLET BY MOUTH ONE TIME DAILY 1/2/18  Yes Roe Renee, DO   polyethylene glycol (MIRALAX) 17 gram packet DISSOLVE CONTENTS OF ONE PACKET IN BEVERAGE ONCE A DAY 12/29/17  Yes Roe Renee, DO   albuterol (PROAIR HFA) 90 mcg/actuation inhaler Take 2 Puffs by inhalation every four (4) hours as needed for Wheezing. 12/28/17  Yes Roe Renee, DO   Dexlansoprazole (DEXILANT) 60 mg CpDB Take 1 Cap by mouth daily. 12/28/17  Yes Roe Renee, DO   fluticasone (FLONASE) 50 mcg/actuation nasal spray 2 Sprays by Both Nostrils route as needed. 12/19/17  Yes Roe Renee, DO   QUEtiapine (SEROQUEL) 200 mg tablet Take 200 mg by mouth daily. Yes Historical Provider   losartan (COZAAR) 25 mg tablet TAKE ONE TABLET BY MOUTH ONE TIME DAILY 11/8/17  Yes Roe Renee DO   ergocalciferol (VITAMIN D2) 50,000 unit capsule TAKE ONE CAPSULE BY MOUTH EVERY 7 DAYS 11/8/17  Yes Roe Renee, DO   polyethylene glycol (MIRALAX) 17 gram packet DISSOLVE 1 PACKET IN BEVERAGE OF CHOICE AND DRINK BY MOUTH ONCE DAILY 11/8/17  Yes Roe Renee, DO   naloxone (NARCAN) 4 mg/actuation nasal spray 1 [place] by IntraNASal route once as needed. Use 1 spray intranasally into 1 nostril. Use a new Narcan nasal spray for subsequent doses and administer into alternating nostrils. May repeat every 2 to 3 minutes as needed. Yes Xenia Allen MD   polyethylene glycol (MIRALAX) 17 gram packet Take 17 g by mouth daily.    Yes Phys Other, MD   valACYclovir (VALTREX) 500 mg tablet Take 1 Tab by mouth two (2) times a day. 9/29/17  Yes Mehnaz Donahue MD   temazepam (RESTORIL) 15 mg capsule Take 1 Cap by mouth nightly as needed for Sleep. Max Daily Amount: 15 mg. Indications: INSOMNIA 3/17/17  Yes Mehnaz Donahue MD   sucralfate (CARAFATE) 1 gram tablet 1 g. 1/13/17  Yes Historical Provider   albuterol (PROVENTIL VENTOLIN) 2.5 mg /3 mL (0.083 %) nebulizer solution 2.5 mg as needed. 1/14/13  Yes Historical Provider   gabapentin (NEURONTIN) 400 mg capsule 400 mg two (2) times a day. 7/7/13  Yes Historical Provider   ipratropium (ATROVENT) 0.02 % nebulizer solution 0.5 mg. 1/14/13  Yes Historical Provider   trazodone (DESYREL) 100 mg tablet Take 100 mg by mouth nightly. Yes Historical Provider   sertraline (ZOLOFT) 100 mg tablet Take 100 mg by mouth nightly as needed. Yes Historical Provider   carbamide peroxide (DEBROX) 6.5 % otic solution Administer 5 Drops into each ear two (2) times a day. 10/18/17   Amaris Michaels MD        Allergies   Allergen Reactions    Aspirin Other (comments)     Stomach bleeding    Macrobid [Nitrofurantoin Monohyd/M-Cryst] Nausea and Vomiting    Nsaids (Non-Steroidal Anti-Inflammatory Drug) Nausea and Vomiting    Opana [Oxymorphone] Other (comments)     Insomnia, weight loss, nightmares    Pcn [Penicillins] Hives       Review of Systems:  Pertinent items are noted in the History of Present Illness. Objective:     Vitals:    01/24/18 1352   BP: 106/77   Pulse: 76   Resp: 16   Temp: 98.5 °F (36.9 °C)   TempSrc: Oral   SpO2: 99%   Weight: 171 lb (77.6 kg)   Height: 5' 4\" (1.626 m)        Physical Exam:  GENERAL: fatigued, cooperative, no distress, appears stated age  EYE: negative  LYMPHATIC: Cervical, supraclavicular, and axillary nodes normal.   EARS: No fluid behind tympanic membranes. THROAT & NECK: mild erythema, normal and no erythema or exudates noted.    LUNG: clear to auscultation bilaterally  HEART: regular rate and rhythm, S1, S2 normal, no murmur, click, rub or gallop  ABDOMEN: soft, non-tender. Bowel sounds normal. No masses,  no organomegaly  EXTREMITIES:  extremities normal, atraumatic, no cyanosis or edema      Labs:     Results for orders placed or performed in visit on 01/24/18   AMB POC URINALYSIS DIP STICK AUTO W/ MICRO     Status: None   Result Value Ref Range Status    Color (UA POC) Yellow  Final    Clarity (UA POC) Clear  Final    Glucose (UA POC) Negative Negative Final    Bilirubin (UA POC) Negative Negative Final    Ketones (UA POC) Negative Negative Final    Specific gravity (UA POC) 1.005 1.001 - 1.035 Final    Blood (UA POC) Trace Negative Final    pH (UA POC) 5.5 4.6 - 8.0 Final    Protein (UA POC) Negative Negative Final    Urobilinogen (UA POC) 0.2 mg/dL 0.2 - 1 Final    Nitrites (UA POC) Negative Negative Final    Leukocyte esterase (UA POC) Negative Negative Final   Results for orders placed or performed in visit on 05/12/17   AMB POC URINALYSIS DIP STICK AUTO W/O MICRO     Status: None   Result Value Ref Range Status    Color (UA POC) Yellow  Final    Clarity (UA POC) Clear  Final    Glucose (UA POC) Negative Negative Final    Bilirubin (UA POC) Negative Negative Final    Ketones (UA POC) Negative Negative Final    Specific gravity (UA POC) 1.015 1.001 - 1.035 Final    Blood (UA POC) Negative Negative Final    pH (UA POC) 7.0 4.6 - 8.0 Final    Protein (UA POC) Negative Negative mg/dL Final    Urobilinogen (UA POC) 0.2 mg/dL 0.2 - 1 Final    Nitrites (UA POC) Negative Negative Final    Leukocyte esterase (UA POC) Negative Negative Final         Assessment:     1.) Myofascial Pain Syndrome: Patient  Referred to Dr. Uday Bray for trigger point injections. 2.) Dysuria: Patient ordered urinalysis which was negative. She was ordered Pyridium for use as needed. 3.) Vitamin D Deficiency: Patient ordered Vitamin D 50,000 units once a week. 4.) Constipation: Patient's Miralax ordered. 5.) Preventive: Labs ordered as noted below. Patient will continue regular follow up.      Plan:     Orders Placed This Encounter    LIPID PANEL    HEMOGLOBIN A1C WITH EAG    CBC WITH AUTOMATED DIFF    VITAMIN D, 25 HYDROXY    URINALYSIS W/ RFLX MICROSCOPIC    REFERRAL TO ORTHOPEDIC SURGERY    AMB POC URINALYSIS DIP STICK AUTO W/ MICRO    AMB POC URINALYSIS DIP STICK OR TABLET REAGENT AUTO W/O MICRO    polyethylene glycol (MIRALAX) 17 gram packet    ergocalciferol (VITAMIN D2) 50,000 unit capsule    phenazopyridine (PYRIDIUM) 200 mg tablet           Signed By: Leeann Woo DO     January 24, 2018

## 2018-01-24 NOTE — TELEPHONE ENCOUNTER
Patient states that the phenazopyridine (PYRIDIUM) 200 mg was $50 and cannot afford it, and wants and alternative sent to the pharmacy.

## 2018-01-25 LAB
BILIRUB UR QL STRIP: NEGATIVE
GLUCOSE UR-MCNC: NEGATIVE MG/DL
KETONES P FAST UR STRIP-MCNC: NEGATIVE MG/DL
PH UR STRIP: 5.5 [PH] (ref 4.6–8)
PROT UR QL STRIP: NEGATIVE
SP GR UR STRIP: 1 (ref 1–1.03)
UA UROBILINOGEN AMB POC: NORMAL (ref 0.2–1)
URINALYSIS CLARITY POC: CLEAR
URINALYSIS COLOR POC: YELLOW
URINE BLOOD POC: NORMAL
URINE LEUKOCYTES POC: NEGATIVE
URINE NITRITES POC: NEGATIVE

## 2018-01-29 ENCOUNTER — OFFICE VISIT (OUTPATIENT)
Dept: ORTHOPEDIC SURGERY | Age: 63
End: 2018-01-29

## 2018-01-29 VITALS
DIASTOLIC BLOOD PRESSURE: 65 MMHG | WEIGHT: 171.6 LBS | HEIGHT: 64 IN | BODY MASS INDEX: 29.3 KG/M2 | SYSTOLIC BLOOD PRESSURE: 107 MMHG | HEART RATE: 69 BPM | OXYGEN SATURATION: 98 % | TEMPERATURE: 97.8 F

## 2018-01-29 DIAGNOSIS — M77.8 LEFT WRIST TENDINITIS: Primary | ICD-10-CM

## 2018-01-29 DIAGNOSIS — M67.432 GANGLION CYST OF WRIST, LEFT: ICD-10-CM

## 2018-01-29 RX ORDER — TRIAMCINOLONE ACETONIDE 40 MG/ML
40 INJECTION, SUSPENSION INTRA-ARTICULAR; INTRAMUSCULAR ONCE
Qty: 1 ML | Refills: 0 | Status: CANCELLED
Start: 2018-01-29 | End: 2018-01-29

## 2018-01-29 NOTE — PATIENT INSTRUCTIONS
Please follow up as needed. You are advised to contact us if your condition worsens. Wrist: Exercises  Your Care Instructions  Here are some examples of exercises for your wrist. Start each exercise slowly. Ease off the exercise if you start to have pain. Your doctor or your physical or occupational therapist will tell you when you can start these exercises. He or she will also tell you which ones will work best for you. How to do the exercises  Prayer stretch    1. Start with your palms together in front of your chest just below your chin. 2. Slowly lower your hands toward your waistline, keeping your hands close to your stomach and your palms together until you feel a mild to moderate stretch under your forearms. 3. Hold for at least 15 to 30 seconds. Repeat 2 to 4 times. Wrist flexor stretch    1. Extend your arm in front of you with your palm up. 2. Bend your wrist, pointing your hand toward the floor. 3. With your other hand, gently bend your wrist farther until you feel a mild to moderate stretch in your forearm. 4. Hold for at least 15 to 30 seconds. Repeat 2 to 4 times. Wrist extensor stretch    1. Repeat steps 1 through 4 of the stretch above, but begin with your extended hand palm down. Follow-up care is a key part of your treatment and safety. Be sure to make and go to all appointments, and call your doctor if you are having problems. It's also a good idea to know your test results and keep a list of the medicines you take. Where can you learn more? Go to http://diana-lashonda.info/. Enter [id number] in the search box to learn more about \"Wrist: Exercises. \"  Current as of: March 21, 2017  Content Version: 11.4  © 7480-2533 [employer]. Care instructions adapted under license by [employer] (which disclaims liability or warranty for this information).  If you have questions about a medical condition or this instruction, always ask your healthcare professional. [address] any warranty or liability for your use of this information.

## 2018-01-29 NOTE — PROCEDURES
PROCEDURE       [facility] PROCEDURE OUTPATIENT PROCEDURE    PROCEDURE: Aspiration of the left wrist - dorsal focal wrist       I, Rudolph Jarvis MD, have reviewed the History, Physical and updated the Allergic reactions for [place] performed immediately prior to start of procedure:       * Patient was identified by name Ancelmo Rolon and date of birth (1955)  * Agreement on procedure being performed was verified  * Risks and Benefits explained to the patient: see below  * Procedure site verified and marked as necessary  * Patient was positioned for comfort  * Verbal Consent and Written Consent Verified by myself and my office staff. * Complications: None  *  All patient and/or family questions answered     The procedure was explained to the patient and possible adverse reactions were discussed. These risks included, but not limited to: bleeding, infection, septic arthritis, local skin irritation (skin discoloration, hyperpigmentation, hypopigmentation, thinning of the skin, development of a wound, skin necrosis), synovitis, subcutaneous fat pad atrophy, subcutaneous abscess, tendon rupture, transient hyperglycemia. Infection may occur requiring surgical debridement and hospitalization. All Diabetics instructed to check serum blood sugar levels 3 times a day (day of the injection) due to hyperglycemia induced from cortisone injections. If serum blood sugar level > 250 mg%, Cha Hutchinson instructed to call PCP to determine if any additional measures are needed. Time: 1:43 PM  Date of procedure: 1/29/2018  Procedure performed by: Rudolph Jarvis MD  Provider assisted by:  MA  Patient accompanied by: her self  How tolerated by patient: tolerated the procedure well with no complications  Comments: none    The   left wrist - dorsal focal wrist area was prepped and cleansed with: sterile fashion using a following solution:  Betadine.  T    Aspiration was performed and the fluid retrieved had visual appearance of:blood tinged,serosanganuous,discarded,      The pain assessment score PRIOR/AFTER to the injection: 4 /10 // 4 /10 pain severity, mild intensity, dull Pain quality    Post injection instructions were given to [facility]: Remove the band aid in 3 hours, Wash site with clean soap, No further dressings there after. Call Andres Deleon  [phone] if any: pain, redness, drainage, fever, or any concerns/questions that [facility] may have regarding the injection. [facility] was advised on the signs of infection and instructed to go to the ER if it is office after hours. Cha Hutchinson tolerated the injection quite well.     Andres Deleon MD MD  1:43 PM

## 2018-01-29 NOTE — PROGRESS NOTES
AMBULATORY PROGRESS NOTE      Patient: Roxanne Nicholson             MRN: [id number]     SSN: [id number] Body mass index is 29.46 kg/(m^2). YOB: 1955     AGE: 60 y.o. EX: female    PCP: Roe Renee DO    IMPRESSION/DIAGNOSIS AND TREATMENT PLAN     DIAGNOSES  1. Left wrist tendinitis    2. Ganglion cyst of wrist, left        Orders Placed This Encounter    DRAIN/INJECT INTERMEDIATE JOINT/BURSA    REFERRAL TO [address] understands her diagnoses and the proposed plan. I explained to her that an aspiration can be attempted. I did retrieve one mL of serosanguinous fluid but no pus. I explained to her that my impression is extensor tenosynovitis of her wrist with focal dorsal swelling. I do recommend her seeing a hand surgeon for further definitive care. Should her symptoms not improve, she may require a tenosynovectomy, but there are no signs of infection. There is no redness, erythema, drainage, and no malodor, just focal swelling to the dorsal part of her wrist.       Plan:    1) Follow up with Hand Surgery. 2) Fluid Aspiration procedure to the Left Wrist: 1 mL of Lidocaine    RTO - PRN    HPI AND EXAMINATION     Cha Hutchinson IS A 60 y.o. female who presents to my outpatient office for a follow up visit involving hand pain. At last visit, I ordered a MRI of the left wrist and provided a left wrist guard. Patient reports that she continues to experience soreness and discomfort along her left wrist. She notes considerable discomfort since the left hand is her dominant hand. She notes that the providers at the Pain Management clinic she goes to have left and they have no providers until March of this year. She is currently on Oxycodone and Morphine for pain. She is interested in having the fluid removed out of her left wrist at this time. She notes the wrist guard has not provided any relief.     Patient has a h/o Gastric Ulcers. Cardiovascular/Peripheral Vascular: Normal Pulses to each hand and foot  Musculoskeletal:  LOCATION:   left hand     HAND, WRIST, FOREARM:  left    Integumentary: No rashes, skin patches, wounds, blisters, or abrasions                         Warm and normal color. No regions of expressible drainage       Deformities:  Is not present        Swelling: Regions of abnormal swelling : along the dorsal left wrist close to the radial carpal joint. Tenderness:  There is regions of tenderness : along the dorsal left wrist along the site of the cyst.       Motor/Strength/Tone Exam: normal 5/5 strength in all tested muscle groups       Sensory Exam:  no sensory deficits noted       Stability Testing: NONE       ROM: full range with pain        Contractures:  Along fourth digit         Vascular : Normal capillary refill and digital coloration                        No embolic phenomena to the toes or hands                        Edema is not present        Neuro Exam:  Sensation : no focal                                                      Motor function: WNL    CHART REVIEW     Past Medical History:   Diagnosis Date    Annular tear of lumbar disc 11/10/2014    Asthma     Bronchitis    Benign tumor of throat     Bone spur     right ankle    Cervicalgia 11/10/2014    Chronic pain     back    Degeneration of lumbar or lumbosacral intervertebral disc 11/10/2014    Degenerative disc disease     Depression     Displacement of lumbar intervertebral disc without myelopathy 11/10/2014    Elevated white blood cell count     Fibromyalgia     GERD (gastroesophageal reflux disease)     Hypertension     Insomnia     Nausea & vomiting     Pain in joint, multiple sites 11/10/2014    Postlaminectomy syndrome, cervical region 11/10/2014    Sinus infection 10/5/15    Ulcer ([address]      Current Outpatient Prescriptions   Medication Sig    ergocalciferol (VITAMIN D2) 50,000 unit capsule TAKE ONE CAPSULE BY MOUTH EVERY 7 DAYS    morphine CR (MS CONTIN) 60 mg CR tablet Take 1 Tab by mouth every twelve (12) hours for 30 days. Max Daily Amount: 120 mg.    oxyCODONE IR (ROXICODONE) 10 mg tab immediate release tablet Take 1 Tab by mouth three (3) times daily as needed. Max Daily Amount: 30 mg.    baclofen (LIORESAL) 10 mg tablet TAKE ONE TABLET BY MOUTH THREE TIMES A DAY    atorvastatin (LIPITOR) 40 mg tablet TAKE ONE TABLET BY MOUTH ONE TIME DAILY    polyethylene glycol (MIRALAX) 17 gram packet DISSOLVE CONTENTS OF ONE PACKET IN BEVERAGE ONCE A DAY    albuterol (PROAIR HFA) 90 mcg/actuation inhaler Take 2 Puffs by inhalation every four (4) hours as needed for Wheezing.  Dexlansoprazole (DEXILANT) 60 mg CpDB Take 1 Cap by mouth daily.  fluticasone (FLONASE) 50 mcg/actuation nasal spray 2 Sprays by Both Nostrils route as needed.  QUEtiapine (SEROQUEL) 200 mg tablet Take 200 mg by mouth daily.  losartan (COZAAR) 25 mg tablet TAKE ONE TABLET BY MOUTH ONE TIME DAILY    naloxone (NARCAN) 4 mg/actuation nasal spray 1 Clements by IntraNASal route once as needed. Use 1 spray intranasally into 1 nostril. Use a new Narcan nasal spray for subsequent doses and administer into alternating nostrils. May repeat every 2 to 3 minutes as needed.  polyethylene glycol (MIRALAX) 17 gram packet Take 17 g by mouth daily.  carbamide peroxide (DEBROX) 6.5 % otic solution Administer 5 Drops into each ear two (2) times a day.  valACYclovir (VALTREX) 500 mg tablet Take 1 Tab by mouth two (2) times a day.  temazepam (RESTORIL) 15 mg capsule Take 1 Cap by mouth nightly as needed for Sleep. Max Daily Amount: 15 mg. Indications: INSOMNIA    sucralfate (CARAFATE) 1 gram tablet 1 g.    albuterol (PROVENTIL VENTOLIN) 2.5 mg /3 mL (0.083 %) nebulizer solution 2.5 mg as needed.  gabapentin (NEURONTIN) 400 mg capsule 400 mg two (2) times a day.     ipratropium (ATROVENT) 0.02 % nebulizer solution 0.5 mg.    trazodone (DESYREL) 100 mg tablet Take 100 mg by mouth nightly.  sertraline (ZOLOFT) 100 mg tablet Take 100 mg by mouth nightly as needed.  polyethylene glycol (MIRALAX) 17 gram packet DISSOLVE 1 PACKET IN BEVERAGE OF CHOICE AND DRINK BY MOUTH ONCE DAILY    [START ON 2/25/2018] morphine CR (MS CONTIN) 60 mg CR tablet Take 1 Tab by mouth every twelve (12) hours for 30 days. Max Daily Amount: 120 mg.    [START ON 3/26/2018] morphine CR (MS CONTIN) 60 mg CR tablet Take 1 Tab by mouth every twelve (12) hours. Max Daily Amount: 120 mg.    [START ON 2/25/2018] oxyCODONE IR (ROXICODONE) 10 mg tab immediate release tablet Take 1 Tab by mouth three (3) times daily as needed. Max Daily Amount: 30 mg.    [START ON 3/26/2018] oxyCODONE IR (ROXICODONE) 10 mg tab immediate release tablet Take 1 Tab by mouth three (3) times daily as needed. Max Daily Amount: 30 mg. No current facility-administered medications for this visit. Allergies   Allergen Reactions    Aspirin Other (comments)     Stomach bleeding    Macrobid [Nitrofurantoin Monohyd/M-Cryst] Nausea and Vomiting    Nsaids (Non-Steroidal Anti-Inflammatory Drug) Nausea and Vomiting    Opana [Oxymorphone] Other (comments)     Insomnia, weight loss, nightmares    Pcn [Penicillins] Hives     Past Surgical History:   Procedure Laterality Date    COLONOSCOPY N/A 4/13/2017    COLONOSCOPY performed by Yamileth Mcconnell MD at [facility] ENDOSCOPY    HX CATARACT REMOVAL      HX CERVICAL FUSION      HX HYSTERECTOMY      HX ORTHOPAEDIC      ganglion cyst removed, right hand    HX OTHER SURGICAL      cyst left thigh removed    LAP,CHOLECYSTECTOMY N/A 02/27/2017    Dr. Quiana Goldsmith Not on file.      Social History Main Topics    Smoking status: Former Smoker    Smokeless tobacco: Never Used    Alcohol use No      Comment: none in 24 years    Drug use: No      Comment: last smoked in 1993    Sexual activity: Not Currently     Family History Problem Relation Age of Onset    Hypertension Other     Heart Attack Other     Heart Disease Other     Stroke Other     Headache Other     Seizures Other        REVIEW OF SYSTEMS : 1/29/2018  ALL BELOW ARE Negative except : SEE HPI       Constitutional: Negative for fever, chills and weight loss. Neg Weigh Loss  Cardiovascular: Negative for chest pain, claudication and leg swelling. SOB, VILLAGOMEZ   Gastrointestinal: Negative for  pain, N/V/D/C, Blood in stool or urine,dysuria, hematuria,        Incontinence, pelvic pain  Musculoskeletal: see HPI. Neurological: Negative for dizziness and weakness. Negative for headaches,Visual Changes, Confusion, Seizures,   Psychiatric/Behavioral: Negative for depression, memory loss and substance abuse. Extremities:  Negative for  hair changes, rash or skin lesion changes. Hematologic: Negative for Bleeding problems, bruising, pallor or swollen lymph nodes. Peripheral Vascular: No calf pain, vascular vein tenderness to calf pain              No calf throbbing, posterior knee throbbing pain    DIAGNOSTIC IMAGING     PROCEDURE       LIZETH PROCEDURE OUTPATIENT PROCEDURE    PROCEDURE: Aspiration of the left wrist - dorsal focal wrist       I, Gayle Ramirez MD, have reviewed the History, Physical and updated the Allergic reactions for [place] performed immediately prior to start of procedure:       * Patient was identified by name Sherie Mathew and date of birth (1955)  * Agreement on procedure being performed was verified  * Risks and Benefits explained to the patient: see below  * Procedure site verified and marked as necessary  * Patient was positioned for comfort  * Verbal Consent and Written Consent Verified by myself and my office staff. * Complications: None  *  All patient and/or family questions answered     The procedure was explained to the patient and possible adverse reactions were discussed.   These risks included, but not limited to: bleeding, infection, septic arthritis, local skin irritation (skin discoloration, hyperpigmentation, hypopigmentation, thinning of the skin, development of a wound, skin necrosis), synovitis, subcutaneous fat pad atrophy, subcutaneous abscess, tendon rupture, transient hyperglycemia. Infection may occur requiring surgical debridement and hospitalization. All Diabetics instructed to check serum blood sugar levels 3 times a day (day of the injection) due to hyperglycemia induced from cortisone injections. If serum blood sugar level > 250 mg%, Cha Hutchinson instructed to call PCP to determine if any additional measures are needed. Time: 1:43 PM  Date of procedure: 1/29/2018  Procedure performed by: Eliana Hdz MD  Provider assisted by:  MA  Patient accompanied by: her self  How tolerated by patient: tolerated the procedure well with no complications  Comments: none    The   left wrist - dorsal focal wrist area was prepped and cleansed with: sterile fashion using a following solution:  Betadine. T    Aspiration was performed and the fluid retrieved had visual appearance of:blood tinged,serosanganuous,discarded,      The pain assessment score PRIOR/AFTER to the injection: 4 /10 // 4 /10 pain severity, mild intensity, dull Pain quality    Post injection instructions were given to [facility]: Remove the band aid in 3 hours, Wash site with clean soap, No further dressings there after. Call Eliana Hdz  [phone] if any: pain, redness, drainage, fever, or any concerns/questions that [facility] may have regarding the injection. [facility] was advised on the signs of infection and instructed to go to the ER if it is office after hours. hCa Hutchinson tolerated the injection quite well.     Eliana Hdz MD MD  1:43 PM      MRI Results (most recent):    Results from [place] encounter on 01/22/18   MRI WRIST LT WO CONT   Narrative EXAMINATION: MRI left wrist without contrast    INDICATION: Left wrist pain    COMPARISON: None    TECHNIQUE: Multiplanar multiecho MRI sequences of the left wrist performed  without contrast.    FINDINGS:    Bones/joints: Marrow edema in the dorsal distal radius along styloid. Mild  triscaphe joint degenerative changes. No clearly acute osseous findings. TFCC/ligaments: Mild irregularity along the ulnar attachments of the TFCC. Intrasubstance signal within the volar aspect of the scapholunate ligament. Lunotriquetral ligament felt to be intact. Tendons: Mild edema within the second extensor compartment. Extensive fluid  surrounding the fourth extensor compartment tendons. Flexor tendons at the wrist  are intact. Soft tissues: Carpal tunnel and median nerve unremarkable. Guyon's canal and  ulnar nerve are unremarkable. No superficial soft tissue findings. Impression IMPRESSION:    1. Extensive fluid surrounding the fourth extensor compartment tendons,  consistent with tenosynovitis. Perhaps minimal tenosynovitis of the second  extensor compartment as well. -Marrow edema in the dorsal lip of the distal radius, extending towards styloid,  of uncertain etiology. If tenosynovitis is inflammatory or infectious, these  findings may be reactive. Also consider contusion in the appropriate setting. 2. Probable degeneration of the TFCC and scapholunate ligament. Mild triscaphe  degenerative change. Written by Ailyn Menezes, as dictated by Tomy Peter MD. IDr., Tomy Peter MD, confirm that all documentation is accurate.

## 2018-02-06 ENCOUNTER — TELEPHONE (OUTPATIENT)
Dept: ORTHOPEDIC SURGERY | Age: 63
End: 2018-02-06

## 2018-02-06 NOTE — TELEPHONE ENCOUNTER
Patient is asking Dr. Conrado Moser to ref her to see Dr. Shamar Larsen for her hand. for surgery. She prefers not to go to [facility] or [place]. Patient brother had hand surgey by  Dr. Shamar Larsen and she would like to be scheduled to have surgery in . She needs this for transportation issues as her car is not always reliable.  Please call patient back at [phone]

## 2018-02-06 NOTE — TELEPHONE ENCOUNTER
Message printed and given to Rae Caballero in the [facility] to charlotte an appointment with Dr. Magnolia Santos.

## 2018-02-07 ENCOUNTER — TELEPHONE (OUTPATIENT)
Dept: PAIN MANAGEMENT | Age: 63
End: 2018-02-07

## 2018-02-07 ENCOUNTER — OFFICE VISIT (OUTPATIENT)
Dept: ORTHOPEDIC SURGERY | Age: 63
End: 2018-02-07

## 2018-02-07 VITALS
SYSTOLIC BLOOD PRESSURE: 119 MMHG | TEMPERATURE: 98.7 F | HEART RATE: 84 BPM | HEIGHT: 64 IN | BODY MASS INDEX: 29.19 KG/M2 | DIASTOLIC BLOOD PRESSURE: 68 MMHG | WEIGHT: 171 LBS | OXYGEN SATURATION: 98 %

## 2018-02-07 DIAGNOSIS — S90.32XA CONTUSION OF LEFT FOOT, INITIAL ENCOUNTER: ICD-10-CM

## 2018-02-07 DIAGNOSIS — S92.415A CLOSED NONDISPLACED FRACTURE OF PROXIMAL PHALANX OF LEFT GREAT TOE, INITIAL ENCOUNTER: Primary | ICD-10-CM

## 2018-02-07 DIAGNOSIS — M79.675 GREAT TOE PAIN, LEFT: ICD-10-CM

## 2018-02-07 RX ORDER — HYDROCODONE BITARTRATE AND ACETAMINOPHEN 5; 325 MG/1; MG/1
1-2 TABLET ORAL
Qty: 30 TAB | Refills: 0 | Status: SHIPPED | OUTPATIENT
Start: 2018-02-07 | End: 2018-11-15

## 2018-02-07 NOTE — PROCEDURES
DIAGNOSTIC STUDIES:  X-rays, three views of the left foot, AP, lateral, and oblique:     1. Alignment shows some metatarsus adductus. 2. There is mild OA of the left great toe first MTP joint. Otherwise, I see no acute fracture, subluxation, or dislocation. She does have small bones in the proximal phalanx of the left third toe. 3. The midfoot joints have some mild OA of the number three TMT joint articulation. 4. The lateral film shows small calcific bone spurs at the inferior and superior portion of the calcaneus evidence. It looks like a high-arched type foot or pes cavus type foot, although these are nonweightbearing x-rays. 5. Otherwise, the ankle and subtalar joint is well maintained. There is some soft tissue swelling seen to the distal dorsal one-third forefoot in the lateral radiographic x-ray. X RAYS AT [address] [place] [address]    TOE X RAYS 3 VIEWS {Left Great Toe  2/7/2018    Bones and Joints: YES /// GREAT TOE P1 FX AT GREAT TOE IP  (LATERAL)     No subluxations, or No dislocations  Alignment: Normal   Soft Tissues: Normal, calcific densities to soft tissues not present to main blood vessels,not present to non blood vessel locations :   Joints: Arthritis:  mild present   (2/3 TMT)  Mineralization: suggests  minimal Osteopenia    I have personally reviewed the results of the above study. The interpretation of this study is my professional opinion.     Kathy Baird MD  2/7/2018  11:33 AM

## 2018-02-07 NOTE — MR AVS SNAPSHOT
[address], [address] [facility] 
[phone] Patient: Linda Cardenas MRN: [id number] NEE:[birth year] Visit Information Date & Time Provider Department Dept. Phone Encounter #  
 2/7/2018  9:30 AM Nicholas Byers, [address] [facility] [id number] Your Appointments 3/28/2018  1:45 PM  
Follow Up with [address] [facility],  [address] [place] [address] (--) Appt Note: Follow up [place] [address] [place] [address] [facility] [address] [phone] [address] [facility] [address]  
  
    
 4/3/2018 11:20 AM  
Follow Up with BETO Dorman [address] for Pain Management (RICHAR SCHEDULING) Appt Note: 3 mon f/u per rc. ..to  
 [address] [address]  
[phone] [facility] [address] [id number] Upcoming Health Maintenance Date Due DTaP/Tdap/Td series (1 - Tdap) 3/30/1975 PAP AKA CERVICAL CYTOLOGY 6/21/2019 BREAST CANCER SCRN MAMMOGRAM 1/12/2020 COLONOSCOPY 4/13/2027 Allergies as of 2/7/2018  Review Complete On: 2/7/2018 By: Maksim Lewis LPN Severity Noted Reaction Type Reactions Aspirin    Other (comments) Stomach bleeding Macrobid [Nitrofurantoin Monohyd/m-cryst]  11/25/2015    Nausea and Vomiting Nsaids (Non-steroidal Anti-inflammatory Drug)    Nausea and Vomiting Opana [Oxymorphone]  11/29/2016    Other (comments) Insomnia, weight loss, nightmares Pcn [Penicillins]    Hives Current Immunizations  Never Reviewed Name Date Pneumococcal Polysaccharide (PPSV-23) 3/7/2014 Not reviewed this visit You Were Diagnosed With   
  
 Codes Comments Great toe pain, left    -  Primary ICD-10-CM: P13.221 ICD-9-CM: 729.5 Contusion of left foot, initial encounter     ICD-10-CM: R18.80JE ICD-9-CM: 924.20 Vitals BP Pulse Temp Height(growth percentile) Weight(growth percentile) SpO2  
 119/68 84 98.7 °F (37.1 °C) (Oral) 5' 4\" (1.626 m) 171 lb (77.6 kg) 98% BMI OB Status Smoking Status 29.35 kg/m2 Hysterectomy Former Smoker BMI and BSA Data Body Mass Index Body Surface Area  
 29.35 kg/m 2 1.87 m 2 Preferred Pharmacy Pharmacy Name Phone [id number] - [address], [address] [phone] Your Updated Medication List  
  
   
This list is accurate as of: 2/7/18 11:35 AM.  Always use your most recent med list.  
  
  
  
  
 * albuterol 2.5 mg /3 mL (0.083 %) nebulizer solution Commonly known as:  PROVENTIL VENTOLIN  
2.5 mg as needed. * albuterol 90 mcg/actuation inhaler Commonly known as:  PROAIR HFA Take 2 Puffs by inhalation every four (4) hours as needed for Wheezing. atorvastatin 40 mg tablet Commonly known as:  LIPITOR  
TAKE ONE TABLET BY MOUTH ONE TIME DAILY  
  
 baclofen 10 mg tablet Commonly known as:  LIORESAL  
TAKE ONE TABLET BY MOUTH THREE TIMES A DAY  
  
 carbamide peroxide 6.5 % otic solution Commonly known as:  Magdalene Shelly Administer 5 Drops into each ear two (2) times a day. Dexlansoprazole 60 mg Cpdb Commonly known as:  DEXILANT Take 1 Cap by mouth daily. ergocalciferol 50,000 unit capsule Commonly known as:  VITAMIN D2  
TAKE ONE CAPSULE BY MOUTH EVERY 7 DAYS  
  
 fluticasone 50 mcg/actuation nasal spray Commonly known as:  [place] 2 Sprays by Both Nostrils route as needed. gabapentin 400 mg capsule Commonly known as:  NEURONTIN  
400 mg two (2) times a day. ipratropium 0.02 % Soln Commonly known as:  ATROVENT  
0.5 mg.  
  
 losartan 25 mg tablet Commonly known as:  COZAAR  
TAKE ONE TABLET BY MOUTH ONE TIME DAILY  
  
 * MIRALAX 17 gram packet Generic drug:  polyethylene glycol Take 17 g by mouth daily. * polyethylene glycol 17 gram packet Commonly known as:  Lucho Gage DISSOLVE CONTENTS OF ONE PACKET IN BEVERAGE ONCE A DAY * polyethylene glycol 17 gram packet Commonly known as:  Lucho Gage DISSOLVE 1 PACKET IN BEVERAGE OF CHOICE AND DRINK BY MOUTH ONCE DAILY * morphine CR 60 mg CR tablet Commonly known as:  MS CONTIN Take 1 Tab by mouth every twelve (12) hours for 30 days. Max Daily Amount: 120 mg.  
  
 * morphine CR 60 mg CR tablet Commonly known as:  MS CONTIN Take 1 Tab by mouth every twelve (12) hours for 30 days. Max Daily Amount: 120 mg.  
Start taking on:  2/25/2018 * morphine CR 60 mg CR tablet Commonly known as:  MS CONTIN Take 1 Tab by mouth every twelve (12) hours. Max Daily Amount: 120 mg.  
Start taking on:  3/26/2018  
  
 naloxone 4 mg/actuation nasal spray Commonly known as:  NARCAN  
1 [place] by IntraNASal route once as needed. Use 1 spray intranasally into 1 nostril. Use a new Narcan nasal spray for subsequent doses and administer into alternating nostrils. May repeat every 2 to 3 minutes as needed. * oxyCODONE IR 10 mg Tab immediate release tablet Commonly known as:  Cliffton Boatman Take 1 Tab by mouth three (3) times daily as needed. Max Daily Amount: 30 mg.  
  
 * oxyCODONE IR 10 mg Tab immediate release tablet Commonly known as:  Cliffton Boatman Take 1 Tab by mouth three (3) times daily as needed. Max Daily Amount: 30 mg. Start taking on:  2/25/2018 * oxyCODONE IR 10 mg Tab immediate release tablet Commonly known as:  Cliffton Boatman Take 1 Tab by mouth three (3) times daily as needed. Max Daily Amount: 30 mg. Start taking on:  3/26/2018 QUEtiapine 200 mg tablet Commonly known as:  SEROquel Take 200 mg by mouth daily. sertraline 100 mg tablet Commonly known as:  ZOLOFT Take 100 mg by mouth nightly as needed. sucralfate 1 gram tablet Commonly known as:  CARAFATE  
1 g.  
  
 temazepam 15 mg capsule Commonly known as:  RESTORIL  
 Take 1 Cap by mouth nightly as needed for Sleep. Max Daily Amount: 15 mg. Indications: INSOMNIA  
  
 traZODone 100 mg tablet Commonly known as:  Nathen Mediate Take 100 mg by mouth nightly. valACYclovir 500 mg tablet Commonly known as:  VALTREX Take 1 Tab by mouth two (2) times a day. * Notice: This list has 11 medication(s) that are the same as other medications prescribed for you. Read the directions carefully, and ask your doctor or other care provider to review them with you. We Performed the Following AMB POC XRAY, FOOT; COMPLETE, 3+ VIEW [82684 CPT(R)] Please provide this summary of care documentation to your next provider. Your primary care clinician is listed as Martha Corea. If you have any questions after today's visit, please call [phone].

## 2018-02-07 NOTE — TELEPHONE ENCOUNTER
Patient called the office today stating that she went to the [address] [facility] today and was told she has a broken toe, and the provider prescribed her Norco 5-325 mg, and wanted to know if she could take it. I spoke to the provider, Rain Obrien PA-C, and he stated she can take the [place] that the Orthopedist prescribed,but would need to stop taking the Roxicodone that we are prescribing her while taking it. I attempted to call the patient back, but had to leave a voicemail for her to call the office back. Patient called the office back and told her what the provider said. She stated she was hesitant to try the [address] and wanted to know if she could go back to taking the Roxicodone if she didn't like the [address], and I told her that it was alright to return to taking the Roxicodone as prescribed as long as she is not taking the Norco at the same time. Patient verbalized understanding.

## 2018-02-08 ENCOUNTER — TELEPHONE (OUTPATIENT)
Dept: ORTHOPEDIC SURGERY | Age: 63
End: 2018-02-08

## 2018-02-08 NOTE — TELEPHONE ENCOUNTER
Advised patient that since she injured her toe again after she left the appointment with Dr. Ruslan Calvin, I can not advise her as to what to do. Patient states she now has a different bruise and a blister on her toe. I advised her to contact her PCP, [facility] since she feels like she may need an antibiotic. Patient states she will just make an appointment with Dr. Ruslan Calvin for next week. Advised patient to go to the ED if she notices a fever, chills, rash and/or SOB.

## 2018-02-08 NOTE — TELEPHONE ENCOUNTER
PATIENT CALLED FOR DR. PINEDA. PATIENT SAID SHE HIT HER TOE AGAIN THE SAME DAY SHE SAW HIM 02/07/18. PATIENT SAID THAT NIGHT THEIR WAS A SHOE ON THE FLOOR AND SHE HIT THE SHOE. PATIENT SAID SHE JUST NOTICED THIS MORNING THAT SHE HAS A BLISTER ON TOP OF THE TOE AND AT THE BOTTOM OF HER FOOT IT IS BRUISED AS WELL. PATIENT WOULD LIKE TO KNOW WHAT DR. PINEDA CAN ADVISE. [facility] [phone]. PATIENT TEL. [phone] AND CELL # [phone].

## 2018-02-13 ENCOUNTER — OFFICE VISIT (OUTPATIENT)
Dept: ORTHOPEDIC SURGERY | Age: 63
End: 2018-02-13

## 2018-02-13 VITALS
TEMPERATURE: 98.7 F | DIASTOLIC BLOOD PRESSURE: 63 MMHG | BODY MASS INDEX: 29.19 KG/M2 | HEIGHT: 64 IN | SYSTOLIC BLOOD PRESSURE: 107 MMHG | WEIGHT: 171 LBS | OXYGEN SATURATION: 99 % | HEART RATE: 78 BPM

## 2018-02-13 DIAGNOSIS — S92.425D CLOSED NONDISPLACED FRACTURE OF DISTAL PHALANX OF LEFT GREAT TOE WITH ROUTINE HEALING, SUBSEQUENT ENCOUNTER: Primary | ICD-10-CM

## 2018-02-13 NOTE — MR AVS SNAPSHOT
[address], [address] 200 [facility] 
[phone] Patient: Phuc Kenyon MRN: [id number] DALIA:[id number] Visit Information Date & Time Provider Department Dept. Phone Encounter #  
 2/13/2018 11:10 AM Tonio Fabian MD [facility] [phone] [id number] Follow-up Instructions Return in about 4 weeks (around 3/13/2018). Your Appointments 2/15/2018  1:30 PM  
New Patient with Vasu Hutton MD  
[address] [facility], [address] and [facility]) Appt Note: lt wrist pain, to discuss sx, adv HS office, to bring ID & ins card [address] [facility], [address]  
[phone]  
  
   
 [address] [facility], [address] [address]  
  
    
 3/2/2018  9:30 AM  
New Patient with Samira Argueta MD  
[facility] ([facility] Appt Note: np  gyn issues bring pic id, ins crd arrive 15 mins early  ch 2/8/18  
 [address] [facility] [address], [facility] [address]  
[phone]  
  
   
 [facility] [facility], 9 [facility]  
  
    
 3/28/2018  1:45 PM  
Follow Up with [address] [address] (--) Appt Note: Follow up [place] [address] [facility] [id number]  
[phone]  
  
   
 [address] [place] [id number]  
  
    
 4/3/2018 11:20 AM  
Follow Up with BETO Thorpe [facility] (RICHAR SCHEDULING) Appt Note: 3 mon f/u per rc. ..to  
 30 [facility] [address] [phone]  [phone] Upcoming Health Maintenance Date Due DTaP/Tdap/Td series (1 - Tdap) 3/30/1975 PAP AKA CERVICAL CYTOLOGY 6/21/2019 BREAST CANCER SCRN MAMMOGRAM 1/12/2020 COLONOSCOPY 4/13/2027 Allergies as of 2/13/2018  Review Complete On: 2/13/2018 By: Tonio Fabian MD  
 Severity Noted Reaction Type Reactions Aspirin    Other (comments) Stomach bleeding Macrobid [Nitrofurantoin Monohyd/m-cryst]  11/25/2015    Nausea and Vomiting Nsaids (Non-steroidal Anti-inflammatory Drug)    Nausea and Vomiting Opana [Oxymorphone]  11/29/2016    Other (comments) Insomnia, weight loss, nightmares Pcn [Penicillins]    Hives Current Immunizations  Never Reviewed Name Date Pneumococcal Polysaccharide (PPSV-23) 3/7/2014 Not reviewed this visit You Were Diagnosed With   
  
 Codes Comments Closed nondisplaced fracture of distal phalanx of left great toe with routine healing, subsequent encounter    -  Primary ICD-10-CM: F81.334R ICD-9-CM: V54.19 Vitals BP Pulse Temp Height(growth percentile) Weight(growth percentile) SpO2  
 107/63 78 98.7 °F (37.1 °C) (Oral) 5' 4\" (1.626 m) 171 lb (77.6 kg) 99% BMI OB Status Smoking Status 29.35 kg/m2 Hysterectomy Former Smoker BMI and BSA Data Body Mass Index Body Surface Area  
 29.35 kg/m 2 1.87 m 2 Preferred Pharmacy Pharmacy Name Phone [id number] - [address], [address] [phone] Your Updated Medication List  
  
   
This list is accurate as of: 2/13/18 11:20 AM.  Always use your most recent med list.  
  
  
  
  
 * albuterol 2.5 mg /3 mL (0.083 %) nebulizer solution Commonly known as:  PROVENTIL VENTOLIN  
2.5 mg as needed. * albuterol 90 mcg/actuation inhaler Commonly known as:  PROAIR HFA Take 2 Puffs by inhalation every four (4) hours as needed for Wheezing. atorvastatin 40 mg tablet Commonly known as:  LIPITOR  
TAKE ONE TABLET BY MOUTH ONE TIME DAILY  
  
 baclofen 10 mg tablet Commonly known as:  LIORESAL  
TAKE ONE TABLET BY MOUTH THREE TIMES A DAY  
  
 carbamide peroxide 6.5 % otic solution Commonly known as:  Magdadm Bravo Administer 5 Drops into each ear two (2) times a day. Dexlansoprazole 60 mg Cpdb Commonly known as:  DEXILANT Take 1 Cap by mouth daily. ergocalciferol 50,000 unit capsule Commonly known as:  VITAMIN D2  
TAKE ONE CAPSULE BY MOUTH EVERY 7 DAYS  
  
 fluticasone 50 mcg/actuation nasal spray Commonly known as:  Delmon Pickup 2 Sprays by Both Nostrils route as needed. gabapentin 400 mg capsule Commonly known as:  NEURONTIN  
400 mg two (2) times a day. HYDROcodone-acetaminophen 5-325 mg per tablet Commonly known as:  Mortimer [place] Take 1-2 Tabs by mouth two (2) times daily as needed for Pain. Max Daily Amount: 4 Tabs. ipratropium 0.02 % Soln Commonly known as:  ATROVENT  
0.5 mg.  
  
 losartan 25 mg tablet Commonly known as:  COZAAR  
TAKE ONE TABLET BY MOUTH ONE TIME DAILY  
  
 * MIRALAX 17 gram packet Generic drug:  polyethylene glycol Take 17 g by mouth daily. * polyethylene glycol 17 gram packet Commonly known as:  Madelyn Furlong DISSOLVE CONTENTS OF ONE PACKET IN BEVERAGE ONCE A DAY * polyethylene glycol 17 gram packet Commonly known as:  Madelyn Furlong DISSOLVE 1 PACKET IN BEVERAGE OF CHOICE AND DRINK BY MOUTH ONCE DAILY * morphine CR 60 mg CR tablet Commonly known as:  MS CONTIN Take 1 Tab by mouth every twelve (12) hours for 30 days. Max Daily Amount: 120 mg.  
  
 * morphine CR 60 mg CR tablet Commonly known as:  MS CONTIN Take 1 Tab by mouth every twelve (12) hours for 30 days. Max Daily Amount: 120 mg.  
Start taking on:  2/25/2018 * morphine CR 60 mg CR tablet Commonly known as:  MS CONTIN Take 1 Tab by mouth every twelve (12) hours. Max Daily Amount: 120 mg.  
Start taking on:  3/26/2018  
  
 naloxone 4 mg/actuation nasal spray Commonly known as:  NARCAN  
1 [place] by IntraNASal route once as needed. Use 1 spray intranasally into 1 nostril. Use a new Narcan nasal spray for subsequent doses and administer into alternating nostrils. May repeat every 2 to 3 minutes as needed. * oxyCODONE IR 10 mg Tab immediate release tablet Commonly known as:  Charla Snellen Take 1 Tab by mouth three (3) times daily as needed. Max Daily Amount: 30 mg.  
  
 * oxyCODONE IR 10 mg Tab immediate release tablet Commonly known as:  Charla Snellen Take 1 Tab by mouth three (3) times daily as needed. Max Daily Amount: 30 mg. Start taking on:  2/25/2018 * oxyCODONE IR 10 mg Tab immediate release tablet Commonly known as:  Charla Snellen Take 1 Tab by mouth three (3) times daily as needed. Max Daily Amount: 30 mg. Start taking on:  3/26/2018 QUEtiapine 200 mg tablet Commonly known as:  SEROquel Take 200 mg by mouth daily. sertraline 100 mg tablet Commonly known as:  ZOLOFT Take 100 mg by mouth nightly as needed. sucralfate 1 gram tablet Commonly known as:  CARAFATE  
1 g.  
  
 temazepam 15 mg capsule Commonly known as:  RESTORIL Take 1 Cap by mouth nightly as needed for Sleep. Max Daily Amount: 15 mg. Indications: INSOMNIA  
  
 traZODone 100 mg tablet Commonly known as:  Lunette Grate Take 100 mg by mouth nightly. valACYclovir 500 mg tablet Commonly known as:  VALTREX Take 1 Tab by mouth two (2) times a day. * Notice: This list has 11 medication(s) that are the same as other medications prescribed for you. Read the directions carefully, and ask your doctor or other care provider to review them with you. We Performed the Following AMB POC XRAY, FOOT; COMPLETE, 3+ VIEW [45840 CPT(R)] AMB SUPPLY ORDER [[id number] Custom] Comments:  
 Short Left CAM walker boot Follow-up Instructions Return in about 4 weeks (around 3/13/2018). Patient Instructions Please follow up in 4 weeks. You are advised to contact us if your condition worsens. Broken Foot: Care Instructions Your Care Instructions A broken foot, or foot fracture, is a break in one or more of the bones in your foot. It may happen because of a sports injury, a fall, or other accident. A compound, or open, fracture occurs when a bone breaks through the skin. A break that does not poke through the skin is a closed fracture. Your treatment depends on the location and type of break in your foot. You may need a splint, a cast, or an orthopedic shoe. Certain kinds of injuries may need surgery at some time. Whatever your treatment, you can ease symptoms and help your foot heal with care at home. You may need 6 to 8 weeks or more to fully heal. 
You heal best when you take good care of yourself. Eat a variety of healthy foods, and don't smoke. Follow-up care is a key part of your treatment and safety. Be sure to make and go to all appointments, and call your doctor if you are having problems. It's also a good idea to know your test results and keep a list of the medicines you take. How can you care for yourself at home? · Be safe with medicines. Take pain medicines exactly as directed. ¨ If the doctor gave you a prescription medicine for pain, take it as prescribed. ¨ If you are not taking a prescription pain medicine, ask your doctor if you can take an over-the-counter medicine. · Leave the splint on until your follow-up appointment. Do not put any weight on the injured foot. If you were given crutches, use them as directed. · Put ice or a cold pack on your foot for 10 to 20 minutes at a time. Try to do this every 1 to 2 hours for the next 3 days (when you are awake) or until the swelling goes down. Put a thin cloth between the ice and your skin. · Prop up the sore foot on a pillow anytime you sit or lie down during the next 3 days. Try to keep it above the level of your heart. This will help reduce swelling. · Follow the cast care instructions your doctor gives you. If you have a splint, do not take it off unless your doctor tells you to. Cast and splint care · If you have a removable splint, ask your doctor if it is okay to remove it to bathe. Your doctor may want you to keep it on as much as possible. · Keep your plaster splint covered by taping a sheet of plastic around it when you bathe. Water under the plaster can cause your skin to itch and hurt. · Never cut your splint. When should you call for help? Call 911 anytime you think you may need emergency care. For example, call if: 
? · You have chest pain, are short of breath, or you cough up blood. ?Call your doctor now or seek immediate medical care if: 
? · You have new or worse pain. ? · Your foot is cool or pale or changes color. ? · You have tingling, weakness, or numbness in your toes. ? · Your cast or splint feels too tight. ? · You have signs of a blood clot in your leg (called a deep vein thrombosis), such as: 
¨ Pain in your calf, back of the knee, thigh, or groin. ¨ Redness or swelling in your leg. ? Watch closely for changes in your health, and be sure to contact your doctor if: 
? · You have a problem with your splint or cast.  
? · You do not get better as expected. Where can you learn more? Go to http://diana-lashonda.info/. Enter U630 in the search box to learn more about \"Broken Foot: Care Instructions. \" Current as of: March 21, 2017 Content Version: 11.4 © 7728-7709 [employer]. Care instructions adapted under license by [employer] (which disclaims liability or warranty for this information). If you have questions about a medical condition or this instruction, always ask your healthcare professional. [address] any warranty or liability for your use of this information. Please provide this summary of care documentation to your next provider. Your primary care clinician is listed as Geeta Carlton. If you have any questions after today's visit, please call [phone].

## 2018-02-13 NOTE — PROCEDURES
TOE/FOREFOOT X RAYS 3 VIEWS Left   2/13/2018    TOE X RAYS 3 VIEWS {Left Great Toe    Bones: Fracture is present lateral IP region distal phalanx: Great Toe. no subluxation   No focal osteolytic or osteoblastic process. Alignment: Toe/s: Neutral   Alignment of forefoot:  WNL. Joint: mild OA changes    Soft Tissues: mild  swelling,    No abnormal calcific densities to soft tissues. No radiopaque foreign body or abnormal lucency noted within soft tissues. Mineralization: suggests minimal Osteopenia    I have personally reviewed the results of the above study. The interpretation of this study is my professional opinion.     Suzanne Blue MD  2/13/2018  11:12 AM

## 2018-02-13 NOTE — PROGRESS NOTES
AMBULATORY PROGRESS NOTE      Patient: Roxanne Nicholson             MRN: [id number]     SSN: [id number] Body mass index is 29.35 kg/(m^2). YOB: 1955     AGE: 60 y.o. EX: female    PCP: Roe Renee DO    IMPRESSION/DIAGNOSIS AND TREATMENT PLAN     DIAGNOSES  1. Closed nondisplaced fracture of distal phalanx of left great toe with routine healing, subsequent encounter        Orders Placed This Encounter    AMB SUPPLY ORDER    [77716] 1331 Jason Malagon understands her diagnoses and the proposed plan. Plan:    1) DME Order: Left Short CAM walker boot. RTO - 4 weeks    HPI AND EXAMINATION     Cha Hutchinson IS A 60 y.o. female who presents to my outpatient office a follow up visit involving a closed nondisplaced fracture of the proximal phalanx of left great toe, contusion of the left foot, and left great toe pain. At last visit, I recommended to use a hard-sole shoe, and prescribed Norco 5 mg. Patient states she continues to experience pain and swelling along her left great toe. At this time, we will provide a short CAM walker boot as she has been unable to ambulate comfortably. Otherwise, patient notes she is doing well and would like to just manage her left great toe pain. Patient has a h/o Gastric Ulcers.      Left ANKLE and FOOT        Gait: slow  Tenderness: severe tenderness to left great toe. .   Cutaneous: No rashes, skin patches, wounds, or abrasions to the lower legs                                Warm and Normal color. No regions of expressible drainage. Medial Border of Tibia Region: absent                                Skin color, texture, turgor normal. Normal.                                Moderate swelling to the left great toe. Ecchymosis to the left great toe IP joint. Joint Motion: ROM Ankle:Normal , Hindfoot: (ST,TN,CC Normal}, Forefoot toes:Normal  Neurologic Exam: Neuro:  Motor: normal 5/5 strength in all tested muscle groups and Sensory : no sensory deficits noted. Contractures: Gastrocnemius or Achilles Contractures absent  Joint / Tendon Stability: No Ankle or Subtalar instability or joint laxity. No peroneal sublux ability or dislocation  Alignment: Slight Pes Planus  Vascular: Normal Pulses/ NL Capillary refill, No evidence of DVT seen on physical exam.                        No calf swelling, no tenderness to calf muscles. Lymphatic:  No Evidence of Lymphedema. CHART REVIEW     Past Medical History:   Diagnosis Date    Annular tear of lumbar disc 11/10/2014    Asthma     Bronchitis    Benign tumor of throat     Bone spur     right ankle    Cervicalgia 11/10/2014    Chronic pain     back    Degeneration of lumbar or lumbosacral intervertebral disc 11/10/2014    Degenerative disc disease     Depression     Displacement of lumbar intervertebral disc without myelopathy 11/10/2014    Elevated white blood cell count     Fibromyalgia     GERD (gastroesophageal reflux disease)     Hypertension     Insomnia     Nausea & vomiting     Pain in joint, multiple sites 11/10/2014    Postlaminectomy syndrome, cervical region 11/10/2014    Sinus infection 10/5/15    Ulcer ([facility] [address]      Current Outpatient Prescriptions   Medication Sig    HYDROcodone-acetaminophen (NORCO) 5-325 mg per tablet Take 1-2 Tabs by mouth two (2) times daily as needed for Pain. Max Daily Amount: 4 Tabs.  polyethylene glycol (MIRALAX) 17 gram packet DISSOLVE 1 PACKET IN BEVERAGE OF CHOICE AND DRINK BY MOUTH ONCE DAILY    ergocalciferol (VITAMIN D2) 50,000 unit capsule TAKE ONE CAPSULE BY MOUTH EVERY 7 DAYS    morphine CR (MS CONTIN) 60 mg CR tablet Take 1 Tab by mouth every twelve (12) hours for 30 days.  Max Daily Amount: 120 mg.    [START ON 2/25/2018] morphine CR (MS CONTIN) 60 mg CR tablet Take 1 Tab by mouth every twelve (12) hours for 30 days. Max Daily Amount: 120 mg.    [START ON 3/26/2018] morphine CR (MS CONTIN) 60 mg CR tablet Take 1 Tab by mouth every twelve (12) hours. Max Daily Amount: 120 mg.    oxyCODONE IR (ROXICODONE) 10 mg tab immediate release tablet Take 1 Tab by mouth three (3) times daily as needed. Max Daily Amount: 30 mg.    [START ON 2/25/2018] oxyCODONE IR (ROXICODONE) 10 mg tab immediate release tablet Take 1 Tab by mouth three (3) times daily as needed. Max Daily Amount: 30 mg.    [START ON 3/26/2018] oxyCODONE IR (ROXICODONE) 10 mg tab immediate release tablet Take 1 Tab by mouth three (3) times daily as needed. Max Daily Amount: 30 mg.    atorvastatin (LIPITOR) 40 mg tablet TAKE ONE TABLET BY MOUTH ONE TIME DAILY    polyethylene glycol (MIRALAX) 17 gram packet DISSOLVE CONTENTS OF ONE PACKET IN BEVERAGE ONCE A DAY    albuterol (PROAIR HFA) 90 mcg/actuation inhaler Take 2 Puffs by inhalation every four (4) hours as needed for Wheezing.  Dexlansoprazole (DEXILANT) 60 mg CpDB Take 1 Cap by mouth daily.  fluticasone (FLONASE) 50 mcg/actuation nasal spray 2 Sprays by Both Nostrils route as needed.  QUEtiapine (SEROQUEL) 200 mg tablet Take 200 mg by mouth daily.  losartan (COZAAR) 25 mg tablet TAKE ONE TABLET BY MOUTH ONE TIME DAILY    naloxone (NARCAN) 4 mg/actuation nasal spray 1 [place] by IntraNASal route once as needed. Use 1 spray intranasally into 1 nostril. Use a new Narcan nasal spray for subsequent doses and administer into alternating nostrils. May repeat every 2 to 3 minutes as needed.  polyethylene glycol (MIRALAX) 17 gram packet Take 17 g by mouth daily.  valACYclovir (VALTREX) 500 mg tablet Take 1 Tab by mouth two (2) times a day.  temazepam (RESTORIL) 15 mg capsule Take 1 Cap by mouth nightly as needed for Sleep. Max Daily Amount: 15 mg.  Indications: INSOMNIA    sucralfate (CARAFATE) 1 gram tablet 1 g.    albuterol (PROVENTIL VENTOLIN) 2.5 mg /3 mL (0.083 %) nebulizer solution 2.5 mg as needed.  gabapentin (NEURONTIN) 400 mg capsule 400 mg two (2) times a day.  ipratropium (ATROVENT) 0.02 % nebulizer solution 0.5 mg.    trazodone (DESYREL) 100 mg tablet Take 100 mg by mouth nightly.  sertraline (ZOLOFT) 100 mg tablet Take 100 mg by mouth nightly as needed.  baclofen (LIORESAL) 10 mg tablet TAKE ONE TABLET BY MOUTH THREE TIMES A DAY    carbamide peroxide (DEBROX) 6.5 % otic solution Administer 5 Drops into each ear two (2) times a day. No current facility-administered medications for this visit. Allergies   Allergen Reactions    Aspirin Other (comments)     Stomach bleeding    Macrobid [Nitrofurantoin Monohyd/M-Cryst] Nausea and Vomiting    Nsaids (Non-Steroidal Anti-Inflammatory Drug) Nausea and Vomiting    Opana [Oxymorphone] Other (comments)     Insomnia, weight loss, nightmares    Pcn [Penicillins] Hives     Past Surgical History:   Procedure Laterality Date    COLONOSCOPY N/A 4/13/2017    COLONOSCOPY performed by Diann Veliz MD at [facility]    HX CATARACT REMOVAL      HX CERVICAL FUSION      HX HYSTERECTOMY      HX ORTHOPAEDIC      ganglion cyst removed, right hand    HX OTHER SURGICAL      cyst left thigh removed    LAP,CHOLECYSTECTOMY N/A 02/27/2017    Dr. Schuyler David Not on file. Social History Main Topics    Smoking status: Former Smoker    Smokeless tobacco: Never Used    Alcohol use No      Comment: none in 24 years    Drug use: No      Comment: last smoked in 1993    Sexual activity: Not Currently     Family History   Problem Relation Age of Onset    Hypertension Other     Heart Attack Other     Heart Disease Other     Stroke Other     Headache Other     Seizures Other        REVIEW OF SYSTEMS : 2/13/2018  ALL BELOW ARE Negative except : SEE HPI       Constitutional: Negative for fever, chills and weight loss.  Neg Weigh Loss  Cardiovascular: Negative for chest pain, claudication and leg swelling. SOB, VILLAGOMEZ   Gastrointestinal: Negative for  pain, N/V/D/C, Blood in stool or urine,dysuria, hematuria,        Incontinence, pelvic pain  Musculoskeletal: see HPI. Neurological: Negative for dizziness and weakness. Negative for headaches,Visual Changes, Confusion, Seizures,   Psychiatric/Behavioral: Negative for depression, memory loss and substance abuse. Extremities:  Negative for  hair changes, rash or skin lesion changes. Hematologic: Negative for Bleeding problems, bruising, pallor or swollen lymph nodes. Peripheral Vascular: No calf pain, vascular vein tenderness to calf pain              No calf throbbing, posterior knee throbbing pain    DIAGNOSTIC IMAGING     TOE/FOREFOOT X RAYS 3 VIEWS Left   2/13/2018    TOE X RAYS 3 VIEWS { Left Great Toe    Bones: Fracture is present lateral IP region distal phalanx: Great Toe. no subluxation   No focal osteolytic or osteoblastic process. Alignment: Toe/s: Neutral   Alignment of forefoot:  WNL. Joint: mild OA changes    Soft Tissues: mild  swelling,    No abnormal calcific densities to soft tissues. No radiopaque foreign body or abnormal lucency noted within soft tissues. Mineralization: suggests minimal Osteopenia    I have personally reviewed the results of the above study. The interpretation of this study is my professional opinion. Rudolph Jarvis MD  2/13/2018  11:12 AM         Written by Keith Acosta, as dictated by Lynden Eisenmenger, MD. I, , Lynden Eisenmenger, MD, confirm that all documentation is accurate.

## 2018-02-13 NOTE — PATIENT INSTRUCTIONS
Please follow up in 4 weeks. You are advised to contact us if your condition worsens. Broken Foot: Care Instructions  Your Care Instructions    A broken foot, or foot fracture, is a break in one or more of the bones in your foot. It may happen because of a sports injury, a fall, or other accident. A compound, or open, fracture occurs when a bone breaks through the skin. A break that does not poke through the skin is a closed fracture. Your treatment depends on the location and type of break in your foot. You may need a splint, a cast, or an orthopedic shoe. Certain kinds of injuries may need surgery at some time. Whatever your treatment, you can ease symptoms and help your foot heal with care at home. You may need 6 to 8 weeks or more to fully heal.  You heal best when you take good care of yourself. Eat a variety of healthy foods, and don't smoke. Follow-up care is a key part of your treatment and safety. Be sure to make and go to all appointments, and call your doctor if you are having problems. It's also a good idea to know your test results and keep a list of the medicines you take. How can you care for yourself at home? · Be safe with medicines. Take pain medicines exactly as directed. ¨ If the doctor gave you a prescription medicine for pain, take it as prescribed. ¨ If you are not taking a prescription pain medicine, ask your doctor if you can take an over-the-counter medicine. · Leave the splint on until your follow-up appointment. Do not put any weight on the injured foot. If you were given crutches, use them as directed. · Put ice or a cold pack on your foot for 10 to 20 minutes at a time. Try to do this every 1 to 2 hours for the next 3 days (when you are awake) or until the swelling goes down. Put a thin cloth between the ice and your skin. · Prop up the sore foot on a pillow anytime you sit or lie down during the next 3 days. Try to keep it above the level of your heart.  This will help reduce swelling. · Follow the cast care instructions your doctor gives you. If you have a splint, do not take it off unless your doctor tells you to. Cast and splint care  · If you have a removable splint, ask your doctor if it is okay to remove it to bathe. Your doctor may want you to keep it on as much as possible. · Keep your plaster splint covered by taping a sheet of plastic around it when you bathe. Water under the plaster can cause your skin to itch and hurt. · Never cut your splint. When should you call for help? Call 911 anytime you think you may need emergency care. For example, call if:  ? · You have chest pain, are short of breath, or you cough up blood. ?Call your doctor now or seek immediate medical care if:  ? · You have new or worse pain. ? · Your foot is cool or pale or changes color. ? · You have tingling, weakness, or numbness in your toes. ? · Your cast or splint feels too tight. ? · You have signs of a blood clot in your leg (called a deep vein thrombosis), such as:  ¨ Pain in your calf, back of the knee, thigh, or groin. ¨ Redness or swelling in your leg. ? Watch closely for changes in your health, and be sure to contact your doctor if:  ? · You have a problem with your splint or cast.   ? · You do not get better as expected. Where can you learn more? Go to http://diana-lashonda.info/. Enter I234 in the search box to learn more about \"Broken Foot: Care Instructions. \"  Current as of: March 21, 2017  Content Version: 11.4  © 0061-6616 [employer]. Care instructions adapted under license by [employer] (which disclaims liability or warranty for this information). If you have questions about a medical condition or this instruction, always ask your healthcare professional. [address] any warranty or liability for your use of this information.

## 2018-02-15 ENCOUNTER — OFFICE VISIT (OUTPATIENT)
Dept: ORTHOPEDIC SURGERY | Facility: CLINIC | Age: 63
End: 2018-02-15

## 2018-02-15 VITALS
BODY MASS INDEX: 29.57 KG/M2 | WEIGHT: 173.2 LBS | SYSTOLIC BLOOD PRESSURE: 97 MMHG | HEART RATE: 86 BPM | DIASTOLIC BLOOD PRESSURE: 68 MMHG | RESPIRATION RATE: 18 BRPM | HEIGHT: 64 IN | OXYGEN SATURATION: 97 % | TEMPERATURE: 98.2 F

## 2018-02-15 DIAGNOSIS — M65.832 EXTENSOR TENOSYNOVITIS OF LEFT WRIST: Primary | ICD-10-CM

## 2018-02-15 DIAGNOSIS — M25.532 LEFT WRIST PAIN: ICD-10-CM

## 2018-02-15 DIAGNOSIS — M79.7 FIBROMYALGIA: ICD-10-CM

## 2018-02-15 RX ORDER — BETAMETHASONE SODIUM PHOSPHATE AND BETAMETHASONE ACETATE 3; 3 MG/ML; MG/ML
6 INJECTION, SUSPENSION INTRA-ARTICULAR; INTRALESIONAL; INTRAMUSCULAR; SOFT TISSUE ONCE
Qty: 0.5 ML | Refills: 0
Start: 2018-02-15 | End: 2018-02-15

## 2018-02-15 RX ORDER — BUPIVACAINE HYDROCHLORIDE 2.5 MG/ML
0.5 INJECTION, SOLUTION EPIDURAL; INFILTRATION; INTRACAUDAL ONCE
Qty: 0.5 ML | Refills: 0
Start: 2018-02-15 | End: 2018-02-15

## 2018-02-15 NOTE — MR AVS SNAPSHOT
[address] [facility] 
 
 
 [address] [facility], [address] [facility] [address] 
[phone] Patient: Brenda Moody MRN: [id number] BOBBY:[birth year] Visit Information Date & Time Provider Department Dept. Phone Encounter #  
 2/15/2018  1:30 PM Diallo Orantes MD [facility] [phone] Follow-up Instructions Return if symptoms worsen or fail to improve. Your Appointments 3/2/2018  9:30 AM  
New Patient with Nathaniel Jimenez MD  
[facility] Appt Note: np  gyn issues bring pic id, ins crd arrive 15 mins early  ch 2/8/18  
 [address], [place] [address] [facility] [address]  
[phone]  
  
   
 [address], [address] [address] [facility] 3/13/2018 11:00 AM  
Follow Up with Tyrone Calderon MD  
[facility] 1 ([facility]) Appt Note: TOE 3wk fu  
 [address], [address] [place] [address]  
[phone] [address] [facility]  
  
    
 3/28/2018  1:45 PM  
Follow Up with [id number] [address] (--) Appt Note: Follow up [place] [address] [facility] [id number] [phone]  
  
   
 [address] [place] [id number]  
  
    
 4/3/2018 11:20 AM  
Follow Up with BETO Kelsey [facility] (RICHAR SCHEDULING) Appt Note: 3 mon f/u per rc. ..to  
 [address] [facility] [address]  
[phone] [facility] [phone] Upcoming Health Maintenance Date Due DTaP/Tdap/Td series (1 - Tdap) 3/30/1975 PAP AKA CERVICAL CYTOLOGY 6/21/2019 BREAST CANCER SCRN MAMMOGRAM 1/12/2020 COLONOSCOPY 4/13/2027 Allergies as of 2/15/2018  Review Complete On: 2/15/2018 By: Anil Carter Severity Noted Reaction Type Reactions Aspirin    Other (comments) Stomach bleeding Macrobid [Nitrofurantoin Monohyd/m-cryst]  11/25/2015    Nausea and Vomiting Nsaids (Non-steroidal Anti-inflammatory Drug)    Nausea and Vomiting Opana [Oxymorphone]  11/29/2016    Other (comments) Insomnia, weight loss, nightmares Pcn [Penicillins]    Hives Current Immunizations  Never Reviewed Name Date Pneumococcal Polysaccharide (PPSV-23) 3/7/2014 Not reviewed this visit You Were Diagnosed With   
  
 Codes Comments Extensor tenosynovitis of left wrist    -  Primary ICD-10-CM: W33.988 ICD-9-CM: 727.05 Left wrist pain     ICD-10-CM: M25.532 ICD-9-CM: 719.43 Fibromyalgia     ICD-10-CM: M79.7 ICD-9-CM: 729.1 Vitals BP Pulse Temp Resp Height(growth percentile) Weight(growth percentile) 97/68 86 98.2 °F (36.8 °C) (Oral) 18 5' 4\" (1.626 m) 173 lb 3.2 oz (78.6 kg) SpO2 BMI OB Status Smoking Status 97% 29.73 kg/m2 Hysterectomy Former Smoker BMI and BSA Data Body Mass Index Body Surface Area  
 29.73 kg/m 2 1.88 m 2 Preferred Pharmacy Pharmacy Name Phone [id number] - [address] [phone] Your Updated Medication List  
  
   
This list is accurate as of: 2/15/18  1:46 PM.  Always use your most recent med list.  
  
  
  
  
 * albuterol 2.5 mg /3 mL (0.083 %) nebulizer solution Commonly known as:  PROVENTIL VENTOLIN  
2.5 mg as needed. * albuterol 90 mcg/actuation inhaler Commonly known as:  PROAIR HFA Take 2 Puffs by inhalation every four (4) hours as needed for Wheezing. atorvastatin 40 mg tablet Commonly known as:  LIPITOR  
TAKE ONE TABLET BY MOUTH ONE TIME DAILY  
  
 baclofen 10 mg tablet Commonly known as:  LIORESAL  
TAKE ONE TABLET BY MOUTH THREE TIMES A DAY  
  
 carbamide peroxide 6.5 % otic solution Commonly known as:  Leela Pankaj Administer 5 Drops into each ear two (2) times a day. Dexlansoprazole 60 mg Cpdb Commonly known as:  DEXILANT Take 1 Cap by mouth daily. ergocalciferol 50,000 unit capsule Commonly known as:  VITAMIN D2  
TAKE ONE CAPSULE BY MOUTH EVERY 7 DAYS  
  
 fluticasone 50 mcg/actuation nasal spray Commonly known as:  Jeppie Meriwether 2 Sprays by Both Nostrils route as needed. gabapentin 400 mg capsule Commonly known as:  NEURONTIN  
400 mg two (2) times a day. HYDROcodone-acetaminophen 5-325 mg per tablet Commonly known as:  Vonnie Paz Take 1-2 Tabs by mouth two (2) times daily as needed for Pain. Max Daily Amount: 4 Tabs. ipratropium 0.02 % Soln Commonly known as:  ATROVENT  
0.5 mg.  
  
 losartan 25 mg tablet Commonly known as:  COZAAR  
TAKE ONE TABLET BY MOUTH ONE TIME DAILY  
  
 * MIRALAX 17 gram packet Generic drug:  polyethylene glycol Take 17 g by mouth daily. * polyethylene glycol 17 gram packet Commonly known as:  Suzen Rude DISSOLVE CONTENTS OF ONE PACKET IN BEVERAGE ONCE A DAY * polyethylene glycol 17 gram packet Commonly known as:  Suzen Rude DISSOLVE 1 PACKET IN BEVERAGE OF CHOICE AND DRINK BY MOUTH ONCE DAILY * morphine CR 60 mg CR tablet Commonly known as:  MS CONTIN Take 1 Tab by mouth every twelve (12) hours for 30 days. Max Daily Amount: 120 mg.  
  
 * morphine CR 60 mg CR tablet Commonly known as:  MS CONTIN Take 1 Tab by mouth every twelve (12) hours for 30 days. Max Daily Amount: 120 mg.  
Start taking on:  2/25/2018 * morphine CR 60 mg CR tablet Commonly known as:  MS CONTIN Take 1 Tab by mouth every twelve (12) hours. Max Daily Amount: 120 mg.  
Start taking on:  3/26/2018  
  
 naloxone 4 mg/actuation nasal spray Commonly known as:  NARCAN  
1 [place] by IntraNASal route once as needed. Use 1 spray intranasally into 1 nostril. Use a new Narcan nasal spray for subsequent doses and administer into alternating nostrils. May repeat every 2 to 3 minutes as needed. * oxyCODONE IR 10 mg Tab immediate release tablet Commonly known as:  Ocie Men Take 1 Tab by mouth three (3) times daily as needed. Max Daily Amount: 30 mg.  
  
 * oxyCODONE IR 10 mg Tab immediate release tablet Commonly known as:  Ocie Men Take 1 Tab by mouth three (3) times daily as needed. Max Daily Amount: 30 mg. Start taking on:  2/25/2018 * oxyCODONE IR 10 mg Tab immediate release tablet Commonly known as:  Ocie Men Take 1 Tab by mouth three (3) times daily as needed. Max Daily Amount: 30 mg. Start taking on:  3/26/2018 QUEtiapine 200 mg tablet Commonly known as:  SEROquel Take 200 mg by mouth daily. sertraline 100 mg tablet Commonly known as:  ZOLOFT Take 100 mg by mouth nightly as needed. sucralfate 1 gram tablet Commonly known as:  CARAFATE  
1 g.  
  
 temazepam 15 mg capsule Commonly known as:  RESTORIL Take 1 Cap by mouth nightly as needed for Sleep. Max Daily Amount: 15 mg. Indications: INSOMNIA  
  
 traZODone 100 mg tablet Commonly known as:  Kendall Olmedo Take 100 mg by mouth nightly. valACYclovir 500 mg tablet Commonly known as:  VALTREX Take 1 Tab by mouth two (2) times a day. * Notice: This list has 11 medication(s) that are the same as other medications prescribed for you. Read the directions carefully, and ask your doctor or other care provider to review them with you. Follow-up Instructions Return if symptoms worsen or fail to improve. Patient Instructions Tenosynovitis of the Wrist: Care Instructions Your Care Instructions Tenosynovitis (say \"ten-oh-sin-uh-VY-tus\") means the lining of a tendon is inflamed. This problem usually affects tendons in your thumb and wrist. A tendon is a cord that joins muscle to bone. Tenosynovitis can be caused by an injury.  Or it may be caused by repeating a movement over and over, such as when you knit, lift things, or play video games. In rare cases, an infected wound causes it. In most cases, you can recover fully. But if the problem is caused by doing something over and over and you don't stop or change doing that, it may come back. Follow-up care is a key part of your treatment and safety. Be sure to make and go to all appointments, and call your doctor if you are having problems. It's also a good idea to know your test results and keep a list of the medicines you take. How can you care for yourself at home? · Prop up the sore wrist on a pillow when you ice it or anytime you sit or lie down during the next 3 days. Try to keep it above the level of your heart. This will help reduce swelling. · Put ice or cold packs on your wrist for 10 to 20 minutes at a time. Try to do this every 1 to 2 hours for the next 3 days (when you are awake) or until the swelling goes down. Put a thin cloth between the ice pack and your skin. · If your swelling is gone after 2 or 3 days, put a heating pad set on low or a warm cloth on your wrist for 15 to 20 minutes. This can reduce pain. · If your doctor gave you an elastic bandage, keep it on for the next 24 to 36 hours or for as long as your doctor told you. The bandage should be snug. But it should not be tight enough to cause numbness, tingling, or swelling. · If your doctor gave you a splint or brace, wear it as directed. It will protect your wrist until it is better. · Take pain medicines exactly as directed. ¨ If the doctor gave you a prescription medicine for pain, take it as prescribed. ¨ If you are not taking a prescription pain medicine, ask your doctor if you can take an over-the-counter medicine. · If your doctor prescribes antibiotics, take them as directed. Do not stop taking them just because you feel better. You need to take the full course of antibiotics. · Try not to use your injured wrist and hand.  
· After you are better, do exercises to make the muscles around your tendon stronger. This can prevent the problem from coming back. Follow instructions from your doctor. When should you call for help? Call your doctor now or seek immediate medical care if: 
? · Your hand or fingers are cool or pale or change colors. ? · You have tingling, weakness, or numbness in your hand and fingers. ? · Your pain gets worse. ? · The tendon may be infected. Signs of infection include: 
¨ Increased pain and tenderness around the wrist or thumb. ¨ Swelling or redness of the wrist or thumb. ¨ A fever. ? Watch closely for changes in your health, and be sure to contact your doctor if: 
? · You do not get better as expected. Where can you learn more? Go to http://diana-lashonda.info/. Enter T923 in the search box to learn more about \"Tenosynovitis of the Wrist: Care Instructions. \" Current as of: March 21, 2017 Content Version: 11.4 © 5529-8286 [employer]. Care instructions adapted under license by [employer] (which disclaims liability or warranty for this information). If you have questions about a medical condition or this instruction, always ask your healthcare professional. [place] [address] any warranty or liability for your use of this information. Please provide this summary of care documentation to your next provider. Your primary care clinician is listed as Martha Corea. If you have any questions after today's visit, please call [phone].

## 2018-02-15 NOTE — PATIENT INSTRUCTIONS
Tenosynovitis of the Wrist: Care Instructions  Your Care Instructions  Tenosynovitis (say \"ten-oh-sin-uh-VY-tus\") means the lining of a tendon is inflamed. This problem usually affects tendons in your thumb and wrist. A tendon is a cord that joins muscle to bone. Tenosynovitis can be caused by an injury. Or it may be caused by repeating a movement over and over, such as when you knit, lift things, or play video games. In rare cases, an infected wound causes it. In most cases, you can recover fully. But if the problem is caused by doing something over and over and you don't stop or change doing that, it may come back. Follow-up care is a key part of your treatment and safety. Be sure to make and go to all appointments, and call your doctor if you are having problems. It's also a good idea to know your test results and keep a list of the medicines you take. How can you care for yourself at home? · Prop up the sore wrist on a pillow when you ice it or anytime you sit or lie down during the next 3 days. Try to keep it above the level of your heart. This will help reduce swelling. · Put ice or cold packs on your wrist for 10 to 20 minutes at a time. Try to do this every 1 to 2 hours for the next 3 days (when you are awake) or until the swelling goes down. Put a thin cloth between the ice pack and your skin. · If your swelling is gone after 2 or 3 days, put a heating pad set on low or a warm cloth on your wrist for 15 to 20 minutes. This can reduce pain. · If your doctor gave you an elastic bandage, keep it on for the next 24 to 36 hours or for as long as your doctor told you. The bandage should be snug. But it should not be tight enough to cause numbness, tingling, or swelling. · If your doctor gave you a splint or brace, wear it as directed. It will protect your wrist until it is better. · Take pain medicines exactly as directed.   ¨ If the doctor gave you a prescription medicine for pain, take it as prescribed. ¨ If you are not taking a prescription pain medicine, ask your doctor if you can take an over-the-counter medicine. · If your doctor prescribes antibiotics, take them as directed. Do not stop taking them just because you feel better. You need to take the full course of antibiotics. · Try not to use your injured wrist and hand. · After you are better, do exercises to make the muscles around your tendon stronger. This can prevent the problem from coming back. Follow instructions from your doctor. When should you call for help? Call your doctor now or seek immediate medical care if:  ? · Your hand or fingers are cool or pale or change colors. ? · You have tingling, weakness, or numbness in your hand and fingers. ? · Your pain gets worse. ? · The tendon may be infected. Signs of infection include:  ¨ Increased pain and tenderness around the wrist or thumb. ¨ Swelling or redness of the wrist or thumb. ¨ A fever. ? Watch closely for changes in your health, and be sure to contact your doctor if:  ? · You do not get better as expected. Where can you learn more? Go to http://diana-lashonda.info/. Enter Y955 in the search box to learn more about \"Tenosynovitis of the Wrist: Care Instructions. \"  Current as of: March 21, 2017  Content Version: 11.4  © 4889-2166 [employer]. Care instructions adapted under license by [employer] (which disclaims liability or warranty for this information). If you have questions about a medical condition or this instruction, always ask your healthcare professional. [place] [address] any warranty or liability for your use of this information.

## 2018-02-15 NOTE — PROGRESS NOTES
Patient: Shaji Cobian                MRN: [id number]       SSN: [id number]  YOB: 1955        AGE: 60 y.o. SEX: female    PCP: Tylor Malagon DO  02/15/18    Chief Complaint   Patient presents with    Wrist Pain     Left     HISTORY:  Shaji Cobian is a 60 y.o. female who is seen for left wrist pain. She states she first noticed a swelling overlying her dorsal left wrist while in physical therapy recently. She states prior to it being pointed out by her therapist she had never noticed any mass. She noted increased left wrist pain after noticing the swelling. She states that she is left handed so most everything she does causes her pain. She is experiencing pain with gripping and pinching. She reports a h/o triggering of her right middle finger and left ring finger. She states she has had to pry open her fingers before because of the triggering. She is currently being seen by Dr. Caron Tavarez for a left toe fracture. She was previously seen by Dr. Trenia Collet in 2000 for her fibromyalgia. She is currently in pain management. She is s/p anterior cervical fusion by Dr. Judy Dick. Pain Assessment  2/15/2018   Location of Pain Wrist   Pain Location Comment -   Location Modifiers Left   Severity of Pain 9   Quality of Pain Aching   Duration of Pain Persistent   Frequency of Pain Constant   Aggravating Factors Bending;Stretching;Straightening   Aggravating Factors Comment -   Limiting Behavior Yes   Relieving Factors Nothing   Relieving Factors Comment -   Result of Injury No   Work-Related Injury -   Type of Injury -     Occupation, etc:  Ms. Daren Velasquez receives social security disability benefits for her low back pain and OA since 2000. She previously worked at [employer] and was training for management. She lives in [place] with her son and daughter. Her daughter is employed as her aid. She has another adult daughter. She also lost a son due to a horrific motorcycle accident a few years ago. She has 5 grandchildren- two of which are adopted. Current weight is 173 pounds. She is 5'4\" tall. She is a diet controlled diabetic. She is not hypertensive. She is left handed. She reports a h/o acid reflux and a stomach ulcer. Lab Results   Component Value Date/Time    Hemoglobin A1c 5.8 (H) 11/15/2017 02:08 PM    Hemoglobin A1c, External 5.9 06/27/2016     Weight Metrics 2/15/2018 2/13/2018 2/7/2018 1/29/2018 1/24/2018 1/10/2018 1/9/2018   Weight 173 lb 3.2 oz 171 lb 171 lb 171 lb 9.6 oz 171 lb 174 lb 174 lb   BMI 29.73 kg/m2 29.35 kg/m2 29.35 kg/m2 29.46 kg/m2 29.35 kg/m2 29.87 kg/m2 29.87 kg/m2       Patient Active Problem List   Diagnosis Code    Lumbago M54.5    Other affections of shoulder region, not elsewhere classified M75.80    Other chronic pain G89.29    Bipolar 1 disorder ([facility] [address] F31.9    Chronic pain syndrome G89.4    Carpal tunnel syndrome G56.00    Degeneration of lumbar or lumbosacral intervertebral disc M51.37    Displacement of lumbar intervertebral disc without myelopathy M51.26    Pain in joint, multiple sites M25.50    Cervicalgia M54.2    Postlaminectomy syndrome, cervical region M96.1    Annular tear of lumbar disc M51.36    Lung nodule seen on imaging study R91.1    ACP (advance care planning) Z71.89    Hypercholesterolemia E78.00    Nausea R11.0    Myalgia M79.1    Vitamin D deficiency E55.9    Benign essential hypertension with target blood pressure below 140/90 I10    Gastroesophageal reflux disease with esophagitis K21.0     REVIEW OF SYSTEMS: All Below are Negative except: See HPI   Constitutional: negative for fever, chills, and weight loss. Cardiovascular: negative for chest pain, claudication, leg swelling, SOB, VILLAGOMEZ   Gastrointestinal: Negative for pain, N/V/C/D, Blood in stool or urine, dysuria,  hematuria, incontinence, pelvic pain. Musculoskeletal: See HPI   Neurological: Negative for dizziness and weakness.    Negative for headaches, Visual changes, confusion, seizures   Phychiatric/Behavioral: Negative for depression, memory loss, substance  abuse. Extremities: Negative for hair changes, rash, or skin lesion changes. Hematologic: Negative for bleeding problems, bruising, pallor or swollen lymph  nodes   Peripheral Vascular: No calf pain, no circulation deficits. Social History     Social History    Marital status:      Spouse name: N/A    Number of children: N/A    Years of education: N/A     Occupational History    Not on file. Social History Main Topics    Smoking status: Former Smoker    Smokeless tobacco: Never Used    Alcohol use No      Comment: none in 24 years    Drug use: No      Comment: last smoked in 1993    Sexual activity: Not Currently     Other Topics Concern    Not on file     Social History Narrative      Allergies   Allergen Reactions    Aspirin Other (comments)     Stomach bleeding    Macrobid [Nitrofurantoin Monohyd/M-Cryst] Nausea and Vomiting    Nsaids (Non-Steroidal Anti-Inflammatory Drug) Nausea and Vomiting    Opana [Oxymorphone] Other (comments)     Insomnia, weight loss, nightmares    Pcn [Penicillins] Hives      Current Outpatient Prescriptions   Medication Sig    ergocalciferol (VITAMIN D2) 50,000 unit capsule TAKE ONE CAPSULE BY MOUTH EVERY 7 DAYS    morphine CR (MS CONTIN) 60 mg CR tablet Take 1 Tab by mouth every twelve (12) hours for 30 days. Max Daily Amount: 120 mg.    oxyCODONE IR (ROXICODONE) 10 mg tab immediate release tablet Take 1 Tab by mouth three (3) times daily as needed. Max Daily Amount: 30 mg.    baclofen (LIORESAL) 10 mg tablet TAKE ONE TABLET BY MOUTH THREE TIMES A DAY    atorvastatin (LIPITOR) 40 mg tablet TAKE ONE TABLET BY MOUTH ONE TIME DAILY    polyethylene glycol (MIRALAX) 17 gram packet DISSOLVE CONTENTS OF ONE PACKET IN BEVERAGE ONCE A DAY    albuterol (PROAIR HFA) 90 mcg/actuation inhaler Take 2 Puffs by inhalation every four (4) hours as needed for Wheezing.  Dexlansoprazole (DEXILANT) 60 mg CpDB Take 1 Cap by mouth daily.  fluticasone (FLONASE) 50 mcg/actuation nasal spray 2 Sprays by Both Nostrils route as needed.  QUEtiapine (SEROQUEL) 200 mg tablet Take 200 mg by mouth daily.  losartan (COZAAR) 25 mg tablet TAKE ONE TABLET BY MOUTH ONE TIME DAILY    naloxone (NARCAN) 4 mg/actuation nasal spray 1 [place] by IntraNASal route once as needed. Use 1 spray intranasally into 1 nostril. Use a new Narcan nasal spray for subsequent doses and administer into alternating nostrils. May repeat every 2 to 3 minutes as needed.  valACYclovir (VALTREX) 500 mg tablet Take 1 Tab by mouth two (2) times a day.  temazepam (RESTORIL) 15 mg capsule Take 1 Cap by mouth nightly as needed for Sleep. Max Daily Amount: 15 mg. Indications: INSOMNIA    albuterol (PROVENTIL VENTOLIN) 2.5 mg /3 mL (0.083 %) nebulizer solution 2.5 mg as needed.  gabapentin (NEURONTIN) 400 mg capsule 400 mg two (2) times a day.  ipratropium (ATROVENT) 0.02 % nebulizer solution 0.5 mg.    trazodone (DESYREL) 100 mg tablet Take 100 mg by mouth nightly.  sertraline (ZOLOFT) 100 mg tablet Take 100 mg by mouth nightly as needed.  HYDROcodone-acetaminophen (NORCO) 5-325 mg per tablet Take 1-2 Tabs by mouth two (2) times daily as needed for Pain. Max Daily Amount: 4 Tabs.  polyethylene glycol (MIRALAX) 17 gram packet DISSOLVE 1 PACKET IN BEVERAGE OF CHOICE AND DRINK BY MOUTH ONCE DAILY    [START ON 2/25/2018] morphine CR (MS CONTIN) 60 mg CR tablet Take 1 Tab by mouth every twelve (12) hours for 30 days. Max Daily Amount: 120 mg.    [START ON 3/26/2018] morphine CR (MS CONTIN) 60 mg CR tablet Take 1 Tab by mouth every twelve (12) hours. Max Daily Amount: 120 mg.    [START ON 2/25/2018] oxyCODONE IR (ROXICODONE) 10 mg tab immediate release tablet Take 1 Tab by mouth three (3) times daily as needed.  Max Daily Amount: 30 mg.    [START ON 3/26/2018] oxyCODONE IR (ROXICODONE) 10 mg tab immediate release tablet Take 1 Tab by mouth three (3) times daily as needed. Max Daily Amount: 30 mg.  polyethylene glycol (MIRALAX) 17 gram packet Take 17 g by mouth daily.  carbamide peroxide (DEBROX) 6.5 % otic solution Administer 5 Drops into each ear two (2) times a day.  sucralfate (CARAFATE) 1 gram tablet 1 g. No current facility-administered medications for this visit. PHYSICAL EXAMINATION:  Visit Vitals    BP 97/68    Pulse 86    Temp 98.2 °F (36.8 °C) (Oral)    Resp 18    Ht 5' 4\" (1.626 m)    Wt 173 lb 3.2 oz (78.6 kg)    SpO2 97%    BMI 29.73 kg/m2      ORTHO EXAMINATION:  Examination Right Wrist Left Wrist   Skin Intact    Tenderness - + dorsum, fourth extensor compartment   Flexion 40 40   Extension 30 30   Deformity - -   Effusion - -   Finger flexion Full Full   Finger extension Full Weak active with pain   Tinel's sign - -   Phalen's test - -   Finklestein maneuver - -   Pain with thumb abduction - -   Capillary refill - -   No evidence of extensor tendon rupture  Pain with active wrist and finger extension    PROCEDURE: After discussing treatment options, patient's left fourth extensor compartment was injected with 1/2 cc Marcaine and 1/2 cc Celestone.     Chart reviewed for the following:   Sudha Bautista MD, have reviewed the History, Physical and updated the Allergic reactions for [place] performed immediately prior to start of procedure:  Sudha Bautista MD, have performed the following reviews on [facility] prior to the start of the procedure:            * Patient was identified by name and date of birth   * Agreement on procedure being performed was verified  * Risks and Benefits explained to the patient  * Procedure site verified and marked as necessary  * Patient was positioned for comfort  * Consent was obtained     Time: 1:42 PM     Date of procedure: 2/15/2018    Procedure performed by:  Dipti Mata MD    Ms. Jasper tolerated the procedure well with no complications. MRI LEFT WRIST WO CONT 1/22/18  IMPRESSION:   1. Extensive fluid surrounding the fourth extensor compartment tendons, consistent with tenosynovitis. Perhaps minimal tenosynovitis of the second extensor compartment as well. -Marrow edema in the dorsal lip of the distal radius, extending towards styloid, of uncertain etiology. If tenosynovitis is inflammatory or infectious, these findings may be reactive. Also consider contusion in the appropriate setting.   2. Probable degeneration of the TFCC and scapholunate ligament. Mild triscaphe degenerative change. IMPRESSION:      ICD-10-CM ICD-9-CM    1. Extensor tenosynovitis of left wrist M65.842 727.05 betamethasone (CELESTONE SOLUSPAN) 6 mg/mL injection      BETAMETHASONE ACETATE & SODIUM PHOSPHATE INJECTION 3 MG EA.      bupivacaine, PF, (MARCAINE, PF,) 0.25 % (2.5 mg/mL) injection      GA INJECT TENDON SHEATH/LIGAMENT      WRIST SPLINT   2. Left wrist pain M25.532 719.43 betamethasone (CELESTONE SOLUSPAN) 6 mg/mL injection      BETAMETHASONE ACETATE & SODIUM PHOSPHATE INJECTION 3 MG EA.      bupivacaine, PF, (MARCAINE, PF,) 0.25 % (2.5 mg/mL) injection      GA INJECT TENDON SHEATH/LIGAMENT      WRIST SPLINT   3. Fibromyalgia M79.7 729.1      PLAN:  After discussing treatment options, patient's left fourth extensor compartment was injected with 1/2 cc Marcaine and 1/2 cc Celestone. We discussed a possible extensor tenosynovectomy and soft tissue mass removal in the future. She was provided with a wrist splint today.      Scribed by Anais Montez ([address]) as dictated by Michael Meeks MD

## 2018-02-19 DIAGNOSIS — K59.03 THERAPEUTIC OPIOID INDUCED CONSTIPATION: ICD-10-CM

## 2018-02-19 DIAGNOSIS — Z29.9 PREVENTIVE MEASURE: ICD-10-CM

## 2018-02-19 DIAGNOSIS — E55.9 VITAMIN D DEFICIENCY: ICD-10-CM

## 2018-02-19 DIAGNOSIS — K21.00 GASTROESOPHAGEAL REFLUX DISEASE WITH ESOPHAGITIS: ICD-10-CM

## 2018-02-19 DIAGNOSIS — I10 BENIGN ESSENTIAL HYPERTENSION WITH TARGET BLOOD PRESSURE BELOW 140/90: ICD-10-CM

## 2018-02-19 DIAGNOSIS — T40.2X5A THERAPEUTIC OPIOID INDUCED CONSTIPATION: ICD-10-CM

## 2018-02-19 DIAGNOSIS — Z79.899 MEDICATION MANAGEMENT: ICD-10-CM

## 2018-02-19 RX ORDER — DEXLANSOPRAZOLE 60 MG/1
CAPSULE, DELAYED RELEASE ORAL
Qty: 30 CAP | Refills: 0 | Status: SHIPPED | OUTPATIENT
Start: 2018-02-19 | End: 2018-03-26 | Stop reason: SDUPTHER

## 2018-02-19 RX ORDER — LOSARTAN POTASSIUM 25 MG/1
TABLET ORAL
Qty: 30 TAB | Refills: 2 | Status: SHIPPED | OUTPATIENT
Start: 2018-02-19 | End: 2018-05-21 | Stop reason: SDUPTHER

## 2018-02-21 ENCOUNTER — APPOINTMENT (OUTPATIENT)
Dept: GENERAL RADIOLOGY | Age: 63
End: 2018-02-21
Attending: NURSE PRACTITIONER
Payer: MEDICARE

## 2018-02-21 ENCOUNTER — HOSPITAL ENCOUNTER (EMERGENCY)
Age: 63
Discharge: HOME | End: 2018-02-22
Attending: EMERGENCY MEDICINE
Payer: MEDICARE

## 2018-02-21 ENCOUNTER — APPOINTMENT (OUTPATIENT)
Dept: CT IMAGING | Age: 63
End: 2018-02-21
Attending: NURSE PRACTITIONER
Payer: MEDICARE

## 2018-02-21 VITALS
WEIGHT: 167 LBS | TEMPERATURE: 98 F | OXYGEN SATURATION: 98 % | BODY MASS INDEX: 30.73 KG/M2 | HEART RATE: 76 BPM | SYSTOLIC BLOOD PRESSURE: 125 MMHG | RESPIRATION RATE: 18 BRPM | DIASTOLIC BLOOD PRESSURE: 78 MMHG | HEIGHT: 62 IN

## 2018-02-21 DIAGNOSIS — R10.13 ABDOMINAL PAIN, EPIGASTRIC: Primary | ICD-10-CM

## 2018-02-21 DIAGNOSIS — R11.2 NON-INTRACTABLE VOMITING WITH NAUSEA, UNSPECIFIED VOMITING TYPE: ICD-10-CM

## 2018-02-21 DIAGNOSIS — R07.9 CHEST PAIN, UNSPECIFIED TYPE: ICD-10-CM

## 2018-02-21 LAB
ALBUMIN SERPL-MCNC: 3.6 G/DL (ref 3.4–5)
ALBUMIN/GLOB SERPL: 0.9 {RATIO} (ref 0.8–1.7)
ALP SERPL-CCNC: 134 U/L (ref 45–117)
ALT SERPL-CCNC: 27 U/L (ref 13–56)
ANION GAP SERPL CALC-SCNC: 11 MMOL/L (ref 3–18)
AST SERPL-CCNC: 30 U/L (ref 15–37)
BASOPHILS # BLD: 0 K/UL (ref 0–0.06)
BASOPHILS NFR BLD: 0 % (ref 0–2)
BILIRUB DIRECT SERPL-MCNC: 0.1 MG/DL (ref 0–0.2)
BILIRUB SERPL-MCNC: 0.4 MG/DL (ref 0.2–1)
BUN SERPL-MCNC: 8 MG/DL (ref 7–18)
BUN/CREAT SERPL: 9 (ref 12–20)
CALCIUM SERPL-MCNC: 8.9 MG/DL (ref 8.5–10.1)
CHLORIDE SERPL-SCNC: 106 MMOL/L (ref 100–108)
CK MB CFR SERPL CALC: 0.5 % (ref 0–4)
CK MB CFR SERPL CALC: 0.6 % (ref 0–4)
CK MB SERPL-MCNC: 1.4 NG/ML (ref 5–25)
CK MB SERPL-MCNC: 1.5 NG/ML (ref 5–25)
CK SERPL-CCNC: 259 U/L (ref 26–192)
CK SERPL-CCNC: 304 U/L (ref 26–192)
CO2 SERPL-SCNC: 24 MMOL/L (ref 21–32)
CREAT SERPL-MCNC: 0.91 MG/DL (ref 0.6–1.3)
DIFFERENTIAL METHOD BLD: ABNORMAL
EOSINOPHIL # BLD: 0 K/UL (ref 0–0.4)
EOSINOPHIL NFR BLD: 0 % (ref 0–5)
ERYTHROCYTE [DISTWIDTH] IN BLOOD BY AUTOMATED COUNT: 12.5 % (ref 11.6–14.5)
GLOBULIN SER CALC-MCNC: 4.1 G/DL (ref 2–4)
GLUCOSE SERPL-MCNC: 139 MG/DL (ref 74–99)
HCT VFR BLD AUTO: 43.1 % (ref 35–45)
HGB BLD-MCNC: 14.2 G/DL (ref 12–16)
LIPASE SERPL-CCNC: 94 U/L (ref 73–393)
LYMPHOCYTES # BLD: 1.3 K/UL (ref 0.9–3.6)
LYMPHOCYTES NFR BLD: 16 % (ref 21–52)
MCH RBC QN AUTO: 27.3 PG (ref 24–34)
MCHC RBC AUTO-ENTMCNC: 32.9 G/DL (ref 31–37)
MCV RBC AUTO: 82.7 FL (ref 74–97)
MONOCYTES # BLD: 0.2 K/UL (ref 0.05–1.2)
MONOCYTES NFR BLD: 3 % (ref 3–10)
NEUTS SEG # BLD: 6.5 K/UL (ref 1.8–8)
NEUTS SEG NFR BLD: 81 % (ref 40–73)
PLATELET # BLD AUTO: 282 K/UL (ref 135–420)
PMV BLD AUTO: 9.5 FL (ref 9.2–11.8)
POTASSIUM SERPL-SCNC: 3.2 MMOL/L (ref 3.5–5.5)
PROT SERPL-MCNC: 7.7 G/DL (ref 6.4–8.2)
RBC # BLD AUTO: 5.21 M/UL (ref 4.2–5.3)
SODIUM SERPL-SCNC: 141 MMOL/L (ref 136–145)
TROPONIN I SERPL-MCNC: <0.02 NG/ML (ref 0–0.06)
TROPONIN I SERPL-MCNC: <0.02 NG/ML (ref 0–0.06)
WBC # BLD AUTO: 8.1 K/UL (ref 4.6–13.2)

## 2018-02-21 PROCEDURE — 80048 BASIC METABOLIC PNL TOTAL CA: CPT | Performed by: NURSE PRACTITIONER

## 2018-02-21 PROCEDURE — 74019 RADEX ABDOMEN 2 VIEWS: CPT

## 2018-02-21 PROCEDURE — 74011250636 HC RX REV CODE- 250/636

## 2018-02-21 PROCEDURE — 74176 CT ABD & PELVIS W/O CONTRAST: CPT

## 2018-02-21 PROCEDURE — 74011250637 HC RX REV CODE- 250/637: Performed by: EMERGENCY MEDICINE

## 2018-02-21 PROCEDURE — 74011250636 HC RX REV CODE- 250/636: Performed by: EMERGENCY MEDICINE

## 2018-02-21 PROCEDURE — 74011250636 HC RX REV CODE- 250/636: Performed by: NURSE PRACTITIONER

## 2018-02-21 PROCEDURE — 96374 THER/PROPH/DIAG INJ IV PUSH: CPT

## 2018-02-21 PROCEDURE — 99285 EMERGENCY DEPT VISIT HI MDM: CPT

## 2018-02-21 PROCEDURE — 96375 TX/PRO/DX INJ NEW DRUG ADDON: CPT

## 2018-02-21 PROCEDURE — 83690 ASSAY OF LIPASE: CPT | Performed by: NURSE PRACTITIONER

## 2018-02-21 PROCEDURE — 71046 X-RAY EXAM CHEST 2 VIEWS: CPT

## 2018-02-21 PROCEDURE — 82553 CREATINE MB FRACTION: CPT | Performed by: NURSE PRACTITIONER

## 2018-02-21 PROCEDURE — 85025 COMPLETE CBC W/AUTO DIFF WBC: CPT | Performed by: NURSE PRACTITIONER

## 2018-02-21 PROCEDURE — 93005 ELECTROCARDIOGRAM TRACING: CPT

## 2018-02-21 PROCEDURE — 96376 TX/PRO/DX INJ SAME DRUG ADON: CPT

## 2018-02-21 PROCEDURE — 74011250637 HC RX REV CODE- 250/637: Performed by: NURSE PRACTITIONER

## 2018-02-21 PROCEDURE — 80076 HEPATIC FUNCTION PANEL: CPT | Performed by: NURSE PRACTITIONER

## 2018-02-21 RX ORDER — MORPHINE SULFATE 2 MG/ML
4 INJECTION, SOLUTION INTRAMUSCULAR; INTRAVENOUS
Status: COMPLETED | OUTPATIENT
Start: 2018-02-21 | End: 2018-02-21

## 2018-02-21 RX ORDER — ONDANSETRON 4 MG/1
4 TABLET, ORALLY DISINTEGRATING ORAL
Qty: 15 TAB | Refills: 0 | Status: SHIPPED | OUTPATIENT
Start: 2018-02-21 | End: 2019-02-12 | Stop reason: SINTOL

## 2018-02-21 RX ORDER — MORPHINE SULFATE 2 MG/ML
INJECTION, SOLUTION INTRAMUSCULAR; INTRAVENOUS
Status: COMPLETED
Start: 2018-02-21 | End: 2018-02-21

## 2018-02-21 RX ORDER — ONDANSETRON 2 MG/ML
4 INJECTION INTRAMUSCULAR; INTRAVENOUS
Status: COMPLETED | OUTPATIENT
Start: 2018-02-21 | End: 2018-02-21

## 2018-02-21 RX ORDER — MAG HYDROX/ALUMINUM HYD/SIMETH 200-200-20
30 SUSPENSION, ORAL (FINAL DOSE FORM) ORAL
Status: DISCONTINUED | OUTPATIENT
Start: 2018-02-21 | End: 2018-02-22 | Stop reason: HOSPADM

## 2018-02-21 RX ORDER — SUCRALFATE 1 G/10ML
1 SUSPENSION ORAL 4 TIMES DAILY
Qty: 100 ML | Refills: 0 | Status: SHIPPED | OUTPATIENT
Start: 2018-02-21 | End: 2018-02-23

## 2018-02-21 RX ADMIN — ONDANSETRON HYDROCHLORIDE 4 MG: 2 INJECTION, SOLUTION INTRAMUSCULAR; INTRAVENOUS at 18:41

## 2018-02-21 RX ADMIN — ALUMINUM HYDROXIDE, MAGNESIUM HYDROXIDE, AND SIMETHICONE 30 ML: 200; 200; 20 SUSPENSION ORAL at 18:45

## 2018-02-21 RX ADMIN — Medication 4 MG: at 18:43

## 2018-02-21 RX ADMIN — Medication 4 MG: at 21:08

## 2018-02-21 RX ADMIN — PHENOBARBITAL 30 ML: 16.2; .1037; .0065; .0194 ELIXIR ORAL at 22:40

## 2018-02-21 RX ADMIN — ONDANSETRON HYDROCHLORIDE 4 MG: 2 INJECTION, SOLUTION INTRAMUSCULAR; INTRAVENOUS at 21:08

## 2018-02-21 RX ADMIN — MORPHINE SULFATE 4 MG: 2 INJECTION, SOLUTION INTRAMUSCULAR; INTRAVENOUS at 21:08

## 2018-02-21 NOTE — ED PROVIDER NOTES
EMERGENCY DEPARTMENT HISTORY AND PHYSICAL EXAM    Date: 2/21/2018  Patient Name: Brittany Gee    History of Presenting Illness     Chief Complaint   Patient presents with    Abdominal Pain    Nausea         History Provided By: Patient    Chief Complaint: abdominal pain, vomiting, and nausea x5 hours  Duration: 5 Hours  Timing:  Constant, Progressive and Worsening  Location: epigastric  Quality: Stabbing  Severity: 10 out of 10  Modifying Factors: movement aggravates symptoms, no alleviating factors. Associated Symptoms: denies any other associated signs or symptoms      Additional History (Context): Brittany Gee is a 60 y.o. female with Chronic pain, HTN, GERD, ulcer, and nausea and vomiting who presents with C/O nausea, vomiting, and epigastric abdominal pain x5 hours. Patient states her symptoms started after having an EGD done today. Pt was on the way home, ate a banana, and began to vomit. Patient reports her abdominal pain has progressively worsened since her vomiting started and is now causing a burning sensation in her chest. Pt also reports 1 episode of diarrhea. Patient denies fever, SOB, headache, or any other symptoms or complaints. PCP: Governor Charlene DO    Current Facility-Administered Medications   Medication Dose Route Frequency Provider Last Rate Last Dose    ondansetron (ZOFRAN) injection 4 mg  4 mg IntraVENous NOW Stoney Aus, NP        morphine injection 4 mg  4 mg IntraVENous NOW Stoney Aus, NP         Current Outpatient Prescriptions   Medication Sig Dispense Refill    DEXILANT 60 mg CpDB TAKE ONE CAPSULE BY MOUTH ONE TIME DAILY  30 Cap 0    losartan (COZAAR) 25 mg tablet TAKE ONE TABLET BY MOUTH ONE TIME DAILY  30 Tab 2    HYDROcodone-acetaminophen (NORCO) 5-325 mg per tablet Take 1-2 Tabs by mouth two (2) times daily as needed for Pain. Max Daily Amount: 4 Tabs.  30 Tab 0    polyethylene glycol (MIRALAX) 17 gram packet DISSOLVE 1 PACKET IN BEVERAGE OF CHOICE AND DRINK BY MOUTH ONCE DAILY 30 Packet 3    ergocalciferol (VITAMIN D2) 50,000 unit capsule TAKE ONE CAPSULE BY MOUTH EVERY 7 DAYS 12 Cap 3    morphine CR (MS CONTIN) 60 mg CR tablet Take 1 Tab by mouth every twelve (12) hours for 30 days. Max Daily Amount: 120 mg. 60 Tab 0    oxyCODONE IR (ROXICODONE) 10 mg tab immediate release tablet Take 1 Tab by mouth three (3) times daily as needed. Max Daily Amount: 30 mg. 90 Tab 0    [START ON 2/25/2018] oxyCODONE IR (ROXICODONE) 10 mg tab immediate release tablet Take 1 Tab by mouth three (3) times daily as needed. Max Daily Amount: 30 mg. 90 Tab 0    [START ON 3/26/2018] oxyCODONE IR (ROXICODONE) 10 mg tab immediate release tablet Take 1 Tab by mouth three (3) times daily as needed. Max Daily Amount: 30 mg. 90 Tab 0    baclofen (LIORESAL) 10 mg tablet TAKE ONE TABLET BY MOUTH THREE TIMES A DAY 90 Tab 5    atorvastatin (LIPITOR) 40 mg tablet TAKE ONE TABLET BY MOUTH ONE TIME DAILY 30 Tab 3    polyethylene glycol (MIRALAX) 17 gram packet DISSOLVE CONTENTS OF ONE PACKET IN BEVERAGE ONCE A DAY 30 Each 0    albuterol (PROAIR HFA) 90 mcg/actuation inhaler Take 2 Puffs by inhalation every four (4) hours as needed for Wheezing. 1 Inhaler 3    fluticasone (FLONASE) 50 mcg/actuation nasal spray 2 Sprays by Both Nostrils route as needed. 1 Bottle 3    QUEtiapine (SEROQUEL) 200 mg tablet Take 200 mg by mouth daily.  naloxone (NARCAN) 4 mg/actuation nasal spray 1 [place] by IntraNASal route once as needed. Use 1 spray intranasally into 1 nostril. Use a new Narcan nasal spray for subsequent doses and administer into alternating nostrils. May repeat every 2 to 3 minutes as needed.  polyethylene glycol (MIRALAX) 17 gram packet Take 17 g by mouth daily.  valACYclovir (VALTREX) 500 mg tablet Take 1 Tab by mouth two (2) times a day. 20 Tab 0    temazepam (RESTORIL) 15 mg capsule Take 1 Cap by mouth nightly as needed for Sleep. Max Daily Amount: 15 mg.  Indications: INSOMNIA 30 Cap 1    sucralfate (CARAFATE) 1 gram tablet 1 g.      albuterol (PROVENTIL VENTOLIN) 2.5 mg /3 mL (0.083 %) nebulizer solution 2.5 mg as needed.  gabapentin (NEURONTIN) 400 mg capsule 400 mg two (2) times a day.  ipratropium (ATROVENT) 0.02 % nebulizer solution 0.5 mg.      trazodone (DESYREL) 100 mg tablet Take 100 mg by mouth nightly.  sertraline (ZOLOFT) 100 mg tablet Take 100 mg by mouth nightly as needed.          Past History     Past Medical History:  Past Medical History:   Diagnosis Date    Annular tear of lumbar disc 11/10/2014    Asthma     Bronchitis    Benign tumor of throat     Bone spur     right ankle    Cervicalgia 11/10/2014    Chronic pain     back    Degeneration of lumbar or lumbosacral intervertebral disc 11/10/2014    Degenerative disc disease     Depression     Displacement of lumbar intervertebral disc without myelopathy 11/10/2014    Elevated white blood cell count     Fibromyalgia     GERD (gastroesophageal reflux disease)     Hypertension     Insomnia     Nausea & vomiting     Pain in joint, multiple sites 11/10/2014    Postlaminectomy syndrome, cervical region 11/10/2014    Sinus infection 10/5/15    Ulcer ([facility] [address]        Past Surgical History:  Past Surgical History:   Procedure Laterality Date    COLONOSCOPY N/A 4/13/2017    COLONOS[facility] M[facility]enzie Contreras [facility] at [facility] ENDOSCOPY    HX CATARACT REMOVAL      HX CERVICAL FUSION      HX HYSTERECTOMY      HX ORTHOPAEDIC      ganglion cyst removed, right hand    HX OTHER SURGICAL      cyst left thigh removed    LAP,CHOLECYSTECTOMY N/A 02/27/2017    Dr. Lan Gains       Family History:  Family History   Problem Relation Age of Onset    Hypertension Other     Heart Attack Other     Heart Disease Other     Stroke Other     Headache Other     Seizures Other        Social History:  Social History   Substance Use Topics    Smoking status: Former Smoker    Smokeless tobacco: Never Used  Alcohol use No      Comment: none in 24 years       Allergies: Allergies   Allergen Reactions    Aspirin Other (comments)     Stomach bleeding    Ativan [Lorazepam] Shortness of Breath    Macrobid [Nitrofurantoin Monohyd/M-Cryst] Nausea and Vomiting    Nsaids (Non-Steroidal Anti-Inflammatory Drug) Nausea and Vomiting    Opana [Oxymorphone] Other (comments)     Insomnia, weight loss, nightmares    Pcn [Penicillins] Hives         Review of Systems   Review of Systems  All Other Systems Negative  Physical Exam     Vitals:    02/21/18 1802   BP: (!) 183/93   Pulse: 61   Resp: 18   Temp: 99 °F (37.2 °C)   SpO2: 100%   Weight: 75.8 kg (167 lb)   Height: 5' 2\" (1.575 m)     Physical Exam   Constitutional: She is oriented to person, place, and time. She appears well-developed and well-nourished. HENT:   Head: Normocephalic and atraumatic. Mouth/Throat: Oropharynx is clear and moist.   Eyes: Pupils are equal, round, and reactive to light. Neck: Normal range of motion. Neck supple. Cardiovascular: Normal rate, regular rhythm, normal heart sounds and intact distal pulses. Pulmonary/Chest: Effort normal and breath sounds normal. No respiratory distress. She has no wheezes. She has no rales. Lungs CTAB   Abdominal: Soft. Bowel sounds are normal. She exhibits no distension and no mass. There is tenderness. There is no rebound and no guarding. Tender to palpation over epigastric area and RUQ   Musculoskeletal: Normal range of motion. Neurological: She is alert and oriented to person, place, and time. Skin: Skin is warm and dry. Nursing note and vitals reviewed. Diagnostic Study Results     Labs -   No results found for this or any previous visit (from the past 12 hour(s)).     Radiologic Studies -   XR ABD FLAT/ ERECT    (Results Pending)   XR CHEST PA LAT    (Results Pending)     CT Results  (Last 48 hours)    None        CXR Results  (Last 48 hours)    None            Medical Decision Making   I am the first provider for this patient. I reviewed the vital signs, available nursing notes, past medical history, past surgical history, family history and social history. Vital Signs-Reviewed the patient's vital signs. Pulse Oximetry Analysis - 100% on room air    EKG: Interpreted by the EP. Cha Hutchinson   Time Interpreted: 19:03   Rate: 64   Rhythm: Sinus rhythm   Interpretation: Normal Sinus rhythm, t wave inversion in anteriolateral leads   Comparison:  T wave inversion new when compared to EKG 3/16/2016    Records Reviewed: Nursing Notes    Procedures:  Procedures    Provider Notes (Medical Decision Making):     Differential Diagnosis: perforation of stomach or intestines, GERD, MI,  exacerbation of chronic nausea and vomiting or other process    Plan: patient with epigastric pain, nausea, and vomiting that began after an EGD today. Will xray abdomen to r/o perforation/free air, CBC, BMP, LFTs, lipase, and with patient's age and chest pain, will obtain EKG, cardiac enzymes, and CXR. 7:45 PM: Patient reports her pain has improved after receiving Zofran, morphine, and Mylanta. 8:32 PM: discussed lab results, xray results, and EKG with patient. She is agreeable to stay for a second set of cardiac enzymes and a CT scan to r/o perforation. MED RECONCILIATION:  Current Facility-Administered Medications   Medication Dose Route Frequency    ondansetron (ZOFRAN) injection 4 mg  4 mg IntraVENous NOW    morphine injection 4 mg  4 mg IntraVENous NOW     Current Outpatient Prescriptions   Medication Sig    DEXILANT 60 mg CpDB TAKE ONE CAPSULE BY MOUTH ONE TIME DAILY     losartan (COZAAR) 25 mg tablet TAKE ONE TABLET BY MOUTH ONE TIME DAILY     HYDROcodone-acetaminophen (NORCO) 5-325 mg per tablet Take 1-2 Tabs by mouth two (2) times daily as needed for Pain. Max Daily Amount: 4 Tabs.     polyethylene glycol (MIRALAX) 17 gram packet DISSOLVE 1 PACKET IN BEVERAGE OF CHOICE AND DRINK BY MOUTH ONCE DAILY    ergocalciferol (VITAMIN D2) 50,000 unit capsule TAKE ONE CAPSULE BY MOUTH EVERY 7 DAYS    morphine CR (MS CONTIN) 60 mg CR tablet Take 1 Tab by mouth every twelve (12) hours for 30 days. Max Daily Amount: 120 mg.    oxyCODONE IR (ROXICODONE) 10 mg tab immediate release tablet Take 1 Tab by mouth three (3) times daily as needed. Max Daily Amount: 30 mg.    [START ON 2/25/2018] oxyCODONE IR (ROXICODONE) 10 mg tab immediate release tablet Take 1 Tab by mouth three (3) times daily as needed. Max Daily Amount: 30 mg.    [START ON 3/26/2018] oxyCODONE IR (ROXICODONE) 10 mg tab immediate release tablet Take 1 Tab by mouth three (3) times daily as needed. Max Daily Amount: 30 mg.    baclofen (LIORESAL) 10 mg tablet TAKE ONE TABLET BY MOUTH THREE TIMES A DAY    atorvastatin (LIPITOR) 40 mg tablet TAKE ONE TABLET BY MOUTH ONE TIME DAILY    polyethylene glycol (MIRALAX) 17 gram packet DISSOLVE CONTENTS OF ONE PACKET IN BEVERAGE ONCE A DAY    albuterol (PROAIR HFA) 90 mcg/actuation inhaler Take 2 Puffs by inhalation every four (4) hours as needed for Wheezing.  fluticasone (FLONASE) 50 mcg/actuation nasal spray 2 Sprays by Both Nostrils route as needed.  QUEtiapine (SEROQUEL) 200 mg tablet Take 200 mg by mouth daily.  naloxone (NARCAN) 4 mg/actuation nasal spray 1 [place] by IntraNASal route once as needed. Use 1 spray intranasally into 1 nostril. Use a new Narcan nasal spray for subsequent doses and administer into alternating nostrils. May repeat every 2 to 3 minutes as needed.  polyethylene glycol (MIRALAX) 17 gram packet Take 17 g by mouth daily.  valACYclovir (VALTREX) 500 mg tablet Take 1 Tab by mouth two (2) times a day.  temazepam (RESTORIL) 15 mg capsule Take 1 Cap by mouth nightly as needed for Sleep. Max Daily Amount: 15 mg.  Indications: INSOMNIA    sucralfate (CARAFATE) 1 gram tablet 1 g.    albuterol (PROVENTIL VENTOLIN) 2.5 mg /3 mL (0.083 %) nebulizer solution 2.5 mg as needed.  gabapentin (NEURONTIN) 400 mg capsule 400 mg two (2) times a day.  ipratropium (ATROVENT) 0.02 % nebulizer solution 0.5 mg.    trazodone (DESYREL) 100 mg tablet Take 100 mg by mouth nightly.  sertraline (ZOLOFT) 100 mg tablet Take 100 mg by mouth nightly as needed. Follow-up Information     None          Current Discharge Medication List            Diagnosis     Clinical Impression: No diagnosis found. 8:40 PM : Pt care transferred to Dr. Rosana Joseph  ,ED provider. History of patient complaint(s), available diagnostic reports and current treatment plan has been discussed thoroughly. Bedside rounding on patient occured : no .   Intended disposition of patient : Home  Pending diagnostics reports and/or labs (please list): repeat cardiac enzymes and CT abdomen

## 2018-02-21 NOTE — ED TRIAGE NOTES
Pt presents via ems with abd pain and nausea post egd today. Pt states hx of gi problems/pain. Pt last took rx morphine at 3:30 today. Pt now reports also having chest pain and shortness of breath that started after egd as well. Per ems pt did not report these symptoms to them. Per ems pt ambulatory to their stretcher.

## 2018-02-22 NOTE — DISCHARGE INSTRUCTIONS
Return for fever, vomiting, shortness of breath, vomiting, decreased fluid intake, weakness, numbness, dizziness, or any change or concerns. Abdominal Pain: Care Instructions  Your Care Instructions    Abdominal pain has many possible causes. Some aren't serious and get better on their own in a few days. Others need more testing and treatment. If your pain continues or gets worse, you need to be rechecked and may need more tests to find out what is wrong. You may need surgery to correct the problem. Don't ignore new symptoms, such as fever, nausea and vomiting, urination problems, pain that gets worse, and dizziness. These may be signs of a more serious problem. Your doctor may have recommended a follow-up visit in the next 8 to 12 hours. If you are not getting better, you may need more tests or treatment. The doctor has checked you carefully, but problems can develop later. If you notice any problems or new symptoms, get medical treatment right away. Follow-up care is a key part of your treatment and safety. Be sure to make and go to all appointments, and call your doctor if you are having problems. It's also a good idea to know your test results and keep a list of the medicines you take. How can you care for yourself at home? · Rest until you feel better. · To prevent dehydration, drink plenty of fluids, enough so that your urine is light yellow or clear like water. Choose water and other caffeine-free clear liquids until you feel better. If you have kidney, heart, or liver disease and have to limit fluids, talk with your doctor before you increase the amount of fluids you drink. · If your stomach is upset, eat mild foods, such as rice, dry toast or crackers, bananas, and applesauce. Try eating several small meals instead of two or three large ones. · Wait until 48 hours after all symptoms have gone away before you have spicy foods, alcohol, and drinks that contain caffeine.   · Do not eat foods that are high in fat. · Avoid anti-inflammatory medicines such as aspirin, ibuprofen (Advil, Motrin), and naproxen (Aleve). These can cause stomach upset. Talk to your doctor if you take daily aspirin for another health problem. When should you call for help? Call 911 anytime you think you may need emergency care. For example, call if:  ? · You passed out (lost consciousness). ? · You pass maroon or very bloody stools. ? · You vomit blood or what looks like coffee grounds. ? · You have new, severe belly pain. ?Call your doctor now or seek immediate medical care if:  ? · Your pain gets worse, especially if it becomes focused in one area of your belly. ? · You have a new or higher fever. ? · Your stools are black and look like tar, or they have streaks of blood. ? · You have unexpected vaginal bleeding. ? · You have symptoms of a urinary tract infection. These may include:  ¨ Pain when you urinate. ¨ Urinating more often than usual.  ¨ Blood in your urine. ? · You are dizzy or lightheaded, or you feel like you may faint. ? Watch closely for changes in your health, and be sure to contact your doctor if:  ? · You are not getting better after 1 day (24 hours). Where can you learn more? Go to http://diana-lashonda.info/. Enter M240 in the search box to learn more about \"Abdominal Pain: Care Instructions. \"  Current as of: March 20, 2017  Content Version: 11.4  © 5856-3193 [employer]. Care instructions adapted under license by [employer] (which disclaims liability or warranty for this information). If you have questions about a medical condition or this instruction, always ask your healthcare professional. [address] any warranty or liability for your use of this information. Chest Pain: Care Instructions  Your Care Instructions    There are many things that can cause chest pain.  Some are not serious and will get better on their own in a few days. But some kinds of chest pain need more testing and treatment. Your doctor may have recommended a follow-up visit in the next 8 to 12 hours. If you are not getting better, you may need more tests or treatment. Even though your doctor has released you, you still need to watch for any problems. The doctor carefully checked you, but sometimes problems can develop later. If you have new symptoms or if your symptoms do not get better, get medical care right away. If you have worse or different chest pain or pressure that lasts more than 5 minutes or you passed out (lost consciousness), call 911 or seek other emergency help right away. A medical visit is only one step in your treatment. Even if you feel better, you still need to do what your doctor recommends, such as going to all suggested follow-up appointments and taking medicines exactly as directed. This will help you recover and help prevent future problems. How can you care for yourself at home? · Rest until you feel better. · Take your medicine exactly as prescribed. Call your doctor if you think you are having a problem with your medicine. · Do not drive after taking a prescription pain medicine. When should you call for help? Call 911 if:  ? · You passed out (lost consciousness). ? · You have severe difficulty breathing. ? · You have symptoms of a heart attack. These may include:  ¨ Chest pain or pressure, or a strange feeling in your chest.  ¨ Sweating. ¨ Shortness of breath. ¨ Nausea or vomiting. ¨ Pain, pressure, or a strange feeling in your back, neck, jaw, or upper belly or in one or both shoulders or arms. ¨ Lightheadedness or sudden weakness. ¨ A fast or irregular heartbeat. After you call 911, the  may tell you to chew 1 adult-strength or 2 to 4 low-dose aspirin. Wait for an ambulance. Do not try to drive yourself. ?Call your doctor today if:  ? · You have any trouble breathing.    ? · Your chest pain gets worse. ? · You are dizzy or lightheaded, or you feel like you may faint. ? · You are not getting better as expected. ? · You are having new or different chest pain. Where can you learn more? Go to http://diana-lashonda.info/. Enter A120 in the search box to learn more about \"Chest Pain: Care Instructions. \"  Current as of: March 20, 2017  Content Version: 11.4  © 5089-0044 [employer]. Care instructions adapted under license by [employer] (which disclaims liability or warranty for this information). If you have questions about a medical condition or this instruction, always ask your healthcare professional. [address] any warranty or liability for your use of this information. Indigestion (Dyspepsia or Heartburn): Care Instructions  Your Care Instructions  Sometimes it can be hard to pinpoint the cause of indigestion. (It is also called dyspepsia or heartburn.) Most cases of an upset stomach with bloating, burning, burping, and nausea are minor and go away within several hours. Home treatment and over-the-counter medicine often are able to control symptoms. But if you take medicine to relieve your indigestion without making diet and lifestyle changes, your symptoms are likely to return again and again. If you get indigestion often, it may be a sign of a more serious medical problem. Be sure to follow up with your doctor, who may want to do tests to be sure of the cause of your indigestion. Follow-up care is a key part of your treatment and safety. Be sure to make and go to all appointments, and call your doctor if you are having problems. It's also a good idea to know your test results and keep a list of the medicines you take. How can you care for yourself at home? · Your doctor may recommend over-the-counter medicine. For mild or occasional indigestion, antacids such as Gaviscon, Mylanta, Maalox, or Tums, may help.  Be safe with medicines. Be careful when you take over-the-counter antacid medicines. Many of these medicines have aspirin in them. Read the label to make sure that you are not taking more than the recommended dose. Too much aspirin can be harmful. · Your doctor also may recommend over-the-counter acid reducers, such as Pepcid AC, Tagamet HB, Zantac 75, or Prilosec. Read and follow all instructions on the label. If you use these medicines often, talk with your doctor. · Change your eating habits. ¨ It's best to eat several small meals instead of two or three large meals. ¨ After you eat, wait 2 to 3 hours before you lie down. ¨ Chocolate, mint, and alcohol can make GERD worse. ¨ Spicy foods, foods that have a lot of acid (like tomatoes and oranges), and coffee can make GERD symptoms worse in some people. If your symptoms are worse after you eat a certain food, you may want to stop eating that food to see if your symptoms get better. · Do not smoke or chew tobacco. Smoking can make GERD worse. If you need help quitting, talk to your doctor about stop-smoking programs and medicines. These can increase your chances of quitting for good. · If you have GERD symptoms at night, raise the head of your bed 6 to 8 inches. You can do this by putting the frame on blocks or placing a foam wedge under the head of your mattress. (Adding extra pillows does not work.)  · Do not wear tight clothing around your middle. · Lose weight if you need to. Losing just 5 to 10 pounds can help. · Do not take anti-inflammatory medicines, such as aspirin, ibuprofen (Advil, Motrin), or naproxen (Aleve). These can irritate the stomach. If you need a pain medicine, try acetaminophen (Tylenol), which does not cause stomach upset. When should you call for help? Call your doctor now or seek immediate medical care if:  ? · You have new or worse belly pain. ? · You are vomiting. ? Watch closely for changes in your health, and be sure to contact your doctor if:  ? · You have new or worse symptoms of indigestion. ? · You have trouble or pain swallowing. ? · You are losing weight. ? · You do not get better as expected. Where can you learn more? Go to http://diana-lashonda.info/. Enter K114 in the search box to learn more about \"Indigestion (Dyspepsia or Heartburn): Care Instructions. \"  Current as of: May 12, 2017  Content Version: 11.4  © 2272-7340 [employer]. Care instructions adapted under license by [employer] (which disclaims liability or warranty for this information). If you have questions about a medical condition or this instruction, always ask your healthcare professional. [address] any warranty or liability for your use of this information. Nausea and Vomiting: Care Instructions  Your Care Instructions    When you are nauseated, you may feel weak and sweaty and notice a lot of saliva in your mouth. Nausea often leads to vomiting. Most of the time you do not need to worry about nausea and vomiting, but they can be signs of other illnesses. Two common causes of nausea and vomiting are stomach flu and food poisoning. Nausea and vomiting from viral stomach flu will usually start to improve within 24 hours. Nausea and vomiting from food poisoning may last from 12 to 48 hours. The doctor has checked you carefully, but problems can develop later. If you notice any problems or new symptoms, get medical treatment right away. Follow-up care is a key part of your treatment and safety. Be sure to make and go to all appointments, and call your doctor if you are having problems. It's also a good idea to know your test results and keep a list of the medicines you take. How can you care for yourself at home? · To prevent dehydration, drink plenty of fluids, enough so that your urine is light yellow or clear like water.  Choose water and other caffeine-free clear liquids until you feel better. If you have kidney, heart, or liver disease and have to limit fluids, talk with your doctor before you increase the amount of fluids you drink. · Rest in bed until you feel better. · When you are able to eat, try clear soups, mild foods, and liquids until all symptoms are gone for 12 to 48 hours. Other good choices include dry toast, crackers, cooked cereal, and gelatin dessert, such as Jell-O. When should you call for help? Call 911 anytime you think you may need emergency care. For example, call if:  ? · You passed out (lost consciousness). ?Call your doctor now or seek immediate medical care if:  ? · You have symptoms of dehydration, such as:  ¨ Dry eyes and a dry mouth. ¨ Passing only a little dark urine. ¨ Feeling thirstier than usual.   ? · You have new or worsening belly pain. ? · You have a new or higher fever. ? · You vomit blood or what looks like coffee grounds. ? Watch closely for changes in your health, and be sure to contact your doctor if:  ? · You have ongoing nausea and vomiting. ? · Your vomiting is getting worse. ? · Your vomiting lasts longer than 2 days. ? · You are not getting better as expected. Where can you learn more? Go to http://diana-lashonda.info/. Enter 25 [id number] in the search box to learn more about \"Nausea and Vomiting: Care Instructions. \"  Current as of: March 20, 2017  Content Version: 11.4  © 3329-6524 [employer]. Care instructions adapted under license by [employer] (which disclaims liability or warranty for this information). If you have questions about a medical condition or this instruction, always ask your healthcare professional. [place] [address] any warranty or liability for your use of this information.

## 2018-02-22 NOTE — ED NOTES
Pt care assumed from Desirae Hauser NP , ED provider. Pt complaint(s), current treatment plan, progression and available diagnostic results have been discussed thoroughly. Rounding occurred: yes  Intended Disposition: TBD   Pending diagnostic reports and/or labs (please list): CT abdomen and repeat cardiac enzymes    CT A/P: IMPRESSION: Nothing acute. Large hiatal hernia. Air-filled structure medial to the duodenum which may be a diverticulum or  ulcer. No wall thickening or surrounding inflammation. Could be further  evaluated with endoscopy or upper GI.    11:20 PM I have assessed the patient and discussed her results and diagnosis. Pt feels better. Pt is discharged is in stable condition. Patient will f/u with PCP and Surgery. Patient is to return to emergency department if any new or worsening condition. Patient understands and verbalizes agreement with plan.

## 2018-02-22 NOTE — ED NOTES
I have reviewed discharge instructions with the patient and caregiver. The patient and caregiver verbalized understanding. Patient armband removed and shredded  Current Discharge Medication List      START taking these medications    Details   ondansetron (ZOFRAN ODT) 4 mg disintegrating tablet Take 1 Tab by mouth every eight (8) hours as needed for Nausea for up to 15 doses. Qty: 15 Tab, Refills: 0      sucralfate (CARAFATE) 100 mg/mL suspension Take 10 mL by mouth four (4) times daily for 2 days.   Qty: 100 mL, Refills: 0         STOP taking these medications       sucralfate (CARAFATE) 1 gram tablet Comments:   Reason for Stopping:

## 2018-02-23 LAB
ATRIAL RATE: 64 BPM
CALCULATED P AXIS, ECG09: 25 DEGREES
CALCULATED R AXIS, ECG10: 6 DEGREES
CALCULATED T AXIS, ECG11: -26 DEGREES
DIAGNOSIS, 93000: NORMAL
P-R INTERVAL, ECG05: 120 MS
Q-T INTERVAL, ECG07: 488 MS
QRS DURATION, ECG06: 88 MS
QTC CALCULATION (BEZET), ECG08: 503 MS
VENTRICULAR RATE, ECG03: 64 BPM

## 2018-02-27 ENCOUNTER — PATIENT OUTREACH (OUTPATIENT)
Dept: FAMILY MEDICINE CLINIC | Age: 63
End: 2018-02-27

## 2018-02-27 DIAGNOSIS — K21.00 GASTROESOPHAGEAL REFLUX DISEASE WITH ESOPHAGITIS: ICD-10-CM

## 2018-02-27 NOTE — PROGRESS NOTES
[facility]  ED Follow-up:  Patient admitted to [facility], 2/21/2018 to 2/21/2018 for abdominal pain. Contacted patient for ED follow up. Verified 2 patient identifiers. Introduced self, role and reason for call. Patient presenting symptoms:   Abdominal pain  Nausea     Patient denied the following:  Pain  SOB  Nausea  Vomiting  Numbness  Tingling  Fever  Chills  Dizziness  Lightheadedness   Abdominal distention     Patient reported the following:  Symptoms improvement  All prescriptions picked up and started     Patient reported the following on the patients ADL's:  Feeds self: YES  Ambulates: YES      Self grooming: YES  Toileting: YES    DME:  [facility]    Appointment(s):  3/7/2018 at 1345 with Dr. Juju Dow      Support:  Children    Medications prescribed at discharge:  Zofran  Carafate    Patient aware of all appointment(s). Family member will transport patient to appointment(s). Patient verbalizes understanding self-management of medications, and when to seek medical attention from PCP. Patient verbalizes understanding of discharge plan. Patient was given the opportunity to ask questions. Contact information was provided for future reference or further questions. Goals Addressed      Establish PCP relationships and regularly scheduled appointments.                   Follow up with PCP, 3/7/2018       Reduce ED Utilization                  No admissions within 30 days post discharge, 2/21/2018  Follow up with PCP  Medication adherence

## 2018-03-06 ENCOUNTER — OFFICE VISIT (OUTPATIENT)
Dept: OBGYN CLINIC | Age: 63
End: 2018-03-06

## 2018-03-06 VITALS
SYSTOLIC BLOOD PRESSURE: 109 MMHG | DIASTOLIC BLOOD PRESSURE: 69 MMHG | HEIGHT: 62 IN | HEART RATE: 90 BPM | TEMPERATURE: 99 F | BODY MASS INDEX: 31.65 KG/M2 | OXYGEN SATURATION: 100 % | WEIGHT: 172 LBS

## 2018-03-06 DIAGNOSIS — M79.18 MYOFASCIAL MUSCLE PAIN: ICD-10-CM

## 2018-03-06 DIAGNOSIS — N95.2 ATROPHIC VAGINITIS: Primary | ICD-10-CM

## 2018-03-06 DIAGNOSIS — N89.8 VAGINAL ODOR: ICD-10-CM

## 2018-03-06 RX ORDER — ALUMINUM HYDROXIDE AND MAGNESIUM CARBONATE 95; 358 MG/15ML; MG/15ML
15 LIQUID ORAL
Refills: 0 | COMMUNITY
Start: 2018-03-04 | End: 2019-01-04

## 2018-03-06 RX ORDER — MORPHINE SULFATE 30 MG/1
TABLET, FILM COATED, EXTENDED RELEASE ORAL EVERY 12 HOURS
COMMUNITY
End: 2018-07-10 | Stop reason: SDUPTHER

## 2018-03-06 NOTE — PROGRESS NOTES
Name: Elise Hallman MRN: [id number] 0716 [facility]   YOB: 1955  Age: 60 y.o. Sex: female        Chief Complaint   Patient presents with    Pelvic Pain     odor and painful knot on abd       HPI     1. LLQ pain--> notes that she feels and very tender knot in her LLQ, noted it last 5 days ago, felt about the size of a pea, does not feel it anymore, was mobile, still notes tenderness, only hurts when it is touched, 8/10, nothing makes it better or worse    2.  Vaginal odor--> 1st noted it approx 1 month ago, thinks it is from her vagina, no bleeding, no discomfort, smells to her like fish, no VB    OB History      Para Term  AB Living    4 4 4   4    SAB TAB Ectopic Molar Multiple Live Births         4        Obstetric Comments          Patient has had a hysterectomyand menopausal  History of sexually transmitted infections she thinks she had syphilis  Last pap 2016 neg           PGyn  History   Sexual Activity    Sexual activity: Not Currently         Past Medical History:   Diagnosis Date    Annular tear of lumbar disc 11/10/2014    Asthma     Bronchitis    Benign tumor of throat     Bone spur     right ankle    Cervicalgia 11/10/2014    Chronic pain     back    Degeneration of lumbar or lumbosacral intervertebral disc 11/10/2014    Degenerative disc disease     Depression     Displacement of lumbar intervertebral disc without myelopathy 11/10/2014    Elevated white blood cell count     Fibromyalgia     GERD (gastroesophageal reflux disease)     Hypertension     Insomnia     Nausea & vomiting     Pain in joint, multiple sites 11/10/2014    Postlaminectomy syndrome, cervical region 11/10/2014    Sinus infection 10/5/15    Ulcer ([address]        Past Surgical History:   Procedure Laterality Date    COLONOSCOPY N/A 2017    COLONOSCOPY performed by Cathy [facility]ijo [facility] at [facility] ENDOSCOPY    HX CATARACT REMOVAL      HX CERVICAL FUSION      HX HYSTERECTOMY      HX ORTHOPAEDIC ganglion cyst removed, right hand    HX OTHER SURGICAL      cyst left thigh removed    LAP,CHOLECYSTECTOMY N/A 02/27/2017    Dr. Dwyer Fill   Allergen Reactions    Aspirin Other (comments)     Stomach bleeding    Ativan [Lorazepam] Shortness of Breath    Macrobid [Nitrofurantoin Monohyd/M-Cryst] Nausea and Vomiting    Nsaids (Non-Steroidal Anti-Inflammatory Drug) Nausea and Vomiting    Opana [Oxymorphone] Other (comments)     Insomnia, weight loss, nightmares    Pcn [Penicillins] Hives       Current Outpatient Prescriptions on File Prior to Visit   Medication Sig Dispense Refill    ondansetron (ZOFRAN ODT) 4 mg disintegrating tablet Take 1 Tab by mouth every eight (8) hours as needed for Nausea for up to 15 doses. 15 Tab 0    DEXILANT 60 mg CpDB TAKE ONE CAPSULE BY MOUTH ONE TIME DAILY  30 Cap 0    losartan (COZAAR) 25 mg tablet TAKE ONE TABLET BY MOUTH ONE TIME DAILY  30 Tab 2    HYDROcodone-acetaminophen (NORCO) 5-325 mg per tablet Take 1-2 Tabs by mouth two (2) times daily as needed for Pain. Max Daily Amount: 4 Tabs. 30 Tab 0    ergocalciferol (VITAMIN D2) 50,000 unit capsule TAKE ONE CAPSULE BY MOUTH EVERY 7 DAYS 12 Cap 3    oxyCODONE IR (ROXICODONE) 10 mg tab immediate release tablet Take 1 Tab by mouth three (3) times daily as needed. Max Daily Amount: 30 mg. 90 Tab 0    baclofen (LIORESAL) 10 mg tablet TAKE ONE TABLET BY MOUTH THREE TIMES A DAY 90 Tab 5    atorvastatin (LIPITOR) 40 mg tablet TAKE ONE TABLET BY MOUTH ONE TIME DAILY 30 Tab 3    polyethylene glycol (MIRALAX) 17 gram packet DISSOLVE CONTENTS OF ONE PACKET IN BEVERAGE ONCE A DAY 30 Each 0    albuterol (PROAIR HFA) 90 mcg/actuation inhaler Take 2 Puffs by inhalation every four (4) hours as needed for Wheezing. 1 Inhaler 3    fluticasone (FLONASE) 50 mcg/actuation nasal spray 2 Sprays by Both Nostrils route as needed. 1 Bottle 3    QUEtiapine (SEROQUEL) 200 mg tablet Take 200 mg by mouth daily.       naloxone [facility]) 4 mg/actuation nasal spray 1 [place] by IntraNASal route once as needed. Use 1 spray intranasally into 1 nostril. Use a new Narcan nasal spray for subsequent doses and administer into alternating nostrils. May repeat every 2 to 3 minutes as needed.  polyethylene glycol (MIRALAX) 17 gram packet Take 17 g by mouth daily.  valACYclovir (VALTREX) 500 mg tablet Take 1 Tab by mouth two (2) times a day. 20 Tab 0    temazepam (RESTORIL) 15 mg capsule Take 1 Cap by mouth nightly as needed for Sleep. Max Daily Amount: 15 mg. Indications: INSOMNIA 30 Cap 1    albuterol (PROVENTIL VENTOLIN) 2.5 mg /3 mL (0.083 %) nebulizer solution 2.5 mg as needed.  gabapentin (NEURONTIN) 400 mg capsule 400 mg two (2) times a day.  ipratropium (ATROVENT) 0.02 % nebulizer solution 0.5 mg.      trazodone (DESYREL) 100 mg tablet Take 100 mg by mouth nightly.  sertraline (ZOLOFT) 100 mg tablet Take 100 mg by mouth nightly as needed. No current facility-administered medications on file prior to visit. Social History     Social History    Marital status:      Spouse name: N/A    Number of children: N/A    Years of education: N/A     Occupational History    Not on file. Social History Main Topics    Smoking status: Former Smoker     Quit date: 3/6/1993    Smokeless tobacco: Never Used    Alcohol use No      Comment: none in 24 years    Drug use: No      Comment: last smoked in 1993    Sexual activity: Not Currently     Other Topics Concern    Not on file     Social History Narrative       Family History   Problem Relation Age of Onset    Hypertension Other     Heart Attack Other     Heart Disease Other     Stroke Other     Headache Other     Seizures Other        Review of Systems   Constitutional: Negative. HENT: Negative. Respiratory: Negative. Cardiovascular: Negative. Gastrointestinal: Positive for abdominal pain.  Negative for constipation, diarrhea, nausea and vomiting. Genitourinary: Negative. Musculoskeletal: Negative. Skin: Negative. Neurological: Negative. Psychiatric/Behavioral: Negative. Visit Vitals    /69    Pulse 90    Temp 99 °F (37.2 °C) (Oral)    Ht 5' 2\" (1.575 m)    Wt 172 lb (78 kg)    SpO2 100%    BMI 31.46 kg/m2       GENERAL:  Well developed, well nourished, in no distress  NEURO/PSYCHE: Grossly intact, normal mood and affect  HEENT: Normal cephalic, atraumatic, good dentition, neck supple. No thyromegaly  CV: regular rate and rhythm  LUNGS: clear to auscultation bilaterally, no wheezes, rhonchi or rales, good air entry with normal effort  ABDOMEN: + BS, soft with tenderness in her LLQ, not deep pain, very superficial, very no guarding, rebound or masses  EXTREMITIES: no edema or erythema noted  SKIN:  Warm, dry, no lesions  LYMPHATICS: No supraclavicular or inguinal nodes noted    PELVIC EXAM:  LABIA MAJORA: no masses, tenderness or lesions  LABIA MINORA: no masses, tenderness or lesions  CLITORIS: no masses, tenderness or lesions  URETHRA: normal appearing, no masses or tenderness  BLADDER: no fullness or tenderness  VAGINA: pink appearing vagina with physiologic discharge, no lesions   PERINEUM: no masses, tenderness or lesions  CERVIX: absent  UTERUS: absent  ADNEXA: nontender and no masses    No results found for this or any previous visit (from the past 24 hour(s)). Wet prep:  No CC or trich, parabasal cells noted      ICD-10-CM ICD-9-CM   1. Atrophic vaginitis N95.2 627.3   2. Myofascial muscle pain M79.1 729.1   3. Vaginal odor N89.8 625.8       1. Atrophic vaginitis  Pt notes that she is asymptomatic, reviewed vaginal estrogen and pt is not interested at this time mostly due to cost, all of her questions were answered. Discussed the difference between systemic estrogen and local estrogen. All of her questions were answered.     2. Myofascial muscle pain  See procedure note, immediate and complete relief noted after the injection    3.  Vaginal odor  No abnormality noted, reviewed repHresh can be used PRN, all of her questions were answered    F/U PRN

## 2018-03-06 NOTE — MR AVS SNAPSHOT
[address] [place] [address] [address], [address] [facility] [address] [phone] Patient: Alma Rosa Hart MRN: [id number] Visit Information Date & Time Provider Department Dept. Phone Encounter #  
 3/6/2018  2:30 PM Tania Carbajal MD [place] [id number] Follow-up Instructions Return as needed. Your Appointments 3/7/2018  1:45 PM  
Office Visit with Yamileth Renee DO [address] (--) Appt Note: Post ED for abdominal pain Alma [address] [facility] [id number] [phone]  
  
   
 [address] [address]  
  
    
 3/13/2018 11:00 AM  
Follow Up with Juliette Yusuf MD  
[facility] ([facility]) Appt Note: TOE 3wk fu  
 [address], [address] [place] [address]  
[phone] [address], [address] [place] [address]  
  
    
 3/28/2018  1:45 PM  
Follow Up with Yamileth Renee DO [address] (--) Appt Note: Follow up Alma Sanderson [facility] [id number] [phone]  
  
   
 [address] [address]  
  
    
 4/3/2018 11:20 AM  
Follow Up with BETO Eric [facility] (RICHAR SCHEDULING) Appt Note: 3 mon f/u per rc. ..to  
 [address] [facility] [address]  
[phone] [facility] [phone] [id number] Upcoming Health Maintenance Date Due  
 PAP AKA CERVICAL CYTOLOGY 6/21/2019 BREAST CANCER SCRN MAMMOGRAM 1/12/2020 COLONOSCOPY 4/13/2027 Allergies as of 3/6/2018  Review Complete On: 3/6/2018 By: Tania Carbajal MD  
  
 Severity Noted Reaction Type Reactions Aspirin    Other (comments) Stomach bleeding Ativan [Lorazepam]  02/21/2018    Shortness of Breath  Macrobid [Nitrofurantoin Monohyd/m-cryst]  11/25/2015    Nausea and Vomiting Nsaids (Non-steroidal Anti-inflammatory Drug)    Nausea and Vomiting Opana [Oxymorphone]  11/29/2016    Other (comments) Insomnia, weight loss, nightmares Pcn [Penicillins]    Hives Current Immunizations  Never Reviewed Name Date Pneumococcal Polysaccharide (PPSV-23) 3/7/2014 Not reviewed this visit You Were Diagnosed With   
  
 Codes Comments Atrophic vaginitis    -  Primary ICD-10-CM: N95.2 ICD-9-CM: 627.3 Myofascial muscle pain     ICD-10-CM: M79.1 ICD-9-CM: 729.1 Vaginal odor     ICD-10-CM: N89.8 ICD-9-CM: 625.8 Vitals BP Pulse Temp Height(growth percentile) Weight(growth percentile) SpO2  
 109/69 90 99 °F (37.2 °C) (Oral) 5' 2\" (1.575 m) 172 lb (78 kg) 100% BMI OB Status Smoking Status 31.46 kg/m2 Hysterectomy Former Smoker BMI and BSA Data Body Mass Index Body Surface Area  
 31.46 kg/m 2 1.85 m 2 Preferred Pharmacy Pharmacy Name Phone [id number] - [address], [address] [phone] Your Updated Medication List  
  
   
This list is accurate as of 3/6/18  3:42 PM.  Always use your most recent med list.  
  
  
  
  
 ACID GONE ANTACID  mg/15 mL suspension Generic drug:  aluminum hydrox-magnesium carb * albuterol 2.5 mg /3 mL (0.083 %) nebulizer solution Commonly known as:  PROVENTIL VENTOLIN  
2.5 mg as needed. * albuterol 90 mcg/actuation inhaler Commonly known as:  PROAIR HFA Take 2 Puffs by inhalation every four (4) hours as needed for Wheezing. atorvastatin 40 mg tablet Commonly known as:  LIPITOR  
TAKE ONE TABLET BY MOUTH ONE TIME DAILY  
  
 baclofen 10 mg tablet Commonly known as:  LIORESAL  
TAKE ONE TABLET BY MOUTH THREE TIMES A DAY DEXILANT 60 mg Cpdb Generic drug:  Dexlansoprazole TAKE ONE CAPSULE BY MOUTH ONE TIME DAILY  
  
 ergocalciferol 50,000 unit capsule Commonly known as:  VITAMIN D2  
 TAKE ONE CAPSULE BY MOUTH EVERY 7 DAYS  
  
 fluticasone 50 mcg/actuation nasal spray Commonly known as:  Awilda Robb 2 Sprays by Both Nostrils route as needed. gabapentin 400 mg capsule Commonly known as:  NEURONTIN  
400 mg two (2) times a day. HYDROcodone-acetaminophen 5-325 mg per tablet Commonly known as:  Constantino Kye Take 1-2 Tabs by mouth two (2) times daily as needed for Pain. Max Daily Amount: 4 Tabs. ipratropium 0.02 % Soln Commonly known as:  ATROVENT  
0.5 mg.  
  
 losartan 25 mg tablet Commonly known as:  COZAAR  
TAKE ONE TABLET BY MOUTH ONE TIME DAILY  
  
 * MIRALAX 17 gram packet Generic drug:  polyethylene glycol Take 17 g by mouth daily. * polyethylene glycol 17 gram packet Commonly known as:  Marcellina Dull DISSOLVE CONTENTS OF ONE PACKET IN BEVERAGE ONCE A DAY  
  
 morphine CR 30 mg CR tablet Commonly known as:  MS CONTIN Take  by mouth every twelve (12) hours. naloxone 4 mg/actuation nasal spray Commonly known as:  NARCAN  
1 [place] by IntraNASal route once as needed. Use 1 spray intranasally into 1 nostril. Use a new Narcan nasal spray for subsequent doses and administer into alternating nostrils. May repeat every 2 to 3 minutes as needed. ondansetron 4 mg disintegrating tablet Commonly known as:  ZOFRAN ODT Take 1 Tab by mouth every eight (8) hours as needed for Nausea for up to 15 doses. oxyCODONE IR 10 mg Tab immediate release tablet Commonly known as:  Verlan Jun Take 1 Tab by mouth three (3) times daily as needed. Max Daily Amount: 30 mg. QUEtiapine 200 mg tablet Commonly known as:  SEROquel Take 200 mg by mouth daily. sertraline 100 mg tablet Commonly known as:  ZOLOFT Take 100 mg by mouth nightly as needed. temazepam 15 mg capsule Commonly known as:  RESTORIL Take 1 Cap by mouth nightly as needed for Sleep. Max Daily Amount: 15 mg. Indications: INSOMNIA  
  
 traZODone 100 mg tablet Commonly known as:  Memo Ort Take 100 mg by mouth nightly. valACYclovir 500 mg tablet Commonly known as:  VALTREX Take 1 Tab by mouth two (2) times a day. * Notice: This list has 4 medication(s) that are the same as other medications prescribed for you. Read the directions carefully, and ask your doctor or other care provider to review them with you. Follow-up Instructions Return as needed. Patient Instructions Atrophic Vaginitis: Care Instructions Your Care Instructions Atrophic vaginitis is an irritation of the vagina. It's caused by thinning tissues and less moisture in the vaginal walls. It often happens during menopause when hormone levels change. Surgery to remove the ovaries also can cause it. Your doctor may do tests to rule out other causes. And you may get tests to measure your hormone levels. The problem is most often treated with the hormone estrogen. It comes in a cream, tablets, or a soft plastic ring that is placed in the vagina. Follow-up care is a key part of your treatment and safety. Be sure to make and go to all appointments, and call your doctor if you are having problems. It's also a good idea to know your test results and keep a list of the medicines you take. How can you care for yourself at home? · Use a water-based lubricant for your vagina if sex is dry or painful. Examples are Astroglide, Wet Lubricant Gel, and K-Y Jelly. · Talk with your doctor about using low-dose vaginal estrogen. It treats dryness and thinning tissue. · Do not douche. · Having sex improves blood flow to the vagina. This helps keep your tissue healthy. When should you call for help? Watch closely for changes in your health, and be sure to contact your doctor if: 
? · You have unexpected vaginal bleeding. ? · You do not get better as expected. Where can you learn more? Go to http://diana-lashonda.info/. Enter R330 in the search box to learn more about \"Atrophic Vaginitis: Care Instructions. \" Current as of: October 13, 2016 Content Version: 11.4 © 1239-9252 [employer]. Care instructions adapted under license by [employer] (which disclaims liability or warranty for this information). If you have questions about a medical condition or this instruction, always ask your healthcare professional. [address] any warranty or liability for your use of this information. Learning About Trigger Point Injections What are trigger point injections? A trigger point is a painful knot in a tight band of muscle. Trigger point injections are shots of medicine into these knots to help relieve the pain. The medicines are usually local anesthetics like lidocaine. How is a trigger point injection done? Your doctor first locates a trigger point by pressing around the painful area. This may cause your muscle to hurt or twitch. This tells the doctor that he or she has found the spot to do the injection. The area is cleaned. Your doctor then injects the medicine into the trigger point. He or she may inject the medicine in more than one direction within the trigger point. The doctor may change direction without removing the needle. If you have more than one trigger point in the muscle, your doctor may repeat the process. Your doctor may stretch the area to help the muscle relax. He or she may also show you how to move and stretch the muscle yourself. The procedure takes about 10 to 30 minutes. How long it takes depends on how many trigger points are treated. But an injection itself takes only a few moments. What can you expect after a trigger point injection? The area may feel a bit numb for a few hours. It may also feel sore. Trigger point injections may reduce some or all of your pain. But the pain can come back after the medicine wears off.  If your pain comes back, your doctor may suggest more shots or other treatment for longer-lasting relief. Follow your doctor's instructions carefully. Follow-up care is a key part of your treatment and safety. Be sure to make and go to all appointments, and call your doctor if you are having problems. It's also a good idea to know your test results and keep a list of the medicines you take. Where can you learn more? Go to http://diana-lashonda.info/. Enter 65442 [id number] in the search box to learn more about \"Learning About Trigger Point Injections. \" Current as of: June 19, 2017 Content Version: 11.4 © 4750-6187 [employer]. Care instructions adapted under license by [employer] (which disclaims liability or warranty for this information). If you have questions about a medical condition or this instruction, always ask your healthcare professional. [address] any warranty or liability for your use of this information. Introducing [facility]! Dear Erica Vidales: Thank you for requesting a Rixty account. Our records indicate that you have previously registered for a Rixty account but its currently inactive. Please call our Rixty support line at [phone]. Additional Information If you have questions, please visit the Frequently Asked Questions section of the Rixty website at [email or web address] Remember, Rixty is NOT to be used for urgent needs. For medical emergencies, dial 911. Now available from your iPhone and Android! Please provide this summary of care documentation to your next provider. Your primary care clinician is listed as Brenna Jeans. If you have any questions after today's visit, please call [phone].

## 2018-03-06 NOTE — PATIENT INSTRUCTIONS
Atrophic Vaginitis: Care Instructions  Your Care Instructions    Atrophic vaginitis is an irritation of the vagina. It's caused by thinning tissues and less moisture in the vaginal walls. It often happens during menopause when hormone levels change. Surgery to remove the ovaries also can cause it. Your doctor may do tests to rule out other causes. And you may get tests to measure your hormone levels. The problem is most often treated with the hormone estrogen. It comes in a cream, tablets, or a soft plastic ring that is placed in the vagina. Follow-up care is a key part of your treatment and safety. Be sure to make and go to all appointments, and call your doctor if you are having problems. It's also a good idea to know your test results and keep a list of the medicines you take. How can you care for yourself at home? · Use a water-based lubricant for your vagina if sex is dry or painful. Examples are Astroglide, Wet Lubricant Gel, and K-Y Jelly. · Talk with your doctor about using low-dose vaginal estrogen. It treats dryness and thinning tissue. · Do not douche. · Having sex improves blood flow to the vagina. This helps keep your tissue healthy. When should you call for help? Watch closely for changes in your health, and be sure to contact your doctor if:  ? · You have unexpected vaginal bleeding. ? · You do not get better as expected. Where can you learn more? Go to http://diana-lashonda.info/. Enter R330 in the search box to learn more about \"Atrophic Vaginitis: Care Instructions. \"  Current as of: October 13, 2016  Content Version: 11.4  © 4592-1036 [employer]. Care instructions adapted under license by [employer] (which disclaims liability or warranty for this information).  If you have questions about a medical condition or this instruction, always ask your healthcare professional. [address] any warranty or liability for your use of this information. Learning About Trigger Point Injections  What are trigger point injections? A trigger point is a painful knot in a tight band of muscle. Trigger point injections are shots of medicine into these knots to help relieve the pain. The medicines are usually local anesthetics like lidocaine. How is a trigger point injection done? Your doctor first locates a trigger point by pressing around the painful area. This may cause your muscle to hurt or twitch. This tells the doctor that he or she has found the spot to do the injection. The area is cleaned. Your doctor then injects the medicine into the trigger point. He or she may inject the medicine in more than one direction within the trigger point. The doctor may change direction without removing the needle. If you have more than one trigger point in the muscle, your doctor may repeat the process. Your doctor may stretch the area to help the muscle relax. He or she may also show you how to move and stretch the muscle yourself. The procedure takes about 10 to 30 minutes. How long it takes depends on how many trigger points are treated. But an injection itself takes only a few moments. What can you expect after a trigger point injection? The area may feel a bit numb for a few hours. It may also feel sore. Trigger point injections may reduce some or all of your pain. But the pain can come back after the medicine wears off. If your pain comes back, your doctor may suggest more shots or other treatment for longer-lasting relief. Follow your doctor's instructions carefully. Follow-up care is a key part of your treatment and safety. Be sure to make and go to all appointments, and call your doctor if you are having problems. It's also a good idea to know your test results and keep a list of the medicines you take. Where can you learn more? Go to http://diana-lashonda.info/.   Enter 36500 [id number] in the search box to learn more about \"Learning About Trigger Point Injections. \"  Current as of: June 19, 2017  Content Version: 11.4  © 5479-7992 [employer]. Care instructions adapted under license by [employer] (which disclaims liability or warranty for this information). If you have questions about a medical condition or this instruction, always ask your healthcare professional. [address] any warranty or liability for your use of this information.

## 2018-03-07 ENCOUNTER — HOSPITAL ENCOUNTER (OUTPATIENT)
Dept: LAB | Age: 63
Discharge: HOME | End: 2018-03-07
Payer: MEDICARE

## 2018-03-07 ENCOUNTER — OFFICE VISIT (OUTPATIENT)
Dept: FAMILY MEDICINE CLINIC | Age: 63
End: 2018-03-07

## 2018-03-07 ENCOUNTER — PATIENT OUTREACH (OUTPATIENT)
Dept: FAMILY MEDICINE CLINIC | Age: 63
End: 2018-03-07

## 2018-03-07 VITALS
TEMPERATURE: 98.4 F | HEIGHT: 62 IN | DIASTOLIC BLOOD PRESSURE: 72 MMHG | RESPIRATION RATE: 17 BRPM | WEIGHT: 173 LBS | OXYGEN SATURATION: 94 % | HEART RATE: 84 BPM | SYSTOLIC BLOOD PRESSURE: 114 MMHG | BODY MASS INDEX: 31.83 KG/M2

## 2018-03-07 DIAGNOSIS — B00.9 HSV-2 INFECTION: ICD-10-CM

## 2018-03-07 DIAGNOSIS — Z79.899 MEDICATION MANAGEMENT: ICD-10-CM

## 2018-03-07 DIAGNOSIS — Z29.9 PREVENTIVE MEASURE: ICD-10-CM

## 2018-03-07 DIAGNOSIS — E78.00 HYPERCHOLESTEROLEMIA: ICD-10-CM

## 2018-03-07 DIAGNOSIS — K59.03 THERAPEUTIC OPIOID INDUCED CONSTIPATION: ICD-10-CM

## 2018-03-07 DIAGNOSIS — R10.13 EPIGASTRIC PAIN: Primary | ICD-10-CM

## 2018-03-07 DIAGNOSIS — M79.18 MYOFASCIAL PAIN SYNDROME: ICD-10-CM

## 2018-03-07 DIAGNOSIS — E55.9 VITAMIN D DEFICIENCY: ICD-10-CM

## 2018-03-07 DIAGNOSIS — F41.9 ACUTE ANXIETY: ICD-10-CM

## 2018-03-07 DIAGNOSIS — T40.2X5A THERAPEUTIC OPIOID INDUCED CONSTIPATION: ICD-10-CM

## 2018-03-07 DIAGNOSIS — I10 BENIGN ESSENTIAL HYPERTENSION WITH TARGET BLOOD PRESSURE BELOW 140/90: ICD-10-CM

## 2018-03-07 DIAGNOSIS — K21.00 GASTROESOPHAGEAL REFLUX DISEASE WITH ESOPHAGITIS: ICD-10-CM

## 2018-03-07 LAB
BASOPHILS # BLD: 0 K/UL (ref 0–0.06)
BASOPHILS NFR BLD: 0 % (ref 0–2)
CHOLEST SERPL-MCNC: 185 MG/DL
DIFFERENTIAL METHOD BLD: NORMAL
EOSINOPHIL # BLD: 0.1 K/UL (ref 0–0.4)
EOSINOPHIL NFR BLD: 2 % (ref 0–5)
ERYTHROCYTE [DISTWIDTH] IN BLOOD BY AUTOMATED COUNT: 13.3 % (ref 11.6–14.5)
EST. AVERAGE GLUCOSE BLD GHB EST-MCNC: 117 MG/DL
HBA1C MFR BLD: 5.7 % (ref 4.2–5.6)
HCT VFR BLD AUTO: 41 % (ref 35–45)
HDLC SERPL-MCNC: 64 MG/DL (ref 40–60)
HDLC SERPL: 2.9 {RATIO} (ref 0–5)
HGB BLD-MCNC: 13 G/DL (ref 12–16)
LDLC SERPL CALC-MCNC: 89.8 MG/DL (ref 0–100)
LIPID PROFILE,FLP: ABNORMAL
LYMPHOCYTES # BLD: 3.5 K/UL (ref 0.9–3.6)
LYMPHOCYTES NFR BLD: 46 % (ref 21–52)
MCH RBC QN AUTO: 28.1 PG (ref 24–34)
MCHC RBC AUTO-ENTMCNC: 31.7 G/DL (ref 31–37)
MCV RBC AUTO: 88.7 FL (ref 74–97)
MONOCYTES # BLD: 0.4 K/UL (ref 0.05–1.2)
MONOCYTES NFR BLD: 6 % (ref 3–10)
NEUTS SEG # BLD: 3.4 K/UL (ref 1.8–8)
NEUTS SEG NFR BLD: 46 % (ref 40–73)
PLATELET # BLD AUTO: 307 K/UL (ref 135–420)
PMV BLD AUTO: 10.6 FL (ref 9.2–11.8)
RBC # BLD AUTO: 4.62 M/UL (ref 4.2–5.3)
TRIGL SERPL-MCNC: 156 MG/DL (ref ?–150)
VLDLC SERPL CALC-MCNC: 31.2 MG/DL
WBC # BLD AUTO: 7.5 K/UL (ref 4.6–13.2)

## 2018-03-07 PROCEDURE — 85025 COMPLETE CBC W/AUTO DIFF WBC: CPT | Performed by: INTERNAL MEDICINE

## 2018-03-07 PROCEDURE — 83036 HEMOGLOBIN GLYCOSYLATED A1C: CPT | Performed by: INTERNAL MEDICINE

## 2018-03-07 PROCEDURE — 82306 VITAMIN D 25 HYDROXY: CPT | Performed by: INTERNAL MEDICINE

## 2018-03-07 PROCEDURE — 36415 COLL VENOUS BLD VENIPUNCTURE: CPT | Performed by: INTERNAL MEDICINE

## 2018-03-07 PROCEDURE — 80061 LIPID PANEL: CPT | Performed by: INTERNAL MEDICINE

## 2018-03-07 RX ORDER — HYDROXYZINE PAMOATE 50 MG/1
50 CAPSULE ORAL
Qty: 90 CAP | Refills: 1 | Status: SHIPPED | OUTPATIENT
Start: 2018-03-07 | End: 2019-01-04

## 2018-03-07 RX ORDER — VALACYCLOVIR HYDROCHLORIDE 500 MG/1
500 TABLET, FILM COATED ORAL 2 TIMES DAILY
Qty: 60 TAB | Refills: 3 | Status: SHIPPED | OUTPATIENT
Start: 2018-03-07 | End: 2019-01-28 | Stop reason: SDUPTHER

## 2018-03-07 NOTE — PATIENT INSTRUCTIONS
Please contact our office if you have any questions about your visit today. 1.) Use Hydroxyzine as needed for anxiety episodes. 2.) Please keep regular follow up in late March. Anxiety Disorder: Care Instructions  Your Care Instructions    Anxiety is a normal reaction to stress. Difficult situations can cause you to have symptoms such as sweaty palms and a nervous feeling. In an anxiety disorder, the symptoms are far more severe. Constant worry, muscle tension, trouble sleeping, nausea and diarrhea, and other symptoms can make normal daily activities difficult or impossible. These symptoms may occur for no reason, and they can affect your work, school, or social life. Medicines, counseling, and self-care can all help. Follow-up care is a key part of your treatment and safety. Be sure to make and go to all appointments, and call your doctor if you are having problems. It's also a good idea to know your test results and keep a list of the medicines you take. How can you care for yourself at home? · Take medicines exactly as directed. Call your doctor if you think you are having a problem with your medicine. · Go to your counseling sessions and follow-up appointments. · Recognize and accept your anxiety. Then, when you are in a situation that makes you anxious, say to yourself, \"This is not an emergency. I feel uncomfortable, but I am not in danger. I can keep going even if I feel anxious. \"  · Be kind to your body:  ¨ Relieve tension with exercise or a massage. ¨ Get enough rest.  ¨ Avoid alcohol, caffeine, nicotine, and illegal drugs. They can increase your anxiety level and cause sleep problems. ¨ Learn and do relaxation techniques. See below for more about these techniques. · Engage your mind. Get out and do something you enjoy. Go to a funny movie, or take a walk or hike. Plan your day. Having too much or too little to do can make you anxious. · Keep a record of your symptoms.  Discuss your fears with a good friend or family member, or join a support group for people with similar problems. Talking to others sometimes relieves stress. · Get involved in social groups, or volunteer to help others. Being alone sometimes makes things seem worse than they are. · Get at least 30 minutes of exercise on most days of the week to relieve stress. Walking is a good choice. You also may want to do other activities, such as running, swimming, cycling, or playing tennis or team sports. Relaxation techniques  Do relaxation exercises 10 to 20 minutes a day. You can play soothing, relaxing music while you do them, if you wish. · Tell others in your house that you are going to do your relaxation exercises. Ask them not to disturb you. · Find a comfortable place, away from all distractions and noise. · Lie down on your back, or sit with your back straight. · Focus on your breathing. Make it slow and steady. · Breathe in through your nose. Breathe out through either your nose or mouth. · Breathe deeply, filling up the area between your navel and your rib cage. Breathe so that your belly goes up and down. · Do not hold your breath. · Breathe like this for 5 to 10 minutes. Notice the feeling of calmness throughout your whole body. As you continue to breathe slowly and deeply, relax by doing the following for another 5 to 10 minutes:  · Tighten and relax each muscle group in your body. You can begin at your toes and work your way up to your head. · Imagine your muscle groups relaxing and becoming heavy. · Empty your mind of all thoughts. · Let yourself relax more and more deeply. · Become aware of the state of calmness that surrounds you. · When your relaxation time is over, you can bring yourself back to alertness by moving your fingers and toes and then your hands and feet and then stretching and moving your entire body.  Sometimes people fall asleep during relaxation, but they usually wake up shortly afterward. · Always give yourself time to return to full alertness before you drive a car or do anything that might cause an accident if you are not fully alert. Never play a relaxation tape while you drive a car. When should you call for help? Call 911 anytime you think you may need emergency care. For example, call if:  ? · You feel you cannot stop from hurting yourself or someone else. ? Keep the numbers for these national suicide hotlines: 3-118-437-TALK (9-871.150.9988) and 6-560-EEUUNPL (1-090-876-984.446.3050). If you or someone you know talks about suicide or feeling hopeless, get help right away. ? Watch closely for changes in your health, and be sure to contact your doctor if:  ? · You have anxiety or fear that affects your life. ? · You have symptoms of anxiety that are new or different from those you had before. Where can you learn more? Go to http://diana-lashonda.info/. Enter P754 in the search box to learn more about \"Anxiety Disorder: Care Instructions. \"  Current as of: May 12, 2017  Content Version: 11.4  © 4334-6814 Healthwise, Incorporated. Care instructions adapted under license by [employer] (which disclaims liability or warranty for this information). If you have questions about a medical condition or this instruction, always ask your healthcare professional. [address] any warranty or liability for your use of this information.

## 2018-03-07 NOTE — MR AVS SNAPSHOT
[address] [address] [address] [address] [phone] Patient: Sebastián Alberto MRN: [id number] WNT:[birth year] Visit Information Date & Time Provider Department Dept. Phone Encounter #  
 3/7/2018  1:45 PM Olivia Mello, [address] [id number] Follow-up Instructions Return for Follow Up . Your Appointments 3/13/2018 11:00 AM  
Follow Up with Aidan Canales MD  
[facility] [place] ([address]) Appt Note: TOE 3wk fu  
 [address], [address] [facility]  
[phone] [address], [address]  
  
    
 3/28/2018  1:45 PM  
Follow Up with Olivia Mello DO [address] [facility] (--) Appt Note: Follow up [address] [facility] [address] [phone]  
  
   
 [address] [place] [id number]  
  
    
 4/3/2018 11:20 AM  
Follow Up with BETO Castellon [facility] (RICHAR SCHEDULING) Appt Note: 3 mon f/u per rc. ..to  
 [address] [facility] [address] [phone] [facility] 2169 [id number] Upcoming Health Maintenance Date Due DTaP/Tdap/Td series (1 - Tdap) 3/30/1975 PAP AKA CERVICAL CYTOLOGY 6/21/2019 BREAST CANCER SCRN MAMMOGRAM 1/12/2020 COLONOSCOPY 4/13/2027 Allergies as of 3/7/2018  Review Complete On: 8/9/3572 By: Clotilde Mena. Nicolas Pedersen LPN Severity Noted Reaction Type Reactions Aspirin    Other (comments) Stomach bleeding Ativan [Lorazepam]  02/21/2018    Shortness of Breath Macrobid [Nitrofurantoin Monohyd/m-cryst]  11/25/2015    Nausea and Vomiting Nsaids (Non-steroidal Anti-inflammatory Drug)    Nausea and Vomiting Opana [Oxymorphone]  11/29/2016    Other (comments) Insomnia, weight loss, nightmares Pcn [Penicillins]    Hives Current Immunizations  Never Reviewed Name Date Pneumococcal Polysaccharide (PPSV-23) 3/7/2014 Not reviewed this visit You Were Diagnosed With   
  
 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  
 31.64 kg/m 2 1.85 m 2 Preferred Pharmacy Pharmacy Name Phone [id number] - [facility], [address] [phone] Your Updated Medication List  
  
   
This list is accurate as of 3/7/18  2:35 PM.  Always use your most recent med list.  
  
  
  
  
 ACID GONE ANTACID  mg/15 mL suspension Generic drug:  aluminum hydrox-magnesium carb * albuterol 2.5 mg /3 mL (0.083 %) nebulizer solution Commonly known as:  PROVENTIL VENTOLIN  
2.5 mg as needed. * albuterol 90 mcg/actuation inhaler Commonly known as:  PROAIR HFA Take 2 Puffs by inhalation every four (4) hours as needed for Wheezing. atorvastatin 40 mg tablet Commonly known as:  LIPITOR  
TAKE ONE TABLET BY MOUTH ONE TIME DAILY  
  
 baclofen 10 mg tablet Commonly known as:  LIORESAL  
TAKE ONE TABLET BY MOUTH THREE TIMES A DAY DEXILANT 60 mg Cpdb Generic drug:  Dexlansoprazole TAKE ONE CAPSULE BY MOUTH ONE TIME DAILY  
  
 ergocalciferol 50,000 unit capsule Commonly known as:  VITAMIN D2  
TAKE ONE CAPSULE BY MOUTH EVERY 7 DAYS  
  
 fluticasone 50 mcg/actuation nasal spray Commonly known as:  Razia Jim 2 Sprays by Both Nostrils route as needed. gabapentin 400 mg capsule Commonly known as:  NEURONTIN  
400 mg two (2) times a day. HYDROcodone-acetaminophen 5-325 mg per tablet Commonly known as:  Ferne Arrow Take 1-2 Tabs by mouth two (2) times daily as needed for Pain. Max Daily Amount: 4 Tabs. hydrOXYzine pamoate 50 mg capsule Commonly known as:  VISTARIL Take 1 Cap by mouth three (3) times daily as needed for Anxiety. ipratropium 0.02 % Soln Commonly known as:  ATROVENT  
0.5 mg.  
  
 losartan 25 mg tablet Commonly known as:  COZAAR  
TAKE ONE TABLET BY MOUTH ONE TIME DAILY  
  
 * MIRALAX 17 gram packet Generic drug:  polyethylene glycol Take 17 g by mouth daily. * polyethylene glycol 17 gram packet Commonly known as:  Ernestene Score DISSOLVE CONTENTS OF ONE PACKET IN BEVERAGE ONCE A DAY  
  
 morphine CR 30 mg CR tablet Commonly known as:  MS CONTIN Take  by mouth every twelve (12) hours. naloxone 4 mg/actuation nasal spray Commonly known as:  NARCAN  
1 [place] by IntraNASal route once as needed. Use 1 spray intranasally into 1 nostril. Use a new Narcan nasal spray for subsequent doses and administer into alternating nostrils. May repeat every 2 to 3 minutes as needed. ondansetron 4 mg disintegrating tablet Commonly known as:  ZOFRAN ODT Take 1 Tab by mouth every eight (8) hours as needed for Nausea for up to 15 doses. oxyCODONE IR 10 mg Tab immediate release tablet Commonly known as:  Aron Phy Take 1 Tab by mouth three (3) times daily as needed. Max Daily Amount: 30 mg. QUEtiapine 200 mg tablet Commonly known as:  SEROquel Take 200 mg by mouth daily. sertraline 100 mg tablet Commonly known as:  ZOLOFT Take 100 mg by mouth nightly as needed. temazepam 15 mg capsule Commonly known as:  RESTORIL Take 1 Cap by mouth nightly as needed for Sleep. Max Daily Amount: 15 mg.  Indications: INSOMNIA  
  
 traZODone 100 mg tablet Commonly known as:  Kamilla Colon Take 100 mg by mouth nightly. valACYclovir 500 mg tablet Commonly known as:  VALTREX Take 1 Tab by mouth two (2) times a day. * Notice: This list has 4 medication(s) that are the same as other medications prescribed for you. Read the directions carefully, and ask your doctor or other care provider to review them with you. Prescriptions Sent to Pharmacy Refills  
 valACYclovir (VALTREX) 500 mg tablet 3 Sig: Take 1 Tab by mouth two (2) times a day. Class: Normal  
 Pharmacy: [address] #: [phone] Route: Oral  
 hydrOXYzine pamoate (VISTARIL) 50 mg capsule 1 Sig: Take 1 Cap by mouth three (3) times daily as needed for Anxiety. Class: Normal  
 Pharmacy: [address] #: [phone] Route: Oral  
  
Follow-up Instructions Return for Follow Up . Patient Instructions Please contact our office if you have any questions about your visit today. 1.) Use Hydroxyzine as needed for anxiety episodes. 2.) Please keep regular follow up in late March. Anxiety Disorder: Care Instructions Your Care Instructions Anxiety is a normal reaction to stress. Difficult situations can cause you to have symptoms such as sweaty palms and a nervous feeling. In an anxiety disorder, the symptoms are far more severe. Constant worry, muscle tension, trouble sleeping, nausea and diarrhea, and other symptoms can make normal daily activities difficult or impossible. These symptoms may occur for no reason, and they can affect your work, school, or social life. Medicines, counseling, and self-care can all help. Follow-up care is a key part of your treatment and safety.  Be sure to make and go to all appointments, and call your doctor if you are having problems. It's also a good idea to know your test results and keep a list of the medicines you take. How can you care for yourself at home? · Take medicines exactly as directed. Call your doctor if you think you are having a problem with your medicine. · Go to your counseling sessions and follow-up appointments. · Recognize and accept your anxiety. Then, when you are in a situation that makes you anxious, say to yourself, \"This is not an emergency. I feel uncomfortable, but I am not in danger. I can keep going even if I feel anxious. \" · Be kind to your body: ¨ Relieve tension with exercise or a massage. ¨ Get enough rest. 
¨ Avoid alcohol, caffeine, nicotine, and illegal drugs. They can increase your anxiety level and cause sleep problems. ¨ Learn and do relaxation techniques. See below for more about these techniques. · Engage your mind. Get out and do something you enjoy. Go to a funny movie, or take a walk or hike. Plan your day. Having too much or too little to do can make you anxious. · Keep a record of your symptoms. Discuss your fears with a good friend or family member, or join a support group for people with similar problems. Talking to others sometimes relieves stress. · Get involved in social groups, or volunteer to help others. Being alone sometimes makes things seem worse than they are. · Get at least 30 minutes of exercise on most days of the week to relieve stress. Walking is a good choice. You also may want to do other activities, such as running, swimming, cycling, or playing tennis or team sports. Relaxation techniques Do relaxation exercises 10 to 20 minutes a day. You can play soothing, relaxing music while you do them, if you wish. · Tell others in your house that you are going to do your relaxation exercises. Ask them not to disturb you. · Find a comfortable place, away from all distractions and noise. · Lie down on your back, or sit with your back straight. · Focus on your breathing. Make it slow and steady. · Breathe in through your nose. Breathe out through either your nose or mouth. · Breathe deeply, filling up the area between your navel and your rib cage. Breathe so that your belly goes up and down. · Do not hold your breath. · Breathe like this for 5 to 10 minutes. Notice the feeling of calmness throughout your whole body. As you continue to breathe slowly and deeply, relax by doing the following for another 5 to 10 minutes: · Tighten and relax each muscle group in your body. You can begin at your toes and work your way up to your head. · Imagine your muscle groups relaxing and becoming heavy. · Empty your mind of all thoughts. · Let yourself relax more and more deeply. · Become aware of the state of calmness that surrounds you. · When your relaxation time is over, you can bring yourself back to alertness by moving your fingers and toes and then your hands and feet and then stretching and moving your entire body. Sometimes people fall asleep during relaxation, but they usually wake up shortly afterward. · Always give yourself time to return to full alertness before you drive a car or do anything that might cause an accident if you are not fully alert. Never play a relaxation tape while you drive a car. When should you call for help? Call 911 anytime you think you may need emergency care. For example, call if: 
? · You feel you cannot stop from hurting yourself or someone else. ? Keep the numbers for these national suicide hotlines: 6-151-584-TALK (2-270.276.1555) and 1-039-IZXIRFQ (0-534.778.3223). If you or someone you know talks about suicide or feeling hopeless, get help right away. ? Watch closely for changes in your health, and be sure to contact your doctor if: 
? · You have anxiety or fear that affects your life. ? · You have symptoms of anxiety that are new or different from those you had before. Where can you learn more? Go to http://diana-lashonda.info/. Enter P754 in the search box to learn more about \"Anxiety Disorder: Care Instructions. \" Current as of: May 12, 2017 Content Version: 11.4 © 3804-7109 [employer]. Care instructions adapted under license by [employer] (which disclaims liability or warranty for this information). If you have questions about a medical condition or this instruction, always ask your healthcare professional. [address] any warranty or liability for your use of this information. Introducing [facility]! Dear Coleman Chaney: Thank you for requesting a [facility] account. Our records indicate that you have previously registered for a [employer] account but its currently inactive. Please call our [employer] support line at [phone]. Additional Information If you have questions, please visit the Frequently Asked Questions section of the [employer] website at [email or web address] Remember, [employer] is NOT to be used for urgent needs. For medical emergencies, dial 911. Now available from your iPhone and Android! Please provide this summary of care documentation to your next provider. Your primary care clinician is listed as Tiffani Green. If you have any questions after today's visit, please call [phone].

## 2018-03-07 NOTE — PROGRESS NOTES
Goals Addressed             Most Recent     COMPLETED: Establish PCP relationships and regularly scheduled appointments. On track (3/7/2018)             Follow up with PCP, 3/7/2018   Patient attended their post discharge follow up appointment with Dr. Bonnie Pearson as scheduled.

## 2018-03-07 NOTE — PROGRESS NOTES
Chief Complaint   Patient presents with   Kristian Nguyen ED Follow-up     states that she was seen and treated for an anxiety attack and nausea and vomiting 2-21-18    Anxiety     states that she needs her medication as she is out and has not called the psych provider     1. Have you been to the ER, urgent care clinic since your last visit? Hospitalized since your last visit? Yes When: 2-21-18 Where: [facility] Reason for visit: vomiting    2. Have you seen or consulted any other health care providers outside of the [address] [place] [address] since your last visit? Include any pap smears or colon screening.  No

## 2018-03-07 NOTE — PROGRESS NOTES
History and Physical    Patient: Sarah Melendez MRN: [id number]  SSN: [id number]    YOB: 1955  Age: 60 y.o. Sex: female      Subjective:      Sarah Melendez is a 60 y.o. female who is her for ER follow up. She was seen for epigastric pain and nausea. She states that she had an upper endoscopy just prior to the ER visit. She states that she had to go to the ER about 5 pm the same day as her endoscopy. She states that they worked her up for cardiac disease but it was negative. She mentions that she used the Zofran just one time that was given to her by the ER. She was also given the pill form of Carafate. She mentions that she has severe anxiety. She states that she will not see the Psychiatrist until April 4th. She states that she has severe reactions to Ativan. She states that she gets severe bronchitic reactions with this. She has had appointments with OBGYN and also orthopedics. Visit from 1/24/2018  Patient is here for regular follow up. The patient recently saw her GI specialist, Dr. Jenny Chen who recommended that she get a trigger point injection for Myofascial Pain Syndrome. The patient had previously been suffering from right flank pain as well as esophageal pain recently. She also mentions that she did get a colonoscopy as well as an upper endoscopy. She also mentions that she has a urinary odor for about two weeks. She also mentions that her right foot goes numb and her toes go numb. She mentions that the numbness occurs primarily at night. She mentions that she also has her fan blowing on her feet. 12/28/2017   Patient is here to follow up on her bronchitis. She states that she completed her antibiotic. She mentions that her cough has essentially resolved. Currently, she states that she is having spasms in her chest. She was seen in the ER and they placed her on Dexilant. She states that she also started on Carafate for ulcers as well.   The patient mentions that she has not used any Prilosec, ranitidine, or Nexium or Pepcid. Visit from 12/19/17  She was seen in the Urgent Care for bronchitis. She mentions that her symptoms began on Friday. She denies any sick contacts prior to the symptoms occurring. She states that she was placed on doxycycline and she threw up. She mentions that she had a similar reaction with doxycycline. She also requests a refill of her Flonase.           Past Medical History:   Diagnosis Date    Annular tear of lumbar disc 11/10/2014    Asthma     Bronchitis    Benign tumor of throat     Bone spur     right ankle    Cervicalgia 11/10/2014    Chronic pain     back    Degeneration of lumbar or lumbosacral intervertebral disc 11/10/2014    Degenerative disc disease     Depression     Displacement of lumbar intervertebral disc without myelopathy 11/10/2014    Elevated white blood cell count     Fibromyalgia     GERD (gastroesophageal reflux disease)     Hypertension     Insomnia     Nausea & vomiting     Pain in joint, multiple sites 11/10/2014    Postlaminectomy syndrome, cervical region 11/10/2014    Sinus infection 10/5/15    Ulcer ([address]      Past Surgical History:   Procedure Laterality Date    COLONOSCOPY N/A 4/13/2017    COLONOSCOPY performed by Constantino Lloyd MD at [facility] HX Left Eye CATARACT REMOVAL--- March 2017      HX CERVICAL FUSION      HX HYSTERECTOMY      HX ORTHOPAEDIC      ganglion cyst removed, right hand    HX OTHER SURGICAL      cyst left thigh removed    LAP,CHOLECYSTECTOMY N/A 02/27/2017    Dr. Raymundo Atkinson      Family History   Problem Relation Age of Onset    Hypertension Other     Heart Attack Other     Heart Disease Other     Stroke Other     Headache Other     Seizures Other      Social History   Substance Use Topics    Smoking status: Former Smoker     Quit date: 3/6/1993    Smokeless tobacco: Never Used    Alcohol use No      Comment: none in 24 years      Prior to Admission medications Medication Sig Start Date End Date Taking? Authorizing Provider   morphine CR (MS CONTIN) 30 mg CR tablet Take  by mouth every twelve (12) hours. Yes Historical Provider   ACID GONE ANTACID  mg/15 mL suspension  3/4/18  Yes Historical Provider   ondansetron (ZOFRAN ODT) 4 mg disintegrating tablet Take 1 Tab by mouth every eight (8) hours as needed for Nausea for up to 15 doses. 2/21/18  Yes Sommer Norton NP   DEXILANT 60 mg CpDB TAKE ONE CAPSULE BY MOUTH ONE TIME DAILY  2/19/18  Yes Roe Renee DO   losartan (COZAAR) 25 mg tablet TAKE ONE TABLET BY MOUTH ONE TIME DAILY  2/19/18  Yes Roe Renee DO   ergocalciferol (VITAMIN D2) 50,000 unit capsule TAKE ONE CAPSULE BY MOUTH EVERY 7 DAYS 1/24/18  Yes Roe Renee DO   oxyCODONE IR (ROXICODONE) 10 mg tab immediate release tablet Take 1 Tab by mouth three (3) times daily as needed. Max Daily Amount: 30 mg. 1/26/18  Yes BETO Joe   baclofen (LIORESAL) 10 mg tablet TAKE ONE TABLET BY MOUTH THREE TIMES A DAY 1/25/18  Yes BETO Joe   atorvastatin (LIPITOR) 40 mg tablet TAKE ONE TABLET BY MOUTH ONE TIME DAILY 1/2/18  Yes Roe Renee DO   polyethylene glycol (MIRALAX) 17 gram packet DISSOLVE CONTENTS OF ONE PACKET IN BEVERAGE ONCE A DAY 12/29/17  Yes Roe Renee DO   albuterol (PROAIR HFA) 90 mcg/actuation inhaler Take 2 Puffs by inhalation every four (4) hours as needed for Wheezing. 12/28/17  Yes Roe Renee DO   fluticasone (FLONASE) 50 mcg/actuation nasal spray 2 Sprays by Both Nostrils route as needed. 12/19/17  Yes Roe Renee DO   QUEtiapine (SEROQUEL) 200 mg tablet Take 200 mg by mouth daily. Yes Historical Provider   naloxone [facility]) 4 mg/actuation nasal spray 1 Morral by IntraNASal route once as needed. Use 1 spray intranasally into 1 nostril. Use a new Narcan nasal spray for subsequent doses and administer into alternating nostrils. May repeat every 2 to 3 minutes as needed.    Yes Phys Other, MD   polyethylene glycol (MIRALAX) 17 gram packet Take 17 g by mouth daily. Yes Phys MD Alejandro   valACYclovir (VALTREX) 500 mg tablet Take 1 Tab by mouth two (2) times a day. 9/29/17  Yes Deirdre Abraham MD   temazepam (RESTORIL) 15 mg capsule Take 1 Cap by mouth nightly as needed for Sleep. Max Daily Amount: 15 mg. Indications: INSOMNIA 3/17/17  Yes Deirdre Abraham MD   albuterol (PROVENTIL VENTOLIN) 2.5 mg /3 mL (0.083 %) nebulizer solution 2.5 mg as needed. 1/14/13  Yes Historical Provider   gabapentin (NEURONTIN) 400 mg capsule 400 mg two (2) times a day. 7/7/13  Yes Historical Provider   ipratropium (ATROVENT) 0.02 % nebulizer solution 0.5 mg. 1/14/13  Yes Historical Provider   trazodone (DESYREL) 100 mg tablet Take 100 mg by mouth nightly. Yes Historical Provider   sertraline (ZOLOFT) 100 mg tablet Take 100 mg by mouth nightly as needed. Yes Historical Provider   HYDROcodone-acetaminophen (NORCO) 5-325 mg per tablet Take 1-2 Tabs by mouth two (2) times daily as needed for Pain. Max Daily Amount: 4 Tabs. 2/7/18   Chapis Burns MD        Allergies   Allergen Reactions    Aspirin Other (comments)     Stomach bleeding    Ativan [Lorazepam] Shortness of Breath    Macrobid [Nitrofurantoin Monohyd/M-Cryst] Nausea and Vomiting    Nsaids (Non-Steroidal Anti-Inflammatory Drug) Nausea and Vomiting    Opana [Oxymorphone] Other (comments)     Insomnia, weight loss, nightmares    Pcn [Penicillins] Hives       Review of Systems:  Pertinent items are noted in the History of Present Illness. Objective:     Vitals:    03/07/18 1354   BP: 114/72   Pulse: 84   Resp: 17   Temp: 98.4 °F (36.9 °C)   TempSrc: Oral   SpO2: 94%   Weight: 173 lb (78.5 kg)   Height: 5' 2\" (1.575 m)        Physical Exam:  GENERAL: fatigued, cooperative, no distress, appears stated age  EYE: negative  LYMPHATIC: Cervical, supraclavicular, and axillary nodes normal.   EARS: No fluid behind tympanic membranes.    THROAT & NECK: mild erythema, normal and no erythema or exudates noted. LUNG: clear to auscultation bilaterally  HEART: regular rate and rhythm, S1, S2 normal, no murmur, click, rub or gallop  ABDOMEN: soft, non-tender. Bowel sounds normal. No masses,  no organomegaly  EXTREMITIES:  extremities normal, atraumatic, no cyanosis or edema      Labs:     Results for orders placed or performed in visit on 01/24/18   AMB POC URINALYSIS DIP STICK AUTO W/ MICRO     Status: None   Result Value Ref Range Status    Color (UA POC) Yellow  Final    Clarity (UA POC) Clear  Final    Glucose (UA POC) Negative Negative Final    Bilirubin (UA POC) Negative Negative Final    Ketones (UA POC) Negative Negative Final    Specific gravity (UA POC) 1.005 1.001 - 1.035 Final    Blood (UA POC) Trace Negative Final    pH (UA POC) 5.5 4.6 - 8.0 Final    Protein (UA POC) Negative Negative Final    Urobilinogen (UA POC) 0.2 mg/dL 0.2 - 1 Final    Nitrites (UA POC) Negative Negative Final    Leukocyte esterase (UA POC) Negative Negative Final   Results for orders placed or performed in visit on 05/12/17   AMB POC URINALYSIS DIP STICK AUTO W/O MICRO     Status: None   Result Value Ref Range Status    Color (UA POC) Yellow  Final    Clarity (UA POC) Clear  Final    Glucose (UA POC) Negative Negative Final    Bilirubin (UA POC) Negative Negative Final    Ketones (UA POC) Negative Negative Final    Specific gravity (UA POC) 1.015 1.001 - 1.035 Final    Blood (UA POC) Negative Negative Final    pH (UA POC) 7.0 4.6 - 8.0 Final    Protein (UA POC) Negative Negative mg/dL Final    Urobilinogen (UA POC) 0.2 mg/dL 0.2 - 1 Final    Nitrites (UA POC) Negative Negative Final    Leukocyte esterase (UA POC) Negative Negative Final         Assessment:     1.) Epigastric Pain: Resolved. 2.) HSV Infection: Patient's acyclovir reordered. 3.) Anxiety: Patient placed on Vistaril for anxiety. Patient will keep regular follow up appointment.      Plan:     Orders Placed This Encounter    valACYclovir (VALTREX) 500 mg tablet    hydrOXYzine pamoate (VISTARIL) 50 mg capsule           Signed By: Teresa Alfaro DO     March 7, 2018

## 2018-03-08 LAB — 25(OH)D3 SERPL-MCNC: 67.8 NG/ML (ref 30–100)

## 2018-03-08 NOTE — PROGRESS NOTES
Trigger point injection    Preprocedure dx: Myofascial pain on LLQ       Postprocedure dx:  Myofascial pain on LLQ       Procedure: Trigger point injection      After consent was obtained, pt was placed in lithotomy position. The skin was cleansed with EtOH. The painful area was isolated. Two mL of lidocaine was used to injected the area in question. Pt was relieved of her pain. Bandage placed. Pt tolerated the procedure well. 1. Atrophic vaginitis  See office note    2. Myofascial muscle pain  Trigger point injection successful    3.  Vaginal odor  See office note

## 2018-03-09 ENCOUNTER — TELEPHONE (OUTPATIENT)
Dept: FAMILY MEDICINE CLINIC | Age: 63
End: 2018-03-09

## 2018-03-13 NOTE — TELEPHONE ENCOUNTER
He Ayoub,   Please let the patient know that her A1C is down to 5.7. Her cholesterol is at 185 and her triglycerides are at 156 which is not bad. She should continue lifestyle change. Thanks.     SHAMIR

## 2018-03-29 ENCOUNTER — PATIENT OUTREACH (OUTPATIENT)
Dept: FAMILY MEDICINE CLINIC | Age: 63
End: 2018-03-29

## 2018-03-29 NOTE — PROGRESS NOTES
Goals Addressed             Most Recent     COMPLETED: Reduce ED Utilization   On track (3/29/2018)             No admissions within 30 days post discharge, 2/21/2018  Follow up with PCP  Medication adherence   Episode closed: no hospitalization or ED admission post 30 days from discharge

## 2018-04-03 ENCOUNTER — OFFICE VISIT (OUTPATIENT)
Dept: PAIN MANAGEMENT | Age: 63
End: 2018-04-03

## 2018-04-03 VITALS
DIASTOLIC BLOOD PRESSURE: 81 MMHG | TEMPERATURE: 98.8 F | RESPIRATION RATE: 12 BRPM | WEIGHT: 173 LBS | HEART RATE: 71 BPM | BODY MASS INDEX: 31.83 KG/M2 | HEIGHT: 62 IN | SYSTOLIC BLOOD PRESSURE: 124 MMHG

## 2018-04-03 DIAGNOSIS — M25.50 PAIN IN JOINT, MULTIPLE SITES: ICD-10-CM

## 2018-04-03 DIAGNOSIS — M54.50 CHRONIC BILATERAL LOW BACK PAIN WITHOUT SCIATICA: ICD-10-CM

## 2018-04-03 DIAGNOSIS — M54.2 CERVICALGIA: ICD-10-CM

## 2018-04-03 DIAGNOSIS — G89.4 CHRONIC PAIN SYNDROME: ICD-10-CM

## 2018-04-03 DIAGNOSIS — M51.37 DEGENERATION OF LUMBAR OR LUMBOSACRAL INTERVERTEBRAL DISC: ICD-10-CM

## 2018-04-03 DIAGNOSIS — G89.29 CHRONIC BILATERAL LOW BACK PAIN WITHOUT SCIATICA: ICD-10-CM

## 2018-04-03 DIAGNOSIS — G89.29 OTHER CHRONIC PAIN: ICD-10-CM

## 2018-04-03 DIAGNOSIS — M51.26 DISPLACEMENT OF LUMBAR INTERVERTEBRAL DISC WITHOUT MYELOPATHY: ICD-10-CM

## 2018-04-03 DIAGNOSIS — Z79.899 ENCOUNTER FOR LONG-TERM (CURRENT) USE OF HIGH-RISK MEDICATION: Primary | ICD-10-CM

## 2018-04-03 LAB
ALCOHOL UR POC: NORMAL
AMPHETAMINES UR POC: NEGATIVE
BARBITURATES UR POC: NORMAL
BENZODIAZEPINES UR POC: NORMAL
BUPRENORPHINE UR POC: NEGATIVE
CANNABINOIDS UR POC: NEGATIVE
CARISOPRODOL UR POC: NORMAL
COCAINE UR POC: NEGATIVE
FENTANYL UR POC: NORMAL
MDMA/ECSTASY UR POC: NORMAL
METHADONE UR POC: NEGATIVE
METHAMPHETAMINE UR POC: NORMAL
METHYLPHENIDATE UR POC: NORMAL
OPIATES UR POC: NORMAL
OXYCODONE UR POC: NORMAL
PHENCYCLIDINE UR POC: NORMAL
PROPOXYPHENE UR POC: NORMAL
TRAMADOL UR POC: NORMAL
TRICYCLICS UR POC: NORMAL

## 2018-04-03 RX ORDER — MORPHINE SULFATE 60 MG/1
60 TABLET, FILM COATED, EXTENDED RELEASE ORAL EVERY 12 HOURS
Qty: 60 TAB | Refills: 0 | Status: SHIPPED | OUTPATIENT
Start: 2018-04-28 | End: 2018-07-02 | Stop reason: SDUPTHER

## 2018-04-03 RX ORDER — MORPHINE SULFATE 60 MG/1
60 TABLET, FILM COATED, EXTENDED RELEASE ORAL EVERY 12 HOURS
Qty: 60 TAB | Refills: 0 | Status: SHIPPED | OUTPATIENT
Start: 2018-05-27 | End: 2018-07-02 | Stop reason: SDUPTHER

## 2018-04-03 RX ORDER — MORPHINE SULFATE 60 MG/1
60 TABLET, FILM COATED, EXTENDED RELEASE ORAL EVERY 12 HOURS
Qty: 60 TAB | Refills: 0 | Status: SHIPPED | OUTPATIENT
Start: 2018-06-26 | End: 2018-07-02 | Stop reason: SDUPTHER

## 2018-04-03 RX ORDER — OXYCODONE HYDROCHLORIDE 10 MG/1
10 TABLET ORAL
Qty: 90 TAB | Refills: 0 | Status: SHIPPED | OUTPATIENT
Start: 2018-04-25 | End: 2018-07-02 | Stop reason: SDUPTHER

## 2018-04-03 RX ORDER — OXYCODONE HYDROCHLORIDE 10 MG/1
10 TABLET ORAL
Qty: 90 TAB | Refills: 0 | Status: SHIPPED | OUTPATIENT
Start: 2018-06-23 | End: 2018-07-02 | Stop reason: SDUPTHER

## 2018-04-03 RX ORDER — OXYCODONE HYDROCHLORIDE 10 MG/1
10 TABLET ORAL
Qty: 90 TAB | Refills: 0 | Status: SHIPPED | OUTPATIENT
Start: 2018-05-24 | End: 2018-07-02 | Stop reason: SDUPTHER

## 2018-04-03 RX ORDER — CYCLOBENZAPRINE HCL 10 MG
5-10 TABLET ORAL
Qty: 90 TAB | Refills: 2 | Status: SHIPPED | OUTPATIENT
Start: 2018-04-03 | End: 2018-05-03

## 2018-04-03 NOTE — PROGRESS NOTES
HISTORY OF PRESENT ILLNESS  Alma Rosa Hart is a 58 y.o. female    HPI: Ms. Estrella Raymond  returns today for f/u of chronic severe pain involving the lumbar spine, which has been attributed to a lumbar degenerative disc disease with spondylosis. In addition, she has neck pain attributed to a post cervical laminectomy syndrome. No h/o lumbar surgery. PT and injections in the past with no improvement. Prior treatment with Fentanyl and Opana with significant side effects. She has been doing well with her current treatment plan which has been offering significant pain control. She does report that she recently fractured her left first toe. She has been following up with Dr. Alex Church. She was ordered a cam walking boot but this was causing her more discomfort. She has not followed up with Dr. Alex Church to discuss further options. She was prescribed hydrocodone which was disclosed to our practice. I have encouraged her to continue to follow-up with Dr. Alex Church. She does report that baclofen is not working as well as she had hoped. We will discontinue baclofen and try Flexeril. She is otherwise doing well with no new issues. However follow-up in 3 months for further evaluation and recommendation. Current medication management consists of MS Contin 60 mg BID,  Oxycodone IR 10 mg TID PRN, and Baclofen 10 mg TID PRN. Lorazepam and Temazepam by her PCP. Medications are helping with pain control and quality of life. Her pain is 2-3/10 with medication and 10/10 without. Pt describes pain as aching. Aggravating factors include standing, sitting, walking. Relieved with rest, sitting, lying down, and avoiding painful activities. Current treatment is helping to improve general activity, mood, walking, sleep, enjoyment of life    Pt does report constipation but is well controlled with Miralax  She  is otherwise doing well with no other complaints today.  She denies any adverse events including nausea, vomiting, dizziness, constipation, hallucinations, or seizures. Because the patient's current regimen places him/her at increased risk for possible overdose, a prescription for naloxone nasal spray has been provided. The patient understands that this medication is only to be used in the setting of a possible overdose and that inadvertent use of this medication could precipitate overt withdrawal.    POC UDS today. Confirmation pending. Allergies   Allergen Reactions    Aspirin Other (comments)     Stomach bleeding    Ativan [Lorazepam] Shortness of Breath    Macrobid [Nitrofurantoin Monohyd/M-Cryst] Nausea and Vomiting    Nsaids (Non-Steroidal Anti-Inflammatory Drug) Nausea and Vomiting    Opana [Oxymorphone] Other (comments)     Insomnia, weight loss, nightmares    Pcn [Penicillins] Hives       Past Surgical History:   Procedure Laterality Date    COLONOSCOPY N/A 4/13/2017    COLONOSCOPY performed by Danuta Longoria MD at [facility] ENDOSCOPY    HX CATARACT REMOVAL      HX CERVICAL FUSION      HX HYSTERECTOMY      HX ORTHOPAEDIC      ganglion cyst removed, right hand    HX OTHER SURGICAL      cyst left thigh removed    LAP,CHOLECYSTECTOMY N/A 02/27/2017    Dr. Vasile Norris         Review of Systems   Constitutional: Negative for chills, fever and weight loss. HENT: Negative for congestion, ear discharge and sore throat. Eyes: Negative for blurred vision and double vision. Respiratory: Negative for cough, shortness of breath and wheezing. Cardiovascular: Negative for chest pain and palpitations. Gastrointestinal: Positive for constipation. Negative for diarrhea, heartburn, nausea and vomiting. Genitourinary: Positive for dysuria, frequency, hematuria and urgency. Musculoskeletal: Positive for back pain and joint pain (left knee  ). Negative for falls and neck pain. Skin: Negative for itching and rash. Neurological: Negative for dizziness, seizures, loss of consciousness and headaches. Endo/Heme/Allergies: Does not bruise/bleed easily. Psychiatric/Behavioral: Positive for depression. Negative for suicidal ideas. The patient has insomnia. The patient is not nervous/anxious. Physical Exam   Constitutional: She is oriented to person, place, and time and well-developed, well-nourished, and in no distress. No distress. HENT:   Head: Normocephalic and atraumatic. Eyes: EOM are normal.   Neck: Normal range of motion. Pulmonary/Chest: Effort normal.   Musculoskeletal: Normal range of motion. surgical scar on left knee from prior surgery. Left first toe: Edema and significant tenderness    Neurological: She is alert and oriented to person, place, and time. Gait abnormal.   Skin: Skin is warm and dry. No rash noted. She is not diaphoretic. No erythema. Psychiatric: Memory and judgment normal.   Nursing note and vitals reviewed. ASSESSMENT:    1. Encounter for long-term (current) use of high-risk medication    2. Chronic bilateral low back pain without sciatica    3. Other chronic pain    4. Chronic pain syndrome    5. Degeneration of lumbar or lumbosacral intervertebral disc    6. Displacement of lumbar intervertebral disc without myelopathy    7. Pain in joint, multiple sites    8. [address] [place] [address] Prescription Monitoring Program was reviewed which does not demonstrate aberrancies and/or inconsistencies with regard to the historical prescribing of controlled medications to this patient by other providers. NOTE: Disclosed hydrocodone from ER visit filled 12/22/2016   Hydrocodone for postsurgical pain disclosed on 2/27/2017, IV morphine disclosed on 4/4/2017 tramadol was disclosed for gallbladder pain  by her PCP on 4/13/2017. This medication was then switched to Fioricet on 4/20/2017. Disclosed oxycodone 5/325 mg received from the emergency room filled on 8/14/2017. PLAN / Pt Instructions:  1. Continue current plan.   Stable on current medication without adverse events. 2. Refill morphine ER 60 mg 2 times daily. 3. Refill oxycodone 10 mg up to 3 times daily as needed for pain. 4. Discontinue baclofen 10 mg.   5. Add Flexeril 10 mg. Please take half to 1 tablet up to 3 times daily as needed  6. Naloxone 4 mg nasal spray for opioid induced respiratory depression emergency only. 7. Continue home exercise program.  Exercise/stretching were demonstrated in office today. 8. Discussed risks of addiction, dependency, and opioid induced hyperalgesia. 9. Return to clinic in 3 months       Medications Ordered Today   Medications    morphine CR (MS CONTIN) 60 mg CR tablet     Sig: Take 1 Tab by mouth every twelve (12) hours. Max Daily Amount: 120 mg. Dispense:  60 Tab     Refill:  0    morphine CR (MS CONTIN) 60 mg CR tablet     Sig: Take 1 Tab by mouth every twelve (12) hours for 30 days. Max Daily Amount: 120 mg. Dispense:  60 Tab     Refill:  0    morphine CR (MS CONTIN) 60 mg CR tablet     Sig: Take 1 Tab by mouth every twelve (12) hours for 30 days. Max Daily Amount: 120 mg. Dispense:  60 Tab     Refill:  0    oxyCODONE IR (ROXICODONE) 10 mg tab immediate release tablet     Sig: Take 1 Tab by mouth three (3) times daily as needed. Max Daily Amount: 30 mg. Dispense:  90 Tab     Refill:  0    oxyCODONE IR (ROXICODONE) 10 mg tab immediate release tablet     Sig: Take 1 Tab by mouth three (3) times daily as needed. Max Daily Amount: 30 mg. Dispense:  90 Tab     Refill:  0    oxyCODONE IR (ROXICODONE) 10 mg tab immediate release tablet     Sig: Take 1 Tab by mouth three (3) times daily as needed. Max Daily Amount: 30 mg. Dispense:  90 Tab     Refill:  0    cyclobenzaprine (FLEXERIL) 10 mg tablet     Sig: Take 0.5-1 Tabs by mouth three (3) times daily as needed for Muscle Spasm(s) for up to 30 days.      Dispense:  90 Tab     Refill:  2       Pain medications prescribed with the objective of pain relief and improved physical and psychosocial function in this patient. Spent 25 minutes with patient today reviewing the treatment plan, goals of treatment plan, and limitations of the treatment plan, to include the potential for side effects from medications and procedures. Mar Schirmer, [address] 4/3/2018      Note: Please excuse any typographical errors. Voice recognition software was used for this note and may cause mistakes.

## 2018-04-03 NOTE — PATIENT INSTRUCTIONS
1. Continue current plan. Stable on current medication without adverse events. 2. Refill morphine ER 60 mg 2 times daily. 3. Refill oxycodone 10 mg up to 3 times daily as needed for pain. 4. Discontinue baclofen 10 mg.   5. Add Flexeril 10 mg. Please take half to 1 tablet up to 3 times daily as needed  6. Naloxone 4 mg nasal spray for opioid induced respiratory depression emergency only. 7. Continue home exercise program.  Exercise/stretching were demonstrated in office today. 8. Discussed risks of addiction, dependency, and opioid induced hyperalgesia.    9. Return to clinic in 3 months

## 2018-04-03 NOTE — PROGRESS NOTES
Nursing Notes    Patient presents to the office today in follow-up. Patient rates her pain at 10/10 on the numerical pain scale. Reviewed medications with counts as follows:    Rx Date filled Qty Dispensed Pill Count Last Dose Short   Morphine Sulf ER 60 mg tab 3/29/18 60 47 today no   Oxycodone HCL 10 mg tab 3/26/18 90 68 4/2/18 no                                  Comments:     POC UDS was performed in office today per verbal order and correct read back from provider. Any new labs or imaging since last appointment? YES, Xray of foot    Have you been to an emergency room (ER) or urgent care clinic since your last visit? YES, Feb 21st for abdominal pain. Have you been hospitalized since your last visit? NO     If yes, where, when, and reason for visit? Have you seen or consulted any other health care providers outside of the [address] [place] [address]  since your last visit? YES, Orthopedic, and ER Dr.     If yes, where, when, and reason for visit? HM deferred to pcp.

## 2018-04-03 NOTE — MR AVS SNAPSHOT
[address] [facility] [address] [phone] Patient: Henry Marquez MRN: [id number] SLK:4/25/3348 Visit Information Date & Time Provider Department Dept. Phone Encounter #  
 4/3/2018 11:20 AM Norma Stoll, [facility] [id number] Follow-up Instructions Return in about 3 months (around 7/3/2018). Upcoming Health Maintenance Date Due DTaP/Tdap/Td series (1 - Tdap) 3/30/1975 MEDICARE YEARLY EXAM 10/13/2018 PAP AKA CERVICAL CYTOLOGY 6/21/2019 BREAST CANCER SCRN MAMMOGRAM 1/12/2020 COLONOSCOPY 4/13/2027 Allergies as of 4/3/2018  Review Complete On: 4/3/2018 By: BETO Bryant Severity Noted Reaction Type Reactions Aspirin    Other (comments) Stomach bleeding Ativan [Lorazepam]  02/21/2018    Shortness of Breath Macrobid [Nitrofurantoin Monohyd/m-cryst]  11/25/2015    Nausea and Vomiting Nsaids (Non-steroidal Anti-inflammatory Drug)    Nausea and Vomiting Opana [Oxymorphone]  11/29/2016    Other (comments) Insomnia, weight loss, nightmares Pcn [Penicillins]    Hives Current Immunizations  Never Reviewed Name Date Pneumococcal Polysaccharide (PPSV-23) 3/7/2014 Not reviewed this visit You Were Diagnosed With   
  
 Codes Comments Encounter for long-term (current) use of high-risk medication    -  Primary ICD-10-CM: C66.194 ICD-9-CM: V58.69 Chronic bilateral low back pain without sciatica     ICD-10-CM: M54.5, G89.29 ICD-9-CM: 724.2, 338.29 Other chronic pain     ICD-10-CM: G89.29 ICD-9-CM: 338.29 Chronic pain syndrome     ICD-10-CM: G89.4 ICD-9-CM: 338.4 Degeneration of lumbar or lumbosacral intervertebral disc     ICD-10-CM: M51.37 
ICD-9-CM: 722.52 Displacement of lumbar intervertebral disc without myelopathy     ICD-10-CM: M51.26 
ICD-9-CM: 722.10 Pain in joint, multiple sites     ICD-10-CM: M25.50 ICD-9-CM: 719.49 Cervicalgia     ICD-10-CM: M54.2 ICD-9-CM: 723.1 Vitals BP Pulse Temp Resp Height(growth percentile) Weight(growth percentile) 124/81 (BP 1 Location: Left arm, BP Patient Position: Sitting) 71 98.8 °F (37.1 °C) (Oral) 12 5' 2\" (1.575 m) 173 lb (78.5 kg) BMI OB Status Smoking Status 31.64 kg/m2 Hysterectomy Former Smoker Vitals History BMI and BSA Data Body Mass Index Body Surface Area  
 31.64 kg/m 2 1.85 m 2 Preferred Pharmacy Pharmacy Name Phone [facility] [address] [phone] Your Updated Medication List  
  
   
This list is accurate as of 4/3/18 12:26 PM.  Always use your most recent med list.  
  
  
  
  
 ACID GONE ANTACID  mg/15 mL suspension Generic drug:  aluminum hydrox-magnesium carb * albuterol 2.5 mg /3 mL (0.083 %) nebulizer solution Commonly known as:  PROVENTIL VENTOLIN  
2.5 mg as needed. * albuterol 90 mcg/actuation inhaler Commonly known as:  PROAIR HFA Take 2 Puffs by inhalation every four (4) hours as needed for Wheezing. atorvastatin 40 mg tablet Commonly known as:  LIPITOR  
TAKE ONE TABLET BY MOUTH ONE TIME DAILY  
  
 baclofen 10 mg tablet Commonly known as:  LIORESAL  
TAKE ONE TABLET BY MOUTH THREE TIMES A DAY  
  
 cyclobenzaprine 10 mg tablet Commonly known as:  FLEXERIL Take 0.5-1 Tabs by mouth three (3) times daily as needed for Muscle Spasm(s) for up to 30 days. DEXILANT 60 mg Cpdb Generic drug:  Dexlansoprazole TAKE ONE CAPSULE BY MOUTH ONE TIME DAILY  
  
 ergocalciferol 50,000 unit capsule Commonly known as:  VITAMIN D2  
TAKE ONE CAPSULE BY MOUTH EVERY 7 DAYS  
  
 fluticasone 50 mcg/actuation nasal spray Commonly known as:  Lex Huff 2 Sprays by Both Nostrils route as needed. gabapentin 400 mg capsule Commonly known as:  NEURONTIN  
 400 mg two (2) times a day. HYDROcodone-acetaminophen 5-325 mg per tablet Commonly known as:  Mateo Meredith Take 1-2 Tabs by mouth two (2) times daily as needed for Pain. Max Daily Amount: 4 Tabs. hydrOXYzine pamoate 50 mg capsule Commonly known as:  VISTARIL Take 1 Cap by mouth three (3) times daily as needed for Anxiety. ipratropium 0.02 % Soln Commonly known as:  ATROVENT  
0.5 mg.  
  
 losartan 25 mg tablet Commonly known as:  COZAAR  
TAKE ONE TABLET BY MOUTH ONE TIME DAILY  
  
 * MIRALAX 17 gram packet Generic drug:  polyethylene glycol Take 17 g by mouth daily. * polyethylene glycol 17 gram packet Commonly known as:  Cyrilla Brash DISSOLVE CONTENTS OF ONE PACKET IN BEVERAGE ONCE A DAY * morphine CR 30 mg CR tablet Commonly known as:  MS CONTIN Take  by mouth every twelve (12) hours. * morphine CR 60 mg CR tablet Commonly known as:  MS CONTIN Take 1 Tab by mouth every twelve (12) hours. Max Daily Amount: 120 mg.  
Start taking on:  4/28/2018 * morphine CR 60 mg CR tablet Commonly known as:  MS CONTIN Take 1 Tab by mouth every twelve (12) hours for 30 days. Max Daily Amount: 120 mg.  
Start taking on:  5/27/2018 * morphine CR 60 mg CR tablet Commonly known as:  MS CONTIN Take 1 Tab by mouth every twelve (12) hours for 30 days. Max Daily Amount: 120 mg.  
Start taking on:  6/26/2018  
  
 naloxone 4 mg/actuation nasal spray Commonly known as:  NARCAN  
1 [place] by IntraNASal route once as needed. Use 1 spray intranasally into 1 nostril. Use a new Narcan nasal spray for subsequent doses and administer into alternating nostrils. May repeat every 2 to 3 minutes as needed. ondansetron 4 mg disintegrating tablet Commonly known as:  ZOFRAN ODT Take 1 Tab by mouth every eight (8) hours as needed for Nausea for up to 15 doses. * oxyCODONE IR 10 mg Tab immediate release tablet Commonly known as:  Kacey Hobson  
 Take 1 Tab by mouth three (3) times daily as needed. Max Daily Amount: 30 mg. Start taking on:  4/25/2018 * oxyCODONE IR 10 mg Tab immediate release tablet Commonly known as:  Valene Myers Take 1 Tab by mouth three (3) times daily as needed. Max Daily Amount: 30 mg. Start taking on:  5/24/2018 * oxyCODONE IR 10 mg Tab immediate release tablet Commonly known as:  Valene Myers Take 1 Tab by mouth three (3) times daily as needed. Max Daily Amount: 30 mg. Start taking on:  6/23/2018 QUEtiapine 200 mg tablet Commonly known as:  SEROquel Take 200 mg by mouth daily. sertraline 100 mg tablet Commonly known as:  ZOLOFT Take 100 mg by mouth nightly as needed. temazepam 15 mg capsule Commonly known as:  RESTORIL Take 1 Cap by mouth nightly as needed for Sleep. Max Daily Amount: 15 mg. Indications: INSOMNIA  
  
 traZODone 100 mg tablet Commonly known as:  Clarence Renu Take 100 mg by mouth nightly. valACYclovir 500 mg tablet Commonly known as:  VALTREX Take 1 Tab by mouth two (2) times a day. * Notice: This list has 11 medication(s) that are the same as other medications prescribed for you. Read the directions carefully, and ask your doctor or other care provider to review them with you. Prescriptions Printed Refills  
 morphine CR (MS CONTIN) 60 mg CR tablet 0 Starting on: 4/28/2018 Sig: Take 1 Tab by mouth every twelve (12) hours. Max Daily Amount: 120 mg.  
 Class: Print Route: Oral  
 morphine CR (MS CONTIN) 60 mg CR tablet 0 Starting on: 5/27/2018 Sig: Take 1 Tab by mouth every twelve (12) hours for 30 days. Max Daily Amount: 120 mg.  
 Class: Print Route: Oral  
 morphine CR (MS CONTIN) 60 mg CR tablet 0 Starting on: 6/26/2018 Sig: Take 1 Tab by mouth every twelve (12) hours for 30 days. Max Daily Amount: 120 mg.  
 Class: Print  Route: Oral  
 oxyCODONE IR (ROXICODONE) 10 mg tab immediate release tablet 0  
 Starting on: 4/25/2018 Sig: Take 1 Tab by mouth three (3) times daily as needed. Max Daily Amount: 30 mg.  
 Class: Print Route: Oral  
 oxyCODONE IR (ROXICODONE) 10 mg tab immediate release tablet 0 Starting on: 5/24/2018 Sig: Take 1 Tab by mouth three (3) times daily as needed. Max Daily Amount: 30 mg.  
 Class: Print Route: Oral  
 oxyCODONE IR (ROXICODONE) 10 mg tab immediate release tablet 0 Starting on: 6/23/2018 Sig: Take 1 Tab by mouth three (3) times daily as needed. Max Daily Amount: 30 mg.  
 Class: Print Route: Oral  
  
Prescriptions Sent to Pharmacy Refills  
 cyclobenzaprine (FLEXERIL) 10 mg tablet 2 Sig: Take 0.5-1 Tabs by mouth three (3) times daily as needed for Muscle Spasm(s) for up to 30 days. Class: Normal  
 Pharmacy: [facility], [address] [facility] #: [phone] Route: Oral  
  
We Performed the Following AMB POC DRUG SCREEN () [ [facility]] DRUG SCREEN [[id number] Custom] Follow-up Instructions Return in about 3 months (around 7/3/2018). Patient Instructions 1. Continue current plan. Stable on current medication without adverse events. 2. Refill morphine ER 60 mg 2 times daily. 3. Refill oxycodone 10 mg up to 3 times daily as needed for pain. 4. Discontinue baclofen 10 mg.  
5. Add Flexeril 10 mg. Please take half to 1 tablet up to 3 times daily as needed 6. Naloxone 4 mg nasal spray for opioid induced respiratory depression emergency only. 7. Continue home exercise program.  Exercise/stretching were demonstrated in office today. 8. Discussed risks of addiction, dependency, and opioid induced hyperalgesia. 9. Return to clinic in 3 months Introducing \[facility]\"" & HEALTH SERVICES! [place] [employer] introduces JustSpotted patient portal. Now you can access parts of your medical record, email your doctor's office, and request medication refills online.    
 
1. In your internet browser, go to [email or web address] 2. Click on the First Time User? Click Here link in the Sign In box. You will see the New Member Sign Up page. 3. Enter your Fishbowl Access Code exactly as it appears below. You will not need to use this code after youve completed the sign-up process. If you do not sign up before the expiration date, you must request a new code. · Fishbowl Access Code: [email or web address] Expires: 7/2/2018 12:26 PM 
 
4. Enter the last four digits of your Social Security Number (xxxx) and Date of Birth (mm/dd/yyyy) as indicated and click Submit. You will be taken to the next sign-up page. 5. Create a Fishbowl ID. This will be your Fishbowl login ID and cannot be changed, so think of one that is secure and easy to remember. 6. Create a Fishbowl password. You can change your password at any time. 7. Enter your Password Reset Question and Answer. This can be used at a later time if you forget your password. 8. Enter your e-mail address. You will receive e-mail notification when new information is available in [address] 9. Click Sign Up. You can now view and download portions of your medical record. 10. Click the Download Summary menu link to download a portable copy of your medical information. If you have questions, please visit the Frequently Asked Questions section of the Fishbowl website. Remember, Fishbowl is NOT to be used for urgent needs. For medical emergencies, dial 911. Now available from your iPhone and Android! Please provide this summary of care documentation to your next provider. Your primary care clinician is listed as [address] [place] [address]. If you have any questions after today's visit, please call [phone].

## 2018-04-06 ENCOUNTER — TELEPHONE (OUTPATIENT)
Dept: PAIN MANAGEMENT | Age: 63
End: 2018-04-06

## 2018-04-24 ENCOUNTER — TELEPHONE (OUTPATIENT)
Dept: FAMILY MEDICINE CLINIC | Age: 63
End: 2018-04-24

## 2018-04-24 NOTE — TELEPHONE ENCOUNTER
Requesting a referral to new pain ammon. Phys.  Will Rachel [phone]    Requesting this for herself and her daughter Evan Mancera

## 2018-04-24 NOTE — TELEPHONE ENCOUNTER
Patient has been contacted regarding a message left , patient states that she has been told that the current provider has advised her that he will no longer provider her with medication and she would like to be referred to another provider in the area. I have called and spoken with the staff at the office that the patient currently attends and they state the following: patients are being assessed on a case by case basis to determine if they need adjustments to their current regimen. Physical therapy and acupuncture will be offered in the future as a treatment modality. Patients were given the option of continuing their care in the current office and if they disagreed with the current provider they were given a list of other providers in the area to transfer their care to them.

## 2018-05-14 ENCOUNTER — TELEPHONE (OUTPATIENT)
Dept: PAIN MANAGEMENT | Age: 63
End: 2018-05-14

## 2018-05-15 ENCOUNTER — OFFICE VISIT (OUTPATIENT)
Dept: FAMILY MEDICINE CLINIC | Age: 63
End: 2018-05-15

## 2018-05-15 VITALS
RESPIRATION RATE: 17 BRPM | DIASTOLIC BLOOD PRESSURE: 67 MMHG | BODY MASS INDEX: 32.02 KG/M2 | HEIGHT: 62 IN | OXYGEN SATURATION: 99 % | HEART RATE: 90 BPM | TEMPERATURE: 99.5 F | WEIGHT: 174 LBS | SYSTOLIC BLOOD PRESSURE: 144 MMHG

## 2018-05-15 DIAGNOSIS — J20.9 ACUTE BRONCHITIS, UNSPECIFIED ORGANISM: Primary | ICD-10-CM

## 2018-05-15 DIAGNOSIS — R05.9 COUGH: ICD-10-CM

## 2018-05-15 RX ORDER — PREDNISONE 10 MG/1
TABLET ORAL
Qty: 21 TAB | Refills: 0 | Status: SHIPPED | OUTPATIENT
Start: 2018-05-15 | End: 2018-05-22 | Stop reason: ALTCHOICE

## 2018-05-15 RX ORDER — CODEINE PHOSPHATE AND GUAIFENESIN 10; 100 MG/5ML; MG/5ML
5 SOLUTION ORAL
Qty: 118 ML | Refills: 0 | Status: SHIPPED | OUTPATIENT
Start: 2018-05-15 | End: 2018-05-22 | Stop reason: SDUPTHER

## 2018-05-15 RX ORDER — AZITHROMYCIN 250 MG/1
TABLET, FILM COATED ORAL
Qty: 6 TAB | Refills: 0 | Status: SHIPPED | OUTPATIENT
Start: 2018-05-15 | End: 2018-05-22 | Stop reason: ALTCHOICE

## 2018-05-15 NOTE — MR AVS SNAPSHOT
[facility] 
 
 
 [address] [place] [address] 
[phone] Patient: Lacie Sheffield MRN: [id number] AIB:9/63/5062 Visit Information Date & Time Provider Department Dept. Phone Encounter #  
 5/15/2018  2:30 PM [id number] Benjamin 77 [id number] Follow-up Instructions Return in about 1 month (around 6/15/2018) for Follow Up . Your Appointments 7/2/2018 12:40 PM  
Follow Up with BETO [facility] (RICHAR SCHEDULING) Appt Note: 3 mon f/u per rc..to  
 [address] [address]  
[phone] [facility] [phone] Upcoming Health Maintenance Date Due DTaP/Tdap/Td series (1 - Tdap) 3/30/1975 Influenza Age 5 to Adult 8/1/2018 MEDICARE YEARLY EXAM 10/13/2018 PAP AKA CERVICAL CYTOLOGY 6/21/2019 BREAST CANCER SCRN MAMMOGRAM 1/12/2020 COLONOSCOPY 4/13/2027 Allergies as of 5/15/2018  Review Complete On: 0/65/1907 By: Yahir Verduzco. Joao Villegas LPN Severity Noted Reaction Type Reactions Aspirin    Other (comments) Stomach bleeding Ativan [Lorazepam]  02/21/2018    Shortness of Breath Macrobid [Nitrofurantoin Monohyd/m-cryst]  11/25/2015    Nausea and Vomiting Nsaids (Non-steroidal Anti-inflammatory Drug)    Nausea and Vomiting Opana [Oxymorphone]  11/29/2016    Other (comments) Insomnia, weight loss, nightmares Pcn [Penicillins]    Hives Current Immunizations  Never Reviewed Name Date Pneumococcal Polysaccharide (PPSV-23) 3/7/2014 Not reviewed this visit You Were Diagnosed With   
  
 Codes Comments Acute bronchitis, unspecified organism    -  Primary ICD-10-CM: J20.9 ICD-9-CM: 466.0 Cough     ICD-10-CM: R05 ICD-9-CM: 440. 2 Vitals BP Pulse Temp Resp Height(growth percentile) Weight(growth percentile) 144/67 (BP 1 Location: Left arm, BP Patient Position: Sitting) 90 99.5 °F (37.5 °C) (Oral) 17 5' 2\" (1.575 m) 174 lb (78.9 kg) SpO2 BMI OB Status Smoking Status 99% 31.83 kg/m2 Hysterectomy Former Smoker BMI and BSA Data Body Mass Index Body Surface Area  
 31.83 kg/m 2 1.86 m 2 Preferred Pharmacy Pharmacy Name Phone [address] [place] [address] [place]  [phone] Your Updated Medication List  
  
   
This list is accurate as of 5/15/18  3:45 PM.  Always use your most recent med list.  
  
  
  
  
 ACID GONE ANTACID  mg/15 mL suspension Generic drug:  aluminum hydrox-magnesium carb * albuterol 2.5 mg /3 mL (0.083 %) nebulizer solution Commonly known as:  PROVENTIL VENTOLIN  
2.5 mg as needed. * albuterol 90 mcg/actuation inhaler Commonly known as:  PROAIR HFA Take 2 Puffs by inhalation every four (4) hours as needed for Wheezing. atorvastatin 40 mg tablet Commonly known as:  LIPITOR  
TAKE ONE TABLET BY MOUTH ONE TIME DAILY  
  
 azithromycin 250 mg tablet Commonly known as:  Gagandeep Breed Take two tablets today then one tablet daily  
  
 baclofen 10 mg tablet Commonly known as:  LIORESAL  
TAKE ONE TABLET BY MOUTH THREE TIMES A DAY DEXILANT 60 mg Cpdb Generic drug:  Dexlansoprazole TAKE ONE CAPSULE BY MOUTH ONE TIME DAILY  
  
 ergocalciferol 50,000 unit capsule Commonly known as:  VITAMIN D2  
TAKE ONE CAPSULE BY MOUTH EVERY 7 DAYS  
  
 fluticasone 50 mcg/actuation nasal spray Commonly known as:  Mickey Calk 2 Sprays by Both Nostrils route as needed. gabapentin 400 mg capsule Commonly known as:  NEURONTIN  
400 mg two (2) times a day. guaiFENesin-codeine 100-10 mg/5 mL solution Commonly known as:  ROBITUSSIN AC Take 5 mL by mouth three (3) times daily as needed for Cough. Max Daily Amount: 15 mL. HYDROcodone-acetaminophen 5-325 mg per tablet Commonly known as:  Nubia Gonsalez  
 Take 1-2 Tabs by mouth two (2) times daily as needed for Pain. Max Daily Amount: 4 Tabs. hydrOXYzine pamoate 50 mg capsule Commonly known as:  VISTARIL Take 1 Cap by mouth three (3) times daily as needed for Anxiety. ipratropium 0.02 % Soln Commonly known as:  ATROVENT  
0.5 mg.  
  
 losartan 25 mg tablet Commonly known as:  COZAAR  
TAKE ONE TABLET BY MOUTH ONE TIME DAILY  
  
 * MIRALAX 17 gram packet Generic drug:  polyethylene glycol Take 17 g by mouth daily. * polyethylene glycol 17 gram packet Commonly known as:  Lex Riley DISSOLVE CONTENTS OF ONE PACKET IN BEVERAGE ONCE A DAY * morphine CR 30 mg CR tablet Commonly known as:  MS CONTIN Take  by mouth every twelve (12) hours. * morphine CR 60 mg CR tablet Commonly known as:  MS CONTIN Take 1 Tab by mouth every twelve (12) hours. Max Daily Amount: 120 mg.  
  
 * morphine CR 60 mg CR tablet Commonly known as:  MS CONTIN Take 1 Tab by mouth every twelve (12) hours for 30 days. Max Daily Amount: 120 mg.  
Start taking on:  5/27/2018 * morphine CR 60 mg CR tablet Commonly known as:  MS CONTIN Take 1 Tab by mouth every twelve (12) hours for 30 days. Max Daily Amount: 120 mg.  
Start taking on:  6/26/2018  
  
 naloxone 4 mg/actuation nasal spray Commonly known as:  NARCAN  
1 [place] by IntraNASal route once as needed. Use 1 spray intranasally into 1 nostril. Use a new Narcan nasal spray for subsequent doses and administer into alternating nostrils. May repeat every 2 to 3 minutes as needed. ondansetron 4 mg disintegrating tablet Commonly known as:  ZOFRAN ODT Take 1 Tab by mouth every eight (8) hours as needed for Nausea for up to 15 doses. * oxyCODONE IR 10 mg Tab immediate release tablet Commonly known as:  Buren Lyme Take 1 Tab by mouth three (3) times daily as needed. Max Daily Amount: 30 mg.  
  
 * oxyCODONE IR 10 mg Tab immediate release tablet Commonly known as:  Jacobo Au Take 1 Tab by mouth three (3) times daily as needed. Max Daily Amount: 30 mg. Start taking on:  5/24/2018 * oxyCODONE IR 10 mg Tab immediate release tablet Commonly known as:  Jacobo Au Take 1 Tab by mouth three (3) times daily as needed. Max Daily Amount: 30 mg. Start taking on:  6/23/2018  
  
 predniSONE 10 mg dose pack Commonly known as:  STERAPRED DS See administration instruction per 10mg dose pack QUEtiapine 200 mg tablet Commonly known as:  SEROquel Take 200 mg by mouth daily. sertraline 100 mg tablet Commonly known as:  ZOLOFT Take 100 mg by mouth nightly as needed. temazepam 15 mg capsule Commonly known as:  RESTORIL Take 1 Cap by mouth nightly as needed for Sleep. Max Daily Amount: 15 mg. Indications: INSOMNIA  
  
 traZODone 100 mg tablet Commonly known as:  Georganna Quiver Take 100 mg by mouth nightly. valACYclovir 500 mg tablet Commonly known as:  VALTREX Take 1 Tab by mouth two (2) times a day. * Notice: This list has 11 medication(s) that are the same as other medications prescribed for you. Read the directions carefully, and ask your doctor or other care provider to review them with you. Prescriptions Printed Refills  
 guaiFENesin-codeine (ROBITUSSIN AC) 100-10 mg/5 mL solution 0 Sig: Take 5 mL by mouth three (3) times daily as needed for Cough. Max Daily Amount: 15 mL. Class: Print Route: Oral  
  
Prescriptions Sent to Pharmacy Refills  
 azithromycin (ZITHROMAX Z-FAWN) 250 mg tablet 0 Sig: Take two tablets today then one tablet daily Class: Normal  
 Pharmacy: [address], [address] Ph #: [phone]  
 predniSONE (STERAPRED DS) 10 mg dose pack 0 Sig: See administration instruction per 10mg dose pack Class: Normal  
 Pharmacy: [address] [id number] - [address], [place] [address] Ph #: [phone] Follow-up Instructions Return in about 1 month (around 6/15/2018) for Follow Up . Patient Instructions Please contact our office if you have any questions about your visit today. 1.) Use the antibiotic completely. 2.) Complete the prednisone. 3.) Use Cough syrup as needed and let Pain Management know. Introducing [facility] & HEALTH SERVICES! Dear Brady Almendarez: Thank you for requesting a Telx account. Our records indicate that you already have an active Telx account. You can access your account anytime at [email or web address] Did you know that you can access your hospital and ER discharge instructions at any time in Telx? You can also review all of your test results from your hospital stay or ER visit. Additional Information If you have questions, please visit the Frequently Asked Questions section of the Telx website at [email or web address] Remember, Telx is NOT to be used for urgent needs. For medical emergencies, dial 911. Now available from your iPhone and Android! Please provide this summary of care documentation to your next provider. Your primary care clinician is listed as Josh Schilling. If you have any questions after today's visit, please call [phone].

## 2018-05-15 NOTE — PROGRESS NOTES
Progress Note    Patient: Rene Harris MRN: [id number]  SSN: [id number]    YOB: 1955  Age: 58 y.o. Sex: female          Subjective:   Rene Harris is a 58 y.o. female who is here for an acute care visit for suspected bronchitis. Rene Harris states that the incident occurred a week and a half ago. She states that she went to the ER twice. She was initially treated with Bactrim and on the second visit was treated with Levaquin. She was also given Hydrocodone cough syrup. She also went to [facility]. She was also treated with prednisone but it was ineffective. Objective:     Vitals:    05/15/18 1428   BP: 144/67   Pulse: 90   Resp: 17   Temp: 99.5 °F (37.5 °C)   TempSrc: Oral   SpO2: 99%   Weight: 174 lb (78.9 kg)   Height: 5' 2\" (1.575 m)        Review of Systems:  Pertinent items are noted in the History of Present Illness. Physical Exam:   GENERAL: fatigued, cooperative, mild distress, appears stated age  EYE: conjunctivae/corneas clear. PERRL, EOM's intact. Fundi benign  ENT: No fluid behind tympanic membranes bilaterally. Bilateral nasal turbinate bogginess  LYMPHATIC: Cervical, supraclavicular, and axillary nodes normal.   THROAT & NECK: mild erythema  LUNG: rhonchi R apex, R base, L apex, L base  HEART: regular rate and rhythm, S1, S2 normal, no murmur, click, rub or gallop  ABDOMEN: soft, non-tender. Bowel sounds normal. No masses,  no organomegaly  EXTREMITIES:  extremities normal, atraumatic, no cyanosis or edema    Lab/Data Review:  No new labs to review        Assessment:   1. Acute bronchitis, unspecified organism    - azithromycin (ZITHROMAX Z-FAWN) 250 mg tablet; Take two tablets today then one tablet daily  Dispense: 6 Tab; Refill: 0  - predniSONE (STERAPRED DS) 10 mg dose pack; See administration instruction per 10mg dose pack  Dispense: 21 Tab; Refill: 0  - guaiFENesin-codeine (ROBITUSSIN AC) 100-10 mg/5 mL solution;  Take 5 mL by mouth three (3) times daily as needed for Cough. Max Daily Amount: 15 mL. Dispense: 118 mL; Refill: 0    2.  Cough    Plan:   Patient will return in 1 month for follow up     Signed By: Chantal Brownlee DO     May 15, 2018

## 2018-05-15 NOTE — PROGRESS NOTES
Chief Complaint   Patient presents with    Cough     productive but she swallows, states thats he has had 2 UC visits for treatment, started about 2 weeks ago    Wheezing     has been using thsi more frequently since bronchitis      1. Have you been to the ER, urgent care clinic since your last visit? Hospitalized since your last visit? Yes When: 5-8-18 Where: [facility] Reason for visit: bronchitis    2. Have you seen or consulted any other health care providers outside of the [address] [place] [address] since your last visit? Include any pap smears or colon screening.  No

## 2018-05-15 NOTE — PATIENT INSTRUCTIONS
Please contact our office if you have any questions about your visit today. 1.) Use the antibiotic completely. 2.) Complete the prednisone. 3.) Use Cough syrup as needed and let Pain Management know.

## 2018-05-16 ENCOUNTER — TELEPHONE (OUTPATIENT)
Dept: PAIN MANAGEMENT | Age: 63
End: 2018-05-16

## 2018-05-21 DIAGNOSIS — Z29.9 PREVENTIVE MEASURE: ICD-10-CM

## 2018-05-21 DIAGNOSIS — Z79.899 MEDICATION MANAGEMENT: ICD-10-CM

## 2018-05-21 DIAGNOSIS — I10 BENIGN ESSENTIAL HYPERTENSION WITH TARGET BLOOD PRESSURE BELOW 140/90: ICD-10-CM

## 2018-05-21 DIAGNOSIS — E55.9 VITAMIN D DEFICIENCY: ICD-10-CM

## 2018-05-21 DIAGNOSIS — T40.2X5A THERAPEUTIC OPIOID INDUCED CONSTIPATION: ICD-10-CM

## 2018-05-21 DIAGNOSIS — K59.03 THERAPEUTIC OPIOID INDUCED CONSTIPATION: ICD-10-CM

## 2018-05-21 RX ORDER — LOSARTAN POTASSIUM 25 MG/1
TABLET ORAL
Qty: 30 TAB | Refills: 3 | Status: SHIPPED | OUTPATIENT
Start: 2018-05-21 | End: 2018-07-06 | Stop reason: SDUPTHER

## 2018-05-21 NOTE — TELEPHONE ENCOUNTER
Requested Prescriptions     Pending Prescriptions Disp Refills    losartan (COZAAR) 25 mg tablet 30 Tab 2

## 2018-05-22 ENCOUNTER — OFFICE VISIT (OUTPATIENT)
Dept: FAMILY MEDICINE CLINIC | Age: 63
End: 2018-05-22

## 2018-05-22 VITALS
HEART RATE: 96 BPM | RESPIRATION RATE: 17 BRPM | OXYGEN SATURATION: 96 % | DIASTOLIC BLOOD PRESSURE: 67 MMHG | BODY MASS INDEX: 31.83 KG/M2 | TEMPERATURE: 98.6 F | HEIGHT: 62 IN | SYSTOLIC BLOOD PRESSURE: 100 MMHG | WEIGHT: 173 LBS

## 2018-05-22 DIAGNOSIS — J20.9 ACUTE BRONCHITIS, UNSPECIFIED ORGANISM: ICD-10-CM

## 2018-05-22 DIAGNOSIS — R32 URINARY INCONTINENCE, UNSPECIFIED TYPE: Primary | ICD-10-CM

## 2018-05-22 RX ORDER — CODEINE PHOSPHATE AND GUAIFENESIN 10; 100 MG/5ML; MG/5ML
5 SOLUTION ORAL
Qty: 118 ML | Refills: 0 | Status: SHIPPED | OUTPATIENT
Start: 2018-05-22 | End: 2018-06-12 | Stop reason: SDUPTHER

## 2018-05-22 RX ORDER — OXYBUTYNIN CHLORIDE 5 MG/1
5 TABLET ORAL 2 TIMES DAILY
Qty: 60 TAB | Refills: 0 | Status: SHIPPED | OUTPATIENT
Start: 2018-05-22 | End: 2018-11-15

## 2018-05-22 NOTE — MR AVS SNAPSHOT
[address] [facility] 
 
 
 [place] [address] [address] [address] [address] [phone] Patient: Jolyn Alpers MRN: [id number] Visit Information Date & Time Provider Department Dept. Phone Encounter #  
 5/22/2018 11:00 AM [facility] [phone] [id number] Follow-up Instructions Return in about 3 weeks (around 6/12/2018). Your Appointments 6/12/2018  1:30 PM  
Follow Up with Marty Sanchez DO [address] [place] [address] (--) Appt Note: bia Marquez [address] [facility] [address] [phone]  
  
   
 [facility] [address]  
  
    
 7/2/2018 12:40 PM  
[facility] ([facility]UL[facility] Appt Note: 3 mon f/u per rc..to  
 [address] [address]  
[phone] [facility] 8820 [id number] Upcoming Health Maintenance Date Due DTaP/Tdap/Td series (1 - Tdap) 3/30/1975 Influenza Age 5 to Adult 8/1/2018 MEDICARE YEARLY EXAM 10/13/2018 PAP AKA CERVICAL CYTOLOGY 6/21/2019 BREAST CANCER SCRN MAMMOGRAM 1/12/2020 COLONOSCOPY 4/13/2027 Allergies as of 5/22/2018  Review Complete On: 0/22/3645 By: Maxi Siddiqui. Carlos Mcgrath LPN Severity Noted Reaction Type Reactions Aspirin    Other (comments) Stomach bleeding Ativan [Lorazepam]  02/21/2018    Shortness of Breath Macrobid [Nitrofurantoin Monohyd/m-cryst]  11/25/2015    Nausea and Vomiting Nsaids (Non-steroidal Anti-inflammatory Drug)    Nausea and Vomiting Opana [Oxymorphone]  11/29/2016    Other (comments) Insomnia, weight loss, nightmares Pcn [Penicillins]    Hives Current Immunizations  Never Reviewed Name Date Pneumococcal Polysaccharide (PPSV-23) 3/7/2014 Not reviewed this visit You Were Diagnosed With   
  
 Codes Comments Urinary incontinence, unspecified type    -  Primary ICD-10-CM: R32 
ICD-9-CM: 788.30 Acute bronchitis, unspecified organism     ICD-10-CM: J20.9 ICD-9-CM: 466.0 Vitals BP Pulse Temp Resp Height(growth percentile) Weight(growth percentile) 100/67 (BP 1 Location: Right arm, BP Patient Position: Sitting) 96 98.6 °F (37 °C) (Oral) 17 5' 2\" (1.575 m) 173 lb (78.5 kg) SpO2 BMI OB Status Smoking Status 96% 31.64 kg/m2 Hysterectomy Former Smoker BMI and BSA Data Body Mass Index Body Surface Area  
 31.64 kg/m 2 1.85 m 2 [id number] [place] [phone] Your Updated Medication List  
  
   
This list is accurate as of 5/22/18 12:08 PM.  Always use your most recent med list.  
  
  
  
  
 ACID GONE ANTACID  mg/15 mL suspension Generic drug:  aluminum hydrox-magnesium carb * albuterol 2.5 mg /3 mL (0.083 %) nebulizer solution Commonly known as:  PROVENTIL VENTOLIN  
2.5 mg as needed. * albuterol 90 mcg/actuation inhaler Commonly known as:  PROAIR HFA Take 2 Puffs by inhalation every four (4) hours as needed for Wheezing. atorvastatin 40 mg tablet Commonly known as:  LIPITOR  
TAKE ONE TABLET BY MOUTH ONE TIME DAILY  
  
 baclofen 10 mg tablet Commonly known as:  LIORESAL  
TAKE ONE TABLET BY MOUTH THREE TIMES A DAY DEXILANT 60 mg Cpdb Generic drug:  Dexlansoprazole TAKE ONE CAPSULE BY MOUTH ONE TIME DAILY  
  
 ergocalciferol 50,000 unit capsule Commonly known as:  VITAMIN D2  
TAKE ONE CAPSULE BY MOUTH EVERY 7 DAYS  
  
 fluticasone 50 mcg/actuation nasal spray Commonly known as:  Lex Huff 2 Sprays by Both Nostrils route as needed. gabapentin 400 mg capsule Commonly known as:  NEURONTIN  
400 mg two (2) times a day. guaiFENesin-codeine 100-10 mg/5 mL solution Commonly known as:  ROBITUSSIN AC  
 Take 5 mL by mouth three (3) times daily as needed for Cough. Max Daily Amount: 15 mL. HYDROcodone-acetaminophen 5-325 mg per tablet Commonly known as:  Allyssa Shania Take 1-2 Tabs by mouth two (2) times daily as needed for Pain. Max Daily Amount: 4 Tabs. hydrOXYzine pamoate 50 mg capsule Commonly known as:  VISTARIL Take 1 Cap by mouth three (3) times daily as needed for Anxiety. ipratropium 0.02 % Soln Commonly known as:  ATROVENT  
0.5 mg.  
  
 losartan 25 mg tablet Commonly known as:  COZAAR  
TAKE ONE TABLET BY MOUTH ONE TIME DAILY  
  
 * MIRALAX 17 gram packet Generic drug:  polyethylene glycol Take 17 g by mouth daily. * polyethylene glycol 17 gram packet Commonly known as:  James Aloe DISSOLVE CONTENTS OF ONE PACKET IN BEVERAGE ONCE A DAY * morphine CR 30 mg CR tablet Commonly known as:  MS CONTIN Take  by mouth every twelve (12) hours. * morphine CR 60 mg CR tablet Commonly known as:  MS CONTIN Take 1 Tab by mouth every twelve (12) hours. Max Daily Amount: 120 mg.  
  
 * morphine CR 60 mg CR tablet Commonly known as:  MS CONTIN Take 1 Tab by mouth every twelve (12) hours for 30 days. Max Daily Amount: 120 mg.  
Start taking on:  5/27/2018 * morphine CR 60 mg CR tablet Commonly known as:  MS CONTIN Take 1 Tab by mouth every twelve (12) hours for 30 days. Max Daily Amount: 120 mg.  
Start taking on:  6/26/2018  
  
 naloxone 4 mg/actuation nasal spray Commonly known as:  NARCAN  
1 [place] by IntraNASal route once as needed. Use 1 spray intranasally into 1 nostril. Use a new Narcan nasal spray for subsequent doses and administer into alternating nostrils. May repeat every 2 to 3 minutes as needed. ondansetron 4 mg disintegrating tablet Commonly known as:  ZOFRAN ODT Take 1 Tab by mouth every eight (8) hours as needed for Nausea for up to 15 doses. oxybutynin 5 mg tablet Commonly known as:  IRHCAYFF  
 Take 1 Tab by mouth two (2) times a day. Indications: URINARY URGE INCONTINENCE  
  
 * oxyCODONE IR 10 mg Tab immediate release tablet Commonly known as:  Renuka Johansen Take 1 Tab by mouth three (3) times daily as needed. Max Daily Amount: 30 mg.  
  
 * oxyCODONE IR 10 mg Tab immediate release tablet Commonly known as:  Renuka Johansen Take 1 Tab by mouth three (3) times daily as needed. Max Daily Amount: 30 mg. Start taking on:  5/24/2018 * oxyCODONE IR 10 mg Tab immediate release tablet Commonly known as:  Renuka Johansen Take 1 Tab by mouth three (3) times daily as needed. Max Daily Amount: 30 mg. Start taking on:  6/23/2018 QUEtiapine 200 mg tablet Commonly known as:  SEROquel Take 200 mg by mouth daily. sertraline 100 mg tablet Commonly known as:  ZOLOFT Take 100 mg by mouth nightly as needed. temazepam 15 mg capsule Commonly known as:  RESTORIL Take 1 Cap by mouth nightly as needed for Sleep. Max Daily Amount: 15 mg. Indications: INSOMNIA  
  
 traZODone 100 mg tablet Commonly known as:  [place] Macadamia Take 100 mg by mouth nightly. valACYclovir 500 mg tablet Commonly known as:  VALTREX Take 1 Tab by mouth two (2) times a day. * Notice: This list has 11 medication(s) that are the same as other medications prescribed for you. Read the directions carefully, and ask your doctor or other care provider to review them with you. Prescriptions Printed Refills  
 guaiFENesin-codeine (ROBITUSSIN AC) 100-10 mg/5 mL solution 0 Sig: Take 5 mL by mouth three (3) times daily as needed for Cough. Max Daily Amount: 15 mL. Class: Print Route: Oral  
  
Prescriptions Sent to Pharmacy Refills  
 oxybutynin (DITROPAN) 5 mg tablet 0 Sig: Take 1 Tab by mouth two (2) times a day. Indications: URINARY URGE INCONTINENCE Class: Normal  
 Pharmacy: [facility] [address] [place] [address] #: [phone] Route: Oral  
  
Follow-up Instructions Return in about 3 weeks (around 6/12/2018). Patient Instructions Please contact our office if you have any questions about your visit today. 1.) Use Oxybutynin twice a day for the bladder. 2.) OK to use the cough syrup as needed. Kegel Exercises: Care Instructions Your Care Instructions Kegel exercises strengthen muscles around the bladder. These muscles control the flow of urine. Kegel exercises are sometime called \"pelvic floor\" exercises. They can help prevent urine leakage and keep the pelvic organs in place. A woman who just had a baby might want to try Kegel exercises. They can strengthen pelvic muscles that have been weakened by pregnancy and childbirth. A man or woman may use Kegel exercises to treat urine leakage. You do Kegel exercises by tightening the muscles you use when you urinate. You will likely need to do these exercises for several weeks to get better. Follow-up care is a key part of your treatment and safety. Be sure to make and go to all appointments, and call your doctor if you are having problems. It's also a good idea to know your test results and keep a list of the medicines you take. How can you care for yourself at home? · Do Kegel exercises. ¨ Find the muscles you need to strengthen. To do this, tighten the muscles that stop your urine while you are going to the bathroom. These are the same muscles you squeeze during Kegel exercises. ¨ Squeeze the muscles as hard as you can. Your belly and thighs should not move. ¨ Hold the squeeze for 3 seconds. Then relax for 3 seconds. ¨ Start with 3 seconds, and then add 1 second each week until you are able to squeeze for 10 seconds. ¨ Repeat the exercise 10 to 15 times for each session. Do three or more sessions each day. · You can check to see if you are using the right muscles. Place a finger in your vagina and squeeze around it.  You are doing them right when you feel pressure around your finger. Your doctor may also suggest that you put special weights in your vagina while you do the exercises. · Do not smoke. It can irritate the bladder. If you need help quitting, talk to your doctor about stop-smoking programs and medicines. These can increase your chances of quitting for good. Where can you learn more? Go to http://diana-lashonda.info/. Enter R572 in the search box to learn more about \"Kegel Exercises: Care Instructions. \" Current as of: May 12, 2017 Content Version: 11.4 © 6607-1710 [email or web address]. Care instructions adapted under license by [employer] (which disclaims liability or warranty for this information). If you have questions about a medical condition or this instruction, always ask your healthcare professional. [address] any warranty or liability for your use of this information. Introducing [facility]! Dear Edita Esposito: Thank you for requesting a [employer] account. Our records indicate that you already have an active [employer] account. You can access your account anytime at [email or web address] Did you know that you can access your hospital and ER discharge instructions at any time in [employer]? You can also review all of your test results from your hospital stay or ER visit. Additional Information If you have questions, please visit the Frequently Asked Questions section of the [employer] website at [email or web address] Remember, [employer] is NOT to be used for urgent needs. For medical emergencies, dial 911. Now available from your iPhone and Android! Please provide this summary of care documentation to your next provider. Your primary care clinician is listed as Smith Santos. If you have any questions after today's visit, please call [phone].

## 2018-05-22 NOTE — PROGRESS NOTES
Progress Note    Patient: Gage Ramos MRN: [id number]  SSN: [id number]    YOB: 1955  Age: 58 y.o. Sex: female          Subjective:   Gage Ramos is a 58 y.o. female who is here for urinary urgency. She mentions that she has bladder pressure with leakage. She mentions that she has been dealing with this for about a month. She states that she has used both a diaper and a pad. She mentions that it does not matter what she drinks, she will still get the urge still. She states that she drinks water or lemonade. She denies any bladder problems in the past.   She mentions that she still gets the night time cough. Visit from 5/15/18  Patient is here for an acute care visit for suspected bronchitis. Gage Ramos states that the incident occurred a week and a half ago. She states that she went to the ER twice. She was initially treated with Bactrim and on the second visit was treated with Levaquin. She was also given Hydrocodone cough syrup. She also went to [facility]. She was also treated with prednisone but it was ineffective. Objective:     Vitals:    05/22/18 1113   BP: 100/67   Pulse: 96   Resp: 17   Temp: 98.6 °F (37 °C)   TempSrc: Oral   SpO2: 96%   Weight: 173 lb (78.5 kg)   Height: 5' 2\" (1.575 m)        Review of Systems:  Pertinent items are noted in the History of Present Illness. Physical Exam:   GENERAL: No acute distress   EYE: conjunctivae/corneas clear. PERRL, EOM's intact. Fundi benign  LYMPHATIC: Cervical, supraclavicular, and axillary nodes normal.   THROAT & NECK: mild erythema  LUNG: rhonchi R apex, R base, L apex, L base  HEART: regular rate and rhythm, S1, S2 normal, no murmur, click, rub or gallop  ABDOMEN: soft, tenderness in the lower abdomen.    EXTREMITIES:  extremities normal, atraumatic, no cyanosis or edema    Lab/Data Review:  No new labs to review        Assessment:     1.) Urinary Incontinence: Patient ordered Oxybutynin for use as needed. Additionally, Kegel exercises were placed in her After Visit Summary    2.) Episode of Bronchitis w/lingering cough: Patient ordered Cheratussin for use as needed. Patient will return in 3 weeks for follow up.     Plan:     Orders Placed This Encounter    guaiFENesin-codeine (ROBITUSSIN AC) 100-10 mg/5 mL solution    oxybutynin (DITROPAN) 5 mg tablet         Signed By: Smith Santos DO     May 22, 2018

## 2018-05-22 NOTE — PROGRESS NOTES
Chief Complaint   Patient presents with    Urinary Retention     states that she has been having this for almost 2 weeks and that her bladder feels full and she has some incontinence     1. Have you been to the ER, urgent care clinic since your last visit? Hospitalized since your last visit? No    2. Have you seen or consulted any other health care providers outside of the [facility] since your last visit? Include any pap smears or colon screening.  No

## 2018-05-22 NOTE — PATIENT INSTRUCTIONS
Please contact our office if you have any questions about your visit today. 1.) Use Oxybutynin twice a day for the bladder. 2.) OK to use the cough syrup as needed. Kegel Exercises: Care Instructions  Your Care Instructions    Kegel exercises strengthen muscles around the bladder. These muscles control the flow of urine. Kegel exercises are sometime called \"pelvic floor\" exercises. They can help prevent urine leakage and keep the pelvic organs in place. A woman who just had a baby might want to try Kegel exercises. They can strengthen pelvic muscles that have been weakened by pregnancy and childbirth. A man or woman may use Kegel exercises to treat urine leakage. You do Kegel exercises by tightening the muscles you use when you urinate. You will likely need to do these exercises for several weeks to get better. Follow-up care is a key part of your treatment and safety. Be sure to make and go to all appointments, and call your doctor if you are having problems. It's also a good idea to know your test results and keep a list of the medicines you take. How can you care for yourself at home? · Do Kegel exercises. ¨ Find the muscles you need to strengthen. To do this, tighten the muscles that stop your urine while you are going to the bathroom. These are the same muscles you squeeze during Kegel exercises. ¨ Squeeze the muscles as hard as you can. Your belly and thighs should not move. ¨ Hold the squeeze for 3 seconds. Then relax for 3 seconds. ¨ Start with 3 seconds, and then add 1 second each week until you are able to squeeze for 10 seconds. ¨ Repeat the exercise 10 to 15 times for each session. Do three or more sessions each day. · You can check to see if you are using the right muscles. Place a finger in your vagina and squeeze around it. You are doing them right when you feel pressure around your finger.  Your doctor may also suggest that you put special weights in your vagina while you do the exercises. · Do not smoke. It can irritate the bladder. If you need help quitting, talk to your doctor about stop-smoking programs and medicines. These can increase your chances of quitting for good. Where can you learn more? Go to http://diana-lashonda.info/. Enter G558 in the search box to learn more about \"Kegel Exercises: Care Instructions. \"  Current as of: May 12, 2017  Content Version: 11.4  © 4642-5275 [employer]. Care instructions adapted under license by [employer] (which disclaims liability or warranty for this information). If you have questions about a medical condition or this instruction, always ask your healthcare professional. [address] any warranty or liability for your use of this information.

## 2018-05-29 ENCOUNTER — TELEPHONE (OUTPATIENT)
Dept: FAMILY MEDICINE CLINIC | Age: 63
End: 2018-05-29

## 2018-05-29 ENCOUNTER — TELEPHONE (OUTPATIENT)
Dept: PAIN MANAGEMENT | Age: 63
End: 2018-05-29

## 2018-05-29 DIAGNOSIS — R33.9 URINARY RETENTION: Primary | ICD-10-CM

## 2018-05-29 NOTE — TELEPHONE ENCOUNTER
Pt is calling requesting a referral to [place] [facility]. Pt stated that she was being seen and treated for Urinary Rentention, but symptoms have gotten worst. Please advise.   P.[phone]  F. [phone]

## 2018-06-04 ENCOUNTER — OFFICE VISIT (OUTPATIENT)
Dept: ORTHOPEDIC SURGERY | Facility: CLINIC | Age: 63
End: 2018-06-04

## 2018-06-04 VITALS
HEIGHT: 62 IN | TEMPERATURE: 98.4 F | BODY MASS INDEX: 32.39 KG/M2 | RESPIRATION RATE: 18 BRPM | OXYGEN SATURATION: 98 % | HEART RATE: 88 BPM | DIASTOLIC BLOOD PRESSURE: 78 MMHG | SYSTOLIC BLOOD PRESSURE: 119 MMHG | WEIGHT: 176 LBS

## 2018-06-04 DIAGNOSIS — M25.532 LEFT WRIST PAIN: ICD-10-CM

## 2018-06-04 DIAGNOSIS — M65.832 EXTENSOR TENOSYNOVITIS OF LEFT WRIST: Primary | ICD-10-CM

## 2018-06-04 RX ORDER — CYCLOBENZAPRINE HCL 10 MG
TABLET ORAL
COMMUNITY
End: 2018-06-19 | Stop reason: SDUPTHER

## 2018-06-04 RX ORDER — BUPIVACAINE HYDROCHLORIDE 2.5 MG/ML
0.5 INJECTION, SOLUTION EPIDURAL; INFILTRATION; INTRACAUDAL ONCE
Qty: 0.5 ML | Refills: 0
Start: 2018-06-04 | End: 2018-06-04

## 2018-06-04 RX ORDER — BETAMETHASONE SODIUM PHOSPHATE AND BETAMETHASONE ACETATE 3; 3 MG/ML; MG/ML
6 INJECTION, SUSPENSION INTRA-ARTICULAR; INTRALESIONAL; INTRAMUSCULAR; SOFT TISSUE ONCE
Qty: 0.5 ML | Refills: 0
Start: 2018-06-04 | End: 2018-06-04

## 2018-06-04 NOTE — PROGRESS NOTES
Patient: Robert Gilmore                MRN: [id number]       SSN: [id number]  YOB: 1955        AGE: 58 y.o. SEX: female    PCP: Gage Logan DO  06/04/18    Chief Complaint   Patient presents with    Wrist Pain     Left     HISTORY:  Robert Gilmore is a 58 y.o. female who is seen for increased dorsal left hand/wrist pain and swelling. She responded temporarily to her previous injection. She states she first noticed a swelling overlying her dorsal left wrist while in physical therapy recently. She noted increased left wrist pain after noticing the swelling. She states that she is left handed so most everything she does causes her pain. She is experiencing pain with gripping and pinching. She reports a h/o triggering of her right middle finger and left ring finger. She states she has had to pry open her fingers before because of the triggering. She was previously being seen by Dr. Shanti Painting for a left toe fracture. She was previously seen by Dr. Janie Mendoza in 2000 for her fibromyalgia. She is currently in pain management. She is s/p anterior cervical fusion by Dr. Debo Kc. She reports she has been dropping things recently. Pain Assessment  6/4/2018   Location of Pain Wrist   Pain Location Comment -   Location Modifiers Left   Severity of Pain 8   Quality of Pain Aching   Duration of Pain Persistent   Frequency of Pain Constant   Aggravating Factors Bending;Stretching;Straightening   Aggravating Factors Comment -   Limiting Behavior Yes   Relieving Factors Nothing   Relieving Factors Comment -   Result of Injury No   Work-Related Injury -   Type of Injury -     Occupation, etc:  Ms. Laura Meyer receives social security disability benefits for her low back pain and OA since 2000. She previously worked at [employer] and was training for management. She lives in [place] with her son and daughter. Her daughter is employed as her aid. She has another adult daughter.  She also lost a son due to a horrific motorcycle accident a few years ago. She has 5 grandchildren- two of which are adopted. Current weight is 173 pounds. She is 5'4\" tall. She is a diet controlled diabetic. She is not hypertensive. She is left handed. She reports a h/o acid reflux and a stomach ulcer. Lab Results   Component Value Date/Time    Hemoglobin A1c 5.7 (H) 03/07/2018 02:41 PM    Hemoglobin A1c, External 5.9 06/27/2016     Weight Metrics 6/4/2018 5/22/2018 5/15/2018 4/3/2018 3/7/2018 3/6/2018 2/21/2018   Weight 176 lb 173 lb 174 lb 173 lb 173 lb 172 lb 167 lb   BMI 32.19 kg/m2 31.64 kg/m2 31.83 kg/m2 31.64 kg/m2 31.64 kg/m2 31.46 kg/m2 30.54 kg/m2       Patient Active Problem List   Diagnosis Code    Lumbago M54.5    Other affections of shoulder region, not elsewhere classified M75.80    Other chronic pain G89.29    Bipolar 1 disorder ([facility] [address] F31.9    Chronic pain syndrome G89.4    Carpal tunnel syndrome G56.00    Degeneration of lumbar or lumbosacral intervertebral disc M51.37    Displacement of lumbar intervertebral disc without myelopathy M51.26    Pain in joint, multiple sites M25.50    Cervicalgia M54.2    Postlaminectomy syndrome, cervical region M96.1    Annular tear of lumbar disc M51.36    Lung nodule seen on imaging study R91.1    ACP (advance care planning) Z71.89    Hypercholesterolemia E78.00    Nausea R11.0    Myalgia M79.1    Vitamin D deficiency E55.9    Benign essential hypertension with target blood pressure below 140/90 I10    Gastroesophageal reflux disease with esophagitis K21.0     REVIEW OF SYSTEMS: All Below are Negative except: See HPI   Constitutional: negative for fever, chills, and weight loss. Cardiovascular: negative for chest pain, claudication, leg swelling, SOB, VILLAGOMEZ   Gastrointestinal: Negative for pain, N/V/C/D, Blood in stool or urine, dysuria,  hematuria, incontinence, pelvic pain.    Musculoskeletal: See HPI   Neurological: Negative for dizziness and weakness. Negative for headaches, Visual changes, confusion, seizures   Phychiatric/Behavioral: Negative for depression, memory loss, substance  abuse. Extremities: Negative for hair changes, rash, or skin lesion changes. Hematologic: Negative for bleeding problems, bruising, pallor or swollen lymph  nodes   Peripheral Vascular: No calf pain, no circulation deficits. Social History     Social History    Marital status:      Spouse name: N/A    Number of children: N/A    Years of education: N/A     Occupational History    Not on file. Social History Main Topics    Smoking status: Former Smoker     Quit date: 3/6/1993    Smokeless tobacco: Never Used    Alcohol use No      Comment: none in 24 years    Drug use: No      Comment: last smoked in 1993    Sexual activity: Not Currently     Other Topics Concern    Not on file     Social History Narrative      Allergies   Allergen Reactions    Aspirin Other (comments)     Stomach bleeding    Ativan [Lorazepam] Shortness of Breath    Macrobid [Nitrofurantoin Monohyd/M-Cryst] Nausea and Vomiting    Nsaids (Non-Steroidal Anti-Inflammatory Drug) Nausea and Vomiting    Opana [Oxymorphone] Other (comments)     Insomnia, weight loss, nightmares    Pcn [Penicillins] Hives      Current Outpatient Prescriptions   Medication Sig    cyclobenzaprine (FLEXERIL) 10 mg tablet Take  by mouth three (3) times daily as needed for Muscle Spasm(s).  oxybutynin (DITROPAN) 5 mg tablet Take 1 Tab by mouth two (2) times a day. Indications: URINARY URGE INCONTINENCE    losartan (COZAAR) 25 mg tablet TAKE ONE TABLET BY MOUTH ONE TIME DAILY    atorvastatin (LIPITOR) 40 mg tablet TAKE ONE TABLET BY MOUTH ONE TIME DAILY    morphine CR (MS CONTIN) 60 mg CR tablet Take 1 Tab by mouth every twelve (12) hours. Max Daily Amount: 120 mg.    oxyCODONE IR (ROXICODONE) 10 mg tab immediate release tablet Take 1 Tab by mouth three (3) times daily as needed.  Max Daily Amount: 30 mg.    DEXILANT 60 mg CpDB TAKE ONE CAPSULE BY MOUTH ONE TIME DAILY    valACYclovir (VALTREX) 500 mg tablet Take 1 Tab by mouth two (2) times a day.  morphine CR (MS CONTIN) 30 mg CR tablet Take  by mouth every twelve (12) hours.  ergocalciferol (VITAMIN D2) 50,000 unit capsule TAKE ONE CAPSULE BY MOUTH EVERY 7 DAYS    polyethylene glycol (MIRALAX) 17 gram packet DISSOLVE CONTENTS OF ONE PACKET IN BEVERAGE ONCE A DAY    albuterol (PROAIR HFA) 90 mcg/actuation inhaler Take 2 Puffs by inhalation every four (4) hours as needed for Wheezing.  fluticasone (FLONASE) 50 mcg/actuation nasal spray 2 Sprays by Both Nostrils route as needed.  QUEtiapine (SEROQUEL) 200 mg tablet Take 200 mg by mouth daily.  naloxone (NARCAN) 4 mg/actuation nasal spray 1 Thomson by IntraNASal route once as needed. Use 1 spray intranasally into 1 nostril. Use a new Narcan nasal spray for subsequent doses and administer into alternating nostrils. May repeat every 2 to 3 minutes as needed.  polyethylene glycol (MIRALAX) 17 gram packet Take 17 g by mouth daily.  temazepam (RESTORIL) 15 mg capsule Take 1 Cap by mouth nightly as needed for Sleep. Max Daily Amount: 15 mg. Indications: INSOMNIA    albuterol (PROVENTIL VENTOLIN) 2.5 mg /3 mL (0.083 %) nebulizer solution 2.5 mg as needed.  gabapentin (NEURONTIN) 400 mg capsule 400 mg two (2) times a day.  ipratropium (ATROVENT) 0.02 % nebulizer solution 0.5 mg.    trazodone (DESYREL) 100 mg tablet Take 100 mg by mouth nightly.  sertraline (ZOLOFT) 100 mg tablet Take 100 mg by mouth nightly as needed.  guaiFENesin-codeine (ROBITUSSIN AC) 100-10 mg/5 mL solution Take 5 mL by mouth three (3) times daily as needed for Cough. Max Daily Amount: 15 mL.  morphine CR (MS CONTIN) 60 mg CR tablet Take 1 Tab by mouth every twelve (12) hours for 30 days.  Max Daily Amount: 120 mg.    [START ON 6/26/2018] morphine CR (MS CONTIN) 60 mg CR tablet Take 1 Tab by mouth every twelve (12) hours for 30 days. Max Daily Amount: 120 mg.    oxyCODONE IR (ROXICODONE) 10 mg tab immediate release tablet Take 1 Tab by mouth three (3) times daily as needed. Max Daily Amount: 30 mg.    [START ON 6/23/2018] oxyCODONE IR (ROXICODONE) 10 mg tab immediate release tablet Take 1 Tab by mouth three (3) times daily as needed. Max Daily Amount: 30 mg.    hydrOXYzine pamoate (VISTARIL) 50 mg capsule Take 1 Cap by mouth three (3) times daily as needed for Anxiety.  ACID GONE ANTACID  mg/15 mL suspension     ondansetron (ZOFRAN ODT) 4 mg disintegrating tablet Take 1 Tab by mouth every eight (8) hours as needed for Nausea for up to 15 doses.  HYDROcodone-acetaminophen (NORCO) 5-325 mg per tablet Take 1-2 Tabs by mouth two (2) times daily as needed for Pain. Max Daily Amount: 4 Tabs.  baclofen (LIORESAL) 10 mg tablet TAKE ONE TABLET BY MOUTH THREE TIMES A DAY     No current facility-administered medications for this visit. PHYSICAL EXAMINATION:  Visit Vitals    /78    Pulse 88    Temp 98.4 °F (36.9 °C) (Oral)    Resp 18    Ht 5' 2\" (1.575 m)    Wt 176 lb (79.8 kg)    SpO2 98%    BMI 32.19 kg/m2      PHYSICAL EXAM:  Visit Vitals    /78    Pulse 88    Temp 98.4 °F (36.9 °C) (Oral)    Resp 18    Ht 5' 2\" (1.575 m)    Wt 176 lb (79.8 kg)    SpO2 98%    BMI 32.19 kg/m2       Appearance: Alert, well appearing and pleasant patient who is in no distress, oriented to person, place/time, and who follows commands. HEENT: [facility] hears well, does not require hearing aids. Her sclera of the eyes are non-icteric. She is breathing normally and no respiratory accessory muscle use is noted. No JVD present and Neck ROM within normal limits. Psychiatric: Affect and mood are appropriate.  Oriented x3  Heart[de-identified]  S1, S2 without murmer, regular rhythm  Lungs:  Breath sounds clear to auscultation  Cardiovascular/Peripheral Vascular: Normal pulses to each foot.  Integumentary: No rashes,  wounds, or abrasions. Warm and normal color. No drainage. Gait: normal  Sensory Exam: Intact/Normal Sensation    Lymphatic: No evidence of Lymphedema  Vascular:       Pulses: palpable  Varicosities none  Wounds/Abrasion: None Present  Neuro: Negative, no tremors  ORTHO EXAMINATION:  Examination Right Wrist Left Wrist   Skin Intact    Tenderness - + dorsum, fourth extensor compartment   Flexion 40 40   Extension 30 30   Deformity - -   Effusion - -   Finger flexion Full Full   Finger extension Full Weak active with pain   Tinel's sign - -   Phalen's test - -   Finklestein maneuver - -   Pain with thumb abduction - -   Capillary refill - -   No evidence of extensor tendon rupture  Pain with active wrist and finger extension  Flat affect    PROCEDURE: After discussing treatment options, patient's left fourth extensor compartment was injected with 1/2 cc Marcaine and 1/2 cc Celestone. Chart reviewed for the following:   Ryanne Acosta MD, have reviewed the History, Physical and updated the Allergic reactions for [place] performed immediately prior to start of procedure:  Ryanne Acosta MD, have performed the following reviews on [facility] prior to the start of the procedure:            * Patient was identified by name and date of birth   * Agreement on procedure being performed was verified  * Risks and Benefits explained to the patient  * Procedure site verified and marked as necessary  * Patient was positioned for comfort  * Consent was obtained     Time: 10:50 AM     Date of procedure: 6/4/2018    Procedure performed by:  Shanita Sweet MD    Ms. Hutchinson tolerated the procedure well with no complications. MRI LEFT WRIST WO CONT 1/22/18  IMPRESSION:   1. Extensive fluid surrounding the fourth extensor compartment tendons, consistent with tenosynovitis.  Perhaps minimal tenosynovitis of the second extensor compartment as well.  -Marrow edema in the dorsal lip of the distal radius, extending towards styloid, of uncertain etiology. If tenosynovitis is inflammatory or infectious, these findings may be reactive. Also consider contusion in the appropriate setting.   2. Probable degeneration of the TFCC and scapholunate ligament. Mild triscaphe degenerative change. IMPRESSION:      ICD-10-CM ICD-9-CM    1. Extensor tenosynovitis of left wrist M65.842 727.05 betamethasone (CELESTONE SOLUSPAN) 6 mg/mL injection      BETAMETHASONE ACETATE & SODIUM PHOSPHATE INJECTION 3 MG EA.      DRAIN/INJECT INTERMEDIATE JOINT/BURSA      bupivacaine, PF, (MARCAINE, PF,) 0.25 % (2.5 mg/mL) injection      REFERRAL TO RHEUMATOLOGY   2. Left wrist pain M25.532 719.43 betamethasone (CELESTONE SOLUSPAN) 6 mg/mL injection      BETAMETHASONE ACETATE & SODIUM PHOSPHATE INJECTION 3 MG EA.      DRAIN/INJECT INTERMEDIATE JOINT/BURSA      bupivacaine, PF, (MARCAINE, PF,) 0.25 % (2.5 mg/mL) injection      REFERRAL TO RHEUMATOLOGY     PLAN: She will follow up with Dr. Joon Quezada for inflammatory arthritis workup. After discussing treatment options, patient's left fourth extensor compartment was injected with 1/2 cc Marcaine and 1/2 cc Celestone. She will be tentatively scheduled for an extensor tenosynovectomy of her left wrist fourth extensor compartment. Risks of surgery outlined and informed consent obtained. She will follow up for H&P.       Scribed by Nancy Aleman [address]) as dictated by Judy Harris MD

## 2018-06-04 NOTE — PATIENT INSTRUCTIONS
Tenosynovitis of the Wrist: Care Instructions  Your Care Instructions  Tenosynovitis (say \"ten-oh-sin-uh-VY-tus\") means the lining of a tendon is inflamed. This problem usually affects tendons in your thumb and wrist. A tendon is a cord that joins muscle to bone. Tenosynovitis can be caused by an injury. Or it may be caused by repeating a movement over and over, such as when you knit, lift things, or play video games. In rare cases, an infected wound causes it. In most cases, you can recover fully. But if the problem is caused by doing something over and over and you don't stop or change doing that, it may come back. Follow-up care is a key part of your treatment and safety. Be sure to make and go to all appointments, and call your doctor if you are having problems. It's also a good idea to know your test results and keep a list of the medicines you take. How can you care for yourself at home? · Prop up the sore wrist on a pillow when you ice it or anytime you sit or lie down during the next 3 days. Try to keep it above the level of your heart. This will help reduce swelling. · Put ice or cold packs on your wrist for 10 to 20 minutes at a time. Try to do this every 1 to 2 hours for the next 3 days (when you are awake) or until the swelling goes down. Put a thin cloth between the ice pack and your skin. · If your swelling is gone after 2 or 3 days, put a heating pad set on low or a warm cloth on your wrist for 15 to 20 minutes. This can reduce pain. · If your doctor gave you an elastic bandage, keep it on for the next 24 to 36 hours or for as long as your doctor told you. The bandage should be snug. But it should not be tight enough to cause numbness, tingling, or swelling. · If your doctor gave you a splint or brace, wear it as directed. It will protect your wrist until it is better. · Take pain medicines exactly as directed.   ¨ If the doctor gave you a prescription medicine for pain, take it as prescribed. ¨ If you are not taking a prescription pain medicine, ask your doctor if you can take an over-the-counter medicine. · If your doctor prescribes antibiotics, take them as directed. Do not stop taking them just because you feel better. You need to take the full course of antibiotics. · Try not to use your injured wrist and hand. · After you are better, do exercises to make the muscles around your tendon stronger. This can prevent the problem from coming back. Follow instructions from your doctor. When should you call for help? Call your doctor now or seek immediate medical care if:  ? · Your hand or fingers are cool or pale or change colors. ? · You have tingling, weakness, or numbness in your hand and fingers. ? · Your pain gets worse. ? · The tendon may be infected. Signs of infection include:  ¨ Increased pain and tenderness around the wrist or thumb. ¨ Swelling or redness of the wrist or thumb. ¨ A fever. ? Watch closely for changes in your health, and be sure to contact your doctor if:  ? · You do not get better as expected. Where can you learn more? Go to http://diana-lashonda.info/. Enter T351 in the search box to learn more about \"Tenosynovitis of the Wrist: Care Instructions. \"  Current as of: March 21, 2017  Content Version: 11.4  © 0647-2294 [employer]. Care instructions adapted under license by [employer] (which disclaims liability or warranty for this information). If you have questions about a medical condition or this instruction, always ask your healthcare professional. [place] [address] any warranty or liability for your use of this information.

## 2018-06-08 PROBLEM — R33.9 URINARY RETENTION: Status: ACTIVE | Noted: 2018-06-08

## 2018-06-12 ENCOUNTER — OFFICE VISIT (OUTPATIENT)
Dept: FAMILY MEDICINE CLINIC | Age: 63
End: 2018-06-12

## 2018-06-12 VITALS
DIASTOLIC BLOOD PRESSURE: 78 MMHG | WEIGHT: 172 LBS | RESPIRATION RATE: 17 BRPM | TEMPERATURE: 97 F | BODY MASS INDEX: 31.65 KG/M2 | HEART RATE: 93 BPM | SYSTOLIC BLOOD PRESSURE: 118 MMHG | HEIGHT: 62 IN

## 2018-06-12 DIAGNOSIS — R05.3 PERSISTENT COUGH FOR 3 WEEKS OR LONGER: Primary | ICD-10-CM

## 2018-06-12 DIAGNOSIS — K21.9 GASTROESOPHAGEAL REFLUX DISEASE WITHOUT ESOPHAGITIS: ICD-10-CM

## 2018-06-12 DIAGNOSIS — R06.2 WHEEZING: ICD-10-CM

## 2018-06-12 DIAGNOSIS — J20.9 ACUTE BRONCHITIS, UNSPECIFIED ORGANISM: ICD-10-CM

## 2018-06-12 RX ORDER — PREDNISONE 10 MG/1
TABLET ORAL
Qty: 21 TAB | Refills: 0 | Status: SHIPPED | OUTPATIENT
Start: 2018-06-12 | End: 2018-08-21 | Stop reason: SDUPTHER

## 2018-06-12 RX ORDER — CODEINE PHOSPHATE AND GUAIFENESIN 10; 100 MG/5ML; MG/5ML
5 SOLUTION ORAL
Qty: 236 ML | Refills: 0 | Status: SHIPPED | OUTPATIENT
Start: 2018-06-12 | End: 2018-11-15

## 2018-06-12 NOTE — MR AVS SNAPSHOT
[facility] 
 
 
 [address] [place] [id number] [phone] Patient: Cherelle Vargas MRN: [id number] Visit Information Date & Time Provider Department Dept. Phone Encounter #  
 6/12/2018  1:30 PM Veronica Contreras [id number] Follow-up Instructions Return for Patient will call for follow up after seeing pulmonologist. .  
  
Your Appointments 7/2/2018 12:40 PM  
Follow Up with BETO Rolon [facility] (RICHAR SCHEDULING) Appt Note: 3 mon f/u per rc..to  
 [facility] [id number] [phone] [facility] [phone] Upcoming Health Maintenance Date Due DTaP/Tdap/Td series (1 - Tdap) 3/30/1975 Influenza Age 5 to Adult 8/1/2018 MEDICARE YEARLY EXAM 10/13/2018 PAP AKA CERVICAL CYTOLOGY 6/21/2019 BREAST CANCER SCRN MAMMOGRAM 1/12/2020 COLONOSCOPY 4/13/2027 Allergies as of 6/12/2018  Review Complete On: 5/59/3027 By: Ethan Corey. Latanya Haywood LPN Severity Noted Reaction Type Reactions Aspirin    Other (comments) Stomach bleeding Ativan [Lorazepam]  02/21/2018    Shortness of Breath Macrobid [Nitrofurantoin Monohyd/m-cryst]  11/25/2015    Nausea and Vomiting Nsaids (Non-steroidal Anti-inflammatory Drug)    Nausea and Vomiting Opana [Oxymorphone]  11/29/2016    Other (comments) Insomnia, weight loss, nightmares Pcn [Penicillins]    Hives Current Immunizations  Never Reviewed Name Date Pneumococcal Polysaccharide (PPSV-23) 3/7/2014 Not reviewed this visit You Were Diagnosed With   
  
 Codes Comments Persistent cough for 3 weeks or longer    -  Primary ICD-10-CM: R05 ICD-9-CM: 786.2 Acute bronchitis, unspecified organism     ICD-10-CM: J20.9 ICD-9-CM: 466.0 Wheezing     ICD-10-CM: R06.2 ICD-9-CM: 786.07   
  
 Vitals BP Pulse Temp Resp Height(growth percentile) Weight(growth percentile) 118/78 (BP 1 Location: Right arm, BP Patient Position: Sitting) 93 97 °F (36.1 °C) 17 5' 2\" (1.575 m) 172 lb (78 kg) BMI OB Status Smoking Status 31.46 kg/m2 Hysterectomy Former Smoker BMI and BSA Data Body Mass Index Body Surface Area  
 31.46 kg/m 2 1.85 m 2 Preferred Pharmacy Pharmacy Name Phone GÓME[facility]ERRAR[facility][id number] - [address], [address] [address] [phone] Your Updated Medication List  
  
   
This list is accurate as of 6/12/18  2:05 PM.  Always use your most recent med list.  
  
  
  
  
 ACID GONE ANTACID  mg/15 mL suspension Generic drug:  aluminum hydrox-magnesium carb * albuterol 2.5 mg /3 mL (0.083 %) nebulizer solution Commonly known as:  PROVENTIL VENTOLIN  
2.5 mg as needed. * albuterol 90 mcg/actuation inhaler Commonly known as:  PROAIR HFA Take 2 Puffs by inhalation every four (4) hours as needed for Wheezing. atorvastatin 40 mg tablet Commonly known as:  LIPITOR  
TAKE ONE TABLET BY MOUTH ONE TIME DAILY  
  
 baclofen 10 mg tablet Commonly known as:  LIORESAL  
TAKE ONE TABLET BY MOUTH THREE TIMES A DAY  
  
 cyclobenzaprine 10 mg tablet Commonly known as:  FLEXERIL Take  by mouth three (3) times daily as needed for Muscle Spasm(s). DEXILANT 60 mg Cpdb Generic drug:  Dexlansoprazole TAKE ONE CAPSULE BY MOUTH ONE TIME DAILY  
  
 ergocalciferol 50,000 unit capsule Commonly known as:  VITAMIN D2  
TAKE ONE CAPSULE BY MOUTH EVERY 7 DAYS  
  
 fluticasone 50 mcg/actuation nasal spray Commonly known as:  Marine Knack 2 Sprays by Both Nostrils route as needed. gabapentin 400 mg capsule Commonly known as:  NEURONTIN  
400 mg two (2) times a day. guaiFENesin-codeine 100-10 mg/5 mL solution Commonly known as:  ROBITUSSIN AC  
 Take 5 mL by mouth three (3) times daily as needed for Cough. Max Daily Amount: 15 mL. HYDROcodone-acetaminophen 5-325 mg per tablet Commonly known as:  Charlie Rhodes Take 1-2 Tabs by mouth two (2) times daily as needed for Pain. Max Daily Amount: 4 Tabs. hydrOXYzine pamoate 50 mg capsule Commonly known as:  VISTARIL Take 1 Cap by mouth three (3) times daily as needed for Anxiety. ipratropium 0.02 % Soln Commonly known as:  ATROVENT  
0.5 mg.  
  
 losartan 25 mg tablet Commonly known as:  COZAAR  
TAKE ONE TABLET BY MOUTH ONE TIME DAILY  
  
 * MIRALAX 17 gram packet Generic drug:  polyethylene glycol Take 17 g by mouth daily. * polyethylene glycol 17 gram packet Commonly known as:  Walter Gardner DISSOLVE CONTENTS OF ONE PACKET IN BEVERAGE ONCE A DAY * morphine CR 30 mg CR tablet Commonly known as:  MS CONTIN Take  by mouth every twelve (12) hours. * morphine CR 60 mg CR tablet Commonly known as:  MS CONTIN Take 1 Tab by mouth every twelve (12) hours. Max Daily Amount: 120 mg.  
  
 * morphine CR 60 mg CR tablet Commonly known as:  MS CONTIN Take 1 Tab by mouth every twelve (12) hours for 30 days. Max Daily Amount: 120 mg.  
  
 * morphine CR 60 mg CR tablet Commonly known as:  MS CONTIN Take 1 Tab by mouth every twelve (12) hours for 30 days. Max Daily Amount: 120 mg.  
Start taking on:  6/26/2018  
  
 naloxone 4 mg/actuation nasal spray Commonly known as:  NARCAN  
1 Carnation by IntraNASal route once as needed. Use 1 spray intranasally into 1 nostril. Use a new Narcan nasal spray for subsequent doses and administer into alternating nostrils. May repeat every 2 to 3 minutes as needed. ondansetron 4 mg disintegrating tablet Commonly known as:  ZOFRAN ODT Take 1 Tab by mouth every eight (8) hours as needed for Nausea for up to 15 doses. oxybutynin 5 mg tablet Commonly known as:  DWKFPNAU  
 Take 1 Tab by mouth two (2) times a day. Indications: URINARY URGE INCONTINENCE  
  
 * oxyCODONE IR 10 mg Tab immediate release tablet Commonly known as:  Cornelia Naqvi Take 1 Tab by mouth three (3) times daily as needed. Max Daily Amount: 30 mg.  
  
 * oxyCODONE IR 10 mg Tab immediate release tablet Commonly known as:  Cornelia Naqvi Take 1 Tab by mouth three (3) times daily as needed. Max Daily Amount: 30 mg.  
  
 * oxyCODONE IR 10 mg Tab immediate release tablet Commonly known as:  Cornelia Naqvi Take 1 Tab by mouth three (3) times daily as needed. Max Daily Amount: 30 mg. Start taking on:  6/23/2018  
  
 predniSONE 10 mg dose pack Commonly known as:  STERAPRED DS See administration instruction per 10mg dose pack QUEtiapine 200 mg tablet Commonly known as:  SEROquel Take 200 mg by mouth daily. sertraline 100 mg tablet Commonly known as:  ZOLOFT Take 100 mg by mouth nightly as needed. temazepam 15 mg capsule Commonly known as:  RESTORIL Take 1 Cap by mouth nightly as needed for Sleep. Max Daily Amount: 15 mg. Indications: INSOMNIA  
  
 traZODone 100 mg tablet Commonly known as:  Linda Homme Take 100 mg by mouth nightly. valACYclovir 500 mg tablet Commonly known as:  VALTREX Take 1 Tab by mouth two (2) times a day. * Notice: This list has 11 medication(s) that are the same as other medications prescribed for you. Read the directions carefully, and ask your doctor or other care provider to review them with you. Prescriptions Printed Refills  
 guaiFENesin-codeine (ROBITUSSIN AC) 100-10 mg/5 mL solution 0 Sig: Take 5 mL by mouth three (3) times daily as needed for Cough. Max Daily Amount: 15 mL. Class: Print Route: Oral  
  
Prescriptions Sent to Pharmacy Refills  
 predniSONE (STERAPRED DS) 10 mg dose pack 0 Sig: See administration instruction per 10mg dose pack  Class: Normal  
 Pharmacy: [id number] - [address] #: [phone] We Performed the Following REFERRAL TO PULMONARY DISEASE [NWL12 Custom] Follow-up Instructions Return for Patient will call for follow up after seeing pulmonologist. . Referral Information Referral ID Referred By Referred To  
  
 [id number] TITO Morales MD   
   [address] [place] [address], [address] [place], [place] [address] Phone: [phone] Fax: [phone] Visits Status Start Date End Date 1 New Request 6/12/18 6/12/19 If your referral has a status of pending review or denied, additional information will be sent to support the outcome of this decision. Patient Instructions 1.) They will call with appointment time for your visit with the lung specialist 
 
2.) Please follow up after seeing the lung specialist.  
 
3.) OK to use the Pepcid twice a day before meals. Introducing [facility]! Dear Mehnaz Ferraro: Thank you for requesting a BitLit account. Our records indicate that you already have an active BitLit account. You can access your account anytime at [email or web address] Did you know that you can access your hospital and ER discharge instructions at any time in BitLit? You can also review all of your test results from your hospital stay or ER visit. Additional Information If you have questions, please visit the Frequently Asked Questions section of the BitLit website at [email or web address] Remember, BitLit is NOT to be used for urgent needs. For medical emergencies, dial 911. Now available from your iPhone and Android! Please provide this summary of care documentation to your next provider. Your primary care clinician is listed as Liliana Laura. If you have any questions after today's visit, please call [phone].

## 2018-06-12 NOTE — PROGRESS NOTES
Progress Note    Patient: Jacob Contreras MRN: [id number]  SSN: [id number]    YOB: 1955  Age: 58 y.o. Sex: female          Subjective:   Jacob Contreras is a 58 y.o. female who is here for follow up. She mentions that her cough is still bothering her. She also is concerned about urinary urgency. In regard to her lungs she states that she was previously told that she had a spot on her lung and wants to see if it is still there. She mentions that when she goes outside her cough worsens. She states that when uses the cough medicine along with water she is able to cough up some phlegm. She states that she even took more than was prescribed for the cough syrup. She states that when she is exposed to grass she develops a severe cough as well. Patient denies coughing up blood. She also continues to use Dexilant. Also she asks whether she can use Pepcid as well. Visit from 5/22/18  Patient is here for follow up on urinary urgency. She mentions that she has bladder pressure with leakage. She mentions that she has been dealing with this for about a month. She states that she has used both a diaper and a pad. She mentions that it does not matter what she drinks, she will still get the urge still. She states that she drinks water or lemonade. She denies any bladder problems in the past.   She mentions that she still gets the night time cough. Visit from 5/15/18  Patient is here for an acute care visit for suspected bronchitis. Jacob Contreras states that the incident occurred a week and a half ago. She states that she went to the ER twice. She was initially treated with Bactrim and on the second visit was treated with Levaquin. She was also given Hydrocodone cough syrup. She also went to [facility]. She was also treated with prednisone but it was ineffective.         Objective:     Vitals:    06/12/18 1335   BP: 118/78   Pulse: 93   Resp: 17   Temp: 97 °F (36.1 °C)   Weight: 172 lb (78 kg)   Height: 5' 2\" (1.575 m)        Review of Systems:  Pertinent items are noted in the History of Present Illness. Physical Exam:   GENERAL: Mild distress   EYE: conjunctivae/corneas clear. PERRL, EOM's intact. Fundi benign  THROAT & NECK: mild erythema  LUNG: Mild expiratory wheezes noted throughout the bases  HEART: regular rate and rhythm, S1, S2 normal, no murmur, click, rub or gallop  ABDOMEN: soft, non-tender, non-distended. EXTREMITIES:  extremities normal, atraumatic, no cyanosis or edema    Lab/Data Review:    XR Results (most recent):    Results from Hospital Encounter encounter on 02/21/18   XR CHEST PA LAT   Narrative PA And Lateral Chest    CPT CODE: 87699    COMPARISON: 2/22/2017. CLINICAL INFORMATION: Chest and abdominal pain, status post EGD. FINDINGS:    Heart size within normal limits. Mild ectasia of the thoracic aorta, stable. Air-fluid level in the retrocardiac space correlates with a hiatal hernia. No  pulmonary edema. No effusion or pneumothorax. There are some overlying lead  artifact. Lungs are otherwise clear. Degenerative change in the spine. No free  air seen under the diaphragm. .         Impression IMPRESSION:    1. No radiographic evidence for an acute cardiopulmonary abnormality. 2. Hiatal hernia. Assessment:     1.) Persistent Cough w/ wheezing: Possibly related to re-emergence of bronchitis. I will order a prednisone pack as well as Cheratussin. Additionally, I urgently referred her to pulmonology. She is set to see Dr. Kristi Persaud with [facility].     2.) Urinary Incontinence: Patient provided referral information to Urology. 3.) GERD: Patient was advised to try Pepcid twice a day with meals. She will also use Dexilant regularly.      Patient will return for follow up after seeing her pulmonologist.    Plan:     Orders Placed This Encounter    REFERRAL TO PULMONARY DISEASE    guaiFENesin-codeine (ROBITUSSIN AC) 100-10 mg/5 mL solution    predniSONE [facility] ROLAND) 10 mg dose pack         Signed By: Pat Soriano,      June 12, 2018

## 2018-06-12 NOTE — PROGRESS NOTES
Chief Complaint   Patient presents with    Follow-up    Cough     states that this has reoccured since last visit     1. Have you been to the ER, urgent care clinic since your last visit? Hospitalized since your last visit? No    2. Have you seen or consulted any other health care providers outside of the [address] [place] [address] since your last visit? Include any pap smears or colon screening.  No

## 2018-06-12 NOTE — PATIENT INSTRUCTIONS
1.) They will call with appointment time for your visit with the lung specialist    2.) Please follow up after seeing the lung specialist.     3.) OK to use the Pepcid twice a day before meals.

## 2018-06-14 ENCOUNTER — TELEPHONE (OUTPATIENT)
Dept: PAIN MANAGEMENT | Age: 63
End: 2018-06-14

## 2018-06-19 RX ORDER — CYCLOBENZAPRINE HCL 10 MG
10 TABLET ORAL
Qty: 90 TAB | Refills: 2 | Status: SHIPPED | OUTPATIENT
Start: 2018-06-19 | End: 2018-08-11 | Stop reason: SDUPTHER

## 2018-06-19 NOTE — TELEPHONE ENCOUNTER
Pt called requesting a refill for her muscle relaxer - cyclobenzaprine was prescribed 4/30/18 with 2 refills.

## 2018-06-20 NOTE — TELEPHONE ENCOUNTER
Called pt - left armond to let her know script sent to her pharmacy for her muscle relaxer - cyclobenzaprine

## 2018-06-28 DIAGNOSIS — Z01.810 PRE-OPERATIVE CARDIOVASCULAR EXAMINATION: ICD-10-CM

## 2018-06-28 DIAGNOSIS — Z01.812 BLOOD TESTS PRIOR TO TREATMENT OR PROCEDURE: ICD-10-CM

## 2018-06-28 DIAGNOSIS — M65.832 EXTENSOR TENOSYNOVITIS OF LEFT WRIST: Primary | ICD-10-CM

## 2018-06-28 DIAGNOSIS — Z01.811 PRE-OPERATIVE RESPIRATORY EXAMINATION: ICD-10-CM

## 2018-07-02 ENCOUNTER — OFFICE VISIT (OUTPATIENT)
Dept: PAIN MANAGEMENT | Age: 63
End: 2018-07-02

## 2018-07-02 VITALS
WEIGHT: 172 LBS | DIASTOLIC BLOOD PRESSURE: 87 MMHG | HEIGHT: 62 IN | SYSTOLIC BLOOD PRESSURE: 118 MMHG | RESPIRATION RATE: 18 BRPM | BODY MASS INDEX: 31.65 KG/M2 | TEMPERATURE: 99.1 F | HEART RATE: 101 BPM

## 2018-07-02 DIAGNOSIS — M51.26 DISPLACEMENT OF LUMBAR INTERVERTEBRAL DISC WITHOUT MYELOPATHY: ICD-10-CM

## 2018-07-02 DIAGNOSIS — M25.50 PAIN IN JOINT, MULTIPLE SITES: ICD-10-CM

## 2018-07-02 DIAGNOSIS — M54.50 CHRONIC BILATERAL LOW BACK PAIN WITHOUT SCIATICA: ICD-10-CM

## 2018-07-02 DIAGNOSIS — M96.1 POSTLAMINECTOMY SYNDROME, CERVICAL REGION: ICD-10-CM

## 2018-07-02 DIAGNOSIS — G89.4 CHRONIC PAIN SYNDROME: ICD-10-CM

## 2018-07-02 DIAGNOSIS — G89.29 CHRONIC BILATERAL LOW BACK PAIN WITHOUT SCIATICA: ICD-10-CM

## 2018-07-02 DIAGNOSIS — M51.37 DEGENERATION OF LUMBAR OR LUMBOSACRAL INTERVERTEBRAL DISC: ICD-10-CM

## 2018-07-02 RX ORDER — MORPHINE SULFATE 30 MG/1
30 TABLET, FILM COATED, EXTENDED RELEASE ORAL EVERY 12 HOURS
Qty: 60 TAB | Refills: 0 | Status: SHIPPED | OUTPATIENT
Start: 2018-08-26 | End: 2018-10-01 | Stop reason: SDUPTHER

## 2018-07-02 RX ORDER — OXYCODONE HYDROCHLORIDE 10 MG/1
10 TABLET ORAL
Qty: 90 TAB | Refills: 0 | Status: SHIPPED | OUTPATIENT
Start: 2018-07-22 | End: 2018-07-10 | Stop reason: SDUPTHER

## 2018-07-02 RX ORDER — MORPHINE SULFATE 15 MG/1
15 TABLET, FILM COATED, EXTENDED RELEASE ORAL EVERY 8 HOURS
Qty: 90 TAB | Refills: 0 | Status: SHIPPED | OUTPATIENT
Start: 2018-09-24 | End: 2018-08-27 | Stop reason: SDUPTHER

## 2018-07-02 RX ORDER — OXYCODONE HYDROCHLORIDE 10 MG/1
10 TABLET ORAL
Qty: 120 TAB | Refills: 0 | Status: SHIPPED | OUTPATIENT
Start: 2018-08-21 | End: 2018-10-01 | Stop reason: SDUPTHER

## 2018-07-02 RX ORDER — MORPHINE SULFATE 30 MG/1
30 TABLET, FILM COATED, EXTENDED RELEASE ORAL EVERY 8 HOURS
Qty: 90 TAB | Refills: 0 | Status: SHIPPED | OUTPATIENT
Start: 2018-07-27 | End: 2018-07-10 | Stop reason: SDUPTHER

## 2018-07-02 RX ORDER — OXYCODONE HYDROCHLORIDE 10 MG/1
10 TABLET ORAL
Qty: 120 TAB | Refills: 0 | Status: SHIPPED | OUTPATIENT
Start: 2018-09-20 | End: 2018-07-10 | Stop reason: SDUPTHER

## 2018-07-02 NOTE — PROGRESS NOTES
HISTORY OF PRESENT ILLNESS  Reagan Delarosa is a 58 y.o. female    HPI: Ms. Eugenia Limon  returns today for f/u of chronic severe pain involving the lumbar spine, which has been attributed to a lumbar degenerative disc disease with spondylosis. In addition, she has neck pain attributed to a post cervical laminectomy syndrome. No h/o lumbar surgery. PT and injections in the past with no improvement. Prior treatment with Fentanyl and Opana with significant side effects. She continues unchanged as last visit. She has been doing well with her current treatment plan which continues to offer significant pain control. We discontinue baclofen last visit and started Flexeril which has been working better. We spent most of today's visit discussing changes to our practice. Explained her that moving forward we will be focusing on more conservative and non-opioid plan of care. This will result in changes to her current treatment plan. We will begin tapering her morphine today. Please see tapering plan below. She will continue to use oxycodone on an as-needed basis only. I have adjust her oxycodone for next month up to 4 times daily as needed. She receives temazepam from her PCP. We discussed the risk associated with opioids and benzodiazepines. I have asked her to discuss alternative treatments regarding temazepam with the PCP as soon as possible as we were no longer be able to prescribe opioid medications while she is concurrently taking benzodiazepines. However follow-up in 3 months for further evaluation and recommendation. I have asked her to call and cancel her appointment and pain management agreement she does decide to transfer her care. Current medication management consists of MS Contin 60 mg BID,  Oxycodone IR 10 mg TID PRN, and Flexeril. .   Temazepam by her PCP. Medications are helping with pain control and quality of life. Her pain is 2-3/10 with medication and 10/10 without.   Pt describes pain as aching. Aggravating factors include standing, sitting, walking. Relieved with rest, sitting, lying down, and avoiding painful activities. Current treatment is helping to improve general activity, mood, walking, sleep, enjoyment of life    Pt does report constipation but is well controlled with Miralax  She  is otherwise doing well with no other complaints today. She denies any adverse events including nausea, vomiting, dizziness, constipation, hallucinations, or seizures. Because the patient's current regimen places him/her at increased risk for possible overdose, a prescription for naloxone nasal spray has been provided. The patient understands that this medication is only to be used in the setting of a possible overdose and that inadvertent use of this medication could precipitate overt withdrawal.    MME: 165  COMM: 3  OSWESTRY: not completed %   Last UDS reviewed         Allergies   Allergen Reactions    Aspirin Other (comments)     Stomach bleeding    Ativan [Lorazepam] Shortness of Breath    Macrobid [Nitrofurantoin Monohyd/M-Cryst] Nausea and Vomiting    Nsaids (Non-Steroidal Anti-Inflammatory Drug) Nausea and Vomiting    Opana [Oxymorphone] Other (comments)     Insomnia, weight loss, nightmares    Pcn [Penicillins] Hives       Past Surgical History:   Procedure Laterality Date    COLONOSCOPY N/A 4/13/2017    COLON[facility] Rud[facility] END[facility]    HX CATARACT REMOVAL      HX CERVICAL FUSION      HX HYSTERECTOMY      HX ORTHOPAEDIC      ganglion cyst removed, right hand    HX OTHER SURGICAL      cyst left thigh removed    LAP,CHOLECYSTECTOMY N/A 02/27/2017    Dr. Karina Addison         Review of Systems   Constitutional: Negative for chills, fever and weight loss. HENT: Negative for congestion, ear discharge and sore throat. Eyes: Negative for blurred vision and double vision. Respiratory: Negative for cough, shortness of breath and wheezing.     Cardiovascular: Negative for chest pain and palpitations. Gastrointestinal: Positive for constipation. Negative for diarrhea, heartburn, nausea and vomiting. Genitourinary: Positive for dysuria, frequency, hematuria and urgency. Musculoskeletal: Positive for back pain and joint pain (left knee  ). Negative for falls and neck pain. Skin: Negative for itching and rash. Neurological: Negative for dizziness, seizures, loss of consciousness and headaches. Endo/Heme/Allergies: Does not bruise/bleed easily. Psychiatric/Behavioral: Positive for depression. Negative for suicidal ideas. The patient has insomnia. The patient is not nervous/anxious. Physical Exam   Constitutional: She is oriented to person, place, and time and well-developed, well-nourished, and in no distress. No distress. HENT:   Head: Normocephalic and atraumatic. Eyes: EOM are normal.   Neck: Normal range of motion. Pulmonary/Chest: Effort normal.   Musculoskeletal: Normal range of motion. surgical scar on left knee from prior surgery. Neurological: She is alert and oriented to person, place, and time. Gait abnormal.   Skin: Skin is warm and dry. No rash noted. She is not diaphoretic. No erythema. Psychiatric: Memory and judgment normal.   Nursing note and vitals reviewed. ASSESSMENT:    1. Chronic bilateral low back pain without sciatica    2. Chronic pain syndrome    3. Degeneration of lumbar or lumbosacral intervertebral disc    4. Displacement of lumbar intervertebral disc without myelopathy    5. Pain in joint, multiple sites    6. Postlaminectomy syndrome, cervical region           [facility] Program was reviewed which does not demonstrate aberrancies and/or inconsistencies with regard to the historical prescribing of controlled medications to this patient by other providers.   NOTE: Disclosed hydrocodone from ER visit filled 12/22/2016   Hydrocodone for postsurgical pain disclosed on 2/27/2017, IV morphine disclosed on 4/4/2017 tramadol was disclosed for gallbladder pain  by her PCP on 4/13/2017. This medication was then switched to Fioricet on 4/20/2017. Disclosed oxycodone 5/325 mg received from the emergency room filled on 8/14/2017. PLAN / Pt Instructions:  1. Continue current plan. Stable on current medication without adverse events. 2. Refill and adjust morphine ER 60 mg down to 30 mg every 8 hours x 1 month, then adjust down to 30 mg every 12 hours x 1 month, then adjust down to 15 mg every 8 hours until next visit. 3. Refill oxycodone 10 mg up to 3 times daily as needed times 1 month, then adjust up to 4 times daily as needed until next visit. 4. Continue Flexeril 10 mg. Please take half to 1 tablet up to 3 times daily as needed. 2 refills  5. Please remember to discuss alternative treatments regarding temazepam with your PCP as soon as possible as we were no longer be able to prescribe opioid medications while you are concurrently taking benzodiazepines. 6. Naloxone 4 mg nasal spray for opioid induced respiratory depression emergency only. 7. Continue home exercise program.    8. Discussed risks of addiction, dependency, and opioid induced hyperalgesia. Please remember to call at least 4-5 business days prior to your medication refill. Return to clinic in 3 months. Please call and cancel your appointment and pain management agreement if you do decide to transfer your care. Medications Ordered Today   Medications    morphine CR (MS CONTIN) 30 mg CR tablet     Sig: Take 1 Tab by mouth every eight (8) hours for 30 days. Max Daily Amount: 90 mg. Dispense:  90 Tab     Refill:  0    oxyCODONE IR (ROXICODONE) 10 mg tab immediate release tablet     Sig: Take 1 Tab by mouth three (3) times daily as needed for up to 30 days. Max Daily Amount: 30 mg.      Dispense:  90 Tab     Refill:  0    morphine CR (MS CONTIN) 30 mg CR tablet     Sig: Take 1 Tab by mouth every twelve (12) hours for 30 days. Max Daily Amount: 60 mg. Dispense:  60 Tab     Refill:  0    morphine CR (MS CONTIN) 15 mg CR tablet     Sig: Take 1 Tab by mouth every eight (8) hours for 30 days. Max Daily Amount: 45 mg. Dispense:  90 Tab     Refill:  0    oxyCODONE IR (ROXICODONE) 10 mg tab immediate release tablet     Sig: Take 1 Tab by mouth four (4) times daily as needed for up to 30 days. Max Daily Amount: 40 mg. Dispense:  120 Tab     Refill:  0    oxyCODONE IR (ROXICODONE) 10 mg tab immediate release tablet     Sig: Take 1 Tab by mouth four (4) times daily as needed for up to 30 days. Max Daily Amount: 40 mg. Dispense:  120 Tab     Refill:  0       DISPOSITION   Pain medications are prescribed with the objective of pain relief and improved physical and psychosocial function in this patient.  Patient has been counseled on proper use of prescribed medications.  Patient has been counseled about chronic medical conditions and their relationship to anxiety and depression and recommended mental health support as needed.  Reviewed with patient self-help tools, home exercise, and lifestyle changes to assist the patient in self-management of symptoms.  Reviewed with patient the treatment plan, goals of treatment plan, and limitations of treatment plan, to include the potential for side effects from medications and procedures. If side effects occur, it is the responsibility of the patient to inform the clinic so that a change in the treatment plan can be made in a safe manner. The patient is advised that stopping prescribed medication may cause an increase in symptoms and possible medication withdrawal symptoms. The patient is informed an emergency room evaluation may be necessary if this occurs. Spent 25 minutes with patient today which more than 50% of that time was spent on counseling and coordination of care.           Eboni Knott 7/2/2018      Note: Please excuse any typographical errors. Voice recognition software was used for this note and may cause mistakes.

## 2018-07-02 NOTE — PROGRESS NOTES
Nursing Notes    Patient presents to the office today in follow-up. Patient rates her pain at 6/10 on the numerical pain scale. Reviewed medications with counts as follows:    Rx Date filled Qty Dispensed Pill Count Last Dose Short   Ms. Ferrer ER 60 mg 06/28/18 60 52 This am no   Oxycodone 10 mg 06/23/18 90 75 This am  no         Comments: Patient is here today for a follow up appt today she states her pain level today is a 6  She states she was seen by her pcm for bronchitis and was given meds for it  PHQ 9 was done patient denies any depression issues     POC UDS was not performed in office today    Any new labs or imaging since last appointment? NO    Have you been to an emergency room (ER) or urgent care clinic since your last visit? NO            Have you been hospitalized since your last visit? NO     If yes, where, when, and reason for visit? Have you seen or consulted any other health care providers outside of the [facility]  since your last visit? YES Thelma      If yes, where, when, and reason for visit? Ms. Milka Dorado has a reminder for a \"due or due soon\" health maintenance. I have asked that she contact her primary care provider for follow-up on this health maintenance.

## 2018-07-06 ENCOUNTER — HOSPITAL ENCOUNTER (OUTPATIENT)
Dept: PREADMISSION TESTING | Age: 63
Discharge: HOME | End: 2018-07-06
Payer: MEDICARE

## 2018-07-06 DIAGNOSIS — Z01.812 BLOOD TESTS PRIOR TO TREATMENT OR PROCEDURE: ICD-10-CM

## 2018-07-06 DIAGNOSIS — E78.00 HYPERCHOLESTEROLEMIA: ICD-10-CM

## 2018-07-06 DIAGNOSIS — E55.9 VITAMIN D DEFICIENCY: ICD-10-CM

## 2018-07-06 DIAGNOSIS — M65.832 EXTENSOR TENOSYNOVITIS OF LEFT WRIST: ICD-10-CM

## 2018-07-06 DIAGNOSIS — K59.03 THERAPEUTIC OPIOID INDUCED CONSTIPATION: ICD-10-CM

## 2018-07-06 DIAGNOSIS — Z01.810 PRE-OPERATIVE CARDIOVASCULAR EXAMINATION: ICD-10-CM

## 2018-07-06 DIAGNOSIS — Z79.899 MEDICATION MANAGEMENT: ICD-10-CM

## 2018-07-06 DIAGNOSIS — Z29.9 PREVENTIVE MEASURE: ICD-10-CM

## 2018-07-06 DIAGNOSIS — I10 BENIGN ESSENTIAL HYPERTENSION WITH TARGET BLOOD PRESSURE BELOW 140/90: ICD-10-CM

## 2018-07-06 DIAGNOSIS — T40.2X5A THERAPEUTIC OPIOID INDUCED CONSTIPATION: ICD-10-CM

## 2018-07-06 LAB
ANION GAP SERPL CALC-SCNC: 4 MMOL/L (ref 3–18)
ATRIAL RATE: 88 BPM
BASOPHILS # BLD: 0 K/UL (ref 0–0.06)
BASOPHILS NFR BLD: 0 % (ref 0–3)
BUN SERPL-MCNC: 13 MG/DL (ref 7–18)
BUN/CREAT SERPL: 16 (ref 12–20)
CALCIUM SERPL-MCNC: 9.3 MG/DL (ref 8.5–10.1)
CALCULATED P AXIS, ECG09: 37 DEGREES
CALCULATED R AXIS, ECG10: 24 DEGREES
CALCULATED T AXIS, ECG11: 4 DEGREES
CHLORIDE SERPL-SCNC: 107 MMOL/L (ref 100–108)
CO2 SERPL-SCNC: 33 MMOL/L (ref 21–32)
CREAT SERPL-MCNC: 0.83 MG/DL (ref 0.6–1.3)
DIAGNOSIS, 93000: NORMAL
DIFFERENTIAL METHOD BLD: ABNORMAL
EOSINOPHIL # BLD: 0.2 K/UL (ref 0–0.4)
EOSINOPHIL NFR BLD: 3 % (ref 0–5)
ERYTHROCYTE [DISTWIDTH] IN BLOOD BY AUTOMATED COUNT: 13.6 % (ref 11.6–14.5)
GLUCOSE SERPL-MCNC: 79 MG/DL (ref 74–99)
HBA1C MFR BLD: 6.1 % (ref 4.2–5.6)
HCT VFR BLD AUTO: 37.6 % (ref 35–45)
HGB BLD-MCNC: 12.6 G/DL (ref 12–16)
LYMPHOCYTES # BLD: 3.6 K/UL (ref 0.8–3.5)
LYMPHOCYTES NFR BLD: 58 % (ref 20–51)
MCH RBC QN AUTO: 28.1 PG (ref 24–34)
MCHC RBC AUTO-ENTMCNC: 33.5 G/DL (ref 31–37)
MCV RBC AUTO: 83.9 FL (ref 74–97)
MONOCYTES # BLD: 0.7 K/UL (ref 0–1)
MONOCYTES NFR BLD: 11 % (ref 2–9)
NEUTS SEG # BLD: 1.7 K/UL (ref 1.8–8)
NEUTS SEG NFR BLD: 28 % (ref 42–75)
P-R INTERVAL, ECG05: 134 MS
PLATELET # BLD AUTO: 305 K/UL (ref 135–420)
PLATELET COMMENTS,PCOM: ABNORMAL
PMV BLD AUTO: 9.7 FL (ref 9.2–11.8)
POTASSIUM SERPL-SCNC: 4.4 MMOL/L (ref 3.5–5.5)
Q-T INTERVAL, ECG07: 382 MS
QRS DURATION, ECG06: 76 MS
QTC CALCULATION (BEZET), ECG08: 462 MS
RBC # BLD AUTO: 4.48 M/UL (ref 4.2–5.3)
RBC MORPH BLD: ABNORMAL
SODIUM SERPL-SCNC: 144 MMOL/L (ref 136–145)
VENTRICULAR RATE, ECG03: 88 BPM
WBC # BLD AUTO: 6.2 K/UL (ref 4.6–13.2)

## 2018-07-06 PROCEDURE — 85025 COMPLETE CBC W/AUTO DIFF WBC: CPT | Performed by: PHYSICIAN ASSISTANT

## 2018-07-06 PROCEDURE — 83036 HEMOGLOBIN GLYCOSYLATED A1C: CPT | Performed by: PHYSICIAN ASSISTANT

## 2018-07-06 PROCEDURE — 80048 BASIC METABOLIC PNL TOTAL CA: CPT | Performed by: PHYSICIAN ASSISTANT

## 2018-07-06 PROCEDURE — 93005 ELECTROCARDIOGRAM TRACING: CPT

## 2018-07-06 PROCEDURE — 36415 COLL VENOUS BLD VENIPUNCTURE: CPT | Performed by: PHYSICIAN ASSISTANT

## 2018-07-06 RX ORDER — LOSARTAN POTASSIUM 25 MG/1
TABLET ORAL
Qty: 30 TAB | Refills: 3 | Status: SHIPPED | OUTPATIENT
Start: 2018-07-06 | End: 2018-07-10 | Stop reason: SDUPTHER

## 2018-07-06 RX ORDER — ATORVASTATIN CALCIUM 40 MG/1
TABLET, FILM COATED ORAL
Qty: 30 TAB | Refills: 3 | Status: SHIPPED | OUTPATIENT
Start: 2018-07-06 | End: 2018-08-06 | Stop reason: SDUPTHER

## 2018-07-10 ENCOUNTER — OFFICE VISIT (OUTPATIENT)
Dept: ORTHOPEDIC SURGERY | Facility: CLINIC | Age: 63
End: 2018-07-10

## 2018-07-10 VITALS
DIASTOLIC BLOOD PRESSURE: 78 MMHG | RESPIRATION RATE: 16 BRPM | HEIGHT: 62 IN | TEMPERATURE: 98.4 F | BODY MASS INDEX: 32.97 KG/M2 | HEART RATE: 106 BPM | OXYGEN SATURATION: 90 % | WEIGHT: 179.2 LBS | SYSTOLIC BLOOD PRESSURE: 123 MMHG

## 2018-07-10 DIAGNOSIS — Z79.899 MEDICATION MANAGEMENT: ICD-10-CM

## 2018-07-10 DIAGNOSIS — K59.03 THERAPEUTIC OPIOID INDUCED CONSTIPATION: ICD-10-CM

## 2018-07-10 DIAGNOSIS — M65.832 EXTENSOR TENOSYNOVITIS OF LEFT WRIST: ICD-10-CM

## 2018-07-10 DIAGNOSIS — M25.532 LEFT WRIST PAIN: Primary | ICD-10-CM

## 2018-07-10 DIAGNOSIS — I10 BENIGN ESSENTIAL HYPERTENSION WITH TARGET BLOOD PRESSURE BELOW 140/90: ICD-10-CM

## 2018-07-10 DIAGNOSIS — T40.2X5A THERAPEUTIC OPIOID INDUCED CONSTIPATION: ICD-10-CM

## 2018-07-10 DIAGNOSIS — Z29.9 PREVENTIVE MEASURE: ICD-10-CM

## 2018-07-10 DIAGNOSIS — E55.9 VITAMIN D DEFICIENCY: ICD-10-CM

## 2018-07-10 RX ORDER — LOSARTAN POTASSIUM 25 MG/1
TABLET ORAL
Qty: 90 TAB | Refills: 1 | Status: SHIPPED | OUTPATIENT
Start: 2018-07-10 | End: 2019-04-18 | Stop reason: SDUPTHER

## 2018-07-10 RX ORDER — OXYCODONE AND ACETAMINOPHEN 7.5; 325 MG/1; MG/1
1 TABLET ORAL
Qty: 28 TAB | Refills: 0 | Status: SHIPPED | OUTPATIENT
Start: 2018-07-10 | End: 2018-11-15

## 2018-07-10 NOTE — PROGRESS NOTES
Please see attached forms in Epic for history and physical and review of systems for upcoming left wrist extensor tenosynovectomy.

## 2018-07-10 NOTE — TELEPHONE ENCOUNTER
Pharmacy called the insurance company will not pay for 30 day supply the patient needs a 90 day supply.  Please advise

## 2018-07-11 ENCOUNTER — HOSPITAL ENCOUNTER (OUTPATIENT)
Dept: LAB | Age: 63
Discharge: HOME | End: 2018-07-11
Payer: MEDICARE

## 2018-07-11 PROCEDURE — 88305 TISSUE EXAM BY PATHOLOGIST: CPT | Performed by: SPECIALIST

## 2018-07-11 NOTE — H&P
History and Physical         58 year AA female seen in preparation for  first dorsal interosseous space release  Review of Systems   Constitutional: Positive for activity change (decreased use left hand / wrist secondary to tenosynivitis). Negative for fatigue and fever. HENT: Negative for ear pain. Eyes: Negative for pain. Respiratory: Negative for shortness of breath. Gastrointestinal: Negative for nausea and vomiting. Endocrine: Negative. Genitourinary: Negative for flank pain. Musculoskeletal: Positive for arthralgias and joint swelling. Generalized   Allergic/Immunologic: Negative. Neurological: Positive for weakness (left hand). Negative for numbness. Psychiatric/Behavioral: Negative. Physical Exam   Nursing note and vitals reviewed. Constitutional: She is oriented to person, place, and time. She appears well-developed and well-nourished. HENT:   Head: Normocephalic and atraumatic. Eyes: Conjunctivae and EOM are normal. Pupils are equal, round, and reactive to light. Neck: Normal range of motion. Cardiovascular: Normal rate, regular rhythm, normal heart sounds and intact distal pulses. Pulmonary/Chest: Effort normal and breath sounds normal.   Musculoskeletal:        Left wrist: She exhibits decreased range of motion and tenderness. Arms:  Neurological: She is alert and oriented to person, place, and time. Skin: Skin is warm and dry. Psychiatric: She has a normal mood and affect. Her speech is normal and behavior is normal. Judgment and thought content normal. Cognition and memory are normal.        pain first dorsal interosseous space     first dorsal interosseous space release    Risk / Benefit: Surgeon will discuss in \"face to face\" encounter on day of surgery. Family History and Surgical History reviewed, discussed in detail, and deemed Non-Contributory in preparation for this surgical encounter.

## 2018-07-13 ENCOUNTER — OFFICE VISIT (OUTPATIENT)
Dept: ORTHOPEDIC SURGERY | Facility: CLINIC | Age: 63
End: 2018-07-13

## 2018-07-13 VITALS
SYSTOLIC BLOOD PRESSURE: 125 MMHG | DIASTOLIC BLOOD PRESSURE: 87 MMHG | OXYGEN SATURATION: 97 % | HEART RATE: 88 BPM | TEMPERATURE: 98.9 F | HEIGHT: 62 IN | BODY MASS INDEX: 32.57 KG/M2 | WEIGHT: 177 LBS

## 2018-07-13 DIAGNOSIS — M65.832 EXTENSOR TENOSYNOVITIS OF LEFT WRIST: ICD-10-CM

## 2018-07-13 DIAGNOSIS — Z48.89 ENCOUNTER FOR POSTOPERATIVE WOUND CHECK: ICD-10-CM

## 2018-07-13 DIAGNOSIS — M25.532 LEFT WRIST PAIN: Primary | ICD-10-CM

## 2018-07-13 NOTE — PROGRESS NOTES
HISTORY OF PRESENT ILLNESS:  Salome Talamantes returns two days status post her left hand extensor tenosynovectomy. Surgical pathology is pending. PHYSICAL EXAM:  She has a mid-dorsal hand cranial/caudal oriented, 12-centimeter surgical incision with interrupted vertical mattress sutures noted. She has bleeding throughout the distal third of her surgical site. She has 2+ edema to the dorsum of the left hand. She is guarded with her flexion at 10 centimeters palm-to-pulp deficit. Her extension is lacking 10° at all metacarpophalangeal joints. Capillary refill is brisk and less than 2 seconds of the left upper extremity.

## 2018-07-17 ENCOUNTER — OFFICE VISIT (OUTPATIENT)
Dept: ORTHOPEDIC SURGERY | Facility: CLINIC | Age: 63
End: 2018-07-17

## 2018-07-17 ENCOUNTER — TELEPHONE (OUTPATIENT)
Dept: PAIN MANAGEMENT | Age: 63
End: 2018-07-17

## 2018-07-17 VITALS
SYSTOLIC BLOOD PRESSURE: 116 MMHG | HEIGHT: 62 IN | TEMPERATURE: 98.5 F | RESPIRATION RATE: 18 BRPM | OXYGEN SATURATION: 96 % | HEART RATE: 112 BPM | BODY MASS INDEX: 32.97 KG/M2 | WEIGHT: 179.2 LBS | DIASTOLIC BLOOD PRESSURE: 75 MMHG

## 2018-07-17 DIAGNOSIS — M25.532 LEFT WRIST PAIN: Primary | ICD-10-CM

## 2018-07-17 DIAGNOSIS — Z48.89 ENCOUNTER FOR POSTOPERATIVE WOUND CHECK: ICD-10-CM

## 2018-07-17 DIAGNOSIS — M65.832 EXTENSOR TENOSYNOVITIS OF LEFT WRIST: ICD-10-CM

## 2018-07-17 NOTE — TELEPHONE ENCOUNTER
Patient called the office stating that she was given Percocet 7.5-325 mg. Chart review was done. Patient stated that she was given Percocet 7.5-325 mg to take for hand surgery. Provider Jermaine Chaudhry PA-C was made aware.

## 2018-07-17 NOTE — PROGRESS NOTES
HISTORY:  Ms. Reyes Musa returns. She is 6 days status post left hand dorsal synovectomy. Surgical site is intact. However, she is only 6 days following surgery. At this point, she is a bit premature for surgical suture removal.  I am going to give her another 6 to 7 days for the wound to mature. She has got some swelling over the dorsal aspect of her hand. PLAN:  She is going to work on her elevation with a couple of pillows when sitting in a chair as well as holding her hand upwards above her heart when she is walking. She will also avoid getting the surgical site wet. She is encouraged to continue active range of motion of all digits all of the left hand. Today, all her questions were answered to her satisfaction.

## 2018-07-23 ENCOUNTER — OFFICE VISIT (OUTPATIENT)
Dept: ORTHOPEDIC SURGERY | Facility: CLINIC | Age: 63
End: 2018-07-23

## 2018-07-23 VITALS — SYSTOLIC BLOOD PRESSURE: 122 MMHG | DIASTOLIC BLOOD PRESSURE: 77 MMHG | HEART RATE: 106 BPM

## 2018-07-23 DIAGNOSIS — Z48.02 VISIT FOR SUTURE REMOVAL: ICD-10-CM

## 2018-07-23 DIAGNOSIS — Z98.890 HISTORY OF SYNOVECTOMY: ICD-10-CM

## 2018-07-23 DIAGNOSIS — Z48.89 ENCOUNTER FOR POSTOPERATIVE WOUND CHECK: ICD-10-CM

## 2018-07-23 DIAGNOSIS — M25.532 LEFT WRIST PAIN: Primary | ICD-10-CM

## 2018-07-23 NOTE — PROGRESS NOTES
HISTORY:  Ms. Santiago Armando returns. She is 12 days status post left hand dorsal synovectomy. Surgical site is intact. She has improved swelling over the dorsal aspect of her hand and generally improved ROM of left wrist and hand but is guarding. Procedural: All sutures removed no complications and steri strips placed. PLAN:  Begin OT, RTO x 3 weeks, focus on motion and wound desensitization.

## 2018-07-24 ENCOUNTER — OFFICE VISIT (OUTPATIENT)
Dept: FAMILY MEDICINE CLINIC | Age: 63
End: 2018-07-24

## 2018-07-24 VITALS
SYSTOLIC BLOOD PRESSURE: 136 MMHG | HEART RATE: 100 BPM | DIASTOLIC BLOOD PRESSURE: 81 MMHG | WEIGHT: 175 LBS | RESPIRATION RATE: 16 BRPM | HEIGHT: 62 IN | BODY MASS INDEX: 32.2 KG/M2 | TEMPERATURE: 97.3 F

## 2018-07-24 DIAGNOSIS — G44.009 CLUSTER HEADACHE, NOT INTRACTABLE, UNSPECIFIED CHRONICITY PATTERN: ICD-10-CM

## 2018-07-24 DIAGNOSIS — R06.2 WHEEZING: ICD-10-CM

## 2018-07-24 DIAGNOSIS — J30.1 ALLERGIC RHINITIS DUE TO POLLEN, UNSPECIFIED SEASONALITY: ICD-10-CM

## 2018-07-24 DIAGNOSIS — R00.0 RAPID HEART RATE: ICD-10-CM

## 2018-07-24 DIAGNOSIS — R05.3 PERSISTENT COUGH: Primary | ICD-10-CM

## 2018-07-24 RX ORDER — RIZATRIPTAN BENZOATE 10 MG/1
TABLET ORAL
Qty: 9 TAB | Refills: 3 | Status: SHIPPED | OUTPATIENT
Start: 2018-07-24 | End: 2018-11-15

## 2018-07-24 RX ORDER — ALBUTEROL SULFATE 90 UG/1
2 AEROSOL, METERED RESPIRATORY (INHALATION)
Qty: 1 INHALER | Refills: 3 | Status: SHIPPED | OUTPATIENT
Start: 2018-07-24 | End: 2019-01-04

## 2018-07-24 RX ORDER — FLUTICASONE PROPIONATE 50 MCG
2 SPRAY, SUSPENSION (ML) NASAL AS NEEDED
Qty: 1 BOTTLE | Refills: 3 | Status: SHIPPED | OUTPATIENT
Start: 2018-07-24 | End: 2018-08-21 | Stop reason: SDUPTHER

## 2018-07-24 NOTE — PROGRESS NOTES
Chief Complaint   Patient presents with    Irregular Heart Beat     states that for the last 2 weeks her HR has been elevated     1. Have you been to the ER, urgent care clinic since your last visit? Hospitalized since your last visit? No    2. Have you seen or consulted any other health care providers outside of the [facility] since your last visit? Include any pap smears or colon screening.  No

## 2018-07-24 NOTE — PROGRESS NOTES
Progress Note    Patient: Farhan Posey MRN: [id number]  SSN: [id number]    YOB: 1955  Age: 58 y.o. Sex: female          Subjective:   Farhan Posey is a 58 y.o. female who is here for follow up. She mentions that over the last three days she has had a severe headache. She mentions that she had a cyst removed from her left hand. She mentions that Dr. Corry Donahue was the one who did her surgery. She states that she did get her stitches out of her hand. She also mentions that she does not feel like her heart rate is beating hard. She mentions that the headache has been going on for the length of time as her high heart rate. She denies blacking out or any spots in any visual stigmata. Visit from   She mentions that her cough is still bothering her. She also is concerned about urinary urgency. In regard to her lungs she states that she was previously told that she had a spot on her lung and wants to see if it is still there. She mentions that when she goes outside her cough worsens. She states that when uses the cough medicine along with water she is able to cough up some phlegm. She states that she even took more than was prescribed for the cough syrup. She states that when she is exposed to grass she develops a severe cough as well. Patient denies coughing up blood. She also continues to use Dexilant. Also she asks whether she can use Pepcid as well. Visit from 5/22/18  Patient is here for follow up on urinary urgency. She mentions that she has bladder pressure with leakage. She mentions that she has been dealing with this for about a month. She states that she has used both a diaper and a pad. She mentions that it does not matter what she drinks, she will still get the urge still. She states that she drinks water or lemonade. She denies any bladder problems in the past.   She mentions that she still gets the night time cough.      Visit from 5/15/18  Patient is here for an acute care visit for suspected bronchitis. Ben Mcdonald states that the incident occurred a week and a half ago. She states that she went to the ER twice. She was initially treated with Bactrim and on the second visit was treated with Levaquin. She was also given Hydrocodone cough syrup. She also went to [facility]. She was also treated with prednisone but it was ineffective. Objective: There were no vitals filed for this visit. Review of Systems:  Pertinent items are noted in the History of Present Illness. Physical Exam:   GENERAL: Mild distress   EYE: conjunctivae/corneas clear. PERRL, EOM's intact. Fundi benign  THROAT & NECK: mild erythema  LUNG: Mild expiratory wheezes noted throughout the bases  HEART: regular rate and rhythm, S1, S2 normal, no murmur, click, rub or gallop  ABDOMEN: soft, non-tender, non-distended. EXTREMITIES:  extremities normal, atraumatic, no cyanosis or edema    Lab/Data Review:    Lab Results   Component Value Date/Time    Sodium 144 07/06/2018 01:18 PM    Potassium 4.4 07/06/2018 01:18 PM    Chloride 107 07/06/2018 01:18 PM    CO2 33 (H) 07/06/2018 01:18 PM    Anion gap 4 07/06/2018 01:18 PM    Glucose 79 07/06/2018 01:18 PM    BUN 13 07/06/2018 01:18 PM    Creatinine 0.83 07/06/2018 01:18 PM    BUN/Creatinine ratio 16 07/06/2018 01:18 PM    GFR est AA >60 07/06/2018 01:18 PM    GFR est non-AA >60 07/06/2018 01:18 PM    Calcium 9.3 07/06/2018 01:18 PM    Bilirubin, total 0.4 02/21/2018 07:15 PM    AST (SGOT) 30 02/21/2018 07:15 PM    Alk.  phosphatase 134 (H) 02/21/2018 07:15 PM    Protein, total 7.7 02/21/2018 07:15 PM    Albumin 3.6 02/21/2018 07:15 PM    Globulin 4.1 (H) 02/21/2018 07:15 PM    A-G Ratio 0.9 02/21/2018 07:15 PM    ALT (SGPT) 27 02/21/2018 07:15 PM       Lab Results   Component Value Date/Time    Sodium 144 07/06/2018 01:18 PM    Potassium 4.4 07/06/2018 01:18 PM    Chloride 107 07/06/2018 01:18 PM    CO2 33 (H) 07/06/2018 01:18 PM    Anion gap 4 07/06/2018 01:18 PM    Glucose 79 07/06/2018 01:18 PM    BUN 13 07/06/2018 01:18 PM    Creatinine 0.83 07/06/2018 01:18 PM    BUN/Creatinine ratio 16 07/06/2018 01:18 PM    GFR est AA >60 07/06/2018 01:18 PM    GFR est non-AA >60 07/06/2018 01:18 PM    Calcium 9.3 07/06/2018 01:18 PM    Bilirubin, total 0.4 02/21/2018 07:15 PM    AST (SGOT) 30 02/21/2018 07:15 PM    Alk. phosphatase 134 (H) 02/21/2018 07:15 PM    Protein, total 7.7 02/21/2018 07:15 PM    Albumin 3.6 02/21/2018 07:15 PM    Globulin 4.1 (H) 02/21/2018 07:15 PM    A-G Ratio 0.9 02/21/2018 07:15 PM    ALT (SGPT) 27 02/21/2018 07:15 PM         XR Results (most recent):    Results from Hospital Encounter encounter on 02/21/18   XR CHEST PA LAT   Narrative PA And Lateral Chest    CPT CODE: 86703    COMPARISON: 2/22/2017. CLINICAL INFORMATION: Chest and abdominal pain, status post EGD. FINDINGS:    Heart size within normal limits. Mild ectasia of the thoracic aorta, stable. Air-fluid level in the retrocardiac space correlates with a hiatal hernia. No  pulmonary edema. No effusion or pneumothorax. There are some overlying lead  artifact. Lungs are otherwise clear. Degenerative change in the spine. No free  air seen under the diaphragm. .         Impression IMPRESSION:    1. No radiographic evidence for an acute cardiopulmonary abnormality. 2. Hiatal hernia. Assessment:     1.) Persistent headaches: Patient ordered Maxalt for use as needed. 2.) Allergic Rhinitis: Patient ordered Flonase for use as needed. 3.) Intermittent Wheezing: Patient ordered albuterol for use as needed. 4.) Rapid Heart Rate: Patient denied recently using albuterol. I will get a Holter monitor to assess levels.         Patient will return for follow up after seeing her pulmonologist.    Plan:     Orders Placed This Encounter    albuterol (PROAIR HFA) 90 mcg/actuation inhaler    fluticasone (FLONASE) 50 mcg/actuation nasal spray    rizatriptan (MAXALT) 10 mg tablet         Signed By: Feliz Bhakta DO     July 24, 2018

## 2018-07-24 NOTE — PATIENT INSTRUCTIONS
Please contact our office if you have any questions about your visit today. 1.) They will call to schedule the Holter Monitor .    2.) Use Maxalt as needed for the headache.     3.) Return in 2 weeks for follow up.

## 2018-07-24 NOTE — MR AVS SNAPSHOT
[facility] 
 
 
 [address] [address] [address] [address] [phone] Patient: Majo Carpenter MRN: [id number] [facility]:[birth year] Visit Information Date & Time Provider Department Dept. Phone Encounter #  
 7/24/2018  1:45 PM Seema Lowery, Roberta N [place] [id number] Follow-up Instructions Return in about 2 weeks (around 8/8/2018) for Follow Up . Your Appointments 8/13/2018 11:15 AM  
POST OP with David Arceo PA-C  
[facility]) Appt Note: LT WRIST 3wk fu  
 [address] [facility], [address] [facility]  
[phone]  
  
   
 [address] [place] [address], [address] [place] [id number]  
  
    
 10/1/2018 11:20 AM  
Follow Up with BETO Young [facility] (RICHAR SCHEDULING) Appt Note: Return in about 3 months (around 10/2/2018). [address] [address]  
[phone] [facility] [address] [id number] Upcoming Health Maintenance Date Due DTaP/Tdap/Td series (1 - Tdap) 3/30/1975 Influenza Age 5 to Adult 8/1/2018 MEDICARE YEARLY EXAM 10/13/2018 PAP AKA CERVICAL CYTOLOGY 6/21/2019 BREAST CANCER SCRN MAMMOGRAM 1/12/2020 COLONOSCOPY 4/13/2027 Allergies as of 7/24/2018  Review Complete On: 8/39/3878 By: Maryse [place]. Praveen Francois LPN Severity Noted Reaction Type Reactions Aspirin    Other (comments) Stomach bleeding Ativan [Lorazepam]  02/21/2018    Shortness of Breath Macrobid [Nitrofurantoin Monohyd/m-cryst]  11/25/2015    Nausea and Vomiting Nsaids (Non-steroidal Anti-inflammatory Drug)    Nausea and Vomiting Opana [Oxymorphone]  11/29/2016    Other (comments) Insomnia, weight loss, nightmares Pcn [Penicillins]    Hives Current Immunizations  Never Reviewed Name Date Pneumococcal Polysaccharide (PPSV-23) 3/7/2014 Not reviewed this visit You Were Diagnosed With   
  
 Codes Comments Persistent cough    -  Primary ICD-10-CM: B43 ICD-9-CM: 786.2 Rapid heart rate     ICD-10-CM: R00.0 ICD-9-CM: 049. 0 Wheezing     ICD-10-CM: R06.2 ICD-9-CM: 786.07 Cluster headache, not intractable, unspecified chronicity pattern     ICD-10-CM: G44.009 ICD-9-CM: 339.00 Allergic rhinitis due to pollen, unspecified seasonality     ICD-10-CM: J30.1 ICD-9-CM: 477.0 Vitals BP Pulse Temp Resp Height(growth percentile) Weight(growth percentile) 136/81 (BP 1 Location: Right arm, BP Patient Position: Sitting) 100 97.3 °F (36.3 °C) (Oral) 16 5' 2\" (1.575 m) 175 lb (79.4 kg) BMI OB Status Smoking Status 32.01 kg/m2 Hysterectomy Former Smoker BMI and BSA Data Body Mass Index Body Surface Area 32.01 kg/m 2 1.86 m 2 Preferred Pharmacy Pharmacy Name Phone [id number] - [address] [phone] Your Updated Medication List  
  
   
This list is accurate as of 7/24/18  2:51 PM.  Always use your most recent med list.  
  
  
  
  
 ACID GONE ANTACID  mg/15 mL suspension Generic drug:  aluminum hydrox-magnesium carb * albuterol 2.5 mg /3 mL (0.083 %) nebulizer solution Commonly known as:  PROVENTIL VENTOLIN  
2.5 mg as needed. * albuterol 90 mcg/actuation inhaler Commonly known as:  PROAIR HFA Take 2 Puffs by inhalation every four (4) hours as needed for Wheezing. atorvastatin 40 mg tablet Commonly known as:  LIPITOR  
TAKE ONE TABLET BY MOUTH ONE TIME DAILY  
  
 baclofen 10 mg tablet Commonly known as:  LIORESAL  
TAKE ONE TABLET BY MOUTH THREE TIMES A DAY DEXILANT 60 mg Cpdb Generic drug:  Dexlansoprazole TAKE ONE CAPSULE BY MOUTH ONE TIME DAILY  
  
 ergocalciferol 50,000 unit capsule Commonly known as:  VITAMIN D2  
TAKE ONE CAPSULE BY MOUTH EVERY 7 DAYS fluticasone 50 mcg/actuation nasal spray Commonly known as:  Jh Harris 2 Sprays by Both Nostrils route as needed. gabapentin 400 mg capsule Commonly known as:  NEURONTIN  
400 mg two (2) times a day. guaiFENesin-codeine 100-10 mg/5 mL solution Commonly known as:  ROBITUSSIN AC Take 5 mL by mouth three (3) times daily as needed for Cough. Max Daily Amount: 15 mL. HYDROcodone-acetaminophen 5-325 mg per tablet Commonly known as:  Olena Mura Take 1-2 Tabs by mouth two (2) times daily as needed for Pain. Max Daily Amount: 4 Tabs. hydrOXYzine pamoate 50 mg capsule Commonly known as:  VISTARIL Take 1 Cap by mouth three (3) times daily as needed for Anxiety. ipratropium 0.02 % Soln Commonly known as:  ATROVENT  
0.5 mg.  
  
 losartan 25 mg tablet Commonly known as:  COZAAR  
TAKE ONE TABLET BY MOUTH ONE TIME DAILY * morphine CR 30 mg CR tablet Commonly known as:  MS CONTIN Take 1 Tab by mouth every twelve (12) hours for 30 days. Max Daily Amount: 60 mg. Start taking on:  8/26/2018 * morphine CR 15 mg CR tablet Commonly known as:  MS CONTIN Take 1 Tab by mouth every eight (8) hours for 30 days. Max Daily Amount: 45 mg. Start taking on:  9/24/2018  
  
 naloxone 4 mg/actuation nasal spray Commonly known as:  NARCAN  
1 [place] by IntraNASal route once as needed. Use 1 spray intranasally into 1 nostril. Use a new Narcan nasal spray for subsequent doses and administer into alternating nostrils. May repeat every 2 to 3 minutes as needed. ondansetron 4 mg disintegrating tablet Commonly known as:  ZOFRAN ODT Take 1 Tab by mouth every eight (8) hours as needed for Nausea for up to 15 doses. oxybutynin 5 mg tablet Commonly known as:  PWUGGNGJ Take 1 Tab by mouth two (2) times a day. Indications: URINARY URGE INCONTINENCE  
  
 oxyCODONE IR 10 mg Tab immediate release tablet Commonly known as:  Juanjo Diana  
 Take 1 Tab by mouth four (4) times daily as needed for up to 30 days. Max Daily Amount: 40 mg. Start taking on:  2018  
  
 oxyCODONE-acetaminophen 7.5-325 mg per tablet Commonly known as:  PERCOCET Take 1 Tab by mouth every six (6) hours as needed for Pain (Hold if sedated. ). Max Daily Amount: 4 Tabs. polyethylene glycol 17 gram packet Commonly known as:  Tad Gardner DISSOLVE CONTENTS OF ONE PACKET IN BEVERAGE ONCE A DAY  
  
 predniSONE 10 mg dose pack Commonly known as:  STERAPRED DS See administration instruction per 10mg dose pack QUEtiapine 200 mg tablet Commonly known as:  SEROquel Take 200 mg by mouth daily. rizatriptan 10 mg tablet Commonly known as:  Epimenio Ormond May repeat in 2 hours if needed  Indications: Migraine  
  
 sertraline 100 mg tablet Commonly known as:  ZOLOFT Take 100 mg by mouth nightly as needed. temazepam 15 mg capsule Commonly known as:  RESTORIL Take 1 Cap by mouth nightly as needed for Sleep. Max Daily Amount: 15 mg. Indications: INSOMNIA  
  
 traZODone 100 mg tablet Commonly known as:  Crescencio [place] Take 100 mg by mouth nightly. valACYclovir 500 mg tablet Commonly known as:  VALTREX Take 1 Tab by mouth two (2) times a day. * Notice: This list has 4 medication(s) that are the same as other medications prescribed for you. Read the directions carefully, and ask your doctor or other care provider to review them with you. Prescriptions Sent to Pharmacy Refills  
 albuterol (PROAIR HFA) 90 mcg/actuation inhaler 3 Sig: Take 2 Puffs by inhalation every four (4) hours as needed for Wheezing. Class: Normal  
 Pharmacy: [facility] [address] Ph #: [phone] Route: Inhalation  
 fluticasone (FLONASE) 50 mcg/actuation nasal spray 3 Si Sprays by Both Nostrils route as needed.   
 Class: Normal  
 Pharmacy: [id number], [address] Ph #: [phone] Route: Both Nostrils  
 rizatriptan (MAXALT) 10 mg tablet 3 Sig: May repeat in 2 hours if needed  Indications: Migraine Class: Normal  
 Pharmacy: [facility], [address] Ph #: [phone] Follow-up Instructions Return in about 2 weeks (around 8/8/2018) for Follow Up . To-Do List   
 07/24/2018 Cardiac Diagnostics:  CARDIAC HOLTER MONITOR Patient Instructions Please contact our office if you have any questions about your visit today. 1.) They will call to schedule the Holter Monitor . 
 
2.) Use Maxalt as needed for the headache.  
 
3.) Return in 2 weeks for follow up. Introducing [facility]! Dear Tyson Grissom: Thank you for requesting a Replenish account. Our records indicate that you already have an active Replenish account. You can access your account anytime at [email or web address] ([employer]). 60mo/SGN (Social Gaming Network) Did you know that you can access your hospital and ER discharge instructions at any time in Replenish? You can also review all of your test results from your hospital stay or ER visit. Additional Information If you have questions, please visit the Frequently Asked Questions section of the Replenish website at [email or web address] (Social Gaming [employer]). 60mo/SGN (Social Gaming Network)/. Remember, Replenish is NOT to be used for urgent needs. For medical emergencies, dial 911. Now available from your iPhone and Android! Please provide this summary of care documentation to your next provider. Your primary care clinician is listed as Jerry Lebron. If you have any questions after today's visit, please call [phone].

## 2018-07-25 ENCOUNTER — HOSPITAL ENCOUNTER (OUTPATIENT)
Dept: NON INVASIVE DIAGNOSTICS | Age: 63
Discharge: HOME | End: 2018-07-25
Attending: INTERNAL MEDICINE
Payer: MEDICARE

## 2018-07-25 DIAGNOSIS — R00.0 RAPID HEART RATE: ICD-10-CM

## 2018-07-25 PROCEDURE — 93226 XTRNL ECG REC<48 HR SCAN A/R: CPT

## 2018-08-02 ENCOUNTER — TELEPHONE (OUTPATIENT)
Dept: FAMILY MEDICINE CLINIC | Age: 63
End: 2018-08-02

## 2018-08-02 ENCOUNTER — HOSPITAL ENCOUNTER (OUTPATIENT)
Dept: PHYSICAL THERAPY | Age: 63
Discharge: HOME | End: 2018-08-02
Payer: MEDICARE

## 2018-08-02 DIAGNOSIS — E78.00 HYPERCHOLESTEROLEMIA: ICD-10-CM

## 2018-08-02 PROCEDURE — G8985 CARRY GOAL STATUS: HCPCS

## 2018-08-02 PROCEDURE — 97535 SELF CARE MNGMENT TRAINING: CPT

## 2018-08-02 PROCEDURE — 97166 OT EVAL MOD COMPLEX 45 MIN: CPT

## 2018-08-02 PROCEDURE — 97110 THERAPEUTIC EXERCISES: CPT

## 2018-08-02 PROCEDURE — G8984 CARRY CURRENT STATUS: HCPCS

## 2018-08-02 NOTE — PROGRESS NOTES
Hand Therapy Evaluation and Daily Note Patient Name: Salome Talamantes Date:2018 : 1955 Age: 58 y.o.y/o [x]  Patient  Verified Payor: [facility] MEDICAID / Plan: [place] / Product Type: Managed Care Medicaid /   
Referring Provider: MD Klarissa Parada MD Visit:  18 Onset Date:   Surgical Date: 18 Surgical Procedure: Dorsal wrist synovectomy In time:1410  Out time:1500 Total Treatment Time (min): 50 Total Timed Codes (min): 25 
1:1 Treatment Time ([facility] only): 50 Visit #: 1 of 8 Treatment Area: Pain in left wrist [M25.532] Precautions: NA Hand Dominance: left handed Hand Involved: left Total Evaluation Time: 25 min History of Present Condition:  Patient is a 58 y.o. female with a chief complaint  of pain and discomfort in the left dominant wrist secondary recent surgical intervention for tenosynovitus of the wrist extensors. Patient reports that she had a small cyst located in the dorsum of the left wrist that was causing pain with functional use. She was evaluated by the physician and it was noted that she has significant inflammation in the surrounding area and it was determined that surgical intervention was required to remove the inflammation in the tissue. She underwent surgery on 18. She has not had therapy since. She was never splinted. Pain Rating:  
Current: (0-no pain 10-debilitating pain) 8 / 10 At best: (0-no pain 10-debilitating pain) 8 / 10 At worst: (0-no pain 10-debilitating pain) 10 / 10 Location: left wrist 
Type:  moderate and severe Better with: Worse with:  
 
Medications/Allergies/Past Medical History:  See chart; reviewed with patient. Depression, HTN Diagnostic Tests: NA 
 
Prior Level of Function: Independent without limitations in ADL and IADL activities. Current Level of Function:  Unable to use the left hand with ADL tasks Social History: single, lives with family Occupation/Job Requirements: Disability since 2000 secondary to back injury/arthritis Observation: minimal use of the left wrist, hypersensitivity to touch. Scar/incision:  Well healed surgical incision on the dorsum of the left wrist approximately 4 cm in length. Location: left wrist 
 
Palpation:  Pain and thickening of the tissue in surgical area Range of Motion:  Wrist Active/Passive ROM Wrist 8/2/18 Flex 35 Ext 50 UD 45 RD 20 Strength:  NT Sensation:   Complaints of decreased sensation on radial side of the incision site. Edema: GIRTH CHART measured in cm 
Date: 8/2/18 Side left DPC circum. 19.5 Wrist Crease 18 Special Tests:   
NT 
ADLs- Patient has an aid that assists with all ADL tasks. Feeding:        []MaxA   []ModA   [x]Jan   [] CGA   []SBA   []Marely   []Independent UE Dressing:       []MaxA   []ModA   [x]Jan   [] CGA   []SBA   []Marely   []Independent LE Dressing:       []MaxA   []ModA   [x]Jan   [] CGA   []SBA   []Marely   []Independent Grooming:       []MaxA   []ModA   [x]Jan   [] CGA   []SBA   []Marely   []Independent Toileting:       []MaxA   []ModA   [x]Jan   [] CGA   []SBA   []Marely   []Independent Bathing:       []MaxA   []ModA   [x]Jan   [] CGA   []SBA   []Marely   []Independent Light Meal Prep:    []MaxA   []ModA   [x]Jan   [] CGA   []SBA   []Marely   []Independent Household/Other: []MaxA   []ModA   [x]Jan   [] CGA   []SBA   []Marely   []Independent Adaptive Equip:     []MaxA   []ModA   []Jan   [] CGA   []SBA   []Marely   []Independent Driving:       []MaxA   []ModA   [x]Jan   [] CGA   []SBA   []Marely   []Independent Todays Treatment:  Patient received an initial evaluation today followed by education as to diagnosis, precautions and treatment plan. Patient was provided with a basic home exercise program including AROM of the wrist, desensitization of the incision site. OBJECTIVE 10 min Therapeutic Exercise:  [x] See flow sheet :  
Rationale: increase ROM to improve the patients ability to return to functional use of the hand/wrist 
 
15 min Self Care/Home Management: scar massage, desensitization and ROM Rationale: education  to improve the patients ability to return to functional use of the left wrist 
 
With 
 [x] TE 
 [] TA 
 [] neuro 
 [] other: Patient Education: [x] Review HEP [] Progressed/Changed HEP based on:  
[] positioning   [] body mechanics   [] transfers   [] heat/ice application  
[] Splint wear/care   [] Sensory re-education   [] scar management     
[] other:   
 
Pain Level (0-10 scale) post treatment: 8/10 Patient will continue to benefit from skilled OT services to address ROM deficits, address strength deficits and analyze and address soft tissue restrictions to attain goals. Assessment:  Patient is a 58 y.o. female with a chief complaint  of pain and discomfort in the left dominant wrist secondary recent surgical intervention for tenosynovitus of the wrist extensors. Patient reports that she had a small cyst located in the dorsum of the left wrist that was causing pain with functional use. She was evaluated by the physician and it was noted that she has significant inflammation in the surrounding area and it was determined that surgical intervention was required to remove the inflammation in the tissue. She underwent surgery on 7/11/18. She has not had therapy since. She was never splinted. Unable to use the left hand with ADL tasks. She has a well healed surgical incision on the dorsum of the left wrist approximately 4 cm in Complaints of decreased sensation on radial side of the incision site. She also has pain and thickening of the tissue in surgical area. Patient received an initial evaluation today followed by education as to diagnosis, precautions and treatment plan.   Patient was provided with a basic home exercise program including AROM of the wrist, desensitization of the incision site. Evaluation Complexity: History MEDIUM Complexity : Expanded review of history including physical, cognitive and psychosocial  history  Examination MEDIUM Complexity : 3-5 performance deficits relating to physical, cognitive , or psychosocial skils that result in activity limitations and / or participation restrictions Clinical Decision Making MEDIUM Complexity : Patient may present with comorbidities that affect occupational performnce. Miniml to moderate modification of tasks or assistance (eg, physical or verbal ) with assesment(s) is necessary to enable patient to complete evaluation Overall Complexity Rating: HIGH Patient would benefit from OT/Hand therapy services for the following problems: 
Problem List: Pain effecting function, Decreased range of motion, Decreased strength and Decreased coordination/prehension Treatment Plan may include any combination of the following: Therapeutic exercise, Physical agent/modality, Scar management, Manual therapy and Patient education Patient / Family readiness to learn indicated by: asking questions, trying to perform skills and interest 
 
Persons(s) to be included in education:   patient (P) Barriers to Learning/Limitations: None Patient Goal (s): reduce pain Patient Self Reported Health Status: good Rehabilitation Potential: good Short Term Goals: To be accomplished in 2  weeks: 
Goal:* Patient will be compliant with initial home exercise program to take an active role in their rehabilitation process. Status at Eval: 
 
Goal:* Patient will demonstrate a good understanding of their condition and strategies for self-management. Status at Eval: 
 
Long Term Goals:  To be accomplished in 4 weeks:  
Goal:* Patient will have 70 degrees of left wrist extension in order to increase ease with ADL's such as bathing, eating and dressing and to eventually bear weight thru the left upper extremity as in pushing up from a chair. Goal:* Patient will have 55 degrees of left wrist flexion in order to perform hygiene tasks and /or work with tools such as a screwdriver. Goal:* Patient will show a 15 point improvement on FOTO functional status measure to improve overall functional performance. Goal:*Patient will report pain at 2/10 with AROM and at rest. 
 
 
 
Frequency / Duration: Patient to be seen 2 times per week for 4 weeks: 
 
Patient/ Caregiver education and instruction: Diagnosis, prognosis, self care and exercises Functional Status Measure: 
Patient's:28 FOTO Benchmark: 36 Expected Change: 25 FOTO score based on 0 - 100 point scale, with 100 being no impairment. These scores are determined by patient reported functional assessments compared against national benchmarked data. 
  
Carry  Current  CL= 60-79%  Goal  CK= 40-59% The severity rating is based on clinical judgment and the FOTO score. Certification Period: 8/2/18-10/25/18 Beverley Maxwell, OT, CHT, CLT 8/2/2018 2:16 PM

## 2018-08-02 NOTE — PROGRESS NOTES
In [address]  [address], [address] 
[phone] fax Plan of Care/Statement of Necessity for Occupational Therapy Services Patient name: Kae Marie Start of Care: 2018 Referral source: Sylwia Lau MD : 1955 Medical Diagnosis: Pain in left wrist [M25.532] Onset [id number] Treatment Diagnosis: left wrist pain Prior Hospitalization: see medical history Provider#: [id number] Medications: Verified on Patient summary List  
 Comorbidities: Depression, HTN, back injury Prior Level of Function: Independent with limitations in ADL and IADL activities secondary to back problems. The Plan of Care and following information is based on the information from the initial evaluation. Assessment/ key information: Patient is a 58 y.o. female with a chief complaint  of pain and discomfort in the left dominant wrist secondary recent surgical intervention for tenosynovitus of the wrist extensors. Patient reports that she had a small cyst located in the dorsum of the left wrist that was causing pain with functional use. She was evaluated by the physician and it was noted that she has significant inflammation in the surrounding area and it was determined that surgical intervention was required to remove the inflammation in the tissue. She underwent surgery on 18. She has not had therapy since. She was never splinted. Unable to use the left hand with ADL tasks. She has a well healed surgical incision on the dorsum of the left wrist approximately 4 cm in Complaints of decreased sensation on radial side of the incision site. She also has pain and thickening of the tissue in surgical area. Patient received an initial evaluation today followed by education as to diagnosis, precautions and treatment plan.   Patient was provided with a basic home exercise program including AROM of the wrist, desensitization of the incision site. Evaluation Complexity: History MEDIUM Complexity : Expanded review of history including physical, cognitive and psychosocial  history  Examination MEDIUM Complexity : 3-5 performance deficits relating to physical, cognitive , or psychosocial skils that result in activity limitations and / or participation restrictions Clinical Decision Making MEDIUM Complexity : Patient may present with comorbidities that affect occupational performnce. Miniml to moderate modification of tasks or assistance (eg, physical or verbal ) with assesment(s) is necessary to enable patient to complete evaluation Overall Complexity Rating: HIGH Patient would benefit from OT/Hand therapy services for the following problems: 
Problem List: Pain effecting function, Decreased range of motion, Decreased strength and Decreased coordination/prehension Treatment Plan may include any combination of the following: Therapeutic exercise, Physical agent/modality, Scar management, Manual therapy and Patient education Patient / Family readiness to learn indicated by: asking questions, trying to perform skills and interest 
 
Persons(s) to be included in education:   patient (P) Barriers to Learning/Limitations: None Patient Goal (s): reduce pain Patient Self Reported Health Status: good Rehabilitation Potential: good Short Term Goals: To be accomplished in 2  weeks: 
Goal:* Patient will be compliant with initial home exercise program to take an active role in their rehabilitation process. Status at Eval: 
 
Goal:* Patient will demonstrate a good understanding of their condition and strategies for self-management. Status at Eval: 
 
Long Term Goals:  To be accomplished in 4 weeks:  
Goal:* Patient will have 70 degrees of left wrist extension in order to increase ease with ADL's such as bathing, eating and dressing and to eventually bear weight thru the left upper extremity as in pushing up from a chair. Goal:* Patient will have 55 degrees of left wrist flexion in order to perform hygiene tasks and /or work with tools such as a screwdriver. Goal:* Patient will show a 15 point improvement on FOTO functional status measure to improve overall functional performance. Goal:*Patient will report pain at 2/10 with AROM and at rest. 
 
 
 
Frequency / Duration: Patient to be seen 2 times per week for 4 weeks: 
 
Patient/ Caregiver education and instruction: Diagnosis, prognosis, self care and exercises Functional Status Measure: 
Patient's:28 FOTO Benchmark: 36 Expected Change: 25 FOTO score based on 0 - 100 point scale, with 100 being no impairment. These scores are determined by patient reported functional assessments compared against national benchmarked data. 
  
Carry  Current  CL= 60-79%  Goal  CK= 40-59% The severity rating is based on clinical judgment and the FOTO score. Certification Period: 8/2/18-10/25/18 Francisco Adams OT, CHT, CLT 8/2/2018 2:16 PM 
 
________________________________________________________________________ I certify that the above Therapy Services are being furnished while the patient is under my care. I agree with the treatment plan and certify that this therapy is necessary. [de-identified] Signature:____________________  Date:____________Time:__________ Please sign and return to In [address] [place], [address] 
[phone] fax

## 2018-08-06 RX ORDER — ATORVASTATIN CALCIUM 40 MG/1
TABLET, FILM COATED ORAL
Qty: 90 TAB | Refills: 3 | Status: SHIPPED | OUTPATIENT
Start: 2018-08-06 | End: 2019-07-15 | Stop reason: SDUPTHER

## 2018-08-07 RX ORDER — POLYETHYLENE GLYCOL 3350 17 G/17G
POWDER, FOR SOLUTION ORAL
Qty: 14 EACH | Refills: 3 | Status: SHIPPED | OUTPATIENT
Start: 2018-08-07 | End: 2018-08-21 | Stop reason: SDUPTHER

## 2018-08-10 ENCOUNTER — HOSPITAL ENCOUNTER (OUTPATIENT)
Dept: PHYSICAL THERAPY | Age: 63
Discharge: HOME | End: 2018-08-10
Payer: MEDICARE

## 2018-08-10 PROCEDURE — 97035 APP MDLTY 1+ULTRASOUND EA 15: CPT

## 2018-08-10 PROCEDURE — 97140 MANUAL THERAPY 1/> REGIONS: CPT

## 2018-08-10 NOTE — PROGRESS NOTES
OT DAILY TREATMENT NOTE - [facility]     Patient Name: Pablo Shaw  Date:8/10/2018  : 1955  [x]  Patient  Verified  [place] / Plan[place] / Product Type: Medicare /    In time:940  Out time:  Total Treatment Time (min): 40  Total Timed Codes (min): 25  1:1 Treatment Time ( [address] only): 25   Visit #: 2 of 8    Treatment Area: Pain in left wrist [M25.532]    SUBJECTIVE  Pain Level (0-10 scale): 8/10  Any medication changes, allergies to medications, adverse drug reactions, diagnosis change, or new procedure performed?: [x] No    [] Yes (see summary sheet for update)  Subjective functional status/changes:   [] No changes reported  \"My hand is just killing me. \"    OBJECTIVE    Modality rationale: decrease pain and increase tissue extensibility to improve the patients ability to use the left wrist   Min Type Additional Details    [] Estim:  []Unatt       []IFC  []Premod                        []Other:  []w/ice   []w/heat  Position:  Location:    [] Estim: []Att    []TENS instruct  []NMES                    []Other:  []w/US   []w/ice   []w/heat  Position:  Location:    []  Traction: [] Cervical       []Lumbar                       [] Prone          []Supine                       []Intermittent   []Continuous Lbs:  [] before manual  [] after manual   10 [x]  Ultrasound: [x]Continuous   [x] Pulsed                           []1MHz   [x]3MHz W/cm2:1.0   Location: left dorsal wrist    []  Iontophoresis with dexamethasone         Location: [] Take home patch   [] In clinic   15 []  Ice     [x]  heat  []  Ice massage  []  Laser   []  Anodyne Position: resting  Location: left wrist    []  Laser with stim  []  Other:  Position:  Location:    []  Vasopneumatic Device Pressure:       [] lo [] med [] hi   Temperature: [] lo [] med [] hi   [x] Skin assessment post-treatment:  [x]intact []redness- no adverse reaction    []redness  adverse reaction:     15 min Therapeutic Exercise:  [x] See flow sheet :   Rationale: increase ROM and desensitize incision site to improve the patients ability to return to normal functional use of the left hand    With   [] TE   [] TA   [] neuro   [] other: Patient Education: [x] Review HEP    [] Progressed/Changed HEP based on:   [] positioning   [] body mechanics   [] transfers   [] heat/ice application   [] Splint wear/care   [] Sensory re-education   [] scar management      [] other:            Other Objective/Functional Measures: Range of Motion:  Wrist Active/Passive ROM  Wrist 8/2/18           Flex 35           Ext 50           UD 45           RD 20                            Strength:  NT     Sensation:   Complaints of decreased sensation on radial side of the incision site.        Edema: GIRTH CHART measured in cm  Date: 8/2/18     Side left     DPC circum. 19.5     Wrist Crease 18             Pain Level (0-10 scale) post treatment: 9/10    ASSESSMENT/Changes in Function: patient reports level of pain is unbearable secondary to reduction in pain medication for back pain    Patient will continue to benefit from skilled OT services to address ROM deficits, address strength deficits and analyze and address soft tissue restrictions to attain remaining goals. []  See Plan of Care  []  See progress note/recertification  []  See Discharge Summary         Progress towards goals / Updated goals:  Short Term Goals: To be accomplished in 2  weeks:  Goal:* Patient will be compliant with initial home exercise program to take an active role in their rehabilitation process.     Goal:* Patient will demonstrate a good understanding of their condition and strategies for self-management.     Long Term Goals:  To be accomplished in 4 weeks:                        Goal:* Patient will have 70 degrees of left wrist extension in order to increase ease with ADL's such as bathing, eating and dressing and to eventually bear weight thru the left upper extremity as in pushing up from a chair.      Goal:* Patient will have 55 degrees of left wrist flexion in order to perform hygiene tasks and /or work with tools such as a screwdriver.      Goal:* Patient will show a 15 point improvement on FOTO functional status measure to improve overall functional performance.     Goal:*Patient will report pain at 2/10 with AROM and at rest.    PLAN  []  Upgrade activities as tolerated     [x]  Continue plan of care  []  Update interventions per flow sheet       []  Discharge due to:_  []  Other:_      Alpha Cons, OT 8/10/2018  10:43 AM    Future Appointments  Date Time Provider Gordo Marquez   8/13/2018 11:15 AM JUICE Cuenca [address]   8/14/2018 1:00 PM Radha Jimenez [address] [facility]   8/16/2018 1:00 PM Radha Jimenez [address] [facility]   8/21/2018 1:15 PM Roe Renee DO [facility] None   8/22/2018 11:00 AM Alpha Cons, OT [facility]   8/24/2018 11:30 AM Alpha Cons, OT [facility]   8/29/2018 11:00 AM Alpha Cons, OT [facility]   10/1/2018 11:20 AM BETO Mendez SCHED

## 2018-08-13 ENCOUNTER — OFFICE VISIT (OUTPATIENT)
Dept: ORTHOPEDIC SURGERY | Facility: CLINIC | Age: 63
End: 2018-08-13

## 2018-08-13 VITALS
WEIGHT: 181.6 LBS | DIASTOLIC BLOOD PRESSURE: 78 MMHG | SYSTOLIC BLOOD PRESSURE: 116 MMHG | HEART RATE: 93 BPM | RESPIRATION RATE: 18 BRPM | OXYGEN SATURATION: 96 % | BODY MASS INDEX: 33.42 KG/M2 | TEMPERATURE: 98 F | HEIGHT: 62 IN

## 2018-08-13 DIAGNOSIS — M79.631 PAIN OF RIGHT FOREARM: Primary | ICD-10-CM

## 2018-08-13 DIAGNOSIS — M06.9 RHEUMATOID ARTHRITIS INVOLVING MULTIPLE SITES, UNSPECIFIED RHEUMATOID FACTOR PRESENCE: ICD-10-CM

## 2018-08-13 RX ORDER — CYCLOBENZAPRINE HCL 10 MG
TABLET ORAL
Qty: 90 TAB | Refills: 1 | Status: SHIPPED | OUTPATIENT
Start: 2018-08-13 | End: 2018-09-11 | Stop reason: SDUPTHER

## 2018-08-13 NOTE — PROGRESS NOTES
HISTORY:  Christie Cintron returns. She is status post left wrist tenosynovitic release over the dorsum of the hand. She has some pain at the release site. The release site has matured nicely as far as skin healing goes. She is limited with her active flexion and extension of the left index, long, and ring finger secondary to pain. She is guarding with ranging today. She is continuing occupational therapy but believes her pain is worsened by the activities. She is trying to exercise at home as well. The patient's OT note reviewed today and with an understanding of increased pain following OT, patient is going to hold off for the next week to 10 days. A secondary complaint today related to the right forearm, she has an area swelling just before the wrist on top of the forearm. She has no trauma reported. EXAMINATION: There is a palpable movable mass of the distal right forearm dorsally in the area of the radial styloid which is painful. Patient's distal sensation intact fully, right upper extremity. Capillary refill is brisk and less than 2 seconds to the right upper extremity. PLAN:  Again, whereby to hold off on OT for the left wrist and hand for at least 2 weeks. An MRI is warranted for the right forearm movable, painful soft tissue mass. We will see her back once the MRI is obtained. Re refer to Dr. Bessie Herndon for RA / Fibromyalgia follow up.

## 2018-08-14 ENCOUNTER — APPOINTMENT (OUTPATIENT)
Dept: PHYSICAL THERAPY | Age: 63
End: 2018-08-14
Payer: MEDICARE

## 2018-08-14 ENCOUNTER — TELEPHONE (OUTPATIENT)
Dept: ORTHOPEDIC SURGERY | Facility: CLINIC | Age: 63
End: 2018-08-14

## 2018-08-14 NOTE — TELEPHONE ENCOUNTER
Ruslan Colbert and Dr. Tania Bermudez last note to Dr. Geetha Manzano office per the patient. Called the patient and notified her that the information had been faxed.

## 2018-08-14 NOTE — TELEPHONE ENCOUNTER
Patient called as she called  office reference appt as ref by Hardeep Beltrán.  Please fax over notes to Dr. Gisel Black office at fax [phone] as she has appt on 08/30/2018 at 11am

## 2018-08-15 DIAGNOSIS — K21.00 GASTROESOPHAGEAL REFLUX DISEASE WITH ESOPHAGITIS: ICD-10-CM

## 2018-08-15 RX ORDER — DEXLANSOPRAZOLE 60 MG/1
CAPSULE, DELAYED RELEASE ORAL
Qty: 30 CAP | Refills: 2 | Status: SHIPPED | OUTPATIENT
Start: 2018-08-15 | End: 2018-10-16 | Stop reason: DRUGHIGH

## 2018-08-16 ENCOUNTER — HOSPITAL ENCOUNTER (OUTPATIENT)
Dept: PHYSICAL THERAPY | Age: 63
End: 2018-08-16
Payer: MEDICARE

## 2018-08-20 ENCOUNTER — TELEPHONE (OUTPATIENT)
Dept: PAIN MANAGEMENT | Age: 63
End: 2018-08-20

## 2018-08-20 NOTE — TELEPHONE ENCOUNTER
Patient called the phone line and stated that she went to the ER and was given pain meds. I tried to call the patient and there was no answer.

## 2018-08-21 ENCOUNTER — TELEPHONE (OUTPATIENT)
Dept: PAIN MANAGEMENT | Age: 63
End: 2018-08-21

## 2018-08-21 ENCOUNTER — OFFICE VISIT (OUTPATIENT)
Dept: FAMILY MEDICINE CLINIC | Age: 63
End: 2018-08-21

## 2018-08-21 VITALS
RESPIRATION RATE: 16 BRPM | WEIGHT: 182 LBS | SYSTOLIC BLOOD PRESSURE: 129 MMHG | HEIGHT: 62 IN | BODY MASS INDEX: 33.49 KG/M2 | TEMPERATURE: 97 F | HEART RATE: 96 BPM | DIASTOLIC BLOOD PRESSURE: 84 MMHG

## 2018-08-21 DIAGNOSIS — M25.531 RIGHT WRIST PAIN: Primary | ICD-10-CM

## 2018-08-21 DIAGNOSIS — K59.09 INTERMITTENT CONSTIPATION: ICD-10-CM

## 2018-08-21 DIAGNOSIS — J30.1 ALLERGIC RHINITIS DUE TO POLLEN, UNSPECIFIED SEASONALITY: ICD-10-CM

## 2018-08-21 DIAGNOSIS — R06.2 WHEEZING: ICD-10-CM

## 2018-08-21 DIAGNOSIS — J20.9 ACUTE BRONCHITIS, UNSPECIFIED ORGANISM: ICD-10-CM

## 2018-08-21 RX ORDER — FLUTICASONE PROPIONATE 50 MCG
2 SPRAY, SUSPENSION (ML) NASAL AS NEEDED
Qty: 1 BOTTLE | Refills: 6 | Status: SHIPPED | OUTPATIENT
Start: 2018-08-21 | End: 2018-10-26 | Stop reason: SDUPTHER

## 2018-08-21 RX ORDER — POLYETHYLENE GLYCOL 3350 17 G/17G
17 POWDER, FOR SOLUTION ORAL DAILY
Qty: 30 EACH | Refills: 6 | Status: SHIPPED | OUTPATIENT
Start: 2018-08-21 | End: 2019-05-06 | Stop reason: SDUPTHER

## 2018-08-21 RX ORDER — PREDNISONE 10 MG/1
TABLET ORAL
Qty: 21 TAB | Refills: 0 | Status: SHIPPED | OUTPATIENT
Start: 2018-08-21 | End: 2018-09-07 | Stop reason: ALTCHOICE

## 2018-08-21 NOTE — PROGRESS NOTES
Progress Note    Patient: Verneda Spatz MRN: [id number]  SSN: [id number]    YOB: 1955  Age: 58 y.o. Sex: female          Subjective:   Verneda Spatz is a 58 y.o. female who is here for follow up on ER visit. She states that she will see her pulmonologist later on this week. She states that she does use both the Maxalt and nasal inhaler. She states that she was seen in the ER for right wrist cyst. She was given a wrist brace. The patient has a follow up with orthopedics for the wrist. She is planned for an MRI on her right wrist. She denies twisting the right wrist--they discovered this while she was in physical therapy. Visit from 7/24/18   She mentions that over the last three days she has had a severe headache. She mentions that she had a cyst removed from her left hand. She mentions that Dr. Mecca Iyer was the one who did her surgery. She states that she did get her stitches out of her hand. She also mentions that she does not feel like her heart rate is beating hard. She mentions that the headache has been going on for the length of time as her high heart rate. She denies blacking out or any spots in any visual stigmata. Visit from 6/12/2018   She mentions that her cough is still bothering her. She also is concerned about urinary urgency. In regard to her lungs she states that she was previously told that she had a spot on her lung and wants to see if it is still there. She mentions that when she goes outside her cough worsens. She states that when uses the cough medicine along with water she is able to cough up some phlegm. She states that she even took more than was prescribed for the cough syrup. She states that when she is exposed to grass she develops a severe cough as well. Patient denies coughing up blood. She also continues to use Dexilant. Also she asks whether she can use Pepcid as well. Visit from 5/22/18  Patient is here for follow up on urinary urgency. She mentions that she has bladder pressure with leakage. She mentions that she has been dealing with this for about a month. She states that she has used both a diaper and a pad. She mentions that it does not matter what she drinks, she will still get the urge still. She states that she drinks water or lemonade. She denies any bladder problems in the past.   She mentions that she still gets the night time cough. Visit from 5/15/18  Patient is here for an acute care visit for suspected bronchitis. Nahomi Handy states that the incident occurred a week and a half ago. She states that she went to the ER twice. She was initially treated with Bactrim and on the second visit was treated with Levaquin. She was also given Hydrocodone cough syrup. She also went to [facility]. She was also treated with prednisone but it was ineffective. Objective:     Vitals:    08/21/18 1344   BP: 129/84   Pulse: 96   Resp: 16   Temp: 97 °F (36.1 °C)   Weight: 182 lb (82.6 kg)   Height: 5' 2\" (1.575 m)        Review of Systems:  Pertinent items are noted in the History of Present Illness. Physical Exam:   GENERAL: Mild distress   EYE: conjunctivae/corneas clear. PERRL, EOM's intact. Fundi benign  THROAT & NECK: mild erythema  LUNG: Clear to auscultation bilaterally   HEART: regular rate and rhythm, S1, S2 normal, no murmur, click, rub or gallop  ABDOMEN: soft, non-tender, non-distended.    EXTREMITIES:  Tenderness in right distal forearm     Lab/Data Review:    Lab Results   Component Value Date/Time    Sodium 144 07/06/2018 01:18 PM    Potassium 4.4 07/06/2018 01:18 PM    Chloride 107 07/06/2018 01:18 PM    CO2 33 (H) 07/06/2018 01:18 PM    Anion gap 4 07/06/2018 01:18 PM    Glucose 79 07/06/2018 01:18 PM    BUN 13 07/06/2018 01:18 PM    Creatinine 0.83 07/06/2018 01:18 PM    BUN/Creatinine ratio 16 07/06/2018 01:18 PM    GFR est AA >60 07/06/2018 01:18 PM    GFR est non-AA >60 07/06/2018 01:18 PM    Calcium 9.3 07/06/2018 01:18 PM    Bilirubin, total 0.4 02/21/2018 07:15 PM    AST (SGOT) 30 02/21/2018 07:15 PM    Alk. phosphatase 134 (H) 02/21/2018 07:15 PM    Protein, total 7.7 02/21/2018 07:15 PM    Albumin 3.6 02/21/2018 07:15 PM    Globulin 4.1 (H) 02/21/2018 07:15 PM    A-G Ratio 0.9 02/21/2018 07:15 PM    ALT (SGPT) 27 02/21/2018 07:15 PM       Lab Results   Component Value Date/Time    Sodium 144 07/06/2018 01:18 PM    Potassium 4.4 07/06/2018 01:18 PM    Chloride 107 07/06/2018 01:18 PM    CO2 33 (H) 07/06/2018 01:18 PM    Anion gap 4 07/06/2018 01:18 PM    Glucose 79 07/06/2018 01:18 PM    BUN 13 07/06/2018 01:18 PM    Creatinine 0.83 07/06/2018 01:18 PM    BUN/Creatinine ratio 16 07/06/2018 01:18 PM    GFR est AA >60 07/06/2018 01:18 PM    GFR est non-AA >60 07/06/2018 01:18 PM    Calcium 9.3 07/06/2018 01:18 PM    Bilirubin, total 0.4 02/21/2018 07:15 PM    AST (SGOT) 30 02/21/2018 07:15 PM    Alk. phosphatase 134 (H) 02/21/2018 07:15 PM    Protein, total 7.7 02/21/2018 07:15 PM    Albumin 3.6 02/21/2018 07:15 PM    Globulin 4.1 (H) 02/21/2018 07:15 PM    A-G Ratio 0.9 02/21/2018 07:15 PM    ALT (SGPT) 27 02/21/2018 07:15 PM         XR Results (most recent):    Results from Hospital Encounter encounter on 02/21/18   XR CHEST PA LAT   Narrative PA And Lateral Chest    CPT CODE: 86513    COMPARISON: 2/22/2017. CLINICAL INFORMATION: Chest and abdominal pain, status post EGD. FINDINGS:    Heart size within normal limits. Mild ectasia of the thoracic aorta, stable. Air-fluid level in the retrocardiac space correlates with a hiatal hernia. No  pulmonary edema. No effusion or pneumothorax. There are some overlying lead  artifact. Lungs are otherwise clear. Degenerative change in the spine. No free  air seen under the diaphragm. .         Impression IMPRESSION:    1. No radiographic evidence for an acute cardiopulmonary abnormality. 2. Hiatal hernia. Assessment:     1.  Right wrist pain    - predniSONE (STERAPRED DS) 10 mg dose pack; See administration instruction per 10mg dose pack  Dispense: 21 Tab; Refill: 0      2. Allergic rhinitis due to pollen, unspecified seasonality    - fluticasone (FLONASE) 50 mcg/actuation nasal spray; 2 Sprays by Both Nostrils route as needed. Dispense: 1 Bottle; Refill: 6      3. Intermittent constipation    - polyethylene glycol (MIRALAX) 17 gram packet; Take 1 Packet by mouth daily. Dispense: 30 Each; Refill: 6      Patient will call for follow up. Plan:     No orders of the defined types were placed in this encounter.         Signed By: Kit Ying DO     August 21, 2018

## 2018-08-21 NOTE — PATIENT INSTRUCTIONS
Please contact our office if you have any questions about your visit today. 1.) Please prednisone pack for the wrist pain. 2.) Please keep your MRI appointment and other specialist appointments. 3.) Please call for follow up with this practice in 1-2 months. Mrs. Raya and Dr. Lorene Proctor

## 2018-08-21 NOTE — MR AVS SNAPSHOT
[facility] 
 
 
 [address] [place] [address] [phone] Patient: Sedrick Pate MRN: [id number] EGI:7/40/0340 Visit Information Date & Time Provider Department Dept. Phone Encounter #  
 8/21/2018  1:15 PM Veronica Maharaj 77 [id number] Follow-up Instructions Return for Patient will call for follow up. .  
  
Your Appointments 8/27/2018 11:00 AM  
New Patient with [facility] ([address]) Appt Note: rt hand/pt to call and check credintialing to make sure he takes [facility] [facility]; rt hand/pt to call and check credentialing to make sure he takes [facility] [facility]* was moved up half hour pre Dr Damian Riley [facility], [address] [place] [address]  
[phone] [address] [place] [address], [facility]  
  
    
 10/1/2018 11:20 AM  
Follow Up with [facility] [address] [facility] (RICHAR SCHEDULING) Appt Note: Return in about 3 months (around 10/2/2018). [address] [address]  
[phone] [facility] [phone] Upcoming Health Maintenance Date Due DTaP/Tdap/Td series (1 - Tdap) 3/30/1975 Influenza Age 5 to Adult 8/1/2018 MEDICARE YEARLY EXAM 10/13/2018 PAP AKA CERVICAL CYTOLOGY 6/21/2019 BREAST CANCER SCRN MAMMOGRAM 1/12/2020 COLONOSCOPY 4/13/2027 Allergies as of 8/21/2018  Review Complete On: 2/93/3373 By: Melva Ray. Betty Chacon LPN Severity Noted Reaction Type Reactions Aspirin    Other (comments) Stomach bleeding Ativan [Lorazepam]  02/21/2018    Shortness of Breath Macrobid [Nitrofurantoin Monohyd/m-cryst]  11/25/2015    Nausea and Vomiting Nsaids (Non-steroidal Anti-inflammatory Drug)    Nausea and Vomiting Opana [Oxymorphone]  11/29/2016    Other (comments) Insomnia, weight loss, nightmares Pcn [Penicillins]    Hives Current Immunizations  Never Reviewed Name Date Pneumococcal Polysaccharide (PPSV-23) 3/7/2014 Not reviewed this visit You Were Diagnosed With   
  
 Codes Comments Right wrist pain    -  Primary ICD-10-CM: M25.531 ICD-9-CM: 719.43 Allergic rhinitis due to pollen, unspecified seasonality     ICD-10-CM: J30.1 ICD-9-CM: 477.0 Acute bronchitis, unspecified organism     ICD-10-CM: J20.9 ICD-9-CM: 466.0 Wheezing     ICD-10-CM: R06.2 ICD-9-CM: 786.07 Intermittent constipation     ICD-10-CM: K59.00 ICD-9-CM: 564.00 Vitals BP Pulse Temp Resp Height(growth percentile) Weight(growth percentile) 129/84 (BP 1 Location: Right arm, BP Patient Position: Sitting) 96 97 °F (36.1 °C) 16 5' 2\" (1.575 m) 182 lb (82.6 kg) BMI OB Status Smoking Status 33.29 kg/m2 Hysterectomy Former Smoker BMI and BSA Data Body Mass Index Body Surface Area  
 33.29 kg/m 2 1.9 m 2 Preferred Pharmacy Pharmacy Name Phone [id number] [place], [address] [place] [phone] Your Updated Medication List  
  
   
This list is accurate as of 8/21/18  2:19 PM.  Always use your most recent med list.  
  
  
  
  
 ACID GONE ANTACID  mg/15 mL suspension Generic drug:  aluminum hydrox-magnesium carb * albuterol 2.5 mg /3 mL (0.083 %) nebulizer solution Commonly known as:  PROVENTIL VENTOLIN  
2.5 mg as needed. * albuterol 90 mcg/actuation inhaler Commonly known as:  PROAIR HFA Take 2 Puffs by inhalation every four (4) hours as needed for Wheezing. atorvastatin 40 mg tablet Commonly known as:  LIPITOR  
TAKE ONE TABLET BY MOUTH ONE TIME DAILY  
  
 baclofen 10 mg tablet Commonly known as:  LIORESAL  
TAKE ONE TABLET BY MOUTH THREE TIMES A DAY  
  
 cyclobenzaprine 10 mg tablet Commonly known as:  FLEXERIL  
 TAKE 1 TABLET BY MOUTH THREE TIMES DAILY AS NEEDED FOR MUSCLE SPASMS FOR UP TO 30 DAYS. DEXILANT 60 mg Cpdb Generic drug:  Dexlansoprazole TAKE ONE CAPSULE BY MOUTH ONE TIME DAILY  
  
 ergocalciferol 50,000 unit capsule Commonly known as:  VITAMIN D2  
TAKE ONE CAPSULE BY MOUTH EVERY 7 DAYS  
  
 fluticasone 50 mcg/actuation nasal spray Commonly known as:  Tu Merles 2 Sprays by Both Nostrils route as needed. gabapentin 400 mg capsule Commonly known as:  NEURONTIN  
400 mg two (2) times a day. guaiFENesin-codeine 100-10 mg/5 mL solution Commonly known as:  ROBITUSSIN AC Take 5 mL by mouth three (3) times daily as needed for Cough. Max Daily Amount: 15 mL. HYDROcodone-acetaminophen 5-325 mg per tablet Commonly known as:  Wes [place]e Take 1-2 Tabs by mouth two (2) times daily as needed for Pain. Max Daily Amount: 4 Tabs. hydrOXYzine pamoate 50 mg capsule Commonly known as:  VISTARIL Take 1 Cap by mouth three (3) times daily as needed for Anxiety. ipratropium 0.02 % Soln Commonly known as:  ATROVENT  
0.5 mg.  
  
 losartan 25 mg tablet Commonly known as:  COZAAR  
TAKE ONE TABLET BY MOUTH ONE TIME DAILY * morphine CR 30 mg CR tablet Commonly known as:  MS CONTIN Take 1 Tab by mouth every twelve (12) hours for 30 days. Max Daily Amount: 60 mg. Start taking on:  8/26/2018 * morphine CR 15 mg CR tablet Commonly known as:  MS CONTIN Take 1 Tab by mouth every eight (8) hours for 30 days. Max Daily Amount: 45 mg. Start taking on:  9/24/2018  
  
 naloxone 4 mg/actuation nasal spray Commonly known as:  NARCAN  
1 [place] by IntraNASal route once as needed. Use 1 spray intranasally into 1 nostril. Use a new Narcan nasal spray for subsequent doses and administer into alternating nostrils. May repeat every 2 to 3 minutes as needed. ondansetron 4 mg disintegrating tablet Commonly known as:  ZOFRAN ODT  
 Take 1 Tab by mouth every eight (8) hours as needed for Nausea for up to 15 doses. oxybutynin 5 mg tablet Commonly known as:  SIZJSCBM Take 1 Tab by mouth two (2) times a day. Indications: URINARY URGE INCONTINENCE  
  
 oxyCODONE IR 10 mg Tab immediate release tablet Commonly known as:  Madison Marion Take 1 Tab by mouth four (4) times daily as needed for up to 30 days. Max Daily Amount: 40 mg.  
  
 oxyCODONE-acetaminophen 7.5-325 mg per tablet Commonly known as:  PERCOCET Take 1 Tab by mouth every six (6) hours as needed for Pain (Hold if sedated. ). Max Daily Amount: 4 Tabs. polyethylene glycol 17 gram packet Commonly known as:  Burke Jorge Take 1 Packet by mouth daily. predniSONE 10 mg dose pack Commonly known as:  STERAPRED DS See administration instruction per 10mg dose pack QUEtiapine 200 mg tablet Commonly known as:  SEROquel Take 200 mg by mouth daily. rizatriptan 10 mg tablet Commonly known as:  Krysta Marielle May repeat in 2 hours if needed  Indications: Migraine  
  
 sertraline 100 mg tablet Commonly known as:  ZOLOFT Take 100 mg by mouth nightly as needed. temazepam 15 mg capsule Commonly known as:  RESTORIL Take 1 Cap by mouth nightly as needed for Sleep. Max Daily Amount: 15 mg. Indications: INSOMNIA  
  
 traZODone 100 mg tablet Commonly known as:  Hessie Jacob Take 100 mg by mouth nightly. valACYclovir 500 mg tablet Commonly known as:  VALTREX Take 1 Tab by mouth two (2) times a day. * Notice: This list has 4 medication(s) that are the same as other medications prescribed for you. Read the directions carefully, and ask your doctor or other care provider to review them with you. Prescriptions Sent to Pharmacy Refills  
 polyethylene glycol (MIRALAX) 17 gram packet 6 Sig: Take 1 Packet by mouth daily.   
 Class: Normal  
 Pharmacy: [id number], [address] [facility] [place]  #: [phone] Route: Oral  
 fluticasone (FLONASE) 50 mcg/actuation nasal spray 6 Si Sprays by Both Nostrils route as needed. Class: Normal  
 Pharmacy: [facility] [place] [address], [address] [place] [address] Ph #: [phone] Route: Both Nostrils  
 predniSONE (STERAPRED DS) 10 mg dose pack 0 Sig: See administration instruction per 10mg dose pack Class: Normal  
 Pharmacy: [facility] [place] [address] Ph #: [phone] Follow-up Instructions Return for Patient will call for follow up. Angel Ruggiero To-Do List   
 2018 1:15 PM  
  Appointment with [facility] MRI RM 2 at [address] [place] [address] ([phone]) GENERAL INSTRUCTIONS  Bring information (ID card) if you have any medically implanted devices. You will be required to lie still while the procedure is being performed. Remove any jewelry (including body piercing, hairpins) prior to MRI. If you have had a creatinine level drawn within the past 30 days, please bring most recent results to your appt. Bring any films, CD's, and reports related to your study with you on the day of your exam.  This only includes studies done outside of [address] [place] [address], [facility], [facility], and [facility]. Bring a complete list of all medications you are currently taking to include prescriptions, over-the-counter meds, herbals, vitamins & any dietary supplements. If you were given medications for claustrophobia or anxiety, please arrange to have someone drive you to your appointment. QUESTIONS  Notify the MRI Department if you have any questions concerning your study. [facility] - [phone] [facility] - [phone] Patient Instructions Please contact our office if you have any questions about your visit today. 1.) Please prednisone pack for the wrist pain. 2.) Please keep your MRI appointment and other specialist appointments. 3.) Please call for follow up with this practice in 1-2 months. Mrs. Raya and Dr. Hannah Felix Introducing [facility]! Dear Paula Thorne: Thank you for requesting a [employer] account. Our records indicate that you already have an active [employer] account. You can access your account anytime at [email or web address] Did you know that you can access your hospital and ER discharge instructions at any time in [employer]? You can also review all of your test results from your hospital stay or ER visit. Additional Information If you have questions, please visit the Frequently Asked Questions section of the [employer] website at [email or web address] Remember, [employer] is NOT to be used for urgent needs. For medical emergencies, dial 911. Now available from your iPhone and Android! Please provide this summary of care documentation to your next provider. Your primary care clinician is listed as [address] [place] [address]. If you have any questions after today's visit, please call [phone].

## 2018-08-21 NOTE — PROGRESS NOTES
Chief Complaint   Patient presents with    Follow-up     1. Have you been to the ER, urgent care clinic since your last visit? Hospitalized since your last visit? No    2. Have you seen or consulted any other health care providers outside of the [address] [place] [address] since your last visit? Include any pap smears or colon screening. No  Fall assessment reveals that the patient is a fall risk, she has been given tips to reduce the amount of falls.

## 2018-08-21 NOTE — TELEPHONE ENCOUNTER
Imelda Brady has called requesting a refill of their controlled medication, morphine sulfate ER and oxycodone, for the management of her chronic back and joint pain. Last office visit date: 07/02/18    Date last  was pulled and reviewed : 08/21/18, last fill date for both meds was 07/27/18    Was the patient compliant when the above report was pulled? yes    Analgesia: pt reports a 70% pain relief with her current opioid medication regimen    Aberrancies: none noted    ADL's: pt states that she is able to do some daily duties but has an aid with her daily to help    Adverse Reaction: none reported by the pt at this time. Provider's last note and plan of care reviewed? Yes, prescriptions pre-printed  Request forwarded to provider for review. Pt also reported that she had to go to the ER for an abscess. She was given a dose of dilaudid while she was there. Pt stated that she did not take any prescriptions home with her.

## 2018-08-22 ENCOUNTER — APPOINTMENT (OUTPATIENT)
Dept: PHYSICAL THERAPY | Age: 63
End: 2018-08-22
Payer: MEDICARE

## 2018-08-22 ENCOUNTER — HOSPITAL ENCOUNTER (OUTPATIENT)
Age: 63
Discharge: HOME | End: 2018-08-22
Attending: PHYSICIAN ASSISTANT
Payer: MEDICARE

## 2018-08-22 DIAGNOSIS — M79.631 PAIN OF RIGHT FOREARM: ICD-10-CM

## 2018-08-22 PROCEDURE — 73218 MRI UPPER EXTREMITY W/O DYE: CPT

## 2018-08-22 NOTE — TELEPHONE ENCOUNTER
The verbal order was received for the pt to be able to come by the office and  her prescriptions. The order was RBV'd. I called and spoke to the pt and she was made aware of this. She verbalized understanding and has no questions. She will come by the office to  her prescriptions.

## 2018-08-24 ENCOUNTER — APPOINTMENT (OUTPATIENT)
Dept: PHYSICAL THERAPY | Age: 63
End: 2018-08-24
Payer: MEDICARE

## 2018-08-27 ENCOUNTER — OFFICE VISIT (OUTPATIENT)
Dept: ORTHOPEDIC SURGERY | Age: 63
End: 2018-08-27

## 2018-08-27 VITALS
RESPIRATION RATE: 16 BRPM | TEMPERATURE: 98.2 F | DIASTOLIC BLOOD PRESSURE: 79 MMHG | BODY MASS INDEX: 33.23 KG/M2 | WEIGHT: 180.6 LBS | HEART RATE: 103 BPM | HEIGHT: 62 IN | SYSTOLIC BLOOD PRESSURE: 116 MMHG | OXYGEN SATURATION: 97 %

## 2018-08-27 DIAGNOSIS — M25.531 RIGHT WRIST PAIN: ICD-10-CM

## 2018-08-27 DIAGNOSIS — M65.4 DE QUERVAIN'S DISEASE (TENOSYNOVITIS): Primary | ICD-10-CM

## 2018-08-27 DIAGNOSIS — M65.9 TENOSYNOVITIS OF LEFT HAND: ICD-10-CM

## 2018-08-27 DIAGNOSIS — M79.642 LEFT HAND PAIN: ICD-10-CM

## 2018-08-27 DIAGNOSIS — M67.431 GANGLION OF WRIST, RIGHT: ICD-10-CM

## 2018-08-27 NOTE — PROGRESS NOTES
Manisha Sloan is a 58 y.o. female ambidextrous on disability. Worker's Compensation and legal considerations: none filed. Vitals:    08/27/18 1307   BP: 116/79   Pulse: (!) 103   Resp: 16   Temp: 98.2 °F (36.8 °C)   TempSrc: Oral   SpO2: 97%   Weight: 180 lb 9.6 oz (81.9 kg)   Height: 5' 2\" (1.575 m)   PainSc:   8           Chief Complaint   Patient presents with    Wrist Pain     R WRIST PAIN         HPI: Pt presents today complaining of right wrist pain s/p MRI and L hand pain and stiffness s/p extensor tenosynovectomy by Dr Paolo Porras. She was in therapy for the left side and was recently taken out due to pain. Date of onset:  3 months on the right wrist, and almost 2 mons postop on the left    Injury: No    Prior Treatment:  Yes: Comment: Tenosynovectomy of the L hand and therapy    Numbness/ Tingling: No    ROS: Review of Systems   Constitutional: Negative for chills and fever. Respiratory: Negative for cough and shortness of breath. Cardiovascular: Negative for chest pain and palpitations. Musculoskeletal: Positive for joint pain. Skin: Negative. Neurological: Negative.         Past Medical History:   Diagnosis Date    Annular tear of lumbar disc 11/10/2014    Asthma     Bronchitis    Benign tumor of throat     Bone spur     right ankle    Cervicalgia 11/10/2014    Chronic pain     back    Degeneration of lumbar or lumbosacral intervertebral disc 11/10/2014    Degenerative disc disease     Depression     Displacement of lumbar intervertebral disc without myelopathy 11/10/2014    Elevated white blood cell count     Fibromyalgia     GERD (gastroesophageal reflux disease)     Hypertension     Insomnia     Nausea & vomiting     Pain in joint, multiple sites 11/10/2014    Postlaminectomy syndrome, cervical region 11/10/2014    Sinus infection 10/5/15    Ulcer        Past Surgical History:   Procedure Laterality Date    COLONOSCOPY N/A 4/13/2017    COLONOSCOPY performed by Haroldo Adams [facility] at [facility]    HX CATARACT REMOVAL      HX CERVICAL FUSION      HX HYSTERECTOMY      HX ORTHOPAEDIC      ganglion cyst removed, right hand    HX OTHER SURGICAL      cyst left thigh removed    HX WRIST FRACTURE TX      LAP,CHOLECYSTECTOMY N/A 02/27/2017    Dr. Dylan Gilliland       Current Outpatient Prescriptions   Medication Sig Dispense Refill    polyethylene glycol (MIRALAX) 17 gram packet Take 1 Packet by mouth daily. 30 Each 6    fluticasone (FLONASE) 50 mcg/actuation nasal spray 2 Sprays by Both Nostrils route as needed. 1 Bottle 6    predniSONE (STERAPRED DS) 10 mg dose pack See administration instruction per 10mg dose pack 21 Tab 0    DEXILANT 60 mg CpDB TAKE ONE CAPSULE BY MOUTH ONE TIME DAILY 30 Cap 2    cyclobenzaprine (FLEXERIL) 10 mg tablet TAKE 1 TABLET BY MOUTH THREE TIMES DAILY AS NEEDED FOR MUSCLE SPASMS FOR UP TO 30 DAYS. 90 Tab 1    atorvastatin (LIPITOR) 40 mg tablet TAKE ONE TABLET BY MOUTH ONE TIME DAILY 90 Tab 3    albuterol (PROAIR HFA) 90 mcg/actuation inhaler Take 2 Puffs by inhalation every four (4) hours as needed for Wheezing. 1 Inhaler 3    rizatriptan (MAXALT) 10 mg tablet May repeat in 2 hours if needed  Indications: Migraine 9 Tab 3    losartan (COZAAR) 25 mg tablet TAKE ONE TABLET BY MOUTH ONE TIME DAILY 90 Tab 1    morphine CR (MS CONTIN) 30 mg CR tablet Take 1 Tab by mouth every twelve (12) hours for 30 days. Max Daily Amount: 60 mg. 60 Tab 0    oxyCODONE IR (ROXICODONE) 10 mg tab immediate release tablet Take 1 Tab by mouth four (4) times daily as needed for up to 30 days. Max Daily Amount: 40 mg. 120 Tab 0    oxybutynin (DITROPAN) 5 mg tablet Take 1 Tab by mouth two (2) times a day. Indications: URINARY URGE INCONTINENCE 60 Tab 0    valACYclovir (VALTREX) 500 mg tablet Take 1 Tab by mouth two (2) times a day. 60 Tab 3    hydrOXYzine pamoate (VISTARIL) 50 mg capsule Take 1 Cap by mouth three (3) times daily as needed for Anxiety.  90 Cap 1    ACID GONE ANTACID  mg/15 mL suspension   0    ondansetron (ZOFRAN ODT) 4 mg disintegrating tablet Take 1 Tab by mouth every eight (8) hours as needed for Nausea for up to 15 doses. 15 Tab 0    ergocalciferol (VITAMIN D2) 50,000 unit capsule TAKE ONE CAPSULE BY MOUTH EVERY 7 DAYS 12 Cap 3    QUEtiapine (SEROQUEL) 200 mg tablet Take 200 mg by mouth daily.  naloxone (NARCAN) 4 mg/actuation nasal spray 1 [place] by IntraNASal route once as needed. Use 1 spray intranasally into 1 nostril. Use a new Narcan nasal spray for subsequent doses and administer into alternating nostrils. May repeat every 2 to 3 minutes as needed.  temazepam (RESTORIL) 15 mg capsule Take 1 Cap by mouth nightly as needed for Sleep. Max Daily Amount: 15 mg. Indications: INSOMNIA 30 Cap 1    albuterol (PROVENTIL VENTOLIN) 2.5 mg /3 mL (0.083 %) nebulizer solution 2.5 mg as needed.  gabapentin (NEURONTIN) 400 mg capsule 400 mg two (2) times a day.  ipratropium (ATROVENT) 0.02 % nebulizer solution 0.5 mg.      trazodone (DESYREL) 100 mg tablet Take 100 mg by mouth nightly.  sertraline (ZOLOFT) 100 mg tablet Take 100 mg by mouth nightly as needed.  oxyCODONE-acetaminophen (PERCOCET) 7.5-325 mg per tablet Take 1 Tab by mouth every six (6) hours as needed for Pain (Hold if sedated. ). Max Daily Amount: 4 Tabs. 28 Tab 0    guaiFENesin-codeine (ROBITUSSIN AC) 100-10 mg/5 mL solution Take 5 mL by mouth three (3) times daily as needed for Cough. Max Daily Amount: 15 mL. 236 mL 0    HYDROcodone-acetaminophen (NORCO) 5-325 mg per tablet Take 1-2 Tabs by mouth two (2) times daily as needed for Pain. Max Daily Amount: 4 Tabs.  30 Tab 0    baclofen (LIORESAL) 10 mg tablet TAKE ONE TABLET BY MOUTH THREE TIMES A DAY 90 Tab 5       Allergies   Allergen Reactions    Aspirin Other (comments)     Stomach bleeding    Ativan [Lorazepam] Shortness of Breath    Macrobid [Nitrofurantoin Monohyd/M-Cryst] Nausea and Vomiting    Nsaids (Non-Steroidal Anti-Inflammatory Drug) Nausea and Vomiting    Opana [Oxymorphone] Other (comments)     Insomnia, weight loss, nightmares    Pcn [Penicillins] Hives         PE: Gen:  AOX3, Flattened affect, generally sad about her pain    Wrist:    Tenderness L R Test L R   1st Ext Comp - + Finkelstein's - +   Snuff Box - - Sánchez - -   2nd Ext Comp - - S-L Shear - -   S-L Joint - - L-T Shear - -   L-T Joint - - DRUJ Sup - -   6th Ext Comp - - DRUJ Pro - -   Ulnar Snuff - - DRUJ Grind - -   Fovea - - TFCC - -   STT Joint - - Mid-Carp Inst - -   FCR - - P-T Grind - -   Intersection - - ECU Sublux. - -      Dorsal Ganglion: + associated with 1st DC tenosynovitis    Volar Ganglion: -    Hand:  Diffuse tenderness and decreased active ROM of the L hand and digits. There is only minimal edema noted. Palm to Pulp distance passively is 2cm. Sensation is grossly intact and all extensors are intact. Incision is well healed. Examination L Digit(s) R Digit(s)   1st CMC Tenderness -  -    1st CMC Grind -  -    Yamileth Nodes -  -    Heberden Nodes -  -    A1 Pulley Tenderness -  -    Triggering -  -    UCL Instability -  -    RCL Instability -  -    Lateral Stress Pain -  -    Palmar Cords -  -    Tabletop test -  -    Garrod's Pads -  -     Strength       Pinch Strength         ROM: within 2 cm of finger flexion        Imaging: Plain films of R wrist are negative for acute processes, or osteoarthritis    MRI reviewed that was done on 8/22 is + for 1st DC tenosynovitis with an associated ganglion cyst.      ICD-10-CM ICD-9-CM    1. Left hand pain M79.642 729.5 AMB POC XRAY, HAND; 3+ VIEWS   2.  Right wrist pain M25.531 719.43 AMB POC XRAY, WRIST; COMPLETE, 3+ VIE       Plan: Right 1st DC injection today   -DeQ brace ordered to be worn for 6 weeks  Continue hand therapy for left side  F/U in 2 months    [address] [facility]  OFFICE PROCEDURE PROGRESS NOTE        Chart reviewed for the following:   Consuelo SANTILLAN DO, have reviewed the History, Physical and updated the Allergic reactions for Vene 89 performed immediately prior to start of procedure:   James SANTILLAN DO, have performed the following reviews on Martell Mcgraw prior to the start of the procedure:            * Patient was identified by name and date of birth   * Agreement on procedure being performed was verified  * Risks and Benefits explained to the patient  * Procedure site verified and marked as necessary  * Patient was positioned for comfort  * Consent was signed and verified     Time: 14:35        Date of procedure: 8/27/2018    Procedure performed by: Consuelo Hardy DO    Provider assisted by: Margie Erickson LPN    Patient assisted by: self    How tolerated by patient: tolerated the procedure well with no complications    Post Procedural Pain Scale: 2 - Hurts Little Bit    Comments: none    Procedure:  After consent was obtained, using sterile technique the Tendon Sheath was prepped. Local anesthetic used: 1% lidocaine. Kenalog 5 mg and was then injected and the needle withdrawn. The procedure was well tolerated. The patient is asked to continue to rest the area for a few more days before resuming regular activities. It may be more painful for the first 1-2 days. Watch for fever, or increased swelling or persistent pain in the joint. Call or return to clinic prn if such symptoms occur or there is failure to improve as anticipated.     Plan was reviewed with patient, who verbalized agreement and understanding of the plan

## 2018-08-27 NOTE — PATIENT INSTRUCTIONS
Wear thumb brace everyday for 6 weeks except for showering    Remove brace in 6 weeks and start gradually increasing activity    Resume Hand Therapy to work on gaining back ROM    Go to Rheumatologist as scheduled for rheumatoid workup     Renee Crape Disease: Exercises  Your Care Instructions  Here are some examples of typical rehabilitation exercises for your condition. Start each exercise slowly. Ease off the exercise if you start to have pain. Your doctor or your physical or occupational therapist will tell you when you can start these exercises and which ones will work best for you. How to do the exercises  Thumb lifts    1. Place your hand on a flat surface, with your palm up. 2. Lift your thumb away from your palm to make a \"C\" shape. 3. Hold for about 6 seconds. 4. Repeat 8 to 12 times. Passive thumb MP flexion    1. Hold your hand in front of you, and turn your hand so your little finger faces down and your thumb faces up. (Your hand should be in the position used for shaking someone's hand.) You may also rest your hand on a flat surface. 2. Use the fingers on your other hand to bend your thumb down at the point where your thumb connects to your palm. 3. Hold for at least 15 to 30 seconds. 4. Repeat 2 to 4 times. Finkelstein stretch    1. Hold your arms out in front of you. (Your hand should be in the position used for shaking someone's hand.)  2. Bend your thumb toward your palm. 3. Use your other hand to gently stretch your thumb and wrist downward until you feel the stretch on the thumb side of your wrist.  4. Hold for at least 15 to 30 seconds. 5. Repeat 2 to 4 times. Resisted ulnar deviation    For this exercise, you will need elastic exercise material, such as surgical tubing or Thera-Band. 1. Sit leaning forward with your legs slightly spread and your elbow on your thigh.   2. Grasp one end of the band with your palm down, and step on the other end with the foot opposite the hand holding the band. 3. Slowly bend your wrist sideways and away from your knee. 4. Repeat 8 to 12 times. Follow-up care is a key part of your treatment and safety. Be sure to make and go to all appointments, and call your doctor if you are having problems. It's also a good idea to know your test results and keep a list of the medicines you take. Where can you learn more? Go to http://diana-lashonda.info/. Enter X172 in the search box to learn more about \"De Quervain's Disease: Exercises. \"  Current as of: November 29, 2017  Content Version: 11.7  © 1096-5967 [employer]. Care instructions adapted under license by [employer] (which disclaims liability or warranty for this information). If you have questions about a medical condition or this instruction, always ask your healthcare professional. [address] any warranty or liability for your use of this information.

## 2018-08-29 ENCOUNTER — APPOINTMENT (OUTPATIENT)
Dept: PHYSICAL THERAPY | Age: 63
End: 2018-08-29
Payer: MEDICARE

## 2018-09-04 RX ORDER — TRIAMCINOLONE ACETONIDE 40 MG/ML
40 INJECTION, SUSPENSION INTRA-ARTICULAR; INTRAMUSCULAR ONCE
Qty: 1 ML | Refills: 0 | Status: CANCELLED
Start: 2018-09-04 | End: 2018-09-04

## 2018-09-06 ENCOUNTER — TELEPHONE (OUTPATIENT)
Dept: PAIN MANAGEMENT | Age: 63
End: 2018-09-06

## 2018-09-06 ENCOUNTER — TELEPHONE ANTICOAG (OUTPATIENT)
Dept: PAIN MANAGEMENT | Age: 63
End: 2018-09-06

## 2018-09-06 ENCOUNTER — TELEPHONE (OUTPATIENT)
Dept: FAMILY MEDICINE CLINIC | Age: 63
End: 2018-09-06

## 2018-09-06 ENCOUNTER — HOSPITAL ENCOUNTER (OUTPATIENT)
Dept: PHYSICAL THERAPY | Age: 63
End: 2018-09-06

## 2018-09-06 NOTE — LETTER
9/11/2018 4:44 PM 
 
Ms. Galvan Temple [facility] 
[address] [address] Dear Imelda Abreu I have reviewed your results and have found the results listed below The Holter Monitor results are within normal limits. Please call if you have any questions [phone] . Sincerely, [address], DO

## 2018-09-06 NOTE — TELEPHONE ENCOUNTER
He Ayoub,   Please let patient know that her Holter Monitor findings were essentially normal.     PBO

## 2018-09-06 NOTE — TELEPHONE ENCOUNTER
I called in to the [address] [place] [address] and clarified that the patient is to use Flonase 2 sprays each nostril twice a day as needed.      SEEO

## 2018-09-06 NOTE — TELEPHONE ENCOUNTER
Patient called the triage line and stated that she would like to have her muscle relaxer increased RC will not be in the office until Monday.  I will call the patient and inform her that he is not in and I will let inform him of her request.

## 2018-09-07 ENCOUNTER — OFFICE VISIT (OUTPATIENT)
Dept: FAMILY MEDICINE CLINIC | Age: 63
End: 2018-09-07

## 2018-09-07 VITALS
HEIGHT: 62 IN | SYSTOLIC BLOOD PRESSURE: 138 MMHG | DIASTOLIC BLOOD PRESSURE: 74 MMHG | RESPIRATION RATE: 14 BRPM | WEIGHT: 182 LBS | HEART RATE: 100 BPM | TEMPERATURE: 98.8 F | OXYGEN SATURATION: 99 % | BODY MASS INDEX: 33.49 KG/M2

## 2018-09-07 DIAGNOSIS — J45.901 ASTHMA WITH ACUTE EXACERBATION, UNSPECIFIED ASTHMA SEVERITY, UNSPECIFIED WHETHER PERSISTENT: Primary | ICD-10-CM

## 2018-09-07 DIAGNOSIS — J02.9 SORE THROAT: ICD-10-CM

## 2018-09-07 DIAGNOSIS — K21.9 GASTROESOPHAGEAL REFLUX DISEASE WITHOUT ESOPHAGITIS: ICD-10-CM

## 2018-09-07 LAB
S PYO AG THROAT QL: NEGATIVE
VALID INTERNAL CONTROL?: YES

## 2018-09-07 RX ORDER — SUCRALFATE 1 G/10ML
2 SUSPENSION ORAL 4 TIMES DAILY
Qty: 414 ML | Refills: 0 | Status: SHIPPED | OUTPATIENT
Start: 2018-09-07 | End: 2018-09-21 | Stop reason: SDUPTHER

## 2018-09-07 RX ORDER — IPRATROPIUM BROMIDE AND ALBUTEROL SULFATE 2.5; .5 MG/3ML; MG/3ML
3 SOLUTION RESPIRATORY (INHALATION)
Qty: 3 ML | Refills: 0
Start: 2018-09-07 | End: 2018-09-07

## 2018-09-07 RX ORDER — FLUTICASONE PROPIONATE 110 UG/1
2 AEROSOL, METERED RESPIRATORY (INHALATION) EVERY 12 HOURS
Qty: 1 INHALER | Refills: 0 | Status: SHIPPED | OUTPATIENT
Start: 2018-09-07 | End: 2018-09-21 | Stop reason: SDUPTHER

## 2018-09-07 RX ORDER — PREDNISONE 20 MG/1
40 TABLET ORAL DAILY
Qty: 6 TAB | Refills: 0 | Status: SHIPPED | OUTPATIENT
Start: 2018-09-07 | End: 2018-09-10

## 2018-09-07 NOTE — PATIENT INSTRUCTIONS
Start the sucralfate today. Keep taking the [address] [place] [address] Avenue. Schedule your visit with Dr. Angelo Mitchell. Take just 1 dose of the prednisone. If your heartburn gets worse, don't take any more! Instead go fill the prescription for the inhaled steroid. If it doesn't get worse then finish the 3 days worth    Plan to use your breathing machine every 4-6 hours while you are awake as needed for cough or shortness of breath. Keep your appointment Monday with the lung specialist.    Go to Urgent care or the ER over the weekend if you are getting worse. Using a Metered-Dose Inhaler: Care Instructions  Your Care Instructions    A metered-dose inhaler lets you breathe medicine into your lungs quickly. Inhaled medicine works faster than the same medicine in a pill. An inhaler allows you to take less medicine than you would need if you took it as a pill. \"Metered-dose\" means that the inhaler gives a measured amount of medicine each time you use it. A metered-dose inhaler gives medicine in the form of a liquid mist.  Your doctor may want you to use a spacer with your inhaler. A spacer is a chamber that you attach to the inhaler. The chamber holds the medicine before you inhale it. That way, you can inhale the medicine in as many breaths as you need. Doctors recommend using a spacer with most metered-dose inhalers, especially those with corticosteroid medicines. Follow-up care is a key part of your treatment and safety. Be sure to make and go to all appointments, and call your doctor if you are having problems. It's also a good idea to know your test results and keep a list of the medicines you take. How can you care for yourself at home? To get started using your inhaler  · Talk with your health care provider to be sure you are using your inhaler the right way. It might help if you practice using it in front of a mirror. Use the inhaler exactly as prescribed. · Check that you have the correct medicine.  If you use more than one inhaler, put a label on each one. This will let you know which one to use at the right time. · Keep track of how much medicine is in the inhaler. Check the label to see how many doses are in the container. If you know how many puffs you can take, you can replace the inhaler before you run out. Ask your health care provider how you can keep track of how much medicine is left. · Use a spacer if you have problems pressing the inhaler and breathing in at the same time. You also may need a spacer if you are using corticosteroid medicines. · If you are using a corticosteroid inhaler, gargle and rinse out your mouth with water after use. Do not swallow the water. Swallowing the water will increase the chance that the medicine will get into your bloodstream. This may make it more likely that you will have side effects. To use a spacer with an inhaler  1. Shake the inhaler. Remove the inhaler cap, and place the mouthpiece of the inhaler into the spacer. Check the inhaler instructions to see if you need to prime your inhaler before you use it. If it needs priming, follow the instructions on how to prime your inhaler. 2. Remove the cap from the spacer. 3. Hold the inhaler upright with the mouthpiece at the bottom. 4. Tilt your head back a little, and breathe out slowly and completely. 5. Place the spacer's mouthpiece in your mouth. 6. Press down on the inhaler to spray one puff of medicine into the spacer, and then start breathing in slowly. Wait to inhale until after you have pressed down on the inhaler. Some spacers have a whistle. If you hear it, you should breathe in more slowly. 7. Hold your breath for 10 seconds. This will let the medicine settle in your lungs. 8. If you need to take a second dose, wait 30 to 60 seconds to allow the inhaler valve to refill. To use an inhaler without a spacer  1. Shake the inhaler as directed. Remove the cap.  Check the instructions to see if you need to prime your inhaler before you use it. If it needs priming, follow the instructions on how to prime your inhaler. 2. Hold the inhaler upright with the mouthpiece at the bottom. 3. Tilt your head back a little, and breathe out slowly and completely. 4. Position the inhaler in one of two ways:  ¨ You can place the inhaler in your mouth. This is easier for most people. And it lowers the risk that any of the medicine will get into your eyes. ¨ Or you can place the inhaler 1 to 2 inches in front of your open mouth, without closing your lips over it. Try to open your mouth as wide as you can. Placing the inhaler in front of your open mouth may be better for getting the medicine into your lungs. But some people may find this too hard to do. 5. Start taking slow, even breaths through your mouth. Press down on the inhaler once, then inhale fully. 6. Hold your breath for 10 seconds. This will let the medicine settle in your lungs. 7. If you need to take a second dose, wait 30 to 60 seconds to allow the inhaler valve to refill. Where can you learn more? Go to http://diana-lashonda.info/. Enter K111 in the search box to learn more about \"Using a Metered-Dose Inhaler: Care Instructions. \"  Current as of: November 12, 2017  Content Version: 11.7  © 6477-3110 [employer]. Care instructions adapted under license by [employer] (which disclaims liability or warranty for this information). If you have questions about a medical condition or this instruction, always ask your healthcare professional. [place] [address] any warranty or liability for your use of this information.

## 2018-09-07 NOTE — PROGRESS NOTES
Chief Complaint   Patient presents with    Cough     began last night. Has albuterol inhaler from last time she had bronchitis two months ago. Will be seeing lung specialist on Monday.  Chest Congestion    Sore Throat       1. Have you been to the ER, urgent care clinic since your last visit? Hospitalized since your last visit? No    2. Have you seen or consulted any other health care providers outside of the [facility] since your last visit? Include any pap smears or colon screening.  No      PF: 200, 175, 250

## 2018-09-07 NOTE — MR AVS SNAPSHOT
[facility] [address] [facility] 
[phone] Patient: Samantha Burgos MRN: [id number] MSI:[id number] Visit Information Date & Time Provider Department Dept. Phone Encounter #  
 9/7/2018 10:30 AM Mao Junior MD [facility] [phone] [id number] Follow-up Instructions Return in about 2 weeks (around 9/21/2018) for GERD and asthma follow up, (30). Your Appointments 10/1/2018 11:20 AM  
Follow Up with BETO Payne [facility] (RICHAR SCHEDULING) Appt Note: Return in about 3 months (around 10/2/2018). [address] [place] [address]  
[phone] [address]  
  
    
 11/1/2018 11:00 AM  
Follow Up with Jerod Villalobos [facility] ([facility]) Appt Note: rt hand 2 mo fu  
 [facility] [address] [facility]  
[phone] [address] [place] [address] Upcoming Health Maintenance Date Due DTaP/Tdap/Td series (1 - Tdap) 3/30/1975 Influenza Age 5 to Adult 8/1/2018 MEDICARE YEARLY EXAM 10/13/2018 PAP AKA CERVICAL CYTOLOGY 6/21/2019 BREAST CANCER SCRN MAMMOGRAM 1/12/2020 COLONOSCOPY 4/13/2027 Allergies as of 9/7/2018  Review Complete On: 9/7/2018 By: Mao Junior MD  
  
 Severity Noted Reaction Type Reactions Aspirin    Other (comments) Stomach bleeding Ativan [Lorazepam]  02/21/2018    Shortness of Breath Macrobid [Nitrofurantoin Monohyd/m-cryst]  11/25/2015    Nausea and Vomiting Nsaids (Non-steroidal Anti-inflammatory Drug)    Nausea and Vomiting Opana [Oxymorphone]  11/29/2016    Other (comments) Insomnia, weight loss, nightmares Pcn [Penicillins]    Hives Current Immunizations  Never Reviewed Name Date Pneumococcal Polysaccharide (PPSV-23) 3/7/2014 Not reviewed this visit You Were Diagnosed With   
  
 Codes Comments Sore throat    -  Primary ICD-10-CM: J02.9 ICD-9-CM: 138 Gastroesophageal reflux disease without esophagitis     ICD-10-CM: K21.9 ICD-9-CM: 530.81 Wheezing     ICD-10-CM: R06.2 ICD-9-CM: 786.07 Asthma with acute exacerbation, unspecified asthma severity, unspecified whether persistent     ICD-10-CM: J45.901 ICD-9-CM: 779.66 Vitals BP Pulse Temp Resp Height(growth percentile) Weight(growth percentile) 138/74 100 98.8 °F (37.1 °C) 14 5' 2\" (1.575 m) 182 lb (82.6 kg) SpO2 PF BMI OB Status Smoking Status 99% 250 L/min 33.29 kg/m2 Hysterectomy Former Smoker Vitals History BMI and BSA Data Body Mass Index Body Surface Area  
 33.29 kg/m 2 1.9 m 2 Preferred Pharmacy Pharmacy Name Phone [id number] [place] [phone] Your Updated Medication List  
  
   
This list is accurate as of 9/7/18 12:16 PM.  Always use your most recent med list.  
  
  
  
  
 ACID GONE ANTACID  mg/15 mL suspension Generic drug:  aluminum hydrox-magnesium carb * albuterol 2.5 mg /3 mL (0.083 %) nebulizer solution Commonly known as:  PROVENTIL VENTOLIN  
2.5 mg as needed. * albuterol 90 mcg/actuation inhaler Commonly known as:  PROAIR HFA Take 2 Puffs by inhalation every four (4) hours as needed for Wheezing. albuterol-ipratropium 2.5 mg-0.5 mg/3 ml Nebu Commonly known as:  DUO-NEB  
3 mL by Nebulization route now for 1 dose. atorvastatin 40 mg tablet Commonly known as:  LIPITOR  
TAKE ONE TABLET BY MOUTH ONE TIME DAILY  
  
 baclofen 10 mg tablet Commonly known as:  LIORESAL  
TAKE ONE TABLET BY MOUTH THREE TIMES A DAY  
  
 cyclobenzaprine 10 mg tablet Commonly known as:  FLEXERIL  
TAKE 1 TABLET BY MOUTH THREE TIMES DAILY AS NEEDED FOR MUSCLE SPASMS FOR UP TO 30 DAYS. DEXILANT 60 mg Cpdb Generic drug:  Dexlansoprazole TAKE ONE CAPSULE BY MOUTH ONE TIME DAILY  
  
 ergocalciferol 50,000 unit capsule Commonly known as:  VITAMIN D2  
TAKE ONE CAPSULE BY MOUTH EVERY 7 DAYS * fluticasone 50 mcg/actuation nasal spray Commonly known as:  Kirti Reges 2 Sprays by Both Nostrils route as needed. * fluticasone 110 mcg/actuation inhaler Commonly known as:  FLOVENT HFA Take 2 Puffs by inhalation every twelve (12) hours. gabapentin 400 mg capsule Commonly known as:  NEURONTIN  
400 mg two (2) times a day. guaiFENesin-codeine 100-10 mg/5 mL solution Commonly known as:  ROBITUSSIN AC Take 5 mL by mouth three (3) times daily as needed for Cough. Max Daily Amount: 15 mL. HYDROcodone-acetaminophen 5-325 mg per tablet Commonly known as:  Margie Art Take 1-2 Tabs by mouth two (2) times daily as needed for Pain. Max Daily Amount: 4 Tabs. hydrOXYzine pamoate 50 mg capsule Commonly known as:  VISTARIL Take 1 Cap by mouth three (3) times daily as needed for Anxiety. ipratropium 0.02 % Soln Commonly known as:  ATROVENT  
0.5 mg.  
  
 losartan 25 mg tablet Commonly known as:  COZAAR  
TAKE ONE TABLET BY MOUTH ONE TIME DAILY  
  
 morphine CR 30 mg CR tablet Commonly known as:  MS CONTIN Take 1 Tab by mouth every twelve (12) hours for 30 days. Max Daily Amount: 60 mg.  
  
 naloxone 4 mg/actuation nasal spray Commonly known as:  NARCAN  
1 [place] by IntraNASal route once as needed. Use 1 spray intranasally into 1 nostril. Use a new Narcan nasal spray for subsequent doses and administer into alternating nostrils. May repeat every 2 to 3 minutes as needed. ondansetron 4 mg disintegrating tablet Commonly known as:  ZOFRAN ODT Take 1 Tab by mouth every eight (8) hours as needed for Nausea for up to 15 doses. oxybutynin 5 mg tablet Commonly known as:  LTQXUNHD  
 Take 1 Tab by mouth two (2) times a day. Indications: URINARY URGE INCONTINENCE  
  
 oxyCODONE IR 10 mg Tab immediate release tablet Commonly known as:  Penelope Ramal Take 1 Tab by mouth four (4) times daily as needed for up to 30 days. Max Daily Amount: 40 mg.  
  
 oxyCODONE-acetaminophen 7.5-325 mg per tablet Commonly known as:  PERCOCET Take 1 Tab by mouth every six (6) hours as needed for Pain (Hold if sedated. ). Max Daily Amount: 4 Tabs. polyethylene glycol 17 gram packet Commonly known as:  Debo Roni Take 1 Packet by mouth daily. predniSONE 20 mg tablet Commonly known as:  Orlean Aas Take 2 Tabs by mouth daily for 3 days. QUEtiapine 200 mg tablet Commonly known as:  SEROquel Take 200 mg by mouth daily. rizatriptan 10 mg tablet Commonly known as:  Caryle [place] May repeat in 2 hours if needed  Indications: Migraine  
  
 sertraline 100 mg tablet Commonly known as:  ZOLOFT Take 100 mg by mouth nightly as needed. sucralfate 100 mg/mL suspension Commonly known as:  Richad Mighty Take 10 mL by mouth four (4) times daily for 10 days. temazepam 15 mg capsule Commonly known as:  RESTORIL Take 1 Cap by mouth nightly as needed for Sleep. Max Daily Amount: 15 mg. Indications: INSOMNIA  
  
 traZODone 100 mg tablet Commonly known as:  Alfredo Handy Take 100 mg by mouth nightly. valACYclovir 500 mg tablet Commonly known as:  VALTREX Take 1 Tab by mouth two (2) times a day. * Notice: This list has 4 medication(s) that are the same as other medications prescribed for you. Read the directions carefully, and ask your doctor or other care provider to review them with you. Prescriptions Printed Refills  
 fluticasone (FLOVENT HFA) 110 mcg/actuation inhaler 0 Sig: Take 2 Puffs by inhalation every twelve (12) hours. Class: Print Route: Inhalation Prescriptions Sent to Pharmacy Refills sucralfate (CARAFATE) 100 mg/mL suspension 0 Sig: Take 10 mL by mouth four (4) times daily for 10 days. Class: Normal  
 Pharmacy: [facility] [address] [place] [address] Ph #: [phone] Route: Oral  
 predniSONE (DELTASONE) 20 mg tablet 0 Sig: Take 2 Tabs by mouth daily for 3 days. Class: Normal  
 Pharmacy: [facility] [address] [place] [address] Ph #: [phone] Route: Oral  
  
We Performed the Following ALBUTEROL IPRATROP NON-COMP [id number] [facility]] AMB POC RAPID STREP A [34171 CPT(R)] CA PRESSURIZED/NONPRESSURIZED INHALATION TREATMENT [id number] CPT(R)] Follow-up Instructions Return in about 2 weeks (around 9/21/2018) for GERD and asthma follow up, (30). Patient Instructions Start the sucralfate today. Keep taking the [address] [place] [address]. Schedule your visit with Dr. Elsa Singh. Take just 1 dose of the prednisone. If your heartburn gets worse, don't take any more! Instead go fill the prescription for the inhaled steroid. If it doesn't get worse then finish the 3 days worth Plan to use your breathing machine every 4-6 hours while you are awake as needed for cough or shortness of breath. Keep your appointment Monday with the lung specialist. 
 
Go to Urgent care or the ER over the weekend if you are getting worse. Using a Metered-Dose Inhaler: Care Instructions Your Care Instructions A metered-dose inhaler lets you breathe medicine into your lungs quickly. Inhaled medicine works faster than the same medicine in a pill. An inhaler allows you to take less medicine than you would need if you took it as a pill. \"Metered-dose\" means that the inhaler gives a measured amount of medicine each time you use it. A metered-dose inhaler gives medicine in the form of a liquid mist. 
Your doctor may want you to use a spacer with your inhaler.  A spacer is a chamber that you attach to the inhaler. The chamber holds the medicine before you inhale it. That way, you can inhale the medicine in as many breaths as you need. Doctors recommend using a spacer with most metered-dose inhalers, especially those with corticosteroid medicines. Follow-up care is a key part of your treatment and safety. Be sure to make and go to all appointments, and call your doctor if you are having problems. It's also a good idea to know your test results and keep a list of the medicines you take. How can you care for yourself at home? To get started using your inhaler · Talk with your health care provider to be sure you are using your inhaler the right way. It might help if you practice using it in front of a mirror. Use the inhaler exactly as prescribed. · Check that you have the correct medicine. If you use more than one inhaler, put a label on each one. This will let you know which one to use at the right time. · Keep track of how much medicine is in the inhaler. Check the label to see how many doses are in the container. If you know how many puffs you can take, you can replace the inhaler before you run out. Ask your health care provider how you can keep track of how much medicine is left. · Use a spacer if you have problems pressing the inhaler and breathing in at the same time. You also may need a spacer if you are using corticosteroid medicines. · If you are using a corticosteroid inhaler, gargle and rinse out your mouth with water after use. Do not swallow the water. Swallowing the water will increase the chance that the medicine will get into your bloodstream. This may make it more likely that you will have side effects. To use a spacer with an inhaler 1. Shake the inhaler. Remove the inhaler cap, and place the mouthpiece of the inhaler into the spacer. Check the inhaler instructions to see if you need to prime your inhaler before you use it.  If it needs priming, follow the instructions on how to prime your inhaler. 2. Remove the cap from the spacer. 3. Hold the inhaler upright with the mouthpiece at the bottom. 4. Tilt your head back a little, and breathe out slowly and completely. 5. Place the spacer's mouthpiece in your mouth. 6. Press down on the inhaler to spray one puff of medicine into the spacer, and then start breathing in slowly. Wait to inhale until after you have pressed down on the inhaler. Some spacers have a whistle. If you hear it, you should breathe in more slowly. 7. Hold your breath for 10 seconds. This will let the medicine settle in your lungs. 8. If you need to take a second dose, wait 30 to 60 seconds to allow the inhaler valve to refill. To use an inhaler without a spacer 1. Shake the inhaler as directed. Remove the cap. Check the instructions to see if you need to prime your inhaler before you use it. If it needs priming, follow the instructions on how to prime your inhaler. 2. Hold the inhaler upright with the mouthpiece at the bottom. 3. Tilt your head back a little, and breathe out slowly and completely. 4. Position the inhaler in one of two ways: 
¨ You can place the inhaler in your mouth. This is easier for most people. And it lowers the risk that any of the medicine will get into your eyes. ¨ Or you can place the inhaler 1 to 2 inches in front of your open mouth, without closing your lips over it. Try to open your mouth as wide as you can. Placing the inhaler in front of your open mouth may be better for getting the medicine into your lungs. But some people may find this too hard to do. 5. Start taking slow, even breaths through your mouth. Press down on the inhaler once, then inhale fully. 6. Hold your breath for 10 seconds. This will let the medicine settle in your lungs. 7. If you need to take a second dose, wait 30 to 60 seconds to allow the inhaler valve to refill. Where can you learn more? Go to http://diana-lashonda.info/. Enter K111 in the search box to learn more about \"Using a Metered-Dose Inhaler: Care Instructions. \" Current as of: November 12, 2017 Content Version: 11.7 © 4616-0957 [employer]. Care instructions adapted under license by [employer] (which disclaims liability or warranty for this information). If you have questions about a medical condition or this instruction, always ask your healthcare professional. [address] any warranty or liability for your use of this information. Introducing [facility]! Dear Frank Cowan: Thank you for requesting a [employer] account. Our records indicate that you already have an active [employer] account. You can access your account anytime at [email or web address] Did you know that you can access your hospital and ER discharge instructions at any time in [employer]? You can also review all of your test results from your hospital stay or ER visit. Additional Information If you have questions, please visit the Frequently Asked Questions section of the [employer] website at [email or web address] Remember, [employer] is NOT to be used for urgent needs. For medical emergencies, dial 911. Now available from your iPhone and Android! Please provide this summary of care documentation to your next provider. Your primary care clinician is listed as Criselda Corbett. If you have any questions after today's visit, please call [phone].

## 2018-09-07 NOTE — PROGRESS NOTES
Katherine Peter is a 58 y.o. female who was seen in clinic today (9/7/2018). Assessment & Plan:       ICD-10-CM ICD-9-CM    1. Asthma with acute exacerbation, unspecified asthma severity, unspecified whether persistent  J45.901 493.92 ALBUTEROL IPRATROP NON-COMP      [place] PRESSURIZED/NONPRESSURIZED INHALATION TREATMENT      albuterol-ipratropium (DUO-NEB) 2.5 mg-0.5 mg/3 ml nebu      predniSONE (DELTASONE) 20 mg tablet      fluticasone (FLOVENT HFA) 110 mcg/actuation inhaler   2. Sore throat J02.9 462 AMB POC RAPID STREP A   3. Gastroesophageal reflux disease without esophagitis K21.9 530.81 sucralfate (CARAFATE) 100 mg/mL suspension     Recurrent wheezing = asthma. Triggered by GERD    Start the sucralfate today. Keep taking the [address] [place] [address] Avenue. Schedule your visit with Dr. Jonathon Hercules. Take just 1 dose of the prednisone. If your heartburn gets worse, don't take any more! Instead go fill the prescription for the inhaled steroid. If it doesn't get worse then finish the 3 days worth    Plan to use your breathing machine every 4-6 hours while you are awake as needed for cough or shortness of breath. Keep your appointment Monday with the lung specialist.    Go to Urgent care or the ER over the weekend if you are getting worse. Follow-up Disposition:  Return in about 2 weeks (around 9/21/2018) for GERD and asthma follow up, (30). Subjective:   Katherine Peter was seen today for Cough and Sore Throat    Patient of Dr. Evert Cole    Onset of dry cough and sore throat last night. Reports dx with \"bronchitis\" 2 years ago with attacks q1-2 months  Was doing well until last night when experienced exacerbation of GERD with severe heartburn symptoms which triggered coughing, recurrent issue    Single use of NIDIA this morning without clinical change  Uses nebulizer as needed. None with this episode. No trial controller agent. Scheduled to see pulm Monday.     Called EMS this morning, assessed and advised to be [facility], not ER. GERD: compliant with PPI, no trigger foods yesterday. Sees Dr. Marylen Rainwater approx q6 months, due for follow up. Uncontrolled. No relief with addition of Maalox/Mylanta last night. Throat burning. His notes are reviewed from earlier this year. Well controlled then. EGD without esophagitis    Results for orders placed or performed in visit on 09/07/18   AMB POC RAPID STREP A   Result Value Ref Range    VALID INTERNAL CONTROL POC Yes     Group A Strep Ag Negative Negative        Outpatient Prescriptions Marked as Taking for the 9/7/18 encounter (Office Visit) with Christina Larios MD   Medication Sig Dispense Refill    polyethylene glycol (MIRALAX) 17 gram packet Take 1 Packet by mouth daily. 30 Each 6    fluticasone (FLONASE) 50 mcg/actuation nasal spray 2 Sprays by Both Nostrils route as needed. 1 Bottle 6    DEXILANT 60 mg CpDB TAKE ONE CAPSULE BY MOUTH ONE TIME DAILY 30 Cap 2    cyclobenzaprine (FLEXERIL) 10 mg tablet TAKE 1 TABLET BY MOUTH THREE TIMES DAILY AS NEEDED FOR MUSCLE SPASMS FOR UP TO 30 DAYS. 90 Tab 1    atorvastatin (LIPITOR) 40 mg tablet TAKE ONE TABLET BY MOUTH ONE TIME DAILY 90 Tab 3    albuterol (PROAIR HFA) 90 mcg/actuation inhaler Take 2 Puffs by inhalation every four (4) hours as needed for Wheezing. 1 Inhaler 3    rizatriptan (MAXALT) 10 mg tablet May repeat in 2 hours if needed  Indications: Migraine 9 Tab 3    losartan (COZAAR) 25 mg tablet TAKE ONE TABLET BY MOUTH ONE TIME DAILY 90 Tab 1    oxyCODONE-acetaminophen (PERCOCET) 7.5-325 mg per tablet Take 1 Tab by mouth every six (6) hours as needed for Pain (Hold if sedated. ). Max Daily Amount: 4 Tabs. 28 Tab 0    morphine CR (MS CONTIN) 30 mg CR tablet Take 1 Tab by mouth every twelve (12) hours for 30 days. Max Daily Amount: 60 mg. 60 Tab 0    oxyCODONE IR (ROXICODONE) 10 mg tab immediate release tablet Take 1 Tab by mouth four (4) times daily as needed for up to 30 days.  Max Daily Amount: 40 mg. 120 Tab 0  oxybutynin (DITROPAN) 5 mg tablet Take 1 Tab by mouth two (2) times a day. Indications: URINARY URGE INCONTINENCE 60 Tab 0    valACYclovir (VALTREX) 500 mg tablet Take 1 Tab by mouth two (2) times a day. 60 Tab 3    hydrOXYzine pamoate (VISTARIL) 50 mg capsule Take 1 Cap by mouth three (3) times daily as needed for Anxiety. 90 Cap 1    ACID GONE ANTACID  mg/15 mL suspension   0    ondansetron (ZOFRAN ODT) 4 mg disintegrating tablet Take 1 Tab by mouth every eight (8) hours as needed for Nausea for up to 15 doses. 15 Tab 0    HYDROcodone-acetaminophen (NORCO) 5-325 mg per tablet Take 1-2 Tabs by mouth two (2) times daily as needed for Pain. Max Daily Amount: 4 Tabs. 30 Tab 0    ergocalciferol (VITAMIN D2) 50,000 unit capsule TAKE ONE CAPSULE BY MOUTH EVERY 7 DAYS 12 Cap 3    QUEtiapine (SEROQUEL) 200 mg tablet Take 200 mg by mouth daily.  naloxone (NARCAN) 4 mg/actuation nasal spray 1 [place] by IntraNASal route once as needed. Use 1 spray intranasally into 1 nostril. Use a new Narcan nasal spray for subsequent doses and administer into alternating nostrils. May repeat every 2 to 3 minutes as needed.  temazepam (RESTORIL) 15 mg capsule Take 1 Cap by mouth nightly as needed for Sleep. Max Daily Amount: 15 mg. Indications: INSOMNIA 30 Cap 1    albuterol (PROVENTIL VENTOLIN) 2.5 mg /3 mL (0.083 %) nebulizer solution 2.5 mg as needed.  gabapentin (NEURONTIN) 400 mg capsule 400 mg two (2) times a day.  ipratropium (ATROVENT) 0.02 % nebulizer solution 0.5 mg.      trazodone (DESYREL) 100 mg tablet Take 100 mg by mouth nightly.  sertraline (ZOLOFT) 100 mg tablet Take 100 mg by mouth nightly as needed.          Patient Active Problem List    Diagnosis    Gastroesophageal reflux disease without esophagitis    Urinary retention    Gastroesophageal reflux disease with esophagitis    Vitamin D deficiency    Benign essential hypertension with target blood pressure below 140/90    Myalgia    Nausea    Hypercholesterolemia    ACP (advance care planning)    Lung nodule seen on imaging study    Degeneration of lumbar or lumbosacral intervertebral disc    Displacement of lumbar intervertebral disc without myelopathy    Pain in joint, multiple sites    Cervicalgia    Postlaminectomy syndrome, cervical region    Annular tear of lumbar disc    Chronic pain syndrome    Carpal tunnel syndrome    Bipolar 1 disorder (HCC)    Lumbago    Other affections of shoulder region, not elsewhere classified    Other chronic pain         Allergies   Allergen Reactions    Aspirin Other (comments)     Stomach bleeding    Ativan [Lorazepam] Shortness of Breath    Macrobid [Nitrofurantoin Monohyd/M-Cryst] Nausea and Vomiting    Nsaids (Non-Steroidal Anti-Inflammatory Drug) Nausea and Vomiting    Opana [Oxymorphone] Other (comments)     Insomnia, weight loss, nightmares    Pcn [Penicillins] Hives         Patient Care Team:  Erik Rice DO as PCP - General (Internal Medicine)  Dionicio Ho MD as Consulting Provider (Neurosurgery)  ROBYN White MD (Gastroenterology)  Loi Lozano MD as Surgeon (General Surgery)  Jeff Saavedra RN as Ambulatory Care Navigator  Earline Sesay RN as Ambulatory Care Navigator (Internal Medicine)    The following sections were reviewed & updated as appropriate: PMH, PSH, FH, and SH. Review of Systems   Constitutional: Negative for fever. Respiratory: Negative for hemoptysis, sputum production, shortness of breath and wheezing. Gastrointestinal: Negative for vomiting. Objective:     Visit Vitals    /74    Pulse 100    Temp 98.8 °F (37.1 °C)    Resp 14    Ht 5' 2\" (1.575 m)    Wt 182 lb (82.6 kg)    SpO2 99%     L/min  Comment: post neb-250, 225, 200    BMI 33.29 kg/m2    predicted     Physical Exam   Constitutional: She is oriented to person, place, and time. She appears well-developed.  No distress. Pleasant obese uncomfortable appearing black female   HENT:   Head: Normocephalic and atraumatic. Mouth/Throat: Oropharynx is clear and moist.   Eyes: Conjunctivae are normal.   Neck: Neck supple. Cardiovascular: Regular rhythm. Tachycardia present. Exam reveals no gallop and no friction rub. No murmur heard. Pulmonary/Chest: Effort normal. She has no wheezes. She has no rales. Decreased air movement. Deep breathing triggers cough   Lymphadenopathy:     She has no cervical adenopathy. Neurological: She is alert and oriented to person, place, and time. Psychiatric: She has a normal mood and affect. Her behavior is normal. Judgment and thought content normal.     Reports modest clinical improvement with neb    Disclaimer: The patient understands our medical plan. Alternatives have been explained and offered. The risks, benefits and significant side effects of all medications have been reviewed. Anticipated time course and progression of condition reviewed. All questions have been addressed. She is encouraged to employ the information provided in the after visit summary, which was reviewed. Where appropriate, she is instructed to call the clinic if she has not been notified either by phone or through [address] with the results of her tests or with an appointment plan for any referrals within 1 week(s). No news is not good news; it's no news. The patient  is to call if her condition worsens or fails to improve or if significant side effects are experienced. Aspects of this note may have been generated using voice recognition software. Despite editing, there may be unrecognized errors.        Jenniffer Cunningham MD

## 2018-09-11 ENCOUNTER — TELEPHONE (OUTPATIENT)
Dept: PAIN MANAGEMENT | Age: 63
End: 2018-09-11

## 2018-09-11 RX ORDER — CYCLOBENZAPRINE HCL 10 MG
TABLET ORAL
Qty: 90 TAB | Refills: 2 | Status: SHIPPED | OUTPATIENT
Start: 2018-09-11 | End: 2018-10-01 | Stop reason: SDUPTHER

## 2018-09-11 NOTE — TELEPHONE ENCOUNTER
Megan Toscano has called requesting a refill of their controlled medication, morphine and oxycodone, for the management of chronic low back pain. Last office visit date: 7/2/18 with Mustapha Kirk, next 10/1/18 with Mustapha Kirk    Date last  was pulled and reviewed : 9/11/18 last filled 8/6/ for morphine and 8/25/18 for oxycodone    Was the patient compliant when the above report was pulled? yes    Analgesia: 70%    Aberrancies: none    ADL's: yes with asst    Adverse Reaction: no    Provider's last note and plan of care reviewed? yes  Request forwarded to provider for review.

## 2018-09-21 ENCOUNTER — OFFICE VISIT (OUTPATIENT)
Dept: FAMILY MEDICINE CLINIC | Age: 63
End: 2018-09-21

## 2018-09-21 VITALS
RESPIRATION RATE: 15 BRPM | SYSTOLIC BLOOD PRESSURE: 104 MMHG | WEIGHT: 182 LBS | DIASTOLIC BLOOD PRESSURE: 64 MMHG | TEMPERATURE: 98.1 F | HEART RATE: 67 BPM | BODY MASS INDEX: 33.49 KG/M2 | OXYGEN SATURATION: 95 % | HEIGHT: 62 IN

## 2018-09-21 DIAGNOSIS — M25.50 PAIN IN JOINT, MULTIPLE SITES: ICD-10-CM

## 2018-09-21 DIAGNOSIS — Z23 ENCOUNTER FOR IMMUNIZATION: ICD-10-CM

## 2018-09-21 DIAGNOSIS — J45.901 ASTHMA WITH ACUTE EXACERBATION, UNSPECIFIED ASTHMA SEVERITY, UNSPECIFIED WHETHER PERSISTENT: Primary | ICD-10-CM

## 2018-09-21 DIAGNOSIS — K21.9 GASTROESOPHAGEAL REFLUX DISEASE WITHOUT ESOPHAGITIS: ICD-10-CM

## 2018-09-21 DIAGNOSIS — G89.29 OTHER CHRONIC PAIN: ICD-10-CM

## 2018-09-21 DIAGNOSIS — F31.9 BIPOLAR 1 DISORDER (HCC): ICD-10-CM

## 2018-09-21 RX ORDER — MONTELUKAST SODIUM 10 MG/1
10 TABLET ORAL DAILY
COMMUNITY
Start: 2018-09-10 | End: 2019-01-04

## 2018-09-21 RX ORDER — SUCRALFATE 1 G/10ML
2 SUSPENSION ORAL 4 TIMES DAILY
Qty: 414 ML | Refills: 0 | Status: SHIPPED | OUTPATIENT
Start: 2018-09-21 | End: 2018-11-15

## 2018-09-21 RX ORDER — FLUTICASONE PROPIONATE 110 UG/1
2 AEROSOL, METERED RESPIRATORY (INHALATION) EVERY 12 HOURS
Qty: 1 INHALER | Refills: 0 | Status: SHIPPED | OUTPATIENT
Start: 2018-09-21 | End: 2019-01-04

## 2018-09-21 NOTE — PROGRESS NOTES
Chief Complaint   Patient presents with    GERD    Asthma    Toe Pain     c/o right toes being numb    Abdominal Pain     1. Have you been to the ER, urgent care clinic since your last visit? Hospitalized since your last visit? No    2. Have you seen or consulted any other health care providers outside of the [facility] since your last visit? Include any pap smears or colon screening.  No    , 350, 300

## 2018-09-21 NOTE — MR AVS SNAPSHOT
[facility] [address] [place] [address] 
[phone] Patient: Samantha Burgos MRN: [id number] XIT:2/32/0627 Visit Information Date & Time Provider Department Dept. Phone Encounter #  
 9/21/2018 11:00 AM Mao Junior MD [facility] [phone] [id number] Follow-up Instructions Return for stomach issues with Dr. Mayra Bustillos, asthma with Dr. Paul Padron, see me for toe concerns. Your Appointments 10/1/2018 11:20 AM  
Follow Up with BETO Payne [facility] (RICHAR SCHEDULING) Appt Note: Return in about 3 months (around 10/2/2018). [address] [address]  
[phone] [address] [facility]  
  
    
 11/1/2018 11:00 AM  
Follow Up with Jerod Villalobos, [address] ([facility]) Appt Note: rt hand 2 mo fu  
 [facility] [address] [id number] [address] [place] [address], [facility] [address] Upcoming Health Maintenance Date Due DTaP/Tdap/Td series (1 - Tdap) 3/30/1975 Influenza Age 5 to Adult 8/1/2018 MEDICARE YEARLY EXAM 10/13/2018 PAP AKA CERVICAL CYTOLOGY 6/21/2019 BREAST CANCER SCRN MAMMOGRAM 1/12/2020 COLONOSCOPY 4/13/2027 Allergies as of 9/21/2018  Review Complete On: 9/21/2018 By: Mao Junior MD  
  
 Severity Noted Reaction Type Reactions Aspirin    Other (comments) Stomach bleeding Ativan [Lorazepam]  02/21/2018    Shortness of Breath Macrobid [Nitrofurantoin Monohyd/m-cryst]  11/25/2015    Nausea and Vomiting Nsaids (Non-steroidal Anti-inflammatory Drug)    Nausea and Vomiting Opana [Oxymorphone]  11/29/2016    Other (comments) Insomnia, weight loss, nightmares Pcn [Penicillins]    Hives Current Immunizations  Never Reviewed Name Date Pneumococcal Polysaccharide (PPSV-23) 3/7/2014 Not reviewed this visit You Were Diagnosed With   
  
 Codes Comments Asthma with acute exacerbation, unspecified asthma severity, unspecified whether persistent     ICD-10-CM: J45.901 ICD-9-CM: 653.19 Gastroesophageal reflux disease without esophagitis     ICD-10-CM: K21.9 ICD-9-CM: 530.81 Vitals BP Pulse Temp Resp Height(growth percentile) Weight(growth percentile) 104/64 67 98.1 °F (36.7 °C) 15 5' 2\" (1.575 m) 182 lb (82.6 kg) SpO2 PF BMI OB Status Smoking Status 95% 350 L/min 33.29 kg/m2 Hysterectomy Former Smoker BMI and BSA Data Body Mass Index Body Surface Area  
 33.29 kg/m 2 1.9 m 2 Preferred Pharmacy Pharmacy Name Phone [facility] [id number], [facility] [phone] Your Updated Medication List  
  
   
This list is accurate as of 9/21/18 11:56 AM.  Always use your most recent med list.  
  
  
  
  
 ACID GONE ANTACID  mg/15 mL suspension Generic drug:  aluminum hydrox-magnesium carb * albuterol 2.5 mg /3 mL (0.083 %) nebulizer solution Commonly known as:  PROVENTIL VENTOLIN  
2.5 mg as needed. * albuterol 90 mcg/actuation inhaler Commonly known as:  PROAIR HFA Take 2 Puffs by inhalation every four (4) hours as needed for Wheezing. atorvastatin 40 mg tablet Commonly known as:  LIPITOR  
TAKE ONE TABLET BY MOUTH ONE TIME DAILY  
  
 baclofen 10 mg tablet Commonly known as:  LIORESAL  
TAKE ONE TABLET BY MOUTH THREE TIMES A DAY  
  
 cyclobenzaprine 10 mg tablet Commonly known as:  FLEXERIL  
TAKE 1 TABLET BY MOUTH THREE TIMES DAILY AS NEEDED FOR MUSCLE SPASMS FOR UP TO 30 DAYS. DEXILANT 60 mg Cpdb Generic drug:  Dexlansoprazole TAKE ONE CAPSULE BY MOUTH ONE TIME DAILY  
  
 ergocalciferol 50,000 unit capsule Commonly known as:  VITAMIN D2  
TAKE ONE CAPSULE BY MOUTH EVERY 7 DAYS * fluticasone 50 mcg/actuation nasal spray Commonly known as:  Larson Blizzard 2 Sprays by Both Nostrils route as needed. * fluticasone 110 mcg/actuation inhaler Commonly known as:  FLOVENT HFA Take 2 Puffs by inhalation every twelve (12) hours. Contact Dr. Wagner [facility] office for further refills  
  
 gabapentin 400 mg capsule Commonly known as:  NEURONTIN  
400 mg two (2) times a day. guaiFENesin-codeine 100-10 mg/5 mL solution Commonly known as:  ROBITUSSIN AC Take 5 mL by mouth three (3) times daily as needed for Cough. Max Daily Amount: 15 mL. HYDROcodone-acetaminophen 5-325 mg per tablet Commonly known as:  Birda Kings Take 1-2 Tabs by mouth two (2) times daily as needed for Pain. Max Daily Amount: 4 Tabs. hydrOXYzine pamoate 50 mg capsule Commonly known as:  VISTARIL Take 1 Cap by mouth three (3) times daily as needed for Anxiety. ipratropium 0.02 % Soln Commonly known as:  ATROVENT  
0.5 mg.  
  
 losartan 25 mg tablet Commonly known as:  COZAAR  
TAKE ONE TABLET BY MOUTH ONE TIME DAILY  
  
 montelukast 10 mg tablet Commonly known as:  SINGULAIR  
  
 morphine CR 30 mg CR tablet Commonly known as:  MS CONTIN Take 1 Tab by mouth every twelve (12) hours for 30 days. Max Daily Amount: 60 mg.  
  
 naloxone 4 mg/actuation nasal spray Commonly known as:  NARCAN  
1 [place] by IntraNASal route once as needed. Use 1 spray intranasally into 1 nostril. Use a new Narcan nasal spray for subsequent doses and administer into alternating nostrils. May repeat every 2 to 3 minutes as needed. ondansetron 4 mg disintegrating tablet Commonly known as:  ZOFRAN ODT Take 1 Tab by mouth every eight (8) hours as needed for Nausea for up to 15 doses. oxybutynin 5 mg tablet Commonly known as:  UFLMMWDI Take 1 Tab by mouth two (2) times a day. Indications: URINARY URGE INCONTINENCE  
  
 oxyCODONE-acetaminophen 7.5-325 mg per tablet Commonly known as:  PERCOCET  
 Take 1 Tab by mouth every six (6) hours as needed for Pain (Hold if sedated. ). Max Daily Amount: 4 Tabs. polyethylene glycol 17 gram packet Commonly known as:  Carmen Prachi Take 1 Packet by mouth daily. QUEtiapine 200 mg tablet Commonly known as:  SEROquel Take 200 mg by mouth daily. rizatriptan 10 mg tablet Commonly known as:  Jenny Prier May repeat in 2 hours if needed  Indications: Migraine  
  
 sertraline 100 mg tablet Commonly known as:  ZOLOFT Take 100 mg by mouth nightly as needed. sucralfate 100 mg/mL suspension Commonly known as:  Maybeury Bi Take 10 mL by mouth four (4) times daily. Contact Dr. April Obrien office for guidance on further refills  
  
 temazepam 15 mg capsule Commonly known as:  RESTORIL Take 1 Cap by mouth nightly as needed for Sleep. Max Daily Amount: 15 mg. Indications: INSOMNIA  
  
 traZODone 100 mg tablet Commonly known as:  Karen Yanira Take 100 mg by mouth nightly. valACYclovir 500 mg tablet Commonly known as:  VALTREX Take 1 Tab by mouth two (2) times a day. * Notice: This list has 4 medication(s) that are the same as other medications prescribed for you. Read the directions carefully, and ask your doctor or other care provider to review them with you. Prescriptions Sent to Pharmacy Refills  
 fluticasone (FLOVENT HFA) 110 mcg/actuation inhaler 0 Sig: Take 2 Puffs by inhalation every twelve (12) hours. Contact Dr. Adam Castro office for further refills Class: Normal  
 [facility]acy: [facility] [address] Ph #: [phone] Route: Inhalation  
 sucralfate (CARAFATE) 100 mg/mL suspension 0 Sig: Take 10 mL by mouth four (4) times daily. Contact Dr. April Obrien office for guidance on further refills Class: Normal  
 [facility]acy: [facility] [address] Ph #: [phone] Route: Oral  
  
Follow-up Instructions Return for stomach issues with Dr. Rosangela Vanessa, asthma with Dr. Tierney Carrasco, see me for toe concerns. To-Do List   
 09/25/2018 2:00 PM  
  Appointment with Poncho Garcia OT at [facility]  [facility] ([phone]) Patient Instructions Please call the clinic if you haven't received a call with the results of your tests or with an appointment plan for any referrals/studies within one week. No news is not good news; it's no news. Please review the list of your current medications (name, formulation, strength and dosing instructions) and allergies and call us if there is an error. It is good practice to bring all of your prescriptions, over the counter medications and herbal supplements to each visit. Learn what conditions your medications are treating. Know which doctor is responsible for managing which condition. If you are needing of more medication, contact the office of the doctor managing that condition. It is my practice to ensure that any prescription generated has a large enough supply with enough refills to last you through and beyond the time I am next expecting to see you. If your medications are running low, contact your pharmacy for a refill. If there are no refills remaining, that means it is time for you to see me to reassess the status of your condition and whether the medication is still appropriate. Introducing [facility]! Dear Bridger Almaguer: Thank you for requesting a [employer] account. Our records indicate that you already have an active [employer] account. You can access your account anytime at [email or web address] Did you know that you can access your hospital and ER discharge instructions at any time in [employer]? You can also review all of your test results from your hospital stay or ER visit. Additional Information If you have questions, please visit the Frequently Asked Questions section of the [employer] website at [email or web address] Remember, [employer] is NOT to be used for urgent needs. For medical emergencies, dial 911. Now available from your iPhone and Android! Please provide this summary of care documentation to your next provider. Your primary care clinician is listed as Pam Kelly. If you have any questions after today's visit, please call [phone].

## 2018-09-21 NOTE — PROGRESS NOTES
Verneda Spatz is a 58 y.o. female who was seen in clinic today (9/21/2018). Assessment & Plan:       ICD-10-CM ICD-9-CM    1. Asthma with acute exacerbation, unspecified asthma severity, unspecified whether persistent  J45.901 493.92 fluticasone (FLOVENT HFA) 110 mcg/actuation inhaler   2. Gastroesophageal reflux disease without esophagitis K21.9 530.81 sucralfate (CARAFATE) 100 mg/mL suspension   3. Encounter for immunization Z23 V03.89 INFLUENZA VIRUS VAC QUAD,SPLIT,PRESV FREE SYRINGE IM   4. Bipolar 1 disorder (HCC) F31.9 296.7    5. Pain in joint, multiple sites M25.50 719.49    6. Other chronic pain G89.29 338.29      Asthma: Improving. Continue montelukast, ICS and NIDIA pending PFTs and follow up with Dr. Christian Felipe. Defering to his management. Should have enough medication to last until planned follow up. Seek refills through his office if needed. GERD: encouraged communication with Dr. Meghana Harris office. Electing to continue carafate pending alternative plan. Should have enough medication to last until planned follow up. Seek refills through his office if needed. Considering transferring care to me as PCM. Reviewed office expectations and workflows with good understanding. Bipolar: Being evaluated/managed approximately by ROBYN Dean. No acute findings today meriting change in management plan. Chronic pain: understands I will not assume management     Requesting reports from care team members        Follow-up Disposition:  Return for stomach issues with Dr. Jed Klein, asthma with Dr. Christian Felipe, see me for toe concerns. Subjective:   Verneda Spatz was seen today for GERD; Asthma; Toe Pain (c/o right toes being numb); and Abdominal Pain      FU GERD: saw Russell Johnston NP, affiliated with Dr. Jed Klein, on Monday. Was given Zantac to take at hs to augment the PPI and follow up plan for 4 weeks. Still with abdominal pain that disrupts sleep.  She asked about a GI cocktail and was told that she'd consult with Dr. Terri Corbett. Has not yet heard back. Not sure that carafate helped. Reports not available for review at the time of our visit. FU cough/asthma/reactive airways:  Reports improvement with inhalers. Saw Dr. Cristian Goncalves who prescribed montelukast and recommended PFTs. Using the albuterol 1-2x/week. What do I do with the inhalers? Reports not available for review at the time of our visit. Requesting I become her PCM. Care team updated. BETO Alexis for chronic pain management  NP Lindalou Goldberg for bipolar disorder      Outpatient Prescriptions Marked as Taking for the 9/21/18 encounter (Office Visit) with Amy Victor MD   Medication Sig Dispense Refill    cyclobenzaprine (FLEXERIL) 10 mg tablet TAKE 1 TABLET BY MOUTH THREE TIMES DAILY AS NEEDED FOR MUSCLE SPASMS FOR UP TO 30 DAYS. 90 Tab 2    fluticasone (FLOVENT HFA) 110 mcg/actuation inhaler Take 2 Puffs by inhalation every twelve (12) hours. 1 Inhaler 0    polyethylene glycol (MIRALAX) 17 gram packet Take 1 Packet by mouth daily. 30 Each 6    fluticasone (FLONASE) 50 mcg/actuation nasal spray 2 Sprays by Both Nostrils route as needed. 1 Bottle 6    DEXILANT 60 mg CpDB TAKE ONE CAPSULE BY MOUTH ONE TIME DAILY 30 Cap 2    atorvastatin (LIPITOR) 40 mg tablet TAKE ONE TABLET BY MOUTH ONE TIME DAILY 90 Tab 3    albuterol (PROAIR HFA) 90 mcg/actuation inhaler Take 2 Puffs by inhalation every four (4) hours as needed for Wheezing. 1 Inhaler 3    rizatriptan (MAXALT) 10 mg tablet May repeat in 2 hours if needed  Indications: Migraine 9 Tab 3    losartan (COZAAR) 25 mg tablet TAKE ONE TABLET BY MOUTH ONE TIME DAILY 90 Tab 1    oxyCODONE-acetaminophen (PERCOCET) 7.5-325 mg per tablet Take 1 Tab by mouth every six (6) hours as needed for Pain (Hold if sedated. ). Max Daily Amount: 4 Tabs. 28 Tab 0    morphine CR (MS CONTIN) 30 mg CR tablet Take 1 Tab by mouth every twelve (12) hours for 30 days.  Max Daily Amount: 60 mg. 60 Tab 0    oxybutynin (DITROPAN) 5 mg tablet Take 1 Tab by mouth two (2) times a day. Indications: URINARY URGE INCONTINENCE 60 Tab 0    valACYclovir (VALTREX) 500 mg tablet Take 1 Tab by mouth two (2) times a day. 60 Tab 3    hydrOXYzine pamoate (VISTARIL) 50 mg capsule Take 1 Cap by mouth three (3) times daily as needed for Anxiety. 90 Cap 1    ACID GONE ANTACID  mg/15 mL suspension   0    ondansetron (ZOFRAN ODT) 4 mg disintegrating tablet Take 1 Tab by mouth every eight (8) hours as needed for Nausea for up to 15 doses. 15 Tab 0    HYDROcodone-acetaminophen (NORCO) 5-325 mg per tablet Take 1-2 Tabs by mouth two (2) times daily as needed for Pain. Max Daily Amount: 4 Tabs. 30 Tab 0    ergocalciferol (VITAMIN D2) 50,000 unit capsule TAKE ONE CAPSULE BY MOUTH EVERY 7 DAYS 12 Cap 3    baclofen (LIORESAL) 10 mg tablet TAKE ONE TABLET BY MOUTH THREE TIMES A DAY 90 Tab 5    QUEtiapine (SEROQUEL) 200 mg tablet Take 200 mg by mouth daily.  naloxone (NARCAN) 4 mg/actuation nasal spray 1 [place] by IntraNASal route once as needed. Use 1 spray intranasally into 1 nostril. Use a new Narcan nasal spray for subsequent doses and administer into alternating nostrils. May repeat every 2 to 3 minutes as needed.  temazepam (RESTORIL) 15 mg capsule Take 1 Cap by mouth nightly as needed for Sleep. Max Daily Amount: 15 mg. Indications: INSOMNIA 30 Cap 1    albuterol (PROVENTIL VENTOLIN) 2.5 mg /3 mL (0.083 %) nebulizer solution 2.5 mg as needed.  gabapentin (NEURONTIN) 400 mg capsule 400 mg two (2) times a day.  ipratropium (ATROVENT) 0.02 % nebulizer solution 0.5 mg.      trazodone (DESYREL) 100 mg tablet Take 100 mg by mouth nightly.  sertraline (ZOLOFT) 100 mg tablet Take 100 mg by mouth nightly as needed.          Patient Active Problem List    Diagnosis    Gastroesophageal reflux disease without esophagitis    Urinary retention    Gastroesophageal reflux disease with esophagitis    Vitamin D deficiency    Benign essential hypertension with target blood pressure below 140/90    Myalgia    Nausea    Hypercholesterolemia    ACP (advance care planning)    Lung nodule seen on imaging study    Degeneration of lumbar or lumbosacral intervertebral disc    Displacement of lumbar intervertebral disc without myelopathy    Pain in joint, multiple sites    Cervicalgia    Postlaminectomy syndrome, cervical region    Annular tear of lumbar disc    Chronic pain syndrome    Carpal tunnel syndrome    Bipolar 1 disorder (HCC)    Lumbago    Other affections of shoulder region, not elsewhere classified    Other chronic pain         Allergies   Allergen Reactions    Aspirin Other (comments)     Stomach bleeding    Ativan [Lorazepam] Shortness of Breath    Macrobid [Nitrofurantoin Monohyd/M-Cryst] Nausea and Vomiting    Nsaids (Non-Steroidal Anti-Inflammatory Drug) Nausea and Vomiting    Opana [Oxymorphone] Other (comments)     Insomnia, weight loss, nightmares    Pcn [Penicillins] Hives         Patient Care Team:  [id number] Agapito Malagon DO as PCP - General (Internal Medicine)  Lilo Marr MD (Gastroenterology)  Eboni Avery, RN as Ambulatory Care Navigator  Afia Mercer RN as Ambulatory Care Navigator (Internal Medicine)  BETO Estrada (Pain Management)  Sharmila Schilling NP as Nurse Practitioner (Psychiatry)  Sommer Epstein MD (Pulmonary Disease)    The following sections were reviewed & updated as appropriate: PMH, PSH, FH, and SH. ROS - per HPI       Objective:     Visit Vitals    /64    Pulse 67    Temp 98.1 °F (36.7 °C)    Resp 15    Ht 5' 2\" (1.575 m)    Wt 182 lb (82.6 kg)    SpO2 95%     L/min    BMI 33.29 kg/m2      Physical Exam   Constitutional: She is oriented to person, place, and time. She appears well-developed. No distress. Pleasant obese black female   HENT:   Head: Normocephalic and atraumatic.    Pulmonary/Chest: Effort normal.   Neurological: She is alert and oriented to person, place, and time. Psychiatric: She has a normal mood and affect. Her behavior is normal. Judgment and thought content normal.         Disclaimer: The patient understands our medical plan. Alternatives have been explained and offered. The risks, benefits and significant side effects of all medications have been reviewed. Anticipated time course and progression of condition reviewed. All questions have been addressed. She is encouraged to employ the information provided in the after visit summary, which was reviewed. Where appropriate, she is instructed to call the clinic if she has not been notified either by phone or through [phone] with the results of her tests or with an appointment plan for any referrals within 1 week(s). No news is not good news; it's no news. The patient  is to call if her condition worsens or fails to improve or if significant side effects are experienced. Aspects of this note may have been generated using voice recognition software. Despite editing, there may be unrecognized errors. Over 50% of the 25 minutes face to face with Larose Duverney consisted of counseling and/or discussing treatment plans.          Sloan Primrose, MD

## 2018-09-25 NOTE — PROGRESS NOTES
In [address]  [facility] [address] [place], [address] 
[phone] fax Occupational Therapy Discharge Summary Patient name: Martell Mcgraw Start of Care: 18 Referral source: Tiera Thomas MD : 1955 Medical/Treatment Diagnosis: Pain in left wrist [M25.532] Onset Date:  Prior Hospitalization: see medical history Provider#: [id number] Medications: Verified on Patient Summary List   
Comorbidities: Depression, HTN, back injury Prior Level of Function: Independent with limitations in ADL and IADL activities secondary to back problems. Visits from Start of Care: 2   Missed Visits: 4+ Reporting Period : 18 to 8/10/18 Summary of Care:Patient failed to return for follow up visits, therefore goal not addressed/met, status unknown G-Codes (GO) Carry  Current  CL= 60-79%  Goal  CK= 40-59%  D/C  CL= 60-79% The severity rating is based on clinical judgment and the FOTO score. ASSESSMENT/RECOMMENDATIONS: 
[x]Discontinue therapy: []Patient has reached or is progressing toward set goals [x]Patient is non-compliant or has abdicated 
    []Due to lack of appreciable progress towards set goals Sandrita Ferrer OT 8/10/2018 3:52 PM

## 2018-10-01 ENCOUNTER — OFFICE VISIT (OUTPATIENT)
Dept: PAIN MANAGEMENT | Age: 63
End: 2018-10-01

## 2018-10-01 VITALS
HEIGHT: 62 IN | RESPIRATION RATE: 14 BRPM | SYSTOLIC BLOOD PRESSURE: 114 MMHG | WEIGHT: 182 LBS | BODY MASS INDEX: 33.49 KG/M2 | TEMPERATURE: 98.7 F | HEART RATE: 85 BPM | DIASTOLIC BLOOD PRESSURE: 72 MMHG

## 2018-10-01 DIAGNOSIS — M54.50 CHRONIC BILATERAL LOW BACK PAIN WITHOUT SCIATICA: ICD-10-CM

## 2018-10-01 DIAGNOSIS — M25.50 PAIN IN JOINT, MULTIPLE SITES: ICD-10-CM

## 2018-10-01 DIAGNOSIS — G89.4 CHRONIC PAIN SYNDROME: ICD-10-CM

## 2018-10-01 DIAGNOSIS — M54.2 CERVICALGIA: ICD-10-CM

## 2018-10-01 DIAGNOSIS — G89.29 CHRONIC BILATERAL LOW BACK PAIN WITHOUT SCIATICA: ICD-10-CM

## 2018-10-01 DIAGNOSIS — M96.1 POSTLAMINECTOMY SYNDROME, CERVICAL REGION: ICD-10-CM

## 2018-10-01 RX ORDER — OXYCODONE HYDROCHLORIDE 10 MG/1
10 TABLET ORAL
Qty: 120 TAB | Refills: 0 | Status: SHIPPED | OUTPATIENT
Start: 2018-12-19 | End: 2019-01-18

## 2018-10-01 RX ORDER — MORPHINE SULFATE 15 MG/1
15 TABLET, FILM COATED, EXTENDED RELEASE ORAL EVERY 12 HOURS
Qty: 60 TAB | Refills: 0 | Status: SHIPPED | OUTPATIENT
Start: 2018-11-23 | End: 2018-12-23

## 2018-10-01 RX ORDER — OXYCODONE HYDROCHLORIDE 10 MG/1
10 TABLET ORAL
Qty: 120 TAB | Refills: 0 | Status: SHIPPED | OUTPATIENT
Start: 2018-10-21 | End: 2018-11-20

## 2018-10-01 RX ORDER — MORPHINE SULFATE 15 MG/1
15 TABLET, FILM COATED, EXTENDED RELEASE ORAL EVERY 8 HOURS
Qty: 90 TAB | Refills: 0 | Status: SHIPPED | OUTPATIENT
Start: 2018-10-24 | End: 2018-11-15

## 2018-10-01 RX ORDER — MORPHINE SULFATE 15 MG/1
15 TABLET, FILM COATED, EXTENDED RELEASE ORAL DAILY
Qty: 30 TAB | Refills: 0 | Status: SHIPPED | OUTPATIENT
Start: 2018-12-22 | End: 2018-11-15

## 2018-10-01 RX ORDER — CYCLOBENZAPRINE HCL 10 MG
TABLET ORAL
Qty: 90 TAB | Refills: 2 | Status: SHIPPED | OUTPATIENT
Start: 2018-10-01

## 2018-10-01 RX ORDER — OXYCODONE HYDROCHLORIDE 10 MG/1
10 TABLET ORAL
Qty: 120 TAB | Refills: 0 | Status: SHIPPED | OUTPATIENT
Start: 2018-11-20 | End: 2018-11-15

## 2018-10-01 NOTE — MR AVS SNAPSHOT
[address] [facility] [address] 
[phone] Patient: Jean Christopher MRN: [id number] IAZ:[id number] Visit Information Date & Time Provider Department Dept. Phone Encounter #  
 10/1/2018 11:20 AM BETO Fontanez [facility] [phone] [id number] Follow-up Instructions Return in about 3 months (around 1/1/2019). Your Appointments 10/8/2018 11:15 AM  
FOLLOW UP EXAM with Adam Tellez MD  
[facility] ([facility] Appt Note: f/u for toe concerns [facility] [address] [facility]  
[phone]  
  
   
 [address] [address]  
  
    
 11/1/2018 11:00 AM  
Follow Up with Phil Heard [employer] Thee Morales ([address]) Appt Note: rt hand 2 mo fu  
 [facility] [address] [facility]  
[phone] [address] [place] [address]  
  
    
 1/2/2019 11:00 AM  
New Patient with BETO Dorsey [facility] (RICHAR SCHEDULING) Appt Note: Return in about 3 months (around 1/1/2019 [address] [facility] [id number] [phone] [facility] [phone] [id number] Upcoming Health Maintenance Date Due DTaP/Tdap/Td series (1 - Tdap) 3/30/1975 Shingrix Vaccine Age 50> (1 of 2) 3/30/2004 MEDICARE YEARLY EXAM 10/13/2018 PAP AKA CERVICAL CYTOLOGY 6/21/2019 BREAST CANCER SCRN MAMMOGRAM 1/12/2020 COLONOSCOPY 4/13/2027 Allergies as of 10/1/2018  Review Complete On: 10/1/2018 By: BETO Fontanez Severity Noted Reaction Type Reactions Aspirin    Other (comments) Stomach bleeding Ativan [Lorazepam]  02/21/2018    Shortness of Breath Macrobid [Nitrofurantoin Monohyd/m-cryst]  11/25/2015    Nausea and Vomiting Nsaids (Non-steroidal Anti-inflammatory Drug)    Nausea and Vomiting Opana [Oxymorphone]  11/29/2016    Other (comments) Insomnia, weight loss, nightmares Pcn [Penicillins]    Hives Current Immunizations  Never Reviewed Name Date Influenza Vaccine 10/20/2015 12:00 AM, 11/4/2014 12:00 AM  
 Influenza Vaccine (Quad) PF 9/21/2018 Pneumococcal Polysaccharide (PPSV-23) 3/7/2014 Not reviewed this visit You Were Diagnosed With   
  
 Codes Comments Chronic bilateral low back pain without sciatica     ICD-10-CM: M54.5, G89.29 ICD-9-CM: 724.2, 338.29 Pain in joint, multiple sites     ICD-10-CM: M25.50 ICD-9-CM: 719.49 Cervicalgia     ICD-10-CM: M54.2 ICD-9-CM: 723.1 Postlaminectomy syndrome, cervical region     ICD-10-CM: M96.1 ICD-9-CM: 722.81 Chronic pain syndrome     ICD-10-CM: G89.4 ICD-9-CM: 338. 4 Vitals BP Pulse Temp Resp Height(growth percentile) Weight(growth percentile) 114/72 (BP 1 Location: Left arm, BP Patient Position: Sitting) 85 98.7 °F (37.1 °C) (Oral) 14 5' 2\" (1.575 m) 182 lb (82.6 kg) BMI OB Status Smoking Status 33.29 kg/m2 Hysterectomy Former Smoker Vitals History BMI and BSA Data Body Mass Index Body Surface Area  
 33.29 kg/m 2 1.9 m 2 Preferred Pharmacy Pharmacy Name Phone [id number], [address] [place] [address] [phone] Your Updated Medication List  
  
   
This list is accurate as of 10/1/18 12:18 PM.  Always use your most recent med list.  
  
  
  
  
 ACID GONE ANTACID  mg/15 mL suspension Generic drug:  aluminum hydrox-magnesium carb Take 15 mL by mouth. * albuterol 2.5 mg /3 mL (0.083 %) nebulizer solution Commonly known as:  PROVENTIL VENTOLIN Take 2.5 mg by inhalation daily as needed. * albuterol 90 mcg/actuation inhaler Commonly known as:  PROAIR HFA Take 2 Puffs by inhalation every four (4) hours as needed for Wheezing. atorvastatin 40 mg tablet Commonly known as:  LIPITOR  
TAKE ONE TABLET BY MOUTH ONE TIME DAILY  
  
 baclofen 10 mg tablet Commonly known as:  LIORESAL  
TAKE ONE TABLET BY MOUTH THREE TIMES A DAY  
  
 cyclobenzaprine 10 mg tablet Commonly known as:  FLEXERIL  
TAKE 1 TABLET BY MOUTH THREE TIMES DAILY AS NEEDED FOR MUSCLE SPASMS FOR UP TO 30 DAYS. DEXILANT 60 mg Cpdb Generic drug:  Dexlansoprazole TAKE ONE CAPSULE BY MOUTH ONE TIME DAILY  
  
 ergocalciferol 50,000 unit capsule Commonly known as:  VITAMIN D2  
TAKE ONE CAPSULE BY MOUTH EVERY 7 DAYS * fluticasone 50 mcg/actuation nasal spray Commonly known as:  Muriel Dave 2 Sprays by Both Nostrils route as needed. * fluticasone 110 mcg/actuation inhaler Commonly known as:  FLOVENT HFA Take 2 Puffs by inhalation every twelve (12) hours. Contact Dr. Karina Coelloer office for further refills  
  
 gabapentin 400 mg capsule Commonly known as:  NEURONTIN  
400 mg two (2) times a day. guaiFENesin-codeine 100-10 mg/5 mL solution Commonly known as:  ROBITUSSIN AC Take 5 mL by mouth three (3) times daily as needed for Cough. Max Daily Amount: 15 mL. HYDROcodone-acetaminophen 5-325 mg per tablet Commonly known as:  Yolanda Quinton Take 1-2 Tabs by mouth two (2) times daily as needed for Pain. Max Daily Amount: 4 Tabs. hydrOXYzine pamoate 50 mg capsule Commonly known as:  VISTARIL Take 1 Cap by mouth three (3) times daily as needed for Anxiety. ipratropium 0.02 % Soln Commonly known as:  ATROVENT Take 0.5 mg by inhalation daily as needed. losartan 25 mg tablet Commonly known as:  COZAAR  
TAKE ONE TABLET BY MOUTH ONE TIME DAILY  
  
 montelukast 10 mg tablet Commonly known as:  SINGULAIR Take 10 mg by mouth daily. * morphine CR 15 mg CR tablet Commonly known as:  MS CONTIN  
 Take 1 Tab by mouth every eight (8) hours for 30 days. Max Daily Amount: 45 mg. Start taking on:  10/24/2018 * morphine CR 15 mg CR tablet Commonly known as:  MS CONTIN Take 1 Tab by mouth every twelve (12) hours for 30 days. Max Daily Amount: 30 mg. Start taking on:  11/23/2018 * morphine CR 15 mg CR tablet Commonly known as:  MS CONTIN Take 1 Tab by mouth daily for 30 days. Max Daily Amount: 15 mg. Start taking on:  12/22/2018  
  
 naloxone 4 mg/actuation nasal spray Commonly known as:  NARCAN  
1 [place] by IntraNASal route once as needed. Use 1 spray intranasally into 1 nostril. Use a new Narcan nasal spray for subsequent doses and administer into alternating nostrils. May repeat every 2 to 3 minutes as needed. ondansetron 4 mg disintegrating tablet Commonly known as:  ZOFRAN ODT Take 1 Tab by mouth every eight (8) hours as needed for Nausea for up to 15 doses. oxybutynin 5 mg tablet Commonly known as:  DPHHBQEC Take 1 Tab by mouth two (2) times a day. Indications: URINARY URGE INCONTINENCE  
  
 * oxyCODONE IR 10 mg Tab immediate release tablet Commonly known as:  Shira Few Take 1 Tab by mouth four (4) times daily as needed for up to 30 days. Max Daily Amount: 40 mg. Start taking on:  10/21/2018 * oxyCODONE IR 10 mg Tab immediate release tablet Commonly known as:  Shira Few Take 1 Tab by mouth four (4) times daily as needed for up to 30 days. Max Daily Amount: 40 mg. Start taking on:  11/20/2018 * oxyCODONE IR 10 mg Tab immediate release tablet Commonly known as:  Shira Few Take 1 Tab by mouth four (4) times daily as needed for up to 30 days. Max Daily Amount: 40 mg. Start taking on:  12/19/2018  
  
 oxyCODONE-acetaminophen 7.5-325 mg per tablet Commonly known as:  PERCOCET Take 1 Tab by mouth every six (6) hours as needed for Pain (Hold if sedated. ). Max Daily Amount: 4 Tabs. polyethylene glycol 17 gram packet Commonly known as:  Beaulah Lock Take 1 Packet by mouth daily. QUEtiapine 200 mg tablet Commonly known as:  SEROquel Take 200 mg by mouth daily. rizatriptan 10 mg tablet Commonly known as:  Gabriela Rao May repeat in 2 hours if needed  Indications: Migraine  
  
 sertraline 100 mg tablet Commonly known as:  ZOLOFT Take 100 mg by mouth nightly as needed. sucralfate 100 mg/mL suspension Commonly known as:  Cristina Dec Take 10 mL by mouth four (4) times daily. Contact Dr. Arline Ahmadi office for guidance on further refills  
  
 temazepam 15 mg capsule Commonly known as:  RESTORIL Take 1 Cap by mouth nightly as needed for Sleep. Max Daily Amount: 15 mg. Indications: INSOMNIA  
  
 traZODone 100 mg tablet Commonly known as:  Roberto Console Take 100 mg by mouth nightly. valACYclovir 500 mg tablet Commonly known as:  VALTREX Take 1 Tab by mouth two (2) times a day. * Notice: This list has 10 medication(s) that are the same as other medications prescribed for you. Read the directions carefully, and ask your doctor or other care provider to review them with you. Prescriptions Printed Refills  
 morphine CR (MS CONTIN) 15 mg CR tablet 0 Starting on: 10/24/2018 Sig: Take 1 Tab by mouth every eight (8) hours for 30 days. Max Daily Amount: 45 mg.  
 Class: Print Route: Oral  
 oxyCODONE IR (ROXICODONE) 10 mg tab immediate release tablet 0 Starting on: 10/21/2018 Sig: Take 1 Tab by mouth four (4) times daily as needed for up to 30 days. Max Daily Amount: 40 mg.  
 Class: Print Route: Oral  
 morphine CR (MS CONTIN) 15 mg CR tablet 0 Starting on: 11/23/2018 Sig: Take 1 Tab by mouth every twelve (12) hours for 30 days. Max Daily Amount: 30 mg.  
 Class: Print Route: Oral  
 morphine CR (MS CONTIN) 15 mg CR tablet 0 Starting on: 12/22/2018 Sig: Take 1 Tab by mouth daily for 30 days. Max Daily Amount: 15 mg.  
 Class: Print  Route: Oral  
 oxyCODONE IR (ROXICODONE) 10 mg tab immediate release tablet 0 Starting on: 11/20/2018 Sig: Take 1 Tab by mouth four (4) times daily as needed for up to 30 days. Max Daily Amount: 40 mg.  
 Class: Print Route: Oral  
 oxyCODONE IR (ROXICODONE) 10 mg tab immediate release tablet 0 Starting on: 12/19/2018 Sig: Take 1 Tab by mouth four (4) times daily as needed for up to 30 days. Max Daily Amount: 40 mg.  
 Class: Print Route: Oral  
  
Prescriptions Sent to Pharmacy Refills  
 cyclobenzaprine (FLEXERIL) 10 mg tablet 2 Sig: TAKE 1 TABLET BY MOUTH THREE TIMES DAILY AS NEEDED FOR MUSCLE SPASMS FOR UP TO 30 DAYS. Class: Normal  
 Pharmacy: [facility] [address] [place] [address] #: [phone] Follow-up Instructions Return in about 3 months (around 1/1/2019). Patient Instructions 1. Modify current plan. Stable on current medication without adverse events. 2. Refill morphine ER 15 mg every 8 hours x 1 month, then adjust down to every 12 hours x 1 month, then adjust down to once daily times 1 month, then discontinue 3. Refill oxycodone 10 mg up to 4 times daily as needed until next visit. 4. Refill Flexeril 10 mg. Please take half to 1 tablet up to 3 times daily as needed. 2 refills 5. Please remember to discuss alternative treatments regarding temazepam with your Psychiatrist. 
6. Naloxone 4 mg nasal spray for opioid induced respiratory depression emergency only. 7. Continue home exercise program.   
8. Discussed risks of addiction, dependency, and opioid induced hyperalgesia. Please remember to call at least 5 business days prior to your medication refill. Return to clinic in 3 months. Please call and cancel your appointment and pain management agreement if you do decide to transfer your care. Introducing [facility] SERVICES! Dear Anay Horn: Thank you for requesting a [employer] account.   Our records indicate that you already have an active [employer] account. You can access your account anytime at [email or web address] Did you know that you can access your hospital and ER discharge instructions at any time in [employer]? You can also review all of your test results from your hospital stay or ER visit. Additional Information If you have questions, please visit the Frequently Asked Questions section of the [employer] website at [email or web address] Remember, [employer] is NOT to be used for urgent needs. For medical emergencies, dial 911. Now available from your iPhone and Android! Please provide this summary of care documentation to your next provider. Your primary care clinician is listed as Lor Najera. If you have any questions after today's visit, please call [phone].

## 2018-10-01 NOTE — PATIENT INSTRUCTIONS
1. Modify current plan. Stable on current medication without adverse events. 2. Refill morphine ER 15 mg every 8 hours x 1 month, then adjust down to every 12 hours x 1 month, then adjust down to once daily times 1 month, then discontinue  3. Refill oxycodone 10 mg up to 4 times daily as needed until next visit. 4. Refill Flexeril 10 mg. Please take half to 1 tablet up to 3 times daily as needed. 2 refills  5. Please remember to discuss alternative treatments regarding temazepam with your Psychiatrist.  6. Naloxone 4 mg nasal spray for opioid induced respiratory depression emergency only. 7. Continue home exercise program.    8. Discussed risks of addiction, dependency, and opioid induced hyperalgesia. Please remember to call at least 5 business days prior to your medication refill. Return to clinic in 3 months. Please call and cancel your appointment and pain management agreement if you do decide to transfer your care.

## 2018-10-01 NOTE — PROGRESS NOTES
HISTORY OF PRESENT ILLNESS  Booker Alberts is a 58 y.o. female    HPI: Ms. Greg Ingram  returns today for f/u of chronic severe pain involving the lumbar spine, which has been attributed to a lumbar degenerative disc disease with spondylosis. In addition, she has neck pain attributed to a post cervical laminectomy syndrome. No h/o lumbar surgery. PT and injections in the past with no improvement. Prior treatment with Fentanyl and Opana with significant side effects. She continues unchanged as last visit. She continues to do well with her current treatment plan which has been offering moderate pain control. She does report that since tapering her medication her pain has increased. We did discuss options at length today. We will continue tapering the morphine as previously discussed. We did adjust her oxycodone up to 4 times daily as needed last visit and will continue until she has completed her taper of morphine. She does understand that we will begin tapering her oxycodone at that time. She is otherwise doing well with no new complaint further evaluation and recommendation. She was reminded to call and cancel her appointment and pain management agreement she does decide to transfer care. Current medication management consists of MS Contin 15 mg TID,  Oxycodone IR 10 mg QID PRN, and Flexeril. .   Temazepam by her psychiatrist.  Medications are helping with pain control and quality of life. Her pain is 2-3/10 with medication and 10/10 without. Pt describes pain as aching. Aggravating factors include standing, sitting, walking. Relieved with rest, sitting, lying down, and avoiding painful activities. Current treatment is helping to improve general activity, mood, walking, sleep, enjoyment of life    Pt does report constipation but is well controlled with Miralax  She  is otherwise doing well with no other complaints today.  She denies any adverse events including nausea, vomiting, dizziness, constipation, hallucinations, or seizures. Because the patient's current regimen places him/her at increased risk for possible overdose, a prescription for naloxone nasal spray has been provided. The patient understands that this medication is only to be used in the setting of a possible overdose and that inadvertent use of this medication could precipitate overt withdrawal.    MME: 90  COMM: 5  OSWESTRY: 40%  POC UDS today. Confirmation pending. Allergies   Allergen Reactions    Aspirin Other (comments)     Stomach bleeding    Ativan [Lorazepam] Shortness of Breath    Macrobid [Nitrofurantoin Monohyd/M-Cryst] Nausea and Vomiting    Nsaids (Non-Steroidal Anti-Inflammatory Drug) Nausea and Vomiting    Opana [Oxymorphone] Other (comments)     Insomnia, weight loss, nightmares    Pcn [Penicillins] Hives       Past Surgical History:   Procedure Laterality Date    COLONOSCOPY N/A 4/13/2017    COLONOSCOPY performed by Mat Balbuena MD at [facility] ENDOSCOPY    HX CATARACT REMOVAL      HX CERVICAL FUSION      HX HYSTERECTOMY      HX ORTHOPAEDIC      ganglion cyst removed, right hand    HX OTHER SURGICAL      cyst left thigh removed    HX WRIST FRACTURE TX      LAP,CHOLECYSTECTOMY N/A 02/27/2017    Dr. Zaidi Second         Review of Systems   Constitutional: Negative for chills, fever and weight loss. HENT: Negative for congestion, ear discharge and sore throat. Eyes: Negative for blurred vision and double vision. Respiratory: Negative for cough, shortness of breath and wheezing. Cardiovascular: Negative for chest pain and palpitations. Gastrointestinal: Positive for constipation. Negative for diarrhea, heartburn, nausea and vomiting. Genitourinary: Positive for dysuria, frequency, hematuria and urgency. Musculoskeletal: Positive for back pain and joint pain (left knee  ). Negative for falls and neck pain. Skin: Negative for itching and rash.    Neurological: Negative for dizziness, seizures, loss of consciousness and headaches. Endo/Heme/Allergies: Does not bruise/bleed easily. Psychiatric/Behavioral: Positive for depression. Negative for suicidal ideas. The patient has insomnia. The patient is not nervous/anxious. Physical Exam   Constitutional: She is oriented to person, place, and time and well-developed, well-nourished, and in no distress. No distress. HENT:   Head: Normocephalic and atraumatic. Eyes: EOM are normal.   Neck: Normal range of motion. Pulmonary/Chest: Effort normal.   Musculoskeletal: Normal range of motion. surgical scar on left knee from prior surgery. Wearing right wrist brace. Neurological: She is alert and oriented to person, place, and time. Gait ( using a cane) abnormal.   Skin: Skin is warm and dry. No rash noted. She is not diaphoretic. No erythema. Psychiatric: Memory and judgment normal.   Nursing note and vitals reviewed. ASSESSMENT:    1. Chronic bilateral low back pain without sciatica    2. Pain in joint, multiple sites    3. Cervicalgia    4. Postlaminectomy syndrome, cervical region    5. Chronic pain syndrome           [place] Prescription Monitoring Program was reviewed which does not demonstrate aberrancies and/or inconsistencies with regard to the historical prescribing of controlled medications to this patient by other providers. NOTE: Disclosed hydrocodone from ER visit filled 12/22/2016   Hydrocodone for postsurgical pain disclosed on 2/27/2017, IV morphine disclosed on 4/4/2017 tramadol was disclosed for gallbladder pain  by her PCP on 4/13/2017. This medication was then switched to Fioricet on 4/20/2017. Disclosed oxycodone 5/325 mg received from the emergency room filled on 8/14/2017. PLAN / Pt Instructions:  1. Modify current plan. Stable on current medication without adverse events.     2. Refill morphine ER 15 mg every 8 hours x 1 month, then adjust down to every 12 hours x 1 month, then adjust down to once daily times 1 month, then discontinue  3. Refill oxycodone 10 mg up to 4 times daily as needed until next visit. 4. Refill Flexeril 10 mg. Please take half to 1 tablet up to 3 times daily as needed. 2 refills  5. Please remember to discuss alternative treatments regarding temazepam with your Psychiatrist.  6. Naloxone 4 mg nasal spray for opioid induced respiratory depression emergency only. 7. Continue home exercise program.    8. Discussed risks of addiction, dependency, and opioid induced hyperalgesia. Please remember to call at least 5 business days prior to your medication refill. Return to clinic in 3 months. Please call and cancel your appointment and pain management agreement if you do decide to transfer your care. Medications Ordered Today   Medications    morphine CR (MS CONTIN) 15 mg CR tablet     Sig: Take 1 Tab by mouth every eight (8) hours for 30 days. Max Daily Amount: 45 mg. Dispense:  90 Tab     Refill:  0    oxyCODONE IR (ROXICODONE) 10 mg tab immediate release tablet     Sig: Take 1 Tab by mouth four (4) times daily as needed for up to 30 days. Max Daily Amount: 40 mg. Dispense:  120 Tab     Refill:  0    morphine CR (MS CONTIN) 15 mg CR tablet     Sig: Take 1 Tab by mouth every twelve (12) hours for 30 days. Max Daily Amount: 30 mg. Dispense:  60 Tab     Refill:  0    morphine CR (MS CONTIN) 15 mg CR tablet     Sig: Take 1 Tab by mouth daily for 30 days. Max Daily Amount: 15 mg. Dispense:  30 Tab     Refill:  0    oxyCODONE IR (ROXICODONE) 10 mg tab immediate release tablet     Sig: Take 1 Tab by mouth four (4) times daily as needed for up to 30 days. Max Daily Amount: 40 mg. Dispense:  120 Tab     Refill:  0    oxyCODONE IR (ROXICODONE) 10 mg tab immediate release tablet     Sig: Take 1 Tab by mouth four (4) times daily as needed for up to 30 days. Max Daily Amount: 40 mg.      Dispense:  120 Tab     Refill:  0    cyclobenzaprine (FLEXERIL) 10 mg tablet     Sig: TAKE 1 TABLET BY MOUTH THREE TIMES DAILY AS NEEDED FOR MUSCLE SPASMS FOR UP TO 30 DAYS. Dispense:  90 Tab     Refill:  2       DISPOSITION   Pain medications are prescribed with the objective of pain relief and improved physical and psychosocial function in this patient.  Patient has been counseled on proper use of prescribed medications.  Patient has been counseled about chronic medical conditions and their relationship to anxiety and depression and recommended mental health support as needed.  Reviewed with patient self-help tools, home exercise, and lifestyle changes to assist the patient in self-management of symptoms.  Reviewed with patient the treatment plan, goals of treatment plan, and limitations of treatment plan, to include the potential for side effects from medications and procedures. If side effects occur, it is the responsibility of the patient to inform the clinic so that a change in the treatment plan can be made in a safe manner. The patient is advised that stopping prescribed medication may cause an increase in symptoms and possible medication withdrawal symptoms. The patient is informed an emergency room evaluation may be necessary if this occurs. Spent 25 minutes with patient today which more than 50% of that time was spent on counseling and coordination of care. Eboni Sears 10/1/2018      Note: Please excuse any typographical errors. Voice recognition software was used for this note and may cause mistakes.

## 2018-10-01 NOTE — PROGRESS NOTES
Nursing Notes    Patient presents to the office today in follow-up. Patient rates her pain at 8/10 on the numerical pain scale. Reviewed medications with counts as follows:    Rx Date filled Qty Dispensed Pill Count Last Dose Short   Morphine sulfate ER 15 mg 09/25/18 90 76 today no   Oxycodone 10 mg  09/22/18 120 83 today no                            Last opioid agreement 01/09/18  Last urine drug screen 04/03/18    Comments: The pt provided an oral specimen in the office today for routine drug testing. Any new labs or imaging since last appointment? NO    Have you been to an emergency room (ER) or urgent care clinic since your last visit? NO            Have you been hospitalized since your last visit? NO     If yes, where, when, and reason for visit? Have you seen or consulted any other health care providers outside of the [address] [place] [address]  since your last visit? YES     If yes, where, when, and reason for visit? GI, pt is also in PT for her left hand  Ms. Hutchinson has a reminder for a \"due or due soon\" health maintenance. I have asked that she contact her primary care provider for follow-up on this health maintenance.     PHQ over the last two weeks 10/1/2018   PHQ Not Done -   Little interest or pleasure in doing things Not at all   Feeling down, depressed, irritable, or hopeless Not at all   Total Score PHQ 2 0   Trouble falling or staying asleep, or sleeping too much Not at all   Feeling tired or having little energy Not at all   Poor appetite, weight loss, or overeating Not at all   Feeling bad about yourself - or that you are a failure or have let yourself or your family down Not at all   Trouble concentrating on things such as school, work, reading, or watching TV Not at all   Moving or speaking so slowly that other people could have noticed; or the opposite being so fidgety that others notice Not at all   Thoughts of being better off dead, or hurting yourself in some way Not at all   PHQ 9 Score 0   How difficult have these problems made it for you to do your work, take care of your home and get along with others Not difficult at all

## 2018-10-08 ENCOUNTER — OFFICE VISIT (OUTPATIENT)
Dept: FAMILY MEDICINE CLINIC | Age: 63
End: 2018-10-08

## 2018-10-08 VITALS
SYSTOLIC BLOOD PRESSURE: 124 MMHG | WEIGHT: 181 LBS | OXYGEN SATURATION: 98 % | BODY MASS INDEX: 33.11 KG/M2 | TEMPERATURE: 98.3 F | HEART RATE: 62 BPM | RESPIRATION RATE: 14 BRPM | DIASTOLIC BLOOD PRESSURE: 76 MMHG

## 2018-10-08 DIAGNOSIS — R33.9 URINARY RETENTION: ICD-10-CM

## 2018-10-08 DIAGNOSIS — R20.0 NUMBNESS OF TOES: Primary | ICD-10-CM

## 2018-10-08 DIAGNOSIS — R32 URINARY INCONTINENCE, UNSPECIFIED TYPE: ICD-10-CM

## 2018-10-08 NOTE — PROGRESS NOTES
Dior Hutchinson 58 y.o. female being seen for   Chief Complaint   Patient presents with    Toe Pain     Pt has right toe pain and numbness (first 3 toes)    Urinary Retention       1. Have you been to the ER, urgent care clinic since your last visit? Hospitalized since your last visit? No    2. Have you seen or consulted any other health care providers outside of the [address] [place] [address] since your last visit? Include any pap smears or colon screening.  No    Pharmacy has been verified  Care team has been verified/updated

## 2018-10-08 NOTE — PROGRESS NOTES
Eri Laureano is a 58 y.o. female who was seen in clinic today (10/8/2018). Assessment & Plan:       ICD-10-CM ICD-9-CM    1. Numbness of toes R20.0 782.0 XR FOOT RT MIN 3 V      REFERRAL TO PODIATRY   2. Urinary incontinence, unspecified type R32 788.30 URINALYSIS W/ RFLX MICROSCOPIC      CULTURE, URINE   3. Urinary retention R33.9 788.20 URINALYSIS W/ RFLX MICROSCOPIC      CULTURE, URINE     paresthesias toes 3-5 right foot new uncontrolled: worse and painful with palpation between 3&4th metatarsal heads and lateral pressure suggestive of Coello's neuroma. Urinary issues unclear etiology. Retention with overflow incontinence? If so, why? Labs today in prep for dedicated visit       Follow-up Disposition:  Return in about 1 week (around 10/15/2018) for urinary retention/incontinence evaluation , (30). Subjective:   Eri Laureano was seen today for Toe Pain (Pt has right toe pain and numbness (first 3 toes)) and Urinary Retention    Scheduled to evaluate constant numbness and tingling toes 3-5 right foot, worse at night (in bed shoes off). Present x 2 months. Stable. No associated symptoms. No trauma. Painless. No change in footwear. Also notes one day last week felt strong urge to void but couldn't. Could only void with coughing, which would then trigger release of full bladder. New problem as of May this year. Occurs 1-2 x/month lasting a few days at a time, as was the case last week. Pain resolves with emptying bladder. Associated with urinary incontinence. Previously seen 5/22/2018 and treated with oxybutinin by Dr. Alyse Coreas. Notes reviewed. Helped the retention and pain issue. Did not help the incontinence. Had been taking it twice daily every day initially until June. Asymptomatic until August, single episode then, none in September, recurred last month.      No pain/retention concern today    Narcotics x 2-3 years, reports dose decreasing           Lab Results   Component Value Date/Time    Sodium 144 07/06/2018 01:18 PM    Potassium 4.4 07/06/2018 01:18 PM    Chloride 107 07/06/2018 01:18 PM    CO2 33 (H) 07/06/2018 01:18 PM    Anion gap 4 07/06/2018 01:18 PM    Glucose 79 07/06/2018 01:18 PM    BUN 13 07/06/2018 01:18 PM    Creatinine 0.83 07/06/2018 01:18 PM    BUN/Creatinine ratio 16 07/06/2018 01:18 PM    GFR est AA >60 07/06/2018 01:18 PM    GFR est non-AA >60 07/06/2018 01:18 PM    Calcium 9.3 07/06/2018 01:18 PM         Outpatient Prescriptions Marked as Taking for the 10/8/18 encounter (Office Visit) with Kim Fountain MD   Medication Sig Dispense Refill    [START ON 10/24/2018] morphine CR (MS CONTIN) 15 mg CR tablet Take 1 Tab by mouth every eight (8) hours for 30 days. Max Daily Amount: 45 mg. 90 Tab 0    [START ON 10/21/2018] oxyCODONE IR (ROXICODONE) 10 mg tab immediate release tablet Take 1 Tab by mouth four (4) times daily as needed for up to 30 days. Max Daily Amount: 40 mg. 120 Tab 0    [START ON 11/23/2018] morphine CR (MS CONTIN) 15 mg CR tablet Take 1 Tab by mouth every twelve (12) hours for 30 days. Max Daily Amount: 30 mg. 60 Tab 0    [START ON 12/22/2018] morphine CR (MS CONTIN) 15 mg CR tablet Take 1 Tab by mouth daily for 30 days. Max Daily Amount: 15 mg. 30 Tab 0    [START ON 11/20/2018] oxyCODONE IR (ROXICODONE) 10 mg tab immediate release tablet Take 1 Tab by mouth four (4) times daily as needed for up to 30 days. Max Daily Amount: 40 mg. 120 Tab 0    [START ON 12/19/2018] oxyCODONE IR (ROXICODONE) 10 mg tab immediate release tablet Take 1 Tab by mouth four (4) times daily as needed for up to 30 days. Max Daily Amount: 40 mg. 120 Tab 0    cyclobenzaprine (FLEXERIL) 10 mg tablet TAKE 1 TABLET BY MOUTH THREE TIMES DAILY AS NEEDED FOR MUSCLE SPASMS FOR UP TO 30 DAYS. 90 Tab 2    montelukast (SINGULAIR) 10 mg tablet Take 10 mg by mouth daily.  fluticasone (FLOVENT HFA) 110 mcg/actuation inhaler Take 2 Puffs by inhalation every twelve (12) hours. Contact Dr. eS Wong office for further refills 1 Inhaler 0    polyethylene glycol (MIRALAX) 17 gram packet Take 1 Packet by mouth daily. 30 Each 6    fluticasone (FLONASE) 50 mcg/actuation nasal spray 2 Sprays by Both Nostrils route as needed. 1 Bottle 6    DEXILANT 60 mg CpDB TAKE ONE CAPSULE BY MOUTH ONE TIME DAILY 30 Cap 2    atorvastatin (LIPITOR) 40 mg tablet TAKE ONE TABLET BY MOUTH ONE TIME DAILY 90 Tab 3    albuterol (PROAIR HFA) 90 mcg/actuation inhaler Take 2 Puffs by inhalation every four (4) hours as needed for Wheezing. 1 Inhaler 3    rizatriptan (MAXALT) 10 mg tablet May repeat in 2 hours if needed  Indications: Migraine 9 Tab 3    losartan (COZAAR) 25 mg tablet TAKE ONE TABLET BY MOUTH ONE TIME DAILY 90 Tab 1    valACYclovir (VALTREX) 500 mg tablet Take 1 Tab by mouth two (2) times a day. (Patient taking differently: Take 500 mg by mouth two (2) times daily as needed.) 60 Tab 3    ergocalciferol (VITAMIN D2) 50,000 unit capsule TAKE ONE CAPSULE BY MOUTH EVERY 7 DAYS 12 Cap 3    QUEtiapine (SEROQUEL) 200 mg tablet Take 200 mg by mouth daily.  naloxone (NARCAN) 4 mg/actuation nasal spray 1 [place] by IntraNASal route once as needed. Use 1 spray intranasally into 1 nostril. Use a new Narcan nasal spray for subsequent doses and administer into alternating nostrils. May repeat every 2 to 3 minutes as needed.  temazepam (RESTORIL) 15 mg capsule Take 1 Cap by mouth nightly as needed for Sleep. Max Daily Amount: 15 mg. Indications: INSOMNIA 30 Cap 1    albuterol (PROVENTIL VENTOLIN) 2.5 mg /3 mL (0.083 %) nebulizer solution Take 2.5 mg by inhalation daily as needed.  gabapentin (NEURONTIN) 400 mg capsule 400 mg two (2) times a day.  ipratropium (ATROVENT) 0.02 % nebulizer solution Take 0.5 mg by inhalation daily as needed.  trazodone (DESYREL) 100 mg tablet Take 100 mg by mouth nightly.  sertraline (ZOLOFT) 100 mg tablet Take 100 mg by mouth nightly as needed. Patient Active Problem List    Diagnosis    Gastroesophageal reflux disease without esophagitis    Urinary retention    Gastroesophageal reflux disease with esophagitis    Vitamin D deficiency    Benign essential hypertension with target blood pressure below 140/90    Myalgia    Nausea    Hypercholesterolemia    ACP (advance care planning)    Lung nodule seen on imaging study    Degeneration of lumbar or lumbosacral intervertebral disc    Displacement of lumbar intervertebral disc without myelopathy    Pain in joint, multiple sites    Cervicalgia    Postlaminectomy syndrome, cervical region    Annular tear of lumbar disc    Chronic pain syndrome    Carpal tunnel syndrome    Bipolar 1 disorder (HCC)    Lumbago    Other affections of shoulder region, not elsewhere classified    Other chronic pain         Allergies   Allergen Reactions    Aspirin Other (comments)     Stomach bleeding    Ativan [Lorazepam] Shortness of Breath    Macrobid [Nitrofurantoin Monohyd/M-Cryst] Nausea and Vomiting    Nsaids (Non-Steroidal Anti-Inflammatory Drug) Nausea and Vomiting    Opana [Oxymorphone] Other (comments)     Insomnia, weight loss, nightmares    Pcn [Penicillins] Hives         Patient Care Team:  Elsa Mendes MD as PCP - General (Family Practice)  Jaylen Arrington MD (Gastroenterology)  Vicente Becker RN as [address] [place] [address] [address] Keegan RN as Ambulatory Care Navigator (Internal Medicine)  BETO Valverde (Pain Management)  Kamilla Cole NP as Nurse Practitioner (Psychiatry)  Joshua Mayen MD (Pulmonary Disease)    The following sections were reviewed & updated as appropriate: PMH, PSH, FH, and SH. Review of Systems   Genitourinary: Negative for dysuria.    Musculoskeletal:        No joint redness/swelling (feet/toes)           Objective:     Visit Vitals    /76 (BP 1 Location: Left arm, BP Patient Position: Sitting)    Pulse 62    Temp 98.3 °F (36.8 °C)    Resp 14  Wt 181 lb (82.1 kg)    SpO2 98%    BMI 33.11 kg/m2      Physical Exam   Constitutional: She is oriented to person, place, and time. She appears well-developed. No distress. HENT:   Head: Normocephalic and atraumatic. Pulmonary/Chest: Effort normal.   Musculoskeletal:        Right foot: Normal.        Left foot: There is tenderness (between toes 3&4, worsening of paresthesias with pain when pressure applied lateral and medial aspects forefoot). There is normal range of motion, no bony tenderness, no swelling, normal capillary refill and no deformity. Neurological: She is alert and oriented to person, place, and time. Skin: Skin is warm and dry. Psychiatric: She has a normal mood and affect. Her behavior is normal. Judgment and thought content normal.         Disclaimer: The patient understands our medical plan. Alternatives have been explained and offered. The risks, benefits and significant side effects of all medications have been reviewed. Anticipated time course and progression of condition reviewed. All questions have been addressed. She is encouraged to employ the information provided in the after visit summary, which was reviewed. Where appropriate, she is instructed to call the clinic if she has not been notified either by phone or through [address] with the results of her tests or with an appointment plan for any referrals within 1 week(s). No news is not good news; it's no news. The patient  is to call if her condition worsens or fails to improve or if significant side effects are experienced. Aspects of this note may have been generated using voice recognition software. Despite editing, there may be unrecognized errors.        Valery Arrington MD

## 2018-10-15 PROBLEM — K57.30 DIVERTICULOSIS OF LARGE INTESTINE WITHOUT HEMORRHAGE: Status: ACTIVE | Noted: 2018-10-15

## 2018-10-15 PROBLEM — K80.20 CALCULUS OF GALLBLADDER WITHOUT CHOLECYSTITIS: Status: ACTIVE | Noted: 2018-10-15

## 2018-10-15 PROBLEM — K44.9 HIATAL HERNIA: Status: ACTIVE | Noted: 2018-10-15

## 2018-10-15 PROBLEM — K21.00 GASTROESOPHAGEAL REFLUX DISEASE WITH ESOPHAGITIS: Status: RESOLVED | Noted: 2017-12-28 | Resolved: 2018-10-15

## 2018-10-15 PROBLEM — K76.0 HEPATIC STEATOSIS: Status: ACTIVE | Noted: 2018-10-15

## 2018-10-16 ENCOUNTER — OFFICE VISIT (OUTPATIENT)
Dept: FAMILY MEDICINE CLINIC | Age: 63
End: 2018-10-16

## 2018-10-16 ENCOUNTER — HOSPITAL ENCOUNTER (OUTPATIENT)
Dept: LAB | Age: 63
Discharge: HOME | End: 2018-10-16
Payer: MEDICARE

## 2018-10-16 VITALS
TEMPERATURE: 98.5 F | SYSTOLIC BLOOD PRESSURE: 110 MMHG | BODY MASS INDEX: 33.13 KG/M2 | WEIGHT: 180 LBS | DIASTOLIC BLOOD PRESSURE: 80 MMHG | HEART RATE: 96 BPM | OXYGEN SATURATION: 97 % | RESPIRATION RATE: 14 BRPM | HEIGHT: 62 IN

## 2018-10-16 DIAGNOSIS — R32 URINARY INCONTINENCE, UNSPECIFIED TYPE: ICD-10-CM

## 2018-10-16 DIAGNOSIS — N95.2 ATROPHIC VAGINITIS: ICD-10-CM

## 2018-10-16 DIAGNOSIS — R33.9 URINARY RETENTION: ICD-10-CM

## 2018-10-16 DIAGNOSIS — N89.8 VAGINAL DISCHARGE: ICD-10-CM

## 2018-10-16 DIAGNOSIS — R32 URINARY INCONTINENCE, UNSPECIFIED TYPE: Primary | ICD-10-CM

## 2018-10-16 DIAGNOSIS — M79.18 MYOFASCIAL PAIN: ICD-10-CM

## 2018-10-16 LAB
APPEARANCE UR: CLEAR
BACTERIA URNS QL MICRO: NEGATIVE /HPF
BILIRUB UR QL: NEGATIVE
COLOR UR: ABNORMAL
EPITH CASTS URNS QL MICRO: NORMAL /LPF (ref 0–5)
GLUCOSE UR STRIP.AUTO-MCNC: NEGATIVE MG/DL
HGB UR QL STRIP: NEGATIVE
KETONES UR QL STRIP.AUTO: ABNORMAL MG/DL
LEUKOCYTE ESTERASE UR QL STRIP.AUTO: ABNORMAL
NITRITE UR QL STRIP.AUTO: NEGATIVE
PH UR STRIP: 5.5 [PH] (ref 5–8)
PROT UR STRIP-MCNC: NEGATIVE MG/DL
RBC #/AREA URNS HPF: 0 /HPF (ref 0–5)
SP GR UR REFRACTOMETRY: 1.03 (ref 1–1.03)
UROBILINOGEN UR QL STRIP.AUTO: 0.2 EU/DL (ref 0.2–1)
WBC URNS QL MICRO: NORMAL /HPF (ref 0–4)
WET MOUNT POCT, WMPOCT: NORMAL

## 2018-10-16 PROCEDURE — 87491 CHLMYD TRACH DNA AMP PROBE: CPT | Performed by: FAMILY MEDICINE

## 2018-10-16 PROCEDURE — 81001 URINALYSIS AUTO W/SCOPE: CPT | Performed by: FAMILY MEDICINE

## 2018-10-16 PROCEDURE — 87086 URINE CULTURE/COLONY COUNT: CPT | Performed by: FAMILY MEDICINE

## 2018-10-16 RX ORDER — DEXLANSOPRAZOLE 60 MG/1
2 CAPSULE, DELAYED RELEASE ORAL
COMMUNITY
Start: 2018-10-15 | End: 2018-12-09

## 2018-10-16 NOTE — MR AVS SNAPSHOT
[address] [place] [address] 
[phone] Patient: Tomy Barney MRN: [id number] ALLEN:[id number][id number] Visit Information Date & Time Provider Department Dept. Phone Encounter #  
 10/16/2018 11:15 AM Avery Narayanan MD [facility] [phone] [id number] Follow-up Instructions Return for urine, plan to follow based on results. Your Appointments 11/1/2018 11:00 AM  
Follow Up with Chaz Mcduffie DO  
[facility] ([facility]) Appt Note: rt hand 2 mo fu  
 [address] [facility] [address] [facility]  
[phone] [address] [facility] Kentrell Wilder  
  
    
 1/2/2019 11:00 AM  
New Patient with BETO [facility] (RICHAR SCHEDULING) Appt Note: Return in about 3 months (around 1/1/2019 [address] [address]  
[phone] [facility] [id number] Upcoming Health Maintenance Date Due DTaP/Tdap/Td series (1 - Tdap) 3/30/1975 Shingrix Vaccine Age 50> (1 of 2) 3/30/2004 MEDICARE YEARLY EXAM 10/13/2018 PAP AKA CERVICAL CYTOLOGY 6/21/2019 BREAST CANCER SCRN MAMMOGRAM 1/12/2020 COLONOSCOPY 4/13/2027 Allergies as of 10/16/2018  Review Complete On: 10/16/2018 By: Avery Narayanan MD  
  
 Severity Noted Reaction Type Reactions Aspirin    Other (comments) Stomach bleeding Ativan [Lorazepam]  02/21/2018    Shortness of Breath Macrobid [Nitrofurantoin Monohyd/m-cryst]  11/25/2015    Nausea and Vomiting Nsaids (Non-steroidal Anti-inflammatory Drug)    Nausea and Vomiting Opana [Oxymorphone]  11/29/2016    Other (comments) Insomnia, weight loss, nightmares Pcn [Penicillins]    Hives Current Immunizations  Never Reviewed Name Date Influenza Vaccine 10/20/2015 12:00 AM, 11/4/2014 12:00 AM  
 Influenza Vaccine (Quad) PF 9/21/2018 Pneumococcal Polysaccharide (PPSV-23) 3/7/2014 Not reviewed this visit You Were Diagnosed With   
  
 Codes Comments Urinary incontinence, unspecified type    -  Primary ICD-10-CM: R32 
ICD-9-CM: 788.30 Vaginal discharge     ICD-10-CM: N89.8 ICD-9-CM: 623.5 Urinary retention     ICD-10-CM: R33.9 ICD-9-CM: 788.20 Atrophic vaginitis     ICD-10-CM: N95.2 ICD-9-CM: 627.3 Myofascial pain     ICD-10-CM: M79.18 ICD-9-CM: 729.1 Vitals BP Pulse Temp Resp Height(growth percentile) Weight(growth percentile) 110/80 96 98.5 °F (36.9 °C) (Oral) 14 5' 2\" (1.575 m) 180 lb (81.6 kg) SpO2 BMI OB Status Smoking Status 97% 32.92 kg/m2 Hysterectomy Former Smoker Vitals History BMI and BSA Data Body Mass Index Body Surface Area  
 32.92 kg/m 2 1.89 m 2 Preferred Pharmacy Pharmacy Name Phone [id number] [phone] Your Updated Medication List  
  
   
This list is accurate as of 10/16/18 12:30 PM.  Always use your most recent med list.  
  
  
  
  
 ACID GONE ANTACID  mg/15 mL suspension Generic drug:  aluminum hydrox-magnesium carb Take 15 mL by mouth. * albuterol 2.5 mg /3 mL (0.083 %) nebulizer solution Commonly known as:  PROVENTIL VENTOLIN Take 2.5 mg by inhalation daily as needed. * albuterol 90 mcg/actuation inhaler Commonly known as:  PROAIR HFA Take 2 Puffs by inhalation every four (4) hours as needed for Wheezing. atorvastatin 40 mg tablet Commonly known as:  LIPITOR  
TAKE ONE TABLET BY MOUTH ONE TIME DAILY  
  
 baclofen 10 mg tablet Commonly known as:  LIORESAL  
TAKE ONE TABLET BY MOUTH THREE TIMES A DAY  
  
 cyclobenzaprine 10 mg tablet Commonly known as:  FLEXERIL  
 TAKE 1 TABLET BY MOUTH THREE TIMES DAILY AS NEEDED FOR MUSCLE SPASMS FOR UP TO 30 DAYS. DEXILANT 60 mg Cpdb Generic drug:  Dexlansoprazole TAKE ONE CAPSULE BY MOUTH ONE TIME DAILY  
  
 ergocalciferol 50,000 unit capsule Commonly known as:  VITAMIN D2  
TAKE ONE CAPSULE BY MOUTH EVERY 7 DAYS * fluticasone 50 mcg/actuation nasal spray Commonly known as:  Pam Koyanagi 2 Sprays by Both Nostrils route as needed. * fluticasone 110 mcg/actuation inhaler Commonly known as:  FLOVENT HFA Take 2 Puffs by inhalation every twelve (12) hours. Contact Dr. Garo Burk office for further refills  
  
 gabapentin 400 mg capsule Commonly known as:  NEURONTIN  
400 mg two (2) times a day. guaiFENesin-codeine 100-10 mg/5 mL solution Commonly known as:  ROBITUSSIN AC Take 5 mL by mouth three (3) times daily as needed for Cough. Max Daily Amount: 15 mL. HYDROcodone-acetaminophen 5-325 mg per tablet Commonly known as:  [address] Take 1-2 Tabs by mouth two (2) times daily as needed for Pain. Max Daily Amount: 4 Tabs. hydrOXYzine pamoate 50 mg capsule Commonly known as:  VISTARIL Take 1 Cap by mouth three (3) times daily as needed for Anxiety. ipratropium 0.02 % Soln Commonly known as:  ATROVENT Take 0.5 mg by inhalation daily as needed. losartan 25 mg tablet Commonly known as:  COZAAR  
TAKE ONE TABLET BY MOUTH ONE TIME DAILY  
  
 montelukast 10 mg tablet Commonly known as:  SINGULAIR Take 10 mg by mouth daily. * morphine CR 15 mg CR tablet Commonly known as:  MS CONTIN Take 1 Tab by mouth every eight (8) hours for 30 days. Max Daily Amount: 45 mg. Start taking on:  10/24/2018 * morphine CR 15 mg CR tablet Commonly known as:  MS CONTIN Take 1 Tab by mouth every twelve (12) hours for 30 days. Max Daily Amount: 30 mg. Start taking on:  11/23/2018 * morphine CR 15 mg CR tablet Commonly known as:  MS CONTIN  
 Take 1 Tab by mouth daily for 30 days. Max Daily Amount: 15 mg. Start taking on:  2018  
  
 naloxone 4 mg/actuation nasal spray Commonly known as:  NARCAN  
1 [place] by IntraNASal route once as needed. Use 1 spray intranasally into 1 nostril. Use a new Narcan nasal spray for subsequent doses and administer into alternating nostrils. May repeat every 2 to 3 minutes as needed. ondansetron 4 mg disintegrating tablet Commonly known as:  ZOFRAN ODT Take 1 Tab by mouth every eight (8) hours as needed for Nausea for up to 15 doses. oxybutynin 5 mg tablet Commonly known as:  SOXXUOBN Take 1 Tab by mouth two (2) times a day. Indications: URINARY URGE INCONTINENCE  
  
 * oxyCODONE IR 10 mg Tab immediate release tablet Commonly known as:  [place] Apgar Take 1 Tab by mouth four (4) times daily as needed for up to 30 days. Max Daily Amount: 40 mg. Start taking on:  10/21/2018 * oxyCODONE IR 10 mg Tab immediate release tablet Commonly known as:  [place] Apgar Take 1 Tab by mouth four (4) times daily as needed for up to 30 days. Max Daily Amount: 40 mg. Start taking on:  2018 * oxyCODONE IR 10 mg Tab immediate release tablet Commonly known as:  [place] Apgar Take 1 Tab by mouth four (4) times daily as needed for up to 30 days. Max Daily Amount: 40 mg. Start taking on:  2018  
  
 oxyCODONE-acetaminophen 7.5-325 mg per tablet Commonly known as:  PERCOCET Take 1 Tab by mouth every six (6) hours as needed for Pain (Hold if sedated. ). Max Daily Amount: 4 Tabs. polyethylene glycol 17 gram packet Commonly known as:  Marshal Jacquie Take 1 Packet by mouth daily. QUEtiapine 200 mg tablet Commonly known as:  SEROquel Take 200 mg by mouth daily. rizatriptan 10 mg tablet Commonly known as:  [place]ha Soda May repeat in 2 hours if needed  Indications: Migraine  
  
 sertraline 100 mg tablet Commonly known as:  ZOLOFT Take 100 mg by mouth nightly as needed. sucralfate 100 mg/mL suspension Commonly known as:  Ruth Esbon Take 10 mL by mouth four (4) times daily. Contact Dr. Adrian Ibarra office for guidance on further refills  
  
 temazepam 15 mg capsule Commonly known as:  RESTORIL Take 1 Cap by mouth nightly as needed for Sleep. Max Daily Amount: 15 mg. Indications: INSOMNIA  
  
 traZODone 100 mg tablet Commonly known as:  Rolene Stevenson Take 100 mg by mouth nightly. valACYclovir 500 mg tablet Commonly known as:  VALTREX Take 1 Tab by mouth two (2) times a day. * Notice: This list has 10 medication(s) that are the same as other medications prescribed for you. Read the directions carefully, and ask your doctor or other care provider to review them with you. We Performed the Following AMB POC SMEAR, STAIN & Danny Morelle [facility] [id number] CPT(R)] Follow-up Instructions Return for urine, plan to follow based on results. Patient Instructions Schedule an appointment with Dr. Miranda Meza for your groin pain (myofascial pain). I will contact her to determine whether she would like to see you to further evaluate your urinary issues or if I am referring you to a urologist. Please call my office back if you have not heard from us with the final plan by the end of the week. You have learned how to relieve your bladder by coughing. If that fails to work and you are unable to void, proceed to the Emergency Room; you may need a catheter placed in your bladder to let out the urine. Introducing \[facility]\"" & [facility] SERVICES! Dear Stanley Minor: Thank you for requesting a [employer] account. Our records indicate that you already have an active [employer] account. You can access your account anytime at [email or web address] Did you know that you can access your hospital and ER discharge instructions at any time in [employer]? You can also review all of your test results from your hospital stay or ER visit. Additional Information If you have questions, please visit the Frequently Asked Questions section of the [employer] website at [email or web address] Remember, [employer] is NOT to be used for urgent needs. For medical emergencies, dial 911. Now available from your iPhone and Android! Please provide this summary of care documentation to your next provider. Your primary care clinician is listed as Curvin Amend. If you have any questions after today's visit, please call [phone].

## 2018-10-16 NOTE — Clinical Note
Jeremias Santillan encouraged her to return to you for the myofascial pain. Would you like to see her to work up her urinary issues? Or would you prefer I refer her to uro-gyn? If so, preference on someone specific?  Alexander Adamson

## 2018-10-16 NOTE — PROGRESS NOTES
Karina Olivasuson Jasper 58 y.o. female is following up   Chief Complaint   Patient presents with    Urinary Retention     f/u     Soreness in abdominal area   Pt says she started to notice an odor yesterday (10/15/18)  Juan J Colvin is 2 pills one time a day    Pt states Dr Alexander Merchant would like a study done for anemia       1. Have you been to the ER, urgent care clinic since your last visit? Hospitalized since your last visit? No    2. Have you seen or consulted any other health care providers outside of the [address] [place] [address] since your last visit? Include any pap smears or colon screening.  No    Pharmacy has been verified  Care team has been verified/updated

## 2018-10-16 NOTE — PATIENT INSTRUCTIONS
Schedule an appointment with Dr. Derrick Hawkins for your groin pain (myofascial pain). I will contact her to determine whether she would like to see you to further evaluate your urinary issues or if I am referring you to a urologist. Please call my office back if you have not heard from us with the final plan by the end of the week. You have learned how to relieve your bladder by coughing. If that fails to work and you are unable to void, proceed to the Emergency Room; you may need a catheter placed in your bladder to let out the urine.

## 2018-10-16 NOTE — PROGRESS NOTES
Louis Sandoval is a 58 y.o. female who was seen in clinic today (10/16/2018). Assessment & Plan:       ICD-10-CM ICD-9-CM    1. Urinary incontinence, unspecified type R32 788.30    2. Urinary retention R33.9 788.20    3. Vaginal discharge N89.8 623.5 CHLAMYDIA/NEISSERIA AMPLIFICATION      AMB POC SMEAR, STAIN & INTERPRET, WET MOUNT   4. Atrophic vaginitis N95.2 627.3    5. Myofascial pain M79.18 729.1      Unable to fully assess bladder status without US or ability to determine PVR in the office. No visible anatomical/mass issue. History suggests retention with overflow. Good LE mobility and strength. Deferring spine imaging at present, particularly with narcotics on med list which could produce this as adverse effect, but low threshold for lumbar imaging if retention is documented. Will contact Dr. Dian Delong, first to see if this is an issue she'd like to pursue or if she'd rather I refer Ms. Hutchinson to uro-gyn. The vaginal discharge color is odd. No pathogens on microscopy. Cultures pending. Suspect may be physiologic with discoloration from scant bleeding from atrophic vaginitis. Patient Instructions   Schedule an appointment with Dr. Dian Delong for your groin pain (myofascial pain). I will contact her to determine whether she would like to see you to further evaluate your urinary issues or if I am referring you to a urologist. Please call my office back if you have not heard from us with the final plan by the end of the week. You have learned how to relieve your bladder by coughing. If that fails to work and you are unable to void, proceed to the Emergency Room; you may need a catheter placed in your bladder to let out the urine. Follow-up Disposition:  Return for urine, plan to follow based on results.       Subjective:   Louis Sandoval was seen today for Urinary Retention (f/u)    Here to geovanny ALBERTO concern of mixed urinary retention/incontinence, new problem as of May this year. Occurs 1-2 x/month lasting a few days at a time. pain resolves with emptying bladder. Prescribed oxybutinin empirically by Dr. Zeenat Madera with modest improvement in pain and retention, not incontinence. No longer taking it. Reports gradual spontaneous improvement since our last visit 10/8/2018. Today also notes vaginal odor since yesterday    Also with right inguinal pain for which she has received injection by Dr Melanie Venegas in the past with relief. She had noted a plan to follow up with her, thought scheduled late Oct. Last week, I'd told her I saw no such appointment scheduled and recommended she call her office. Not done as of yet. Chart review Dr. Melanie Venegas' notes from 3/6/2018: Atrophic vaginitis  Myofascial muscle pain (left side)  Vaginal odor    Post hysterectomy    Wet prep: negative for pathogens, scant cellularity, no clue cells  KOH: negative for pathogens, negative whiff             Outpatient Prescriptions Marked as Taking for the 10/16/18 encounter (Office Visit) with Charity Boles MD   Medication Sig Dispense Refill    [START ON 10/24/2018] morphine CR (MS CONTIN) 15 mg CR tablet Take 1 Tab by mouth every eight (8) hours for 30 days. Max Daily Amount: 45 mg. 90 Tab 0    [START ON 10/21/2018] oxyCODONE IR (ROXICODONE) 10 mg tab immediate release tablet Take 1 Tab by mouth four (4) times daily as needed for up to 30 days. Max Daily Amount: 40 mg. 120 Tab 0    [START ON 11/23/2018] morphine CR (MS CONTIN) 15 mg CR tablet Take 1 Tab by mouth every twelve (12) hours for 30 days. Max Daily Amount: 30 mg. 60 Tab 0    [START ON 12/22/2018] morphine CR (MS CONTIN) 15 mg CR tablet Take 1 Tab by mouth daily for 30 days. Max Daily Amount: 15 mg. 30 Tab 0    [START ON 11/20/2018] oxyCODONE IR (ROXICODONE) 10 mg tab immediate release tablet Take 1 Tab by mouth four (4) times daily as needed for up to 30 days.  Max Daily Amount: 40 mg. 120 Tab 0    [START ON 12/19/2018] oxyCODONE IR (ROXICODONE) 10 mg tab immediate release tablet Take 1 Tab by mouth four (4) times daily as needed for up to 30 days. Max Daily Amount: 40 mg. 120 Tab 0    cyclobenzaprine (FLEXERIL) 10 mg tablet TAKE 1 TABLET BY MOUTH THREE TIMES DAILY AS NEEDED FOR MUSCLE SPASMS FOR UP TO 30 DAYS. 90 Tab 2    montelukast (SINGULAIR) 10 mg tablet Take 10 mg by mouth daily.  fluticasone (FLOVENT HFA) 110 mcg/actuation inhaler Take 2 Puffs by inhalation every twelve (12) hours. Contact Dr. Berle Snellen office for further refills 1 Inhaler 0    polyethylene glycol (MIRALAX) 17 gram packet Take 1 Packet by mouth daily. 30 Each 6    fluticasone (FLONASE) 50 mcg/actuation nasal spray 2 Sprays by Both Nostrils route as needed. 1 Bottle 6    DEXILANT 60 mg CpDB TAKE ONE CAPSULE BY MOUTH ONE TIME DAILY (Patient taking differently: TAKE TWO CAPSULE BY MOUTH ONE TIME DAILY) 30 Cap 2    atorvastatin (LIPITOR) 40 mg tablet TAKE ONE TABLET BY MOUTH ONE TIME DAILY 90 Tab 3    albuterol (PROAIR HFA) 90 mcg/actuation inhaler Take 2 Puffs by inhalation every four (4) hours as needed for Wheezing. 1 Inhaler 3    losartan (COZAAR) 25 mg tablet TAKE ONE TABLET BY MOUTH ONE TIME DAILY 90 Tab 1    oxyCODONE-acetaminophen (PERCOCET) 7.5-325 mg per tablet Take 1 Tab by mouth every six (6) hours as needed for Pain (Hold if sedated. ). Max Daily Amount: 4 Tabs. 28 Tab 0    ergocalciferol (VITAMIN D2) 50,000 unit capsule TAKE ONE CAPSULE BY MOUTH EVERY 7 DAYS 12 Cap 3    QUEtiapine (SEROQUEL) 200 mg tablet Take 200 mg by mouth daily.  naloxone (NARCAN) 4 mg/actuation nasal spray 1 [place] by IntraNASal route once as needed. Use 1 spray intranasally into 1 nostril. Use a new Narcan nasal spray for subsequent doses and administer into alternating nostrils. May repeat every 2 to 3 minutes as needed.  temazepam (RESTORIL) 15 mg capsule Take 1 Cap by mouth nightly as needed for Sleep. Max Daily Amount: 15 mg.  Indications: INSOMNIA 30 Cap 1    gabapentin (NEURONTIN) 400 mg capsule 400 mg two (2) times a day.  trazodone (DESYREL) 100 mg tablet Take 100 mg by mouth nightly.  sertraline (ZOLOFT) 100 mg tablet Take 100 mg by mouth nightly as needed.          Patient Active Problem List    Diagnosis    Diverticulosis of large intestine without hemorrhage     [place] 4/2017      Hiatal hernia     h pylori tx 3/2013; multiple EGDs 1/21/2015, 5/24/2017, 4/13/2017, 2/21/2018 - normal mucosa      Hepatic steatosis     US 1/13/2017      Calculus of gallbladder without cholecystitis     CT 12/22/2016 also with duodenal diverticulum      Gastroesophageal reflux disease without esophagitis    Urinary retention    Vitamin D deficiency    Benign essential hypertension with target blood pressure below 140/90    Myalgia    Nausea    Hypercholesterolemia    Lung nodule seen on imaging study    Degeneration of lumbar or lumbosacral intervertebral disc    Displacement of lumbar intervertebral disc without myelopathy    Pain in joint, multiple sites    Cervicalgia    Postlaminectomy syndrome, cervical region    Annular tear of lumbar disc    Chronic pain syndrome    Carpal tunnel syndrome    Bipolar 1 disorder (HCC)    Lumbago    Other affections of shoulder region, not elsewhere classified    Other chronic pain         Allergies   Allergen Reactions    Aspirin Other (comments)     Stomach bleeding    Ativan [Lorazepam] Shortness of Breath    Macrobid [Nitrofurantoin Monohyd/M-Cryst] Nausea and Vomiting    Nsaids (Non-Steroidal Anti-Inflammatory Drug) Nausea and Vomiting    Opana [Oxymorphone] Other (comments)     Insomnia, weight loss, nightmares    Pcn [Penicillins] Hives         Patient Care Team:  Avery Narayanan MD as PCP - General (Family Practice)  Deep Portillo MD (Gastroenterology)  Pj Rubio, RN as [address] [place] [address] [address] Keegan RN as Ambulatory Care Navigator (Internal Medicine)  BETO Cespedes (Pain Management)  Norma Velázquez NP as Nurse Practitioner (Psychiatry)  Jony Hussein MD (Pulmonary Disease)    The following sections were reviewed & updated as appropriate: PMH, PSH, FH, and SH. Review of Systems   Constitutional: Negative for fever. Genitourinary:        No vaginal bleeding           Objective:     Visit Vitals    /80    Pulse 96    Temp 98.5 °F (36.9 °C) (Oral)    Resp 14    Ht 5' 2\" (1.575 m)    Wt 180 lb (81.6 kg)    SpO2 97%    BMI 32.92 kg/m2      Physical Exam   Constitutional: She is oriented to person, place, and time. She appears well-developed. No distress. HENT:   Head: Normocephalic and atraumatic. Pulmonary/Chest: Effort normal.   Genitourinary: No labial fusion. There is no rash, tenderness, lesion or injury on the right labia. There is no rash, tenderness, lesion or injury on the left labia. There is erythema (punctate throughout cw atrophy) in the vagina. No tenderness or bleeding in the vagina. No foreign body in the vagina. No signs of injury around the vagina. Vaginal discharge (scant, medium brown, homogenous, malodorous, thin ) found. Genitourinary Comments: Bimanual limited by diffuse lower abd/pelvic tenderness with external palpation and obese habitus   Lymphadenopathy:        Right: No inguinal adenopathy present. Left: No inguinal adenopathy present. Neurological: She is alert and oriented to person, place, and time. Psychiatric: She has a normal mood and affect. Her behavior is normal. Judgment and thought content normal.         Disclaimer: The patient understands our medical plan. Alternatives have been explained and offered. The risks, benefits and significant side effects of all medications have been reviewed. Anticipated time course and progression of condition reviewed. All questions have been addressed. She is encouraged to employ the information provided in the after visit summary, which was reviewed.       Where appropriate, she is instructed to call the clinic if she has not been notified either by phone or through [address] with the results of her tests or with an appointment plan for any referrals within 1 week(s). No news is not good news; it's no news. The patient  is to call if her condition worsens or fails to improve or if significant side effects are experienced. Aspects of this note may have been generated using voice recognition software. Despite editing, there may be unrecognized errors.        Catrachito Salamanca MD

## 2018-10-17 LAB
C TRACH RRNA SPEC QL NAA+PROBE: NEGATIVE
N GONORRHOEA RRNA SPEC QL NAA+PROBE: NEGATIVE
SPECIMEN SOURCE: NORMAL

## 2018-10-18 LAB
BACTERIA SPEC CULT: NORMAL
SERVICE CMNT-IMP: NORMAL

## 2018-10-18 NOTE — PROGRESS NOTES
Patient in the office to see Dr. Nolvia Navarrete had her stop at my desk  verified she has been made aware of her results.

## 2018-10-19 ENCOUNTER — TELEPHONE (OUTPATIENT)
Dept: FAMILY MEDICINE CLINIC | Age: 63
End: 2018-10-19

## 2018-10-19 NOTE — TELEPHONE ENCOUNTER
Please advise Ms. Hutchinson to call Dr. Day De' office to schedule appointment to be seen for both her groin pain and her urinary issues. Dr. Day De says she's pretty booked but they will ensure she's seen.     ERIKA

## 2018-10-25 DIAGNOSIS — J30.1 ALLERGIC RHINITIS DUE TO POLLEN, UNSPECIFIED SEASONALITY: ICD-10-CM

## 2018-10-25 RX ORDER — FLUTICASONE PROPIONATE 50 MCG
SPRAY, SUSPENSION (ML) NASAL
Refills: 2 | OUTPATIENT
Start: 2018-10-25

## 2018-10-26 RX ORDER — FLUTICASONE PROPIONATE 50 MCG
2 SPRAY, SUSPENSION (ML) NASAL AS NEEDED
Qty: 1 BOTTLE | Refills: 1 | Status: SHIPPED | OUTPATIENT
Start: 2018-10-26 | End: 2019-01-18 | Stop reason: SDUPTHER

## 2018-10-26 NOTE — TELEPHONE ENCOUNTER
I've not seen her for this medication before. Order modified with 1 rf. Not sure what the status is of her relationship with pulmonary. If she's done there, we can address this medication in a couple of months in conjunction with a review of her pulm evaluation and plan. I've not seen that report. Please request notes. This agent is also available over the counter if needed.  ERIKA

## 2018-10-26 NOTE — TELEPHONE ENCOUNTER
Called patient to see if she has scheduled an appointment with Dr. Allie Cornejo she states she has an appointment on 11/15/18.

## 2018-10-26 NOTE — TELEPHONE ENCOUNTER
Called patient to talk with her about GYN appointment she has asked if her Flonase had been filled she was told this was sent to Dr. Marianne Fournier but I will route to Dr. Kg Blackwood for review.

## 2018-10-28 DIAGNOSIS — J45.901 ASTHMA WITH ACUTE EXACERBATION, UNSPECIFIED ASTHMA SEVERITY, UNSPECIFIED WHETHER PERSISTENT: ICD-10-CM

## 2018-10-29 RX ORDER — DEXAMETHASONE 4 MG/1
TABLET ORAL
Refills: 0 | OUTPATIENT
Start: 2018-10-29

## 2018-10-29 NOTE — TELEPHONE ENCOUNTER
Automated refill request. She has seen a pulmonologist, who should be addressing all pulm med requests unless/until she returns to me. Thank you.  ERIKA

## 2018-10-29 NOTE — TELEPHONE ENCOUNTER
Spoke with the patient 10/29/2018 @ 10:23 am she stated she does not need a refill on the Flovent She needs a refill on her Flonase I advised the patient   to call the pharmacy and have them send the [facility] Rx request. the patient also stated she no longer needs th Flovent.

## 2018-10-31 NOTE — TELEPHONE ENCOUNTER
Called patient had her verify her  she does still see Dr. Richard Madrid and does get this medication filled with him, she is not scheduled to be seen with him until 2019. OV note has been requested.

## 2018-11-15 ENCOUNTER — OFFICE VISIT (OUTPATIENT)
Dept: OBGYN CLINIC | Age: 63
End: 2018-11-15

## 2018-11-15 VITALS
HEART RATE: 128 BPM | DIASTOLIC BLOOD PRESSURE: 80 MMHG | WEIGHT: 185.6 LBS | HEIGHT: 62 IN | BODY MASS INDEX: 34.16 KG/M2 | TEMPERATURE: 99.4 F | OXYGEN SATURATION: 90 % | SYSTOLIC BLOOD PRESSURE: 116 MMHG

## 2018-11-15 DIAGNOSIS — R00.0 TACHYCARDIA: ICD-10-CM

## 2018-11-15 DIAGNOSIS — M79.18 MYOFASCIAL MUSCLE PAIN: Primary | ICD-10-CM

## 2018-11-15 DIAGNOSIS — R51.9 INTRACTABLE HEADACHE, UNSPECIFIED CHRONICITY PATTERN, UNSPECIFIED HEADACHE TYPE: ICD-10-CM

## 2018-11-15 NOTE — PROGRESS NOTES
Name: Aisha Leonard MRN: [id number] [id number] [facility] YOB: 1955  Age: 58 y.o. Sex: female Chief Complaint Patient presents with  Groin Pain Pain for months in groin area- right side HPI 1. Myofascial muscle pain Pt notes that she has had recurrence of her RLQ pain that radiates to her groin, 10/10, had trigger pt injection at our last visit which was helpful until recently. Notes a sharp pain that radiates down her groin, seems similar to the pain she experienced last time 
  
2. Vaginal odor Noted at last visit, used repHresh which was helpful 3. Headache Notes that she starting having a HA today, usually does not have headaches OB History  Para Term  AB Living 4 4 4     4 SAB TAB Ectopic Molar Multiple Live Births 4  
   
 Obstetric Comments Patient has had a hysterectomyand menopausal 
History of sexually transmitted infections she thinks she had syphilis Last pap 2016 neg PGyn Social History Substance and Sexual Activity Sexual Activity No  
 
 
 
Past Medical History:  
Diagnosis Date  Annular tear of lumbar disc 11/10/2014  Asthma Bronchitis  Benign tumor of throat  Bone spur   
 right ankle  Cervicalgia 11/10/2014  Chronic pain   
 back  Degeneration of lumbar or lumbosacral intervertebral disc 11/10/2014  Degenerative disc disease  Depression  Displacement of lumbar intervertebral disc without myelopathy 11/10/2014  Elevated white blood cell count  Fibromyalgia  GERD (gastroesophageal reflux disease)  Hypertension  Insomnia  Nausea & vomiting  Pain in joint, multiple sites 11/10/2014  Postlaminectomy syndrome, cervical region 11/10/2014  Sinus infection 10/5/15  Ulcer Past Surgical History:  
Procedure Laterality Date  HX CATARACT REMOVAL    
 HX CERVICAL FUSION    
 HX HYSTERECTOMY  HX ORTHOPAEDIC    
 ganglion cyst removed, right hand  HX OTHER SURGICAL    
 cyst left thigh removed  HX WRIST FRACTURE TX    
 LAP,CHOLECYSTECTOMY N/A 02/27/2017 Dr. Saadia Dominguez Allergies Allergen Reactions  Aspirin Other (comments) Stomach bleeding  Ativan [Lorazepam] Shortness of Breath  Macrobid [Nitrofurantoin Monohyd/M-Cryst] Nausea and Vomiting  Nsaids (Non-Steroidal Anti-Inflammatory Drug) Nausea and Vomiting  Opana [Oxymorphone] Other (comments) Insomnia, weight loss, nightmares  Pcn [Penicillins] Hives Current Outpatient Medications on File Prior to Visit Medication Sig Dispense Refill  fluticasone (FLONASE) 50 mcg/actuation nasal spray 2 Sprays by Both Nostrils route as needed. 1 Bottle 1  
 Dexlansoprazole (DEXILANT) 60 mg CpDB Take 2 Caps by mouth.  oxyCODONE IR (ROXICODONE) 10 mg tab immediate release tablet Take 1 Tab by mouth four (4) times daily as needed for up to 30 days. Max Daily Amount: 40 mg. 120 Tab 0  
 [START ON 11/23/2018] morphine CR (MS CONTIN) 15 mg CR tablet Take 1 Tab by mouth every twelve (12) hours for 30 days. Max Daily Amount: 30 mg. 60 Tab 0  cyclobenzaprine (FLEXERIL) 10 mg tablet TAKE 1 TABLET BY MOUTH THREE TIMES DAILY AS NEEDED FOR MUSCLE SPASMS FOR UP TO 30 DAYS. 90 Tab 2  
 montelukast (SINGULAIR) 10 mg tablet Take 10 mg by mouth daily.  fluticasone (FLOVENT HFA) 110 mcg/actuation inhaler Take 2 Puffs by inhalation every twelve (12) hours. Contact Dr. Frida Palacios office for further refills 1 Inhaler 0  
 polyethylene glycol (MIRALAX) 17 gram packet Take 1 Packet by mouth daily. 30 Each 6  
 atorvastatin (LIPITOR) 40 mg tablet TAKE ONE TABLET BY MOUTH ONE TIME DAILY 90 Tab 3  
 albuterol (PROAIR HFA) 90 mcg/actuation inhaler Take 2 Puffs by inhalation every four (4) hours as needed for Wheezing.  1 Inhaler 3  
 losartan (COZAAR) 25 mg tablet TAKE ONE TABLET BY MOUTH ONE TIME DAILY 90 Tab 1  
  valACYclovir (VALTREX) 500 mg tablet Take 1 Tab by mouth two (2) times a day. (Patient taking differently: Take 500 mg by mouth two (2) times daily as needed.) 60 Tab 3  
 hydrOXYzine pamoate (VISTARIL) 50 mg capsule Take 1 Cap by mouth three (3) times daily as needed for Anxiety. 90 Cap 1  ACID GONE ANTACID  mg/15 mL suspension Take 15 mL by mouth.  0  
 ondansetron (ZOFRAN ODT) 4 mg disintegrating tablet Take 1 Tab by mouth every eight (8) hours as needed for Nausea for up to 15 doses. 15 Tab 0  
 ergocalciferol (VITAMIN D2) 50,000 unit capsule TAKE ONE CAPSULE BY MOUTH EVERY 7 DAYS 12 Cap 3  
 baclofen (LIORESAL) 10 mg tablet TAKE ONE TABLET BY MOUTH THREE TIMES A DAY 90 Tab 5  QUEtiapine (SEROQUEL) 200 mg tablet Take 200 mg by mouth daily.  naloxone (NARCAN) 4 mg/actuation nasal spray 1 [place] by IntraNASal route once as needed. Use 1 spray intranasally into 1 nostril. Use a new Narcan nasal spray for subsequent doses and administer into alternating nostrils. May repeat every 2 to 3 minutes as needed.  temazepam (RESTORIL) 15 mg capsule Take 1 Cap by mouth nightly as needed for Sleep. Max Daily Amount: 15 mg. Indications: INSOMNIA 30 Cap 1  
 albuterol (PROVENTIL VENTOLIN) 2.5 mg /3 mL (0.083 %) nebulizer solution Take 2.5 mg by inhalation daily as needed.  gabapentin (NEURONTIN) 400 mg capsule 400 mg two (2) times a day.  ipratropium (ATROVENT) 0.02 % nebulizer solution Take 0.5 mg by inhalation daily as needed.  trazodone (DESYREL) 100 mg tablet Take 100 mg by mouth nightly.  sertraline (ZOLOFT) 100 mg tablet Take 100 mg by mouth nightly as needed.  [START ON 12/19/2018] oxyCODONE IR (ROXICODONE) 10 mg tab immediate release tablet Take 1 Tab by mouth four (4) times daily as needed for up to 30 days. Max Daily Amount: 40 mg. 120 Tab 0 No current facility-administered medications on file prior to visit. Review of Systems Constitutional: Negative. Gastrointestinal: Positive for abdominal pain. Musculoskeletal: Positive for joint pain and myalgias. Neurological: Positive for headaches. Visit Vitals /80 Pulse (!) 128 Temp 99.4 °F (37.4 °C) (Oral) Ht 5' 2\" (1.575 m) Wt 185 lb 9.6 oz (84.2 kg) SpO2 90% BMI 33.95 kg/m² GENERAL:  Well developed, well nourished, in no distress ABDOMEN:  In LLQ, pt notes sharp pain that travels down her groin area Trigger point injection 
  
Preprocedure dx:        Myofascial pain on RLQ Postprocedure dx:       Myofascial pain on RLQ Procedure: Trigger point injection 
  
  
After consent was obtained, pt was placed in lithotomy position. The skin was cleansed with EtOH. The painful area was isolated. Threee mL of 1% lidocaine with epinephrine was used to injected the area in question. Pt was relieved of her pain. Pt tolerated the procedure well. No results found for this or any previous visit (from the past 24 hour(s)). ICD-10-CM ICD-9-CM 1. Myofascial muscle pain M79.18 729.1 2. Intractable headache, unspecified chronicity pattern, unspecified headache type R51 784.0 3. Tachycardia R00.0 785.0 1. Myofascial muscle pain Relief noted after the trigger pt injection 2. Intractable headache, unspecified chronicity pattern, unspecified headache type Unsure why to has this HA, recommend evaluation at the ED 3. Tachycardia Rechecked and in the 140s, referred to the hospital for further evaluation F/U PRN

## 2018-11-19 ENCOUNTER — TELEPHONE (OUTPATIENT)
Dept: PAIN MANAGEMENT | Age: 63
End: 2018-11-19

## 2018-11-19 NOTE — TELEPHONE ENCOUNTER
Patient called me about medicine refill 14892/8  8:57am    1. Name, verified  2. Oxycodone, morphine   3. [phone]  4. Analgesia -70% pain relief  5. ADL - she has a aid that does everything for her  6.   No side effects    **she asked if she could get medicine by [phone] as she is going out of town for 2 weeks**

## 2018-11-20 NOTE — TELEPHONE ENCOUNTER
Please remind patient to avoid concurrent use of benzodiazepines while on chronic opioid therapy. Thank you  Reviewed. OK to distribute prescription.

## 2018-11-21 NOTE — TELEPHONE ENCOUNTER
Patient identified using two patient identifiers (name and ); patient advised prescriptions are ready for pick-up. Patient also informed to avoid concurrent use of benzodiazepines and opioids d/t increase r/o respiratory depression. Patient verbalized understanding.

## 2018-11-26 ENCOUNTER — OFFICE VISIT (OUTPATIENT)
Dept: FAMILY MEDICINE CLINIC | Age: 63
End: 2018-11-26

## 2018-11-26 DIAGNOSIS — R73.03 PREDIABETES: Primary | ICD-10-CM

## 2018-11-26 DIAGNOSIS — K21.9 GASTROESOPHAGEAL REFLUX DISEASE WITHOUT ESOPHAGITIS: ICD-10-CM

## 2018-11-27 LAB
BASOPHILS # BLD AUTO: 0 X10E3/UL (ref 0–0.2)
BASOPHILS NFR BLD AUTO: 0 %
BUN SERPL-MCNC: 18 MG/DL (ref 8–27)
BUN/CREAT SERPL: 21 (ref 12–28)
CALCIUM SERPL-MCNC: 9.2 MG/DL (ref 8.7–10.3)
CHLORIDE SERPL-SCNC: 104 MMOL/L (ref 96–106)
CO2 SERPL-SCNC: 22 MMOL/L (ref 20–29)
CREAT SERPL-MCNC: 0.85 MG/DL (ref 0.57–1)
EOSINOPHIL # BLD AUTO: 0.1 X10E3/UL (ref 0–0.4)
EOSINOPHIL NFR BLD AUTO: 2 %
ERYTHROCYTE [DISTWIDTH] IN BLOOD BY AUTOMATED COUNT: 14.1 % (ref 12.3–15.4)
EST. AVERAGE GLUCOSE BLD GHB EST-MCNC: 126 MG/DL
GLUCOSE SERPL-MCNC: 109 MG/DL (ref 65–99)
HBA1C MFR BLD: 6 % (ref 4.8–5.6)
HCT VFR BLD AUTO: 36.5 % (ref 34–46.6)
HGB BLD-MCNC: 12.5 G/DL (ref 11.1–15.9)
IMM GRANULOCYTES # BLD: 0 X10E3/UL (ref 0–0.1)
IMM GRANULOCYTES NFR BLD: 0 %
LYMPHOCYTES # BLD AUTO: 3.2 X10E3/UL (ref 0.7–3.1)
LYMPHOCYTES NFR BLD AUTO: 45 %
MCH RBC QN AUTO: 29.1 PG (ref 26.6–33)
MCHC RBC AUTO-ENTMCNC: 34.2 G/DL (ref 31.5–35.7)
MCV RBC AUTO: 85 FL (ref 79–97)
MONOCYTES # BLD AUTO: 0.4 X10E3/UL (ref 0.1–0.9)
MONOCYTES NFR BLD AUTO: 6 %
NEUTROPHILS # BLD AUTO: 3.3 X10E3/UL (ref 1.4–7)
NEUTROPHILS NFR BLD AUTO: 47 %
PLATELET # BLD AUTO: 323 X10E3/UL (ref 150–379)
POTASSIUM SERPL-SCNC: 4.8 MMOL/L (ref 3.5–5.2)
RBC # BLD AUTO: 4.3 X10E6/UL (ref 3.77–5.28)
SODIUM SERPL-SCNC: 144 MMOL/L (ref 134–144)
WBC # BLD AUTO: 7.1 X10E3/UL (ref 3.4–10.8)

## 2018-12-03 ENCOUNTER — HOSPITAL ENCOUNTER (OUTPATIENT)
Dept: MAMMOGRAPHY | Age: 63
Discharge: HOME | End: 2018-12-03
Attending: FAMILY MEDICINE
Payer: MEDICARE

## 2018-12-03 DIAGNOSIS — Z12.31 VISIT FOR SCREENING MAMMOGRAM: ICD-10-CM

## 2018-12-03 PROCEDURE — 77063 BREAST TOMOSYNTHESIS BI: CPT

## 2018-12-06 ENCOUNTER — OFFICE VISIT (OUTPATIENT)
Dept: FAMILY MEDICINE CLINIC | Age: 63
End: 2018-12-06

## 2018-12-06 VITALS
HEART RATE: 92 BPM | OXYGEN SATURATION: 96 % | TEMPERATURE: 98.6 F | HEIGHT: 62 IN | BODY MASS INDEX: 33.68 KG/M2 | RESPIRATION RATE: 14 BRPM | SYSTOLIC BLOOD PRESSURE: 108 MMHG | WEIGHT: 183 LBS | DIASTOLIC BLOOD PRESSURE: 70 MMHG

## 2018-12-06 DIAGNOSIS — E66.9 OBESITY (BMI 30.0-34.9): ICD-10-CM

## 2018-12-06 DIAGNOSIS — B37.83 ANGULAR CHEILITIS WITH CANDIDIASIS: ICD-10-CM

## 2018-12-06 DIAGNOSIS — R73.03 PREDIABETES: Primary | ICD-10-CM

## 2018-12-06 RX ORDER — KETOCONAZOLE 20 MG/G
CREAM TOPICAL 2 TIMES DAILY
Qty: 15 G | Refills: 11 | Status: SHIPPED | OUTPATIENT
Start: 2018-12-06 | End: 2019-04-10

## 2018-12-06 RX ORDER — PNEUMOCOCCAL 13-VALENT CONJUGATE VACCINE 2.2; 2.2; 2.2; 2.2; 2.2; 4.4; 2.2; 2.2; 2.2; 2.2; 2.2; 2.2; 2.2 UG/.5ML; UG/.5ML; UG/.5ML; UG/.5ML; UG/.5ML; UG/.5ML; UG/.5ML; UG/.5ML; UG/.5ML; UG/.5ML; UG/.5ML; UG/.5ML; UG/.5ML
INJECTION, SUSPENSION INTRAMUSCULAR
Refills: 0 | COMMUNITY
Start: 2018-09-27 | End: 2019-01-04 | Stop reason: ALTCHOICE

## 2018-12-06 RX ORDER — BUTALBITAL, ACETAMINOPHEN AND CAFFEINE 50; 325; 40 MG/1; MG/1; MG/1
TABLET ORAL
COMMUNITY
Start: 2018-11-15 | End: 2019-04-12

## 2018-12-06 RX ORDER — BUSPIRONE HYDROCHLORIDE 5 MG/1
TABLET ORAL
COMMUNITY
Start: 2018-11-11 | End: 2019-01-04

## 2018-12-06 RX ORDER — LIDOCAINE HYDROCHLORIDE 20 MG/ML
SOLUTION ORAL; TOPICAL
COMMUNITY
Start: 2018-09-21 | End: 2019-01-04

## 2018-12-06 RX ORDER — SUCRALFATE 1 G/10ML
1000 SUSPENSION ORAL
COMMUNITY
Start: 2017-08-14 | End: 2019-01-04

## 2018-12-06 RX ORDER — RIZATRIPTAN BENZOATE 10 MG/1
TABLET ORAL
COMMUNITY
Start: 2018-11-19 | End: 2019-05-20 | Stop reason: ALTCHOICE

## 2018-12-06 RX ORDER — LIDOCAINE 50 MG/G
OINTMENT TOPICAL
COMMUNITY
Start: 2018-12-03 | End: 2019-10-17 | Stop reason: ALTCHOICE

## 2018-12-06 RX ORDER — DOXYCYCLINE 100 MG/1
CAPSULE ORAL
COMMUNITY
Start: 2018-11-16 | End: 2019-01-04 | Stop reason: ALTCHOICE

## 2018-12-06 NOTE — PROGRESS NOTES
Alla Cordero is a 58 y.o. female who was seen in clinic today (12/6/2018). Assessment & Plan: ICD-10-CM ICD-9-CM 1. Prediabetes R73.03 790.29 HEMOGLOBIN A1C WITH EAG  
   METABOLIC PANEL, BASIC  
2. Obesity (BMI 30.0-34. 9) E66.9 278.00   
3. Angular cheilitis with candidiasis B37.0 112.0 ketoconazole (NIZORAL) 2 % topical cream  
 
15-30% of patients with prediabetes will develop diabetes over the course of 5 years. The Diabetes Prevention Program showed therapeutic lifestyle changes/weight loss favored over medication: diet/exercise Angular cheilitis: well controlled with therapy. Continue Follow-up Disposition: 
Return for Medicare Wellness Visit at your convenience, follow up prediabetes 1 year , non fasting labs prior. Subjective:  
Alla Cordero was seen today for Diabetes (f/u ) and Follow-up (Blood work ) FU prediabetes Requesting new order for ketoconazole cream, prescribed elsewhere. Sounds as though for angular cheilitis \"because I drool while I sleep. \" Using x 3-4 months with good effect. Lab Results Component Value Date/Time Hemoglobin A1c 6.0 (H) 11/26/2018 12:00 AM  
 Hemoglobin A1c 6.1 (H) 07/06/2018 01:18 PM  
 Hemoglobin A1c 5.7 (H) 03/07/2018 02:41 PM  
 Hemoglobin A1c, External 5.9 06/27/2016 Glucose 109 (H) 11/26/2018 12:00 AM  
 LDL, calculated 89.8 03/07/2018 02:41 PM  
 Creatinine, POC 0.9 09/06/2017 11:43 AM  
 Creatinine 0.85 11/26/2018 12:00 AM  
 
 
Results for orders placed or performed in visit on 11/26/18 CBC WITH AUTOMATED DIFF Result Value Ref Range WBC 7.1 3.4 - 10.8 x10E3/uL  
 RBC 4.30 3.77 - 5.28 x10E6/uL HGB 12.5 11.1 - 15.9 g/dL HCT 36.5 34.0 - 46.6 % MCV 85 79 - 97 fL  
 MCH 29.1 26.6 - 33.0 pg  
 MCHC 34.2 31.5 - 35.7 g/dL  
 RDW 14.1 12.3 - 15.4 % PLATELET 976 995 - 993 x10E3/uL NEUTROPHILS 47 Not Estab. % Lymphocytes 45 Not Estab. % MONOCYTES 6 Not Estab. %  EOSINOPHILS 2 Not Estab. %  
 BASOPHILS 0 Not Estab. %  
 ABS. NEUTROPHILS 3.3 1.4 - 7.0 x10E3/uL Abs Lymphocytes 3.2 (H) 0.7 - 3.1 x10E3/uL  
 ABS. MONOCYTES 0.4 0.1 - 0.9 x10E3/uL  
 ABS. EOSINOPHILS 0.1 0.0 - 0.4 x10E3/uL  
 ABS. BASOPHILS 0.0 0.0 - 0.2 x10E3/uL IMMATURE GRANULOCYTES 0 Not Estab. %  
 ABS. IMM. GRANS. 0.0 0.0 - 0.1 x10E3/uL METABOLIC PANEL, BASIC Result Value Ref Range Glucose 109 (H) 65 - 99 mg/dL BUN 18 8 - 27 mg/dL Creatinine 0.85 0.57 - 1.00 mg/dL GFR est non-AA 73 >59 mL/min/1.73 GFR est AA 84 >59 mL/min/1.73  
 BUN/Creatinine ratio 21 12 - 28 Sodium 144 134 - 144 mmol/L Potassium 4.8 3.5 - 5.2 mmol/L Chloride 104 96 - 106 mmol/L  
 CO2 22 20 - 29 mmol/L Calcium 9.2 8.7 - 10.3 mg/dL HEMOGLOBIN A1C WITH EAG Result Value Ref Range Hemoglobin A1c 6.0 (H) 4.8 - 5.6 % Estimated average glucose 126 mg/dL Outpatient Medications Marked as Taking for the 12/6/18 encounter (Office Visit) with Lazarus Finch MD  
Medication Sig Dispense Refill  butalbital-acetaminophen-caffeine (FIORICET, ESGIC) -40 mg per tablet Take 1 Tab by Mouth Every 4 Hours As Needed.  sucralfate (CARAFATE) 100 mg/mL suspension 1,000 mg.    
 fluticasone (FLONASE) 50 mcg/actuation nasal spray 2 Sprays by Both Nostrils route as needed. 1 Bottle 1  
 Dexlansoprazole (DEXILANT) 60 mg CpDB Take 2 Caps by mouth.  morphine CR (MS CONTIN) 15 mg CR tablet Take 1 Tab by mouth every twelve (12) hours for 30 days. Max Daily Amount: 30 mg. (Patient taking differently: Take 60 mg by mouth every twelve (12) hours.) 60 Tab 0  
 [START ON 12/19/2018] oxyCODONE IR (ROXICODONE) 10 mg tab immediate release tablet Take 1 Tab by mouth four (4) times daily as needed for up to 30 days. Max Daily Amount: 40 mg. 120 Tab 0  cyclobenzaprine (FLEXERIL) 10 mg tablet TAKE 1 TABLET BY MOUTH THREE TIMES DAILY AS NEEDED FOR MUSCLE SPASMS FOR UP TO 30 DAYS.  90 Tab 2  
  polyethylene glycol (MIRALAX) 17 gram packet Take 1 Packet by mouth daily. 30 Each 6  
 atorvastatin (LIPITOR) 40 mg tablet TAKE ONE TABLET BY MOUTH ONE TIME DAILY 90 Tab 3  
 losartan (COZAAR) 25 mg tablet TAKE ONE TABLET BY MOUTH ONE TIME DAILY 90 Tab 1  valACYclovir (VALTREX) 500 mg tablet Take 1 Tab by mouth two (2) times a day. (Patient taking differently: Take 500 mg by mouth two (2) times daily as needed.) 60 Tab 3  ACID GONE ANTACID  mg/15 mL suspension Take 15 mL by mouth.  0  
 ondansetron (ZOFRAN ODT) 4 mg disintegrating tablet Take 1 Tab by mouth every eight (8) hours as needed for Nausea for up to 15 doses. 15 Tab 0  
 ergocalciferol (VITAMIN D2) 50,000 unit capsule TAKE ONE CAPSULE BY MOUTH EVERY 7 DAYS 12 Cap 3  
 QUEtiapine (SEROQUEL) 200 mg tablet Take 200 mg by mouth daily.  naloxone (NARCAN) 4 mg/actuation nasal spray 1 [place] by IntraNASal route once as needed. Use 1 spray intranasally into 1 nostril. Use a new Narcan nasal spray for subsequent doses and administer into alternating nostrils. May repeat every 2 to 3 minutes as needed.  temazepam (RESTORIL) 15 mg capsule Take 1 Cap by mouth nightly as needed for Sleep. Max Daily Amount: 15 mg. Indications: INSOMNIA 30 Cap 1  
 gabapentin (NEURONTIN) 400 mg capsule 400 mg two (2) times a day.  trazodone (DESYREL) 100 mg tablet Take 100 mg by mouth nightly.  sertraline (ZOLOFT) 100 mg tablet Take 100 mg by mouth nightly as needed. Patient Active Problem List  
 Diagnosis  Obesity (BMI 30.0-34. 9)  Prediabetes  Angular cheilitis with candidiasis  Diverticulosis of large intestine without hemorrhage [place] 4/2017 
  
 Hiatal hernia  
  h pylori tx 3/2013; multiple EGDs 1/21/2015, 5/24/2017, 4/13/2017, 2/21/2018 - normal mucosa  Hepatic steatosis US 1/13/2017  Calculus of gallbladder without cholecystitis CT 12/22/2016 also with duodenal diverticulum  Gastroesophageal reflux disease without esophagitis  Urinary retention  Vitamin D deficiency  Benign essential hypertension with target blood pressure below 140/90  Myalgia  Nausea  Hypercholesterolemia  Lung nodule seen on imaging study  Degeneration of lumbar or lumbosacral intervertebral disc  Displacement of lumbar intervertebral disc without myelopathy  Pain in joint, multiple sites  Cervicalgia  Postlaminectomy syndrome, cervical region  Annular tear of lumbar disc  Chronic pain syndrome  Carpal tunnel syndrome  Bipolar 1 disorder ([address]  Lumbago  Other affections of shoulder region, not elsewhere classified  Other chronic pain Allergies Allergen Reactions  Aspirin Other (comments) Stomach bleeding  Ativan [Lorazepam] Shortness of Breath  Macrobid [Nitrofurantoin Monohyd/M-Cryst] Nausea and Vomiting  Nsaids (Non-Steroidal Anti-Inflammatory Drug) Nausea and Vomiting  Opana [Oxymorphone] Other (comments) Insomnia, weight loss, nightmares  Pcn [Penicillins] Hives Patient Care Team: 
Wayne French MD as PCP - [facility]) Tutu Aldana MD (Gastroenterology) Mckayla Mendez RN as Ambulatory Care Navigator Branden Mendoza RN as Ambulatory Care Navigator (Internal Medicine) [place], [place] (Pain Management) Elmer Stahl NP as Nurse Practitioner (Psychiatry) Rey Campos MD (Pulmonary Disease) The following sections were reviewed & updated as appropriate: PMH, PSH, FH, and SH. Objective:  
 
Visit Vitals /70 Pulse 92 Temp 98.6 °F (37 °C) (Oral) Resp 14 Ht 5' 2\" (1.575 m) Wt 183 lb (83 kg) SpO2 96% BMI 33.47 kg/m² Physical Exam  
Constitutional: She is oriented to person, place, and time. She appears well-developed. No distress. Pleasant obese black female HENT:  
Head: Normocephalic and atraumatic. No external oral lesions noted Pulmonary/Chest: Effort normal.  
Neurological: She is alert and oriented to person, place, and time. Psychiatric: She has a normal mood and affect. Her behavior is normal. Judgment and thought content normal.  
 
 
 
Disclaimer: The patient understands our medical plan. Alternatives have been explained and offered. The risks, benefits and significant side effects of all medications have been reviewed. Anticipated time course and progression of condition reviewed. All questions have been addressed. She is encouraged to employ the information provided in the after visit summary, which was reviewed. Where appropriate, she is instructed to call the clinic if she has not been notified either by phone or through [address] with the results of her tests or with an appointment plan for any referrals within 1 week(s). No news is not good news; it's no news. The patient  is to call if her condition worsens or fails to improve or if significant side effects are experienced. Aspects of this note may have been generated using voice recognition software. Despite editing, there may be unrecognized errors.   
 
 
Elsa Mendes MD

## 2018-12-06 NOTE — PATIENT INSTRUCTIONS
Prediabetes: Care Instructions Your Care Instructions Prediabetes is a warning sign that you are at risk for getting type 2 diabetes. It means that your blood sugar is higher than it should be. The food you eat turns into sugar, which your body uses for energy. Normally, an organ called the pancreas makes insulin, which allows the sugar in your blood to get into your body's cells. But when your body can't use insulin the right way, the sugar doesn't move into cells. It stays in your blood instead. This is called insulin resistance. The buildup of sugar in the blood causes prediabetes. The good news is that lifestyle changes may help you get your blood sugar back to normal and help you avoid or delay diabetes. Follow-up care is a key part of your treatment and safety. Be sure to make and go to all appointments, and call your doctor if you are having problems. It's also a good idea to know your test results and keep a list of the medicines you take. How can you care for yourself at home? · Watch your weight. A healthy weight helps your body use insulin properly. · Limit the amount of calories, sweets, and unhealthy fat you eat. Ask your doctor if you should see a dietitian. A registered dietitian can help you create meal plans that fit your lifestyle. · Get at least 30 minutes of exercise on most days of the week. Exercise helps control your blood sugar. It also helps you maintain a healthy weight. Walking is a good choice. You also may want to do other activities, such as running, swimming, cycling, or playing tennis or team sports. · Do not smoke. Smoking can make prediabetes worse. If you need help quitting, talk to your doctor about stop-smoking programs and medicines. These can increase your chances of quitting for good. · If your doctor prescribed medicines, take them exactly as prescribed. Call your doctor if you think you are having a problem with your medicine. You will get more details on the specific medicines your doctor prescribes. When should you call for help? Watch closely for changes in your health, and be sure to contact your doctor if: 
  · You have any symptoms of diabetes. These may include: 
? Being thirsty more often. ? Urinating more. ? Being hungrier. ? Losing weight. ? Being very tired. ? Having blurry vision.  
  · You have a wound that will not heal.  
  · You have an infection that will not go away.  
  · You have problems with your blood pressure.  
  · You want more information about diabetes and how you can keep from getting it. Where can you learn more? Go to http://diana-lashonda.info/. Enter I222 in the search box to learn more about \"Prediabetes: Care Instructions. \" Current as of: December 7, 2017 Content Version: 11.8 © 3788-2324 [employer]. Care instructions adapted under license by [employer] (which disclaims liability or warranty for this information). If you have questions about a medical condition or this instruction, always ask your healthcare professional. [address] any warranty or liability for your use of this information.

## 2018-12-06 NOTE — PROGRESS NOTES
Ravi Felicianopauline Jasper 58 y.o. female being seen for Chief Complaint Patient presents with  Diabetes f/u  Follow-up Blood work No concerns The patient states that she would like a refill on her Ketocortazole Cream 2% for her lips 1. Have you been to the ER, urgent care clinic since your last visit? Hospitalized since your last visit? Yes 11/15/18 High heart rate [facility]  
 
2. Have you seen or consulted any other health care providers outside of the [facility] since your last visit? Include any pap smears or colon screening. Yes ER visit see above note Pharmacy has been verified Care team has been verified/updated

## 2018-12-10 ENCOUNTER — TELEPHONE (OUTPATIENT)
Dept: FAMILY MEDICINE CLINIC | Age: 63
End: 2018-12-10

## 2018-12-10 NOTE — TELEPHONE ENCOUNTER
Ms. Silvia Bob called asking for a mouthrinse. She couldn't remember the name/spelling of it, starts with [facility]. \"   Dr. Polly Montoya prescribed for her before. I reviewed the med history. I can't seem to find what she is looking for. Please assist me in tagging the medication on. She would like a return call once msg is reviewed.

## 2018-12-17 DIAGNOSIS — R20.0 NUMBNESS OF TOES: ICD-10-CM

## 2018-12-19 ENCOUNTER — HOSPITAL ENCOUNTER (OUTPATIENT)
Age: 63
Discharge: HOME | End: 2018-12-19
Attending: PHYSICAL MEDICINE & REHABILITATION
Payer: MEDICARE

## 2018-12-19 DIAGNOSIS — M19.90 DJD (DEGENERATIVE JOINT DISEASE): ICD-10-CM

## 2018-12-19 DIAGNOSIS — M54.50 LOW BACK PAIN: ICD-10-CM

## 2018-12-19 PROCEDURE — 72148 MRI LUMBAR SPINE W/O DYE: CPT

## 2018-12-19 NOTE — TELEPHONE ENCOUNTER
12/19/2018  Called the [facility] in [place] and they found Chlorhexidine 0.12% oral rinse prescribed by a different Dr.    I called Ms. Hutchinson (No Answer) Left a message for her to call us back at her earliest convenience     SMD

## 2018-12-19 NOTE — TELEPHONE ENCOUNTER
Spoke with Mary Jo LAZO 1955 (confirmed) on 2018    the patient stated that she got the mouthwash / rinse from her dentist and that she no longer needs it.      SMD

## 2018-12-27 ENCOUNTER — OFFICE VISIT (OUTPATIENT)
Dept: ORTHOPEDIC SURGERY | Age: 63
End: 2018-12-27

## 2018-12-27 VITALS
RESPIRATION RATE: 16 BRPM | TEMPERATURE: 97.7 F | HEART RATE: 90 BPM | SYSTOLIC BLOOD PRESSURE: 112 MMHG | OXYGEN SATURATION: 98 % | BODY MASS INDEX: 33.9 KG/M2 | WEIGHT: 184.2 LBS | DIASTOLIC BLOOD PRESSURE: 73 MMHG | HEIGHT: 62 IN

## 2018-12-27 DIAGNOSIS — M65.841 OTHER SYNOVITIS AND TENOSYNOVITIS, RIGHT HAND: ICD-10-CM

## 2018-12-27 DIAGNOSIS — M65.842 OTHER SYNOVITIS AND TENOSYNOVITIS, LEFT HAND: Primary | ICD-10-CM

## 2018-12-27 RX ORDER — DICLOFENAC SODIUM 10 MG/G
GEL TOPICAL 4 TIMES DAILY
Qty: 1 G | Refills: 1 | Status: SHIPPED | OUTPATIENT
Start: 2018-12-27

## 2018-12-27 NOTE — PROGRESS NOTES
Juan Manuel Cavazos is a 58 y.o. female ambidextrous on disability. Worker's Compensation and legal considerations: none filed.     Vitals:    12/27/18 1059   BP: 112/73   Pulse: 90   Resp: 16   Temp: 97.7 °F (36.5 °C)   TempSrc: Oral   SpO2: 98%   Weight: 184 lb 3.2 oz (83.6 kg)   Height: 5' 2\" (1.575 m)   PainSc:   8           Chief Complaint   Patient presents with    Hand Pain     gulshan hand pain          HPI: Patient returns today for 3-month follow-up    Date of onset:  3 months on the right wrist, and almost 2 mons postop on the left    Injury: No    Prior Treatment:  Yes: Comment: Tenosynovectomy of the L hand and therapy    Numbness/ Tingling: No    Review of Systems - General ROS: negative  Respiratory ROS: no cough, shortness of breath, or wheezing  Cardiovascular ROS: no chest pain or dyspnea on exertion  Musculoskeletal ROS: positive for - pain in hand - bilateral  Neurological ROS: negative  Dermatological ROS: negative    Past Medical History:   Diagnosis Date    Annular tear of lumbar disc 11/10/2014    Asthma     Bronchitis    Benign tumor of throat     Bone spur     right ankle    Cervicalgia 11/10/2014    Chronic pain     back    Degeneration of lumbar or lumbosacral intervertebral disc 11/10/2014    Degenerative disc disease     Depression     Displacement of lumbar intervertebral disc without myelopathy 11/10/2014    Elevated white blood cell count     Fibromyalgia     GERD (gastroesophageal reflux disease)     Hypertension     Insomnia     Nausea & vomiting     Pain in joint, multiple sites 11/10/2014    Postlaminectomy syndrome, cervical region 11/10/2014    Sinus infection 10/5/15    Ulcer        Past Surgical History:   Procedure Laterality Date    COLONOSCOPY N/A 4/13/2017    COLONOSCOPY performed [facility] at [facility] ENDOSCOPY    HX CATARACT REMOVAL      HX CERVICAL FUSION      HX HYSTERECTOMY      HX ORTHOPAEDIC      ganglion cyst removed, right hand    HX OTHER SURGICAL      cyst left thigh removed    HX WRIST FRACTURE TX      LAP,CHOLECYSTECTOMY N/A 02/27/2017    Dr. Zaidi Second       Current Outpatient Medications   Medication Sig Dispense Refill    diclofenac (VOLTAREN) 1 % gel Apply  to affected area four (4) times daily. 1 g 1    busPIRone (BUSPAR) 5 mg tablet       butalbital-acetaminophen-caffeine (FIORICET, ESGIC) -40 mg per tablet Take 1 Tab by Mouth Every 4 Hours As Needed.  doxycycline (VIBRAMYCIN) 100 mg capsule       lidocaine (XYLOCAINE) 5 % ointment       LIDOCAINE VISCOUS 2 % solution       PREVNAR 13, PF, 0.5 mL syrg injection inject 0.5 milliliter intramuscularly  0    rizatriptan (MAXALT) 10 mg tablet       sucralfate (CARAFATE) 100 mg/mL suspension 1,000 mg.  ketoconazole (NIZORAL) 2 % topical cream Apply  to affected area two (2) times a day. 15 g 11    fluticasone (FLONASE) 50 mcg/actuation nasal spray 2 Sprays by Both Nostrils route as needed. 1 Bottle 1    oxyCODONE IR (ROXICODONE) 10 mg tab immediate release tablet Take 1 Tab by mouth four (4) times daily as needed for up to 30 days. Max Daily Amount: 40 mg. 120 Tab 0    cyclobenzaprine (FLEXERIL) 10 mg tablet TAKE 1 TABLET BY MOUTH THREE TIMES DAILY AS NEEDED FOR MUSCLE SPASMS FOR UP TO 30 DAYS. 90 Tab 2    montelukast (SINGULAIR) 10 mg tablet Take 10 mg by mouth daily.  fluticasone (FLOVENT HFA) 110 mcg/actuation inhaler Take 2 Puffs by inhalation every twelve (12) hours. Contact Dr. Reta Bond office for further refills 1 Inhaler 0    polyethylene glycol (MIRALAX) 17 gram packet Take 1 Packet by mouth daily. 30 Each 6    atorvastatin (LIPITOR) 40 mg tablet TAKE ONE TABLET BY MOUTH ONE TIME DAILY 90 Tab 3    albuterol (PROAIR HFA) 90 mcg/actuation inhaler Take 2 Puffs by inhalation every four (4) hours as needed for Wheezing.  1 Inhaler 3    losartan (COZAAR) 25 mg tablet TAKE ONE TABLET BY MOUTH ONE TIME DAILY 90 Tab 1    valACYclovir (VALTREX) 500 mg tablet Take 1 Tab by mouth two (2) times a day. (Patient taking differently: Take 500 mg by mouth two (2) times daily as needed.) 60 Tab 3    hydrOXYzine pamoate (VISTARIL) 50 mg capsule Take 1 Cap by mouth three (3) times daily as needed for Anxiety. 90 Cap 1    ACID GONE ANTACID  mg/15 mL suspension Take 15 mL by mouth.  0    ondansetron (ZOFRAN ODT) 4 mg disintegrating tablet Take 1 Tab by mouth every eight (8) hours as needed for Nausea for up to 15 doses. 15 Tab 0    ergocalciferol (VITAMIN D2) 50,000 unit capsule TAKE ONE CAPSULE BY MOUTH EVERY 7 DAYS 12 Cap 3    baclofen (LIORESAL) 10 mg tablet TAKE ONE TABLET BY MOUTH THREE TIMES A DAY 90 Tab 5    QUEtiapine (SEROQUEL) 200 mg tablet Take 200 mg by mouth daily.  naloxone (NARCAN) 4 mg/actuation nasal spray 1 [place] by IntraNASal route once as needed. Use 1 spray intranasally into 1 nostril. Use a new Narcan nasal spray for subsequent doses and administer into alternating nostrils. May repeat every 2 to 3 minutes as needed.  temazepam (RESTORIL) 15 mg capsule Take 1 Cap by mouth nightly as needed for Sleep. Max Daily Amount: 15 mg. Indications: INSOMNIA 30 Cap 1    albuterol (PROVENTIL VENTOLIN) 2.5 mg /3 mL (0.083 %) nebulizer solution Take 2.5 mg by inhalation daily as needed.  gabapentin (NEURONTIN) 400 mg capsule 400 mg two (2) times a day.  ipratropium (ATROVENT) 0.02 % nebulizer solution Take 0.5 mg by inhalation daily as needed.  trazodone (DESYREL) 100 mg tablet Take 100 mg by mouth nightly.  sertraline (ZOLOFT) 100 mg tablet Take 100 mg by mouth nightly as needed.          Allergies   Allergen Reactions    Aspirin Other (comments)     Stomach bleeding    Ativan [Lorazepam] Shortness of Breath    Macrobid [Nitrofurantoin Monohyd/M-Cryst] Nausea and Vomiting    Nsaids (Non-Steroidal Anti-Inflammatory Drug) Nausea and Vomiting    Opana [Oxymorphone] Other (comments)     Insomnia, weight loss, nightmares    Pcn [Penicillins] Hives         PE:     Wrist:    Tenderness L R Test L R   1st Ext Comp - - Finkelstein's - -   Snuff Box - - Sánchez - -   2nd Ext Comp - - S-L Shear - -   S-L Joint - - L-T Shear - -   L-T Joint - - DRUJ Sup - -   6th Ext Comp - - DRUJ Pro - -   Ulnar Snuff - - DRUJ Grind - -   Fovea - - TFCC - -   STT Joint - - Mid-Carp Inst - -   FCR - - P-T Grind - -   Intersection - - ECU Sublux. - -      Dorsal Ganglion: +    Volar Ganglion: -    Hand: There is palpable scar tissue about the dorsum of the left hand at the incisional site of her left extensor tenosynovectomy. There is mild tenderness to palpation in this area. There is no acute swelling or erythema noted. Range of motion is limited bilaterally. She has a palm to pulp distance of approximately 1-1/2-2 cm. Examination L Digit(s) R Digit(s)   1st CMC Tenderness -  -    1st CMC Grind -  -    Yamileth Nodes -  -    Heberden Nodes -  -    A1 Pulley Tenderness -  -    Triggering -  -    UCL Instability -  -    RCL Instability -  -    Lateral Stress Pain -  -    Palmar Cords -  -    Tabletop test -  -    Garrod's Pads -  -     Strength       Pinch Strength         ROM: within 2 cm of finger flexion        Imaging: Plain films of R wrist are negative for acute processes, or osteoarthritis    MRI reviewed that was done on 8/22 is + for 1st DC tenosynovitis with an associated ganglion cyst.      ICD-10-CM ICD-9-CM    1. Tenosynovitis of left hand M65.9 727.05 diclofenac (VOLTAREN) 1 % gel      REFERRAL TO OCCUPATIONAL THERAPY   2. Tenosynovitis of right hand M65.9 727.05 diclofenac (VOLTAREN) 1 % gel      REFERRAL TO OCCUPATIONAL THERAPY       Plan: At this point I will send her to occupational therapy. She said she would be willing to go if she can bring her heating pad for her back. I have also told her that at her primary care appointment next week she should ask for a rheumatoid workup.     I have given her Voltaren gel for bilateral hand pain. I discussed with her her listed aspirin allergy and she said it just bothers her stomach. Follow-up in 2 months.     Plan was reviewed with patient, who verbalized agreement and understanding of the plan

## 2018-12-27 NOTE — PROGRESS NOTES
1. Have you been to the ER, urgent care clinic since your last visit? Hospitalized since your last visit? No    2. Have you seen or consulted any other health care providers outside of the [address] [place] [address] since your last visit? Include any pap smears or colon screening.  No

## 2019-01-03 NOTE — PATIENT INSTRUCTIONS
Medicare Wellness Visit, Female The best way to live healthy is to have a lifestyle where you eat a well-balanced diet, exercise regularly, limit alcohol use, and quit all forms of tobacco/nicotine, if applicable. Regular preventive services are another way to keep healthy. Preventive services (vaccines, screening tests, monitoring & exams) can help personalize your care plan, which helps you manage your own care. Screening tests can find health problems at the earliest stages, when they are easiest to treat. [facility] follows the current, evidence-based guidelines published by the [facility] ([facility]) when recommending preventive services for our patients. Because we follow these guidelines, sometimes recommendations change over time as research supports it. (For example, mammograms used to be recommended annually. Even though Medicare will still pay for an annual mammogram, the newer guidelines recommend a mammogram every two years for women of average risk.) Of course, you and your doctor may decide to screen more often for some diseases, based on your risk and your health status. Preventive services for you include: - Medicare offers their members a free annual wellness visit, which is time for you and your primary care provider to discuss and plan for your preventive service needs. Take advantage of this benefit every year! 
-All adults over the age of 72 should receive the recommended pneumonia vaccines. Current USPSTF guidelines recommend a series of two vaccines for the best pneumonia protection.  
-All adults should have a flu vaccine yearly and a tetanus vaccine every 10 years. All adults age 61 and older should receive a shingles vaccine once in their lifetime.   
-A bone mass density test is recommended when a woman turns 65 to screen for osteoporosis. This test is only recommended one time, as a screening. Some providers will use this same test as a disease monitoring tool if you already have osteoporosis. -All adults age 38-68 who are overweight should have a diabetes screening test once every three years.  
-Other screening tests and preventive services for persons with diabetes include: an eye exam to screen for diabetic retinopathy, a kidney function test, a foot exam, and stricter control over your cholesterol.  
-Cardiovascular screening for adults with routine risk involves an electrocardiogram (ECG) at intervals determined by your doctor.  
-Colorectal cancer screenings should be done for adults age 54-65 with no increased risk factors for colorectal cancer. There are a number of acceptable methods of screening for this type of cancer. Each test has its own benefits and drawbacks. Discuss with your doctor what is most appropriate for you during your annual wellness visit. The different tests include: colonoscopy (considered the best screening method), a fecal occult blood test, a fecal DNA test, and sigmoidoscopy. -Breast cancer screenings are recommended every other year for women of normal risk, age 54-69. 
-Cervical cancer screenings for women over age 72 are only recommended with certain risk factors.  
-All adults born between [facility] should be screened once for Hepatitis C. Here is a list of your current Health Maintenance items (your personalized list of preventive services) with a due date: 
Health Maintenance Due Topic Date Due  
 DTaP/Tdap/Td  (1 - Tdap) 03/30/1975  Shingles Vaccine (1 of 2) 03/30/2004 [facility] Annual Well Visit  10/13/2018  Bone Mineral Density   03/30/2019 Well Visit, Women 48 to 72: Care Instructions Your Care Instructions Physical exams can help you stay healthy. Your doctor has checked your overall health and may have suggested ways to take good care of yourself. He or she also may have recommended tests.  At home, you can help prevent illness with healthy eating, regular exercise, and other steps. Follow-up care is a key part of your treatment and safety. Be sure to make and go to all appointments, and call your doctor if you are having problems. It's also a good idea to know your test results and keep a list of the medicines you take. How can you care for yourself at home? · Reach and stay at a healthy weight. This will lower your risk for many problems, such as obesity, diabetes, heart disease, and high blood pressure. · Get at least 30 minutes of exercise on most days of the week. Walking is a good choice. You also may want to do other activities, such as running, swimming, cycling, or playing tennis or team sports. · Do not smoke. Smoking can make health problems worse. If you need help quitting, talk to your doctor about stop-smoking programs and medicines. These can increase your chances of quitting for good. · Protect your skin from too much sun. When you're outdoors from 10 a.m. to 4 p.m., stay in the shade or cover up with clothing and a hat with a wide brim. Wear sunglasses that block UV rays. Even when it's cloudy, put broad-spectrum sunscreen (SPF 30 or higher) on any exposed skin. · See a dentist one or two times a year for checkups and to have your teeth cleaned. · Wear a seat belt in the car. · Limit alcohol to 1 drink a day. Too much alcohol can cause health problems. Follow your doctor's advice about when to have certain tests. These tests can spot problems early. · Cholesterol. Your doctor will tell you how often to have this done based on your age, family history, or other things that can increase your risk for heart attack and stroke. · Blood pressure. Have your blood pressure checked during a routine doctor visit. Your doctor will tell you how often to check your blood pressure based on your age, your blood pressure results, and other factors. · Mammogram. Ask your doctor how often you should have a mammogram, which is an X-ray of your breasts. A mammogram can spot breast cancer before it can be felt and when it is easiest to treat. · Pap test and pelvic exam. Ask your doctor how often you should have a Pap test. You may not need to have a Pap test as often as you used to. · Vision. Have your eyes checked every year or two or as often as your doctor suggests. Some experts recommend that you have yearly exams for glaucoma and other age-related eye problems starting at age 48. · Hearing. Tell your doctor if you notice any change in your hearing. You can have tests to find out how well you hear. · Diabetes. Ask your doctor whether you should have tests for diabetes. · Colon cancer. You should begin tests for colon cancer at age 48. You may have one of several tests. Your doctor will tell you how often to have tests based on your age and risk. Risks include whether you already had a precancerous polyp removed from your colon or whether your parents, sisters and brothers, or children have had colon cancer. · Thyroid disease. Talk to your doctor about whether to have your thyroid checked as part of a regular physical exam. Women have an increased chance of a thyroid problem. · Osteoporosis. You should begin tests for bone density at age 72. If you are younger than 72, ask your doctor whether you have factors that may increase your risk for this disease. You may want to have this test before age 72. · Heart attack and stroke risk. At least every 4 to 6 years, you should have your risk for heart attack and stroke assessed. Your doctor uses factors such as your age, blood pressure, cholesterol, and whether you smoke or have diabetes to show what your risk for a heart attack or stroke is over the next 10 years. When should you call for help?  
Watch closely for changes in your health, and be sure to contact your doctor if you have any problems or symptoms that concern you. Where can you learn more? Go to http://diana-lashonda.info/. Enter T442 in the search box to learn more about \"Well Visit, Women 50 to 72: Care Instructions. \" Current as of: March 29, 2018 Content Version: 11.8 © 0679-4023 [employer]. Care instructions adapted under license by [employer] ([employer]) (which disclaims liability or warranty for this information). If you have questions about a medical condition or this instruction, always ask your healthcare professional. [address] any warranty or liability for your use of this information. Call your insurance company to verify their coverage of Shingrix (the new shingles vaccine). How much will they expect you to pay? Will they pay at the office or only at the pharmacy? Then call our office for appropriate assistance. Shingles Vaccine: What You Need to Know What is shingles? Shingles is a painful skin rash, often with blisters. It is also called herpes zoster, or just zoster. A shingles rash usually appears on one side of the face or body and lasts from 2 to 4 weeks. Its main symptom is pain, which can be quite severe. Other symptoms of shingles can include fever, headache, chills and upset stomach. Very rarely, a shingles infection can lead to pneumonia, hearing problems, blindness, brain inflammation (encephalitis) or death. For about 1 person in 5, severe pain can continue even long after the rash clears up. This is called post-herpetic neuralgia. Shingles is caused by the varicella zoster virus, the same virus that causes chickenpox. Only someone who has had chickenpox-or, rarely, has gotten chickenpox vaccine-can get shingles. The virus stays in your body, and can cause shingles many years later. You can't catch shingles from another person with shingles.  However, a person who has never had chickenpox (or chickenpox vaccine) could get chickenpox from someone with shingles. This is not very common. Shingles is far more common in people 48years of age and older than in younger people. It is also more common in people whose immune systems are weakened because of a disease such as cancer, or drugs such as steroids or chemotherapy. At least 1 million people a year in the [facility] get shingles. Please stop your losartan while you are on the antibiotics.

## 2019-01-03 NOTE — PROGRESS NOTES
This is the Subsequent Medicare Annual Wellness Exam, performed 12 months or more after the Initial AWV or the last Subsequent AWV I have reviewed the patient's medical history in detail and updated the computerized patient record. History Past Medical History:  
Diagnosis Date  Annular tear of lumbar disc 11/10/2014  Asthma Bronchitis  Benign tumor of throat  Bone spur   
 right ankle  Cervicalgia 11/10/2014  Chronic pain   
 back  Degeneration of lumbar or lumbosacral intervertebral disc 11/10/2014  Degenerative disc disease  Depression  Displacement of lumbar intervertebral disc without myelopathy 11/10/2014  Elevated white blood cell count  Fibromyalgia  GERD (gastroesophageal reflux disease)  Hypertension  Insomnia  Nausea & vomiting  Pain in joint, multiple sites 11/10/2014  Postlaminectomy syndrome, cervical region 11/10/2014  Sinus infection 10/5/15  Ulcer Past Surgical History:  
Procedure Laterality Date  COLONOSCOPY N/A 4/13/2017 COLONOSCOPY performed by [facility]i Brantley [facility] at [facility] ENDOSCOPY  
 HX CATARACT REMOVAL    
 HX CERVICAL FUSION    
 HX HYSTERECTOMY  HX ORTHOPAEDIC    
 ganglion cyst removed, right hand  HX OTHER SURGICAL    
 cyst left thigh removed  HX WRIST FRACTURE TX    
 LAP,CHOLECYSTECTOMY N/A 02/27/2017 Dr. Gabbie Rhodes Current Outpatient Medications Medication Sig Dispense Refill  morphine CR (MS CONTIN) 60 mg CR tablet Take  by mouth every twelve (12) hours.  butalbital-acetaminophen-caffeine (FIORICET, ESGIC) -40 mg per tablet Take 1 Tab by Mouth Every 4 Hours As Needed.  lidocaine (XYLOCAINE) 5 % ointment  rizatriptan (MAXALT) 10 mg tablet  ketoconazole (NIZORAL) 2 % topical cream Apply  to affected area two (2) times a day. 15 g 11  
 fluticasone (FLONASE) 50 mcg/actuation nasal spray 2 Sprays by Both Nostrils route as needed.  1 Bottle 1  
  oxyCODONE IR (ROXICODONE) 10 mg tab immediate release tablet Take 1 Tab by mouth four (4) times daily as needed for up to 30 days. Max Daily Amount: 40 mg. 120 Tab 0  cyclobenzaprine (FLEXERIL) 10 mg tablet TAKE 1 TABLET BY MOUTH THREE TIMES DAILY AS NEEDED FOR MUSCLE SPASMS FOR UP TO 30 DAYS. 90 Tab 2  polyethylene glycol (MIRALAX) 17 gram packet Take 1 Packet by mouth daily. 30 Each 6  
 atorvastatin (LIPITOR) 40 mg tablet TAKE ONE TABLET BY MOUTH ONE TIME DAILY 90 Tab 3  
 losartan (COZAAR) 25 mg tablet TAKE ONE TABLET BY MOUTH ONE TIME DAILY 90 Tab 1  valACYclovir (VALTREX) 500 mg tablet Take 1 Tab by mouth two (2) times a day. (Patient taking differently: Take 500 mg by mouth two (2) times daily as needed.) 60 Tab 3  
 ondansetron (ZOFRAN ODT) 4 mg disintegrating tablet Take 1 Tab by mouth every eight (8) hours as needed for Nausea for up to 15 doses. 15 Tab 0  
 ergocalciferol (VITAMIN D2) 50,000 unit capsule TAKE ONE CAPSULE BY MOUTH EVERY 7 DAYS 12 Cap 3  
 QUEtiapine (SEROQUEL) 200 mg tablet Take 200 mg by mouth daily.  naloxone (NARCAN) 4 mg/actuation nasal spray 1 [place] by IntraNASal route once as needed. Use 1 spray intranasally into 1 nostril. Use a new Narcan nasal spray for subsequent doses and administer into alternating nostrils. May repeat every 2 to 3 minutes as needed.  gabapentin (NEURONTIN) 400 mg capsule 400 mg two (2) times a day.  trazodone (DESYREL) 100 mg tablet Take 100 mg by mouth nightly.  diclofenac (VOLTAREN) 1 % gel Apply  to affected area four (4) times daily. 1 g 1 Allergies Allergen Reactions  Aspirin Other (comments) Stomach bleeding  Ativan [Lorazepam] Shortness of Breath  Macrobid [Nitrofurantoin Monohyd/M-Cryst] Nausea and Vomiting  Nsaids (Non-Steroidal Anti-Inflammatory Drug) Nausea and Vomiting  Opana [Oxymorphone] Other (comments) Insomnia, weight loss, nightmares  Pcn [Penicillins] Hives Family History Problem Relation Age of Onset  Hypertension Other  Heart Attack Other  Heart Disease Other  Stroke Other  Headache Other  Seizures Other Social History Tobacco Use  Smoking status: Former Smoker Last attempt to quit: 3/6/1993 Years since quittin.8  Smokeless tobacco: Never Used Substance Use Topics  Alcohol use: No  
  Comment: none in 24 years Patient Active Problem List  
Diagnosis Code  Lumbago M54.5  Other affections of shoulder region, not elsewhere classified M75.80  
 Other chronic pain G89.29  
 Bipolar 1 disorder (HCC) F31.9  Chronic pain syndrome G89.4  Carpal tunnel syndrome G56.00  Degeneration of lumbar or lumbosacral intervertebral disc M51.37  
 Displacement of lumbar intervertebral disc without myelopathy M51.26  
 Pain in joint, multiple sites M25.50  Cervicalgia M54.2  Postlaminectomy syndrome, cervical region M96.1  Annular tear of lumbar disc M51.36  
 Lung nodule seen on imaging study R91.1  Hypercholesterolemia E78.00  Nausea R11.0  Myalgia M79.10  Vitamin D deficiency E55.9  Benign essential hypertension with target blood pressure below 140/90 I10  
 Urinary retention R33.9  Gastroesophageal reflux disease without esophagitis K21.9  Diverticulosis of large intestine without hemorrhage K57.30  
 Hiatal hernia K44.9  Hepatic steatosis K76.0  
 Calculus of gallbladder without cholecystitis K80.20  Obesity (BMI 30.0-34. 9) E66.9  Prediabetes R73.03  
 Angular cheilitis with candidiasis B37.0 Depression Risk Factor Screening: PHQ over the last two weeks 2019 PHQ Not Done - Little interest or pleasure in doing things Several days Feeling down, depressed, irritable, or hopeless Not at all Total Score PHQ 2 1 Trouble falling or staying asleep, or sleeping too much Not at all Feeling tired or having little energy Not at all Poor appetite, weight loss, or overeating Not at all Feeling bad about yourself - or that you are a failure or have let yourself or your family down Not at all Trouble concentrating on things such as school, work, reading, or watching TV Not at all Moving or speaking so slowly that other people could have noticed; or the opposite being so fidgety that others notice Not at all Thoughts of being better off dead, or hurting yourself in some way Not at all PHQ 9 Score 1 How difficult have these problems made it for you to do your work, take care of your home and get along with others Somewhat difficult Alcohol Risk Factor Screening: You do not drink alcohol or very rarely. Functional Ability and Level of Safety:  
Hearing Loss Hearing is good. The patient needs further evaluation. Activities of Daily Living The home contains: no safety equipment. Patient needs help with:  housework Fall Risk Fall Risk Assessment, last 12 mths 1/4/2019 Able to walk? Yes Fall in past 12 months? No  
Fall with injury? -  
Number of falls in past 12 months - Fall Risk Score -  
 
PT fall screen - no increased risk of fall Abuse Screen Patient is not abused Cognitive Screening Evaluation of Cognitive Function: 
Has your family/caregiver stated any concerns about your memory: no 
Normal 
 
Patient Care Team  
Patient Care Team: 
Tom Moise MD as PCP - [facility]) Simeon Carty MD (Gastroenterology) Fior Manning, RN as Ambulatory Care Navigator Mendel Hemp, RN as Ambulatory Care Navigator (Internal Medicine) Tasha Jaquezma (Pain Management) Cristal Barba NP as Nurse Practitioner (Psychiatry) Christeen Homans, MD (Pulmonary Disease) Assessment/Plan Education and counseling provided: 
Are appropriate based on today's review and evaluation Diagnoses and all orders for this visit: 
 
1. Medicare annual wellness visit, subsequent Health Maintenance Due Topic Date Due  
 DTaP/Tdap/Td series (1 - Tdap) 03/30/1975  Shingrix Vaccine Age 50> (1 of 2) 03/30/2004  Bone Densitometry (Dexa) Screening  03/30/2019 Schedule pap June [address] 1 year

## 2019-01-04 ENCOUNTER — HOSPITAL ENCOUNTER (OUTPATIENT)
Dept: LAB | Age: 64
Discharge: HOME | End: 2019-01-04
Payer: MEDICARE

## 2019-01-04 ENCOUNTER — OFFICE VISIT (OUTPATIENT)
Dept: FAMILY MEDICINE CLINIC | Age: 64
End: 2019-01-04

## 2019-01-04 VITALS
RESPIRATION RATE: 14 BRPM | OXYGEN SATURATION: 92 % | WEIGHT: 186 LBS | DIASTOLIC BLOOD PRESSURE: 70 MMHG | HEART RATE: 76 BPM | SYSTOLIC BLOOD PRESSURE: 100 MMHG | BODY MASS INDEX: 34.23 KG/M2 | HEIGHT: 62 IN | TEMPERATURE: 98.4 F

## 2019-01-04 DIAGNOSIS — T22.211A PARTIAL THICKNESS BURN OF RIGHT FOREARM, INITIAL ENCOUNTER: ICD-10-CM

## 2019-01-04 DIAGNOSIS — I10 BENIGN ESSENTIAL HYPERTENSION WITH TARGET BLOOD PRESSURE BELOW 140/90: ICD-10-CM

## 2019-01-04 DIAGNOSIS — K02.9 DENTAL CARIES: ICD-10-CM

## 2019-01-04 DIAGNOSIS — Z00.00 MEDICARE ANNUAL WELLNESS VISIT, SUBSEQUENT: Primary | ICD-10-CM

## 2019-01-04 DIAGNOSIS — M26.609 TMJ DYSFUNCTION: ICD-10-CM

## 2019-01-04 PROCEDURE — 87070 CULTURE OTHR SPECIMN AEROBIC: CPT

## 2019-01-04 RX ORDER — MORPHINE SULFATE 60 MG/1
60 TABLET, FILM COATED, EXTENDED RELEASE ORAL EVERY 12 HOURS
COMMUNITY

## 2019-01-04 RX ORDER — SULFAMETHOXAZOLE AND TRIMETHOPRIM 800; 160 MG/1; MG/1
1 TABLET ORAL 2 TIMES DAILY
Qty: 14 TAB | Refills: 0 | Status: SHIPPED | OUTPATIENT
Start: 2019-01-04 | End: 2019-01-11
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
## 2019-01-07 ENCOUNTER — HOSPITAL ENCOUNTER (OUTPATIENT)
Dept: PHYSICAL THERAPY | Age: 64
Discharge: HOME | End: 2019-01-07
Payer: MEDICARE

## 2019-01-07 LAB
BACTERIA SPEC CULT: NORMAL
GRAM STN SPEC: NORMAL
GRAM STN SPEC: NORMAL
SERVICE CMNT-IMP: NORMAL

## 2019-01-07 PROCEDURE — 97535 SELF CARE MNGMENT TRAINING: CPT

## 2019-01-07 PROCEDURE — 97110 THERAPEUTIC EXERCISES: CPT

## 2019-01-07 PROCEDURE — 97166 OT EVAL MOD COMPLEX 45 MIN: CPT

## 2019-01-07 NOTE — PROGRESS NOTES
[address]  [address] [address] [place], [address] 
[phone] fax Plan of Care/Statement of Necessity for Occupational Therapy Services Patient name: Yusef Miner Start of Care: 2019 Referral source: Simone Barber DO : 1955 Medical Diagnosis: Synovitis and tenosynovitis, unspecified [M65.9] Payor: Cathleen Schroeder / Plan: [facility] / Product Type: Medicare /  Onset Date:2017 Treatment Diagnosis: bilateral hand pain Prior Hospitalization: see medical history Provider#: [id number] Medications: Verified on Patient summary List  
 Comorbidities: Fibromyalgia, depression, arthritis, HTN Prior Level of Function: Independent with ADL. IADL tasks without functional limitations on bilateral hands The Plan of Care and following information is based on the information from the initial evaluation. Assessment/ key information: Patient is a 58 y.o. female with a chief complaint  of bilateral hand pain rating pain level 9/10, left>right, secondary to left extensor tenosynovectomy on 2018. Pt reports c/o dropping items and decreased strength. Pt reports she has received skilled OT services previously but was in too much pain to attend follow-up treatment sessions. Pt is scheduled to see a rheumatologist on 19 to be tested for rheumatoid arthritis. She demonstrated very poor tolerance to touch and goniometric measurements of AROM of bilateral wrists. She demonstrated a positive Finkelstein's test on the left wrist and negative for the right wrist.  She was able to make a fist with bilateral hands with effort. She reports triggering of right middle finger. Patient received injection in right 1st dorsal compartment on 2018 and was placed in a splint for 6 weeks. She reports she has not began using Voltaren gel for pain. Skilled OT services are necessary to address deficits and improve quality of life. Evaluation Complexity: History LOW Complexity : Brief history review  Examination LOW Complexity : 1-3 performance deficits relating to physical, cognitive , or psychosocial skils that result in activity limitations and / or participation restrictions  Clinical Decision Making MEDIUM Complexity : Patient may present with comorbidities that affect occupational performnce. Miniml to moderate modification of tasks or assistance (eg, physical or verbal ) with assesment(s) is necessary to enable patient to complete evaluation Overall Complexity Rating: MEDIUM Problem List: Pain effecting function, Decreased range of motion, Decreased strength, Edema effecting function, Decreased coordination/prehension, Decreased ADL/functional abilities , Decreased activity tolerance, Decreased flexibility/joint mobility and Sensability Treatment Plan may include any combination of the following: Therapeutic exercise, Therapeutic activities, Neuromuscular re-education, Physical agent/modality, Scar management, Manual therapy, Splinting/orthoses, Wound care, Patient education and ADLs/IADLs Patient / Family readiness to learn indicated by: asking questions Persons(s) to be included in education:   patient (P) Barriers to Learning/Limitations: None Patient Goal (s): free from pain, don't have to use cream or gel, no more swelling.  Patient Self Reported Health Status: good Rehabilitation Potential: fair Short Term Goals: To be accomplished in 2  weeks: 1. Patient will be compliant with initial home exercise program to take an active role in their rehabilitation process. Status at Eval: pt educated in wrist ROM. 2. Patient will demonstrate a good understanding of their condition and strategies for self-management. Status at Eval: pt educated in activity modification and pain relief strategies such as moist heat application Long Term Goals: To be accomplished in 4 weeks: 1. Patient will have 40 degrees of left wrist extension in order to increase ease with ADL's such as bathing, eating and dressing and to eventually bear weight thru the left upper extremity as in pushing up from a chair. 2. Patient will have 40 degrees of left wrist flexion in order to perform hygiene tasks and /or work with tools such as a screwdriver. 3. Patient will have 60 degrees of right forearm supination in order to perform ADL's including washing face and accepting change. 4. Patient will show a 13 point improvement on FOTO functional status measure to improve overall functional performance. Frequency / Duration: Patient to be seen 2 times per week for 4 weeks:  
Patient/ Caregiver education and instruction: Diagnosis, prognosis, self care, activity modification, brace/ splint application and exercises 
[x]  Plan of care has been reviewed with GARCIA Certification Period: 1/7/2019 - 2/4/2019 Aneesh Wagner OT 1/7/2019 3:50 PM 
 
________________________________________________________________________ I certify that the above Therapy Services are being furnished while the patient is under my care. I agree with the treatment plan and certify that this therapy is necessary. [de-identified] Signature:____________Date:_________TIME:________ 
 
[employer], Date and Time must be completed for valid certification ** Please sign and return to [address] [place], [address] 
[phone] fax

## 2019-01-07 NOTE — PROGRESS NOTES
Hand Therapy Evaluation and Daily Note Patient Name: Dirk Lew Date:2019 : 1955 Age: 58 y.o.y/o [x]  Patient  Verified Payor: Anni Jones / Plan: [address] / Product Type: Medicare /   
Referring Provider: DO Klarissa Mcgowan MD Visit:  2019 Onset Date:  2017 Surgical Date: 2017 Surgical Procedure: extensor tenosynovectomy 2018 In time:12:00 PM  Out time:12:55 PM 
Total Treatment Time (min): 55 Total Timed Codes (min): 25 
1:1 Treatment Time (MC only): 55 Visit #: 1 of 8 Treatment Area: Synovitis and tenosynovitis, unspecified [M65.9] Precautions: 
 
Hand Dominance: left handed Hand Involved: bilateral 
 
Total Evaluation Time:  30 History of Present Condition:  Patient is a 58 y.o. female with a chief complaint  of bilateral hand pain rating pain level 9/10, left>right, secondary to left extensor tenosynovectomy on 2018. Pt reports c/o dropping items and decreased strength. Pt reports she has received skilled OT services previously but was in too much pain to attend follow-up treatment sessions. Pt is scheduled to see a rheumatologist on 19 to be tested for rheumatoid arthritis. She demonstrated very poor tolerance to touch and goniometric measurements of AROM of bilateral wrists. Pain Rating:  
Current: (0-no pain 10-debilitating pain) severe At best: (0-no pain 10-debilitating pain) severe At worst: (0-no pain 10-debilitating pain) severe Location: bilateral hand and bilateral wrist 
Type:  severe Better with: meds, ice heat Worse with:  
 
Medications/Allergies/Past Medical History:  See chart; reviewed with patient. Fibromyalgia, depression, arthritis, HTN Diagnostic Tests: Imaging: Plain films of R wrist are negative for acute processes, or osteoarthritis 
  
MRI reviewed that was done on  is + for 1st DC tenosynovitis with an associated ganglion cyst. 
 
 Prior Level of Function: Independent with ADL. IADL tasks without functional limitations on bilateral hands Current Level of Function:  Unable to use left hand for ADL/IADL tasks Social History: single, lives with family 
  
Occupation/Job Requirements: Disability since 2000 secondary to back injury/arthritis 
  
Observation: minimal use of the left wrist, hypersensitivity to touch. Scar/incision:  Well healed surgical incision on the dorsum of the left wrist approximately 4 cm in length. Location: left wrist 
 
Palpation:  Palpable scar tissue dorsum left hand at the incision site with mild tenderness reported Range of Motion:   Elbow/Wrist  
Wrist 1/7/2019 Right/Left Flex 0-50/0-26 Ext 0-57/0-28 UD 0-30/0-30 RD 0-20/0-18 FA Pro 0-80/0-80 Sup 0-50/0-55 Strength:  DNT due to high pain level reported Sensation:    intact Edema: GIRTH CHART measured in cm 
Date: 1/7/2019 Side R/L   
DPC circum. 20.0/20.4 Wrist Crease 17.5/17.9 Special Tests: 1/7/19 Tenderness L R Test L R  
1st Dorsal Comp - - Finkelstein's + -  
Snuff Box - - Sánchez - -  
2nd Dorsal Comp - - S-L Shear - -  
S-L Joint - - L-T Shear - -  
L-T Joint - - DRUJ Sup - -  
6th Dorsal Comp - - DRUJ Pro - - Ulnar Snuff - - DRUJ Grind - - Fovea - - TFCC - -  
STT Joint - - Mid-Carp Inst - -  
FCR - - P-T Grind - - Intersection - - ECU Sublux. - -  
  
ADLs Feeding:        []MaxA   []ModA   []Jan   [] CGA   []SBA   []Marely   [x]Independent UE Dressing:       []MaxA   []ModA   [x]Jan   [] CGA   []SBA   []Marely   []Independent LE Dressing:       []MaxA   []ModA   [x]Jan   [] CGA   []SBA   []Marely   []Independent Grooming:       []MaxA   []ModA   [x]Jan   [] CGA   []SBA   []Marely   []Independent Toileting:       []MaxA   []ModA   [x]Jan   [] CGA   []SBA   []Marely   []Independent Bathing:       []MaxA   []ModA   [x]Jan   [] CGA   []SBA   []Marely   []Independent Light Meal Prep:    []MaxA   []ModA   [x]Jan   [] CGA   []SBA   []Marely   []Independent Household/Other: []MaxA   []ModA   [x]Jan   [] CGA   []SBA   []Marely   []Independent Adaptive Equip:     []MaxA   []ModA   []Jan   [] CGA   []SBA   []Marely   []Independent Driving:       [x]MaxA   []ModA   []Jan   [] CGA   []SBA   []Marely   []Independent Todays Treatment:  Patient received an initial evaluation today followed by education as to diagnosis, precautions and treatment plan. Patient was provided with a basic home exercise program including gentle wrist ROM. OBJECTIVE 15 min Therapeutic Exercise:  [] See flow sheet :  
Rationale: increase ROM and improve coordination to improve the patient's ability to move bilateral wrist through pain-free ROM tasks. 10 min Self Care/Home Management: activity modification, moist heat application Rationale: education  to improve the patients ability to reduce symptoms in bilateral wrists. With 
 [] TE 
 [] TA 
 [] neuro 
 [] other: Patient Education: [x] Review HEP [] Progressed/Changed HEP based on:  
[] positioning   [] body mechanics   [] transfers   [] heat/ice application  
[] Splint wear/care   [] Sensory re-education   [] scar management     
[] other:   
 
Pain Level (0-10 scale) post treatment: 8/10 Patient will continue to benefit from skilled OT services to modify and progress therapeutic interventions, address ROM deficits, address strength deficits and analyze and address soft tissue restrictions to attain goals. Assessment: Pt son drives her to where she needs to go. Right MF pain with fist and reports triggering, able to make a fist with right hand with effort. Able to make a fist with left hand with effort. Able to move wrist with functional tasks, difficulty with movement for objective tests. Short Term Goals: To be accomplished in 2  weeks: 1.  Patient will be compliant with initial home exercise program to take an active role in their rehabilitation process. Status at Eval: pt educated in wrist ROM. 2. Patient will demonstrate a good understanding of their condition and strategies for self-management. Status at Eval: pt educated in activity modification and pain relief strategies such as moist heat application Long Term Goals: To be accomplished in 4 weeks: 1. Patient will have 40 degrees of left wrist extension in order to increase ease with ADL's such as bathing, eating and dressing and to eventually bear weight thru the left upper extremity as in pushing up from a chair. 2. Patient will have 40 degrees of left wrist flexion in order to perform hygiene tasks and /or work with tools such as a screwdriver. 3. Patient will have 60 degrees of right forearm supination in order to perform ADL's including washing face and accepting change. 4. Patient will show a 13 point improvement on FOTO functional status measure to improve overall functional performance. Frequency / Duration: Patient to be seen 2 times per week for 4 weeks: 
 
Patient/ Caregiver education and instruction: Diagnosis, prognosis, self care, activity modification, brace/ splint application and exercises Functional Status Measure: 
Patient's:37 FOTO Benchmark: 50 Expected Change: 13 FOTO score based on 0 - 100 point scale, with 100 being no impairment. These scores are determined by patient reported functional assessments compared against national benchmarked data. 
  
Carry  Current  CL= 60-79%  Goal  CK= 40-59% The severity rating is based on clinical judgment and the FOTO score. Certification Period: 1/7/2019 - 2/4/2019 Josh Can OT 1/7/2019 12:15 PM

## 2019-01-08 NOTE — PROGRESS NOTES
Please call patient and notify of normal results. She should come see me if her arm doesn't heal. Thank you.  ERIKA

## 2019-01-10 ENCOUNTER — TELEPHONE (OUTPATIENT)
Dept: ORTHOPEDIC SURGERY | Age: 64
End: 2019-01-10

## 2019-01-10 ENCOUNTER — TELEPHONE (OUTPATIENT)
Dept: INTERNAL MEDICINE CLINIC | Age: 64
End: 2019-01-10

## 2019-01-10 NOTE — TELEPHONE ENCOUNTER
Per Fax: pharmacy would like to know how many grams the patient will be using per dose for the Diclofenac 1% gel? Thanks.

## 2019-01-11 ENCOUNTER — APPOINTMENT (OUTPATIENT)
Dept: PHYSICAL THERAPY | Age: 64
End: 2019-01-11
Payer: MEDICARE

## 2019-01-15 ENCOUNTER — HOSPITAL ENCOUNTER (OUTPATIENT)
Dept: PHYSICAL THERAPY | Age: 64
Discharge: HOME | End: 2019-01-15
Payer: MEDICARE

## 2019-01-15 PROCEDURE — 97140 MANUAL THERAPY 1/> REGIONS: CPT

## 2019-01-15 PROCEDURE — 97110 THERAPEUTIC EXERCISES: CPT

## 2019-01-15 PROCEDURE — 97035 APP MDLTY 1+ULTRASOUND EA 15: CPT

## 2019-01-15 NOTE — PROGRESS NOTES
OT DAILY TREATMENT NOTE - [facility] 3-16 Patient Name: Aquiles Kennedy Date:1/15/2019 : 1955 [x]  Patient  Verified Payor: [place] / Plan: Charlie Edwards / Product Type: Medicare / In time:12:00 PM  Out time:12:56 PM 
Total Treatment Time (min): 56 Total Timed Codes (min): 46 
1:1 Treatment Time ( [address] only): 56 Visit #: 2 of 8 Treatment Area: Synovitis and tenosynovitis, unspecified [M65.9] SUBJECTIVE Pain Level (0-10 scale): 7/10 left, 0/10 right Any medication changes, allergies to medications, adverse drug reactions, diagnosis change, or new procedure performed?: [x] No    [] Yes (see summary sheet for update) Subjective functional status/changes:   [] No changes reported \"I need heat for my back for my arthritis. My left has been hurting but the right feels good. \" OBJECTIVE Modality rationale: decrease inflammation, decrease pain and increase tissue extensibility to improve the patients ability to move bilateral wrists through pain-free ROM tasks. Min Type Additional Details  
 [] Estim:  []Unatt       []IFC  []Premod []Other:  []w/ice   []w/heat Position: Location:  
 [] Estim: []Att    []TENS instruct  []NMES []Other:  []w/US   []w/ice   []w/heat Position: Location:  
 []  Traction: [] Cervical       []Lumbar 
                     [] Prone          []Supine []Intermittent   []Continuous Lbs: 
[] before manual 
[] after manual  
10 [x]  Ultrasound: []Continuous   [x] Pulsed []1MHz   [x]3MHz Location: left dorsum wrist 
W/cm2:1.0 []  Iontophoresis with dexamethasone Location: [] Take home patch  
[] In clinic  
10 []  Ice     [x]  heat 
[]  Ice massage 
[]  Laser  
[]  Anodyne Position: resting Location:  bilateral hands/wrists  
 []  Laser with stim 
[]  Other: Position: Location:  
 []  Vasopneumatic Device Pressure:       [] lo [] med [] hi  
 Temperature: [] lo [] med [] hi  
[x] Skin assessment post-treatment:  [x]intact []redness- no adverse reaction 
  []redness  adverse reaction:  
11 min Therapeutic Exercise:  [x] See flow sheet :  
Rationale: increase ROM to improve the patients ability to move wrists through pain-free ROM tasks. 25 min Manual Therapy:  IASTM using tool #6 using sweeping technique Rationale: decrease pain, increase ROM and increase tissue extensibility to bilateral dorsum wrists and thumbs With 
 [x] TE 
 [] TA 
 [] neuro 
 [] other: Patient Education: [x] Review HEP [] Progressed/Changed HEP based on:  
[] positioning   [] body mechanics   [] transfers   [] heat/ice application  
[] Splint wear/care   [] Sensory re-education   [] scar management     
[] other:   
 
     
Other Objective/Functional Measures: Special Test: Grind test positive on bilateral thumbs indicating possible CMC arthritis Pain Level (0-10 scale) post treatment: 7/10 ASSESSMENT/Changes in Function: Initiated IASTM and US during this treatment session and patient tolerated this. She has possible CMC arthritis in bilateral thumbs. Palpable cyst on dorsum left wrist.  Getting test for rheumatoid arthritis this week. Very poor tolerance to therex and wrist PROM stretches. Patient will continue to benefit from skilled OT services to modify and progress therapeutic interventions, address ROM deficits, address strength deficits and analyze and address soft tissue restrictions to attain remaining goals. [x]  See Plan of Care 
[]  See progress note/recertification 
[]  See Discharge Summary Progress towards goals / Updated goals: 
Short Term Goals: To be accomplished in 2  weeks: 1. Patient will be compliant with initial home exercise program to take an active role in their rehabilitation process. Status at al: pt educated in wrist ROM.  
2. Patient will demonstrate a good understanding of their condition and strategies for self-management. Status at al: pt educated in activity modification and pain relief strategies such as moist heat application Long Term Goals: To be accomplished in 4 weeks: 1. Patient will have 40 degrees of left wrist extension in order to increase ease with ADL's such as bathing, eating and dressing and to eventually bear weight thru the left upper extremity as in pushing up from a chair. 2. Patient will have 40 degrees of left wrist flexion in order to perform hygiene tasks and /or work with tools such as a screwdriver. 3. Patient will have 60 degrees of right forearm supination in order to perform ADL's including washing face and accepting change. 4. Patient will show a 13 point improvement on FOTO functional status measure to improve overall functional performance. PLAN 
[]  Upgrade activities as tolerated     [x]  Continue plan of care 
[]  Update interventions per flow sheet      
[]  Discharge due to:_ 
[]  Other:_ Anselmo Flores OT 1/15/2019  12:05 PM 
 
Future Appointments Date Time Provider Gordo Marquez 1/17/2019  1:30 PM Lajuanda Flow, OT [facility]  
1/18/2019 11:30 AM MD Simon Ridley  
1/22/2019  2:00 PM Lajuanda Flow, OT [facility]  
1/24/2019  2:00 PM Lajuanda Flow, OT [facility]  
1/29/2019  2:00 PM Lajuanda Flow, OT [facility]  
1/31/2019  2:00 PM Lajuanda Flow, OT [facility]  
2/28/2019  1:40 PM DO Grace Lee 68  
5/31/2019 11:00 AM MD Simon Ridley

## 2019-01-17 ENCOUNTER — HOSPITAL ENCOUNTER (OUTPATIENT)
Dept: PHYSICAL THERAPY | Age: 64
Discharge: HOME | End: 2019-01-17
Payer: MEDICARE

## 2019-01-17 PROCEDURE — 97035 APP MDLTY 1+ULTRASOUND EA 15: CPT

## 2019-01-17 PROCEDURE — 97140 MANUAL THERAPY 1/> REGIONS: CPT

## 2019-01-17 NOTE — PROGRESS NOTES
OT DAILY TREATMENT NOTE - [facility]  Patient Name: Demetris Sanchez Date:2019 : 1955 [x]  Patient  Verified Payor: [place] MEDICARE / Plan: Charlie Edwards / Product Type: Medicare / In time:1:15 PM  Out time:2:00 PM 
Total Treatment Time (min): 45 Total Timed Codes (min): 35 
1:1 Treatment Time ( only): 35 Visit #: 3 of 8 Treatment Area: Synovitis and tenosynovitis, unspecified [M65.9] SUBJECTIVE Pain Level (0-10 scale): 4/10 Any medication changes, allergies to medications, adverse drug reactions, diagnosis change, or new procedure performed?: [x] No    [] Yes (see summary sheet for update) Subjective functional status/changes:   [] No changes reported \"I have felt much better since last time and I have been doing the exercises. \" OBJECTIVE Modality rationale: decrease edema, decrease inflammation, decrease pain and increase tissue extensibility to improve the patients ability to move bilateral hands through pain-free ROM tasks. Min Type Additional Details  
 [] Estim:  []Unatt       []IFC  []Premod []Other:  []w/ice   []w/heat Position: Location:  
 [] Estim: []Att    []TENS instruct  []NMES []Other:  []w/US   []w/ice   []w/heat Position: Location:  
 []  Traction: [] Cervical       []Lumbar 
                     [] Prone          []Supine []Intermittent   []Continuous Lbs: 
[] before manual 
[] after manual  
10 [x]  Ultrasound: []Continuous   [x] Pulsed []1MHz   [x]3MHz W/cm2: 1.0 Location:left dorsum wrist  
 []  Iontophoresis with dexamethasone Location: [] Take home patch  
[] In clinic  
10 []  Ice     [x]  heat 
[]  Ice massage 
[]  Laser  
[]  Anodyne Position:resting Location: bilateral hands/wrists  
 []  Laser with stim 
[]  Other:  Position: Location:  
 []  Vasopneumatic Device Pressure:       [] lo [] med [] hi  
Temperature: [] lo [] med [] hi  
 [x] Skin assessment post-treatment:  [x]intact []redness- no adverse reaction 
  []redness  adverse reaction:  
 
25 min Manual Therapy:  IASTM using tool #6 using sweeping technique Rationale: decrease pain, increase ROM and increase tissue extensibility to bilateral dorsum wrists and thumbs. With 
 [] TE 
 [] TA 
 [] neuro [x] other: Patient Education: [x] Review HEP [] Progressed/Changed HEP based on:  
[] positioning   [] body mechanics   [] transfers   [] heat/ice application  
[] Splint wear/care   [] Sensory re-education   [] scar management     
[] other:   
 
     
Other Objective/Functional Measures: Patient able to make a fist with left hand without increased pain. Pt verbalized compliance with HEP. Pain Level (0-10 scale) post treatment: 1/10 ASSESSMENT/Changes in Function: Patient saw good results from treatment last session and has improved pain level. Progressing towards goals. Patient will continue to benefit from skilled OT services to modify and progress therapeutic interventions, address ROM deficits, address strength deficits and analyze and address soft tissue restrictions to attain remaining goals. [x]  See Plan of Care 
[]  See progress note/recertification 
[]  See Discharge Summary Progress towards goals / Updated goals: 
Short Term Goals: To be accomplished in 2  weeks: 1. Patient will be compliant with initial home exercise program to take an active role in their rehabilitation process. Goal Met 1/17/19 - pt verbalized and demonstrated compliance with HEP Status at [facility]: pt educated in wrist ROM. 2. Patient will demonstrate a good understanding of their condition and strategies for self-management. Status at [facility]: pt educated in activity modification and pain relief strategies such as moist heat application.  
Long Term Goals: To be accomplished in 4 weeks:             
1. Patient will have 40 degrees of left wrist extension in order to increase ease with ADL's such as bathing, eating and dressing and to eventually bear weight thru the left upper extremity as in pushing up from a chair. 2. Patient will have 40 degrees of left wrist flexion in order to perform hygiene tasks and /or work with tools such as a screwdriver. 3. Patient will have 60 degrees of right forearm supination in order to perform ADL's including washing face and accepting change. 4. Patient will show a 13 point improvement on FOTO functional status measure to improve overall functional performance. PLAN 
[]  Upgrade activities as tolerated     [x]  Continue plan of care 
[]  Update interventions per flow sheet      
[]  Discharge due to:_ 
[]  Other:_ Tali Pruett OT 1/17/2019  1:22 PM 
 
Future Appointments Date Time Provider Gordo Marquez 1/17/2019  1:30 PM Isabel Miller OT [facility]  
1/18/2019 11:30 AM MD Simon Knox  
1/22/2019  2:00 PM Isabel Miller OT [facility]  
1/24/2019  2:00 PM Isabel Miller OT [facility]  
1/29/2019  2:00 PM Isabel Miller OT [facility]  
1/31/2019  2:00 PM Isabel Miller OT [facility]  
2/28/2019  1:40 PM DO Isra Mcbride  
5/31/2019 11:00 AM MD Simon Knox

## 2019-01-18 ENCOUNTER — TELEPHONE (OUTPATIENT)
Dept: FAMILY MEDICINE CLINIC | Age: 64
End: 2019-01-18

## 2019-01-18 ENCOUNTER — OFFICE VISIT (OUTPATIENT)
Dept: FAMILY MEDICINE CLINIC | Age: 64
End: 2019-01-18

## 2019-01-18 VITALS
HEART RATE: 81 BPM | TEMPERATURE: 98.3 F | BODY MASS INDEX: 33.86 KG/M2 | RESPIRATION RATE: 14 BRPM | HEIGHT: 62 IN | DIASTOLIC BLOOD PRESSURE: 70 MMHG | SYSTOLIC BLOOD PRESSURE: 90 MMHG | WEIGHT: 184 LBS | OXYGEN SATURATION: 97 %

## 2019-01-18 DIAGNOSIS — E55.9 VITAMIN D DEFICIENCY: ICD-10-CM

## 2019-01-18 DIAGNOSIS — J30.1 ALLERGIC RHINITIS DUE TO POLLEN, UNSPECIFIED SEASONALITY: Primary | ICD-10-CM

## 2019-01-18 DIAGNOSIS — M65.9 TENOSYNOVITIS OF HAND: ICD-10-CM

## 2019-01-18 RX ORDER — ERGOCALCIFEROL 1.25 MG/1
CAPSULE ORAL
Qty: 12 CAP | Refills: 3 | Status: CANCELLED | OUTPATIENT
Start: 2019-01-18

## 2019-01-18 RX ORDER — FLUTICASONE PROPIONATE 50 MCG
2 SPRAY, SUSPENSION (ML) NASAL AS NEEDED
Qty: 3 BOTTLE | Refills: 4 | Status: SHIPPED | OUTPATIENT
Start: 2019-01-18 | End: 2019-10-17 | Stop reason: SDUPTHER

## 2019-01-18 NOTE — PROGRESS NOTES
José Miguel Carlisle is a 58 y.o. female who was seen in clinic today (1/18/2019). Assessment & Plan:       ICD-10-CM ICD-9-CM    1. Allergic rhinitis due to pollen, unspecified seasonality J30.1 477.0 fluticasone (FLONASE) 50 mcg/actuation nasal spray   2. Vitamin D deficiency E55.9 268.9 VITAMIN D, 25 HYDROXY      METABOLIC PANEL, COMPREHENSIVE       Use nasal steroids every day, not as needed  Vit D deficiency: most recent value more than adequately replaced. Suspend therapy pending review of labs. I'm not seeing hallmarks of RA on exam. Will coordinate with him    Follow-up Disposition:  Return for vit d and lab plan to follow based on results. Conferred with Dr. Dionicio Sharma after the visit. He indicates evidence of tenosynovitis on MRI and subtle exam findings meriting eval.       ICD-10-CM ICD-9-CM    1. Allergic rhinitis due to pollen, unspecified seasonality J30.1 477.0 fluticasone (FLONASE) 50 mcg/actuation nasal spray   2. Vitamin D deficiency E55.9 268.9 VITAMIN D, 25 HYDROXY      METABOLIC PANEL, COMPREHENSIVE      METABOLIC PANEL, COMPREHENSIVE      VITAMIN D, 25 HYDROXY   3. Tenosynovitis of hand M65.9 727.05 RHEUMATOID FACTOR, QL      CYCLIC CITRUL PEPTIDE AB, IGG      CRP, HIGH SENSITIVITY      SED RATE (ESR)     Follow-up Disposition:  Return for vit d and lab plan to follow based on results. Subjective:   José Miguel Carlisle was seen today for Arthritis (f/u)      Here saying Dr. Dionicio Sharma is concerned she may have RA. When I ask her to point to her pain, she indicates surgical scar dorsal aspect left wrist and area midportion over MTs 2&3 left hand, stating radiates generally to radial aspect of left hand. Father was disabled from arthritis. She's confident it was RA. History of Vit D deficiency on weekly replacement. Requesting refill.        Lab Results   Component Value Date/Time    Vitamin D 25-Hydroxy 67.8 03/07/2018 02:41 PM       Allergic sinusitis: uses nasal steroids \"as needed\" = approx 1x/week for successful relief of headaches. Requesting refill. No trial daily therapy. Outpatient Medications Marked as Taking for the 1/18/19 encounter (Office Visit) with Gunnar Canchola MD   Medication Sig Dispense Refill    morphine CR (MS CONTIN) 60 mg CR tablet Take  by mouth every twelve (12) hours.  diclofenac (VOLTAREN) 1 % gel Apply  to affected area four (4) times daily. 1 g 1    butalbital-acetaminophen-caffeine (FIORICET, ESGIC) -40 mg per tablet Take 1 Tab by Mouth Every 4 Hours As Needed.  lidocaine (XYLOCAINE) 5 % ointment       rizatriptan (MAXALT) 10 mg tablet       ketoconazole (NIZORAL) 2 % topical cream Apply  to affected area two (2) times a day. 15 g 11    fluticasone (FLONASE) 50 mcg/actuation nasal spray 2 Sprays by Both Nostrils route as needed. 1 Bottle 1    oxyCODONE IR (ROXICODONE) 10 mg tab immediate release tablet Take 1 Tab by mouth four (4) times daily as needed for up to 30 days. Max Daily Amount: 40 mg. 120 Tab 0    cyclobenzaprine (FLEXERIL) 10 mg tablet TAKE 1 TABLET BY MOUTH THREE TIMES DAILY AS NEEDED FOR MUSCLE SPASMS FOR UP TO 30 DAYS. 90 Tab 2    polyethylene glycol (MIRALAX) 17 gram packet Take 1 Packet by mouth daily. 30 Each 6    atorvastatin (LIPITOR) 40 mg tablet TAKE ONE TABLET BY MOUTH ONE TIME DAILY 90 Tab 3    losartan (COZAAR) 25 mg tablet TAKE ONE TABLET BY MOUTH ONE TIME DAILY 90 Tab 1    ondansetron (ZOFRAN ODT) 4 mg disintegrating tablet Take 1 Tab by mouth every eight (8) hours as needed for Nausea for up to 15 doses. 15 Tab 0    ergocalciferol (VITAMIN D2) 50,000 unit capsule TAKE ONE CAPSULE BY MOUTH EVERY 7 DAYS 12 Cap 3    QUEtiapine (SEROQUEL) 200 mg tablet Take 200 mg by mouth daily.  naloxone (NARCAN) 4 mg/actuation nasal spray 1 [place] by IntraNASal route once as needed. Use 1 spray intranasally into 1 nostril.  Use a new Narcan nasal spray for subsequent doses and administer into alternating nostrils. May repeat every 2 to 3 minutes as needed.  gabapentin (NEURONTIN) 400 mg capsule 400 mg two (2) times a day.  trazodone (DESYREL) 100 mg tablet Take 100 mg by mouth nightly. Patient Active Problem List    Diagnosis    Obesity (BMI 30.0-34. 9)    Prediabetes    Angular cheilitis with candidiasis    Diverticulosis of large intestine without hemorrhage     [place] 4/2017      Hiatal hernia     h pylori tx 3/2013; multiple EGDs 1/21/2015, 5/24/2017, 4/13/2017, 2/21/2018 - normal mucosa      Hepatic steatosis     US 1/13/2017      Calculus of gallbladder without cholecystitis     CT 12/22/2016 also with duodenal diverticulum      Gastroesophageal reflux disease without esophagitis    Urinary retention    Vitamin D deficiency    Benign essential hypertension with target blood pressure below 140/90    Myalgia    Nausea    Hypercholesterolemia    Lung nodule seen on imaging study    Degeneration of lumbar or lumbosacral intervertebral disc    Displacement of lumbar intervertebral disc without myelopathy    Pain in joint, multiple sites    Cervicalgia    Postlaminectomy syndrome, cervical region    Annular tear of lumbar disc    Chronic pain syndrome    Carpal tunnel syndrome    Bipolar 1 disorder (HCC)    Lumbago    Other affections of shoulder region, not elsewhere classified    Other chronic pain         Allergies   Allergen Reactions    Aspirin Other (comments)     Stomach bleeding    Ativan [Lorazepam] Shortness of Breath    Macrobid [Nitrofurantoin Monohyd/M-Cryst] Nausea and Vomiting    Nsaids (Non-Steroidal Anti-Inflammatory Drug) Nausea and Vomiting    Opana [Oxymorphone] Other (comments)     Insomnia, weight loss, nightmares    Pcn [Penicillins] Hives         Patient Care Team:  Belinda Hill MD as PCP - General (Family Practice)  Angela Cruz MD (Gastroenterology)  Jordan Berumen, OSWALDO as Ambulatory Care Navigator  Jourdan Aburto, OSWALDO as Ambulatory Care Navigator (Internal Medicine)  BETO Tam (Pain Management)  Ana Rosa Buck NP as Nurse Practitioner (Psychiatry)  Fara Espino MD (Pulmonary Disease)    The following sections were reviewed & updated as appropriate: PMH, PSH, FH, and SH. Review of Systems   HENT: Negative for nosebleeds. Gastrointestinal: Negative for abdominal pain. Objective:     Visit Vitals  BP 90/70   Pulse 81   Temp 98.3 °F (36.8 °C) (Oral)   Resp 14   Ht 5' 2\" (1.575 m)   Wt 184 lb (83.5 kg)   SpO2 97%   BMI 33.65 kg/m²      Physical Exam   Constitutional: She is oriented to person, place, and time. She appears well-developed. No distress. Pleasant obese black female   HENT:   Head: Normocephalic and atraumatic. Pulmonary/Chest: Effort normal.   Musculoskeletal:   Hands: no swelling, no clear enlargement/deformity MTPs, or PIPs   Neurological: She is alert and oriented to person, place, and time. Psychiatric: She has a normal mood and affect. Her behavior is normal. Judgment and thought content normal.         Disclaimer: The patient understands our medical plan. Alternatives have been explained and offered. The risks, benefits and significant side effects of all medications have been reviewed. Anticipated time course and progression of condition reviewed. All questions have been addressed. She is encouraged to employ the information provided in the after visit summary, which was reviewed. Where appropriate, she is instructed to call the clinic if she has not been notified either by phone or through [address] with the results of her tests or with an appointment plan for any referrals within 1 week(s). No news is not good news; it's no news. The patient  is to call if her condition worsens or fails to improve or if significant side effects are experienced. Aspects of this note may have been generated using voice recognition software. Despite editing, there may be unrecognized errors. Candie Phillips MD

## 2019-01-18 NOTE — Clinical Note
Cat, after conferring with Dr. Shereen Gonzalez, I've ordered labs to help determine whether or not she may have RA. Please check to see if we can draw them at our office (I think so), then notify the patient.  ERIKA

## 2019-01-18 NOTE — Clinical Note
He Ramirez, I really appreciated meeting you (sort of) when you came to our primary care meeting. This patient is sweet. She's also manipulative. Do you have concerns of RA in her?  Thanks, Saadia Thomas

## 2019-01-18 NOTE — PROGRESS NOTES
iZna Hutchinson 58 y.o. female being seen for   Chief Complaint   Patient presents with    Arthritis     f/u     Would like blood work     1. Have you been to the ER, urgent care clinic since your last visit? Hospitalized since your last visit? No    2. Have you seen or consulted any other health care providers outside of the [address] [place] [address] since your last visit? Include any pap smears or colon screening.  No    Pharmacy has been verified  Care team has been verified/updated

## 2019-01-18 NOTE — TELEPHONE ENCOUNTER
[employer] called on clarification on Ms. Hutchinson's Flonase. fluticasone (FLONASE) 50 mcg/actuation nasal spray [[id number]]   Order Details   Dose: 2 Spray Route: Both Nostrils Frequency: AS NEEDED   Dispense Quantity: 3 Bottle Refills: 4 Fills remaining: --           Si Sprays by Both Nostrils route as needed. After reviewing Dr. Kimberly Robb note and assessment :\"Use nasal steroids every day, not as needed\". Verbal directions given to pharmacist. Pharmacist Read back order and stated understanding.

## 2019-01-22 ENCOUNTER — HOSPITAL ENCOUNTER (OUTPATIENT)
Dept: PHYSICAL THERAPY | Age: 64
Discharge: HOME | End: 2019-01-22
Payer: MEDICARE

## 2019-01-22 PROCEDURE — 97110 THERAPEUTIC EXERCISES: CPT

## 2019-01-22 PROCEDURE — 97035 APP MDLTY 1+ULTRASOUND EA 15: CPT

## 2019-01-22 PROCEDURE — 97140 MANUAL THERAPY 1/> REGIONS: CPT

## 2019-01-22 NOTE — PROGRESS NOTES
OT DAILY TREATMENT NOTE 10-18 Patient Name: Aquiles Kennedy Date:2019 : 1955 [x]  Patient  Verified Payor: [place] / Plan: [facility] / Product Type: Medicare / In time:2:00 PM  Out time:2:58 PM 
Total Treatment Time (min): 58 Visit #: 4 of 8 Medicare/BCBS Only Total Timed Codes (min):  58 1:1 Treatment Time:  62 Treatment Area: Synovitis and tenosynovitis, unspecified [M65.9] SUBJECTIVE Pain Level (0-10 scale): 0/10 Any medication changes, allergies to medications, adverse drug reactions, diagnosis change, or new procedure performed?: [x] No    [] Yes (see summary sheet for update) Subjective functional status/changes:   [] No changes reported \"I don't have any pain today. I did take Tylenol before. \" OBJECTIVE Modality rationale: decrease edema, decrease inflammation, decrease pain and increase tissue extensibility to improve the patients ability to increase use of left hand for functional tasks. Min Type Additional Details  
 [] Estim:  []Unatt       []IFC  []Premod []Other:  []w/ice   []w/heat Position: Location:  
 [] Estim: []Att    []TENS instruct  []NMES []Other:  []w/US   []w/ice   []w/heat Position: Location:  
 []  Traction: [] Cervical       []Lumbar 
                     [] Prone          []Supine []Intermittent   []Continuous Lbs: 
[] before manual 
[] after manual  
8 [x]  Ultrasound: [x]Continuous   [] Pulsed []1MHz   [x]3MHz Location: right dorsum hand W/cm2:1.0 []  Iontophoresis with dexamethasone Location: [] Take home patch  
[] In clinic  
 []  Ice     []  heat 
[]  Ice massage 
[]  Laser  
[]  Anodyne Position: Location:  
 []  Laser with stim 
[]  Other: Position: Location:  
 []  Vasopneumatic Device Pressure:       [] lo [] med [] hi  
Temperature: [] lo [] med [] hi  
 [x] Skin assessment post-treatment:  [x]intact []redness- no adverse reaction 
  []redness  adverse reaction:  
 
40 min Therapeutic Exercise:  [x] See flow sheet :  
Rationale: increase ROM and increase strength to improve the patients ability to increase use of bilateral hands for functional tasks. 10 min Manual Therapy:  IASTM using tool # 6 using sweeping and fanning techniques Rationale: decrease pain, increase ROM, increase tissue extensibility and decrease edema  to left thumb and dorsum hand. With 
 [] TE 
 [] TA 
 [] neuro 
 [] other: Patient Education: [x] Review HEP [] Progressed/Changed HEP based on:  
[] positioning   [] body mechanics   [] transfers   [] heat/ice application  
[] Splint wear/care   [] Sensory re-education   [] scar management     
[] other:   
 
     
Other Objective/Functional Measures: Range of Motion:   Elbow/Wrist  
Wrist 1/7/2019 Right/Left  1/22/2019 Left        
Flex 0-50/0-26  0-52        
Ext 0-57/0-28 0-42         
UD 0-30/0-30          
RD 0-20/0-18          
FA             
Pro 0-80/0-80          
Sup 0-50/0-55          
   
Pain Level (0-10 scale) post treatment: 1-2/10 ASSESSMENT/Changes in Function: Patient reported she hasn't been pain-free since August 2018 and that she was pain-free today. She reports that even when she does not take Tylenol, her wrists are feeling better. She demonstrated improved left wrist AROM today. Initiated therex today during treatment session and patient observed to tolerate this well. Patient will continue to benefit from skilled OT services to modify and progress therapeutic interventions, address ROM deficits, address strength deficits and analyze and address soft tissue restrictions to attain remaining goals. [x]  See Plan of Care 
[]  See progress note/recertification 
[]  See Discharge Summary Progress towards goals / Updated goals: 
Short Term Goals: To be accomplished in 2  weeks: 1. Patient will be compliant with initial home exercise program to take an active role in their rehabilitation process. Goal Met 1/17/19 - pt verbalized and demonstrated compliance with HEP Status at Eval: pt educated in wrist ROM. 2. Patient will demonstrate a good understanding of their condition and strategies for self-management. Status at Eval: pt educated in activity modification and pain relief strategies such as moist heat application. Long Term Goals: To be accomplished in 4 weeks:             
1. Patient will have 40 degrees of left wrist extension in order to increase ease with ADL's such as bathing, eating and dressing and to eventually bear weight thru the left upper extremity as in pushing up from a chair. Goal Met 1/22/19 - 42 degrees 2. Patient will have 40 degrees of left wrist flexion in order to perform hygiene tasks and /or work with tools such as a screwdriver. Goal Met 1/22/19 - 52 degrees 3. Patient will have 60 degrees of right forearm supination in order to perform ADL's including washing face and accepting change. 4. Patient will show a 13 point improvement on FOTO functional status measure to improve overall functional performance. PLAN 
[]  Upgrade activities as tolerated     [x]  Continue plan of care 
[]  Update interventions per flow sheet      
[]  Discharge due to:_ 
[]  Other:_ Italo Swartz OT 1/22/2019  2:11 PM 
 
Future Appointments Date Time Provider Gordo Marquez 1/24/2019  2:00 [facility]  
1/29/2019  2:00 PM Mony Enrique OT [facility]  
1/31/2019  2:00 PM Mony Enrique OT [facility]  
2/28/2019  1:40 PM DO Grace Pike 68  
5/31/2019 11:00 AM MD Simon Burgos

## 2019-01-24 ENCOUNTER — TELEPHONE (OUTPATIENT)
Dept: FAMILY MEDICINE CLINIC | Age: 64
End: 2019-01-24

## 2019-01-24 ENCOUNTER — HOSPITAL ENCOUNTER (OUTPATIENT)
Dept: PHYSICAL THERAPY | Age: 64
Discharge: HOME | End: 2019-01-24
Payer: MEDICARE

## 2019-01-24 DIAGNOSIS — B00.9 HSV-2 INFECTION: ICD-10-CM

## 2019-01-24 PROCEDURE — 97110 THERAPEUTIC EXERCISES: CPT

## 2019-01-24 PROCEDURE — 97140 MANUAL THERAPY 1/> REGIONS: CPT

## 2019-01-24 PROCEDURE — 97035 APP MDLTY 1+ULTRASOUND EA 15: CPT

## 2019-01-24 NOTE — PROGRESS NOTES
OT DAILY TREATMENT NOTE 10-18 Patient Name: Matthew Cuevas Date:2019 : 1955 [x]  Patient  Verified Payor: [place] MEDICARE / Plan: Charlie Edwards / Product Type: Medicare / In time:2:00 PM  Out time:2:40 PM 
Total Treatment Time (min): 40 Visit #: 5 of 8 Medicare/BCBS Only Total Timed Codes (min):  40 1:1 Treatment Time:  40 Treatment Area: Synovitis and tenosynovitis, unspecified [M65.9] SUBJECTIVE Pain Level (0-10 scale): 6-7/10 Any medication changes, allergies to medications, adverse drug reactions, diagnosis change, or new procedure performed?: [x] No    [] Yes (see summary sheet for update) Subjective functional status/changes:   [] No changes reported \"I think it's the weather. \" OBJECTIVE Modality rationale: decrease pain and increase tissue extensibility to improve the patients ability to move left hand/wrist through pain-free ROM tasks. Min Type Additional Details  
 [] Estim:  []Unatt       []IFC  []Premod []Other:  []w/ice   []w/heat Position: Location:  
 [] Estim: []Att    []TENS instruct  []NMES []Other:  []w/US   []w/ice   []w/heat Position: Location:  
 []  Traction: [] Cervical       []Lumbar 
                     [] Prone          []Supine []Intermittent   []Continuous Lbs: 
[] before manual 
[] after manual  
8 [x]  Ultrasound: [x]Continuous   [] Pulsed []1MHz   [x]3MHz W/cm2: 1.0 Location: left dorsum hand  
 []  Iontophoresis with dexamethasone Location: [] Take home patch  
[] In clinic  
 []  Ice     []  heat 
[]  Ice massage 
[]  Laser  
[]  Anodyne Position: Location:  
 []  Laser with stim 
[]  Other:  Position: Location:  
 []  Vasopneumatic Device Pressure:       [] lo [] med [] hi  
Temperature: [] lo [] med [] hi  
[x] Skin assessment post-treatment:  [x]intact []redness- no adverse reaction 
  []redness  adverse reaction: 22 min Therapeutic Exercise:  [x] See flow sheet :  
Rationale: increase ROM and increase strength to improve the patients ability to return to functional tasks. 10 min Manual Therapy:  IASTM using tool #6 using sweeping, fanning, and brushing techniques. Rationale: increase tissue extensibility and decrease edema  to left dorsum wrist/hand With 
 [x] TE 
 [] TA 
 [] neuro 
 [] other: Patient Education: [x] Review HEP [] Progressed/Changed HEP based on:  
[] positioning   [] body mechanics   [] transfers   [] heat/ice application  
[] Splint wear/care   [] Sensory re-education   [] scar management     
[] other:   
 
     
Other Objective/Functional Measures: see goals Pain Level (0-10 scale) post treatment: 5/10 ASSESSMENT/Changes in Function: Patient reported increased pain level today however she stated it was more muscle soreness than pain after therapist educated patient on difference between muscle pain and soreness. She struggled to perform therex with left hand today. She appears to have arthritic nodule on dorsum left hand over 3rd or 4th metacarpal.  She also c/o possible cyst at proximal incision of dorsum left wrist. 
 
Patient will continue to benefit from skilled OT services to modify and progress therapeutic interventions, address ROM deficits, address strength deficits and analyze and address soft tissue restrictions to attain remaining goals. [x]  See Plan of Care 
[]  See progress note/recertification 
[]  See Discharge Summary Progress towards goals / Updated goals: 
Short Term Goals: To be accomplished in 2  weeks: 1. Patient will be compliant with initial home exercise program to take an active role in their rehabilitation process. Goal Met 1/17/19 - pt verbalized and demonstrated compliance with HEP Status at [facility]: pt educated in wrist ROM. 2. Patient will demonstrate a good understanding of their condition and strategies for self-management. Status at [facility]: pt educated in activity modification and pain relief strategies such as moist heat application. Goal Met 1/24/19 - pt verbalized taking pain medication as needed for pian relief as well as hot pack use. Long Term Goals: To be accomplished in 4 weeks:             
1. Patient will have 40 degrees of left wrist extension in order to increase ease with ADL's such as bathing, eating and dressing and to eventually bear weight thru the left upper extremity as in pushing up from a chair. Goal Met 1/22/19 - 42 degrees 2. Patient will have 40 degrees of left wrist flexion in order to perform hygiene tasks and /or work with tools such as a screwdriver. Goal Met 1/22/19 - 52 degrees 3. Patient will have 60 degrees of right forearm supination in order to perform ADL's including washing face and accepting change. 4. Patient will show a 13 point improvement on FOTO functional status measure to improve overall functional performance. 
  
PLAN 
[]  Upgrade activities as tolerated     [x]  Continue plan of care 
[]  Update interventions per flow sheet      
[]  Discharge due to:_ 
[]  Other:_ Yana Williamson OT 1/24/2019  1:52 PM 
 
Future Appointments Date Time Provider Gordo Marquez 1/24/2019  2:00 PM [facility]  
1/29/2019  2:00 PM [facility]  
1/31/2019  2:00 PM [facility]  
2/28/2019  1:40 [facility]  
5/31/2019 11:00 AM Gillian Hewitt MD [facility]

## 2019-01-28 NOTE — TELEPHONE ENCOUNTER
Please clarify whether she is taking this twice daily every day to prevent sores or if she is taking one pill twice in a single day as needed for onset of sores. Either way, it's an odd regimen that I anticipate revising during the office visit.     ERIKA

## 2019-01-28 NOTE — TELEPHONE ENCOUNTER
Spoke with Zina LAZO 1955 (confirmed) on 2019  the patient stated that she take this medication for oral sores     She has an appt set for 2019 10:15 AM     She would like some medication refill on her Valtrex called in to hold her over untill her visit on      Medication pended     SMD

## 2019-01-29 ENCOUNTER — APPOINTMENT (OUTPATIENT)
Dept: PHYSICAL THERAPY | Age: 64
End: 2019-01-29
Payer: MEDICARE

## 2019-01-31 ENCOUNTER — OFFICE VISIT (OUTPATIENT)
Dept: ORTHOPEDIC SURGERY | Age: 64
End: 2019-01-31

## 2019-01-31 ENCOUNTER — HOSPITAL ENCOUNTER (OUTPATIENT)
Dept: PHYSICAL THERAPY | Age: 64
Discharge: HOME | End: 2019-01-31
Payer: MEDICARE

## 2019-01-31 ENCOUNTER — TELEPHONE (OUTPATIENT)
Dept: FAMILY MEDICINE CLINIC | Age: 64
End: 2019-01-31

## 2019-01-31 VITALS
OXYGEN SATURATION: 95 % | BODY MASS INDEX: 33.86 KG/M2 | HEIGHT: 62 IN | DIASTOLIC BLOOD PRESSURE: 74 MMHG | WEIGHT: 184 LBS | SYSTOLIC BLOOD PRESSURE: 132 MMHG | TEMPERATURE: 97.7 F | HEART RATE: 89 BPM

## 2019-01-31 DIAGNOSIS — G90.512 COMPLEX REGIONAL PAIN SYNDROME TYPE 1 OF LEFT UPPER EXTREMITY: ICD-10-CM

## 2019-01-31 DIAGNOSIS — M65.842 OTHER SYNOVITIS AND TENOSYNOVITIS, LEFT HAND: Primary | ICD-10-CM

## 2019-01-31 PROCEDURE — 97535 SELF CARE MNGMENT TRAINING: CPT

## 2019-01-31 RX ORDER — VALACYCLOVIR HYDROCHLORIDE 500 MG/1
500 TABLET, FILM COATED ORAL 2 TIMES DAILY
Qty: 30 TAB | Refills: 0 | Status: SHIPPED | OUTPATIENT
Start: 2019-01-31 | End: 2019-02-12 | Stop reason: SDUPTHER

## 2019-01-31 NOTE — TELEPHONE ENCOUNTER
Spoke with Jenny LAZO 1955 (confirmed) on 2019 she stated that she takes Valtrex everyday as a preventive. I told her that Dr. Raghav Reaves would like to discuss this therapy in depth an an appt but will fill it until her appt date. 2019 10:15 for Dr. Raghav Reaves       The patient also has another appt set up for labs for hand surgeon. For Lab 2019         The patient expressed clear understanding and did not have any questions.     SMD

## 2019-01-31 NOTE — TELEPHONE ENCOUNTER
Kanwal Carranza, this patient appears to be unclear on how to proceed for having the labs I ordered done to evaluate possible RA. She asked Dr. Bonnie Snider (hand surgeon) for help. Please verify whether we can process them all here or if they need special handling via HV then call her. Thanks.  ERIKA

## 2019-01-31 NOTE — PATIENT INSTRUCTIONS
Complex Regional Pain Syndrome: Care Instructions  Your Care Instructions    You may be recovering from an injured hand or other limb when the burning pain begins. For others, the pain begins without a clear cause. This is called complex regional pain syndrome or reflex sympathetic dystrophy. It is a flare-up of certain nerves that overreact to something. Doctors often do not know why it happens. For some people, the pain, swelling, and stiffness can be much worse than the original injury. This pain can last a long time--months or even years. Although it is an unusual condition, it is very real. You may feel better on some days than on others. Your skin may become very sensitive, and it may look blotchy or shiny. You may have trouble moving the limb. This condition sometimes goes away after some months. More likely, you will need a treatment plan to reduce symptoms, stop the pain from moving to different parts of your body, and prevent permanent damage. You may have to try several treatments before you find what works best for you. In some cases, shots into the nerve area (nerve blocks) seem to reduce symptoms. Pain medicine is only one part of successful treatment. Physical therapy and counseling can also help. Follow-up care is a key part of your treatment and safety. Be sure to make and go to all appointments, and call your doctor if you are having problems. It's also a good idea to know your test results and keep a list of the medicines you take. How can you care for yourself at home? · Try to relax and reduce stress. Relaxation techniques such as deep breathing or meditation can help. · Keep moving, if you can. Gentle, daily exercise, such as walking or swimming, can help reduce pain over the long term. · Put ice or a cold pack on the area for 10 to 20 minutes at a time. Put a thin cloth between the ice and your skin. · Apply a heating pad set on low or a warm cloth to the painful area.   · Gently massage the painful area. · Get enough sleep. Talk with your doctor if you have trouble sleeping because of pain. · Take your medicines exactly as prescribed. ? Your doctor may have prescribed medicines used to treat depression and seizures. These medicines can reduce your pain, help you sleep better, and improve your mood. ? If you are not taking a prescription medicine, ask your doctor if you can take an over-the-counter medicine. ? Do not take two or more pain medicines at the same time unless the doctor told you to. Many pain medicines have acetaminophen, which is Tylenol. Too much acetaminophen (Tylenol) can be harmful. · Keep a daily pain diary. Record how your moods, thoughts, sleep patterns, activities, and medicine affect your pain. Having a record can help you and your doctor find the best ways to treat your pain. · Try to think positively. Remember that your thoughts can affect your pain level. Do things you enjoy to make yourself feel better when you have pain. See a movie, read a book, listen to music, or spend time with a friend. Let your doctor know if you are feeling depressed or anxious. When should you call for help? Call your doctor now or seek immediate medical care if:    · Your pain is getting worse or is out of control.    Watch closely for changes in your health, and be sure to contact your doctor if:    · You cannot sleep because of your pain and stiffness.     · You are feeling down or blue, or you are not enjoying things like you once did. Where can you learn more? Go to http://diana-lashonda.info/. Enter U528 in the search box to learn more about \"Complex Regional Pain Syndrome: Care Instructions. \"  Current as of: Maria A 3, 2018  Content Version: 11.9  © 8702-5824 [employer]. Care instructions adapted under license by [employer] (which disclaims liability or warranty for this information).  If you have questions about a medical condition or this instruction, always ask your healthcare professional. [place] [address] any warranty or liability for your use of this information.

## 2019-01-31 NOTE — PROGRESS NOTES
Adela Ryder is a 58 y.o. female ambidextrous on disability. Worker's Compensation and legal considerations: none filed. Vitals:    01/31/19 1433   BP: 132/74   Pulse: 89   Temp: 97.7 °F (36.5 °C)   TempSrc: Oral   SpO2: 95%   Weight: 184 lb (83.5 kg)   Height: 5' 2\" (1.575 m)   PainSc:   7   PainLoc: Hand           Chief Complaint   Patient presents with    Hand Pain     right     HPI:  Patient comes in today 1 month early for her scheduled follow-up appointment. She came over from therapy because she is concerned about cyst in her dorsum of her hand. She was recently seen by her primary care physician who I cannot communicated with regarding a lab workup for possible inflammatory arthropathy. She has not gotten his labs done and she is not sure if she is supposed to get them before seeing her primary care doctor. She says that the therapy is hurting her and her hand is painful all the way up her arm even to light touch. She is Several months status post extensor tenosynovectomy by my partner.     Previous HPI: Patient returns today for 3-month follow-up    Date of onset:  3 months on the right wrist, and almost 2 mons postop on the left    Injury: No    Prior Treatment:  Yes: Comment: Tenosynovectomy of the L hand and therapy    Numbness/ Tingling: No    Review of Systems - General ROS: negative  Respiratory ROS: no cough, shortness of breath, or wheezing  Cardiovascular ROS: no chest pain or dyspnea on exertion  Musculoskeletal ROS: positive for - pain in hand - bilateral  Neurological ROS: negative  Dermatological ROS: negative    Past Medical History:   Diagnosis Date    Annular tear of lumbar disc 11/10/2014    Asthma     Bronchitis    Benign tumor of throat     Bone spur     right ankle    Cervicalgia 11/10/2014    Chronic pain     back    Degeneration of lumbar or lumbosacral intervertebral disc 11/10/2014    Degenerative disc disease     Depression     Displacement of lumbar intervertebral disc without myelopathy 11/10/2014    Elevated white blood cell count     Fibromyalgia     GERD (gastroesophageal reflux disease)     Hypertension     Insomnia     Nausea & vomiting     Pain in joint, multiple sites 11/10/2014    Postlaminectomy syndrome, cervical region 11/10/2014    Sinus infection 10/5/15    Ulcer        Past Surgical History:   Procedure Laterality Date    COLONOSCOPY N/A 4/13/2017    COLONOS[facility] performed by [facility]i [facility] at [facility] HX CATARACT REMOVAL      HX CERVICAL FUSION      HX HYSTERECTOMY      HX ORTHOPAEDIC      ganglion cyst removed, right hand    HX OTHER SURGICAL      cyst left thigh removed    HX WRIST FRACTURE TX      LAP,CHOLECYSTECTOMY N/A 02/27/2017    Dr. Gabbie Rhodes       Current Outpatient Medications   Medication Sig Dispense Refill    fluticasone (FLONASE) 50 mcg/actuation nasal spray 2 Sprays by Both Nostrils route as needed. 3 Bottle 4    diclofenac (VOLTAREN) 1 % gel Apply  to affected area four (4) times daily. 1 g 1    butalbital-acetaminophen-caffeine (FIORICET, ESGIC) -40 mg per tablet Take 1 Tab by Mouth Every 4 Hours As Needed.  lidocaine (XYLOCAINE) 5 % ointment       rizatriptan (MAXALT) 10 mg tablet       ketoconazole (NIZORAL) 2 % topical cream Apply  to affected area two (2) times a day. 15 g 11    cyclobenzaprine (FLEXERIL) 10 mg tablet TAKE 1 TABLET BY MOUTH THREE TIMES DAILY AS NEEDED FOR MUSCLE SPASMS FOR UP TO 30 DAYS. 90 Tab 2    polyethylene glycol (MIRALAX) 17 gram packet Take 1 Packet by mouth daily. 30 Each 6    atorvastatin (LIPITOR) 40 mg tablet TAKE ONE TABLET BY MOUTH ONE TIME DAILY 90 Tab 3    losartan (COZAAR) 25 mg tablet TAKE ONE TABLET BY MOUTH ONE TIME DAILY 90 Tab 1    valACYclovir (VALTREX) 500 mg tablet Take 1 Tab by mouth two (2) times a day.  (Patient taking differently: Take 500 mg by mouth two (2) times daily as needed.) 60 Tab 3    ondansetron (ZOFRAN ODT) 4 mg disintegrating tablet Take 1 Tab by mouth every eight (8) hours as needed for Nausea for up to 15 doses. 15 Tab 0    ergocalciferol (VITAMIN D2) 50,000 unit capsule TAKE ONE CAPSULE BY MOUTH EVERY 7 DAYS 12 Cap 3    QUEtiapine (SEROQUEL) 200 mg tablet Take 200 mg by mouth daily.  naloxone (NARCAN) 4 mg/actuation nasal spray 1 [place] by IntraNASal route once as needed. Use 1 spray intranasally into 1 nostril. Use a new Narcan nasal spray for subsequent doses and administer into alternating nostrils. May repeat every 2 to 3 minutes as needed.  gabapentin (NEURONTIN) 400 mg capsule 400 mg two (2) times a day.  trazodone (DESYREL) 100 mg tablet Take 100 mg by mouth nightly.  morphine CR (MS CONTIN) 60 mg CR tablet Take  by mouth every twelve (12) hours. Allergies   Allergen Reactions    Aspirin Other (comments)     Stomach bleeding    Ativan [Lorazepam] Shortness of Breath    Macrobid [Nitrofurantoin Monohyd/M-Cryst] Nausea and Vomiting    Nsaids (Non-Steroidal Anti-Inflammatory Drug) Nausea and Vomiting    Opana [Oxymorphone] Other (comments)     Insomnia, weight loss, nightmares    Pcn [Penicillins] Hives         PE:     L Wrist:  There is exquisite tenderness to palpation to light touch over the dorsum of the hand and forearm. The lightest touch the dorsum of her wrist and hand causes extreme pain that is disproportionate from what it should be    Tenderness L R Test L R   1st Ext Comp - - Finkelstein's - -   Snuff Box - - Sánchez - -   2nd Ext Comp - - S-L Shear - -   S-L Joint - - L-T Shear - -   L-T Joint - - DRUJ Sup - -   6th Ext Comp - - DRUJ Pro - -   Ulnar Snuff - - DRUJ Grind - -   Fovea - - TFCC - -   STT Joint - - Mid-Carp Inst - -   FCR - - P-T Grind - -   Intersection - - ECU Sublux. - -      Dorsal Ganglion: +    Volar Ganglion: -    Hand: There is palpable scar tissue about the dorsum of the left hand at the incisional site of her left extensor tenosynovectomy.   There is mild tenderness to palpation in this area. There is no acute swelling or erythema noted. Range of motion is limited bilaterally. She has a palm to pulp distance of approximately 1-1/2-2 cm. Examination L Digit(s) R Digit(s)   1st CMC Tenderness -  -    1st CMC Grind -  -    Yamileth Nodes -  -    Heberden Nodes -  -    A1 Pulley Tenderness -  -    Triggering -  -    UCL Instability -  -    RCL Instability -  -    Lateral Stress Pain -  -    Palmar Cords -  -    Tabletop test -  -    Garrod's Pads -  -     Strength       Pinch Strength         ROM: within 2 cm of finger flexion        Imaging: Plain films of R wrist are negative for acute processes, or osteoarthritis    MRI reviewed that was done on 8/22 is + for 1st DC tenosynovitis with an associated ganglion cyst.      ICD-10-CM ICD-9-CM    1. Other synovitis and tenosynovitis, left hand M65.842 727.05    2. Complex regional pain syndrome type 1 of left upper extremity G90.512 337.21        Plan      I have spoken to the occupational therapist regarding desensitization protocol for complex regional pain syndrome. I believe she has this as well as a component of inflammatory arthropathy. Whether this is rheumatoid arthritis or some other inflammatory arthropathy I am unsure. She has labs pending through her primary care physician that I will follow-up on at her next visit. I told her to keep her visit as scheduled for February. Cuco Paulino also instructed her on desensitization of her hand.     Plan was reviewed with patient, who verbalized agreement and understanding of the plan

## 2019-01-31 NOTE — PROGRESS NOTES
OT DAILY TREATMENT NOTE 10-18 Patient Name: Jose Villarreal Date:2019 : 1955 [x]  Patient  Verified Payor: [place] / Plan: Charlie Edwards / Product Type: Medicare / In time:2:00 PM  Out time:2:25 PM 
Total Treatment Time (min): 25 Visit #: 6 of 8 Medicare/BCBS Only Total Timed Codes (min):  10 1:1 Treatment Time:  25 Treatment Area: Synovitis and tenosynovitis, unspecified [M65.9] SUBJECTIVE Pain Level (0-10 scale): 7-10/10 Any medication changes, allergies to medications, adverse drug reactions, diagnosis change, or new procedure performed?: [x] No    [] Yes (see summary sheet for update) Subjective functional status/changes:   [] No changes reported \"I cannot move I am in so much pain. \" OBJECTIVE Modality rationale: decrease pain and increase tissue extensibility to improve the patients ability to move bilateral wrists through pain-free ROM tasks Type Additional Details  
[] Estim:  []Unatt       []IFC  []Premod []Other:  []w/ice   []w/heat Position: Location:  
[] Estim: []Att    []TENS instruct  []NMES []Other:  []w/US   []w/ice   []w/heat Position: Location:  
[]  Traction: [] Cervical       []Lumbar 
                     [] Prone          []Supine []Intermittent   []Continuous Lbs: 
[] before manual 
[] after manual  
[]  Ultrasound: []Continuous   [] Pulsed []1MHz   []3MHz W/cm2: 
Location:  
[]  Iontophoresis with dexamethasone Location: [] Take home patch  
[] In clinic  
[]  Ice     [x]  heat 
[]  Ice massage 
[]  Laser  
[]  Anodyne Position:resting Location: bilateral hands  
[]  Laser with stim 
[]  Other:  Position: Location:  
[]  Vasopneumatic Device Pressure:       [] lo [] med [] hi  
Temperature: [] lo [] med [] hi  
[x] Skin assessment post-treatment:  [x]intact [x]redness- no adverse reaction 
  []redness  adverse reaction: 10 min Self Care/Home Management: activity modification Rationale: education  to improve the patients ability to reduce symptoms in bilateral wrists. With 
 [] TE 
 [] TA 
 [] neuro 
 [] other: Patient Education: [x] Review HEP [] Progressed/Changed HEP based on:  
[] positioning   [] body mechanics   [] transfers   [] heat/ice application  
[] Splint wear/care   [] Sensory re-education   [] scar management     
[] other:   
 
     
Other Objective/Functional Measures: Range of Motion:   Elbow/Wrist  
Wrist 1/7/2019 Right/Left  1/22/2019 Left  1/31/2019 Left      
Flex 0-50/0-26  0-52 10 w/ p!      
Ext 0-57/0-28 0-42   5 w/ p!      
UD 0-30/0-30          
RD 0-20/0-18          
FA             
Pro 0-80/0-80          
Sup 0-50/0-55          
    
Pain Level (0-10 scale) post treatment: 10/10 ASSESSMENT/Changes in Function: Patient came in to skilled OT today with pain rating 7-10/10 pain level in left hand/wrist with pain worsening since 3 days ago. She did not come for her last skilled OT session because her pain level was high. She presented today, unable to flex and extend her left wrist without increased pain. She is c/o pain on the dorsum left hand where there is a possible ganglion cyst that was identified on x-ray in August 2018. The cyst appears to be larger since beginning skilled OT. I have advised patient to see physician before continuing skilled OT services. Patient has been advised to discontinue HEP. Patient will continue to benefit from skilled OT services to modify and progress therapeutic interventions, address ROM deficits, address strength deficits and analyze and address soft tissue restrictions to attain remaining goals. [x]  See Plan of Care 
[]  See progress note/recertification 
[]  See Discharge Summary Progress towards goals / Updated goals: 
Short Term Goals: To be accomplished in 2  weeks: 1. Patient will be compliant with initial home exercise program to take an active role in their rehabilitation process. Goal Met 1/17/19 - pt verbalized and demonstrated compliance with HEP Status at Eval: pt educated in wrist ROM. 2. Patient will demonstrate a good understanding of their condition and strategies for self-management.   
Status at [facility]: pt educated in activity modification and pain relief strategies such as moist heat application. Goal Met 1/24/19 - pt verbalized taking pain medication as needed for pian relief as well as hot pack use. Long Term Goals: To be accomplished in 4 weeks:             
1. Patient will have 40 degrees of left wrist extension in order to increase ease with ADL's such as bathing, eating and dressing and to eventually bear weight thru the left upper extremity as in pushing up from a chair. Goal Met 1/22/19 - 42 degrees 2. Patient will have 40 degrees of left wrist flexion in order to perform hygiene tasks and /or work with tools such as a screwdriver. Goal Met 7/80/03 - 52 degrees 3. Patient will have 60 degrees of right forearm supination in order to perform ADL's including washing face and accepting change. 4. Patient will show a 13 point improvement on FOTO functional status measure to improve overall functional performance. PLAN 
[]  Upgrade activities as tolerated     []  Continue plan of care 
[]  Update interventions per flow sheet      
[]  Discharge due to:_ 
[x]  Other:_  Hold until patient sees physician Jesse Marie OT 1/31/2019  2:04 PM 
 
Future Appointments Date Time Provider Gordo Marquez 2/8/2019 10:15 AM MD Simon Slaughter  
2/28/2019  1:40 PM Helaine Moritz, DO [address]  
5/31/2019 11:00 AM Romi Wilson MD [place]

## 2019-02-04 NOTE — TELEPHONE ENCOUNTER
the patient had an appt to come and get her labs done and canceled the patient has another appt set up for her top come in for labs

## 2019-02-07 ENCOUNTER — OFFICE VISIT (OUTPATIENT)
Dept: ORTHOPEDIC SURGERY | Facility: CLINIC | Age: 64
End: 2019-02-07

## 2019-02-07 DIAGNOSIS — M25.532 LEFT WRIST PAIN: ICD-10-CM

## 2019-02-07 DIAGNOSIS — M79.642 LEFT HAND PAIN: ICD-10-CM

## 2019-02-07 DIAGNOSIS — M65.832 EXTENSOR TENOSYNOVITIS OF LEFT WRIST: Primary | ICD-10-CM

## 2019-02-07 RX ORDER — BETAMETHASONE SODIUM PHOSPHATE AND BETAMETHASONE ACETATE 3; 3 MG/ML; MG/ML
6 INJECTION, SUSPENSION INTRA-ARTICULAR; INTRALESIONAL; INTRAMUSCULAR; SOFT TISSUE ONCE
Qty: 0.5 ML | Refills: 0
Start: 2019-02-07 | End: 2019-02-07

## 2019-02-07 RX ORDER — BUPIVACAINE HYDROCHLORIDE 2.5 MG/ML
0.5 INJECTION, SOLUTION EPIDURAL; INFILTRATION; INTRACAUDAL ONCE
Qty: 0.5 ML | Refills: 0
Start: 2019-02-07 | End: 2019-02-07

## 2019-02-07 NOTE — PROGRESS NOTES
Patient: Ladonna Roque                MRN: [id number]       SSN: [id number]  YOB: 1955        AGE: 58 y.o. SEX: female    PCP: Nay Mitchell MD  02/07/19    CC: LEFT WRIST PAIN    HISTORY:  Ladonna Roque is a 58 y.o. female female who is seen for dorsal left hand/wrist pain and swelling. She is s/p extensor tenosynovectomy 7/11/18. She notes soreness at the proximal aspect of her incision site with pain radiating into her hand. She notes a small firm lump has formed under the skin. She had been experiencing extreme pain with use of her hand or wrist. She relates her pain to a headache. She was seen by Dr. Mike Malik on 1/31/19 who she states told her she has tender skin. She responded temporarily to her previous injection. She states she first noticed a swelling overlying her dorsal left wrist while in physical therapy. She noted increased left wrist pain after noticing the swelling. She states that she is left handed so most everything she does causes her pain. She is experiencing pain with gripping and pinching.      She was previously being seen by Dr. Rob Parker for a left toe fracture. She was previously seen by Dr. Sandhya Yost in 2000 for her fibromyalgia. She is currently in pain management. She is s/p anterior cervical fusion by Dr. Kyler Hernandez. She reports she has been dropping things recently.         Pain Assessment  2/7/2019   Location of Pain Wrist;Hand   Pain Location Comment -   Location Modifiers Left   Severity of Pain 9   Quality of Pain Aching   Quality of Pain Comment -   Duration of Pain Persistent   Frequency of Pain Constant   Aggravating Factors Bending;Stretching;Straightening   Aggravating Factors Comment -   Limiting Behavior Yes   Relieving Factors Nothing   Relieving Factors Comment -   Result of Injury No   Work-Related Injury -   Type of Injury -     Occupation, etc:  Ms. Lobo Burton has received social security disability benefits for low back pain and OA since 2000. She previously worked at [employer] and was training for management. She lives in [place] with her son and daughter. Her daughter is employed as her aid. She has another adult daughter. She also lost a son due to a horrific motorcycle accident a few years ago. She has 5 grandchildren- two of which are adopted. Current weight is 173 pounds. She is 5'4\" tall. She is a diet controlled diabetic. She is not hypertensive. She is left handed. She reports a h/o acid reflux and a stomach ulcer.        Lab Results   Component Value Date/Time    Hemoglobin A1c 6.0 (H) 11/26/2018 12:00 AM    Hemoglobin A1c, External 5.9 06/27/2016     Weight Metrics 2/7/2019 1/31/2019 1/18/2019 1/4/2019 12/27/2018 12/6/2018 11/15/2018   Weight 183 lb 184 lb 184 lb 186 lb 184 lb 3.2 oz 183 lb 185 lb 9.6 oz   BMI 33.47 kg/m2 33.65 kg/m2 33.65 kg/m2 34.02 kg/m2 33.69 kg/m2 33.47 kg/m2 33.95 kg/m2       Patient Active Problem List   Diagnosis Code    Lumbago M54.5    Other affections of shoulder region, not elsewhere classified M75.80    Other chronic pain G89.29    Bipolar 1 disorder ([facility]) F31.9    Chronic pain syndrome G89.4    Carpal tunnel syndrome G56.00    Degeneration of lumbar or lumbosacral intervertebral disc M51.37    Displacement of lumbar intervertebral disc without myelopathy M51.26    Pain in joint, multiple sites M25.50    Cervicalgia M54.2    Postlaminectomy syndrome, cervical region M96.1    Annular tear of lumbar disc M51.36    Lung nodule seen on imaging study R91.1    Hypercholesterolemia E78.00    Nausea R11.0    Myalgia M79.10    Vitamin D deficiency E55.9    Benign essential hypertension with target blood pressure below 140/90 I10    Urinary retention R33.9    Gastroesophageal reflux disease without esophagitis K21.9    Diverticulosis of large intestine without hemorrhage K57.30    Hiatal hernia K44.9    Hepatic steatosis K76.0    Calculus of gallbladder without cholecystitis K80.20    Obesity (BMI 30.0-34. 9) E66.9    Prediabetes R73.03    Angular cheilitis with candidiasis B37.0     REVIEW OF SYSTEMS: All Below are Negative except: See HPI   Constitutional: negative for fever, chills, and weight loss. Cardiovascular: negative for chest pain, claudication, leg swelling, SOB, VILLAGOMEZ   Gastrointestinal: Negative for pain, N/V/C/D, Blood in stool or urine, dysuria,  hematuria, incontinence, pelvic pain. Musculoskeletal: See HPI   Neurological: Negative for dizziness and weakness. Negative for headaches, Visual changes, confusion, seizures   Phychiatric/Behavioral: Negative for depression, memory loss, substance  abuse. Extremities: Negative for hair changes, rash, or skin lesion changes. Hematologic: Negative for bleeding problems, bruising, pallor or swollen lymph  nodes   Peripheral Vascular: No calf pain, no circulation deficits.     Social History     Socioeconomic History    Marital status:      Spouse name: Not on file    Number of children: Not on file    Years of education: Not on file    Highest education level: Not on file   Social Needs    Financial resource strain: Not on file    Food insecurity - worry: Not on file    Food insecurity - inability: Not on file    Transportation needs - medical: Not on file   [employer] needs - non-medical: Not on file   Occupational History    Not on file   Tobacco Use    Smoking status: Former Smoker     Last attempt to quit: 3/6/1993     Years since quittin.9    Smokeless tobacco: Never Used   Substance and Sexual Activity    Alcohol use: No     Comment: none in 24 years    Drug use: No     Comment: last smoked in -cocaine    Sexual activity: No   Other Topics Concern    Not on file   Social History Narrative    Not on file      Allergies   Allergen Reactions    Aspirin Other (comments)     Stomach bleeding    Ativan [Lorazepam] Shortness of Breath    Macrobid [Nitrofurantoin Monohyd/M-Cryst] Nausea and Vomiting    Nsaids (Non-Steroidal Anti-Inflammatory Drug) Nausea and Vomiting    Opana [Oxymorphone] Other (comments)     Insomnia, weight loss, nightmares    Pcn [Penicillins] Hives      Current Outpatient Medications   Medication Sig    betamethasone (CELESTONE SOLUSPAN) 6 mg/mL injection 1 mL by Intra artICUlar route once for 1 dose.  bupivacaine, PF, (MARCAINE, PF,) 0.25 % (2.5 mg/mL) injection 4 mL by Intra artICUlar route once for 1 dose.  valACYclovir (VALTREX) 500 mg tablet Take 1 Tab by mouth two (2) times a day.  fluticasone (FLONASE) 50 mcg/actuation nasal spray 2 Sprays by Both Nostrils route as needed.  morphine CR (MS CONTIN) 60 mg CR tablet Take  by mouth every twelve (12) hours.  diclofenac (VOLTAREN) 1 % gel Apply  to affected area four (4) times daily.  butalbital-acetaminophen-caffeine (FIORICET, ESGIC) -40 mg per tablet Take 1 Tab by Mouth Every 4 Hours As Needed.  lidocaine (XYLOCAINE) 5 % ointment     rizatriptan (MAXALT) 10 mg tablet     ketoconazole (NIZORAL) 2 % topical cream Apply  to affected area two (2) times a day.  cyclobenzaprine (FLEXERIL) 10 mg tablet TAKE 1 TABLET BY MOUTH THREE TIMES DAILY AS NEEDED FOR MUSCLE SPASMS FOR UP TO 30 DAYS.  polyethylene glycol (MIRALAX) 17 gram packet Take 1 Packet by mouth daily.  atorvastatin (LIPITOR) 40 mg tablet TAKE ONE TABLET BY MOUTH ONE TIME DAILY    losartan (COZAAR) 25 mg tablet TAKE ONE TABLET BY MOUTH ONE TIME DAILY    ondansetron (ZOFRAN ODT) 4 mg disintegrating tablet Take 1 Tab by mouth every eight (8) hours as needed for Nausea for up to 15 doses.  ergocalciferol (VITAMIN D2) 50,000 unit capsule TAKE ONE CAPSULE BY MOUTH EVERY 7 DAYS    QUEtiapine (SEROQUEL) 200 mg tablet Take 200 mg by mouth daily.  naloxone (NARCAN) 4 mg/actuation nasal spray 1 [place] by IntraNASal route once as needed. Use 1 spray intranasally into 1 nostril.  Use a new Narcan nasal spray for subsequent doses and administer into alternating nostrils. May repeat every 2 to 3 minutes as needed.  gabapentin (NEURONTIN) 400 mg capsule 400 mg two (2) times a day.  trazodone (DESYREL) 100 mg tablet Take 100 mg by mouth nightly. No current facility-administered medications for this visit. PHYSICAL EXAMINATION:  There were no vitals taken for this visit. ORTHO EXAMINATION:  Examination Right Wrist Left Wrist   Skin Intact    Tenderness - + dorsum, fourth extensor compartment   Flexion 40 40   Extension 30 30   Deformity - -   Effusion - -   Finger flexion Full Full   Finger extension Full Weak active with pain   Tinel's sign - -   Phalen's test - -   Finklestein maneuver - -   Pain with thumb abduction - -   Capillary refill - -   No evidence of extensor tendon rupture  Pain with active wrist and finger extension  Flat affect    TIME OUT:   Chart reviewed for the following:   I, Gradie Aase, MD, have reviewed the History, Physical and updated the Allergic reactions for [address] performed immediately prior to start of procedure:  Guillermina Braun MD, have performed the following reviews on [address] prior to the start of the procedure:            * Patient was identified by name and date of birth   * Agreement on procedure being performed was verified  * Risks and Benefits explained to the patient  * Procedure site verified and marked as necessary  * Patient was positioned for comfort  * Consent was obtained     Time: 11:30 AM     Date of procedure: 2/7/2019    Procedure performed by:  Gradie Aase, MD    Ms. Hutchinson tolerated the procedure well with no complications. MRI LEFT WRIST WO CONT 1/22/18  IMPRESSION:   1. Extensive fluid surrounding the fourth extensor compartment tendons, consistent with tenosynovitis. Perhaps minimal tenosynovitis of the second extensor compartment as well.   -Marrow edema in the dorsal lip of the distal radius, extending towards styloid, of uncertain etiology. If tenosynovitis is inflammatory or infectious, these findings may be reactive. Also consider contusion in the appropriate setting.   2. Probable degeneration of the TFCC and scapholunate ligament. Mild triscaphe degenerative change. IMPRESSION:      ICD-10-CM ICD-9-CM    1. Extensor tenosynovitis of left wrist M65.842 727.05 betamethasone (CELESTONE SOLUSPAN) 6 mg/mL injection      BETAMETHASONE ACETATE & SODIUM PHOSPHATE INJECTION 3 MG EA.      bupivacaine, PF, (MARCAINE, PF,) 0.25 % (2.5 mg/mL) injection      PA INJECT TENDON SHEATH/LIGAMENT   2. Left hand pain M79.642 729.5 betamethasone (CELESTONE SOLUSPAN) 6 mg/mL injection      BETAMETHASONE ACETATE & SODIUM PHOSPHATE INJECTION 3 MG EA.      bupivacaine, PF, (MARCAINE, PF,) 0.25 % (2.5 mg/mL) injection      PA INJECT TENDON SHEATH/LIGAMENT   3. Left wrist pain M25.532 719.43 betamethasone (CELESTONE SOLUSPAN) 6 mg/mL injection      BETAMETHASONE ACETATE & SODIUM PHOSPHATE INJECTION 3 MG EA.      bupivacaine, PF, (MARCAINE, PF,) 0.25 % (2.5 mg/mL) injection      PA INJECT TENDON SHEATH/LIGAMENT     PLAN: She will follow up with Dr. Oliva Rodgers. After discussing treatment options, patient's extensor tendon sheath was reinjected with 1/2 cc Marcaine and 1/2 cc Celestone. She will follow up as needed. There is no need for further surgery at this time.     Scribed by Dada Parkinson [address]) as dictated by Baltazar Echevarria MD

## 2019-02-12 ENCOUNTER — OFFICE VISIT (OUTPATIENT)
Dept: FAMILY MEDICINE CLINIC | Age: 64
End: 2019-02-12

## 2019-02-12 VITALS
SYSTOLIC BLOOD PRESSURE: 100 MMHG | HEART RATE: 112 BPM | BODY MASS INDEX: 33.68 KG/M2 | HEIGHT: 62 IN | WEIGHT: 183 LBS | OXYGEN SATURATION: 94 % | RESPIRATION RATE: 14 BRPM | DIASTOLIC BLOOD PRESSURE: 70 MMHG | TEMPERATURE: 100.1 F

## 2019-02-12 DIAGNOSIS — B00.2 RECURRENT ORAL HERPES SIMPLEX: ICD-10-CM

## 2019-02-12 DIAGNOSIS — R52 BODY ACHES: ICD-10-CM

## 2019-02-12 DIAGNOSIS — G25.79 SEROTONIN SYNDROME: Primary | ICD-10-CM

## 2019-02-12 LAB
QUICKVUE INFLUENZA TEST: NEGATIVE
VALID INTERNAL CONTROL?: YES

## 2019-02-12 RX ORDER — VALACYCLOVIR HYDROCHLORIDE 500 MG/1
500 TABLET, FILM COATED ORAL DAILY
Qty: 90 TAB | Refills: 4 | Status: SHIPPED | OUTPATIENT
Start: 2019-02-12 | End: 2020-06-17

## 2019-02-12 RX ORDER — AMITRIPTYLINE HYDROCHLORIDE 75 MG/1
75 TABLET, FILM COATED ORAL DAILY
Refills: 0 | COMMUNITY
Start: 2019-02-10 | End: 2019-02-12 | Stop reason: SINTOL

## 2019-02-12 RX ORDER — DEXLANSOPRAZOLE 60 MG/1
60 CAPSULE, DELAYED RELEASE ORAL 2 TIMES DAILY
COMMUNITY
Start: 2019-02-03

## 2019-02-12 NOTE — PROGRESS NOTES
Zachariah Mike is a 58 y.o. female who was seen in clinic today (2/12/2019). Assessment & Plan:       ICD-10-CM ICD-9-CM    1. Serotonin syndrome G25.79 333.99    2. Body aches R52 780.96 AMB POC RAPID INFLUENZA TEST   3. Recurrent oral herpes simplex B00.2 054.2 valACYclovir (VALTREX) 500 mg tablet     Serotonin syndrome supported by constellation of  Vomiting  Dysarthria  Clonus  Tachycardia  Diaphoresis  Fine tremor  Cogwheeling a bit atypical, though suspect a variant of rigidity    Multiple agents with both serotonergic overlap and anticholinergic effects, gradually progressive symptoms since initiation of amitriptyline. Gradual nature more typical in NMS, whereas SS more typically with acute onset within the first 1-2 days of initiation. Nonetheless, clinical picture not suggestive of NMS. Could be related to recent increase in morphine dose, changed 4 days ago    Decreasing amitriptyline dose by half for the next 2 days and then discontinuing altogether  Contacting offices of both ROBYN Camargo and Dr. Rudolph Trejo    Also recommending discontinuing ondansetron, also serotonergic with risk for QT prolongation with her Seroquel. Advised trial ginger ale pending what I hope will be complete resolution of n/v with dc of TCA. Frequency of HSV outbreaks merits suppression. Standard dose is 500 mg once daily. Fioricet no longer recommended as abortive agent for migraines. Plan dedicated visit for alternative strategy. Follow-up Disposition:  Return in about 1 week (around 2/19/2019) for serotonin syndrome follow up. Declined earlier appointment. Subjective:   Zachariah Mike was seen today for Medication Refill; Vomiting; and Generalized Body Aches    Challenging historian    Complains of acute onset of malaise, body aches and vomiting, 4 episodes emesis over past 2 days, tolerating liquids.  associated with body \"twitching\" x couple days and difficulty articulating speech x 1 month    amitriptiline initiated by ROBYN Plunkettjohn Placido approx 2 months ago for insomnia  Sertraline and temazepam discontinued at that time    Morphine dose doubled on 2/8/2019    Takes:  cyclobenzaprine 3x/day, x 6 months unchanged (Dr. Donna Velez)  Gabapentin 400 mg bid, 5 years unchanged (ROBYN Plunkettjohn Placido)  Seroquel 200 mg daily x 1 year, unchanged  (NP Dimitriosjohn [place])  Trazodone 100 mg hs, x 1.5 years, unchanged (ROBYN Cummins)  Amitriptyline 75 mg qhs x 2 months, unchanged (ROBYN Plunkettjohn Placido)    Morphine 60 mg 2x/day increased from 30 mg a few days ago (Dr. Yvette Morris)  Had been on Fioricet for migraines through Dr. Susan Hernandez - most recently 1.5 weeks ago, has 2 tabs left    Excessive sweating x 8 months, worse x 2 months. LMP 1981  Tremor same timeframe, worse x 3 months    No diarrhea, no constipation    Had been scheduled for follow up of recurrent HSV, new to me. Single oral lesion mid upper lip approx 1/month. Confusing hx: at times states no suppression, taking 500 mg once daily as needed until resolution, which would take 1-1.5 weeks, but then states has been taking twice daily. Rapid flu neg      Outpatient Medications Marked as Taking for the 2/12/19 encounter (Office Visit) with Rosalino Diaz MD   Medication Sig Dispense Refill    valACYclovir (VALTREX) 500 mg tablet Take 1 Tab by mouth two (2) times a day. 30 Tab 0    fluticasone (FLONASE) 50 mcg/actuation nasal spray 2 Sprays by Both Nostrils route as needed. 3 Bottle 4    morphine CR (MS CONTIN) 60 mg CR tablet Take  by mouth every twelve (12) hours.  diclofenac (VOLTAREN) 1 % gel Apply  to affected area four (4) times daily. 1 g 1    butalbital-acetaminophen-caffeine (FIORICET, ESGIC) -40 mg per tablet Take 1 Tab by Mouth Every 4 Hours As Needed.  lidocaine (XYLOCAINE) 5 % ointment       rizatriptan (MAXALT) 10 mg tablet       ketoconazole (NIZORAL) 2 % topical cream Apply  to affected area two (2) times a day.  15 g 11    cyclobenzaprine (FLEXERIL) 10 mg tablet TAKE 1 TABLET BY MOUTH THREE TIMES DAILY AS NEEDED FOR MUSCLE SPASMS FOR UP TO 30 DAYS. 90 Tab 2    polyethylene glycol (MIRALAX) 17 gram packet Take 1 Packet by mouth daily. 30 Each 6    atorvastatin (LIPITOR) 40 mg tablet TAKE ONE TABLET BY MOUTH ONE TIME DAILY 90 Tab 3    losartan (COZAAR) 25 mg tablet TAKE ONE TABLET BY MOUTH ONE TIME DAILY 90 Tab 1    ondansetron (ZOFRAN ODT) 4 mg disintegrating tablet Take 1 Tab by mouth every eight (8) hours as needed for Nausea for up to 15 doses. 15 Tab 0    ergocalciferol (VITAMIN D2) 50,000 unit capsule TAKE ONE CAPSULE BY MOUTH EVERY 7 DAYS 12 Cap 3    QUEtiapine (SEROQUEL) 200 mg tablet Take 200 mg by mouth daily.  naloxone (NARCAN) 4 mg/actuation nasal spray 1 Dunn by IntraNASal route once as needed. Use 1 spray intranasally into 1 nostril. Use a new Narcan nasal spray for subsequent doses and administer into alternating nostrils. May repeat every 2 to 3 minutes as needed.  gabapentin (NEURONTIN) 400 mg capsule 400 mg two (2) times a day.  trazodone (DESYREL) 100 mg tablet Take 100 mg by mouth nightly. Patient Active Problem List    Diagnosis    Obesity (BMI 30.0-34. 9)    Prediabetes    Angular cheilitis with candidiasis    Diverticulosis of large intestine without hemorrhage     [place] 4/2017      Hiatal hernia     h pylori tx 3/2013; multiple EGDs 1/21/2015, 5/24/2017, 4/13/2017, 2/21/2018 - normal mucosa      Hepatic steatosis     US 1/13/2017      Calculus of gallbladder without cholecystitis     CT 12/22/2016 also with duodenal diverticulum      Gastroesophageal reflux disease without esophagitis    Urinary retention    Vitamin D deficiency    Benign essential hypertension with target blood pressure below 140/90    Myalgia    Nausea    Hypercholesterolemia    Lung nodule seen on imaging study    Degeneration of lumbar or lumbosacral intervertebral disc    Displacement of lumbar intervertebral disc without myelopathy    Pain in joint, multiple sites    Cervicalgia    Postlaminectomy syndrome, cervical region    Annular tear of lumbar disc    Chronic pain syndrome    Carpal tunnel syndrome    Bipolar 1 disorder (HCC)    Lumbago    Other affections of shoulder region, not elsewhere classified    Other chronic pain         Allergies   Allergen Reactions    Aspirin Other (comments)     Stomach bleeding    Ativan [Lorazepam] Shortness of Breath    Macrobid [Nitrofurantoin Monohyd/M-Cryst] Nausea and Vomiting    Nsaids (Non-Steroidal Anti-Inflammatory Drug) Nausea and Vomiting    Opana [Oxymorphone] Other (comments)     Insomnia, weight loss, nightmares    Pcn [Penicillins] Hives         Patient Care Team:  Yeny Arias MD as PCP - General (Family Practice)  Barbra Jansen MD (Gastroenterology)  Tamera Hagan RN as Ambulatory Care Navigator  Crys Schroeder RN as Ambulatory Care Navigator (Internal Medicine)  BETO Ernandez (Pain Management)  Efren Mcpherson NP as Nurse Practitioner (Psychiatry)  Real Velasquez MD (Pulmonary Disease)  Jemima Claros MD (Physical Medicine and Rehab)    The following sections were reviewed & updated as appropriate: PMH, PSH, FH, and SH. ROS - per HPI       Objective:     Visit Vitals  /70   Pulse (!) 112   Temp 100.1 °F (37.8 °C) (Oral)   Resp 14   Ht 5' 2\" (1.575 m)   Wt 183 lb (83 kg)   SpO2 94%   BMI 33.47 kg/m²      Wt Readings from Last 3 Encounters:   02/12/19 183 lb (83 kg)   02/07/19 183 lb (83 kg)   01/31/19 184 lb (83.5 kg)     Temp Readings from Last 3 Encounters:   02/12/19 100.1 °F (37.8 °C) (Oral)   01/31/19 97.7 °F (36.5 °C) (Oral)   01/18/19 98.3 °F (36.8 °C) (Oral)     BP Readings from Last 3 Encounters:   02/12/19 100/70   02/07/19 95/70   01/31/19 132/74     Pulse Readings from Last 3 Encounters:   02/12/19 (!) 112   02/07/19 (!) 105   01/31/19 89       Physical Exam   Constitutional: She is oriented to person, place, and time.  She appears well-developed. Non-toxic appearance. She appears ill. No distress. HENT:   Head: Normocephalic and atraumatic. Neck: Neck supple. Cardiovascular: Regular rhythm. Tachycardia present. Pulmonary/Chest: Effort normal and breath sounds normal. No respiratory distress. She has no wheezes. She has no rales. Neurological: She is alert and oriented to person, place, and time. She displays tremor (fine, bl hands). Reflex Scores:       Bicep reflexes are 2+ on the right side and 2+ on the left side. Patellar reflexes are 2+ on the right side and 2+ on the left side. cogwheeling [place] >RUE,  Few beats inducible clonus LLE  Few beats horizontal nystagmus   Testing biceps reflex LUE triggered patellar reflex LLE   Skin: Skin is warm and dry. Psychiatric: She has a normal mood and affect. Her behavior is normal. Judgment and thought content normal.         Disclaimer: The patient understands our medical plan. Alternatives have been explained and offered. The risks, benefits and significant side effects of all medications have been reviewed. Anticipated time course and progression of condition reviewed. All questions have been addressed. She is encouraged to employ the information provided in the after visit summary, which was reviewed. Where appropriate, she is instructed to call the clinic if she has not been notified either by phone or through [address] with the results of her tests or with an appointment plan for any referrals within 1 week(s). No news is not good news; it's no news. The patient  is to call if her condition worsens or fails to improve or if significant side effects are experienced. Aspects of this note may have been generated using voice recognition software. Despite editing, there may be unrecognized errors. Over 50% of the 51 minutes face to face with Bridgette Corral consisted of developing and discussing treatment plans.          Pamela Chambers MD

## 2019-02-12 NOTE — PROGRESS NOTES
Artemio Hutchinson 58 y.o. female being seen for   Chief Complaint   Patient presents with    Medication Refill    Vomiting    Generalized Body Aches     sxs since yesterday   POC FLU: Negative   Medication pended     1. Have you been to the ER, urgent care clinic since your last visit? Hospitalized since your last visit? No    2. Have you seen or consulted any other health care providers outside of the [address] [place] [address] since your last visit? Include any pap smears or colon screening.  No    Pharmacy has been verified  Care team has been verified/updated

## 2019-02-12 NOTE — Clinical Note
Please fax notes to Dr. Shonda Wang and ROBYN Matthews with high importance and request for return call.

## 2019-02-13 ENCOUNTER — TELEPHONE (OUTPATIENT)
Dept: FAMILY MEDICINE CLINIC | Age: 64
End: 2019-02-13

## 2019-02-13 ENCOUNTER — HOSPITAL ENCOUNTER (OUTPATIENT)
Dept: LAB | Age: 64
Discharge: HOME | End: 2019-02-13
Payer: MEDICARE

## 2019-02-13 DIAGNOSIS — M65.9 TENOSYNOVITIS OF HAND: ICD-10-CM

## 2019-02-13 LAB — ERYTHROCYTE [SEDIMENTATION RATE] IN BLOOD: 45 MM/HR (ref 0–30)

## 2019-02-13 PROCEDURE — 36415 COLL VENOUS BLD VENIPUNCTURE: CPT

## 2019-02-13 PROCEDURE — 86141 C-REACTIVE PROTEIN HS: CPT

## 2019-02-13 PROCEDURE — 86431 RHEUMATOID FACTOR QUANT: CPT

## 2019-02-13 PROCEDURE — 85652 RBC SED RATE AUTOMATED: CPT

## 2019-02-13 PROCEDURE — 86200 CCP ANTIBODY: CPT

## 2019-02-13 NOTE — TELEPHONE ENCOUNTER
I gave both Dr. Philomena Bernstein and Dr. Heard Manner Dr. Susan Magdaleno cell phone number and told them both that she would like to speak with them about Ms. Hutchinson's care.     SMD

## 2019-02-14 NOTE — PROGRESS NOTES
Delayed entry: spoke with Dr. Gutierrez Duran 2/13/2019 at approx 10:15. Confirmed dose adjustment morphine end last week. Agreed clinical picture consistent with serotonin syndrome, polypharmacy. Will communicate updated clinical picture post elimination of amitriptyline and post RA workup.     Brad Lew MD  2/14/2019

## 2019-02-15 ENCOUNTER — TELEPHONE (OUTPATIENT)
Dept: FAMILY MEDICINE CLINIC | Age: 64
End: 2019-02-15

## 2019-02-15 LAB
CCP IGA+IGG SERPL IA-ACNC: 5 UNITS (ref 0–19)
CRP SERPL HS-MCNC: 191.45 MG/L (ref 0–3)
RHEUMATOID FACT SERPL-ACNC: 17.5 IU/ML (ref 0–13.9)

## 2019-02-15 NOTE — TELEPHONE ENCOUNTER
I have not yet heard from [facility]. Please fax my notes from 2/12 &2/13 to Dr. Alissa Alamo (verify with [facility] staff that he is the physician supervisor for the NPs) and request a call back. We can plan a call appointment, so long as it is prior to 2/22/2019, when I next see the patient.   ERIKA

## 2019-02-18 NOTE — TELEPHONE ENCOUNTER
I called and spoke with office staff 2/18/2019 stating that I had called previously and left Dr. Vicente Rogers personal # and she has not yet herd for ROBYN monaco and is requesting that she call in regards to [facility] I also informed them that I have already sent her last progress notes to [facility] to / review for the call. I left Dr. Vicente Rogers personal # once again.     SMD

## 2019-02-19 ENCOUNTER — TELEPHONE (OUTPATIENT)
Dept: FAMILY MEDICINE CLINIC | Age: 64
End: 2019-02-19

## 2019-02-21 NOTE — PROGRESS NOTES
[address] [facility] [address]  Jeannine Destin [address] [place], [address] 
[phone] fax Occupational Therapy Discharge Summary Patient name: Kacie Beck Start of Care: 2019 Referral source: Immanuel Garcia DO : 1955 Medical/Treatment Diagnosis: Synovitis and tenosynovitis, unspecified [M65.9] Payor: Zack Ha / Plan: [address] / Product Type: Medicare /  Onset Date:2017 Prior Hospitalization: see medical history Provider#: [id number] Medications: Verified on Patient Summary List   
Comorbidities: Fibromyalgia, depression, arthritis, HTN Prior Level of Function: Independent with ADL. IADL tasks without functional limitations on bilateral hands Visits from Start of Care: 6    Missed Visits: 2 Reporting Period : 2019 to 2019 Summary of Care: 
1. Patient will be compliant with initial home exercise program to take an active role in their rehabilitation process. Goal Met 19 - pt verbalized and demonstrated compliance with HEP Status at Eval: pt educated in wrist ROM. 2. Patient will demonstrate a good understanding of their condition and strategies for self-management.   
Status at Eval: pt educated in activity modification and pain relief strategies such as moist heat application. Goal Met  - pt verbalized taking pain medication as needed for pian relief as well as hot pack use. Long Term Goals: To be accomplished in 4 weeks:             
1. Patient will have 40 degrees of left wrist extension in order to increase ease with ADL's such as bathing, eating and dressing and to eventually bear weight thru the left upper extremity as in pushing up from a chair. Goal Met 19 - 42 degrees 2. Patient will have 40 degrees of left wrist flexion in order to perform hygiene tasks and /or work with tools such as a screwdriver. Goal Met  - 52 degrees 3. Patient will have 60 degrees of right forearm supination in order to perform ADL's including washing face and accepting change. 4. Patient will show a 13 point improvement on FOTO functional status measure to improve overall functional performance. ASSESSMENT/RECOMMENDATIONS: Upon arrival to her last skilled OT treatment session, patient reported pain rating 7-10/10 pain level in left hand/wrist with pain worsening over 3 days. At her previous treatment session, she had no pain in bilateral wrists and tolerated AROM and strengthening well. She had very poor tolerance to touch on dorsum left wrist.  This therapist advised that patient see physician ASAP to address status change. Pt saw physician that same day and later called to let therapist know that she would be seeking a second opinion and that she will call back to schedule if she felt like she could continue with skilled OT. Patient failed to report back to skilled OT. Will d/c at this time. Thank you for this referral.  
 
[x]Discontinue therapy: []Patient has reached or is progressing toward set goals [x]Patient is non-compliant or has abdicated 
    []Due to lack of appreciable progress towards set goals Bekah Alvarado OT 2/21/2019 3:49 PM

## 2019-02-22 ENCOUNTER — OFFICE VISIT (OUTPATIENT)
Dept: FAMILY MEDICINE CLINIC | Age: 64
End: 2019-02-22

## 2019-02-22 VITALS
SYSTOLIC BLOOD PRESSURE: 120 MMHG | HEIGHT: 62 IN | WEIGHT: 181 LBS | RESPIRATION RATE: 14 BRPM | DIASTOLIC BLOOD PRESSURE: 80 MMHG | HEART RATE: 86 BPM | OXYGEN SATURATION: 96 % | TEMPERATURE: 98.7 F | BODY MASS INDEX: 33.31 KG/M2

## 2019-02-22 DIAGNOSIS — G89.4 CHRONIC PAIN SYNDROME: ICD-10-CM

## 2019-02-22 DIAGNOSIS — Z79.899 POLYPHARMACY: ICD-10-CM

## 2019-02-22 DIAGNOSIS — G25.79 SEROTONIN SYNDROME: Primary | ICD-10-CM

## 2019-02-22 DIAGNOSIS — K44.9 HIATAL HERNIA: ICD-10-CM

## 2019-02-22 DIAGNOSIS — M05.79 RHEUMATOID ARTHRITIS INVOLVING MULTIPLE SITES WITH POSITIVE RHEUMATOID FACTOR (HCC): ICD-10-CM

## 2019-02-22 DIAGNOSIS — F31.81 BIPOLAR 2 DISORDER (HCC): ICD-10-CM

## 2019-02-22 RX ORDER — OXYCODONE HYDROCHLORIDE 10 MG/1
10 TABLET ORAL
COMMUNITY
Start: 2019-02-12

## 2019-02-22 NOTE — PROGRESS NOTES
Thompson Hutchinson 58 y.o. female being seen for   Chief Complaint   Patient presents with    Other     Serotonin Syndrome      Still having pain in upper abdomin     1. Have you been to the ER, urgent care clinic since your last visit? Hospitalized since your last visit? No    2. Have you seen or consulted any other health care providers outside of the [address] [place] [address] since your last visit? Include any pap smears or colon screening.  No    Pharmacy has been verified  Care team has been verified/updated

## 2019-02-22 NOTE — TELEPHONE ENCOUNTER
I called ROBYN Mon's office and confirmed Dr. Christelle Clark is the physician supervisor for the NPs and requested that he call Dr. Wil Church back (left Dr. Mendoza Cons cell phone #) Concerning a life treating complications from medications NP Lenna Canavan has prescribed      Dr. Wil Church has tried contact NP Lenna Canavan twice (2) now and has failed to receive a return call after leaving her personal # twice. The office has also verified that the Progress notes for Ms. Hutchinson   SMD

## 2019-02-22 NOTE — TELEPHONE ENCOUNTER
Discussed patient with Dr. Keke Cortes who concurred likely serotonin syndrome vs. Cholinergic. Confirmed dx bipolar 2. Reviewed current improved clinical condition. Case will be reviewed with eye toward education for NP Yenny Davis and further med changes reducing Ms. Jasper's risk.  ERIKA

## 2019-02-22 NOTE — PROGRESS NOTES
Jose Villarreal is a 58 y.o. female who was seen in clinic today (2/24/2019). Assessment & Plan:       ICD-10-CM ICD-9-CM    1. Serotonin syndrome G25.79 333.99    2. Rheumatoid arthritis involving multiple sites with positive rheumatoid factor (HCC) M05.79 714.0 REFERRAL TO RHEUMATOLOGY   3. Bipolar 2 disorder (HCC) F31.81 296.89    4. Polypharmacy Z79.899 V58.69    5. Chronic pain syndrome G89.4 338.4    6. Hiatal hernia K44.9 553.3      Serotonin syndrome: resolved    Will continue to coordinate with Jinny Jarrett and Brendan Rae as pain and GI symptoms predominate her many concerns. Concerted efforts to wean/discontinue any pharm agent not clearly addressing pathology is recommended to all members of her care team.    GI symptoms have improved in this past week. Given the thoughtful thorough evaluation and management she's had through Dr. Gina Louis, I suspect she will be best served by first considering her residual GERD/HH symptoms to represent adverse drug reactions or side effects to the agents employed to address her psychiatric and pain producing conditions. I'm confident her PPI will be adjusted to more commonly employed dose as soon as clinical picture supports it. Similarly, diaphoresis in a 58year old with remote onset of menopausal status cannot be attributed to menopausal vasomotor instability. Seroquel clearly indicated for her Bipolar 2. Revisit of the dose/utility of the other agents (gabapentin, trazodone) encouraged. She is being seen at [facility] next week. She will be seeing Dr. Deanna Castro the following week for reassessment of her pain management plan. Labs consistent with RA (new). Referring. DMARDs therapy will hopefully improve her pain, making that easier. She's not convinced she has complex regional pain syndrome affecting her left wrist  and is currently declining therapy targeting that.  Explained that whatever else may be affecting this area is outside of my expertise and encouraged her to follow up with Jinny Herbert and/or [employer]. Follow-up Disposition:  Return if symptoms worsen or fail to improve. Subjective:   Matthew Cuevas was seen today for Other (Serotonin Syndrome )    Follow-up serotonin syndrome acutely following increase in morphine dose in the setting of polypharmacy. TCA and ondansetron discontinued 2/12/2019. Feels much better with dramatic improvement in sweating and nausea since the change. TCA had been purported to address insomnia. No complaints of sleep disturbance with the change. Currently most bothered by persistent epigastric discomfort. Notes having received significant improvement in GERD symptoms with doubling PPI, Dexilant now 120 mg daily. Seeing NP Quiana Pineda next week. Seeing Dr. Yasmine Spivey 3/5/2019    Reviewed Dr. Barbi Rivero assessment of CRPS as source of the hypersensitivity dorsal left hand/wrist. She disagrees and remains concerned about the cyst that had been excised from there by Dr. [employer]. Results for orders placed or performed during the hospital encounter of 02/13/19   RHEUMATOID FACTOR, QL   Result Value Ref Range    Rheumatoid factor 17.5 (H) 0.0 - 25.5 IU/mL   CYCLIC CITRUL PEPTIDE AB, IGG   Result Value Ref Range    CCP Antibodies IgG/IgA 5 0 - 19 units   CRP, HIGH SENSITIVITY   Result Value Ref Range    C-Reactive Protein, Cardiac 191.45 (H) 0.00 - 3.00 mg/L   SED RATE (ESR)   Result Value Ref Range    Sed rate, automated 45 (H) 0 - 30 mm/hr        Outpatient Medications Marked as Taking for the 2/22/19 encounter (Office Visit) with Yennifer Sanches MD   Medication Sig Dispense Refill    valACYclovir (VALTREX) 500 mg tablet Take 1 Tab by mouth daily. 90 Tab 4    DEXILANT 60 mg CpDB capsule (delayed release) Take 120 mg by mouth daily.  fluticasone (FLONASE) 50 mcg/actuation nasal spray 2 Sprays by Both Nostrils route as needed.  3 Bottle 4    morphine CR (MS CONTIN) 60 mg CR tablet Take  by mouth every twelve (12) hours.  lidocaine (XYLOCAINE) 5 % ointment       rizatriptan (MAXALT) 10 mg tablet       ketoconazole (NIZORAL) 2 % topical cream Apply  to affected area two (2) times a day. 15 g 11    polyethylene glycol (MIRALAX) 17 gram packet Take 1 Packet by mouth daily. 30 Each 6    atorvastatin (LIPITOR) 40 mg tablet TAKE ONE TABLET BY MOUTH ONE TIME DAILY 90 Tab 3    losartan (COZAAR) 25 mg tablet TAKE ONE TABLET BY MOUTH ONE TIME DAILY 90 Tab 1    QUEtiapine (SEROQUEL) 200 mg tablet Take 200 mg by mouth daily.  gabapentin (NEURONTIN) 400 mg capsule 400 mg two (2) times a day.  trazodone (DESYREL) 100 mg tablet Take 100 mg by mouth nightly. Patient Active Problem List    Diagnosis    Obesity (BMI 30.0-34. 9)    Prediabetes    Angular cheilitis with candidiasis    Diverticulosis of large intestine without hemorrhage     [place] 4/2017      Hiatal hernia     h pylori tx 3/2013; multiple EGDs 1/21/2015, 5/24/2017, 4/13/2017, 2/21/2018 - normal mucosa      Hepatic steatosis     US 1/13/2017      Calculus of gallbladder without cholecystitis     CT 12/22/2016 also with duodenal diverticulum      Gastroesophageal reflux disease without esophagitis    Urinary retention    Vitamin D deficiency    Benign essential hypertension with target blood pressure below 140/90    Myalgia    Nausea    Hypercholesterolemia    Lung nodule seen on imaging study    Degeneration of lumbar or lumbosacral intervertebral disc    Displacement of lumbar intervertebral disc without myelopathy    Pain in joint, multiple sites    Cervicalgia    Postlaminectomy syndrome, cervical region    Annular tear of lumbar disc    Chronic pain syndrome    Carpal tunnel syndrome    Bipolar 2 disorder [facility])     Per conversation Dr. Tyra Mayo 2/22/2019 based on review of her records there. Treated with Seroquel.       Lumbago    Other affections of shoulder region, not elsewhere classified    Other chronic pain Allergies   Allergen Reactions    Aspirin Other (comments)     Stomach bleeding    Ativan [Lorazepam] Shortness of Breath    Macrobid [Nitrofurantoin Monohyd/M-Cryst] Nausea and Vomiting    Nsaids (Non-Steroidal Anti-Inflammatory Drug) Nausea and Vomiting    Opana [Oxymorphone] Other (comments)     Insomnia, weight loss, nightmares    Pcn [Penicillins] Hives         Patient Care Team:  Salomon Riggs MD as PCP - General (Family Practice)  Augustine Sofia MD (Gastroenterology)  Latisha Marroquin NP as Nurse Practitioner (Psychiatry)  Loyda Alves MD (Physical Medicine and Rehab)  Marquise Lee MD (Psychiatry)  Mateo New NP as Nurse Practitioner (Gastroenterology)    The following sections were reviewed & updated as appropriate: PMH, PSH, FH, and SH. Review of Systems   Constitutional: Negative for fever. Gastrointestinal: Negative for heartburn and vomiting. Objective:     Visit Vitals  /80   Pulse 86   Temp 98.7 °F (37.1 °C) (Oral)   Resp 14   Ht 5' 2\" (1.575 m)   Wt 181 lb (82.1 kg)   SpO2 96%   BMI 33.11 kg/m²      Physical Exam   Constitutional: She is oriented to person, place, and time. She appears well-developed. No distress. HENT:   Head: Normocephalic and atraumatic. Pulmonary/Chest: Effort normal.   Neurological: She is alert and oriented to person, place, and time. More animated, speech clear   Skin: Skin is warm and dry. Psychiatric: She has a normal mood and affect. Her behavior is normal. Judgment and thought content normal.         Disclaimer: The patient understands our medical plan. Alternatives have been explained and offered. The risks, benefits and significant side effects of all medications have been reviewed. Anticipated time course and progression of condition reviewed. All questions have been addressed. She is encouraged to employ the information provided in the after visit summary, which was reviewed.       Where appropriate, she is instructed to call the clinic if she has not been notified either by phone or through [address] with the results of her tests or with an appointment plan for any referrals within 1 week(s). No news is not good news; it's no news. The patient  is to call if her condition worsens or fails to improve or if significant side effects are experienced. Aspects of this note may have been generated using voice recognition software. Despite editing, there may be unrecognized errors.        Margarita Gilman MD

## 2019-02-22 NOTE — Clinical Note
Jeremiah Hinkle, she's interested in staying in the [facility] area if possible. Any Rheumatologists there?  ERIKA

## 2019-02-25 ENCOUNTER — TELEPHONE (OUTPATIENT)
Dept: FAMILY MEDICINE CLINIC | Age: 64
End: 2019-02-25

## 2019-02-25 NOTE — TELEPHONE ENCOUNTER
Patient will need to have her Vitamin D level drawn before medication will be refilled, she was seen with Dr. Day Suarez on 19 advised at that time to have labs drawn with Vitamin D follow up plan based on her results. Called patient had her verify her  she has been made aware she will need labs drawn and a follow up with Dr. Day Suarez to see where she is on her Vitamin D level. Patient states she will call the office back on tomorrow to set up her appt.

## 2019-02-25 NOTE — TELEPHONE ENCOUNTER
Patient called at 2:59 requesting a refill on herergocalciferol (VITAMIN D2) 50,000 unit capsule, patient states she forgot to mention that she needed a refill last week. Please call patient at [phone] if you have any questions.

## 2019-03-04 ENCOUNTER — TELEPHONE (OUTPATIENT)
Dept: FAMILY MEDICINE CLINIC | Age: 64
End: 2019-03-04

## 2019-03-04 DIAGNOSIS — N30.90 CYSTITIS: Primary | ICD-10-CM

## 2019-03-04 RX ORDER — SULFAMETHOXAZOLE AND TRIMETHOPRIM 800; 160 MG/1; MG/1
1 TABLET ORAL 2 TIMES DAILY
Qty: 20 TAB | Refills: 0 | Status: SHIPPED | OUTPATIENT
Start: 2019-03-04 | End: 2019-03-14

## 2019-03-04 NOTE — TELEPHONE ENCOUNTER
Ms. Bonnie Bravo called requesting to speak with the nurse in reference to some antibiotics that was just prescribed for her on Friday. She is allergic to Penicillins, she states. She noticed after taking the medication, total of four pills out of fourteen, she was itching and feeling fatigue. She started reading the side effects and reactions, the paperwork with the medication states not to take if there is an allergic reaction to Penicillins. She would like a call back as soon as possible. [phone].

## 2019-03-04 NOTE — TELEPHONE ENCOUNTER
Tried calling pt at below number. Left message for patient at home number requesting return call. Both numbers on file are not working.

## 2019-03-04 NOTE — TELEPHONE ENCOUNTER
Spoke to patient. She said she was given keflex for UTI in the ER. She took 4 of the 14 pills and began itching. After researching, she saw that if she's allergic to PCN then she should not take keflex. Can something else be sent in? Please advise.

## 2019-03-11 ENCOUNTER — TELEPHONE (OUTPATIENT)
Dept: FAMILY MEDICINE CLINIC | Age: 64
End: 2019-03-11

## 2019-03-11 NOTE — TELEPHONE ENCOUNTER
Ms. Miguelito Garcias called in reference to her rheumatology referral. She is inquiring about the status. She would like a return call at [phone].

## 2019-03-12 NOTE — TELEPHONE ENCOUNTER
Patient referral was faxed to [facility] in [facility] called patient to let her know no answer left message letting her know I was calling her back about her referral, asked that she call the office back.

## 2019-03-13 NOTE — TELEPHONE ENCOUNTER
Called patient had her verify her  she was told her consult request has been [facility] to [facility] offered telephone number to call for an appt patient states she is on the road and will call the office back.

## 2019-03-18 NOTE — TELEPHONE ENCOUNTER
Called patient had her verify her  she states she has not been contacted by Rheumatology she has been given the telephone number to the [facility] to call for an appt,

## 2019-03-25 ENCOUNTER — TELEPHONE (OUTPATIENT)
Dept: FAMILY MEDICINE CLINIC | Age: 64
End: 2019-03-25

## 2019-03-25 NOTE — TELEPHONE ENCOUNTER
Patient called at 9:27 stating she received a letter in the mail stating all Losartan is on recall, and she needs to Schedule an appointment to see Dr. Jumana Pinzon, she's not available until April. Please call patient she wants to know if she can be squeezed in sooner. Please call [phone]

## 2019-03-26 NOTE — TELEPHONE ENCOUNTER
Pt has been scheduled for 4/12 w/Dr. Ashly Thompson. She was advised to continue taking current medications until she comes in for her appt. Pt expressed clear understanding and had no further questions.

## 2019-03-27 ENCOUNTER — TELEPHONE (OUTPATIENT)
Dept: FAMILY MEDICINE CLINIC | Age: 64
End: 2019-03-27

## 2019-03-27 NOTE — TELEPHONE ENCOUNTER
Eri Hernandez from [facility] called stating that they don't accept [facility] secondary insurance.

## 2019-03-27 NOTE — TELEPHONE ENCOUNTER
Patient called at 11:57 requesting a call back in Reference to a referral to a Rheumatology. please call patient back at [phone]

## 2019-03-29 NOTE — TELEPHONE ENCOUNTER
Called patient had her verify her  she has been made aware the [facility] not accept her [employer] so I have sent her consult request to [facility] in [place] who should contact her for an appt. If she has not been contacted by next Thursday-Friday to call here to let me know, she has expressed understanding and agrees with this plan.

## 2019-03-29 NOTE — TELEPHONE ENCOUNTER
Consult request has been faxed to [facility] in [place] will notify patient.

## 2019-04-10 ENCOUNTER — HOSPITAL ENCOUNTER (EMERGENCY)
Age: 64
Discharge: HOME | End: 2019-04-10
Attending: EMERGENCY MEDICINE
Payer: MEDICARE

## 2019-04-10 ENCOUNTER — APPOINTMENT (OUTPATIENT)
Dept: GENERAL RADIOLOGY | Age: 64
End: 2019-04-10
Attending: EMERGENCY MEDICINE
Payer: MEDICARE

## 2019-04-10 VITALS
RESPIRATION RATE: 16 BRPM | DIASTOLIC BLOOD PRESSURE: 84 MMHG | BODY MASS INDEX: 30.05 KG/M2 | SYSTOLIC BLOOD PRESSURE: 141 MMHG | HEART RATE: 88 BPM | HEIGHT: 64 IN | WEIGHT: 176 LBS | TEMPERATURE: 99 F | OXYGEN SATURATION: 100 %

## 2019-04-10 DIAGNOSIS — R07.89 MUSCULOSKELETAL CHEST PAIN: Primary | ICD-10-CM

## 2019-04-10 DIAGNOSIS — R07.89 OTHER CHEST PAIN: ICD-10-CM

## 2019-04-10 LAB
ANION GAP SERPL CALC-SCNC: 7 MMOL/L (ref 3–18)
ATRIAL RATE: 107 BPM
BASOPHILS # BLD: 0 K/UL (ref 0–0.1)
BASOPHILS NFR BLD: 0 % (ref 0–2)
BUN SERPL-MCNC: 11 MG/DL (ref 7–18)
BUN/CREAT SERPL: 11 (ref 12–20)
CALCIUM SERPL-MCNC: 8.9 MG/DL (ref 8.5–10.1)
CALCULATED P AXIS, ECG09: 39 DEGREES
CALCULATED R AXIS, ECG10: 14 DEGREES
CALCULATED T AXIS, ECG11: 26 DEGREES
CHLORIDE SERPL-SCNC: 106 MMOL/L (ref 100–108)
CO2 SERPL-SCNC: 28 MMOL/L (ref 21–32)
CREAT SERPL-MCNC: 1.02 MG/DL (ref 0.6–1.3)
D DIMER PPP FEU-MCNC: 0.33 UG/ML(FEU)
DIAGNOSIS, 93000: NORMAL
DIFFERENTIAL METHOD BLD: NORMAL
EOSINOPHIL # BLD: 0.2 K/UL (ref 0–0.4)
EOSINOPHIL NFR BLD: 3 % (ref 0–5)
ERYTHROCYTE [DISTWIDTH] IN BLOOD BY AUTOMATED COUNT: 13.4 % (ref 11.6–14.5)
GLUCOSE SERPL-MCNC: 109 MG/DL (ref 74–99)
HCT VFR BLD AUTO: 38.2 % (ref 35–45)
HGB BLD-MCNC: 12.2 G/DL (ref 12–16)
LYMPHOCYTES # BLD: 3.2 K/UL (ref 0.9–3.6)
LYMPHOCYTES NFR BLD: 51 % (ref 21–52)
MCH RBC QN AUTO: 28.3 PG (ref 24–34)
MCHC RBC AUTO-ENTMCNC: 31.9 G/DL (ref 31–37)
MCV RBC AUTO: 88.6 FL (ref 74–97)
MONOCYTES # BLD: 0.3 K/UL (ref 0.05–1.2)
MONOCYTES NFR BLD: 5 % (ref 3–10)
NEUTS SEG # BLD: 2.7 K/UL (ref 1.8–8)
NEUTS SEG NFR BLD: 41 % (ref 40–73)
P-R INTERVAL, ECG05: 170 MS
PLATELET # BLD AUTO: 289 K/UL (ref 135–420)
PMV BLD AUTO: 9.4 FL (ref 9.2–11.8)
POTASSIUM SERPL-SCNC: 4.2 MMOL/L (ref 3.5–5.5)
Q-T INTERVAL, ECG07: 356 MS
QRS DURATION, ECG06: 98 MS
QTC CALCULATION (BEZET), ECG08: 475 MS
RBC # BLD AUTO: 4.31 M/UL (ref 4.2–5.3)
SODIUM SERPL-SCNC: 141 MMOL/L (ref 136–145)
TROPONIN I SERPL-MCNC: <0.02 NG/ML (ref 0–0.04)
VENTRICULAR RATE, ECG03: 107 BPM
WBC # BLD AUTO: 6.4 K/UL (ref 4.6–13.2)

## 2019-04-10 PROCEDURE — 84484 ASSAY OF TROPONIN QUANT: CPT

## 2019-04-10 PROCEDURE — 85025 COMPLETE CBC W/AUTO DIFF WBC: CPT

## 2019-04-10 PROCEDURE — 80048 BASIC METABOLIC PNL TOTAL CA: CPT

## 2019-04-10 PROCEDURE — 93005 ELECTROCARDIOGRAM TRACING: CPT

## 2019-04-10 PROCEDURE — 71045 X-RAY EXAM CHEST 1 VIEW: CPT

## 2019-04-10 PROCEDURE — 85379 FIBRIN DEGRADATION QUANT: CPT

## 2019-04-10 PROCEDURE — 99285 EMERGENCY DEPT VISIT HI MDM: CPT

## 2019-04-10 PROCEDURE — 74011250637 HC RX REV CODE- 250/637: Performed by: EMERGENCY MEDICINE

## 2019-04-10 RX ORDER — AMITRIPTYLINE HYDROCHLORIDE 75 MG/1
TABLET, FILM COATED ORAL
Refills: 0 | COMMUNITY
Start: 2019-04-09 | End: 2019-04-24

## 2019-04-10 RX ORDER — METHOCARBAMOL 750 MG/1
750 TABLET, FILM COATED ORAL
Status: COMPLETED | OUTPATIENT
Start: 2019-04-10 | End: 2019-04-10

## 2019-04-10 RX ADMIN — METHOCARBAMOL 750 MG: 750 TABLET ORAL at 16:40

## 2019-04-10 NOTE — ED TRIAGE NOTES
C/O CP x 2 weeks. Pt states that she was at the gastro dr today and was told that her pulse rate was 116.

## 2019-04-10 NOTE — ED PROVIDER NOTES
EMERGENCY DEPARTMENT HISTORY AND PHYSICAL EXAM 
 
2:16 PM 
 
 
Date: 4/10/2019 Patient Name: Demetrice Mohr History of Presenting Illness Chief Complaint Patient presents with  Chest Pain History Provided By: Patient Additional History (Context): Demetrice Mohr is a 71 y.o. female with Past medical history of GERD, hypertension, asthma, chronic pain who presents with constant chest pain for about 2 weeks. She denies any radiating pain but the pain is midsternal and towards the right. She denies any shortness of breath, dizziness, sweating, abdominal pain, neck pain or nausea. She states that she was sent to the ED for evaluation from her GI doctor's office. PCP: Phillip Schilling MD 
 
 
 
Past History Past Medical History: 
Past Medical History:  
Diagnosis Date  Annular tear of lumbar disc 11/10/2014  Asthma Bronchitis  Benign tumor of throat  Bone spur   
 right ankle  Cervicalgia 11/10/2014  Chronic pain   
 back  Degeneration of lumbar or lumbosacral intervertebral disc 11/10/2014  Degenerative disc disease  Depression  Displacement of lumbar intervertebral disc without myelopathy 11/10/2014  Elevated white blood cell count  Fibromyalgia  GERD (gastroesophageal reflux disease)  Hypertension  Insomnia  Nausea & vomiting  Pain in joint, multiple sites 11/10/2014  Postlaminectomy syndrome, cervical region 11/10/2014  Sinus infection 10/5/15  Ulcer Past Surgical History: 
Past Surgical History:  
Procedure Laterality Date  COLONOSCOPY N/A 4/13/2017 COLONOSCOPY performed by Todd Davies MD at [facility] ENDOSCOPY  
 HX CATARACT REMOVAL    
 HX CERVICAL FUSION    
 HX HYSTERECTOMY  HX ORTHOPAEDIC    
 ganglion cyst removed, right hand  HX OTHER SURGICAL    
 cyst left thigh removed  HX WRIST FRACTURE TX    
 LAP,CHOLECYSTECTOMY N/A 02/27/2017 Dr. Saleem Meals Family History: Family History Problem Relation Age of Onset  Hypertension Other  Heart Attack Other  Heart Disease Other  Stroke Other  Headache Other  Seizures Other  Arthritis Father Social History: 
Social History Tobacco Use  Smoking status: Former Smoker Last attempt to quit: 3/6/1993 Years since quittin.1  Smokeless tobacco: Never Used Substance Use Topics  Alcohol use: No  
  Comment: none in 24 years  Drug use: No  
  Comment: last smoked in -cocaine Allergies: Allergies Allergen Reactions  Aspirin Other (comments) Stomach bleeding  Ativan [Lorazepam] Shortness of Breath  Bactrim [Sulfamethoprim] Rash  Keflex [Cephalexin] Rash  Macrobid [Nitrofurantoin Monohyd/M-Cryst] Nausea and Vomiting  Nsaids (Non-Steroidal Anti-Inflammatory Drug) Nausea and Vomiting  Opana [Oxymorphone] Other (comments) Insomnia, weight loss, nightmares  Pcn [Penicillins] Hives Review of Systems Review of Systems Constitutional: Negative for chills and fever. HENT: Negative for congestion, rhinorrhea, sore throat and trouble swallowing. Eyes: Negative for visual disturbance. Respiratory: Negative for cough, shortness of breath and wheezing. Cardiovascular: Positive for chest pain. Gastrointestinal: Negative for abdominal pain, nausea and vomiting. Endocrine: Negative for polyuria. Genitourinary: Negative for dysuria. Musculoskeletal: Negative for arthralgias and neck stiffness. Skin: Negative for pallor and rash. Neurological: Negative for dizziness, weakness, numbness and headaches. Hematological: Does not bruise/bleed easily. Psychiatric/Behavioral: Negative for confusion and dysphoric mood. All other systems reviewed and are negative. Physical Exam  
 
Visit Vitals /84 Pulse 88 Temp 99 °F (37.2 °C) Resp 16 Ht 5' 4\" (1.626 m) Wt 79.8 kg (176 lb) SpO2 100% BMI 30.21 kg/m² Physical Exam  
Constitutional: She is oriented to person, place, and time. She appears well-developed and well-nourished. No distress. HENT:  
Head: Normocephalic and atraumatic. Mouth/Throat: Oropharynx is clear and moist.  
Eyes: Pupils are equal, round, and reactive to light. Conjunctivae are normal. No scleral icterus. Neck: Normal range of motion. Neck supple. Cardiovascular: Normal rate and intact distal pulses. Exam reveals no gallop and no friction rub. No murmur heard. Capillary refill < 3 seconds No bruits Pulmonary/Chest: Effort normal and breath sounds normal. No respiratory distress. She has no wheezes. She has no rales. She exhibits tenderness (Reproducible anterior chest wall tenderness, no crepitance). Abdominal: Soft. Bowel sounds are normal. She exhibits no distension. There is no tenderness. Musculoskeletal: Normal range of motion. She exhibits no edema. Lymphadenopathy:  
  She has no cervical adenopathy. Neurological: She is alert and oriented to person, place, and time. No cranial nerve deficit. Skin: Skin is warm and dry. She is not diaphoretic. Psychiatric: Her behavior is normal.  
Nursing note and vitals reviewed. Diagnostic Study Results Labs - Recent Results (from the past 12 hour(s)) EKG, 12 LEAD, INITIAL Collection Time: 04/10/19 12:39 PM  
Result Value Ref Range Ventricular Rate 107 BPM  
 Atrial Rate 107 BPM  
 P-R Interval 170 ms QRS Duration 98 ms Q-T Interval 356 ms  
 QTC Calculation (Bezet) 475 ms Calculated P Axis 39 degrees Calculated R Axis 14 degrees Calculated T Axis 26 degrees Diagnosis Sinus tachycardia Otherwise normal ECG When compared with ECG of 06-JUL-2018 13:41, No significant change was found CBC WITH AUTOMATED DIFF Collection Time: 04/10/19  2:22 PM  
Result Value Ref Range WBC 6.4 4.6 - 13.2 K/uL  
 RBC 4.31 4.20 - 5.30 M/uL  
 HGB 12.2 12.0 - 16.0 g/dL HCT 38.2 35.0 - 45.0 % MCV 88.6 74.0 - 97.0 FL  
 MCH 28.3 24.0 - 34.0 PG  
 MCHC 31.9 31.0 - 37.0 g/dL  
 RDW 13.4 11.6 - 14.5 % PLATELET 825 471 - 327 K/uL MPV 9.4 9.2 - 11.8 FL  
 NEUTROPHILS 41 40 - 73 % LYMPHOCYTES 51 21 - 52 % MONOCYTES 5 3 - 10 % EOSINOPHILS 3 0 - 5 % BASOPHILS 0 0 - 2 %  
 ABS. NEUTROPHILS 2.7 1.8 - 8.0 K/UL  
 ABS. LYMPHOCYTES 3.2 0.9 - 3.6 K/UL  
 ABS. MONOCYTES 0.3 0.05 - 1.2 K/UL  
 ABS. EOSINOPHILS 0.2 0.0 - 0.4 K/UL  
 ABS. BASOPHILS 0.0 0.0 - 0.1 K/UL  
 DF AUTOMATED METABOLIC PANEL, BASIC Collection Time: 04/10/19  2:22 PM  
Result Value Ref Range Sodium 141 136 - 145 mmol/L Potassium 4.2 3.5 - 5.5 mmol/L Chloride 106 100 - 108 mmol/L  
 CO2 28 21 - 32 mmol/L Anion gap 7 3.0 - 18 mmol/L Glucose 109 (H) 74 - 99 mg/dL BUN 11 7.0 - 18 MG/DL Creatinine 1.02 0.6 - 1.3 MG/DL  
 BUN/Creatinine ratio 11 (L) 12 - 20 GFR est AA >60 >60 ml/min/1.73m2 GFR est non-AA 54 (L) >60 ml/min/1.73m2 Calcium 8.9 8.5 - 10.1 MG/DL  
TROPONIN I Collection Time: 04/10/19  2:22 PM  
Result Value Ref Range Troponin-I, QT <0.02 0.0 - 0.045 NG/ML  
D DIMER Collection Time: 04/10/19  2:22 PM  
Result Value Ref Range D DIMER 0.33 <0.46 ug/ml(FEU) Radiologic Studies -  
XR CHEST PORT Final Result IMPRESSION:  
  
Stable chest with no acute process. Medical Decision Making I am the first provider for this patient. I reviewed the vital signs, available nursing notes, past medical history, past surgical history, family history and social history. Vital Signs-Reviewed the patient's vital signs. Pulse Oximetry Analysis -  100 on room air (Interpretation) normal 
 
Cardiac Monitor: 
Rate: 88 Rhythm:  Normal Sinus Rhythm EKG: Interpreted by the EP. Time Interpreted: 12:40 PM 
 Rate: 107 Rhythm: Sinus Tachycardia Interpretation: Normal QRS duration, normal axis, no ST elevation, no ST depressions Records Reviewed: Nursing Notes and Old Medical Records (Time of Review: 2:16 PM) Provider Notes (Medical Decision Making): DDX: Musculoskeletal, GERD, esophageal spasm, cardiac, anxiety Check labs, EKG, chest x-ray MDM Medications  
methocarbamol (ROBAXIN) tablet 750 mg (750 mg Oral Given 4/10/19 1640) ED Course: Progress Notes, Reevaluation, and Consults: 
Labs reassuring Gave patient dose of muscle relaxer I have reassessed the patient. I have discussed the workup, results and plan with the patient and patient is in agreement. Patient is feeling better. Patient was discharge in stable condition. Patient was given outpatient follow up. Patient is to return to emergency department if any new or worsening condition. Diagnosis Clinical Impression: 1. Musculoskeletal chest pain 2. Other chest pain Disposition: Discharged Follow-up Information Follow up With Specialties Details Why Contact Info Nelly Mandujano MD Family Practice Schedule an appointment as soon as possible for a visit in 2 days  [address] [facility] [address] 
[phone] [place] Night, DO Cardiology Schedule an appointment as soon as possible for a visit in 2 days  [address] [place] [address] [phone] [facility] 
[phone] [id number] [facility] EMERGENCY DEPT Emergency Medicine  As needed, If symptoms worsen [address] [place] [address] [address]ijo [id number] [phone] Patient's Medications Start Taking No medications on file Continue Taking AMITRIPTYLINE (ELAVIL) 75 MG TABLET      
 ATORVASTATIN (LIPITOR) 40 MG TABLET    TAKE ONE TABLET BY MOUTH ONE TIME DAILY BUTALBITAL-ACETAMINOPHEN-CAFFEINE (FIORICET, ESGIC) -40 MG PER TABLET    Take 1 Tab by Mouth Every 4 Hours As Needed.   
 CYCLOBENZAPRINE (FLEXERIL) 10 MG TABLET    TAKE 1 TABLET BY MOUTH THREE TIMES DAILY AS NEEDED FOR MUSCLE SPASMS FOR UP TO 30 DAYS. DEXILANT 60 MG CPDB CAPSULE (DELAYED RELEASE)    Take 120 mg by mouth daily. DICLOFENAC (VOLTAREN) 1 % GEL    Apply  to affected area four (4) times daily. FLUTICASONE (FLONASE) 50 MCG/ACTUATION NASAL SPRAY    2 Sprays by Both Nostrils route as needed. LIDOCAINE (XYLOCAINE) 5 % OINTMENT      
 LOSARTAN (COZAAR) 25 MG TABLET    TAKE ONE TABLET BY MOUTH ONE TIME DAILY MORPHINE CR (MS CONTIN) 60 MG CR TABLET    Take  by mouth every twelve (12) hours. NALOXONE (NARCAN) 4 MG/ACTUATION NASAL SPRAY    1 [place] by IntraNASal route once as needed. Use 1 spray intranasally into 1 nostril. Use a new Narcan nasal spray for subsequent doses and administer into alternating nostrils. May repeat every 2 to 3 minutes as needed. OXYCODONE IR (ROXICODONE) 10 MG TAB IMMEDIATE RELEASE TABLET      
 POLYETHYLENE GLYCOL (MIRALAX) 17 GRAM PACKET    Take 1 Packet by mouth daily. QUETIAPINE (SEROQUEL) 200 MG TABLET    Take 200 mg by mouth daily. RIZATRIPTAN (MAXALT) 10 MG TABLET      
 VALACYCLOVIR (VALTREX) 500 MG TABLET    Take 1 Tab by mouth daily. These Medications have changed No medications on file Stop Taking ERGOCALCIFEROL (VITAMIN D2) 50,000 UNIT CAPSULE    TAKE ONE CAPSULE BY MOUTH EVERY 7 DAYS  
 GABAPENTIN (NEURONTIN) 400 MG CAPSULE    400 mg two (2) times a day. KETOCONAZOLE (NIZORAL) 2 % TOPICAL CREAM    Apply  to affected area two (2) times a day. TRAZODONE (DESYREL) 100 MG TABLET    Take 100 mg by mouth nightly. DO Franki Khalil medical dictation software was used for portions of this report. Unintended transcription errors may occur. My signature above authenticates this document and my orders, the final   
diagnosis (es), discharge prescription (s), and instructions in the Epic   
record. If you have any questions please contact [phone].

## 2019-04-10 NOTE — ED NOTES
Bedside and Verbal shift change report given to Lucia Melton RN (oncoming nurse) by Kadi Hicks RN (offgoing nurse). Report included the following information SBAR, ED Summary, Procedure Summary and Cardiac Rhythm nsr.

## 2019-04-10 NOTE — DISCHARGE INSTRUCTIONS
Chest Pain: Care Instructions  Your Care Instructions    There are many things that can cause chest pain. Some are not serious and will get better on their own in a few days. But some kinds of chest pain need more testing and treatment. Your doctor may have recommended a follow-up visit in the next 8 to 12 hours. If you are not getting better, you may need more tests or treatment. Even though your doctor has released you, you still need to watch for any problems. The doctor carefully checked you, but sometimes problems can develop later. If you have new symptoms or if your symptoms do not get better, get medical care right away. If you have worse or different chest pain or pressure that lasts more than 5 minutes or you passed out (lost consciousness), call 911 or seek other emergency help right away. A medical visit is only one step in your treatment. Even if you feel better, you still need to do what your doctor recommends, such as going to all suggested follow-up appointments and taking medicines exactly as directed. This will help you recover and help prevent future problems. How can you care for yourself at home? · Rest until you feel better. · Take your medicine exactly as prescribed. Call your doctor if you think you are having a problem with your medicine. · Do not drive after taking a prescription pain medicine. When should you call for help? Call 911 if:    · You passed out (lost consciousness).     · You have severe difficulty breathing.     · You have symptoms of a heart attack. These may include:  ? Chest pain or pressure, or a strange feeling in your chest.  ? Sweating. ? Shortness of breath. ? Nausea or vomiting. ? Pain, pressure, or a strange feeling in your back, neck, jaw, or upper belly or in one or both shoulders or arms. ? Lightheadedness or sudden weakness. ? A fast or irregular heartbeat.   After you call 911, the  may tell you to chew 1 adult-strength or 2 to 4 low-dose aspirin. Wait for an ambulance. Do not try to drive yourself.    Call your doctor today if:    · You have any trouble breathing.     · Your chest pain gets worse.     · You are dizzy or lightheaded, or you feel like you may faint.     · You are not getting better as expected.     · You are having new or different chest pain. Where can you learn more? Go to http://diana-lashonda.info/. Enter A120 in the search box to learn more about \"Chest Pain: Care Instructions. \"  Current as of: September 23, 2018  Content Version: 11.9  © 8545-7522 [employer]. Care instructions adapted under license by [employer] (which disclaims liability or warranty for this information). If you have questions about a medical condition or this instruction, always ask your healthcare professional. [address] any warranty or liability for your use of this information. Patient Education        Musculoskeletal Chest Pain: Care Instructions  Your Care Instructions    Chest pain is not always a sign that something is wrong with your heart or that you have another serious problem. The doctor thinks your chest pain is caused by strained muscles or ligaments, inflamed chest cartilage, or another problem in your chest, rather than by your heart. You may need more tests to find the cause of your chest pain. Follow-up care is a key part of your treatment and safety. Be sure to make and go to all appointments, and call your doctor if you are having problems. It's also a good idea to know your test results and keep a list of the medicines you take. How can you care for yourself at home? · Take pain medicines exactly as directed. ? If the doctor gave you a prescription medicine for pain, take it as prescribed. ? If you are not taking a prescription pain medicine, ask your doctor if you can take an over-the-counter medicine. · Rest and protect the sore area.   · Stop, change, or take a break from any activity that may be causing your pain or soreness. · Put ice or a cold pack on the sore area for 10 to 20 minutes at a time. Try to do this every 1 to 2 hours for the next 3 days (when you are awake) or until the swelling goes down. Put a thin cloth between the ice and your skin. · After 2 or 3 days, apply a heating pad set on low or a warm cloth to the area that hurts. Some doctors suggest that you go back and forth between hot and cold. · Do not wrap or tape your ribs for support. This may cause you to take smaller breaths, which could increase your risk of lung problems. · Mentholated creams such as Bengay or Icy Hot may soothe sore muscles. Follow the instructions on the package. · Follow your doctor's instructions for exercising. · Gentle stretching and massage may help you get better faster. Stretch slowly to the point just before pain begins, and hold the stretch for at least 15 to 30 seconds. Do this 3 or 4 times a day. Stretch just after you have applied heat. · As your pain gets better, slowly return to your normal activities. Any increased pain may be a sign that you need to rest a while longer. When should you call for help? Call 911 anytime you think you may need emergency care. For example, call if:    · You have chest pain or pressure. This may occur with:  ? Sweating. ? Shortness of breath. ? Nausea or vomiting. ? Pain that spreads from the chest to the neck, jaw, or one or both shoulders or arms. ? Dizziness or lightheadedness. ? A fast or uneven pulse. After calling 911, chew 1 adult-strength aspirin. Wait for an ambulance.  Do not try to drive yourself.     · You have sudden chest pain and shortness of breath, or you cough up blood.    Call your doctor now or seek immediate medical care if:    · You have any trouble breathing.     · Your chest pain gets worse.     · Your chest pain occurs consistently with exercise and is relieved by rest.   [place] Nan closely for changes in your health, and be sure to contact your doctor if:    · Your chest pain does not get better after 1 week. Where can you learn more? Go to http://diana-lashonda.info/. Enter V293 in the search box to learn more about \"Musculoskeletal Chest Pain: Care Instructions. \"  Current as of: September 23, 2018  Content Version: 11.9  © 7756-0516 [employer]. Care instructions adapted under license by [employer] (which disclaims liability or warranty for this information). If you have questions about a medical condition or this instruction, always ask your healthcare professional. [place] [address] any warranty or liability for your use of this information.

## 2019-04-12 ENCOUNTER — OFFICE VISIT (OUTPATIENT)
Dept: FAMILY MEDICINE CLINIC | Age: 64
End: 2019-04-12

## 2019-04-12 VITALS
TEMPERATURE: 98.3 F | RESPIRATION RATE: 16 BRPM | SYSTOLIC BLOOD PRESSURE: 110 MMHG | WEIGHT: 176.2 LBS | DIASTOLIC BLOOD PRESSURE: 70 MMHG | HEIGHT: 64 IN | OXYGEN SATURATION: 98 % | BODY MASS INDEX: 30.08 KG/M2 | HEART RATE: 122 BPM

## 2019-04-12 DIAGNOSIS — R53.81 MALAISE: ICD-10-CM

## 2019-04-12 DIAGNOSIS — B34.9 VIRAL SYNDROME: Primary | ICD-10-CM

## 2019-04-12 LAB
QUICKVUE INFLUENZA TEST: NEGATIVE
VALID INTERNAL CONTROL?: YES

## 2019-04-12 NOTE — PATIENT INSTRUCTIONS

## 2019-04-12 NOTE — PROGRESS NOTES
Helen Moe is a 71 y.o. female who was seen in clinic today (4/12/2019). Assessment & Plan:       ICD-10-CM ICD-9-CM    1. Viral syndrome B34.9 079.99    2. Malaise R53.81 780.79 AMB POC RAPID INFLUENZA TEST     Broad DDX of potentially dangerous conditions already evaluated in the ER. Thankfully no recent medication adjustments to raise clive there this visit. Rest, fluids    Yes, insomnia is miserable. I do not have agents I can safely add to her medication regimen to address it. Cautioned against OTC agents with good understanding    Proceed to ER if not able to tolerate PO or if clinical condition worsens    Keep appointments with Dr. Jam Barba and NP Titi Garcia         Follow-up and Dispositions    · Return in about 4 days (around 4/16/2019) for illness follow up, proceed to ER if not tolerating fluids, (30). Subjective:   Helen Moe was seen today for Insomnia; Chills; and Sweats      complains of malaise and fatigue    Had reported approx 1 week chest pain at time of GI appointment 4/10/2019, for which then encouraged to proceed to ER with plan to resume GI workup after cardiac eval.    Reassuring ER eval, including CXR, labs, EKG. Reproducible chest wall tenderness without crepitus on exam. Given robaxin single dose. No meds on discharge. ROBYN Nye/Dr. Angeline Eller and Dr. [place] White' notes reviewed. Scheduled to see Dr. Agnes Coleman on 4/24. Chest pain has resolved \"but this morning when got out of bed I just felt so sick and felt like I was going to pass out. \" Ate breakfast despite poor appetite, regurg of food into her mouth without emesis. Lots of burping today. EMS called. States reassuring home assessment and advised to be seen in the office. Also with chills and sweats today  Notes feels anxious, exhausted - hasn't slept well all week for unclear reason. No otc meds    I'd previously discontinued TCA 2/12/2019 as well as ondansetron due to serotonin syndrome.  Ms. Jasper says it was resumed by ROBYN Deal in March for insomnia with discontinuation of gabapentin, trazodone at that time. Seeing her again next month. States saw Dr. Eben Blank in March with reduction in narcotic prescription. Seeing him next month. No other med changes  No known sick contacts    Tolerated water with her meds today. Hasn't attempted anything else    +vaccined against flu this season    Scheduled for RA intake in June    Results for orders placed or performed during the hospital encounter of 04/10/19   CBC WITH AUTOMATED DIFF   Result Value Ref Range    WBC 6.4 4.6 - 13.2 K/uL    RBC 4.31 4.20 - 5.30 M/uL    HGB 12.2 12.0 - 16.0 g/dL    HCT 38.2 35.0 - 45.0 %    MCV 88.6 74.0 - 97.0 FL    MCH 28.3 24.0 - 34.0 PG    MCHC 31.9 31.0 - 37.0 g/dL    RDW 13.4 11.6 - 14.5 %    PLATELET 557 969 - 640 K/uL    MPV 9.4 9.2 - 11.8 FL    NEUTROPHILS 41 40 - 73 %    LYMPHOCYTES 51 21 - 52 %    MONOCYTES 5 3 - 10 %    EOSINOPHILS 3 0 - 5 %    BASOPHILS 0 0 - 2 %    ABS. NEUTROPHILS 2.7 1.8 - 8.0 K/UL    ABS. LYMPHOCYTES 3.2 0.9 - 3.6 K/UL    ABS. MONOCYTES 0.3 0.05 - 1.2 K/UL    ABS. EOSINOPHILS 0.2 0.0 - 0.4 K/UL    ABS.  BASOPHILS 0.0 0.0 - 0.1 K/UL    DF AUTOMATED     METABOLIC PANEL, BASIC   Result Value Ref Range    Sodium 141 136 - 145 mmol/L    Potassium 4.2 3.5 - 5.5 mmol/L    Chloride 106 100 - 108 mmol/L    CO2 28 21 - 32 mmol/L    Anion gap 7 3.0 - 18 mmol/L    Glucose 109 (H) 74 - 99 mg/dL    BUN 11 7.0 - 18 MG/DL    Creatinine 1.02 0.6 - 1.3 MG/DL    BUN/Creatinine ratio 11 (L) 12 - 20      GFR est AA >60 >60 ml/min/1.73m2    GFR est non-AA 54 (L) >60 ml/min/1.73m2    Calcium 8.9 8.5 - 10.1 MG/DL   TROPONIN I   Result Value Ref Range    Troponin-I, QT <0.02 0.0 - 0.045 NG/ML   D DIMER   Result Value Ref Range    D DIMER 0.33 <0.46 ug/ml(FEU)   EKG, 12 LEAD, INITIAL   Result Value Ref Range    Ventricular Rate 107 BPM    Atrial Rate 107 BPM    P-R Interval 170 ms    QRS Duration 98 ms    Q-T Interval 356 ms    QTC Calculation (Bezet) 475 ms    Calculated P Axis 39 degrees    Calculated R Axis 14 degrees    Calculated T Axis 26 degrees    Diagnosis       Sinus tachycardia  Otherwise normal ECG  When compared with ECG of 06-JUL-2018 13:41,  No significant change was found  Confirmed by Marina Salvador MD, ----- ([id number]) on 4/10/2019 7:38:11 PM        XR Results (most recent):  Results from Hospital Encounter encounter on 04/10/19   XR CHEST PORT    Narrative EXAM:  XR CHEST PORT    INDICATION:   cp    COMPARISON: 2/21/2018    FINDINGS:     Underinflated lungs with no parenchymal consolidation. No pneumothorax or  pleural effusion. Normal heart size. Hiatal hernia again noted. Bones intact. Impression IMPRESSION:    Stable chest with no acute process. Sinus tachycardia   Otherwise normal ECG   When compared with ECG of 06-JUL-2018 13:41,   No significant change was found   Confirmed by Marina Salvador MD, ----- ([id number]) on 4/10/2019 7:38:11 PM     Outpatient Medications Marked as Taking for the 4/12/19 encounter (Office Visit) with Damir Perez MD   Medication Sig Dispense Refill    amitriptyline (ELAVIL) 75 mg tablet   0    oxyCODONE IR (ROXICODONE) 10 mg tab immediate release tablet       valACYclovir (VALTREX) 500 mg tablet Take 1 Tab by mouth daily. 90 Tab 4    DEXILANT 60 mg CpDB capsule (delayed release) Take 120 mg by mouth daily.  fluticasone (FLONASE) 50 mcg/actuation nasal spray 2 Sprays by Both Nostrils route as needed. 3 Bottle 4    morphine CR (MS CONTIN) 60 mg CR tablet Take  by mouth every twelve (12) hours.  diclofenac (VOLTAREN) 1 % gel Apply  to affected area four (4) times daily. 1 g 1    lidocaine (XYLOCAINE) 5 % ointment       cyclobenzaprine (FLEXERIL) 10 mg tablet TAKE 1 TABLET BY MOUTH THREE TIMES DAILY AS NEEDED FOR MUSCLE SPASMS FOR UP TO 30 DAYS. 90 Tab 2    polyethylene glycol (MIRALAX) 17 gram packet Take 1 Packet by mouth daily.  30 Each 6    atorvastatin (LIPITOR) 40 mg tablet TAKE ONE TABLET BY MOUTH ONE TIME DAILY 90 Tab 3    losartan (COZAAR) 25 mg tablet TAKE ONE TABLET BY MOUTH ONE TIME DAILY 90 Tab 1    QUEtiapine (SEROQUEL) 200 mg tablet Take 200 mg by mouth daily. Patient Active Problem List    Diagnosis    Obesity (BMI 30.0-34. 9)    Prediabetes    Angular cheilitis with candidiasis    Diverticulosis of large intestine without hemorrhage     [place] 4/2017      Hiatal hernia     h pylori tx 3/2013; multiple EGDs 1/21/2015, 5/24/2017, 4/13/2017, 2/21/2018 - normal mucosa      Hepatic steatosis     US 1/13/2017      Calculus of gallbladder without cholecystitis     CT 12/22/2016 also with duodenal diverticulum      Gastroesophageal reflux disease without esophagitis    Urinary retention    Vitamin D deficiency    Benign essential hypertension with target blood pressure below 140/90    Myalgia    Nausea    Hypercholesterolemia    Lung nodule seen on imaging study    Degeneration of lumbar or lumbosacral intervertebral disc    Displacement of lumbar intervertebral disc without myelopathy    Pain in joint, multiple sites    Cervicalgia    Postlaminectomy syndrome, cervical region    Annular tear of lumbar disc    Chronic pain syndrome    Carpal tunnel syndrome    Bipolar 2 disorder [facility])     Per conversation Dr. Vaishali Brady 2/22/2019 based on review of her records there. Treated with Seroquel.       Lumbago    Other affections of shoulder region, not elsewhere classified    Other chronic pain         Allergies   Allergen Reactions    Aspirin Other (comments)     Stomach bleeding    Ativan [Lorazepam] Shortness of Breath    Bactrim [Sulfamethoprim] Rash    Keflex [Cephalexin] Rash    Macrobid [Nitrofurantoin Monohyd/M-Cryst] Nausea and Vomiting    Nsaids (Non-Steroidal Anti-Inflammatory Drug) Nausea and Vomiting    Opana [Oxymorphone] Other (comments)     Insomnia, weight loss, nightmares    Pcn [Penicillins] Hives Patient Care Team:  Romel Jung MD as PCP - General (Family Practice)  Nidia Hernandez MD (Gastroenterology)  Gunner Grande NP as Nurse Practitioner (Psychiatry)  Kayce Amaya MD (Physical Medicine and Rehab)  Ovidio Donahue MD (Psychiatry)  Alisha Plaza NP as Nurse Practitioner (Gastroenterology)    The following sections were reviewed & updated as appropriate: PMH, PSH, FH, and SH. Review of Systems   HENT: Positive for ear pain (right, this morning, resolved) and sore throat (this morning, resolved). Respiratory: Negative for cough and shortness of breath. Cardiovascular: Negative for chest pain. Gastrointestinal: Negative for diarrhea and vomiting. Genitourinary: Negative for dysuria. Skin: Negative for rash. POC flu neg    Objective:     Visit Vitals  /70   Pulse (!) 122   Temp 98.3 °F (36.8 °C) (Oral)   Resp 16   Ht 5' 4\" (1.626 m)   Wt 176 lb 3.2 oz (79.9 kg)   SpO2 98%   BMI 30.24 kg/m²      Physical Exam   Constitutional: She is oriented to person, place, and time. She appears well-developed. Non-toxic appearance. No distress. Pleasant uncomfortable appearing black female   HENT:   Head: Normocephalic and atraumatic. Neck: Neck supple. Cardiovascular: Regular rhythm. Tachycardia present. Exam reveals no gallop and no friction rub. No murmur heard. Pulmonary/Chest: Effort normal and breath sounds normal. No respiratory distress. She has no decreased breath sounds. She has no wheezes. She has no rhonchi. She has no rales. Abdominal: Soft. Bowel sounds are normal. She exhibits no distension and no mass. There is no tenderness. Lymphadenopathy:     She has no cervical adenopathy. Neurological: She is alert and oriented to person, place, and time. Skin: Skin is warm and dry. No rash noted. Psychiatric: She has a normal mood and affect. Her behavior is normal. Judgment and thought content normal.         Disclaimer:     The patient understands our medical plan. Alternatives have been explained and offered. The risks, benefits and significant side effects of all medications have been reviewed. Anticipated time course and progression of condition reviewed. All questions have been addressed. She is encouraged to employ the information provided in the after visit summary, which was reviewed. Where applicable, she is instructed to call the clinic if she has not been notified either by phone or through [address] with the results of her tests or with an appointment plan for any referrals within 1 week(s). No news is not good news; it's no news. The patient  is to call if her condition worsens or fails to improve or if significant side effects are experienced. Aspects of this note may have been generated using voice recognition software. Despite editing, there may be unrecognized errors. Over 50% of the 31 minutes face to face with Michelle Wade consisted of care coordination, counseling and discussing treatment plans.            Wyatt Nyhan, MD

## 2019-04-12 NOTE — PROGRESS NOTES
Patient scheduled for blood pressure management but she states was having chest pain yesterday and was seen at the [facility] ER, was told to schedule with cardiology she has an appt with them on Wednesday. She c/o morning feeling like she was going to pass out. She says she was hot and sweaty then gets cold and has chills. An ambulance was called to her home, she was checked by EMT but did not go to the hospital. She c/o not being able to sleep and feeling exhausted she is taking Melatonin but says this no longer works. 1. Have you been to the ER, urgent care clinic since your last visit? Hospitalized since your last visit? Yes When: 4/11/19 Where: 4/11/19 Reason for visit: chest pain  2. Have you seen or consulted any other health care providers outside of the [address] [place] [address] since your last visit? Include any pap smears or colon screening. No    Medication reconciliation has been completed with patient. Care team discussed/updated as well as pharmacy. Suman Gonzalez

## 2019-04-18 ENCOUNTER — OFFICE VISIT (OUTPATIENT)
Dept: FAMILY MEDICINE CLINIC | Age: 64
End: 2019-04-18

## 2019-04-18 VITALS
HEIGHT: 64 IN | RESPIRATION RATE: 16 BRPM | SYSTOLIC BLOOD PRESSURE: 110 MMHG | WEIGHT: 176 LBS | HEART RATE: 111 BPM | OXYGEN SATURATION: 95 % | DIASTOLIC BLOOD PRESSURE: 86 MMHG | TEMPERATURE: 98.1 F | BODY MASS INDEX: 30.05 KG/M2

## 2019-04-18 DIAGNOSIS — R68.2 DRY MOUTH: ICD-10-CM

## 2019-04-18 DIAGNOSIS — I10 ESSENTIAL HYPERTENSION: ICD-10-CM

## 2019-04-18 DIAGNOSIS — R10.13 EPIGASTRIC ABDOMINAL PAIN: Primary | ICD-10-CM

## 2019-04-18 DIAGNOSIS — R00.0 TACHYCARDIA: ICD-10-CM

## 2019-04-18 DIAGNOSIS — R53.81 MALAISE: ICD-10-CM

## 2019-04-18 RX ORDER — RIZATRIPTAN BENZOATE 10 MG/1
TABLET ORAL
Status: CANCELLED | OUTPATIENT
Start: 2019-04-18

## 2019-04-18 RX ORDER — LOSARTAN POTASSIUM 25 MG/1
TABLET ORAL
Qty: 90 TAB | Refills: 4 | Status: SHIPPED | OUTPATIENT
Start: 2019-04-18 | End: 2020-01-17 | Stop reason: SDUPTHER
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
## 2019-04-18 NOTE — PROGRESS NOTES
Patient here for f/u on her abdominal pain and nausea which she says is unchanged. She states she think she may have an ulcer. She is asking for refills on her blood pressure and migraine medications. Has an appt with GI 5/6/19. She also c/o not being able to sleep. 1. Have you been to the ER, urgent care clinic since your last visit? Hospitalized since your last visit? No    2. Have you seen or consulted any other health care providers outside of the [address] [place] [address] since your last visit? Include any pap smears or colon screening. No    Medication reconciliation has been completed with patient. Care team discussed/updated as well as pharmacy.

## 2019-04-19 ENCOUNTER — HOSPITAL ENCOUNTER (OUTPATIENT)
Dept: LAB | Age: 64
Discharge: HOME | End: 2019-04-19
Payer: MEDICARE

## 2019-04-19 DIAGNOSIS — R10.13 EPIGASTRIC ABDOMINAL PAIN: ICD-10-CM

## 2019-04-19 DIAGNOSIS — R00.0 TACHYCARDIA: ICD-10-CM

## 2019-04-19 LAB
ALBUMIN SERPL-MCNC: 3.9 G/DL (ref 3.4–5)
ALBUMIN/GLOB SERPL: 1 {RATIO} (ref 0.8–1.7)
ALP SERPL-CCNC: 206 U/L (ref 45–117)
ALT SERPL-CCNC: 27 U/L (ref 13–56)
ANION GAP SERPL CALC-SCNC: 2 MMOL/L (ref 3–18)
APPEARANCE UR: CLEAR
AST SERPL-CCNC: 26 U/L (ref 15–37)
BACTERIA URNS QL MICRO: ABNORMAL /HPF
BASOPHILS # BLD: 0 K/UL (ref 0–0.1)
BASOPHILS NFR BLD: 0 % (ref 0–2)
BILIRUB SERPL-MCNC: 0.3 MG/DL (ref 0.2–1)
BILIRUB UR QL: NEGATIVE
BUN SERPL-MCNC: 12 MG/DL (ref 7–18)
BUN/CREAT SERPL: 12 (ref 12–20)
CALCIUM SERPL-MCNC: 9.6 MG/DL (ref 8.5–10.1)
CHLORIDE SERPL-SCNC: 109 MMOL/L (ref 100–108)
CO2 SERPL-SCNC: 29 MMOL/L (ref 21–32)
COLOR UR: YELLOW
CREAT SERPL-MCNC: 0.98 MG/DL (ref 0.6–1.3)
DIFFERENTIAL METHOD BLD: ABNORMAL
EOSINOPHIL # BLD: 0.1 K/UL (ref 0–0.4)
EOSINOPHIL NFR BLD: 2 % (ref 0–5)
EPITH CASTS URNS QL MICRO: ABNORMAL /LPF (ref 0–5)
ERYTHROCYTE [DISTWIDTH] IN BLOOD BY AUTOMATED COUNT: 13.2 % (ref 11.6–14.5)
GLOBULIN SER CALC-MCNC: 3.8 G/DL (ref 2–4)
GLUCOSE SERPL-MCNC: 118 MG/DL (ref 74–99)
GLUCOSE UR STRIP.AUTO-MCNC: NEGATIVE MG/DL
HCT VFR BLD AUTO: 39.7 % (ref 35–45)
HGB BLD-MCNC: 12.9 G/DL (ref 12–16)
HGB UR QL STRIP: NEGATIVE
KETONES UR QL STRIP.AUTO: NEGATIVE MG/DL
LEUKOCYTE ESTERASE UR QL STRIP.AUTO: ABNORMAL
LIPASE SERPL-CCNC: 110 U/L (ref 73–393)
LYMPHOCYTES # BLD: 4.7 K/UL (ref 0.9–3.6)
LYMPHOCYTES NFR BLD: 57 % (ref 21–52)
MCH RBC QN AUTO: 28.3 PG (ref 24–34)
MCHC RBC AUTO-ENTMCNC: 32.5 G/DL (ref 31–37)
MCV RBC AUTO: 87.1 FL (ref 74–97)
MONOCYTES # BLD: 0.5 K/UL (ref 0.05–1.2)
MONOCYTES NFR BLD: 6 % (ref 3–10)
NEUTS SEG # BLD: 2.8 K/UL (ref 1.8–8)
NEUTS SEG NFR BLD: 35 % (ref 40–73)
NITRITE UR QL STRIP.AUTO: NEGATIVE
PH UR STRIP: 6 [PH] (ref 5–8)
PLATELET # BLD AUTO: 331 K/UL (ref 135–420)
PMV BLD AUTO: 9.7 FL (ref 9.2–11.8)
POTASSIUM SERPL-SCNC: 4.1 MMOL/L (ref 3.5–5.5)
PROT SERPL-MCNC: 7.7 G/DL (ref 6.4–8.2)
PROT UR STRIP-MCNC: NEGATIVE MG/DL
RBC # BLD AUTO: 4.56 M/UL (ref 4.2–5.3)
RBC #/AREA URNS HPF: ABNORMAL /HPF (ref 0–5)
SODIUM SERPL-SCNC: 140 MMOL/L (ref 136–145)
SP GR UR REFRACTOMETRY: 1.01 (ref 1–1.03)
TSH SERPL-ACNC: 1.45 UIU/ML
UROBILINOGEN UR QL STRIP.AUTO: 0.2 EU/DL (ref 0.2–1)
WBC # BLD AUTO: 8.2 K/UL (ref 4.6–13.2)
WBC URNS QL MICRO: ABNORMAL /HPF (ref 0–4)

## 2019-04-19 PROCEDURE — 85025 COMPLETE CBC W/AUTO DIFF WBC: CPT

## 2019-04-19 PROCEDURE — 84443 ASSAY THYROID STIM HORMONE: CPT

## 2019-04-19 PROCEDURE — 83690 ASSAY OF LIPASE: CPT

## 2019-04-19 PROCEDURE — 36415 COLL VENOUS BLD VENIPUNCTURE: CPT

## 2019-04-19 PROCEDURE — 81001 URINALYSIS AUTO W/SCOPE: CPT

## 2019-04-19 PROCEDURE — 82150 ASSAY OF AMYLASE: CPT

## 2019-04-19 PROCEDURE — 80053 COMPREHEN METABOLIC PANEL: CPT

## 2019-04-19 PROCEDURE — 87086 URINE CULTURE/COLONY COUNT: CPT

## 2019-04-19 PROCEDURE — 93005 ELECTROCARDIOGRAM TRACING: CPT

## 2019-04-20 LAB
ATRIAL RATE: 99 BPM
CALCULATED P AXIS, ECG09: 27 DEGREES
CALCULATED R AXIS, ECG10: 11 DEGREES
CALCULATED T AXIS, ECG11: 6 DEGREES
DIAGNOSIS, 93000: NORMAL
P-R INTERVAL, ECG05: 148 MS
Q-T INTERVAL, ECG07: 374 MS
QRS DURATION, ECG06: 90 MS
QTC CALCULATION (BEZET), ECG08: 479 MS
VENTRICULAR RATE, ECG03: 99 BPM

## 2019-04-21 LAB
BACTERIA SPEC CULT: NORMAL
SERVICE CMNT-IMP: NORMAL

## 2019-04-22 ENCOUNTER — TELEPHONE (OUTPATIENT)
Dept: FAMILY MEDICINE CLINIC | Age: 64
End: 2019-04-22

## 2019-04-22 NOTE — TELEPHONE ENCOUNTER
Patient called at 8:27 requesting a RX to be called in for her for Nausea and vomiting. Please call patient at [phone].

## 2019-04-22 NOTE — TELEPHONE ENCOUNTER
Called patient back had her verify her  she says she has been vomiting every since she left the office last time she was here which was on 19, she says about 15 mins after eating she begins to feel nauseated and vomits, she says she can not keep anything down asked if she was able to keep liquids down she initially stated she could at this point I told her she should be seen she then said she has not vomited today, she has not eaten but has had water and able to keep this down without nausea. Patient aware Dr. Ava Soto not in the office today will return tomorrow and I will call her back once Dr. Ava Soto has reviewed message, told if her vomiting gets worse or if she is unable to keep down liquids, develops severe abdominal cramping or fever to go to ER or UC. Patient has expressed understanding. Please advise.

## 2019-04-23 LAB
AMYLASE P SERPL-CCNC: 36 U/L (ref 10–53)
AMYLASE S SERPL-CCNC: 82 U/L (ref 11–83)
AMYLASE SERPL-CCNC: 118 U/L (ref 31–124)

## 2019-04-23 NOTE — TELEPHONE ENCOUNTER
If not tolerating fluids, proceed to ER. Typical antinausea medications with significant drug interactions with her current regimen. Already seeing Dr. Jose D Padilla. She should contact his office for guidance.   ERIKA

## 2019-04-23 NOTE — TELEPHONE ENCOUNTER
Called patient had her verify her  she has been told she should contact her gastro specialists in [employer] to let them know what's going on with her she has expressed understanding and agrees with this plan.

## 2019-04-24 ENCOUNTER — OFFICE VISIT (OUTPATIENT)
Dept: CARDIOLOGY CLINIC | Age: 64
End: 2019-04-24

## 2019-04-24 VITALS
DIASTOLIC BLOOD PRESSURE: 84 MMHG | HEART RATE: 99 BPM | BODY MASS INDEX: 29.88 KG/M2 | SYSTOLIC BLOOD PRESSURE: 128 MMHG | HEIGHT: 64 IN | OXYGEN SATURATION: 97 % | WEIGHT: 175 LBS

## 2019-04-24 DIAGNOSIS — R00.0 RAPID HEART RATE: Primary | ICD-10-CM

## 2019-04-24 DIAGNOSIS — R94.31 ABNORMAL ELECTROCARDIOGRAM: ICD-10-CM

## 2019-04-24 NOTE — PROGRESS NOTES
Manisha Sloan presents today for Chief Complaint Patient presents with  New Patient Seen HBV for chest pain Manisha Sloan preferred language for health care discussion is english/other. Is someone accompanying this pt? Yes family member Is the patient using any DME equipment during OV? No  
 
Depression Screening: 
3 most recent PHQ Screens 1/4/2019 PHQ Not Done - Little interest or pleasure in doing things Several days Feeling down, depressed, irritable, or hopeless Not at all Total Score PHQ 2 1 Trouble falling or staying asleep, or sleeping too much Not at all Feeling tired or having little energy Not at all Poor appetite, weight loss, or overeating Not at all Feeling bad about yourself - or that you are a failure or have let yourself or your family down Not at all Trouble concentrating on things such as school, work, reading, or watching TV Not at all Moving or speaking so slowly that other people could have noticed; or the opposite being so fidgety that others notice Not at all Thoughts of being better off dead, or hurting yourself in some way Not at all PHQ 9 Score 1 How difficult have these problems made it for you to do your work, take care of your home and get along with others Somewhat difficult Learning Assessment: 
Learning Assessment 4/24/2019 PRIMARY LEARNER Patient HIGHEST LEVEL OF EDUCATION - PRIMARY LEARNER  GRADUATED HIGH SCHOOL OR GED  
BARRIERS PRIMARY LEARNER NONE  
CO-LEARNER CAREGIVER No  
PRIMARY LANGUAGE ENGLISH  
LEARNER PREFERENCE PRIMARY READING  
  -  
  -  
ANSWERED BY patient RELATIONSHIP SELF Abuse Screening: 
Abuse Screening Questionnaire 4/24/2019 Do you ever feel afraid of your partner? Tomasz Kelly Are you in a relationship with someone who physically or mentally threatens you? Tomasz Kelly Is it safe for you to go home? Abigail Foster Fall Risk Fall Risk Assessment, last 12 mths 4/24/2019 Able to walk?  Yes  
 Fall in past 12 months? No  
Fall with injury? -  
Number of falls in past 12 months - Fall Risk Score - Pt currently taking Anticoagulant therapy? no 
 
Coordination of Care: 1. Have you been to the ER, urgent care clinic since your last visit? Hospitalized since your last visit? no 
 
2. Have you seen or consulted any other health care providers outside of the [address] [place] [address] since your last visit? Include any pap smears or colon screening.  no

## 2019-04-24 NOTE — PROGRESS NOTES
[address] Chief Complaint Patient presents with  New Patient Seen HBV for chest pain CP/ ER follow up, dizzy, LE edema HPI [address] is a 71 y.o. AAF with no personal history of heart disease here for evaluation of persistent chest discomfort. As you know patient has been experiencing some chest discomfort for over a month now. He had a particular stretch of 3 days in a row discomfort that is quite severe. She describes it as sudden onset 10 out of 10 pain in her chest that seemed to radiate to her back. She was having some nausea and even vomited. She said her shirt was drenched with sweat. He does have some fibromyalgia and chronic pain and there was some tenderness involved in her chest personally reviewed her ER visit as well as the results of their workup. There have not been any noticeable relieving factors to her chest pain. She is not having any chest pain today. Besides the chest discomfort she also notices intermittent swelling in her legs as well as some dizziness. History was reviewed as detailed below Cardiac RFs: postmenopausal/ age, HTN, remote polysubstance abuse incl tobacco and cocaine, +FH premature CAD Past Medical History:  
Diagnosis Date  Annular tear of lumbar disc 11/10/2014  Asthma Bronchitis  Benign tumor of throat  Bone spur   
 right ankle  Cervicalgia 11/10/2014  Chronic pain   
 back  Degeneration of lumbar or lumbosacral intervertebral disc 11/10/2014  Degenerative disc disease  Depression  Displacement of lumbar intervertebral disc without myelopathy 11/10/2014  Elevated white blood cell count  Fibromyalgia  GERD (gastroesophageal reflux disease)  Hypertension  Insomnia  Nausea & vomiting  Pain in joint, multiple sites 11/10/2014  Postlaminectomy syndrome, cervical region 11/10/2014  Sinus infection 10/5/15  Ulcer Past Surgical History: Procedure Laterality Date  COLONOSCOPY N/A 4/13/2017 COLONOSCOPY performed by Diana [facility] at [facility] ENDOSCOPY  
 HX CATARACT REMOVAL    
 HX CERVICAL FUSION    
 HX HYSTERECTOMY  HX ORTHOPAEDIC    
 ganglion cyst removed, right hand  HX OTHER SURGICAL    
 cyst left thigh removed  HX WRIST FRACTURE TX    
 LAP,CHOLECYSTECTOMY N/A 02/27/2017 Dr. Sloane Lozada Current Outpatient Medications Medication Sig Dispense Refill  losartan (COZAAR) 25 mg tablet TAKE ONE TABLET BY MOUTH ONE TIME DAILY 90 Tab 4  
 oxyCODONE IR (ROXICODONE) 10 mg tab immediate release tablet  valACYclovir (VALTREX) 500 mg tablet Take 1 Tab by mouth daily. 90 Tab 4  DEXILANT 60 mg CpDB capsule (delayed release) Take 120 mg by mouth daily.  fluticasone (FLONASE) 50 mcg/actuation nasal spray 2 Sprays by Both Nostrils route as needed. 3 Bottle 4  
 morphine CR (MS CONTIN) 60 mg CR tablet Take  by mouth every twelve (12) hours.  diclofenac (VOLTAREN) 1 % gel Apply  to affected area four (4) times daily. 1 g 1  
 lidocaine (XYLOCAINE) 5 % ointment  cyclobenzaprine (FLEXERIL) 10 mg tablet TAKE 1 TABLET BY MOUTH THREE TIMES DAILY AS NEEDED FOR MUSCLE SPASMS FOR UP TO 30 DAYS. 90 Tab 2  polyethylene glycol (MIRALAX) 17 gram packet Take 1 Packet by mouth daily. 30 Each 6  
 atorvastatin (LIPITOR) 40 mg tablet TAKE ONE TABLET BY MOUTH ONE TIME DAILY 90 Tab 3  
 QUEtiapine (SEROQUEL) 200 mg tablet Take 200 mg by mouth daily.  naloxone (NARCAN) 4 mg/actuation nasal spray 1 [place] by IntraNASal route once as needed. Use 1 spray intranasally into 1 nostril. Use a new Narcan nasal spray for subsequent doses and administer into alternating nostrils. May repeat every 2 to 3 minutes as needed.  rizatriptan (MAXALT) 10 mg tablet Allergies Allergen Reactions  Aspirin Other (comments) Stomach bleeding  Ativan [Lorazepam] Shortness of Breath  Bactrim [Sulfamethoprim] Rash  Keflex [Cephalexin] Rash  Macrobid [Nitrofurantoin Monohyd/M-Cryst] Nausea and Vomiting  Nsaids (Non-Steroidal Anti-Inflammatory Drug) Nausea and Vomiting  Opana [Oxymorphone] Other (comments) Insomnia, weight loss, nightmares  Pcn [Penicillins] Hives Social History Socioeconomic History  Marital status:  Spouse name: Not on file  Number of children: Not on file  Years of education: Not on file  Highest education level: Not on file Occupational History  Not on file Social Needs  Financial resource strain: Not on file  Food insecurity:  
  Worry: Not on file Inability: Not on file  Transportation needs:  
  Medical: Not on file Non-medical: Not on file Tobacco Use  Smoking status: Former Smoker Last attempt to quit: 3/6/1993 Years since quittin.1  Smokeless tobacco: Never Used Substance and Sexual Activity  Alcohol use: No  
  Comment: none in 24 years  Drug use: No  
  Comment: last smoked in -cocaine  Sexual activity: Never Lifestyle  Physical activity:  
  Days per week: Not on file Minutes per session: Not on file  Stress: Not on file Relationships  Social connections:  
  Talks on phone: Not on file Gets together: Not on file Attends Synagogue service: Not on file Active member of club or organization: Not on file Attends meetings of clubs or organizations: Not on file Relationship status: Not on file  Intimate partner violence:  
  Fear of current or ex partner: Not on file Emotionally abused: Not on file Physically abused: Not on file Forced sexual activity: Not on file Other Topics Concern  Not on file Social History Narrative  Not on file FH + premature CAD in brother age 48 Review of Systems 14 pt Review of Systems is negative unless otherwise mentioned in the HPI. Wt Readings from Last 3 Encounters: 04/24/19 79.4 kg (175 lb) 04/18/19 79.8 kg (176 lb) 04/12/19 79.9 kg (176 lb 3.2 oz) Temp Readings from Last 3 Encounters:  
04/18/19 98.1 °F (36.7 °C) (Oral) 04/12/19 98.3 °F (36.8 °C) (Oral) 04/10/19 99 °F (37.2 °C) BP Readings from Last 3 Encounters:  
04/24/19 128/84  
04/18/19 110/86  
04/12/19 110/70 Pulse Readings from Last 3 Encounters:  
04/24/19 99  
04/18/19 (!) 111  
04/12/19 (!) 122 Physical Exam:   
Visit Vitals /84 Pulse 99 Ht 5' 4\" (1.626 m) Wt 79.4 kg (175 lb) SpO2 97% BMI 30.04 kg/m² Physical Exam  
Constitutional: She is oriented to person, place, and time. She appears well-developed and well-nourished. HENT:  
Head: Normocephalic and atraumatic. Eyes: Pupils are equal, round, and reactive to light. EOM are normal.  
Neck: No JVD present. Cardiovascular: Normal rate, regular rhythm, normal heart sounds and intact distal pulses. Exam reveals no gallop and no friction rub. No murmur heard. Pulmonary/Chest: Effort normal and breath sounds normal. No respiratory distress. She has no wheezes. She has no rales. She exhibits no tenderness. +TTP chest wall Abdominal: Soft. Bowel sounds are normal.  
Musculoskeletal: She exhibits no edema or tenderness. Neurological: She is alert and oriented to person, place, and time. Skin: Skin is warm and dry. Psychiatric: She has a normal mood and affect. EKG last: Sinus tachycardia, normal axis and intervals, no ST segment abnormalities Lab Results Component Value Date/Time Cholesterol, total 185 03/07/2018 02:41 PM  
 HDL Cholesterol 64 (H) 03/07/2018 02:41 PM  
 LDL, calculated 89.8 03/07/2018 02:41 PM  
 VLDL, calculated 31.2 03/07/2018 02:41 PM  
 Triglyceride 156 (H) 03/07/2018 02:41 PM  
 CHOL/HDL Ratio 2.9 03/07/2018 02:41 PM  
 
Lab Results Component Value Date/Time TSH 1.45 04/19/2019 03:16 PM  
 
Lab Results Component Value Date/Time  Sodium 140 04/19/2019 03:16 PM  
 Potassium 4.1 04/19/2019 03:16 PM  
 Chloride 109 (H) 04/19/2019 03:16 PM  
 CO2 29 04/19/2019 03:16 PM  
 Anion gap 2 (L) 04/19/2019 03:16 PM  
 Glucose 118 (H) 04/19/2019 03:16 PM  
 BUN 12 04/19/2019 03:16 PM  
 Creatinine 0.98 04/19/2019 03:16 PM  
 BUN/Creatinine ratio 12 04/19/2019 03:16 PM  
 GFR est AA >60 04/19/2019 03:16 PM  
 GFR est non-AA 57 (L) 04/19/2019 03:16 PM  
 Calcium 9.6 04/19/2019 03:16 PM  
 Bilirubin, total 0.3 04/19/2019 03:16 PM  
 AST (SGOT) 26 04/19/2019 03:16 PM  
 Alk. phosphatase 206 (H) 04/19/2019 03:16 PM  
 Protein, total 7.7 04/19/2019 03:16 PM  
 Albumin 3.9 04/19/2019 03:16 PM  
 Globulin 3.8 04/19/2019 03:16 PM  
 A-G Ratio 1.0 04/19/2019 03:16 PM  
 ALT (SGPT) 27 04/19/2019 03:16 PM  
 
 
 
Impression and Plan: 
Christopher Goel is a 71 y.o. with: 
 
1.) Atypical but persistent CP, assoc with diaphoresis, nausea 2.) HTN 
3.) +FH premature CAD 
4.) LE edema 1.) Stress echocardiogram 
2.) RTC 4-6 weeks recheck symptoms, discuss results further I spent significant time with patient discussing her most recent symptoms. While I agree her pain is somewhat reproducible on exam- I cannot entirely explain all of her associated symptoms (nausea and the sweating etc) and how they might be explained by a MSK etiology. She has significant risk factors to warrant an ischemic evaluation. I took the liberty of setting her up for a stress echocardiogram which I think will answer several questions for us. Further recommendations to follow pending her workup. Thank you for allowing me to participate in the care of your patient, please do not hesitate to call with questions or concerns. Kindest Regards, Emelia Gaona, DO

## 2019-04-30 ENCOUNTER — OFFICE VISIT (OUTPATIENT)
Dept: FAMILY MEDICINE CLINIC | Age: 64
End: 2019-04-30

## 2019-04-30 DIAGNOSIS — R30.0 DYSURIA: Primary | ICD-10-CM

## 2019-05-01 VITALS
RESPIRATION RATE: 12 BRPM | TEMPERATURE: 98.3 F | DIASTOLIC BLOOD PRESSURE: 84 MMHG | BODY MASS INDEX: 29.82 KG/M2 | WEIGHT: 179 LBS | HEIGHT: 65 IN | SYSTOLIC BLOOD PRESSURE: 132 MMHG | OXYGEN SATURATION: 98 % | HEART RATE: 89 BPM

## 2019-05-01 NOTE — PROGRESS NOTES
Chief Complaint   Patient presents with    Urinary Pain    Urinary Odor         Pt states she's in the office to go over results from tests she had done last week. I explained to her that our systems were down and that we were unable to see the results. She states she is still having abdominal pain. I consulted Dr. Rubén Hinojosa. Pt is followed by GI for abdominal pain. Pt also sees cardiology . Pt should follow up with GI for abd pain. I informed patient of this. She said 'why can't Dr. Rubén Hinojosa just treat me? Instead of me going everywhere'. I explained to patient that we have specialists for a reason. While we can treat certain things here, there are some illness/issues/etc that should be followed by speciality care. Pt then c/o urinary pain and odor that started at 2 am this morning. I told her we could see her for the pain/odor.

## 2019-05-02 DIAGNOSIS — N30.90 CYSTITIS: Primary | ICD-10-CM

## 2019-05-02 NOTE — PROGRESS NOTES
See downtime documents scanned. Attention is drawn to improvement in her tachycardia with cessation of TCA in the wake of our last visit.   ERIKA

## 2019-05-03 DIAGNOSIS — K59.09 INTERMITTENT CONSTIPATION: ICD-10-CM

## 2019-05-03 NOTE — TELEPHONE ENCOUNTER
Patient called today at 2:22 requesting a refill on her rizatriptan (MAXALT) 10 mg, she states she is completely out.

## 2019-05-03 NOTE — TELEPHONE ENCOUNTER
Patient will need an appt, Dr. Ruslan Schwartz has not prescribed this mediation for her before, called the patient no answer left message asking her to call the office back.

## 2019-05-06 ENCOUNTER — TELEPHONE (OUTPATIENT)
Dept: FAMILY MEDICINE CLINIC | Age: 64
End: 2019-05-06

## 2019-05-06 LAB
A VAGINAE DNA VAG QL NAA+PROBE: NORMAL SCORE
BACTERIA UR CULT: NORMAL
BILIRUB UR QL STRIP: NEGATIVE
BVAB2 DNA VAG QL NAA+PROBE: NORMAL SCORE
C ALBICANS DNA VAG QL NAA+PROBE: NEGATIVE
C GLABRATA DNA VAG QL NAA+PROBE: NEGATIVE
GLUCOSE UR-MCNC: NEGATIVE MG/DL
KETONES P FAST UR STRIP-MCNC: NEGATIVE MG/DL
MEGA1 DNA VAG QL NAA+PROBE: NORMAL SCORE
PH UR STRIP: 6 [PH] (ref 4.6–8)
PROT UR QL STRIP: NEGATIVE
SP GR UR STRIP: 1.02 (ref 1–1.03)
T VAGINALIS RRNA SPEC QL NAA+PROBE: NEGATIVE
UA UROBILINOGEN AMB POC: NORMAL (ref 0.2–1)
URINALYSIS CLARITY POC: CLEAR
URINALYSIS COLOR POC: YELLOW
URINE BLOOD POC: NEGATIVE
URINE LEUKOCYTES POC: NORMAL
URINE NITRITES POC: NEGATIVE

## 2019-05-06 RX ORDER — RIZATRIPTAN BENZOATE 10 MG/1
TABLET ORAL
OUTPATIENT
Start: 2019-05-06

## 2019-05-06 RX ORDER — POLYETHYLENE GLYCOL 3350 17 G/17G
17 POWDER, FOR SOLUTION ORAL DAILY
Qty: 30 EACH | Refills: 6 | Status: SHIPPED | OUTPATIENT
Start: 2019-05-06

## 2019-05-06 NOTE — TELEPHONE ENCOUNTER
Miralax signed. The Maxalt has a lot of potentially dangerous interactions with her other medications. We do need a visit to identify a safe migraine abortive agent for her. There are multiple encounter strings for her.  Her lab results (UCx and NuSwab) were normal. ERIKA

## 2019-05-06 NOTE — TELEPHONE ENCOUNTER
Patient called at 9:29 returning a call to [place].  Please call patient back at [phone]

## 2019-05-06 NOTE — TELEPHONE ENCOUNTER
Called patient back had her verify her  she has been told she will need an appointment to have her Maxalt refilled told her it looks like Dr. Marlene Shoemaker has not refilled this medication for her and she will need an appointment patient states Dr. Marlene Shoemaker told her she knows she takes this medication and told her to call the office when she is out for a refill, she is also asking for refills on her Miralax. Patient has been told I will send to Dr. Marlene Shoemaker and call her back if I need to.

## 2019-05-07 NOTE — TELEPHONE ENCOUNTER
Called patient had her verify her  she has been scheduled with Dr. Keke Lopez on 19 at 2:45 to discuss migraine treatment options.

## 2019-05-20 ENCOUNTER — OFFICE VISIT (OUTPATIENT)
Dept: FAMILY MEDICINE CLINIC | Age: 64
End: 2019-05-20

## 2019-05-20 VITALS
DIASTOLIC BLOOD PRESSURE: 78 MMHG | BODY MASS INDEX: 28.99 KG/M2 | RESPIRATION RATE: 14 BRPM | WEIGHT: 174 LBS | TEMPERATURE: 98.7 F | OXYGEN SATURATION: 97 % | HEART RATE: 87 BPM | SYSTOLIC BLOOD PRESSURE: 128 MMHG | HEIGHT: 65 IN

## 2019-05-20 DIAGNOSIS — G43.009 MIGRAINE WITHOUT AURA AND WITHOUT STATUS MIGRAINOSUS, NOT INTRACTABLE: Primary | ICD-10-CM

## 2019-05-20 RX ORDER — PROPRANOLOL HYDROCHLORIDE 80 MG/1
80 CAPSULE, EXTENDED RELEASE ORAL DAILY
Status: CANCELLED | OUTPATIENT
Start: 2019-05-20

## 2019-05-20 NOTE — PROGRESS NOTES
Gera Loya is a 71 y.o. female who was seen in clinic today (5/20/2019). Assessment & Plan:       ICD-10-CM ICD-9-CM    1. Migraine without aura and without status migrainosus, not intractable G43.009 346.10 isomethepten-caf-acetaminophen 65- mg tab     Recurrent headaches. New to me. Uncontrolled. Features consistent with migraine, confounded by more frequent headaches that sound allergic in nature given relief with nasal steroids - the use of which is delaying use of abortive agents as she waits for it to work. Maxalt previously prescribed not safe in conjunction with her other medications. Temporary relief with acetaminophen encouraging for success with above if taken at headache onset. Look for triggers  Declined prophylaxis with propranolol. Reconsider in follow up    Patient Instructions   Use your flonase every day to prevent sinus headaches. If that gives you nosebleeds, try every other day. Follow-up and Dispositions    · Return in about 2 months (around 7/20/2019) for migraine headache follow up. Subjective:   Gera Loya was seen today for Migraine      complains of years of \"terrible\" headaches: temporal/frontal/between eyes. Sometimes unilateral. \"nagging\" \"aching\" pain, throbbing. No aura. No clear triggers. 3-4/month. Lasts 3 hours typically. Occasionally last longer, most recent headache 3 days ago lasted > 7 hours. associated with nausea and photophobia. history of neck pain, resolved since remote surgery. maxalt previously effective  \"eased\" with Tylenol, but temporary relief    Similar to experience of recurrent sinus congestion that resolves with prn use of nasal steroids. Occurs 2/week year round. +fam hx migraines in daughter and granddaughter    No change off TCA, which has been discontinued. No change in insomnia, which remains uncontrolled.        Outpatient Medications Marked as Taking for the 5/20/19 encounter (Office Visit) with Nelly Mandujano MD   Medication Sig Dispense Refill    polyethylene glycol (MIRALAX) 17 gram packet Take 1 Packet by mouth daily. 30 Each 6    losartan (COZAAR) 25 mg tablet TAKE ONE TABLET BY MOUTH ONE TIME DAILY 90 Tab 4    oxyCODONE IR (ROXICODONE) 10 mg tab immediate release tablet       valACYclovir (VALTREX) 500 mg tablet Take 1 Tab by mouth daily. 90 Tab 4    DEXILANT 60 mg CpDB capsule (delayed release) Take 120 mg by mouth daily.  fluticasone (FLONASE) 50 mcg/actuation nasal spray 2 Sprays by Both Nostrils route as needed. 3 Bottle 4    morphine CR (MS CONTIN) 60 mg CR tablet Take  by mouth every twelve (12) hours.  diclofenac (VOLTAREN) 1 % gel Apply  to affected area four (4) times daily. 1 g 1    lidocaine (XYLOCAINE) 5 % ointment       cyclobenzaprine (FLEXERIL) 10 mg tablet TAKE 1 TABLET BY MOUTH THREE TIMES DAILY AS NEEDED FOR MUSCLE SPASMS FOR UP TO 30 DAYS. 90 Tab 2    atorvastatin (LIPITOR) 40 mg tablet TAKE ONE TABLET BY MOUTH ONE TIME DAILY 90 Tab 3    QUEtiapine (SEROQUEL) 200 mg tablet Take 200 mg by mouth daily.  naloxone (NARCAN) 4 mg/actuation nasal spray 1 [place] by IntraNASal route once as needed. Use 1 spray intranasally into 1 nostril. Use a new Narcan nasal spray for subsequent doses and administer into alternating nostrils. May repeat every 2 to 3 minutes as needed. Patient Active Problem List    Diagnosis    Essential hypertension    Obesity (BMI 30.0-34. 9)    Prediabetes    Angular cheilitis with candidiasis    Diverticulosis of large intestine without hemorrhage     [place] 4/2017      Hiatal hernia     h pylori tx 3/2013; multiple EGDs 1/21/2015, 5/24/2017, 4/13/2017, 2/21/2018 - normal mucosa      Hepatic steatosis     US 1/13/2017      Calculus of gallbladder without cholecystitis     CT 12/22/2016 also with duodenal diverticulum      Gastroesophageal reflux disease without esophagitis    Urinary retention    Vitamin D deficiency    Benign essential hypertension with target blood pressure below 140/90    Myalgia    Nausea    Hypercholesterolemia    Lung nodule seen on imaging study    Degeneration of lumbar or lumbosacral intervertebral disc    Displacement of lumbar intervertebral disc without myelopathy    Pain in joint, multiple sites    Cervicalgia    Postlaminectomy syndrome, cervical region    Annular tear of lumbar disc    Chronic pain syndrome    Carpal tunnel syndrome    Bipolar 2 disorder [facility])     Per conversation Dr. Jefferson Ramsey 2/22/2019 based on review of her records there. Treated with Seroquel.  Lumbago    Other affections of shoulder region, not elsewhere classified    Other chronic pain         Allergies   Allergen Reactions    Aspirin Other (comments)     Stomach bleeding    Ativan [Lorazepam] Shortness of Breath    Bactrim [Sulfamethoprim] Rash    Keflex [Cephalexin] Rash    Macrobid [Nitrofurantoin Monohyd/M-Cryst] Nausea and Vomiting    Nsaids (Non-Steroidal Anti-Inflammatory Drug) Nausea and Vomiting    Opana [Oxymorphone] Other (comments)     Insomnia, weight loss, nightmares    Pcn [Penicillins] Hives         Patient Care Team:  Sheyla Jones MD as PCP - General (Family Practice)  Ankit Haywood MD (Gastroenterology)  Asuncion Alcantara NP as Nurse Practitioner (Psychiatry)  Bola Mondragon MD (Physical Medicine and Rehab)  Meagan Downs MD (Psychiatry)  Misha Horn NP as Nurse Practitioner (Gastroenterology)    The following sections were reviewed & updated as appropriate: PMH, PSH, FH, and SH. Review of Systems   Constitutional: Negative for fever. Neurological: Negative for sensory change, speech change, seizures and loss of consciousness.            Objective:     Visit Vitals  /78   Pulse 87   Temp 98.7 °F (37.1 °C)   Resp 14   Ht 5' 5\" (1.651 m)   Wt 174 lb (78.9 kg)   SpO2 97%   BMI 28.96 kg/m²      Physical Exam   Constitutional: She is oriented to person, place, and time. She appears well-developed. No distress. Pleasant overweight black female   HENT:   Head: Normocephalic and atraumatic. Pulmonary/Chest: Effort normal.   Neurological: She is alert and oriented to person, place, and time. (neuro exam recently completed attendant with visit for serotonin syndrome - not repeated today)   Psychiatric: She has a normal mood and affect. Her behavior is normal. Judgment and thought content normal.         Disclaimer: The patient understands our medical plan. Alternatives have been explained and offered. The risks, benefits and significant side effects of all medications have been reviewed. Anticipated time course and progression of condition reviewed. All questions have been addressed. She is encouraged to employ the information provided in the after visit summary, which was reviewed. Where applicable, she is instructed to call the clinic if she has not been notified either by phone or through [address] with the results of her tests or with an appointment plan for any referrals within 1 week(s). No news is not good news; it's no news. The patient  is to call if her condition worsens or fails to improve or if significant side effects are experienced. Aspects of this note may have been generated using voice recognition software. Despite editing, there may be unrecognized errors.        Sundra Hamman, MD

## 2019-05-20 NOTE — PROGRESS NOTES
Pt in office for migraine treatment options. 1. Have you been to the ER, urgent care clinic since your last visit? Hospitalized since your last visit? No    2. Have you seen or consulted any other health care providers outside of the [address] [place] [address] since your last visit? Include any pap smears or colon screening.  No

## 2019-05-20 NOTE — PATIENT INSTRUCTIONS
Use your flonase every day to prevent sinus headaches. If that gives you nosebleeds, try every other day. Recurring Migraine Headache: Care Instructions  Your Care Instructions  Migraines are painful, throbbing headaches. They often start on one side of the head. They may cause nausea and vomiting and make you sensitive to light, sound, or smell. Some people may have only a few migraines throughout life. Others have them as often as several times a month. The goal of treatment is to reduce the number of migraines you have and relieve your symptoms. Even with treatment, you may continue to have migraines. You play an important role in dealing with your headaches. Work on avoiding things that seem to trigger your migraines. When you feel a headache coming on, act quickly to stop it before it gets worse. Follow-up care is a key part of your treatment and safety. Be sure to make and go to all appointments, and call your doctor if you are having problems. It's also a good idea to know your test results and keep a list of the medicines you take. How can you care for yourself at home? · Do not drive if you have taken a prescription pain medicine. · Rest in a quiet, dark room until your headache is gone. Close your eyes and try to relax or go to sleep. Do not watch TV or read. · Put a cold, moist cloth or cold pack on the painful area for 10 to 20 minutes at a time. Put a thin cloth between the cold pack and your skin. · Have someone gently massage your neck and shoulders. · Take your medicines exactly as prescribed. Call your doctor if you think you are having a problem with your medicine. You will get more details on the specific medicines your doctor prescribes. To prevent migraines  · Keep a headache diary so you can figure out what triggers your headaches. Avoiding triggers may help you prevent headaches. Record when each headache began, how long it lasted, and what the pain was like.  Use words like throbbing, aching, stabbing, or dull. Write down any other symptoms you had with the headache. These may include nausea, flashing lights or dark spots, or sensitivity to bright light or loud noise. Note if the headache occurred near your period. List anything that might have triggered the headache. Triggers may include certain foods (chocolate, cheese, wine) or odors, smoke, bright light, stress, or lack of sleep. · If your doctor has prescribed medicine for your migraines, take it as directed. You may have medicine that you take only when you get a migraine and medicine that you take all the time to help prevent migraines. ? If your doctor has prescribed medicine for when you get a headache, take it at the first sign of a migraine, unless your doctor has given you other instructions. ? If your doctor has prescribed medicine to prevent migraines, take it exactly as prescribed. Call your doctor if you think you are having a problem with your medicine. · Find healthy ways to deal with stress. Migraines are most common during or right after stressful times. Take time to relax before and after you do something that has caused a migraine in the past.  · Try to keep your muscles relaxed by keeping good posture. Check your jaw, face, neck, and shoulder muscles for tension. Try to relax them. When sitting at a desk, change positions often. Stretch for 30 seconds each hour. · Get regular sleep and exercise. · Eat regular meals, and avoid foods and drinks that often trigger migraines. These include chocolate and alcohol, especially red wine and port. Chemicals used in food, such as aspartame and monosodium glutamate (MSG), also can trigger migraines. So can some food additives, such as those found in hot dogs, hernandez, cold cuts, aged cheeses, and pickled foods. · Limit caffeine by not drinking too much coffee, tea, or soda. Do not quit caffeine suddenly, because that can also give you migraines.   · Do not smoke or allow others to smoke around you. If you need help quitting, talk to your doctor about stop-smoking programs and medicines. These can increase your chances of quitting for good. · If you are taking birth control pills or hormone therapy, talk to your doctor about whether they are triggering your migraines. When should you call for help? Call 911 anytime you think you may need emergency care. For example, call if:    · You have symptoms of a stroke. These may include:  ? Sudden numbness, tingling, weakness, or loss of movement in your face, arm, or leg, especially on only one side of your body. ? Sudden vision changes. ? Sudden trouble speaking. ? Sudden confusion or trouble understanding simple statements. ? Sudden problems with walking or balance. ? A sudden, severe headache that is different from past headaches.    Call your doctor now or seek immediate medical care if:    · You develop a fever and a stiff neck.     · You have new nausea and vomiting, or you cannot keep down food or liquids.    Watch closely for changes in your health, and be sure to contact your doctor if:    · You have a headache that does not get better within 1 or 2 days.     · Your headaches get worse or happen more often. Where can you learn more? Go to http://diana-lashonda.info/. Enter V975 in the search box to learn more about \"Recurring Migraine Headache: Care Instructions. \"  Current as of: Maria A 3, 2018  Content Version: 11.9  © 2354-1760 [employer]. Care instructions adapted under license by [employer] (which disclaims liability or warranty for this information). If you have questions about a medical condition or this instruction, always ask your healthcare professional. [place] [address] any warranty or liability for your use of this information.

## 2019-05-21 ENCOUNTER — TELEPHONE (OUTPATIENT)
Dept: FAMILY MEDICINE CLINIC | Age: 64
End: 2019-05-21

## 2019-05-21 DIAGNOSIS — G43.009 MIGRAINE WITHOUT AURA AND WITHOUT STATUS MIGRAINOSUS, NOT INTRACTABLE: Primary | ICD-10-CM

## 2019-05-21 NOTE — TELEPHONE ENCOUNTER
Similar product prescribed. Different dosing instructions. It may be possible to call this in to the pharmacy for her.  ERIKA

## 2019-05-21 NOTE — TELEPHONE ENCOUNTER
Patient called and states she went to [facility] and was told that the medication Dr. Lizzy Joseph prescribed yesterday is no longer being manufactured.        isomethepten-caf-acetaminophen 65- mg tab

## 2019-05-22 DIAGNOSIS — R94.31 ABNORMAL ELECTROCARDIOGRAM: Primary | ICD-10-CM

## 2019-05-22 DIAGNOSIS — R00.0 RAPID HEART RATE: ICD-10-CM

## 2019-05-22 NOTE — TELEPHONE ENCOUNTER
Called patient to let her know new medication has been ordered for her no answer left message asking her to call the office back. Called pharmacy to see if this is able to be called in was told this medication has also been discontinued and is no longer available, patient called the office back had her verify her  she has been made aware and told I will call her back once Dr. Marzena Magdaleno reviews this. Patient has expressed understanding.

## 2019-05-23 NOTE — TELEPHONE ENCOUNTER
We'll improvise:  2 extra strength acetaminophen (Tylenol) which is 1000 mg + a large cup of regular coffee or a standard regular soda (need the caffeine) at headache onset.     Can repeat the coffee/soda in 1-2 hours if needed  Acetaminophen can be repeated in 6-8 hours - max 3000 mg per day    KJS

## 2019-05-23 NOTE — TELEPHONE ENCOUNTER
Studies have shown that the OTC product Excedrin Migraine is as effective as this prescription product had been. I recommend she try Excedrin Migraine. If she is unable to achieve relief when using it as directed on the package, call my office so that I can research a safe alternative.    ERIKA

## 2019-05-23 NOTE — TELEPHONE ENCOUNTER
Called patient had her verify her  she was told Dr. Esther Baumann recommends she tries OTC Excedrin Migraine for her head aches patient states she can not take any NSAID she says this will tear her stomach up. Patient states Dr. Erica Guzman told her to stay away from NSAIDS. Please advise.

## 2019-05-24 NOTE — TELEPHONE ENCOUNTER
Meanwhile, I've spoken with Dr. Penelope Winchester, who notes that product not officially discontinued, but becoming harder and harder to find. She offered an alternative if she's not a fan of the soda idea: \"Excedrin Tension Headache contains 500 mg acetaminophen and 65 mg caffeine per caplet and the dose is 2 caplets every 6 hours as needed. Not sure how many pharmacies stock a generic to that specific formulation (and she wouldnt want to use other generic Excedrin formulations as they usually contain aspirin) but the Excedrin website does have a $1.50 coupon that she could print and use. \"    She would need to limit to no more than 6 caps/24 hours.   KJS

## 2019-05-30 ENCOUNTER — HOSPITAL ENCOUNTER (OUTPATIENT)
Dept: NON INVASIVE DIAGNOSTICS | Age: 64
Discharge: HOME | End: 2019-05-30
Attending: INTERNAL MEDICINE
Payer: MEDICARE

## 2019-05-30 VITALS — HEIGHT: 64 IN | BODY MASS INDEX: 29.71 KG/M2 | WEIGHT: 174 LBS

## 2019-05-30 DIAGNOSIS — R94.31 ABNORMAL ELECTROCARDIOGRAM: ICD-10-CM

## 2019-05-30 DIAGNOSIS — R00.0 RAPID HEART RATE: ICD-10-CM

## 2019-05-30 LAB
STRESS BASELINE DIAS BP: 80 MMHG
STRESS BASELINE HR: 118 BPM
STRESS BASELINE SYS BP: 118 MMHG
STRESS ESTIMATED WORKLOAD: 5.7 METS
STRESS EXERCISE DUR MIN: NORMAL
STRESS PEAK DIAS BP: 80 MMHG
STRESS PEAK SYS BP: 142 MMHG
STRESS PERCENT HR ACHIEVED: 109 %
STRESS POST PEAK HR: 169 BPM
STRESS RATE PRESSURE PRODUCT: NORMAL BPM*MMHG
STRESS ST DEPRESSION: 0 MM
STRESS ST ELEVATION: 0 MM
STRESS STAGE 1 BP: NORMAL MMHG
STRESS STAGE 1 DURATION: 3 MIN:SEC
STRESS STAGE 1 HR: 142 BPM
STRESS STAGE 2 DURATION: NORMAL MIN:SEC
STRESS STAGE 2 HR: 146 BPM
STRESS STAGE RECOVERY 1 BP: NORMAL MMHG
STRESS STAGE RECOVERY 1 DURATION: 2 MIN:SEC
STRESS STAGE RECOVERY 1 HR: 131 BPM
STRESS STAGE RECOVERY 2 BP: NORMAL MMHG
STRESS STAGE RECOVERY 2 DURATION: 4 MIN:SEC
STRESS STAGE RECOVERY 2 HR: 118 BPM
STRESS TARGET HR: 155 BPM

## 2019-05-30 PROCEDURE — 93351 STRESS TTE COMPLETE: CPT

## 2019-05-30 NOTE — TELEPHONE ENCOUNTER
Called patient had her verify her  she has been made aware she can try OTC Excedrin Tension Headache she was told this contains acetaminophen and caffeine she can take 2 caplets every 6 hours as needed. She says she will try this as she can not drink all that soda. She was told if this does not help her head aches to call the office back.

## 2019-05-31 ENCOUNTER — TELEPHONE (OUTPATIENT)
Dept: CARDIOLOGY CLINIC | Age: 64
End: 2019-05-31

## 2019-05-31 ENCOUNTER — ANESTHESIA EVENT (OUTPATIENT)
Dept: ENDOSCOPY | Age: 64
End: 2019-05-31
Payer: MEDICARE

## 2019-05-31 ENCOUNTER — OFFICE VISIT (OUTPATIENT)
Dept: ORTHOPEDIC SURGERY | Facility: CLINIC | Age: 64
End: 2019-05-31

## 2019-05-31 VITALS
WEIGHT: 175.8 LBS | RESPIRATION RATE: 16 BRPM | TEMPERATURE: 98.5 F | SYSTOLIC BLOOD PRESSURE: 147 MMHG | OXYGEN SATURATION: 97 % | HEIGHT: 64 IN | HEART RATE: 94 BPM | DIASTOLIC BLOOD PRESSURE: 85 MMHG | BODY MASS INDEX: 30.01 KG/M2

## 2019-05-31 DIAGNOSIS — M65.832 EXTENSOR TENOSYNOVITIS OF LEFT WRIST: Primary | ICD-10-CM

## 2019-05-31 DIAGNOSIS — M65.831 EXTENSOR TENOSYNOVITIS OF WRIST, RIGHT: ICD-10-CM

## 2019-05-31 DIAGNOSIS — M79.7 FIBROMYALGIA: ICD-10-CM

## 2019-05-31 RX ORDER — BETAMETHASONE SODIUM PHOSPHATE AND BETAMETHASONE ACETATE 3; 3 MG/ML; MG/ML
6 INJECTION, SUSPENSION INTRA-ARTICULAR; INTRALESIONAL; INTRAMUSCULAR; SOFT TISSUE ONCE
Qty: 0.5 ML | Refills: 0
Start: 2019-05-31 | End: 2019-05-31

## 2019-05-31 NOTE — PROGRESS NOTES
Per your last note'  Impression and Plan:  Yusef Jolley is a 71 y.o. with:     1.) Atypical but persistent CP, assoc with diaphoresis, nausea  2.) HTN  3.) +FH premature CAD  4.) LE edema     1.) Stress echocardiogram  2.) RTC 4-6 weeks recheck symptoms, discuss results further     I spent significant time with patient discussing her most recent symptoms. While I agree her pain is somewhat reproducible on exam- I cannot entirely explain all of her associated symptoms (nausea and the sweating etc) and how they might be explained by a MSK etiology. She has significant risk factors to warrant an ischemic evaluation. I took the liberty of setting her up for a stress echocardiogram which I think will answer several questions for us.   Further recommendations to follow pending her workup.       Thank you for allowing me to participate in the care of your patient, please do not hesitate to call with questions or concerns.     Kindest Regards,     Yumiko Fletcher, DO

## 2019-05-31 NOTE — PROGRESS NOTES
Patient: Yusef Jolley                MRN: [id number]       SSN: [id number]  YOB: 1955        AGE: 71 y.o. SEX: female    PCP: Damir Perez MD  05/31/19    CC: INCREASED BILATERAL PAIN    HISTORY:  Yusef Jolley is a 71 y.o. female female who is seen for dorsal L>R hand/wrist pain and swelling. She is s/p left extensor tenosynovectomy 7/11/18. She states that physical therapy and bracing increased her pain. She notes a small firm lump has formed under the skin. She had been experiencing extreme pain with use of her hand or wrist. She relates her pain to a headache. She was seen by Dr. Aurelio Fong on 1/31/19 who she states told her she has tender skin. She responded temporarily to her previous injection. She states she first noticed a swelling overlying her dorsal left wrist while in physical therapy. She noted increased left wrist pain after noticing the swelling. She states that she is left handed so most everything she does causes her pain. She is experiencing pain with gripping and pinching.      She was previously being seen by Dr. Edison Maldonado for a left toe fracture. She was previously seen by Dr. Carri Escudero in 2000 for her fibromyalgia. She is currently in pain management. She is s/p anterior cervical fusion by Dr. Asha Keller. She reports she has been dropping things recently. Pain Assessment  5/31/2019   Location of Pain Wrist   Pain Location Comment -   Location Modifiers Left;Right   Severity of Pain 10   Quality of Pain Aching; Throbbing; Sharp;Burning   Quality of Pain Comment -   Duration of Pain Persistent   Frequency of Pain Constant   Aggravating Factors -   Aggravating Factors Comment -   Limiting Behavior Yes   Relieving Factors -   Relieving Factors Comment -   Result of Injury No   Work-Related Injury -   Type of Injury -     Occupation, etc:  Ms. Dariel Caldera has received social security disability benefits for low back pain and OA since 2000.  She previously worked at [employer] and was training for management. She lives in [place] with her son and daughter. Her daughter is employed as her aid. She has another adult daughter. She also lost a son due to a horrific motorcycle accident a few years ago. She has 5 grandchildren- two of which are adopted. Current weight is 173 pounds. She is 5'4\" tall. She is a diet controlled diabetic. She is not hypertensive. She is left handed. She reports a h/o acid reflux and a stomach ulcer. Lab Results   Component Value Date/Time    Hemoglobin A1c 6.0 (H) 11/26/2018 12:00 AM    Hemoglobin A1c, External 5.9 06/27/2016     Weight Metrics 5/31/2019 5/30/2019 5/29/2019 5/20/2019 4/30/2019 4/24/2019 4/18/2019   Weight 175 lb 12.8 oz 174 lb 174 lb 174 lb 179 lb 175 lb 176 lb   BMI 30.18 kg/m2 29.87 kg/m2 - 28.96 kg/m2 29.79 kg/m2 30.04 kg/m2 30.21 kg/m2       Patient Active Problem List   Diagnosis Code    Lumbago M54.5    Other affections of shoulder region, not elsewhere classified M75.80    Other chronic pain G89.29    Bipolar 2 disorder ([facility] F31.81    Chronic pain syndrome G89.4    Carpal tunnel syndrome G56.00    Degeneration of lumbar or lumbosacral intervertebral disc M51.37    Displacement of lumbar intervertebral disc without myelopathy M51.26    Pain in joint, multiple sites M25.50    Cervicalgia M54.2    Postlaminectomy syndrome, cervical region M96.1    Annular tear of lumbar disc M51.36    Lung nodule seen on imaging study R91.1    Hypercholesterolemia E78.00    Nausea R11.0    Myalgia M79.10    Vitamin D deficiency E55.9    Benign essential hypertension with target blood pressure below 140/90 I10    Urinary retention R33.9    Gastroesophageal reflux disease without esophagitis K21.9    Diverticulosis of large intestine without hemorrhage K57.30    Hiatal hernia K44.9    Hepatic steatosis K76.0    Calculus of gallbladder without cholecystitis K80.20    Obesity (BMI 30.0-34. 9) E66.9    Prediabetes R73.03  Angular cheilitis with candidiasis B37.0    Essential hypertension I10    Migraine without aura and without status migrainosus, not intractable G43.009     REVIEW OF SYSTEMS: All Below are Negative except: See HPI   Constitutional: negative for fever, chills, and weight loss. Cardiovascular: negative for chest pain, claudication, leg swelling, SOB, VILLAGOMEZ   Gastrointestinal: Negative for pain, N/V/C/D, Blood in stool or urine, dysuria,  hematuria, incontinence, pelvic pain. Musculoskeletal: See HPI   Neurological: Negative for dizziness and weakness. Negative for headaches, Visual changes, confusion, seizures   Phychiatric/Behavioral: Negative for depression, memory loss, substance  abuse. Extremities: Negative for hair changes, rash, or skin lesion changes. Hematologic: Negative for bleeding problems, bruising, pallor or swollen lymph  nodes   Peripheral Vascular: No calf pain, no circulation deficits.     Social History     Socioeconomic History    Marital status:      Spouse name: Not on file    Number of children: Not on file    Years of education: Not on file    Highest education level: Not on file   Occupational History    Not on file   Social Needs    Financial resource strain: Not on file    Food insecurity:     Worry: Not on file     Inability: Not on file    Transportation needs:     Medical: Not on file     Non-medical: Not on file   Tobacco Use    Smoking status: Former Smoker     Last attempt to quit: 3/6/1993     Years since quittin.2    Smokeless tobacco: Never Used   Substance and Sexual Activity    Alcohol use: No     Comment: none in 24 years    Drug use: No     Comment: last smoked in -cocaine    Sexual activity: Never   Lifestyle    Physical activity:     Days per week: Not on file     Minutes per session: Not on file    Stress: Not on file   Relationships    Social connections:     Talks on phone: Not on file     Gets together: Not on file Attends [employer] service: Not on file     Active member of club or organization: Not on file     Attends meetings of clubs or organizations: Not on file     Relationship status: Not on file    Intimate partner violence:     Fear of current or ex partner: Not on file     Emotionally abused: Not on file     Physically abused: Not on file     Forced sexual activity: Not on file   Other Topics Concern    Not on file   Social History Narrative    Not on file      Allergies   Allergen Reactions    Aspirin Other (comments)     Stomach bleeding    Ativan [Lorazepam] Shortness of Breath    Bactrim [Sulfamethoprim] Rash    Keflex [Cephalexin] Rash    Macrobid [Nitrofurantoin Monohyd/M-Cryst] Nausea and Vomiting    Nsaids (Non-Steroidal Anti-Inflammatory Drug) Nausea and Vomiting    Opana [Oxymorphone] Other (comments)     Insomnia, weight loss, nightmares    Pcn [Penicillins] Hives      Current Outpatient Medications   Medication Sig    polyethylene glycol (MIRALAX) 17 gram packet Take 1 Packet by mouth daily.  losartan (COZAAR) 25 mg tablet TAKE ONE TABLET BY MOUTH ONE TIME DAILY    oxyCODONE IR (ROXICODONE) 10 mg tab immediate release tablet     valACYclovir (VALTREX) 500 mg tablet Take 1 Tab by mouth daily.  DEXILANT 60 mg CpDB capsule (delayed release) Take 120 mg by mouth daily.  fluticasone (FLONASE) 50 mcg/actuation nasal spray 2 Sprays by Both Nostrils route as needed.  morphine CR (MS CONTIN) 60 mg CR tablet Take  by mouth every twelve (12) hours.  diclofenac (VOLTAREN) 1 % gel Apply  to affected area four (4) times daily.  lidocaine (XYLOCAINE) 5 % ointment     cyclobenzaprine (FLEXERIL) 10 mg tablet TAKE 1 TABLET BY MOUTH THREE TIMES DAILY AS NEEDED FOR MUSCLE SPASMS FOR UP TO 30 DAYS.  atorvastatin (LIPITOR) 40 mg tablet TAKE ONE TABLET BY MOUTH ONE TIME DAILY    QUEtiapine (SEROQUEL) 200 mg tablet Take 200 mg by mouth daily.     naloxone (NARCAN) 4 mg/actuation nasal spray 1 [place] by IntraNASal route once as needed. Use 1 spray intranasally into 1 nostril. Use a new Narcan nasal spray for subsequent doses and administer into alternating nostrils. May repeat every 2 to 3 minutes as needed. No current facility-administered medications for this visit. PHYSICAL EXAMINATION:  Visit Vitals  /85   Pulse 94   Temp 98.5 °F (36.9 °C) (Oral)   Resp 16   Ht 5' 4\" (1.626 m)   Wt 175 lb 12.8 oz (79.7 kg)   SpO2 97%   BMI 30.18 kg/m²     ORTHO EXAMINATION:  Examination Right Wrist Left Wrist   Skin Intact    Tenderness + dorsum, fourth extensor compartment + dorsum, fourth extensor compartment   Flexion 40 40   Extension 30 30   Deformity - -   Effusion - -   Finger flexion Full Full   Finger extension Weak active with pain Weak active with pain   Tinel's sign - -   Phalen's test - -   Finklestein maneuver - -   Pain with thumb abduction - -   Capillary refill - -   No evidence of extensor tendon rupture  Pain with active wrist and finger extension  Flat affect    TIME OUT:   Chart reviewed for the following:   I, Rosalba Wells MD, have reviewed the History, Physical and updated the Allergic reactions for [address] performed immediately prior to start of procedure:  Tracey Valenzuela MD, have performed the following reviews on [address] prior to the start of the procedure:  * Patient was identified by name and date of birth   * Agreement on procedure being performed was verified  * Risks and Benefits explained to the patient  * Procedure site verified and marked as necessary  * Patient was positioned for comfort  * Consent was obtained     Time: 4:46 PM     Date of procedure: 5/31/2019  Procedure performed by:  Rosalba Wells MD  Ms. Hutchinson tolerated the procedure well with no complications. MRI LEFT WRIST WO CONT 1/22/18  IMPRESSION:   1. Extensive fluid surrounding the fourth extensor compartment tendons, consistent with tenosynovitis. Perhaps minimal tenosynovitis of the second extensor compartment as well. -Marrow edema in the dorsal lip of the distal radius, extending towards styloid, of uncertain etiology. If tenosynovitis is inflammatory or infectious, these findings may be reactive. Also consider contusion in the appropriate setting.   2. Probable degeneration of the TFCC and scapholunate ligament. Mild triscaphe degenerative change. IMPRESSION:      ICD-10-CM ICD-9-CM    1. Extensor tenosynovitis of left wrist M65.832 727.05 betamethasone (CELESTONE SOLUSPAN) 6 mg/mL injection      BETAMETHASONE ACETATE & SODIUM PHOSPHATE INJECTION 3 MG EA.      FL INJECT TENDON SHEATH/LIGAMENT   2. Extensor tenosynovitis of wrist, right M65.831 727.05 betamethasone (CELESTONE SOLUSPAN) 6 mg/mL injection      BETAMETHASONE ACETATE & SODIUM PHOSPHATE INJECTION 3 MG EA.      FL INJECT TENDON SHEATH/LIGAMENT   3. Fibromyalgia M79.7 729.1      PLAN: She will follow up with Dr. Alberto Dobbins for her fibromyalgia. After discussing treatment options, patient's extensor tendon sheaths were injected with 1/2 cc Marcaine and 1/2 cc Celestone. She will follow up as needed. There is no need for further surgery at this time. She will continue to follow up with pain management.      Scribed by Messi Almaraz [address]) as dictated by Dario Sanchez MD

## 2019-06-03 ENCOUNTER — ANESTHESIA (OUTPATIENT)
Dept: ENDOSCOPY | Age: 64
End: 2019-06-03
Payer: MEDICARE

## 2019-06-03 ENCOUNTER — HOSPITAL ENCOUNTER (OUTPATIENT)
Age: 64
Setting detail: OUTPATIENT SURGERY
Discharge: HOME | End: 2019-06-03
Attending: INTERNAL MEDICINE | Admitting: INTERNAL MEDICINE
Payer: MEDICARE

## 2019-06-03 ENCOUNTER — TELEPHONE (OUTPATIENT)
Dept: CARDIOLOGY CLINIC | Age: 64
End: 2019-06-03

## 2019-06-03 VITALS
HEIGHT: 64 IN | WEIGHT: 175 LBS | DIASTOLIC BLOOD PRESSURE: 84 MMHG | TEMPERATURE: 98 F | OXYGEN SATURATION: 100 % | HEART RATE: 90 BPM | RESPIRATION RATE: 16 BRPM | SYSTOLIC BLOOD PRESSURE: 128 MMHG | BODY MASS INDEX: 29.88 KG/M2

## 2019-06-03 PROCEDURE — 74011250636 HC RX REV CODE- 250/636: Performed by: NURSE ANESTHETIST, CERTIFIED REGISTERED

## 2019-06-03 PROCEDURE — 76060000031 HC ANESTHESIA FIRST 0.5 HR: Performed by: INTERNAL MEDICINE

## 2019-06-03 PROCEDURE — 76040000019: Performed by: INTERNAL MEDICINE

## 2019-06-03 RX ORDER — FLUMAZENIL 0.1 MG/ML
0.2 INJECTION INTRAVENOUS
Status: CANCELLED | OUTPATIENT
Start: 2019-06-03 | End: 2019-06-03

## 2019-06-03 RX ORDER — SODIUM CHLORIDE 0.9 % (FLUSH) 0.9 %
5-40 SYRINGE (ML) INJECTION AS NEEDED
Status: CANCELLED | OUTPATIENT
Start: 2019-06-03

## 2019-06-03 RX ORDER — ATROPINE SULFATE 0.1 MG/ML
0.5 INJECTION INTRAVENOUS
Status: CANCELLED | OUTPATIENT
Start: 2019-06-03 | End: 2019-06-04

## 2019-06-03 RX ORDER — LIDOCAINE HYDROCHLORIDE 20 MG/ML
INJECTION, SOLUTION EPIDURAL; INFILTRATION; INTRACAUDAL; PERINEURAL AS NEEDED
Status: DISCONTINUED | OUTPATIENT
Start: 2019-06-03 | End: 2019-06-03 | Stop reason: HOSPADM

## 2019-06-03 RX ORDER — EPINEPHRINE 0.1 MG/ML
1 INJECTION INTRACARDIAC; INTRAVENOUS
Status: CANCELLED | OUTPATIENT
Start: 2019-06-03 | End: 2019-06-04

## 2019-06-03 RX ORDER — DEXTROMETHORPHAN/PSEUDOEPHED 2.5-7.5/.8
1.2 DROPS ORAL
Status: CANCELLED | OUTPATIENT
Start: 2019-06-03

## 2019-06-03 RX ORDER — SODIUM CHLORIDE 0.9 % (FLUSH) 0.9 %
5-40 SYRINGE (ML) INJECTION EVERY 8 HOURS
Status: CANCELLED | OUTPATIENT
Start: 2019-06-03

## 2019-06-03 RX ORDER — NALOXONE HYDROCHLORIDE 0.4 MG/ML
0.4 INJECTION, SOLUTION INTRAMUSCULAR; INTRAVENOUS; SUBCUTANEOUS
Status: CANCELLED | OUTPATIENT
Start: 2019-06-03 | End: 2019-06-03

## 2019-06-03 RX ORDER — SODIUM CHLORIDE, SODIUM LACTATE, POTASSIUM CHLORIDE, CALCIUM CHLORIDE 600; 310; 30; 20 MG/100ML; MG/100ML; MG/100ML; MG/100ML
75 INJECTION, SOLUTION INTRAVENOUS CONTINUOUS
Status: DISCONTINUED | OUTPATIENT
Start: 2019-06-04 | End: 2019-06-03 | Stop reason: HOSPADM

## 2019-06-03 RX ORDER — PROPOFOL 10 MG/ML
INJECTION, EMULSION INTRAVENOUS AS NEEDED
Status: DISCONTINUED | OUTPATIENT
Start: 2019-06-03 | End: 2019-06-03 | Stop reason: HOSPADM

## 2019-06-03 RX ADMIN — SODIUM CHLORIDE, SODIUM LACTATE, POTASSIUM CHLORIDE, AND CALCIUM CHLORIDE 75 ML/HR: 600; 310; 30; 20 INJECTION, SOLUTION INTRAVENOUS at 12:31

## 2019-06-03 RX ADMIN — PROPOFOL 60 MG: 10 INJECTION, EMULSION INTRAVENOUS at 13:04

## 2019-06-03 RX ADMIN — FAMOTIDINE 20 MG: 10 INJECTION INTRAVENOUS at 12:31

## 2019-06-03 RX ADMIN — SODIUM CHLORIDE, SODIUM LACTATE, POTASSIUM CHLORIDE, AND CALCIUM CHLORIDE: 600; 310; 30; 20 INJECTION, SOLUTION INTRAVENOUS at 12:48

## 2019-06-03 RX ADMIN — LIDOCAINE HYDROCHLORIDE 100 MG: 20 INJECTION, SOLUTION EPIDURAL; INFILTRATION; INTRACAUDAL; PERINEURAL at 13:04

## 2019-06-03 NOTE — DISCHARGE INSTRUCTIONS
DISCHARGE SUMMARY from Nurse     POST-PROCEDURE INSTRUCTIONS:    Call your Physician if you:  ? Observe any excess bleeding. ? Develop a temperature over 100.5o F.  ? Experience abdominal, shoulder or chest pain. ? Notice any signs of decreased circulation or nerve impairment to an extremity such as a change in color, persistent numbness, tingling, coldness or increase in pain. ? Vomit blood or you have nausea and vomiting lasting longer than 4 hours. ? Are unable to take medications. ? Are unable to urinate within 8 hours after discharge following general anesthesia or intravenous sedation. For the next 24 hours after receiving general anesthesia or intravenous sedation, or while taking prescription Narcotics, limit your activities:  ? Do NOT drive a motor vehicle, operate hazard machinery or power tools, or perform tasks that require coordination. The medication you received during your procedure may have some effect on your mental awareness. ? Do NOT make important personal or business decisions. The medication you received during your procedure may have some effect on your mental awareness. ? Do NOT drink alcoholic beverages. These drinks do not mix well with the medications that have been given to you. ? Upon discharge from the hospital, you must be accompanied by a responsible adult. ? Resume your diet as directed by your physician. ? Resume medications as your physician has prescribed. ? Please give a list of your current medications to your Primary Care Provider. ? Please update this list whenever your medications are discontinued, doses are changed, or new medications (including over-the-counter products) are added. ? Please carry medication information at all times in case of emergency situations. These are general instructions for a healthy lifestyle:    No smoking/ No tobacco products/ Avoid exposure to second hand smoke.    Surgeon General's Warning:  Quitting smoking now greatly reduces serious risk to your health. Obesity, smoking, and a sedentary lifestyle greatly increase your risk for illness.  A healthy diet, regular physical exercise & weight monitoring are important for maintaining a healthy lifestyle   You may be retaining fluid if you have a history of heart failure or if you experience any of the following symptoms:  Weight gain of 3 pounds or more overnight or 5 pounds in a week, increased swelling in our hands or feet or shortness of breath while lying flat in bed. Please call your doctor as soon as you notice any of these symptoms; do not wait until your next office visit. Recognize signs and symptoms of STROKE:  F  -  Face looks uneven  A  -  Arms unable to move or move unevenly  S  -  Speech slurred or non-existent  T  -  Time to call 911 - as soon as signs and symptoms begin - DO NOT go back to bed or wait to see If you get better - TIME IS BRAIN. Colorectal Screening   Colorectal cancer almost always develops from precancerous polyps (abnormal growths) in the colon or rectum. Screening tests can find precancerous polyps, so that they can be removed before they turn into cancer. Screening tests can also find colorectal cancer early, when treatment works best.  [facility] [place] Speak with your physician about when you should begin screening and how often you should be tested. Upper GI Endoscopy: What to Expect at [address] [place] [address]  After you have an endoscopy, you will stay at the hospital or clinic for 1 to 2 hours. This will allow the medicine to wear off. You will be able to go home after your doctor or nurse checks to make sure you are not having any problems. You may have to stay overnight if you had treatment during the test. You may have a sore throat for a day or two after the test.  This care sheet gives you a general idea about what to expect after the test.  How can you care for yourself at home?   Activity  · Rest as much as you need to after you go home.  · You should be able to go back to your usual activities the day after the test.  Diet  · Follow your doctor's directions for eating after the test.  · Drink plenty of fluids (unless your doctor has told you not to). Medications  · If you have a sore throat the day after the test, use an over-the-counter spray to numb your throat. Follow-up care is a key part of your treatment and safety. Be sure to make and go to all appointments, and call your doctor if you are having problems. It's also a good idea to know your test results and keep a list of the medicines you take. When should you call for help? Call 911 anytime you think you may need emergency care. For example, call if:  · You passed out (lost consciousness). · You cough up blood. · You vomit blood or what looks like coffee grounds. · You pass maroon or very bloody stools. Call your doctor now or seek immediate medical care if:  · You have trouble swallowing. · You have belly pain. · Your stools are black and tarlike or have streaks of blood. · You are sick to your stomach or cannot keep fluids down. Watch closely for changes in your health, and be sure to contact your doctor if:  · Your throat still hurts after a day or two. · You do not get better as expected. Where can you learn more? Go to Webcom.be  Enter J454 in the search box to learn more about \"Upper GI Endoscopy: What to Expect at Home. \"   © 1873-6734 [employer]. Care instructions adapted under license by [facility] (which disclaims liability or warranty for this information). This care instruction is for use with your licensed healthcare professional. If you have questions about a medical condition or this instruction, always ask your healthcare professional. [place] [address] any warranty or liability for your use of this information.   Content Version: 63.9.272929; Current as of: November 14, 2014        Patient Education        Hiatal Hernia: Care Instructions  Your Care Instructions  A hiatal hernia occurs when part of the stomach bulges into the chest cavity. A hiatal hernia may allow stomach acid and juices to back up into the esophagus (acid reflux). This can cause a feeling of burning, warmth, heat, or pain behind the breastbone. This feeling may often occur after you eat, soon after you lie down, or when you bend forward, and it may come and go. You also may have a sour taste in your mouth. These symptoms are commonly known as heartburn or reflux. But not all hiatal hernias cause symptoms. Follow-up care is a key part of your treatment and safety. Be sure to make and go to all appointments, and call your doctor if you are having problems. It's also a good idea to know your test results and keep a list of the medicines you take. How can you care for yourself at home? · Take your medicines exactly as prescribed. Call your doctor if you think you are having a problem with your medicine. · Do not take aspirin or other nonsteroidal anti-inflammatory drugs (NSAIDs), such as ibuprofen (Advil, Motrin) or naproxen (Aleve), unless your doctor says it is okay. Ask your doctor what you can take for pain. · Your doctor may recommend over-the-counter medicine. For mild or occasional indigestion, antacids such as Tums, Gaviscon, Maalox, or Mylanta may help. Your doctor also may recommend over-the-counter acid reducers, such as famotidine (Pepcid AC), cimetidine (Tagamet HB), ranitidine (Zantac 75 and Zantac 150), or omeprazole (Prilosec). Read and follow all instructions on the label. If you use these medicines often, talk with your doctor. · Change your eating habits. ? It's best to eat several small meals instead of two or three large meals. ? After you eat, wait 2 to 3 hours before you lie down. Late-night snacks aren't a good idea. ? Chocolate, mint, and alcohol can make heartburn worse.  They relax the valve between the esophagus and the stomach. ? Spicy foods, foods that have a lot of acid (like tomatoes and oranges), and coffee can make heartburn symptoms worse in some people. If your symptoms are worse after you eat a certain food, you may want to stop eating that food to see if your symptoms get better. · Do not smoke or chew tobacco.  · If you get heartburn at night, raise the head of your bed 6 to 8 inches by putting the frame on blocks or placing a foam wedge under the head of your mattress. (Adding extra pillows does not work.)  · Do not wear tight clothing around your middle. · Lose weight if you need to. Losing just 5 to 10 pounds can help. When should you call for help? Call your doctor now or seek immediate medical care if:    · You have new or worse belly pain.     · You are vomiting.    Watch closely for changes in your health, and be sure to contact your doctor if:    · You have new or worse symptoms of indigestion.     · You have trouble or pain swallowing.     · You are losing weight.     · You do not get better as expected. Where can you learn more? Go to http://diana-lashonda.info/. Enter G667 in the search box to learn more about \"Hiatal Hernia: Care Instructions. \"  Current as of: March 27, 2018  Content Version: 11.9  © 5887-8868 [employer]. Care instructions adapted under license by [employer] (which disclaims liability or warranty for this information). If you have questions about a medical condition or this instruction, always ask your healthcare professional. [place] [address] any warranty or liability for your use of this information.

## 2019-06-03 NOTE — H&P
History and Physical reviewed; I have examined the patient and there are no pertinent changes. Eugenia Godwin MD, MD   12:55 PM 6/3/2019  [facility], [address] [place] [address]  [email or web address]

## 2019-06-03 NOTE — TELEPHONE ENCOUNTER
----- Message from Nancy Mohs, DO sent at 6/2/2019  1:28 PM EDT -----  I think this is the pt we waited to clear based on these results so she may already have been informed of results? If not, if you could call and her and let her know stress test was normal/ low risk. Thanks    ----- Message -----  From: Salazar AprilDENNIS  Sent: 4/31/1547   8:19 AM  To: Nancy Mohs, DO    Per your last note'  Impression and Plan:  Megan Toscano is a 71 y.o. with:     1.) Atypical but persistent CP, assoc with diaphoresis, nausea  2.) HTN  3.) +FH premature CAD  4.) LE edema     1.) Stress echocardiogram  2.) RTC 4-6 weeks recheck symptoms, discuss results further     I spent significant time with patient discussing her most recent symptoms. While I agree her pain is somewhat reproducible on exam- I cannot entirely explain all of her associated symptoms (nausea and the sweating etc) and how they might be explained by a MSK etiology. She has significant risk factors to warrant an ischemic evaluation. I took the liberty of setting her up for a stress echocardiogram which I think will answer several questions for us.   Further recommendations to follow pending her workup.       Thank you for allowing me to participate in the care of your patient, please do not hesitate to call with questions or concerns.     Kindest Regards,     Nancy Mohs, DO

## 2019-06-03 NOTE — ANESTHESIA PREPROCEDURE EVALUATION
Relevant Problems   No relevant active problems       Anesthetic History               Review of Systems / Medical History  Patient summary reviewed and pertinent labs reviewed    Pulmonary                   Neuro/Psych              Cardiovascular                  Exercise tolerance: >4 METS  Comments: Stress test last week.  Clearded by cardiology   GI/Hepatic/Renal                Endo/Other             Other Findings              Physical Exam    Airway  Mallampati: III  TM Distance: 4 - 6 cm  Neck ROM: normal range of motion   Mouth opening: Normal     Cardiovascular  Regular rate and rhythm,  S1 and S2 normal,  no murmur, click, rub, or gallop  Rhythm: regular  Rate: normal         Dental    Dentition: Lower partial plate and Upper partial plate     Pulmonary  Breath sounds clear to auscultation               Abdominal  GI exam deferred       Other Findings            Anesthetic Plan    ASA: 3  Anesthesia type: MAC          Induction: Intravenous

## 2019-06-03 NOTE — PROCEDURES
[email or web address]  [place], [address]      Brief Procedure Note    Howie Suarez  1955  [id number]    Date of Procedure: 6/3/2019    Preoperative diagnosis: 278.00 - E66.9,  Body mass index 30+ - obesity  796.2 - R03.0,  Elevated blood pressure  530.81 - K21.9,  Gastro-esophageal reflux disease without esophagitis  787.02 - R11.0,  Nausea  786.50 - R07.9,  Chest pain  787.3 - R14.1,  Gas pain    Postoperative diagnosis: duodenal diverticula, hiatal hernia    Type of Anesthesia: MAC (Monitored anesthesia care)    Description of findings: same as post op dx    Procedure: Procedure(s):  UPPER ENDOSCOPY    :  Dr. Tran Bustillos MD    Assistant(s): Endoscopy Technician-1: Jelani Ellington  Endoscopy RN-1: Dee Dee Porras RN; Kenny Sahu    EBL:None    Implants:  None    Specimens: None    Findings: See printed and scanned procedure note    Complications: None    Dr. Tran Bustillos MD  6/3/2019  1:18 PM

## 2019-06-03 NOTE — TELEPHONE ENCOUNTER
Clearance form has already been faxed to Dr. Elizabeth Fraga. Called patient, verified by two identifiers, name and . Patient notified of stress echo results. All questions answered at time of phone call.

## 2019-06-25 ENCOUNTER — TELEPHONE (OUTPATIENT)
Dept: FAMILY MEDICINE CLINIC | Age: 64
End: 2019-06-25

## 2019-06-25 NOTE — TELEPHONE ENCOUNTER
Patient called at 12:43 to Follow up on a referral from 02/22/19 for Rheumatology,Patient states she has not heard from anyone as of yet, Please review and contact patient at [phone] as soon as you can.

## 2019-06-27 NOTE — TELEPHONE ENCOUNTER
I told pt that Elmer Uribe had faxed over her information to [facility] back in March. She said she hadn't heard anything and thought she was going yo the [facility]. I told her that their office called stating they do not take her secondary insurance, which is why Elmer Uribe sent her info to [facility]. I told her that I would refax the information. I also gave Ms. Montesinos the phone number to the office so she can give them a call. Pt expressed clear understanding and had no further questions.

## 2019-07-15 DIAGNOSIS — I10 ESSENTIAL HYPERTENSION: ICD-10-CM

## 2019-07-15 DIAGNOSIS — R73.03 PREDIABETES: Primary | ICD-10-CM

## 2019-07-15 DIAGNOSIS — E78.00 HYPERCHOLESTEROLEMIA: ICD-10-CM

## 2019-07-15 NOTE — TELEPHONE ENCOUNTER
This patient contacted office for the following prescriptions to be filled:    Medication requested   Requested Prescriptions     Pending Prescriptions Disp Refills    atorvastatin (LIPITOR) 40 mg tablet 90 Tab 3     Sig: TAKE ONE TABLET BY MOUTH ONE TIME DAILY       PCP: Dr. Adrian Maloney or Print:Mail order or [phone] [phone] pharmacy [phone]    Scheduled appointment if not seen by current providers in office: LOV 05/20/19 No Upcoming Scheduled.

## 2019-07-18 RX ORDER — ATORVASTATIN CALCIUM 40 MG/1
TABLET, FILM COATED ORAL
Qty: 90 TAB | Refills: 0 | Status: SHIPPED | OUTPATIENT
Start: 2019-07-18 | End: 2019-10-17 | Stop reason: SDUPTHER

## 2019-07-18 NOTE — TELEPHONE ENCOUNTER
Ordered. Plan to be seen in approx 2 months with nonfasting labs prior to follow up on her cholesterol, hypertension and prediabetes. (30)    Separate appointments needed for other concerns, such as her migraine headaches.     ERIKA

## 2019-07-18 NOTE — TELEPHONE ENCOUNTER
Patient called the office today asking about the status of her refill request, she is now completely out of her medication. She has not been officially seen for her Cholesterol, last Lipid panel was done 3/7/2018 by Dr. Angel Araujo. She is asking for a 90 day supply, medication pended, please review and sign if appropriate.

## 2019-07-19 NOTE — TELEPHONE ENCOUNTER
Called patient had her verify her  she has been made aware her medication was sent to her pharmacy on yesterday and told Dr. Sabrina Santos wants to see her in the office in 2 months for follow up with non-fasting labs the week before. Asked if she wanted to set up her appts now she states she will call the office back to schedule.

## 2019-08-05 ENCOUNTER — HOSPITAL ENCOUNTER (OUTPATIENT)
Dept: NUCLEAR MEDICINE | Age: 64
End: 2019-08-05
Attending: NURSE PRACTITIONER

## 2019-08-05 ENCOUNTER — HOSPITAL ENCOUNTER (OUTPATIENT)
Dept: NUCLEAR MEDICINE | Age: 64
Discharge: HOME | End: 2019-08-05
Attending: NURSE PRACTITIONER

## 2019-08-05 DIAGNOSIS — K59.00 CONSTIPATION: ICD-10-CM

## 2019-08-05 DIAGNOSIS — R10.13 EPIGASTRIC PAIN: ICD-10-CM

## 2019-08-05 DIAGNOSIS — K22.4 DYSKINESIA OF ESOPHAGUS: ICD-10-CM

## 2019-08-05 DIAGNOSIS — K21.9 ACID REFLUX: ICD-10-CM

## 2019-08-05 DIAGNOSIS — E66.9 OBESITY WITH BODY MASS INDEX 30 OR GREATER: ICD-10-CM

## 2019-08-05 DIAGNOSIS — R11.0 NAUSEA: ICD-10-CM

## 2019-08-06 ENCOUNTER — APPOINTMENT (OUTPATIENT)
Dept: GENERAL RADIOLOGY | Age: 64
End: 2019-08-06
Attending: EMERGENCY MEDICINE
Payer: MEDICARE

## 2019-08-06 ENCOUNTER — HOSPITAL ENCOUNTER (OUTPATIENT)
Dept: NUCLEAR MEDICINE | Age: 64
Discharge: HOME | End: 2019-08-06
Attending: NURSE PRACTITIONER
Payer: MEDICARE

## 2019-08-06 ENCOUNTER — APPOINTMENT (OUTPATIENT)
Dept: GENERAL RADIOLOGY | Age: 64
End: 2019-08-06
Attending: PHYSICIAN ASSISTANT
Payer: MEDICARE

## 2019-08-06 ENCOUNTER — HOSPITAL ENCOUNTER (EMERGENCY)
Age: 64
Discharge: HOME | End: 2019-08-06
Attending: EMERGENCY MEDICINE
Payer: MEDICARE

## 2019-08-06 VITALS
WEIGHT: 174 LBS | SYSTOLIC BLOOD PRESSURE: 121 MMHG | HEART RATE: 94 BPM | OXYGEN SATURATION: 99 % | RESPIRATION RATE: 16 BRPM | HEIGHT: 64 IN | BODY MASS INDEX: 29.71 KG/M2 | DIASTOLIC BLOOD PRESSURE: 77 MMHG | TEMPERATURE: 97.6 F

## 2019-08-06 DIAGNOSIS — R00.2 PALPITATIONS: ICD-10-CM

## 2019-08-06 DIAGNOSIS — M25.531 WRIST PAIN, ACUTE, RIGHT: Primary | ICD-10-CM

## 2019-08-06 LAB
ALBUMIN SERPL-MCNC: 4 G/DL (ref 3.4–5)
ALBUMIN/GLOB SERPL: 0.9 {RATIO} (ref 0.8–1.7)
ALP SERPL-CCNC: 172 U/L (ref 45–117)
ALT SERPL-CCNC: 32 U/L (ref 13–56)
ANION GAP SERPL CALC-SCNC: 5 MMOL/L (ref 3–18)
AST SERPL-CCNC: 35 U/L (ref 10–38)
ATRIAL RATE: 75 BPM
BASOPHILS # BLD: 0 K/UL (ref 0–0.1)
BASOPHILS NFR BLD: 0 % (ref 0–2)
BILIRUB SERPL-MCNC: 0.3 MG/DL (ref 0.2–1)
BUN SERPL-MCNC: 14 MG/DL (ref 7–18)
BUN/CREAT SERPL: 16 (ref 12–20)
CALCIUM SERPL-MCNC: 9 MG/DL (ref 8.5–10.1)
CALCULATED P AXIS, ECG09: 16 DEGREES
CALCULATED R AXIS, ECG10: -5 DEGREES
CALCULATED T AXIS, ECG11: 1 DEGREES
CHLORIDE SERPL-SCNC: 108 MMOL/L (ref 100–111)
CK MB CFR SERPL CALC: 1.1 % (ref 0–4)
CK MB SERPL-MCNC: 2.5 NG/ML (ref 5–25)
CK SERPL-CCNC: 223 U/L (ref 26–192)
CO2 SERPL-SCNC: 27 MMOL/L (ref 21–32)
CREAT SERPL-MCNC: 0.89 MG/DL (ref 0.6–1.3)
DIAGNOSIS, 93000: NORMAL
DIFFERENTIAL METHOD BLD: ABNORMAL
EOSINOPHIL # BLD: 0.1 K/UL (ref 0–0.4)
EOSINOPHIL NFR BLD: 1 % (ref 0–5)
ERYTHROCYTE [DISTWIDTH] IN BLOOD BY AUTOMATED COUNT: 14.4 % (ref 11.6–14.5)
GLOBULIN SER CALC-MCNC: 4.4 G/DL (ref 2–4)
GLUCOSE SERPL-MCNC: 98 MG/DL (ref 74–99)
HCT VFR BLD AUTO: 38.6 % (ref 35–45)
HGB BLD-MCNC: 12.7 G/DL (ref 12–16)
LYMPHOCYTES # BLD: 4.3 K/UL (ref 0.9–3.6)
LYMPHOCYTES NFR BLD: 59 % (ref 21–52)
MCH RBC QN AUTO: 28.2 PG (ref 24–34)
MCHC RBC AUTO-ENTMCNC: 32.9 G/DL (ref 31–37)
MCV RBC AUTO: 85.8 FL (ref 74–97)
MONOCYTES # BLD: 0.4 K/UL (ref 0.05–1.2)
MONOCYTES NFR BLD: 5 % (ref 3–10)
NEUTS SEG # BLD: 2.5 K/UL (ref 1.8–8)
NEUTS SEG NFR BLD: 35 % (ref 40–73)
P-R INTERVAL, ECG05: 128 MS
PLATELET # BLD AUTO: 306 K/UL (ref 135–420)
PMV BLD AUTO: 9.6 FL (ref 9.2–11.8)
POTASSIUM SERPL-SCNC: 4.2 MMOL/L (ref 3.5–5.5)
PROT SERPL-MCNC: 8.4 G/DL (ref 6.4–8.2)
Q-T INTERVAL, ECG07: 406 MS
QRS DURATION, ECG06: 82 MS
QTC CALCULATION (BEZET), ECG08: 453 MS
RBC # BLD AUTO: 4.5 M/UL (ref 4.2–5.3)
SODIUM SERPL-SCNC: 140 MMOL/L (ref 136–145)
TROPONIN I BLD-MCNC: <0.04 NG/ML (ref 0–0.08)
TROPONIN I SERPL-MCNC: <0.02 NG/ML (ref 0–0.04)
TSH SERPL DL<=0.05 MIU/L-ACNC: 1.43 UIU/ML (ref 0.36–3.74)
VENTRICULAR RATE, ECG03: 75 BPM
WBC # BLD AUTO: 7.4 K/UL (ref 4.6–13.2)

## 2019-08-06 PROCEDURE — 80053 COMPREHEN METABOLIC PANEL: CPT

## 2019-08-06 PROCEDURE — 78264 GASTRIC EMPTYING IMG STUDY: CPT

## 2019-08-06 PROCEDURE — 73110 X-RAY EXAM OF WRIST: CPT

## 2019-08-06 PROCEDURE — 82550 ASSAY OF CK (CPK): CPT

## 2019-08-06 PROCEDURE — 85025 COMPLETE CBC W/AUTO DIFF WBC: CPT

## 2019-08-06 PROCEDURE — 93005 ELECTROCARDIOGRAM TRACING: CPT

## 2019-08-06 PROCEDURE — 74011250636 HC RX REV CODE- 250/636: Performed by: EMERGENCY MEDICINE

## 2019-08-06 PROCEDURE — 71045 X-RAY EXAM CHEST 1 VIEW: CPT

## 2019-08-06 PROCEDURE — 96374 THER/PROPH/DIAG INJ IV PUSH: CPT

## 2019-08-06 PROCEDURE — 84443 ASSAY THYROID STIM HORMONE: CPT

## 2019-08-06 PROCEDURE — 84484 ASSAY OF TROPONIN QUANT: CPT

## 2019-08-06 PROCEDURE — 99284 EMERGENCY DEPT VISIT MOD MDM: CPT

## 2019-08-06 RX ORDER — FENTANYL CITRATE 50 UG/ML
25 INJECTION, SOLUTION INTRAMUSCULAR; INTRAVENOUS
Status: COMPLETED | OUTPATIENT
Start: 2019-08-06 | End: 2019-08-06

## 2019-08-06 RX ADMIN — FENTANYL CITRATE 25 MCG: 50 INJECTION INTRAMUSCULAR; INTRAVENOUS at 13:48

## 2019-08-06 NOTE — ED PROVIDER NOTES
EMERGENCY DEPARTMENT HISTORY AND PHYSICAL EXAM    1:37 PM      Date: 8/6/2019  Patient Name: Martha Velazquez    History of Presenting Illness     Chief Complaint   Patient presents with    Palpitations         History Provided By: Patient      Additional History (Context): Martha Velazquez is a 71 y.o. female with PMHx of fibromyalgia, HTN and chronic pain who presents to the ED with c/o worsening R wrist pain for the past several weeks. Admits her father had rheumatoid arthritis, her PCP suggested she visit a rheumatologist but she has been unable to tolerate the pain until appointment on 9/26. Denies back or neck pain. Denies recent fall, trauma or injury. Also reports palpitations for the past several days, notes similar symptoms in the past for which she was evaluated by cardiologist. Reports stress test was normal. Denies palpitations today. Claims she has been taking Tylenol, Morphine and Oxycodone for arm pain. No other symptoms or concerns were expressed. PCP: Carlos A Cabello MD    Chief Complaint: R wrist pain  Duration:  Several weeks  Timing:  Worsening  Location: R wrist  Quality: Not reported  Severity: Not described  Modifying Factors: No modifying or aggravating factors were reported. Associated Symptoms: denies any other associated signs or symptoms    Current Outpatient Medications   Medication Sig Dispense Refill    atorvastatin (LIPITOR) 40 mg tablet TAKE ONE TABLET BY MOUTH ONE TIME DAILY 90 Tab 0    polyethylene glycol (MIRALAX) 17 gram packet Take 1 Packet by mouth daily. 30 Each 6    losartan (COZAAR) 25 mg tablet TAKE ONE TABLET BY MOUTH ONE TIME DAILY 90 Tab 4    oxyCODONE IR (ROXICODONE) 10 mg tab immediate release tablet       valACYclovir (VALTREX) 500 mg tablet Take 1 Tab by mouth daily. (Patient taking differently: Take  by mouth daily.) 90 Tab 4    DEXILANT 60 mg CpDB capsule (delayed release) Take 120 mg by mouth daily.       fluticasone (FLONASE) 50 mcg/actuation nasal spray 2 Sprays by Both Nostrils route as needed. 3 Bottle 4    morphine CR (MS CONTIN) 60 mg CR tablet Take  by mouth every twelve (12) hours.  diclofenac (VOLTAREN) 1 % gel Apply  to affected area four (4) times daily. 1 g 1    lidocaine (XYLOCAINE) 5 % ointment       cyclobenzaprine (FLEXERIL) 10 mg tablet TAKE 1 TABLET BY MOUTH THREE TIMES DAILY AS NEEDED FOR MUSCLE SPASMS FOR UP TO 30 DAYS. 90 Tab 2    QUEtiapine (SEROQUEL) 200 mg tablet Take 200 mg by mouth daily.  naloxone (NARCAN) 4 mg/actuation nasal spray 1 [place] by IntraNASal route once as needed. Use 1 spray intranasally into 1 nostril. Use a new Narcan nasal spray for subsequent doses and administer into alternating nostrils. May repeat every 2 to 3 minutes as needed.          Past History     Past Medical History:  Past Medical History:   Diagnosis Date    Annular tear of lumbar disc 11/10/2014    Asthma     Bronchitis    Benign tumor of throat     Bone spur     right ankle    Cervicalgia 11/10/2014    Chronic pain     back    Degeneration of lumbar or lumbosacral intervertebral disc 11/10/2014    Degenerative disc disease     Depression     Displacement of lumbar intervertebral disc without myelopathy 11/10/2014    Elevated white blood cell count     Fibromyalgia     GERD (gastroesophageal reflux disease)     Hypertension     Insomnia     Nausea & vomiting     Pain in joint, multiple sites 11/10/2014    Postlaminectomy syndrome, cervical region 11/10/2014    Sinus infection 10/5/15    Ulcer        Past Surgical History:  Past Surgical History:   Procedure Laterality Date    COLONOSCOPY N/A 4/13/2017    COLONOSCOPY performed by Dirk Hercules MD at [facility]    HX CATARACT REMOVAL      HX CERVICAL FUSION      HX HYSTERECTOMY      HX ORTHOPAEDIC      ganglion cyst removed, right hand    HX OTHER SURGICAL      cyst left thigh removed    HX WRIST FRACTURE [id number] N/A 02/27/2017     Nasir       Family History:  Family History   Problem Relation Age of Onset    Hypertension Other     Heart Attack Other     Heart Disease Other     Stroke Other     Headache Other     Seizures Other     Arthritis Father     Migraines Granddaughter     Migraines Daughter        Social History:  Social History     Tobacco Use    Smoking status: Former Smoker     Last attempt to quit: 3/6/1993     Years since quittin.4    Smokeless tobacco: Never Used   Substance Use Topics    Alcohol use: No     Comment: none in 24 years    Drug use: No     Comment: last smoked in -cocaine       Allergies: Allergies   Allergen Reactions    Aspirin Other (comments)     Stomach bleeding    Ativan [Lorazepam] Shortness of Breath    Bactrim [Sulfamethoprim] Rash    Keflex [Cephalexin] Rash    Macrobid [Nitrofurantoin Monohyd/M-Cryst] Nausea and Vomiting    Nsaids (Non-Steroidal Anti-Inflammatory Drug) Nausea and Vomiting    Opana [Oxymorphone] Other (comments)     Insomnia, weight loss, nightmares    Pcn [Penicillins] Hives         Review of Systems       Review of Systems   Constitutional: Negative for activity change, fatigue and fever. HENT: Negative for congestion and rhinorrhea. Eyes: Negative for visual disturbance. Respiratory: Negative for shortness of breath. Cardiovascular: Negative for chest pain and palpitations. Gastrointestinal: Negative for abdominal pain, diarrhea, nausea and vomiting. Genitourinary: Negative for dysuria and hematuria. Musculoskeletal: Positive for arthralgias (R wrist). Negative for back pain. Skin: Negative for rash. Neurological: Negative for dizziness, weakness and light-headedness. All other systems reviewed and are negative.         Physical Exam     Visit Vitals  /77   Pulse 94   Temp 97.6 °F (36.4 °C)   Resp 16   Ht 5' 4\" (1.626 m)   Wt 78.9 kg (174 lb)   SpO2 99%   BMI 29.87 kg/m²         Physical Exam   Constitutional: She is oriented to person, place, and time. She appears well-developed and well-nourished. No distress. HENT:   Head: Normocephalic and atraumatic. Right Ear: External ear normal.   Left Ear: External ear normal.   Nose: Nose normal.   Mouth/Throat: Oropharynx is clear and moist.   Eyes: Pupils are equal, round, and reactive to light. Conjunctivae and EOM are normal.   Neck: Normal range of motion. Neck supple. No tracheal deviation present. Cardiovascular: Normal rate, regular rhythm and intact distal pulses. Pulmonary/Chest: Effort normal and breath sounds normal. She exhibits no tenderness. Abdominal: Soft. Bowel sounds are normal. She exhibits no distension. There is no tenderness. There is no rebound and no guarding. Musculoskeletal: Normal range of motion. She exhibits tenderness. She exhibits no edema. Tenderness to palpation over right wrist, radial aspect. Decreased range of motion due to pain. Decreased  strength of right hand. Otherwise full range of motion. Pulses intact throughout. Neurological: She is alert and oriented to person, place, and time. No cranial nerve deficit. Coordination normal.   Skin: Skin is warm and dry. Psychiatric: She has a normal mood and affect. Her behavior is normal. Judgment and thought content normal.   Nursing note and vitals reviewed.         Diagnostic Study Results     Labs -  Recent Results (from the past 12 hour(s))   EKG, 12 LEAD, INITIAL    Collection Time: 08/06/19 12:36 PM   Result Value Ref Range    Ventricular Rate 75 BPM    Atrial Rate 75 BPM    P-R Interval 128 ms    QRS Duration 82 ms    Q-T Interval 406 ms    QTC Calculation (Bezet) 453 ms    Calculated P Axis 16 degrees    Calculated R Axis -5 degrees    Calculated T Axis 1 degrees    Diagnosis       Normal sinus rhythm  Normal ECG  When compared with ECG of 19-APR-2019 15:39,  No significant change was found  Confirmed by Lawrence Chapman MD, [place] ([id number]) on 8/6/2019 2:20:49 PM     CBC WITH AUTOMATED DIFF Collection Time: 08/06/19 12:55 PM   Result Value Ref Range    WBC 7.4 4.6 - 13.2 K/uL    RBC 4.50 4. 20 - 5.30 M/uL    HGB 12.7 12.0 - 16.0 g/dL    HCT 38.6 35.0 - 45.0 %    MCV 85.8 74.0 - 97.0 FL    MCH 28.2 24.0 - 34.0 PG    MCHC 32.9 31.0 - 37.0 g/dL    RDW 14.4 11.6 - 14.5 %    PLATELET 294 673 - 938 K/uL    MPV 9.6 9.2 - 11.8 FL    NEUTROPHILS 35 (L) 40 - 73 %    LYMPHOCYTES 59 (H) 21 - 52 %    MONOCYTES 5 3 - 10 %    EOSINOPHILS 1 0 - 5 %    BASOPHILS 0 0 - 2 %    ABS. NEUTROPHILS 2.5 1.8 - 8.0 K/UL    ABS. LYMPHOCYTES 4.3 (H) 0.9 - 3.6 K/UL    ABS. MONOCYTES 0.4 0.05 - 1.2 K/UL    ABS. EOSINOPHILS 0.1 0.0 - 0.4 K/UL    ABS. BASOPHILS 0.0 0.0 - 0.1 K/UL    DF AUTOMATED     METABOLIC PANEL, COMPREHENSIVE    Collection Time: 08/06/19 12:55 PM   Result Value Ref Range    Sodium 140 136 - 145 mmol/L    Potassium 4.2 3.5 - 5.5 mmol/L    Chloride 108 100 - 111 mmol/L    CO2 27 21 - 32 mmol/L    Anion gap 5 3.0 - 18 mmol/L    Glucose 98 74 - 99 mg/dL    BUN 14 7.0 - 18 MG/DL    Creatinine 0.89 0.6 - 1.3 MG/DL    BUN/Creatinine ratio 16 12 - 20      GFR est AA >60 >60 ml/min/1.73m2    GFR est non-AA >60 >60 ml/min/1.73m2    Calcium 9.0 8.5 - 10.1 MG/DL    Bilirubin, total 0.3 0.2 - 1.0 MG/DL    ALT (SGPT) 32 13 - 56 U/L    AST (SGOT) 35 10 - 38 U/L    Alk.  phosphatase 172 (H) 45 - 117 U/L    Protein, total 8.4 (H) 6.4 - 8.2 g/dL    Albumin 4.0 3.4 - 5.0 g/dL    Globulin 4.4 (H) 2.0 - 4.0 g/dL    A-G Ratio 0.9 0.8 - 1.7     CARDIAC PANEL,(CK, CKMB & TROPONIN)    Collection Time: 08/06/19 12:55 PM   Result Value Ref Range     (H) 26 - 192 U/L    CK - MB 2.5 <3.6 ng/ml    CK-MB Index 1.1 0.0 - 4.0 %    Troponin-I, QT <0.02 0.0 - 0.045 NG/ML   TSH 3RD GENERATION    Collection Time: 08/06/19 12:55 PM   Result Value Ref Range    TSH 1.43 0.36 - 3.74 uIU/mL   POC TROPONIN-I    Collection Time: 08/06/19  3:13 PM   Result Value Ref Range    Troponin-I (POC) <0.04 0.00 - 0.08 ng/mL       Radiologic Studies -   XR WRIST RT AP/LAT/OBL MIN 3V   Final Result   IMPRESSION:   1. Mild wrist osteoarthritis. 2.  No evidence of acute fracture or dislocation. XR CHEST SNGL V   Final Result   Impression:   1. No acute infiltrate or effusion. 2.  Moderate hiatal hernia. Medical Decision Making     It should be noted that ILizett DO will be the provider of record for this patient. I reviewed the vital signs, available nursing notes, past medical history, past surgical history, family history and social history. Vital Signs-Reviewed the patient's vital signs. Pulse Oximetry Analysis -  99% on room air, stable. Cardiac Monitor:  Rate: 100  Rhythm:  Normal Sinus Rhythm     EKG: Interpreted by the EP. No acute changes    Records Reviewed: Nursing Notes and Old Medical Records (Time of Review: 1:37 PM)    ED Course: Progress Notes, Reevaluation, and Consults:  3:42 PM  Repeat trop neg      Provider Notes (Medical Decision Making):   Patient is a 67-year-old female who comes in with wrist pain. Patient has narcotic pain medication at home. She has seen Dr. Lauren Solomon from orthopedic surgery for her wrist before and has gotten injections in both of them. States that the left wrist was better and the right wrist did not get better. She is not able to take NSAIDs or steroids due to problems with her stomach according to her gastroenterologist per patient. She is supposed to follow-up with a rheumatologist at the end of September for further work-up of rheumatoid arthritis or other rheumatologic condition. Patient is on morphine and oxycodone at home. I am unable to give her steroids today for the above-mentioned reason. No acute findings on x-ray. No evidence for acute neurologic event. Patient is to follow-up with her doctors as scheduled. Return if any further concerns. Of note, patient also had intermittent palpitations. Troponin negative x2.     Return if any worsening or concerning symptoms. Understands agrees with plan. Stable for discharge. Diagnosis     Clinical Impression:   1. Wrist pain, acute, right    2. Palpitations        Disposition: D/C    Follow-up Information     Follow up With Specialties Details Why Contact Info    Esthela Pedro MD Family Practice Schedule an appointment as soon as possible for a visit  [address] [facility] [phone]  [address] [facility],Third Floor [address] [facility]      Taqueria Tang MD Orthopedic Surgery Schedule an appointment as soon as possible for a visit  [address] [facility] [address]             Patient's Medications   Start Taking    No medications on file   Continue Taking    ATORVASTATIN (LIPITOR) 40 MG TABLET    TAKE ONE TABLET BY MOUTH ONE TIME DAILY    CYCLOBENZAPRINE (FLEXERIL) 10 MG TABLET    TAKE 1 TABLET BY MOUTH THREE TIMES DAILY AS NEEDED FOR MUSCLE SPASMS FOR UP TO 30 DAYS. DEXILANT 60 MG CPDB CAPSULE (DELAYED RELEASE)    Take 120 mg by mouth daily. DICLOFENAC (VOLTAREN) 1 % GEL    Apply  to affected area four (4) times daily. FLUTICASONE (FLONASE) 50 MCG/ACTUATION NASAL SPRAY    2 Sprays by Both Nostrils route as needed. LIDOCAINE (XYLOCAINE) 5 % OINTMENT        LOSARTAN (COZAAR) 25 MG TABLET    TAKE ONE TABLET BY MOUTH ONE TIME DAILY    MORPHINE CR (MS CONTIN) 60 MG CR TABLET    Take  by mouth every twelve (12) hours. NALOXONE (NARCAN) 4 MG/ACTUATION NASAL SPRAY    1 [place] by IntraNASal route once as needed. Use 1 spray intranasally into 1 nostril. Use a new Narcan nasal spray for subsequent doses and administer into alternating nostrils. May repeat every 2 to 3 minutes as needed. OXYCODONE IR (ROXICODONE) 10 MG TAB IMMEDIATE RELEASE TABLET        POLYETHYLENE GLYCOL (MIRALAX) 17 GRAM PACKET    Take 1 Packet by mouth daily. QUETIAPINE (SEROQUEL) 200 MG TABLET    Take 200 mg by mouth daily. VALACYCLOVIR (VALTREX) 500 MG TABLET    Take 1 Tab by mouth daily. These Medications have changed    No medications on file   Stop Taking    No medications on file     _______________________________       [address] acting as a scribe for and in the presence of Kat Bales DO      August 06, 2019 at 1:37 PM       Provider Attestation:      I personally performed the services described in the documentation, reviewed the documentation, as recorded by the scribe in my presence, and it accurately and completely records my words and actions.  August 06, 2019 at 1:37 PM - Kat BURTON DO        _______________________________

## 2019-08-19 ENCOUNTER — TELEPHONE (OUTPATIENT)
Dept: FAMILY MEDICINE CLINIC | Age: 64
End: 2019-08-19

## 2019-08-19 NOTE — TELEPHONE ENCOUNTER
Patient called the office today for an appointment, call was transferred to the back for triage patient states she has had left kidney pain that began on Saturday. She denies any fever no nausea no vomiting, she denies any blood in her urine no painful urination. She did says she has noticed urinary frequency. Patient has been scheduled for Thursday, told her I will look for a sooner appt for her she stated she wanted to come in on Friday when she knows she will have a ride, but will just keep the appt scheduled for Thursday. She has been told if her symptoms get worse or if she develop any new onset of the above symptoms she has expressed understanding and agrees with this plan.

## 2019-09-03 PROBLEM — K31.84 GASTROPARESIS: Status: ACTIVE | Noted: 2019-09-03

## 2019-09-09 ENCOUNTER — TELEPHONE (OUTPATIENT)
Dept: FAMILY MEDICINE CLINIC | Age: 64
End: 2019-09-09

## 2019-09-09 NOTE — TELEPHONE ENCOUNTER
Received a call from [address] [place] [address]. They are inquiring about the supplies they sent a request for, about 2wks ago.

## 2019-09-23 ENCOUNTER — TELEPHONE (OUTPATIENT)
Dept: FAMILY MEDICINE CLINIC | Age: 64
End: 2019-09-23

## 2019-09-23 NOTE — TELEPHONE ENCOUNTER
Please call patient to try to get her scheduled, Im showing no availability until Friday and she has an appointment on that day at another office. Please call patient as soon as possible at [phone]

## 2019-09-30 DIAGNOSIS — E78.00 HYPERCHOLESTEROLEMIA: ICD-10-CM

## 2019-10-01 RX ORDER — ATORVASTATIN CALCIUM 40 MG/1
TABLET, FILM COATED ORAL
Qty: 90 TAB | Refills: 0 | OUTPATIENT
Start: 2019-10-01

## 2019-10-17 ENCOUNTER — TELEPHONE (OUTPATIENT)
Dept: FAMILY MEDICINE CLINIC | Age: 64
End: 2019-10-17

## 2019-10-17 ENCOUNTER — OFFICE VISIT (OUTPATIENT)
Dept: FAMILY MEDICINE CLINIC | Age: 64
End: 2019-10-17

## 2019-10-17 VITALS
TEMPERATURE: 98.6 F | SYSTOLIC BLOOD PRESSURE: 118 MMHG | BODY MASS INDEX: 29.26 KG/M2 | WEIGHT: 171.4 LBS | HEIGHT: 64 IN | DIASTOLIC BLOOD PRESSURE: 76 MMHG | RESPIRATION RATE: 14 BRPM | HEART RATE: 110 BPM

## 2019-10-17 DIAGNOSIS — K13.79 RECURRENT ORAL ULCERS: ICD-10-CM

## 2019-10-17 DIAGNOSIS — F31.81 BIPOLAR 2 DISORDER (HCC): Primary | ICD-10-CM

## 2019-10-17 DIAGNOSIS — J30.1 ALLERGIC RHINITIS DUE TO POLLEN, UNSPECIFIED SEASONALITY: ICD-10-CM

## 2019-10-17 DIAGNOSIS — Z79.899 MEDICATION MANAGEMENT: ICD-10-CM

## 2019-10-17 DIAGNOSIS — K59.03 THERAPEUTIC OPIOID INDUCED CONSTIPATION: ICD-10-CM

## 2019-10-17 DIAGNOSIS — T40.2X5A THERAPEUTIC OPIOID INDUCED CONSTIPATION: ICD-10-CM

## 2019-10-17 DIAGNOSIS — Z23 ENCOUNTER FOR IMMUNIZATION: ICD-10-CM

## 2019-10-17 DIAGNOSIS — K02.9 DENTAL CARIES: ICD-10-CM

## 2019-10-17 DIAGNOSIS — E78.00 HYPERCHOLESTEROLEMIA: Primary | ICD-10-CM

## 2019-10-17 DIAGNOSIS — I10 BENIGN ESSENTIAL HYPERTENSION WITH TARGET BLOOD PRESSURE BELOW 140/90: ICD-10-CM

## 2019-10-17 DIAGNOSIS — Z29.9 PREVENTIVE MEASURE: ICD-10-CM

## 2019-10-17 DIAGNOSIS — R32 URINARY INCONTINENCE, UNSPECIFIED TYPE: ICD-10-CM

## 2019-10-17 DIAGNOSIS — E55.9 VITAMIN D DEFICIENCY: ICD-10-CM

## 2019-10-17 RX ORDER — ZOLPIDEM TARTRATE 10 MG/1
TABLET ORAL
COMMUNITY
Start: 2019-10-14 | End: 2020-01-17 | Stop reason: ALTCHOICE

## 2019-10-17 RX ORDER — CHLORHEXIDINE GLUCONATE 20 %
SOLUTION, NON-ORAL MISCELLANEOUS
Qty: 500 ML | Refills: 1 | Status: SHIPPED | OUTPATIENT
Start: 2019-10-17 | End: 2020-01-17 | Stop reason: ALTCHOICE

## 2019-10-17 RX ORDER — FLUTICASONE PROPIONATE 50 MCG
2 SPRAY, SUSPENSION (ML) NASAL AS NEEDED
Qty: 3 BOTTLE | Refills: 4 | Status: SHIPPED | OUTPATIENT
Start: 2019-10-17 | End: 2020-07-14 | Stop reason: SDUPTHER

## 2019-10-17 RX ORDER — CHOLECALCIFEROL (VITAMIN D3) 125 MCG
2000 CAPSULE ORAL DAILY
COMMUNITY

## 2019-10-17 RX ORDER — ATORVASTATIN CALCIUM 40 MG/1
TABLET, FILM COATED ORAL
Qty: 90 TAB | Refills: 4 | Status: SHIPPED | OUTPATIENT
Start: 2019-10-17 | End: 2020-06-17 | Stop reason: SDUPTHER

## 2019-10-17 NOTE — PROGRESS NOTES
Derrell Mendez is a 71 y.o. female who was seen in clinic today (10/17/2019). Assessment & Plan:       ICD-10-CM ICD-9-CM    1. Hypercholesterolemia E78.00 272.0 atorvastatin (LIPITOR) 40 mg tablet   2. Allergic rhinitis due to pollen, unspecified seasonality J30.1 477.0 fluticasone propionate (FLONASE) 50 mcg/actuation nasal spray   3. Urinary incontinence, unspecified type R32 788.30 REFERRAL TO UROLOGY   4. Dental caries K02.9 521.00 chlorhexidine gluconate, bulk, (HIBICLENS) 20 % soln liquid   5. Encounter for immunization Z23 V03.89 INFLUENZA VACCINE INACTIVATED (IIV), SUBUNIT, ADJUVANTED, IM   6. Recurrent oral ulcers K13.79 528.9 chlorhexidine gluconate, bulk, (HIBICLENS) 20 % soln liquid       Hyperlipidemia: Status? Continue present management pending review of labs previously ordered being drawn today  Prediabetes: Status? AR/allergic sinusitis: Well controlled, continue present management    incontinence: explained important to determine and treat underlying cause, not simply accommodate with supplies. As she's had other adverse med reactions (dry mouth, tachycardia) I would not be surprised if she were found to have overflow incontinence    Encouraged to find new dentist    Will also make Dr. Arlin Funk aware in case her dry mouth/lesions are part of a rheum process      Follow-up and Dispositions    · Return in about 3 months (around 1/17/2020) for chronic medical conditions, lab plan to follow, subject to change based on results. Subjective:   Derrell Mendez was seen today for Incontinence and Mouth Laceration    Requesting incontinence supplies: incontinent of urine x > 1 year. No workup.     Follow-up hyperlipidemia on statin  Cardiac eval earlier this year with Dr. Ceasar Reyes including stress echo - reassuring  Follow up preDM    No recent labs available    Lab Results   Component Value Date/Time    Cholesterol, total 185 03/07/2018 02:41 PM    HDL Cholesterol 64 (H) 03/07/2018 02:41 PM    LDL, calculated 89.8 03/07/2018 02:41 PM    VLDL, calculated 31.2 03/07/2018 02:41 PM    Triglyceride 156 (H) 03/07/2018 02:41 PM    CHOL/HDL Ratio 2.9 03/07/2018 02:41 PM       Follow-up allergic rhinitis/sinusitis: Well-controlled on nasal steroids    Had previously been prescribed a mouthwash for oral sores by dentist, Dr. Vanessa Shelton who has since left the area or closed practice. Present off and on x 1 year, returned approx 3 weeks ago. \"My dentures make my mouth hurt\" Can't afford new ones    Undergoing arthritis workup. Labs and films pending with visit Dr. Floyd Monroe scheduled mid November. Outpatient Medications Marked as Taking for the 10/17/19 encounter (Office Visit) with Anand Wang MD   Medication Sig Dispense Refill    cholecalciferol, vitamin D3, 2,000 unit tab Take  by mouth.  zolpidem (AMBIEN) 10 mg tablet       cloNIDine HCl (CATAPRES) 0.1 mg tablet       atorvastatin (LIPITOR) 40 mg tablet TAKE ONE TABLET BY MOUTH ONE TIME DAILY 90 Tab 0    polyethylene glycol (MIRALAX) 17 gram packet Take 1 Packet by mouth daily. 30 Each 6    losartan (COZAAR) 25 mg tablet TAKE ONE TABLET BY MOUTH ONE TIME DAILY 90 Tab 4    oxyCODONE IR (ROXICODONE) 10 mg tab immediate release tablet       valACYclovir (VALTREX) 500 mg tablet Take 1 Tab by mouth daily. (Patient taking differently: Take  by mouth daily.) 90 Tab 4    DEXILANT 60 mg CpDB capsule (delayed release) Take 120 mg by mouth daily.  fluticasone (FLONASE) 50 mcg/actuation nasal spray 2 Sprays by Both Nostrils route as needed. 3 Bottle 4    morphine CR (MS CONTIN) 60 mg CR tablet Take  by mouth every twelve (12) hours.  diclofenac (VOLTAREN) 1 % gel Apply  to affected area four (4) times daily. 1 g 1    cyclobenzaprine (FLEXERIL) 10 mg tablet TAKE 1 TABLET BY MOUTH THREE TIMES DAILY AS NEEDED FOR MUSCLE SPASMS FOR UP TO 30 DAYS. 90 Tab 2    QUEtiapine (SEROQUEL) 200 mg tablet Take 200 mg by mouth daily. Patient Active Problem List    Diagnosis    Gastroparesis     Dr. Gladys Washington 8/25/2019: documented with gastric emptying study. Declined meds. Small meals, low fiber, low fat diet.  Migraine without aura and without status migrainosus, not intractable    Essential hypertension    Obesity (BMI 30.0-34. 9)    Prediabetes    Angular cheilitis with candidiasis    Diverticulosis of large intestine without hemorrhage     [place] 4/2017      Hiatal hernia     h pylori tx 3/2013; multiple EGDs 1/21/2015, 5/24/2017, 4/13/2017, 2/21/2018 - normal mucosa      Hepatic steatosis     US 1/13/2017      Calculus of gallbladder without cholecystitis     CT 12/22/2016 also with duodenal diverticulum      Gastroesophageal reflux disease without esophagitis     6/3/2019 EGD Charmaine Addison MD: abnormal motility, large HH, normal duod, esoph and gastric mucosa, diverticulum third part of duodenum      Urinary retention    Vitamin D deficiency    Benign essential hypertension with target blood pressure below 140/90    Myalgia    Nausea    Hypercholesterolemia    Lung nodule seen on imaging study    Degeneration of lumbar or lumbosacral intervertebral disc    Displacement of lumbar intervertebral disc without myelopathy    Pain in joint, multiple sites    Cervicalgia    Postlaminectomy syndrome, cervical region    Annular tear of lumbar disc    Chronic pain syndrome    Carpal tunnel syndrome    Bipolar 2 disorder [facility])     Per conversation Dr. Briseida Vann 2/22/2019 based on review of her records there. Treated with Seroquel.       Lumbago    Other affections of shoulder region, not elsewhere classified    Other chronic pain         Allergies   Allergen Reactions    Aspirin Other (comments)     Stomach bleeding    Ativan [Lorazepam] Shortness of Breath    Bactrim [Sulfamethoprim] Rash    Keflex [Cephalexin] Rash    Macrobid [Nitrofurantoin Monohyd/M-Cryst] Nausea and Vomiting    Nsaids (Non-Steroidal Anti-Inflammatory Drug) Nausea and Vomiting    Opana [Oxymorphone] Other (comments)     Insomnia, weight loss, nightmares    Pcn [Penicillins] Hives        Social History     Tobacco Use    Smoking status: Former Smoker     Packs/day: 0.50     Years: 3.00     Pack years: 1.50     Last attempt to quit: 3/6/1993     Years since quittin.6    Smokeless tobacco: Never Used   Substance Use Topics    Alcohol use: No       Patient Care Team:  Bruno Miller MD as PCP - General (Family Practice)  Jenaro Jean MD (Gastroenterology)  Billie Salamanca NP as Nurse Practitioner (Psychiatry)  Floridalma Colin MD (Physical Medicine and Rehab)  Mcihelle Hodge MD (Psychiatry)  Radha Tobias NP as Nurse Practitioner (Gastroenterology)  Claire Dumont RN as Transitional Nurse Navigator  Micaela Kaminski MD (Rheumatology)    The following sections were reviewed & updated as appropriate: PMH, PSH, FH, and SH. Review of Systems   Constitutional: Negative for fever. Respiratory: Negative for shortness of breath. Cardiovascular: Negative for chest pain. Objective:     Visit Vitals  /76   Pulse (!) 110   Temp 98.6 °F (37 °C) (Oral)   Resp 14   Ht 5' 4\" (1.626 m)   Wt 171 lb 6.4 oz (77.7 kg)   BMI 29.42 kg/m²      Physical Exam   Constitutional: She is oriented to person, place, and time. She appears well-developed. No distress. HENT:   Head: Normocephalic and atraumatic. Mouth/Throat: She has dentures. Abnormal dentition. Dental caries present. No dental abscesses or lacerations. There are a couple of superficial erosions posteriorly on the bucca mucosa laterally. No bleeding, swelling, erythema, discharge either attendant to those erosions or elsewhere in the oral cavity   Cardiovascular: Regular rhythm and normal heart sounds. Tachycardia present. Exam reveals no gallop and no friction rub. No murmur heard.   Pulmonary/Chest: Effort normal and breath sounds normal. No respiratory distress. She has no wheezes. She has no rhonchi. She has no rales. Musculoskeletal: She exhibits no edema. Neurological: She is alert and oriented to person, place, and time. Skin: Skin is warm and dry. She is not diaphoretic. No cyanosis. Nails show no clubbing. Psychiatric: She has a normal mood and affect. Her behavior is normal. Judgment and thought content normal.         Disclaimer: The patient understands our medical plan. Alternatives have been explained and offered. The risks, benefits and significant side effects of all medications have been reviewed. Anticipated time course and progression of condition reviewed. All questions have been addressed. She is encouraged to employ the information provided in the after visit summary, which was reviewed. Where applicable, she is instructed to call the clinic if she has not been notified either by phone or through [address] with the results of her tests or with an appointment plan for any referrals within 1 week(s). No news is not good news; it's no news. The patient  is to call if her condition worsens or fails to improve or if significant side effects are experienced. Aspects of this note may have been generated using voice recognition software. Despite editing, there may be unrecognized errors.        Huseyin Alvarado MD

## 2019-10-17 NOTE — TELEPHONE ENCOUNTER
Received a call from [facility] in [address] [place] [address] who had a question about patients chlorhexidine gluconate states the sig is for patient to swish and spit twice a day wanted to know if this should be the chlorhexidine rinse he was told yes and given verbal auth to change this.

## 2019-10-17 NOTE — PROGRESS NOTES
Patient here for her incontinence supplies. She is asking for refills on her Atorvastatin and Flonase today as well. She is also asking about a mouth wash that was previously prescribed for her she says she keeps biting the insides of her cheeks and they are sore. 1. Have you been to the ER, urgent care clinic since your last visit? Hospitalized since your last visit? Yes When: 8/6/19 Where:  ER Reason for visit: stomach and wrist pain  2. Have you seen or consulted any other health care providers outside of the [address] [place] [address] since your last visit? Include any pap smears or colon screening. Yes When: 10/9/19 Where: [place] [address] Reason for visit: abdominal mass    Medication reconciliation has been completed with patient. Care team discussed/updated as well as pharmacy. Health Maintenance Due   Topic Date Due    DTaP/Tdap/Td series (1 - Tdap) 03/30/1975    Shingrix Vaccine Age 50> (1 of 2) 03/30/2004    GLAUCOMA SCREENING Q2Y  03/30/2019    Bone Densitometry (Dexa) Screening  03/30/2019    Influenza Age 9 to Adult  08/01/2019    Pneumococcal 65+ years (2 of 2 - PPSV23) 09/27/2019     Health Maintenance reviewed - Patient will get her flu vaccine today.

## 2019-10-17 NOTE — TELEPHONE ENCOUNTER
This patient contacted office for the following prescriptions to be filled:    Medication requested :   Requested Prescriptions     Pending Prescriptions Disp Refills    QUEtiapine (SEROQUEL) 200 mg tablet       Sig: Take 1 Tab by mouth daily. PCP: Dr. Parker Neighbours or Print: Geri Russell  Mail order or Local pharmacy: [phone]    Scheduled appointment if not seen by current providers in office: LOV 10/17/19, next appt 1/17/2020    Patient states she has been receiving this from [facility] with [facility]. She has been calling them for 1 week now and leaving messages with no return call. She has been out of this medication since last Wed. She finally asked her pharmacy to fax this as well to them. She received notification from the pharmacy since she was here earlier today (which is why she didn't mention it at appt today) that Ilene Ibarra denied it and states they no longer can see her b/c they no longer accept her insurance. Therefore now the patient is without medication and is concerned about withdrawals. She will also need assistance finding another psych doctor. I let her know I would forward this message to Miguel Thompson and Dr. Esther Baumann to see what could be done for her as she was explained and understands this is not something Dr. Esther Baumann prescribes for her.

## 2019-10-18 LAB
ALBUMIN SERPL-MCNC: 4.8 G/DL (ref 3.6–4.8)
ALBUMIN/GLOB SERPL: 1.5 {RATIO} (ref 1.2–2.2)
ALP SERPL-CCNC: 171 IU/L (ref 39–117)
ALT SERPL-CCNC: 20 IU/L (ref 0–32)
AST SERPL-CCNC: 25 IU/L (ref 0–40)
BASOPHILS # BLD AUTO: 0.1 X10E3/UL (ref 0–0.2)
BASOPHILS NFR BLD AUTO: 1 %
BILIRUB SERPL-MCNC: 0.3 MG/DL (ref 0–1.2)
BUN SERPL-MCNC: 14 MG/DL (ref 8–27)
BUN/CREAT SERPL: 15 (ref 12–28)
CALCIUM SERPL-MCNC: 10.3 MG/DL (ref 8.7–10.3)
CHLORIDE SERPL-SCNC: 101 MMOL/L (ref 96–106)
CHOLEST SERPL-MCNC: 189 MG/DL (ref 100–199)
CO2 SERPL-SCNC: 23 MMOL/L (ref 20–29)
CREAT SERPL-MCNC: 0.95 MG/DL (ref 0.57–1)
CREAT UR-MCNC: NORMAL MG/DL
EOSINOPHIL # BLD AUTO: 0.1 X10E3/UL (ref 0–0.4)
EOSINOPHIL NFR BLD AUTO: 1 %
ERYTHROCYTE [DISTWIDTH] IN BLOOD BY AUTOMATED COUNT: 12.2 % (ref 12.3–15.4)
EST. AVERAGE GLUCOSE BLD GHB EST-MCNC: 131 MG/DL
GLOBULIN SER CALC-MCNC: 3.3 G/DL (ref 1.5–4.5)
GLUCOSE SERPL-MCNC: 82 MG/DL (ref 65–99)
HBA1C MFR BLD: 6.2 % (ref 4.8–5.6)
HCT VFR BLD AUTO: 40.7 % (ref 34–46.6)
HDLC SERPL-MCNC: 60 MG/DL
HGB BLD-MCNC: 13.8 G/DL (ref 11.1–15.9)
IMM GRANULOCYTES # BLD AUTO: 0 X10E3/UL (ref 0–0.1)
IMM GRANULOCYTES NFR BLD AUTO: 0 %
INTERPRETATION, 910389: NORMAL
LDLC SERPL CALC-MCNC: 102 MG/DL (ref 0–99)
LDLC/HDLC SERPL: 1.7 RATIO (ref 0–3.2)
LYMPHOCYTES # BLD AUTO: 3.4 X10E3/UL (ref 0.7–3.1)
LYMPHOCYTES NFR BLD AUTO: 44 %
MCH RBC QN AUTO: 29.1 PG (ref 26.6–33)
MCHC RBC AUTO-ENTMCNC: 33.9 G/DL (ref 31.5–35.7)
MCV RBC AUTO: 86 FL (ref 79–97)
MICROALBUMIN UR-MCNC: NORMAL
MONOCYTES # BLD AUTO: 0.4 X10E3/UL (ref 0.1–0.9)
MONOCYTES NFR BLD AUTO: 6 %
NEUTROPHILS # BLD AUTO: 3.7 X10E3/UL (ref 1.4–7)
NEUTROPHILS NFR BLD AUTO: 48 %
PLATELET # BLD AUTO: 376 X10E3/UL (ref 150–450)
POTASSIUM SERPL-SCNC: 5.3 MMOL/L (ref 3.5–5.2)
PROT SERPL-MCNC: 8.1 G/DL (ref 6–8.5)
RBC # BLD AUTO: 4.75 X10E6/UL (ref 3.77–5.28)
SODIUM SERPL-SCNC: 143 MMOL/L (ref 134–144)
TRIGL SERPL-MCNC: 137 MG/DL (ref 0–149)
VLDLC SERPL CALC-MCNC: 27 MG/DL (ref 5–40)
WBC # BLD AUTO: 7.6 X10E3/UL (ref 3.4–10.8)

## 2019-10-18 RX ORDER — QUETIAPINE FUMARATE 200 MG/1
200 TABLET, FILM COATED ORAL DAILY
Qty: 30 TAB | Refills: 0 | Status: SHIPPED | OUTPATIENT
Start: 2019-10-18

## 2019-10-18 NOTE — TELEPHONE ENCOUNTER
Please call patient and notify of reassyring results. No change in plan. No labs needed prior to her appointment in Jan. Thank you.  EIRKA

## 2019-10-18 NOTE — TELEPHONE ENCOUNTER
Called [facility] to verify, unable to get in contact with with anyone there no matter which option is selected, unable to leave voicemail telephone just rings then loops back to main menu. Referral has been pended please advise.

## 2019-10-18 NOTE — TELEPHONE ENCOUNTER
I've not yet heard back from him. 30 day supply ordered pending further disposition planning.  Please fax notes with high importance to [facility] with attention to both Dr. Heydi Matson and NP José Luis Mora

## 2019-10-18 NOTE — TELEPHONE ENCOUNTER
Called patient told Dr. Alexia Garcia has ordered 30 day supply of her medication and has put in a new referral to a Psychiatrists for her. Told I will find someone who takes her insurance and send the referral in for her.

## 2019-10-19 LAB
ALBUMIN/CREAT UR: 8.1 MG/G CREAT (ref 0–30)
CREAT UR-MCNC: 303.9 MG/DL
MICROALBUMIN UR-MCNC: 24.5 UG/ML

## 2019-10-21 NOTE — TELEPHONE ENCOUNTER
Called patient had her verify her  she has been made aware of her results and no change in follow up plan as of now.

## 2019-10-21 NOTE — TELEPHONE ENCOUNTER
Consult request faxed to [facility]. Called [facility] again selected option for provider calls x 2 no answer telephone just rang, selected option for new patient line left message asking that someone please call the office back asap, Dr. Jose John is trying to get in contact with dr. Godfrey Dale about a patient, office number left.

## 2019-10-21 NOTE — TELEPHONE ENCOUNTER
Please call patient and notify of normal results. Meanwhile, I have not heard anything re her status with [facility]. Have you been able to get through?  ERIKA

## 2019-10-22 NOTE — TELEPHONE ENCOUNTER
Called [facility] selected option for provider calls got the appointment spoke with the representative there who transferred me to Dr. Balinda Moritz  ext [phone]) left message on this voicemail asking that she call me back also explained Dr. Loletta Felty had been trying to get in contact with Dr. Philippe Castro about a mutual patient, patient info has been left on VM as well.

## 2019-10-23 NOTE — TELEPHONE ENCOUNTER
Michaell Bacon called the office back this AM in reference to patient, she was not able to answer any questions about insurance issues, was transferred to [facility] who says she did talk with patient about this, [facility] will no longer accept patients [facility] as of the end of the year, [facility] says she tried to explain to patient if she has not missed her open enrollment date for Medicaid she can change her [facility] and will still be able to be seen there however patient did not understand what she was trying to tell her. [facility] will give a 30 day supply of medication only while patient is being referred to another specialists, she did have an appt scheduled with NP Nash Banda for today but this was cancelled by patient because she did not understand the insurance changes. Asked to be transferred back to [facility] Xiong asked her to please put in a message to Dr. Rosario Augustine to contact Dr. Larry Conn.

## 2019-10-24 NOTE — TELEPHONE ENCOUNTER
It sounds like their office is trying to work on a transition plan for her. Without their involvement in the referral process, it is unlikely that she will be able to transfer to another psychiatrist's care within 30 days. We will do what we can to support that process but expect care to continue to be rendered to Ms. Hutchinson until successful transfer of care. Given the misunderstanding, I hope they scheduled another appointment for her to be seen.     ERIKA

## 2019-10-25 ENCOUNTER — OFFICE VISIT (OUTPATIENT)
Dept: ORTHOPEDIC SURGERY | Facility: CLINIC | Age: 64
End: 2019-10-25

## 2019-10-25 VITALS
RESPIRATION RATE: 16 BRPM | BODY MASS INDEX: 29.19 KG/M2 | DIASTOLIC BLOOD PRESSURE: 81 MMHG | HEART RATE: 90 BPM | WEIGHT: 171 LBS | HEIGHT: 64 IN | SYSTOLIC BLOOD PRESSURE: 137 MMHG | TEMPERATURE: 98.4 F

## 2019-10-25 DIAGNOSIS — M65.4 DE QUERVAIN'S DISEASE (TENOSYNOVITIS): Primary | ICD-10-CM

## 2019-10-25 DIAGNOSIS — M25.531 RIGHT WRIST PAIN: ICD-10-CM

## 2019-10-25 RX ORDER — BETAMETHASONE SODIUM PHOSPHATE AND BETAMETHASONE ACETATE 3; 3 MG/ML; MG/ML
6 INJECTION, SUSPENSION INTRA-ARTICULAR; INTRALESIONAL; INTRAMUSCULAR; SOFT TISSUE ONCE
Qty: 0.5 ML | Refills: 0
Start: 2019-10-25 | End: 2019-10-25

## 2019-10-25 NOTE — TELEPHONE ENCOUNTER
Called patient had her verify her  asked if she has been scheduled with NP, Eric Lundberg she says she has an appt in December, she is completely out of her Ambien, has picked up the Seroquel that was sent in by Dr. Chase Marquez. She says she asked if she could get medication from [facility] to last her until she comes in and was told they could not refill until she is seen in the office. Told we have reached out to [facility] and are waiting for a return call and I will follow up on this today.

## 2019-10-25 NOTE — PROGRESS NOTES
Patient: Teodoro Aburto                MRN: [id number]       SSN: [id number]  YOB: 1955        AGE: 71 y.o. SEX: female    PCP: Quiana Saavedra MD  10/25/19    CC: RIGHT WRIST PAIN    HISTORY:  Teodoro Aburto is a 71 y.o. female female who is seen for wrist pain and swelling. She is s/p left extensor tenosynovectomy 7/11/18. She states that physical therapy increased her pain. She notes a small firm lump has formed under the skin over the radial aspect of her left wrist. She had been experiencing extreme pain with use of her hand or wrist. She relates her pain to a headache. She was seen by Dr. Surjit Moody on 1/31/19 who she states told her she has tender skin. She responded temporarily to her previous injection. She states she first noticed a swelling overlying her dorsal left wrist while in physical therapy. She noted increased left wrist pain after noticing the swelling. She states that she is left handed so most everything she does causes her pain. She is experiencing pain with gripping and pinching.      She was previously being seen by Dr. Joy Ward for a left toe fracture. She was previously seen by Dr. Johan Horan in 2000 for her fibromyalgia. She is currently in pain management. She is s/p anterior cervical fusion by Dr. Virginie Borrero. She reports she has been dropping things recently. Pain Assessment  10/25/2019   Location of Pain Wrist   Pain Location Comment -   Location Modifiers Right   Severity of Pain 10   Quality of Pain Aching; Throbbing   Quality of Pain Comment -   Duration of Pain Persistent   Frequency of Pain Constant   Aggravating Factors Other (Comment)   Aggravating Factors Comment when trying to hold objects, dropping objects   Limiting Behavior Yes   Relieving Factors Nothing   Relieving Factors Comment -   Result of Injury No   Work-Related Injury -   Type of Injury -     Occupation, etc:  Ms. Collette Coons has received social security disability benefits for low back pain and OA since 2000. She previously worked at [employer] and was training for management. She lives in [place] with her son and daughter. Her daughter is employed as her aid. She has another adult daughter. She also lost a son due to a horrific motorcycle accident a few years ago. She has 5 grandchildren- two of which are adopted. Current weight is 173 pounds. She is 5'4\" tall. She is a diet controlled diabetic. She is not hypertensive. She is left handed. She reports a h/o acid reflux and a stomach ulcer.      Lab Results   Component Value Date/Time    Hemoglobin A1c 6.2 (H) 10/17/2019 11:15 AM    Hemoglobin A1c, External 5.9 06/27/2016     Weight Metrics 10/25/2019 10/17/2019 10/7/2019 10/4/2019 8/6/2019 6/3/2019 5/31/2019   Weight 171 lb 171 lb 6.4 oz - 174 lb 174 lb 175 lb 175 lb 12.8 oz   BMI 29.35 kg/m2 29.42 kg/m2 29.87 kg/m2 - 29.87 kg/m2 30.04 kg/m2 30.18 kg/m2       Patient Active Problem List   Diagnosis Code    Lumbago M54.5    Other affections of shoulder region, not elsewhere classified M75.80    Other chronic pain G89.29    Bipolar 2 disorder ([facility] [address] F31.81    Chronic pain syndrome G89.4    Carpal tunnel syndrome G56.00    Degeneration of lumbar or lumbosacral intervertebral disc M51.37    Displacement of lumbar intervertebral disc without myelopathy M51.26    Pain in joint, multiple sites M25.50    Cervicalgia M54.2    Postlaminectomy syndrome, cervical region M96.1    Annular tear of lumbar disc M51.36    Lung nodule seen on imaging study R91.1    Hypercholesterolemia E78.00    Nausea R11.0    Myalgia M79.10    Vitamin D deficiency E55.9    Benign essential hypertension with target blood pressure below 140/90 I10    Urinary retention R33.9    Gastroesophageal reflux disease without esophagitis K21.9    Diverticulosis of large intestine without hemorrhage K57.30    Hiatal hernia K44.9    Hepatic steatosis K76.0    Calculus of gallbladder without cholecystitis K80.20  Obesity (BMI 30.0-34. 9) E66.9    Prediabetes R73.03    Angular cheilitis with candidiasis B37.0    Essential hypertension I10    Migraine without aura and without status migrainosus, not intractable G43.009    Gastroparesis K31.84     REVIEW OF SYSTEMS: All Below are Negative except: See HPI   Constitutional: negative for fever, chills, and weight loss. Cardiovascular: negative for chest pain, claudication, leg swelling, SOB, VILLAGOMEZ   Gastrointestinal: Negative for pain, N/V/C/D, Blood in stool or urine, dysuria,  hematuria, incontinence, pelvic pain. Musculoskeletal: See HPI   Neurological: Negative for dizziness and weakness. Negative for headaches, Visual changes, confusion, seizures   Phychiatric/Behavioral: Negative for depression, memory loss, substance  abuse. Extremities: Negative for hair changes, rash, or skin lesion changes. Hematologic: Negative for bleeding problems, bruising, pallor or swollen lymph  nodes   Peripheral Vascular: No calf pain, no circulation deficits.     Social History     Socioeconomic History    Marital status:      Spouse name: Not on file    Number of children: Not on file    Years of education: Not on file    Highest education level: Not on file   Occupational History    Not on file   Social Needs    Financial resource strain: Not on file    Food insecurity:     Worry: Not on file     Inability: Not on file    Transportation needs:     Medical: Not on file     Non-medical: Not on file   Tobacco Use    Smoking status: Former Smoker     Packs/day: 0.50     Years: 3.00     Pack years: 1.50     Last attempt to quit: 3/6/1993     Years since quittin.6    Smokeless tobacco: Never Used   Substance and Sexual Activity    Alcohol use: No    Drug use: No     Comment: Last smoked in -cocaine    Sexual activity: Never   Lifestyle    Physical activity:     Days per week: Not on file     Minutes per session: Not on file    Stress: Not on file Relationships    Social connections:     Talks on phone: Not on file     Gets together: Not on file     Attends Anabaptism service: Not on file     Active member of club or organization: Not on file     Attends meetings of clubs or organizations: Not on file     Relationship status: Not on file    Intimate partner violence:     Fear of current or ex partner: Not on file     Emotionally abused: Not on file     Physically abused: Not on file     Forced sexual activity: Not on file   Other Topics Concern    Not on file   Social History Narrative    Not on file      Allergies   Allergen Reactions    Aspirin Other (comments)     Stomach bleeding    Ativan [Lorazepam] Shortness of Breath    Bactrim [Sulfamethoprim] Rash    Keflex [Cephalexin] Rash    Macrobid [Nitrofurantoin Monohyd/M-Cryst] Nausea and Vomiting    Nsaids (Non-Steroidal Anti-Inflammatory Drug) Nausea and Vomiting    Opana [Oxymorphone] Other (comments)     Insomnia, weight loss, nightmares    Pcn [Penicillins] Hives      Current Outpatient Medications   Medication Sig    QUEtiapine (SEROQUEL) 200 mg tablet Take 1 Tab by mouth daily.  cholecalciferol, vitamin D3, 2,000 unit tab Take  by mouth.  zolpidem (AMBIEN) 10 mg tablet     atorvastatin (LIPITOR) 40 mg tablet TAKE ONE TABLET BY MOUTH ONE TIME DAILY    fluticasone propionate (FLONASE) 50 mcg/actuation nasal spray 2 Sprays by Both Nostrils route as needed for Rhinitis.  chlorhexidine gluconate, bulk, (HIBICLENS) 20 % soln liquid Swish and spit twice daily    cloNIDine HCl (CATAPRES) 0.1 mg tablet     polyethylene glycol (MIRALAX) 17 gram packet Take 1 Packet by mouth daily.  losartan (COZAAR) 25 mg tablet TAKE ONE TABLET BY MOUTH ONE TIME DAILY    oxyCODONE IR (ROXICODONE) 10 mg tab immediate release tablet     valACYclovir (VALTREX) 500 mg tablet Take 1 Tab by mouth daily.  (Patient taking differently: Take  by mouth daily.)    DEXILANT 60 mg CpDB capsule (delayed release) Take 120 mg by mouth daily.  diclofenac (VOLTAREN) 1 % gel Apply  to affected area four (4) times daily.  cyclobenzaprine (FLEXERIL) 10 mg tablet TAKE 1 TABLET BY MOUTH THREE TIMES DAILY AS NEEDED FOR MUSCLE SPASMS FOR UP TO 30 DAYS.  naloxone (NARCAN) 4 mg/actuation nasal spray 1 [place] by IntraNASal route once as needed. Use 1 spray intranasally into 1 nostril. Use a new Narcan nasal spray for subsequent doses and administer into alternating nostrils. May repeat every 2 to 3 minutes as needed.  morphine CR (MS CONTIN) 60 mg CR tablet Take  by mouth every twelve (12) hours. No current facility-administered medications for this visit.        PHYSICAL EXAMINATION:  Visit Vitals  /81 (BP 1 Location: Left arm, BP Patient Position: Sitting)   Pulse 90   Temp 98.4 °F (36.9 °C) (Oral)   Resp 16   Ht 5' 4\" (1.626 m)   Wt 171 lb (77.6 kg)   BMI 29.35 kg/m²     ORTHO EXAMINATION:  Examination Right Wrist Left Wrist   Skin Intact    Tenderness + dorsum, fourth extensor compartment + dorsum, fourth extensor compartment   Flexion 40 40   Extension 30 30   Deformity - -   Effusion - -   Finger flexion Full Full   Finger extension Weak active with pain Weak active with pain   Tinel's sign - -   Phalen's test - -   Finklestein maneuver + -   Pain with thumb abduction - -   Capillary refill - -   No evidence of extensor tendon rupture  Pain with active wrist and finger extension  Flat affect    TIME OUT:   Chart reviewed for the following:   IMaddie MD, have reviewed the History, Physical and updated the Allergic reactions for [facility]   TIME OUT performed immediately prior to start of procedure:  Branden Garcia MD, have performed the following reviews on [facility] prior to the start of the procedure:  * Patient was identified by name and date of birth   * Agreement on procedure being performed was verified  * Risks and Benefits explained to the patient  * Procedure site verified and marked as necessary  * Patient was positioned for comfort  * Consent was obtained     Time: 4:33 PM    Date of procedure: 10/25/2019  Procedure performed by:  Liliana Simmons MD  Ms. Hutchinson tolerated the procedure well with no complications. MRI LEFT WRIST WO CONT 1/22/18  IMPRESSION:   1. Extensive fluid surrounding the fourth extensor compartment tendons, consistent with tenosynovitis. Perhaps minimal tenosynovitis of the second extensor compartment as well. -Marrow edema in the dorsal lip of the distal radius, extending towards styloid, of uncertain etiology. If tenosynovitis is inflammatory or infectious, these findings may be reactive. Also consider contusion in the appropriate setting.    2. Probable degeneration of the TFCC and scapholunate ligament. Mild triscaphe degenerative change. IMPRESSION:      ICD-10-CM ICD-9-CM    1. De Quervain's disease (tenosynovitis) M65.4 727.04 betamethasone (CELESTONE SOLUSPAN) 6 mg/mL injection      BETAMETHASONE ACETATE & SODIUM PHOSPHATE INJECTION 3 MG EA.      CT INJECT TENDON SHEATH/LIGAMENT      AMB SUPPLY ORDER   2. Right wrist pain M25.531 719.43 betamethasone (CELESTONE SOLUSPAN) 6 mg/mL injection      BETAMETHASONE ACETATE & SODIUM PHOSPHATE INJECTION 3 MG EA.      CT INJECT TENDON SHEATH/LIGAMENT      AMB SUPPLY ORDER     PLAN: Thumb spica splint provided. She will follow up with Dr. Po Anderson for her fibromyalgia. After discussing treatment options, patient's 1st extensorcompartment was injected with 1/2 cc Marcaine and 1/2 cc Celestone. There is no need for further surgery at this time. She will continue to follow up with pain management. She will follow up as needed.     Scribed by Eli Swan  ([address]) as dictated by Liliana Simmons MD

## 2019-10-25 NOTE — PROGRESS NOTES
1. Have you been to the ER, urgent care clinic since your last visit? Hospitalized since your last visit? Yes, [address] doctor, Dr. Nikolay Fink    2. Have you seen or consulted any other health care providers outside of the [address] [place] [address] since your last visit? Include any pap smears or colon screening.    Yes, see above

## 2019-10-25 NOTE — PROGRESS NOTES
Verbal order given by Dr. Elva Avendano to draw up 0.5 mL 0.25% Sensorcaine and 0.5 mL 30 mg/5mL Betamethasone.

## 2019-10-30 ENCOUNTER — TELEPHONE (OUTPATIENT)
Dept: FAMILY MEDICINE CLINIC | Age: 64
End: 2019-10-30

## 2019-11-01 NOTE — TELEPHONE ENCOUNTER
Patient called the office asking if Dr. Loletta Felty had responded about her medication, she was told we are trying to get a clinician on the phone to talk with Dr. Loletta Felty about this, told we left a message earlier today and I will call now to see if I can get someone, called [facility] selected option provider calls left another message stating we have left multiple messages for Karen Sandoval NP and Dr. Philippe Castro to call Dr. Loletta Felty to discuss a mutual patient and have not heard back from anyone asked that someone please call the office back. Office number has been left.

## 2019-11-01 NOTE — TELEPHONE ENCOUNTER
Patient called the office stating she has been in contact with [facility] asking if they could refill her Ambien and her Seroquel to last her until her appt on 11/25/19 with Gabrielle Vergara NP, she says she was told [facility] CARLSON will not fill any medications for her until she is seen in the office. Patient is asking if Dr. Jose John can fill her Seroquel again and her Ambien just enough to get her to her appt with Estefani CARLSON on 11/25/19. We still have not gotten a response to requests to have Dr. Godfrey Dale or [facility] contact our office, please advise.

## 2019-11-12 NOTE — TELEPHONE ENCOUNTER
Called [facility] spoke with MsVerena Jess leaving another message for Angela Thompson NP and/or Dr. Bourgeois Mention to call Dr. Wil Church to discuss patients treatment plan.

## 2019-12-05 ENCOUNTER — HOSPITAL ENCOUNTER (OUTPATIENT)
Dept: MAMMOGRAPHY | Age: 64
Discharge: HOME | End: 2019-12-05
Attending: FAMILY MEDICINE
Payer: MEDICARE

## 2019-12-05 DIAGNOSIS — Z12.31 VISIT FOR SCREENING MAMMOGRAM: ICD-10-CM

## 2019-12-05 PROCEDURE — 77063 BREAST TOMOSYNTHESIS BI: CPT

## 2019-12-06 NOTE — PROGRESS NOTES
Reassuring results, Ms. Hutchinson. Remember that no test is perfect. Plan to be seen if you should develop any breast symptoms such as pain, skin changes or masses. Take good care.  ERIKA

## 2019-12-10 NOTE — PROGRESS NOTES
*ATTENTION:  This note has been created by a medical student for educational purposes only. Please do not refer to the content of this note for clinical decision-making, billing, or other purposes. Please see attending physicians note to obtain clinical information on this patient. *     CC: ED f/u  HPI: 62yo -American female presents to office for ED f/u. She presented to ED on 1/13/17 for abdominal pain and was diagnosed with gallstones and peptic ulcer disease. She has appt with GI on 1/27/17. She reports nausea, epigastric abdominal pain, and excessive flatulence since being seen in the ER. She is taking Carafate every 6 hrs as prescribed but is requesting approval to take the medication every 4 hrs due to incomplete relief of sx. Last BM was this AM and was normal at that time, denies hematochezia, pain with defecation, dysuria, and fevers. She reports weight loss in her extremities but increasing central obesity for the past couple of months. She states that she always feels hot, associated with diaphoresis, denies CP and palpitations.     ROS:  Constitutional: reports central obesity, thinning of extremities, heat intolerance; denies fever  Heart: denies CP, palpitations  Lungs: denies cough, SOB  GI: reports excessive flatulence, nausea, epigastric pain; denies hematochezia, pain with defecation, constipation  : denies dysuria  Musculoskeletal: denies lower extremity pain, swelling, numbness, tingling    PE:  Visit Vitals    /80 (BP 1 Location: Left arm, BP Patient Position: Sitting)    Pulse 71    Temp 98.1 °F (36.7 °C) (Temporal)    Resp 18    Ht 5' 2\" (1.575 m)    Wt 166 lb (75.3 kg)    SpO2 97%    BMI 30.36 kg/m2   Constitutional: A&Ox3, no acute distress  HEENT: normocephalic, no conjunctival injection, no scleral icterus, EOMI  Neck: no thyroid enlargement or palpable nodules  Cardiovascular: RRR, no murmurs rubs or gallops  Lungs: CTA in all lung fields, no wheezes rales or rhonchi  Abdomen: enlarged abdomen, soft, no erythema or bruising, no scars, active bowel sounds, moderate epigastric and RLQ TTP with guarding, no rebound tenderness, negative Gallardo's sign    A/P:  1. Biliary colic with gallstones: pt can take carafate q4hrs, pt will see gastroenterology for management  2.  Change in distribution of body fat 36.8

## 2019-12-17 ENCOUNTER — PATIENT OUTREACH (OUTPATIENT)
Dept: FAMILY MEDICINE CLINIC | Age: 64
End: 2019-12-17

## 2019-12-17 NOTE — PROGRESS NOTES
Complex Case Management      Date/Time:  2019 11:37 AM    Method of communication with patient:phone    Nurse Navigator (NN) contacted the patient by telephone to perform Ambulatory Care Coordination. Verified name and  with patient as identifiers. Provided introduction to self, and explanation of the Ambulatory Care Manager's role. Reviewed most recent clinic visit w/ patient who verbalized understanding. Patient given an opportunity to ask questions. Top Challenges reviewed with the patient   1. Patient states that she is having family emergency at this time and requests that NN call back \"after the holiday\". The patient agrees to contact the PCP office or the [address] [place] [address] for questions related to their healthcare. Provided contact information for future reference. Disease Specific:   N/A    Home Health Active: No    DME Active: No    Barriers to care? None noted at present time    Advance Care Planning:   Does patient have an Advance Directive:  not on file     Medication(s):   Medication reconciliation was not performed with patient, who requested that med reconciliation be postponed until after the holidays.      [facility] follow up appointment(s):   Future Appointments   Date Time Provider Gordo Marquez   2020 10:00 AM Dimas Lopez MD [place]        Non-[facility] follow up appointment(s): NA    Goals      Reduce Risk of Hospitalization      Patient will attend all physician appointments as scheduled    NN will follow patient for 6 - 9 weeks

## 2019-12-26 LAB — TSH SERPL-ACNC: 1.45 UIU/ML (ref 0.36–3.74)

## 2020-01-08 ENCOUNTER — PATIENT OUTREACH (OUTPATIENT)
Dept: FAMILY MEDICINE CLINIC | Age: 65
End: 2020-01-08

## 2020-01-08 RX ORDER — METOCLOPRAMIDE 5 MG/1
5 TABLET ORAL DAILY
COMMUNITY

## 2020-01-08 RX ORDER — DICYCLOMINE HYDROCHLORIDE 20 MG/1
20 TABLET ORAL
COMMUNITY

## 2020-01-08 NOTE — PROGRESS NOTES
[facility] Follow up         Follow up Dx:  [address] contacted the patient by telephone to perform follow up assessment. Verified  and address with patient as identifiers. Hospitalizations/ ED visits:  [facility] ED 19 for abd pain; N&V    Patient reports that she continues to have abd pain; discomfort in upper quadrants and pain in lower quadrants which make her feel as if she is \"going to faint\". Continues to complain of N&V, Denies abdominal tightness/firmness; states she is \"gassy\" but having large, formed, soft, brown stool daily; denies blood in stool, denies stool color black, coffee-ground, pale tan or green. States she is now eating frequent small meals through out the day to help decrease nausea. Forcing fluids to 6 - 8 glasses per day. Medication:   Performed medication reconciliation with patient, and patient verbalizes understanding of administration of home medications. There were no barriers to obtaining medications identified at this time. States Hibaclens swish and spit is \"not working\", that she continues to have blisters on inside of mouth. PCP/Specialist Appointments:  PCP on 20  Gastroenterology on 1/10/20. Dentist on 20. Reviewed red flags with patient, and patient verbalizes understanding. Patient given an opportunity to ask questions. No other clinical/social/functional needs noted. Patient reminded that there are physicians on call 24 hours a day / 7 days a week (M-F 5pm to 8am and from Friday 5pm until Monday 8am for the weekend) should the patient have questions or concerns. [facility] provided contact information for future reference. The patient agrees to contact the PCP office for questions related to their healthcare.

## 2020-01-13 NOTE — MR AVS SNAPSHOT
Elizabeth Visit Information Date & Time Provider Department Dept. Phone Encounter #  
 6/23/2017  3:45 PM Rupal Grajeda MD [facility] [phone] [id number] Follow-up Instructions Return if symptoms worsen or fail to improve. Your Appointments 8/15/2017 10:40 AM  
Follow Up with BETO Ortiz [address] for Pain Management (RICHAR SCHEDULING) Appt Note: return in 2 months [address] [address]  
[phone] [facility] [phone] [id number] Upcoming Health Maintenance Date Due DTaP/Tdap/Td series (1 - Tdap) 3/30/1975 INFLUENZA AGE 9 TO ADULT 8/1/2017 BREAST CANCER SCRN MAMMOGRAM 12/2/2017 PAP AKA CERVICAL CYTOLOGY 6/21/2019 COLONOSCOPY 4/13/2027 Allergies as of 6/23/2017  Review Complete On: 6/23/2017 By: Rupal Grajeda MD  
  
 Severity Noted Reaction Type Reactions Aspirin    Other (comments) Stomach bleeding Macrobid [Nitrofurantoin Monohyd/m-cryst]  11/25/2015    Nausea and Vomiting Nsaids (Non-steroidal Anti-inflammatory Drug)    Nausea and Vomiting Opana [Oxymorphone]  11/29/2016    Other (comments) Insomnia, weight loss, nightmares Pcn [Penicillins]    Hives Current Immunizations  Never Reviewed Name Date Pneumococcal Polysaccharide (PPSV-23) 3/7/2014 Not reviewed this visit You Were Diagnosed With   
  
 Codes Comments Acute otitis externa of right ear, unspecified type    -  Primary ICD-10-CM: H60.501 ICD-9-CM: 380.10 Headache, unspecified headache type     ICD-10-CM: R51 ICD-9-CM: 784.0 Right upper quadrant abdominal pain     ICD-10-CM: R10.11 ICD-9-CM: 789.01 Vitals BP Pulse Temp Resp Height(growth percentile) Weight(growth percentile) 142/86 (BP 1 Location: Left arm, BP Patient Position: Sitting) 73 98 °F (36.7 °C) (Temporal) 16 5' 4\" (1.626 m) 158 lb (71.7 kg) SpO2 BMI OB Status Smoking Status 96% 27.12 kg/m2 Hysterectomy Former Smoker Vitals History BMI and BSA Data Body Mass Index Body Surface Area  
 27.12 kg/m 2 1.8 m 2 Preferred Pharmacy Pharmacy Name Phone [id number] - [address] [phone] Your Updated Medication List  
  
   
This list is accurate as of: 6/23/17  3:51 PM.  Always use your most recent med list.  
  
  
  
  
 albuterol 2.5 mg /3 mL (0.083 %) nebulizer solution Commonly known as:  PROVENTIL VENTOLIN  
2.5 mg.  
  
 atorvastatin 40 mg tablet Commonly known as:  LIPITOR  
TAKE ONE TABLET BY MOUTH ONE TIME DAILY  
  
 baclofen 10 mg tablet Commonly known as:  LIORESAL  
TAKE ONE TABLET BY MOUTH THREE TIMES A DAY Start taking on:  8/14/2017  
  
 butalbital-acetaminophen-caffeine -40 mg per tablet Commonly known as:  Ryanne Lento Take 1 Tab by mouth every six (6) hours as needed for Pain. Max Daily Amount: 4 Tabs. DEXILANT 60 mg Cpdb Generic drug:  Dexlansoprazole 60 mg daily. dicyclomine 10 mg capsule Commonly known as:  BENTYL Take 1 Cap by mouth four (4) times daily. doxycycline 100 mg tablet Commonly known as:  ADOXA Take 1 Tab by mouth two (2) times a day for 10 days. fluticasone 50 mcg/actuation nasal spray Commonly known as:  Nisha Bough 2 Sprays by Both Nostrils route as needed. gabapentin 400 mg capsule Commonly known as:  NEURONTIN  
400 mg two (2) times a day. GEODON 80 mg capsule Generic drug:  ziprasidone Take 160 mg by mouth two (2) times daily (with meals). ipratropium 0.02 % nebulizer solution Commonly known as:  ATROVENT  
0.5 mg.  
  
 losartan 25 mg tablet Commonly known as:  COZAAR  
TAKE ONE TABLET BY MOUTH ONE TIME DAILY  
  
 magnesium citrate solution Take 1/3 of bottle every 8 hours until finished or until you have a bowel movement. megestrol 20 mg tablet Commonly known as:  MEGACE Take 1 Tab by mouth daily. * morphine CR 60 mg CR tablet Commonly known as:  MS CONTIN Take 1 Tab by mouth every twelve (12) hours. Max Daily Amount: 120 mg.  
Start taking on:  7/4/2017 * morphine CR 60 mg CR tablet Commonly known as:  MS CONTIN Take 1 Tab by mouth every twelve (12) hours for 30 days. Max Daily Amount: 120 mg.  
Start taking on:  8/3/2017  
  
 naloxone 4 mg/actuation Spry 4 mg by Nasal route as needed. For emergency use only  Indications: OPIATE-INDUCED RESPIRATORY DEPRESSION  
  
 * oxyCODONE IR 10 mg Tab immediate release tablet Commonly known as:  Robb Maya Take 1 Tab by mouth three (3) times daily as needed. Max Daily Amount: 30 mg. Start taking on:  7/4/2017 * oxyCODONE IR 10 mg Tab immediate release tablet Commonly known as:  Robb Maya Take 1 Tab by mouth three (3) times daily as needed. Max Daily Amount: 30 mg. Start taking on:  8/3/2017 polyethylene glycol 17 gram packet Commonly known as:  Artice Daunt TAKE 1 PACKET BY MOUTH DAILY. sertraline 100 mg tablet Commonly known as:  ZOLOFT Take  by mouth daily. sucralfate 1 gram tablet Commonly known as:  CARAFATE  
1 g.  
  
 temazepam 15 mg capsule Commonly known as:  RESTORIL Take 1 Cap by mouth nightly as needed for Sleep. Max Daily Amount: 15 mg. Indications: INSOMNIA  
  
 traMADol 50 mg tablet Commonly known as:  ULTRAM  
Take 1 Tab by mouth every six (6) hours as needed for Pain. Max Daily Amount: 200 mg.  
  
 traZODone 100 mg tablet Commonly known as:  Mayank Mallet Take 100 mg by mouth nightly. valACYclovir 500 mg tablet Commonly known as:  VALTREX Take 1 Tab by mouth two (2) times a day for 10 days. VITAMIN D2 50,000 unit capsule Generic drug:  ergocalciferol TAKE 1 CAPSULE BY MOUTH EVERY 7 DAYS * Notice:   This list has 4 medication(s) that are the same as other medications prescribed for you. Read the directions carefully, and ask your doctor or other care provider to review them with you. Prescriptions Printed Refills  
 butalbital-acetaminophen-caffeine (FIORICET, ESGIC) -40 mg per tablet 0 Sig: Take 1 Tab by mouth every six (6) hours as needed for Pain. Max Daily Amount: 4 Tabs. Class: Print Route: Oral  
  
Prescriptions Sent to Pharmacy Refills  
 doxycycline (ADOXA) 100 mg tablet 0 Sig: Take 1 Tab by mouth two (2) times a day for 10 days. Class: Normal  
 Pharmacy: [address] [facility], [facility] #: [phone] Route: Oral  
  
Follow-up Instructions Return if symptoms worsen or fail to improve. Patient Instructions Headache: Care Instructions Your Care Instructions Headaches have many possible causes. Most headaches aren't a sign of a more serious problem, and they will get better on their own. Home treatment may help you feel better faster. The doctor has checked you carefully, but problems can develop later. If you notice any problems or new symptoms, get medical treatment right away. Follow-up care is a key part of your treatment and safety. Be sure to make and go to all appointments, and call your doctor if you are having problems. It's also a good idea to know your test results and keep a list of the medicines you take. How can you care for yourself at home? · Do not drive if you have taken a prescription pain medicine. · Rest in a quiet, dark room until your headache is gone. Close your eyes and try to relax or go to sleep. Don't watch TV or read. · Put a cold, moist cloth or cold pack on the painful area for 10 to 20 minutes at a time. Put a thin cloth between the cold pack and your skin. · Use a warm, moist towel or a heating pad set on low to relax tight shoulder and neck muscles. · Have someone gently massage your neck and shoulders. · Take pain medicines exactly as directed. ¨ If the doctor gave you a prescription medicine for pain, take it as prescribed. ¨ If you are not taking a prescription pain medicine, ask your doctor if you can take an over-the-counter medicine. · Be careful not to take pain medicine more often than the instructions allow, because you may get worse or more frequent headaches when the medicine wears off. · Do not ignore new symptoms that occur with a headache, such as a fever, weakness or numbness, vision changes, or confusion. These may be signs of a more serious problem. To prevent headaches · Keep a headache diary so you can figure out what triggers your headaches. Avoiding triggers may help you prevent headaches. Record when each headache began, how long it lasted, and what the pain was like (throbbing, aching, stabbing, or dull). Write down any other symptoms you had with the headache, such as nausea, flashing lights or dark spots, or sensitivity to bright light or loud noise. Note if the headache occurred near your period. List anything that might have triggered the headache, such as certain foods (chocolate, cheese, wine) or odors, smoke, bright light, stress, or lack of sleep. · Find healthy ways to deal with stress. Headaches are most common during or right after stressful times. Take time to relax before and after you do something that has caused a headache in the past. 
· Try to keep your muscles relaxed by keeping good posture. Check your jaw, face, neck, and shoulder muscles for tension, and try relaxing them. When sitting at a desk, change positions often, and stretch for 30 seconds each hour. · Get plenty of sleep and exercise. · Eat regularly and well. Long periods without food can trigger a headache. · Treat yourself to a massage. Some people find that regular massages are very helpful in relieving tension. · Limit caffeine by not drinking too much coffee, tea, or soda.  But don't quit caffeine suddenly, because that can also give you headaches. · Reduce eyestrain from computers by blinking frequently and looking away from the computer screen every so often. Make sure you have proper eyewear and that your monitor is set up properly, about an arm's length away. · Seek help if you have depression or anxiety. Your headaches may be linked to these conditions. Treatment can both prevent headaches and help with symptoms of anxiety or depression. When should you call for help? Call 911 anytime you think you may need emergency care. For example, call if: 
· You have signs of a stroke. These may include: 
¨ Sudden numbness, paralysis, or weakness in your face, arm, or leg, especially on only one side of your body. ¨ Sudden vision changes. ¨ Sudden trouble speaking. ¨ Sudden confusion or trouble understanding simple statements. ¨ Sudden problems with walking or balance. ¨ A sudden, severe headache that is different from past headaches. Call your doctor now or seek immediate medical care if: 
· You have a new or worse headache. · Your headache gets much worse. Where can you learn more? Go to http://diana-lashonda.info/. Enter M271 in the search box to learn more about \"Headache: Care Instructions. \" Current as of: October 14, 2016 Content Version: 11.3 © 3444-1987 [employer]. Care instructions adapted under license by [employer] (which disclaims liability or warranty for this information). If you have questions about a medical condition or this instruction, always ask your healthcare professional. [place] [address] any warranty or liability for your use of this information. Please provide this summary of care documentation to your next provider. Your primary care clinician is listed as [id number] [facility]. If you have any questions after today's visit, please call [phone].

## 2020-01-17 ENCOUNTER — OFFICE VISIT (OUTPATIENT)
Dept: FAMILY MEDICINE CLINIC | Age: 65
End: 2020-01-17

## 2020-01-17 VITALS
SYSTOLIC BLOOD PRESSURE: 122 MMHG | WEIGHT: 172.6 LBS | BODY MASS INDEX: 29.47 KG/M2 | HEIGHT: 64 IN | TEMPERATURE: 98.2 F | RESPIRATION RATE: 14 BRPM | DIASTOLIC BLOOD PRESSURE: 68 MMHG | HEART RATE: 96 BPM | OXYGEN SATURATION: 98 %

## 2020-01-17 VITALS
RESPIRATION RATE: 14 BRPM | WEIGHT: 172.6 LBS | DIASTOLIC BLOOD PRESSURE: 68 MMHG | HEIGHT: 64 IN | BODY MASS INDEX: 29.47 KG/M2 | SYSTOLIC BLOOD PRESSURE: 122 MMHG | TEMPERATURE: 98.2 F | OXYGEN SATURATION: 98 % | HEART RATE: 96 BPM

## 2020-01-17 DIAGNOSIS — R73.03 PREDIABETES: ICD-10-CM

## 2020-01-17 DIAGNOSIS — R32 URINARY INCONTINENCE, UNSPECIFIED TYPE: ICD-10-CM

## 2020-01-17 DIAGNOSIS — F31.81 BIPOLAR 2 DISORDER (HCC): ICD-10-CM

## 2020-01-17 DIAGNOSIS — Z78.0 POSTMENOPAUSAL STATE: ICD-10-CM

## 2020-01-17 DIAGNOSIS — Z00.00 MEDICARE ANNUAL WELLNESS VISIT, SUBSEQUENT: ICD-10-CM

## 2020-01-17 DIAGNOSIS — E78.00 HYPERCHOLESTEROLEMIA: ICD-10-CM

## 2020-01-17 DIAGNOSIS — R07.89 NON-CARDIAC CHEST PAIN: ICD-10-CM

## 2020-01-17 DIAGNOSIS — Z13.39 SCREENING FOR ALCOHOLISM: ICD-10-CM

## 2020-01-17 DIAGNOSIS — Z23 ENCOUNTER FOR IMMUNIZATION: ICD-10-CM

## 2020-01-17 DIAGNOSIS — I10 ESSENTIAL HYPERTENSION: Primary | ICD-10-CM

## 2020-01-17 RX ORDER — LOSARTAN POTASSIUM 25 MG/1
TABLET ORAL
Qty: 90 TAB | Refills: 4 | Status: SHIPPED | OUTPATIENT
Start: 2020-01-17 | End: 2021-02-09

## 2020-01-17 RX ORDER — PROMETHAZINE HYDROCHLORIDE 12.5 MG/1
12.5 TABLET ORAL AS NEEDED
COMMUNITY
Start: 2019-11-02

## 2020-01-17 NOTE — PROGRESS NOTES
This is the Subsequent Medicare Annual Wellness Exam, performed 12 months or more after the Initial AWV or the last Subsequent AWV    I have reviewed the patient's medical history in detail and updated the computerized patient record. History     Patient Active Problem List   Diagnosis Code    Lumbago M54.5    Other affections of shoulder region, not elsewhere classified M75.80    Other chronic pain G89.29    Bipolar 2 disorder ([facility] [address] F31.81    Chronic pain syndrome G89.4    Carpal tunnel syndrome G56.00    Degeneration of lumbar or lumbosacral intervertebral disc M51.37    Displacement of lumbar intervertebral disc without myelopathy M51.26    Pain in joint, multiple sites M25.50    Cervicalgia M54.2    Postlaminectomy syndrome, cervical region M96.1    Annular tear of lumbar disc M51.36    Lung nodule seen on imaging study R91.1    Hypercholesterolemia E78.00    Nausea R11.0    Myalgia M79.10    Vitamin D deficiency E55.9    Urinary retention R33.9    Gastroesophageal reflux disease without esophagitis K21.9    Diverticulosis of large intestine without hemorrhage K57.30    Hiatal hernia K44.9    Hepatic steatosis K76.0    Calculus of gallbladder without cholecystitis K80.20    Obesity (BMI 30.0-34. 9) E66.9    Prediabetes R73.03    Angular cheilitis with candidiasis B37.0    Essential hypertension I10    Migraine without aura and without status migrainosus, not intractable G43.009    Gastroparesis K31.84    Urinary incontinence R32    Non-cardiac chest pain R07.89     Past Medical History:   Diagnosis Date    Annular tear of lumbar disc 11/10/2014    Asthma     Bronchitis    Benign tumor of throat     Bone spur     right ankle    Cervicalgia 11/10/2014    Chronic pain     back    Degeneration of lumbar or lumbosacral intervertebral disc 11/10/2014    Degenerative disc disease     Depression     Displacement of lumbar intervertebral disc without myelopathy 11/10/2014    Elevated white blood cell count     Fibromyalgia     GERD (gastroesophageal reflux disease)     Hypertension     Insomnia     Nausea & vomiting     Pain in joint, multiple sites 11/10/2014    Postlaminectomy syndrome, cervical region 11/10/2014    Sinus infection 10/5/15    Ulcer       Past Surgical History:   Procedure Laterality Date    COLONOSCOPY N/A 4/13/2017    COLONOSCOPY performed by Donita Butler MD at [address] HX CATARACT REMOVAL      HX CERVICAL FUSION      HX HYSTERECTOMY      HX ORTHOPAEDIC      ganglion cyst removed, right hand    HX OTHER SURGICAL      cyst left thigh removed    HX WRIST FRACTURE TX      LAP,CHOLECYSTECTOMY N/A 02/27/2017    Dr. Gwendolyn Moreno     Current Outpatient Medications   Medication Sig Dispense Refill    promethazine (PHENERGAN) 12.5 mg tablet Take 12.5 mg by mouth.  dicyclomine (BENTYL) 20 mg tablet Take 20 mg by mouth four (4) times daily as needed for Abdominal Cramps.  metoclopramide HCl (REGLAN) 5 mg tablet Take 5 mg by mouth Before breakfast, lunch, and dinner.  QUEtiapine (SEROQUEL) 200 mg tablet Take 1 Tab by mouth daily. 30 Tab 0    cholecalciferol, vitamin D3, 2,000 unit tab Take  by mouth.  atorvastatin (LIPITOR) 40 mg tablet TAKE ONE TABLET BY MOUTH ONE TIME DAILY 90 Tab 4    fluticasone propionate (FLONASE) 50 mcg/actuation nasal spray 2 Sprays by Both Nostrils route as needed for Rhinitis. 3 Bottle 4    cloNIDine HCL (CATAPRES) 0.3 mg tablet Take 0.3 mg by mouth nightly.  polyethylene glycol (MIRALAX) 17 gram packet Take 1 Packet by mouth daily. 30 Each 6    oxyCODONE IR (ROXICODONE) 10 mg tab immediate release tablet 10 mg every eight (8) hours as needed.  valACYclovir (VALTREX) 500 mg tablet Take 1 Tab by mouth daily. (Patient taking differently: Take  by mouth daily.) 90 Tab 4    DEXILANT 60 mg CpDB capsule (delayed release) Take 120 mg by mouth daily.       morphine CR (MS CONTIN) 60 mg CR tablet Take 30 mg by mouth every twelve (12) hours.  diclofenac (VOLTAREN) 1 % gel Apply  to affected area four (4) times daily. 1 g 1    cyclobenzaprine (FLEXERIL) 10 mg tablet TAKE 1 TABLET BY MOUTH THREE TIMES DAILY AS NEEDED FOR MUSCLE SPASMS FOR UP TO 30 DAYS. 90 Tab 2    losartan (COZAAR) 25 mg tablet TAKE ONE TABLET BY MOUTH ONE TIME DAILY 90 Tab 4    naloxone (NARCAN) 4 mg/actuation nasal spray 1 Pavo by IntraNASal route once as needed. Use 1 spray intranasally into 1 nostril. Use a new Narcan nasal spray for subsequent doses and administer into alternating nostrils. May repeat every 2 to 3 minutes as needed.        Allergies   Allergen Reactions    Aspirin Other (comments)     Stomach bleeding    Ativan [Lorazepam] Shortness of Breath    Bactrim [Sulfamethoprim] Rash    Keflex [Cephalexin] Rash    Macrobid [Nitrofurantoin Monohyd/M-Cryst] Nausea and Vomiting    Nsaids (Non-Steroidal Anti-Inflammatory Drug) Nausea and Vomiting    Opana [Oxymorphone] Other (comments)     Insomnia, weight loss, nightmares    Pcn [Penicillins] Hives       Family History   Problem Relation Age of Onset    Hypertension Other     Heart Attack Other     Heart Disease Other     Stroke Other     Headache Other     Seizures Other     Arthritis Father     Migraines Granddaughter     Migraines Daughter      Social History     Tobacco Use    Smoking status: Former Smoker     Packs/day: 0.50     Years: 3.00     Pack years: 1.50     Last attempt to quit: 3/6/1993     Years since quittin.8    Smokeless tobacco: Never Used   Substance Use Topics    Alcohol use: No       Depression Risk Factor Screening:     3 most recent PHQ Screens 2019   PHQ Not Done -   Little interest or pleasure in doing things Several days   Feeling down, depressed, irritable, or hopeless Not at all   Total Score PHQ 2 1   Trouble falling or staying asleep, or sleeping too much Not at all   Feeling tired or having little energy Not at all   Poor appetite, weight loss, or overeating Not at all   Feeling bad about yourself - or that you are a failure or have let yourself or your family down Not at all   Trouble concentrating on things such as school, work, reading, or watching TV Not at all   Moving or speaking so slowly that other people could have noticed; or the opposite being so fidgety that others notice Not at all   Thoughts of being better off dead, or hurting yourself in some way Not at all   PHQ 9 Score 1   How difficult have these problems made it for you to do your work, take care of your home and get along with others Somewhat difficult   Being treated by NP Lakia Gan for bipolar disorder    Alcohol Risk Factor Screening:   Do you average 1 drink per night or more than 7 drinks a week:  No    On any one occasion in the past three months have you have had more than 3 drinks containing alcohol:  No      Functional Ability and Level of Safety:   Hearing: Hearing is good. Activities of Daily Living: The home contains: no safety equipment. Patient needs help with:  transportation, preparing meals, laundry, housework, dressing, bathing and hygiene      Due to right wrist pain, been seeing Dr. Michelle Jensen. Pending surgery. Indicates that her new [employer] carrier has provided a care coordinator who has been to the house and is working on a plan of assistance. Ambulation: with no difficulty    Fall Risk:  Fall Risk Assessment, last 12 mths 1/17/2020   Able to walk? Yes   Fall in past 12 months? Yes   Fall with injury?  No   Number of falls in past 12 months 3   Fall Risk Score 3     TUG test 10 seconds    complains of poor balance - not interested in referral    Abuse Screen:  Patient is not abused    Cognitive Screening   Has your family/caregiver stated any concerns about your memory: no  Cognitive Screening: Normal - observation    Patient Care Team   Patient Care Team:  Mehreen Tellez MD as PCP - General (Family Practice)  Mehreen Tellez MD as PCP - [facility] Empaneled Provider  Efren Harley MD (Gastroenterology)  Patsy Pollard NP as Nurse Practitioner (Psychiatry)  Linda Estrada MD (Physical Medicine and Rehab)  Matthew Sosa MD (Psychiatry)  Mahin Osorio NP as Nurse Practitioner (Gastroenterology)  Marcio Villalta MD (Rheumatology)    Assessment/Plan   Education and counseling provided:  Patient has declined information on ACP. Diagnoses and all orders for this visit:    1. Medicare annual wellness visit, subsequent    2. Screening for alcoholism  -     [place] ANNUAL ALCOHOL SCREEN 15 MIN    3. Postmenopausal state  -     DEXA BONE DENSITY STUDY AXIAL; Future    4.  Encounter for immunization  -     ADMIN PNEUMOCOCCAL VACCINE  -     PNEUMOCOCCAL POLYSACCHARIDE VACCINE, 23-VALENT, ADULT OR IMMUNOSUPPRESSED PT DOSE,        Health Maintenance Due   Topic Date Due    DTaP/Tdap/Td series (1 - Tdap) 03/[birth year]/1965    Shingrix Vaccine Age 50> (1 of 2) 03/30/2004    GLAUCOMA SCREENING Q2Y  03/30/2019    Bone Densitometry (Dexa) Screening  03/30/2019    Pneumococcal 65+ years (2 of 2 - PPSV23) 09/27/2019    MEDICARE YEARLY EXAM  01/05/2020     Health Maintenance   Topic Date Due    DTaP/Tdap/Td series (1 - Tdap) 03/30/1965    Shingrix Vaccine Age 50> (1 of 2) 03/30/2004    GLAUCOMA SCREENING Q2Y  03/30/2019    Bone Densitometry (Dexa) Screening  03/30/2019    Pneumococcal 65+ years (2 of 2 - PPSV23) 09/27/2019    MEDICARE YEARLY EXAM  01/05/2020    BREAST CANCER SCRN MAMMOGRAM  12/05/2021    COLONOSCOPY  04/13/2027    Hepatitis C Screening  Completed    Influenza Age 5 to Adult  Completed

## 2020-01-17 NOTE — PATIENT INSTRUCTIONS
Medicare Wellness Visit, Female The best way to live healthy is to have a lifestyle where you eat a well-balanced diet, exercise regularly, limit alcohol use, and quit all forms of tobacco/nicotine, if applicable. Regular preventive services are another way to keep healthy. Preventive services (vaccines, screening tests, monitoring & exams) can help personalize your care plan, which helps you manage your own care. Screening tests can find health problems at the earliest stages, when they are easiest to treat. [facility] follows the current, evidence-based guidelines published by the [facility] ([facility]) when recommending preventive services for our patients. Because we follow these guidelines, sometimes recommendations change over time as research supports it. (For example, mammograms used to be recommended annually. Even though Medicare will still pay for an annual mammogram, the newer guidelines recommend a mammogram every two years for women of average risk). Of course, you and your doctor may decide to screen more often for some diseases, based on your risk and your co-morbidities (chronic disease you are already diagnosed with). Preventive services for you include: - Medicare offers their members a free annual wellness visit, which is time for you and your primary care provider to discuss and plan for your preventive service needs. Take advantage of this benefit every year! 
-All adults over the age of 72 should receive the recommended pneumonia vaccines. Current USPSTF guidelines recommend a series of two vaccines for the best pneumonia protection.  
-All adults should have a flu vaccine yearly and a tetanus vaccine every 10 years.  
-All adults age 48 and older should receive the shingles vaccines (series of two vaccines). -All adults age 38-68 who are overweight should have a diabetes screening test once every three years. -All adults born between [birth year] and 1966 should be screened once for Hepatitis C. 
-Other screening tests and preventive services for persons with diabetes include: an eye exam to screen for diabetic retinopathy, a kidney function test, a foot exam, and stricter control over your cholesterol.  
-Cardiovascular screening for adults with routine risk involves an electrocardiogram (ECG) at intervals determined by your doctor.  
-Colorectal cancer screenings should be done for adults age 54-65 with no increased risk factors for colorectal cancer. There are a number of acceptable methods of screening for this type of cancer. Each test has its own benefits and drawbacks. Discuss with your doctor what is most appropriate for you during your annual wellness visit. The different tests include: colonoscopy (considered the best screening method), a fecal occult blood test, a fecal DNA test, and sigmoidoscopy. 
 
-A bone mass density test is recommended when a woman turns 65 to screen for osteoporosis. This test is only recommended one time, as a screening. Some providers will use this same test as a disease monitoring tool if you already have osteoporosis. -Breast cancer screenings are recommended every other year for women of normal risk, age 54-69. 
-Cervical cancer screenings for women over age 72 are only recommended with certain risk factors. Here is a list of your current Health Maintenance items (your personalized list of preventive services) with a due date: 
Health Maintenance Due Topic Date Due  
 DTaP/Tdap/Td  (1 - Tdap) 03/30/1965  Shingles Vaccine (1 of 2) 03/30/2004  Glaucoma Screening   03/30/2019  Bone Mineral Density   03/30/2019  Pneumococcal Vaccine (2 of 2 - PPSV23) 09/27/2019 [address] [place] [address] Annual Well Visit  01/05/2020 Preventing Falls: Care Instructions Your Care Instructions Getting around your home safely can be a challenge if you have injuries or health problems that make it easy for you to fall. Loose rugs and furniture in walkways are among the dangers for many older people who have problems walking or who have poor eyesight. People who have conditions such as arthritis, osteoporosis, or dementia also have to be careful not to fall. You can make your home safer with a few simple measures. Follow-up care is a key part of your treatment and safety. Be sure to make and go to all appointments, and call your doctor if you are having problems. It's also a good idea to know your test results and keep a list of the medicines you take. How can you care for yourself at home? Taking care of yourself · You may get dizzy if you do not drink enough water. To prevent dehydration, drink plenty of fluids, enough so that your urine is light yellow or clear like water. Choose water and other caffeine-free clear liquids. If you have kidney, heart, or liver disease and have to limit fluids, talk with your doctor before you increase the amount of fluids you drink. · Exercise regularly to improve your strength, muscle tone, and balance. Walk if you can. Swimming may be a good choice if you cannot walk easily. · Have your vision and hearing checked each year or any time you notice a change. If you have trouble seeing and hearing, you might not be able to avoid objects and could lose your balance. · Know the side effects of the medicines you take. Ask your doctor or pharmacist whether the medicines you take can affect your balance. Sleeping pills or sedatives can affect your balance. · Limit the amount of alcohol you drink. Alcohol can impair your balance and other senses. · Ask your doctor whether calluses or corns on your feet need to be removed. If you wear loose-fitting shoes because of calluses or corns, you can lose your balance and fall. · Talk to your doctor if you have numbness in your feet. Preventing falls at home · Remove raised doorway thresholds, throw rugs, and clutter. Repair loose carpet or raised areas in the floor. · Move furniture and electrical cords to keep them out of walking paths. · Use nonskid floor wax, and wipe up spills right away, especially on ceramic tile floors. · If you use a walker or cane, put rubber tips on it. If you use crutches, clean the bottoms of them regularly with an abrasive pad, such as steel wool. · Keep your house well lit, especially [address], and outside walkways. Use night-lights in areas such as hallways and bathrooms. Add extra light switches or use remote switches (such as switches that go on or off when you clap your hands) to make it easier to turn lights on if you have to get up during the night. · Install sturdy handrails on stairways. · Move items in your cabinets so that the things you use a lot are on the lower shelves (about waist level). · Keep a cordless phone and a flashlight with new batteries by your bed. If possible, put a phone in each of the main rooms of your house, or carry a cell phone in case you fall and cannot reach a phone. Or, you can wear a device around your neck or wrist. You push a button that sends a signal for help. · Wear low-heeled shoes that fit well and give your feet good support. Use footwear with nonskid soles. Check the heels and soles of your shoes for wear. Repair or replace worn heels or soles. · Do not wear socks without shoes on wood floors. · Walk on the grass when the sidewalks are slippery. If you live in an area that gets snow and ice in the winter, sprinkle salt on slippery steps and sidewalks. Preventing falls in the bath · Install grab bars and nonskid mats inside and outside your shower or tub and near the toilet and sinks. · Use shower chairs and bath benches. · Use a hand-held shower head that will allow you to sit while showering.  
· Get into a tub or shower by putting the weaker leg in first. Get out of a tub or shower with your strong side first. 
· Repair loose toilet seats and consider installing a raised toilet seat to make getting on and off the toilet easier. · Keep your bathroom door unlocked while you are in the shower. Where can you learn more? Go to http://diana-lashonda.info/. Enter [phone] [id number] [phone] in the search box to learn more about \"Preventing Falls: Care Instructions. \" Current as of: March 16, 2018 Content Version: 11.8 © 5294-8086 [employer]. Care instructions adapted under license by [employer] (which disclaims liability or warranty for this information). If you have questions about a medical condition or this instruction, always ask your healthcare professional. [address] any warranty or liability for your use of this information.

## 2020-01-17 NOTE — PROGRESS NOTES
Patient here for [address] today. 1. Have you been to the ER, urgent care clinic since your last visit? Hospitalized since your last visit? Yes When: 1/14/2020 Where: [facility] Reason for visit: Stomach Pain  2. Have you seen or consulted any other health care providers outside of the [address] [place] [address] since your last visit? Include any pap smears or colon screening. No    Medication reconciliation has been completed with patient. Care team discussed/updated as well as pharmacy. Health Maintenance Due   Topic Date Due    DTaP/Tdap/Td series (1 - Tdap) 03/30/1965    Shingrix Vaccine Age 50> (1 of 2) 03/30/2004    GLAUCOMA SCREENING Q2Y  03/30/2019    Bone Densitometry (Dexa) Screening  03/30/2019    Pneumococcal 65+ years (2 of 2 - PPSV23) 09/27/2019    MEDICARE YEARLY EXAM  01/05/2020     Health Maintenance reviewed - MWV today. Darian Hughes

## 2020-01-17 NOTE — PROGRESS NOTES
Nick Andre is a 71 y.o. female who was seen in clinic today (1/17/2020). Assessment & Plan:   Diagnoses and all orders for this visit:    1. Essential hypertension  -     losartan (COZAAR) 25 mg tablet; TAKE ONE TABLET BY MOUTH ONE TIME DAILY  -     MICROALBUMIN, UR, RAND W/ MICROALB/CREAT RATIO; Future  -     METABOLIC PANEL, BASIC; Future  -     CBC WITH AUTOMATED DIFF; Future    2. Non-cardiac chest pain    3. Hypercholesterolemia    4. Prediabetes    5. Urinary incontinence, unspecified type    6. Bipolar 2 disorder ([facility] [address]      Hypertension: Well controlled, continue present management    Non cardiac chest pain: relief with burping/passing gas points to that as cause. Encouraged trial of gas relieving agents over OTC analgesics    Lipids: Well controlled, continue present management  PreDM: no meds indicated. Metformin would be challenging with GI issues. Urinary incontinence unclear etiology: pursuing urology referral    Bipolar disorder: Being evaluated/managed by NP Man Orr. No acute findings today meriting change in management plan. Follow-up and Dispositions    · Return in about 3 months (around 4/17/2020) for chronic medical conditions, non fasting labs 1 week prior, (30). Subjective:   Nick Andre was seen today for Follow Up Chronic Condition    Notes received from BETO Humphreys/Dr. Femi Lopez dated 1/10/2020: Lower abdominal pain with chills tentatively attributed to subclinical diverticulitis. Treated with ciprofloxacin and metronidazole with recommendation to follow-up in 2 to 4 weeks. Feeling much better. Follow-up hypertension: no headaches since gained good control    Key CAD CHF Meds             atorvastatin (LIPITOR) 40 mg tablet (Taking) TAKE ONE TABLET BY MOUTH ONE TIME DAILY    cloNIDine HCl (CATAPRES) 0.1 mg tablet (Taking) Take 0.3 mg by mouth nightly.     losartan (COZAAR) 25 mg tablet (Taking) TAKE ONE TABLET BY MOUTH ONE TIME DAILY            Non cardiac chest pain relieved with Excedrin 2-3x/week. Notes that she'll pass gas when having that pain, or may burp with relief. Never tried Gaviscon for it. Follow-up urinary incontinence unclear type: Referred urology 10/17/2019. Says when tried to make appointment, was told that doctor no longer there. Intake with another physician not offered. Follow-up prediabetes: no meds    Follow up bipolar disorder: switched insurance to be able to continue to receive care with NP Lauri Guardian. Clonidine rx'ed recently for insomnia. Working well (replaced Ambien)      Lab Results   Component Value Date/Time    Sodium 143 10/17/2019 11:15 AM    Potassium 5.3 (H) 10/17/2019 11:15 AM    Chloride 101 10/17/2019 11:15 AM    CO2 23 10/17/2019 11:15 AM    Anion gap 5 08/06/2019 12:55 PM    Glucose 82 10/17/2019 11:15 AM    BUN 14 10/17/2019 11:15 AM    Creatinine 0.95 10/17/2019 11:15 AM    BUN/Creatinine ratio 15 10/17/2019 11:15 AM    GFR est AA 73 10/17/2019 11:15 AM    GFR est non-AA 63 10/17/2019 11:15 AM    Calcium 10.3 10/17/2019 11:15 AM    Bilirubin, total 0.3 10/17/2019 11:15 AM    AST (SGOT) 25 10/17/2019 11:15 AM    Alk.  phosphatase 171 (H) 10/17/2019 11:15 AM    Protein, total 8.1 10/17/2019 11:15 AM    Albumin 4.8 10/17/2019 11:15 AM    Globulin 4.4 (H) 08/06/2019 12:55 PM    A-G Ratio 1.5 10/17/2019 11:15 AM    ALT (SGPT) 20 10/17/2019 11:15 AM     Lab Results   Component Value Date/Time    Cholesterol, total 189 10/17/2019 11:15 AM    HDL Cholesterol 60 10/17/2019 11:15 AM    LDL, calculated 102 (H) 10/17/2019 11:15 AM    VLDL, calculated 27 10/17/2019 11:15 AM    Triglyceride 137 10/17/2019 11:15 AM    CHOL/HDL Ratio 2.9 03/07/2018 02:41 PM     Lab Results   Component Value Date/Time    Hemoglobin A1c 6.2 (H) 10/17/2019 11:15 AM    Hemoglobin A1c 6.0 (H) 11/26/2018 12:00 AM    Hemoglobin A1c 6.1 (H) 07/06/2018 01:18 PM    Hemoglobin A1c, External 5.9 06/27/2016    Glucose 82 10/17/2019 11:15 AM    Microalb/Creat ratio (ug/mg creat.) 8.1 10/18/2019 02:17 AM    LDL, calculated 102 (H) 10/17/2019 11:15 AM    Creatinine, POC 0.9 09/06/2017 11:43 AM    Creatinine 0.95 10/17/2019 11:15 AM          Prior to Admission medications    Medication Sig Start Date End Date Taking? Authorizing Provider   promethazine (PHENERGAN) 12.5 mg tablet Take 12.5 mg by mouth. 11/2/19  Yes Provider, Historical   dicyclomine (BENTYL) 20 mg tablet Take 20 mg by mouth four (4) times daily as needed for Abdominal Cramps. Yes Provider, Historical   metoclopramide HCl (REGLAN) 5 mg tablet Take 5 mg by mouth Before breakfast, lunch, and dinner. Yes Provider, Historical   QUEtiapine (SEROQUEL) 200 mg tablet Take 1 Tab by mouth daily. 10/18/19  Yes Augustine Dunbar MD   cholecalciferol, vitamin D3, 2,000 unit tab Take  by mouth. Yes Provider, Historical   atorvastatin (LIPITOR) 40 mg tablet TAKE ONE TABLET BY MOUTH ONE TIME DAILY 10/17/19  Yes Augustine Dunbar MD   fluticasone propionate (FLONASE) 50 mcg/actuation nasal spray 2 Sprays by Both Nostrils route as needed for Rhinitis. 10/17/19  Yes Augustine Dunbar MD   cloNIDine HCl (CATAPRES) 0.1 mg tablet Take 0.3 mg by mouth nightly. 9/16/19  Yes Provider, Historical   polyethylene glycol (MIRALAX) 17 gram packet Take 1 Packet by mouth daily. 5/6/19  Yes Augustine Dunbar MD   losartan (COZAAR) 25 mg tablet TAKE ONE TABLET BY MOUTH ONE TIME DAILY 4/18/19  Yes Augustine Dunbar MD   oxyCODONE IR (ROXICODONE) 10 mg tab immediate release tablet 10 mg every eight (8) hours as needed. 2/12/19  Yes Provider, Historical   valACYclovir (VALTREX) 500 mg tablet Take 1 Tab by mouth daily. Patient taking differently: Take  by mouth daily. 2/12/19  Yes Augustine Dunbar MD   DEXILANT 60 mg CpDB capsule (delayed release) Take 120 mg by mouth daily. 2/3/19  Yes Provider, Historical   morphine CR (MS CONTIN) 60 mg CR tablet Take 30 mg by mouth every twelve (12) hours.    Yes Provider, Historical   diclofenac (VOLTAREN) 1 % gel Apply  to affected area four (4) times daily. 12/27/18  Yes James Alarcon DO   cyclobenzaprine (FLEXERIL) 10 mg tablet TAKE 1 TABLET BY MOUTH THREE TIMES DAILY AS NEEDED FOR MUSCLE SPASMS FOR UP TO 30 DAYS. 10/1/18  Yes Bello Ramsey., PA   benzocaine (ANBESOL, BENZOCAINE, MAX STR) 20 % gel topical gel Apply  to affected area four (4) times daily as needed for Pain. Indications: irritation of the mouth  1/17/20  Provider, Historical   zolpidem (AMBIEN) 10 mg tablet  10/14/19 1/17/20  Provider, Historical   chlorhexidine gluconate, bulk, (HIBICLENS) 20 % soln liquid Swish and spit twice daily 10/17/19 1/17/20  Phillip Paulino MD   naloxone [facility]) 4 mg/actuation nasal spray 1 [place] by IntraNASal route once as needed. Use 1 spray intranasally into 1 nostril. Use a new Narcan nasal spray for subsequent doses and administer into alternating nostrils. May repeat every 2 to 3 minutes as needed. Other, MD Xenia         Patient Active Problem List    Diagnosis    Gastroparesis     Dr. Lauren Streeter 8/25/2019: documented with gastric emptying study. Declined meds. Small meals, low fiber, low fat diet.  Migraine without aura and without status migrainosus, not intractable    Essential hypertension    Obesity (BMI 30.0-34. 9)    Prediabetes    Angular cheilitis with candidiasis    Diverticulosis of large intestine without hemorrhage     Julian 4/2017      Hiatal hernia     h pylori tx 3/2013; multiple EGDs 1/21/2015, 5/24/2017, 4/13/2017, 2/21/2018 - normal mucosa      Hepatic steatosis     US 1/13/2017      Calculus of gallbladder without cholecystitis     CT 12/22/2016 also with duodenal diverticulum      Gastroesophageal reflux disease without esophagitis     6/3/2019 EGD De Diaz MD: abnormal motility, large HH, normal duod, esoph and gastric mucosa, diverticulum third part of duodenum      Urinary retention    Vitamin D deficiency    Benign essential hypertension with target blood pressure below 140/90    Myalgia    Nausea    Hypercholesterolemia    Lung nodule seen on imaging study    Degeneration of lumbar or lumbosacral intervertebral disc    Displacement of lumbar intervertebral disc without myelopathy    Pain in joint, multiple sites    Cervicalgia    Postlaminectomy syndrome, cervical region    Annular tear of lumbar disc    Chronic pain syndrome    Carpal tunnel syndrome    Bipolar 2 disorder [facility])     Per conversation Dr. Lukas Delacruz 2019 based on review of her records there. Treated with Seroquel.  Lumbago    Other affections of shoulder region, not elsewhere classified    Other chronic pain         Allergies   Allergen Reactions    Aspirin Other (comments)     Stomach bleeding    Ativan [Lorazepam] Shortness of Breath    Bactrim [Sulfamethoprim] Rash    Keflex [Cephalexin] Rash    Macrobid [Nitrofurantoin Monohyd/M-Cryst] Nausea and Vomiting    Nsaids (Non-Steroidal Anti-Inflammatory Drug) Nausea and Vomiting    Opana [Oxymorphone] Other (comments)     Insomnia, weight loss, nightmares    Pcn [Penicillins] Hives        Social History     Tobacco Use    Smoking status: Former Smoker     Packs/day: 0.50     Years: 3.00     Pack years: 1.50     Last attempt to quit: 3/6/1993     Years since quittin.8    Smokeless tobacco: Never Used   Substance Use Topics    Alcohol use: No       Patient Care Team:  Nu Justice MD as PCP - General (Family Practice)  Nu Justice MD as PCP - [facility] Empaneled Provider  Teresa Obrien MD (Gastroenterology)  Melyssa Henry NP as Nurse Practitioner (Psychiatry)  Jayden Oreilly MD (Physical Medicine and Rehab)  Shekhar Pacheco MD (Psychiatry)  Yakov Pate NP as Nurse Practitioner (Gastroenterology)  Zoey Wilkins MD (Rheumatology)    The following sections were reviewed & updated as appropriate: PMH, PSH, FH, and SH. Review of Systems   Constitutional: Negative for fever. Neurological: Negative for sensory change and headaches. Objective:     Visit Vitals  /68   Pulse 96   Temp 98.2 °F (36.8 °C) (Oral)   Resp 14   Ht 5' 4\" (1.626 m)   Wt 172 lb 9.6 oz (78.3 kg)   SpO2 98%   BMI 29.63 kg/m²      Physical Exam  Constitutional:       General: She is not in acute distress. Appearance: She is well-developed. She is obese. She is not diaphoretic. HENT:      Head: Normocephalic and atraumatic. Cardiovascular:      Rate and Rhythm: Normal rate and regular rhythm. Heart sounds: Normal heart sounds. No murmur. No friction rub. No gallop. Pulmonary:      Effort: Pulmonary effort is normal. No respiratory distress. Breath sounds: Normal breath sounds. No wheezing, rhonchi or rales. Musculoskeletal:      Right lower leg: No edema. Left lower leg: No edema. Skin:     General: Skin is warm and dry. Nails: There is no clubbing. Neurological:      Mental Status: She is alert and oriented to person, place, and time. Psychiatric:         Behavior: Behavior normal.         Thought Content: Thought content normal.         Judgment: Judgment normal.           Disclaimer: The patient understands our medical plan. Alternatives have been explained and offered. The risks, benefits and significant side effects of all medications have been reviewed. Anticipated time course and progression of condition reviewed. All questions have been addressed. She is encouraged to employ the information provided in the after visit summary, which was reviewed. Where applicable, she is instructed to call the clinic if she has not been notified either by phone or through [address] with the results of her tests or with an appointment plan for any referrals within 1 week(s). No news is not good news; it's no news. The patient  is to call if her condition worsens or fails to improve or if significant side effects are experienced.      Aspects of this note may have been generated using voice recognition software. Despite editing, there may be unrecognized errors.        Lisa Carbajal MD

## 2020-01-17 NOTE — PROGRESS NOTES
Patient here for chronic condition f/u. No concerns. 1. Have you been to the ER, urgent care clinic since your last visit? Hospitalized since your last visit? Yes When: Jan. 2020 [facility]: [facility] Reason for visit: Stomach pain  2. Have you seen or consulted any other health care providers outside of the [address] [place] [address] since your last visit? Include any pap smears or colon screening. Yes When: 1/14/20 Where: [facility] Reason for visit: f/u    Medication reconciliation has been completed with patient. Care team discussed/updated as well as pharmacy. Health Maintenance Due   Topic Date Due    DTaP/Tdap/Td series (1 - Tdap) 03/30/1965    Shingrix Vaccine Age 50> (1 of 2) 03/30/2004    GLAUCOMA SCREENING Q2Y  03/30/2019    Bone Densitometry (Dexa) Screening  03/30/2019    Pneumococcal 65+ years (2 of 2 - PPSV23) 09/27/2019    MEDICARE YEARLY EXAM  01/05/2020     Health Maintenance reviewed - MWV today.

## 2020-01-17 NOTE — ACP (ADVANCE CARE PLANNING)
Advance Care Planning (ACP) Provider Note - Comprehensive     Date of ACP Conversation: 01/17/20  Persons included in Conversation:  patient  Length of ACP Conversation in minutes: <16 minutes (Non-Billable)    Authorized Decision Maker (if patient is incapable of making informed decisions): This person is: Other Legally Authorized Decision Maker (e.g. Next of Kin)    Jimmy Wang    She has NO advanced directive  - add't info provided. General ACP for ALL Patients with Decision Making Capacity:  Importance of advance care planning, including choosing a healthcare agent to communicate patient's healthcare decisions if patient lost the ability to make decisions, such as after a sudden illness or accident  Understanding of the healthcare agent role was assessed and information provided  Opportunity offered to explore how cultural, Taoist, spiritual, or personal beliefs would affect decisions for future care  Exploration of values, goals, and preferences if recovery is not expected, even with continued medical treatment in the event of: Imminent death or severe, permanent brain injury: \"In these circumstances, what matters most to you? \"  Care focused more on comfort or quality of life.         Interventions Provided:  Recommended communicating the plan and making copies for the healthcare agent, personal physician, and others as appropriate (e.g., health system)  Recommended review of completed ACP document annually or upon change in health status

## 2020-01-29 ENCOUNTER — PATIENT OUTREACH (OUTPATIENT)
Dept: FAMILY MEDICINE CLINIC | Age: 65
End: 2020-01-29

## 2020-01-29 NOTE — PROGRESS NOTES
[facility] Follow up         Follow up Dx:  [address] contacted the patient by telephone to perform follow up assessment. Verified  and address with patient as identifiers. PCP/Specialist Appointments:  Attended PCP appointment on 20. Attended GI appointment on 1/10/20; states she was given an antibiotic and no longer has nausea or abdominal pain. Attended dentist appointment on 20; states she was given a gel to use in mouth as well as dentist Brittani Salen my tooth down\" and she no longer has problem with blisters in her mouth. Patient given an opportunity to ask questions. No other clinical/social/functional needs noted. Patient reminded that there are physicians on call 24 hours a day / 7 days a week (- 5pm to 8am and from Friday 5pm until Monday 8am for the weekend) should the patient have questions or concerns. [facility] provided contact information for future reference. The patient agrees to contact the PCP office for questions related to their healthcare.

## 2020-02-13 ENCOUNTER — TELEPHONE (OUTPATIENT)
Dept: FAMILY MEDICINE CLINIC | Age: 65
End: 2020-02-13

## 2020-02-13 NOTE — TELEPHONE ENCOUNTER
Patient called and would like to know if Dr. Sandie Go can order her pads/diapers through [facility]. She states thye had faxed something months ago but she can no longer afford to pay for them. She can be reached at [phone]. She is aware Dr. Sandie Go is out of the office today.

## 2020-02-17 NOTE — TELEPHONE ENCOUNTER
Called patient back had her verify her , she says she has not seen Urology yet the one she called no longer practices. She is asking if Dr. Mychal Stevenson can signed off on her incontinence supplies says she can no longer afford to buy these OTC. Told I will look into her Urology referral and will call [facility] to get a new order faxed to our office.

## 2020-02-18 NOTE — TELEPHONE ENCOUNTER
Urology consult request has been faxed to [facility] to contact patient for an appointment, she is asking if Dr. Judy Angeles can sign order form for her incontinence supplies says she can not afford to buy OTC, called patient this AM to verify sizes she will need, form has been placed in Dr. Amelia Salvador office for review.

## 2020-02-20 ENCOUNTER — OFFICE VISIT (OUTPATIENT)
Dept: ORTHOPEDIC SURGERY | Facility: CLINIC | Age: 65
End: 2020-02-20

## 2020-02-20 VITALS
HEART RATE: 100 BPM | WEIGHT: 173.8 LBS | OXYGEN SATURATION: 100 % | DIASTOLIC BLOOD PRESSURE: 91 MMHG | SYSTOLIC BLOOD PRESSURE: 141 MMHG | BODY MASS INDEX: 29.67 KG/M2 | HEIGHT: 64 IN | RESPIRATION RATE: 18 BRPM | TEMPERATURE: 96.9 F

## 2020-02-20 DIAGNOSIS — M79.7 FIBROMYALGIA: ICD-10-CM

## 2020-02-20 DIAGNOSIS — M65.4 DE QUERVAIN'S DISEASE (TENOSYNOVITIS): Primary | ICD-10-CM

## 2020-02-20 DIAGNOSIS — M25.531 RIGHT WRIST PAIN: ICD-10-CM

## 2020-02-20 RX ORDER — BETAMETHASONE SODIUM PHOSPHATE AND BETAMETHASONE ACETATE 3; 3 MG/ML; MG/ML
6 INJECTION, SUSPENSION INTRA-ARTICULAR; INTRALESIONAL; INTRAMUSCULAR; SOFT TISSUE ONCE
Qty: 0.5 ML | Refills: 0
Start: 2020-02-20 | End: 2020-02-20

## 2020-02-20 NOTE — PROGRESS NOTES
Patient: Ayanna Barr                MRN: [id number]       SSN: [id number]  YOB: 1955        AGE: 71 y.o. SEX: female    PCP: Sharath Calderon MD  02/20/20    CC: RIGHT WRIST PAIN    HISTORY:  Ayanna Barr is a 71 y.o. female female who is seen for increased wrist pain and swelling. She is s/p left extensor tenosynovectomy 7/11/18. She states that physical therapy increased her pain. She notes a small firm lump has formed under the skin over the radial aspect of her left wrist. She had been experiencing extreme pain with use of her hand or wrist. She relates her pain to a headache. She was seen by Dr. Darryl Iniguez on 1/31/19 who she states told her she has tender skin. She responded temporarily to her previous injection. She states she first noticed a swelling overlying her dorsal left wrist while in physical therapy. She noted increased left wrist pain after noticing the swelling. She states that she is left handed so most everything she does causes her pain. She is experiencing pain with gripping and pinching.      She was previously being seen by Dr. Rosa Pedroza for a left toe fracture. She was previously seen by Dr. Marlon Li in 2000 for her fibromyalgia. She is currently in pain management. She is s/p anterior cervical fusion by Dr. Thomas Mercedes. She reports she has been dropping things recently.       Pain Assessment  2/20/2020   Location of Pain Wrist   Pain Location Comment -   Location Modifiers Right   Severity of Pain 9   Quality of Pain Throbbing;Aching   Quality of Pain Comment -   Duration of Pain Persistent   Frequency of Pain Constant   Aggravating Factors (No Data)   Aggravating Factors Comment Cannot put hand to use because of the pain   Limiting Behavior -   Relieving Factors Rest;Heat   Relieving Factors Comment Ice doesn't help, it makes it worse   Result of Injury No   Work-Related Injury -   Type of Injury -     Occupation, etc:  Ms. Roxy Bruner has received social security disability benefits for low back pain and OA since 2000. She previously worked at [employer] and was training for management. She lives alone in [place]. Her daughter is employed as her aid. She has another adult daughter. She also lost a son due to a horrific motorcycle accident a few years ago. She has 5 grandchildren- two of which are adopted. Her grandson is in the [employer]. Current weight is 173 pounds. She is 5'4\" tall. She is a diet controlled diabetic. She is not hypertensive. She is left handed. She reports a h/o acid reflux and a stomach ulcer.      Lab Results   Component Value Date/Time    Hemoglobin A1c 6.2 (H) 10/17/2019 11:15 AM    Hemoglobin A1c, External 5.9 06/27/2016     Weight Metrics 2/20/2020 1/17/2020 1/17/2020 10/25/2019 10/17/2019 10/7/2019 10/4/2019   Weight 173 lb 12.8 oz 172 lb 9.6 oz 172 lb 9.6 oz 171 lb 171 lb 6.4 oz - 174 lb   BMI 29.83 kg/m2 29.63 kg/m2 29.63 kg/m2 29.35 kg/m2 29.42 kg/m2 29.87 kg/m2 -       Patient Active Problem List   Diagnosis Code    Lumbago M54.5    Other affections of shoulder region, not elsewhere classified M75.80    Other chronic pain G89.29    Bipolar 2 disorder ([facility] [address] F31.81    Chronic pain syndrome G89.4    Carpal tunnel syndrome G56.00    Degeneration of lumbar or lumbosacral intervertebral disc M51.37    Displacement of lumbar intervertebral disc without myelopathy M51.26    Pain in joint, multiple sites M25.50    Cervicalgia M54.2    Postlaminectomy syndrome, cervical region M96.1    Annular tear of lumbar disc M51.36    Lung nodule seen on imaging study R91.1    Hypercholesterolemia E78.00    Nausea R11.0    Myalgia M79.10    Vitamin D deficiency E55.9    Urinary retention R33.9    Gastroesophageal reflux disease without esophagitis K21.9    Diverticulosis of large intestine without hemorrhage K57.30    Hiatal hernia K44.9    Hepatic steatosis K76.0    Calculus of gallbladder without cholecystitis K80.20    Obesity (BMI 30.0-34. 9) E66.9    Prediabetes R73.03    Angular cheilitis with candidiasis B37.0    Essential hypertension I10    Migraine without aura and without status migrainosus, not intractable G43.009    Gastroparesis K31.84    Urinary incontinence R32    Non-cardiac chest pain R07.89     REVIEW OF SYSTEMS: All Below are Negative except: See HPI   Constitutional: negative for fever, chills, and weight loss. Cardiovascular: negative for chest pain, claudication, leg swelling, SOB, VILLAGOMEZ   Gastrointestinal: Negative for pain, N/V/C/D, Blood in stool or urine, dysuria,  hematuria, incontinence, pelvic pain. Musculoskeletal: See HPI   Neurological: Negative for dizziness and weakness. Negative for headaches, Visual changes, confusion, seizures   Phychiatric/Behavioral: Negative for depression, memory loss, substance  abuse. Extremities: Negative for hair changes, rash, or skin lesion changes. Hematologic: Negative for bleeding problems, bruising, pallor or swollen lymph  nodes   Peripheral Vascular: No calf pain, no circulation deficits.     Social History     Socioeconomic History    Marital status:      Spouse name: Not on file    Number of children: Not on file    Years of education: Not on file    Highest education level: Not on file   Occupational History    Not on file   Social Needs    Financial resource strain: Not on file    Food insecurity:     Worry: Not on file     Inability: Not on file    Transportation needs:     Medical: Not on file     Non-medical: Not on file   Tobacco Use    Smoking status: Former Smoker     Packs/day: 0.50     Years: 3.00     Pack years: 1.50     Last attempt to quit: 3/6/1993     Years since quittin.9    Smokeless tobacco: Never Used   Substance and Sexual Activity    Alcohol use: No    Drug use: No     Comment: Last smoked in -cocaine    Sexual activity: Never   Lifestyle    Physical activity:     Days per week: Not on file     Minutes per session: Not on file    Stress: Not on file   Relationships    Social connections:     Talks on phone: Not on file     Gets together: Not on file     Attends Voodoo service: Not on file     Active member of club or organization: Not on file     Attends meetings of clubs or organizations: Not on file     Relationship status: Not on file    Intimate partner violence:     Fear of current or ex partner: Not on file     Emotionally abused: Not on file     Physically abused: Not on file     Forced sexual activity: Not on file   Other Topics Concern    Not on file   Social History Narrative    Not on file      Allergies   Allergen Reactions    Aspirin Other (comments)     Stomach bleeding    Ativan [Lorazepam] Shortness of Breath    Bactrim [Sulfamethoprim] Rash    Keflex [Cephalexin] Rash    Macrobid [Nitrofurantoin Monohyd/M-Cryst] Nausea and Vomiting    Nsaids (Non-Steroidal Anti-Inflammatory Drug) Nausea and Vomiting    Opana [Oxymorphone] Other (comments)     Insomnia, weight loss, nightmares    Pcn [Penicillins] Hives      Current Outpatient Medications   Medication Sig    promethazine (PHENERGAN) 12.5 mg tablet Take 12.5 mg by mouth.  losartan (COZAAR) 25 mg tablet TAKE ONE TABLET BY MOUTH ONE TIME DAILY    dicyclomine (BENTYL) 20 mg tablet Take 20 mg by mouth four (4) times daily as needed for Abdominal Cramps.  metoclopramide HCl (REGLAN) 5 mg tablet Take 5 mg by mouth Before breakfast, lunch, and dinner.  QUEtiapine (SEROQUEL) 200 mg tablet Take 1 Tab by mouth daily.  cholecalciferol, vitamin D3, 2,000 unit tab Take  by mouth.  atorvastatin (LIPITOR) 40 mg tablet TAKE ONE TABLET BY MOUTH ONE TIME DAILY    fluticasone propionate (FLONASE) 50 mcg/actuation nasal spray 2 Sprays by Both Nostrils route as needed for Rhinitis.  cloNIDine HCL (CATAPRES) 0.3 mg tablet Take 0.3 mg by mouth nightly.  polyethylene glycol (MIRALAX) 17 gram packet Take 1 Packet by mouth daily.     oxyCODONE IR (ROXICODONE) 10 mg tab immediate release tablet 10 mg every eight (8) hours as needed.  valACYclovir (VALTREX) 500 mg tablet Take 1 Tab by mouth daily. (Patient taking differently: Take  by mouth daily.)    DEXILANT 60 mg CpDB capsule (delayed release) Take 120 mg by mouth daily.  morphine CR (MS CONTIN) 60 mg CR tablet Take 30 mg by mouth every twelve (12) hours.  diclofenac (VOLTAREN) 1 % gel Apply  to affected area four (4) times daily.  cyclobenzaprine (FLEXERIL) 10 mg tablet TAKE 1 TABLET BY MOUTH THREE TIMES DAILY AS NEEDED FOR MUSCLE SPASMS FOR UP TO 30 DAYS.  naloxone (NARCAN) 4 mg/actuation nasal spray 1 [place] by IntraNASal route once as needed. Use 1 spray intranasally into 1 nostril. Use a new Narcan nasal spray for subsequent doses and administer into alternating nostrils. May repeat every 2 to 3 minutes as needed. No current facility-administered medications for this visit.        PHYSICAL EXAMINATION:  Visit Vitals  BP (!) 141/91   Pulse 100   Temp 96.9 °F (36.1 °C) (Oral)   Resp 18   Ht 5' 4\" (1.626 m)   Wt 173 lb 12.8 oz (78.8 kg)   SpO2 100%   BMI 29.83 kg/m²     ORTHO EXAMINATION:  Examination Right Wrist Left Wrist   Skin Intact    Tenderness + dorsum, fourth extensor compartment + dorsum, fourth extensor compartment   Flexion 40 40   Extension 30 30   Deformity - -   Effusion - -   Finger flexion Full Full   Finger extension Weak active with pain Weak active with pain   Tinel's sign - -   Phalen's test - -   Finklestein maneuver + -   Pain with thumb abduction - -   Capillary refill - -   No evidence of extensor tendon rupture  Pain with active wrist and finger extension  Flat affect    TIME OUT:   Chart reviewed for the following:   Curt Bryan MD, have reviewed the History, Physical and updated the Allergic reactions for [facility]   TIME OUT performed immediately prior to start of procedure:  Curt Bryan MD, have performed the following reviews on [address] prior to the start of the procedure:  * Patient was identified by name and date of birth   * Agreement on procedure being performed was verified  * Risks and Benefits explained to the patient  * Procedure site verified and marked as necessary  * Patient was positioned for comfort  * Consent was obtained     Time: 1:52 PM    Date of procedure: 2/20/2020  Procedure performed by:  Theo Easley MD  Ms. Hutchinson tolerated the procedure well with no complications. MRI LEFT WRIST WO CONT 1/22/18  IMPRESSION:   1. Extensive fluid surrounding the fourth extensor compartment tendons, consistent with tenosynovitis. Perhaps minimal tenosynovitis of the second extensor compartment as well. -Marrow edema in the dorsal lip of the distal radius, extending towards styloid, of uncertain etiology. If tenosynovitis is inflammatory or infectious, these findings may be reactive. Also consider contusion in the appropriate setting.    2. Probable degeneration of the TFCC and scapholunate ligament. Mild triscaphe degenerative change. IMPRESSION:      ICD-10-CM ICD-9-CM    1. De Quervain's disease (tenosynovitis) M65.4 727.04 betamethasone (CELESTONE SOLUSPAN) 6 mg/mL injection      BETAMETHASONE ACETATE & SODIUM PHOSPHATE INJECTION 3 MG EA.      NV INJECT TENDON SHEATH/LIGAMENT   2. Right wrist pain M25.531 719.43 betamethasone (CELESTONE SOLUSPAN) 6 mg/mL injection      BETAMETHASONE ACETATE & SODIUM PHOSPHATE INJECTION 3 MG EA.      NV INJECT TENDON SHEATH/LIGAMENT   3. Fibromyalgia M79.7 729.1      PLAN:  After discussing treatment options, patient's dorsal wrist extensor compartment was injected with 1/2 cc Marcaine and 1/2 cc Celestone. She will continue to follow up with pain management. She will follow up PRN with Dr. Ankush Person for orthopedic hand surgery consultation.      Scribed by Magdalena Snider  ([address]) as dictated by Theo Easley MD

## 2020-02-20 NOTE — PROGRESS NOTES
1. Have you been to the ER, urgent care clinic since your last visit? Hospitalized since your last visit? Yes When: Last week of January  Where: [facility]  Reason for visit: Stomach pain     2. Have you seen or consulted any other health care providers outside of the [address] [place] [address] since your last visit? Include any pap smears or colon screening.  No

## 2020-02-20 NOTE — PROGRESS NOTES
Verbal order given by Dr. Enid Hester to draw up 0.5 mL 0.25% Sensorcaine and 0.5 mL 30 mg/5mL Betamethasone.

## 2020-02-21 ENCOUNTER — TELEPHONE (OUTPATIENT)
Dept: FAMILY MEDICINE CLINIC | Age: 65
End: 2020-02-21

## 2020-02-21 NOTE — TELEPHONE ENCOUNTER
[address] verified they has received fax on patient, they have sent over another form that has to be completed because of her insurance told I will have Dr. Marlene Shoemaker sign this and call her once it has been faxed.

## 2020-02-21 NOTE — TELEPHONE ENCOUNTER
Called patient told her order has been faxed she says she called and was told nothing had been received told I will re-fax and f/u on status and let her know.

## 2020-03-04 ENCOUNTER — OFFICE VISIT (OUTPATIENT)
Dept: ORTHOPEDIC SURGERY | Facility: CLINIC | Age: 65
End: 2020-03-04

## 2020-03-04 VITALS
RESPIRATION RATE: 18 BRPM | OXYGEN SATURATION: 99 % | TEMPERATURE: 97.5 F | HEART RATE: 108 BPM | HEIGHT: 64 IN | BODY MASS INDEX: 29.67 KG/M2 | SYSTOLIC BLOOD PRESSURE: 109 MMHG | DIASTOLIC BLOOD PRESSURE: 73 MMHG | WEIGHT: 173.8 LBS

## 2020-03-04 DIAGNOSIS — M05.731 RHEUMATOID ARTHRITIS INVOLVING RIGHT WRIST WITH POSITIVE RHEUMATOID FACTOR (HCC): ICD-10-CM

## 2020-03-04 DIAGNOSIS — M65.4 DE QUERVAIN'S TENOSYNOVITIS, RIGHT: Primary | ICD-10-CM

## 2020-03-04 DIAGNOSIS — M67.431 GANGLION CYST OF DORSUM OF RIGHT WRIST: ICD-10-CM

## 2020-03-04 NOTE — PROGRESS NOTES
Christopher Goel is a 71 y.o. female ambidextrous on disability. Worker's Compensation and legal considerations: none filed. Vitals:    03/04/20 1037   BP: 109/73   Pulse: (!) 108   Resp: 18   Temp: 97.5 °F (36.4 °C)   TempSrc: Oral   SpO2: 99%   Weight: 173 lb 12.8 oz (78.8 kg)   Height: 5' 4\" (1.626 m)   PainSc:   8   PainLoc: Wrist           Chief Complaint   Patient presents with    Wrist Pain     Right       HPI: Patient comes in today with complaints of right wrist pain and cyst in the area. She reports previous injections that did not help. She is requesting to have the cyst removed. She also reports pain that goes up her arm. She recently had some rheumatoid labs done that were positive for inflammatory labs as well as a rheumatoid factor. However she saw a rheumatologist who told her she does not have rheumatoid arthritis. Upon inspecting the rheumatologist note, there may be a discrepancy between the lab results and what is listed in his note regarding the positive rheumatoid factor. Previous HPI:  Patient comes in today 1 month early for her scheduled follow-up appointment. She came over from therapy because she is concerned about cyst in her dorsum of her hand. She was recently seen by her primary care physician who I cannot communicated with regarding a lab workup for possible inflammatory arthropathy. She has not gotten his labs done and she is not sure if she is supposed to get them before seeing her primary care doctor. She says that the therapy is hurting her and her hand is painful all the way up her arm even to light touch. She is Several months status post extensor tenosynovectomy by my partner.     Initial HPI: Patient returns today for 3-month follow-up    Date of onset:  3 months on the right wrist, and almost 2 mons postop on the left    Injury: No    Prior Treatment:  Yes: Comment: Tenosynovectomy of the L hand and therapy    Numbness/ Tingling: No    Review of Systems - General ROS: negative  Respiratory ROS: no cough, shortness of breath, or wheezing  Cardiovascular ROS: no chest pain or dyspnea on exertion  Musculoskeletal ROS: positive for - pain in hand - bilateral  Neurological ROS: negative  Dermatological ROS: negative    Past Medical History:   Diagnosis Date    Annular tear of lumbar disc 11/10/2014    Asthma     Bronchitis    Benign tumor of throat     Bone spur     right ankle    Cervicalgia 11/10/2014    Chronic pain     back    Degeneration of lumbar or lumbosacral intervertebral disc 11/10/2014    Degenerative disc disease     Depression     Displacement of lumbar intervertebral disc without myelopathy 11/10/2014    Elevated white blood cell count     Fibromyalgia     GERD (gastroesophageal reflux disease)     Hypertension     Insomnia     Nausea & vomiting     Pain in joint, multiple sites 11/10/2014    Postlaminectomy syndrome, cervical region 11/10/2014    Sinus infection 10/5/15    Ulcer        Past Surgical History:   Procedure Laterality Date    COLONOSCOPY N/A 4/13/2017    COLONOSCOPY performed by Erika Hallman MD at [facility]COPY    HX CATARACT REMOVAL      HX CERVICAL FUSION      HX HYSTERECTOMY      HX ORTHOPAEDIC      ganglion cyst removed, right hand    HX OTHER SURGICAL      cyst left thigh removed    HX WRIST FRACTURE TX      LAP,CHOLECYSTECTOMY N/A 02/27/2017    Dr. Selene Brush       Current Outpatient Medications   Medication Sig Dispense Refill    triamcinolone acetonide (KENALOG) 10 mg/mL injection 1 mL by Intra artICUlar route once for 1 dose. 1 Vial 0    promethazine (PHENERGAN) 12.5 mg tablet Take 12.5 mg by mouth.  losartan (COZAAR) 25 mg tablet TAKE ONE TABLET BY MOUTH ONE TIME DAILY 90 Tab 4    dicyclomine (BENTYL) 20 mg tablet Take 20 mg by mouth four (4) times daily as needed for Abdominal Cramps.  metoclopramide HCl (REGLAN) 5 mg tablet Take 5 mg by mouth Before breakfast, lunch, and dinner.       QUEtiapine (SEROQUEL) 200 mg tablet Take 1 Tab by mouth daily. 30 Tab 0    cholecalciferol, vitamin D3, 2,000 unit tab Take  by mouth.  atorvastatin (LIPITOR) 40 mg tablet TAKE ONE TABLET BY MOUTH ONE TIME DAILY 90 Tab 4    fluticasone propionate (FLONASE) 50 mcg/actuation nasal spray 2 Sprays by Both Nostrils route as needed for Rhinitis. 3 Bottle 4    cloNIDine HCL (CATAPRES) 0.3 mg tablet Take 0.3 mg by mouth nightly.  polyethylene glycol (MIRALAX) 17 gram packet Take 1 Packet by mouth daily. 30 Each 6    oxyCODONE IR (ROXICODONE) 10 mg tab immediate release tablet 10 mg every eight (8) hours as needed.  valACYclovir (VALTREX) 500 mg tablet Take 1 Tab by mouth daily. (Patient taking differently: Take  by mouth daily.) 90 Tab 4    DEXILANT 60 mg CpDB capsule (delayed release) Take 120 mg by mouth daily.  morphine CR (MS CONTIN) 60 mg CR tablet Take 30 mg by mouth every twelve (12) hours.  diclofenac (VOLTAREN) 1 % gel Apply  to affected area four (4) times daily. 1 g 1    cyclobenzaprine (FLEXERIL) 10 mg tablet TAKE 1 TABLET BY MOUTH THREE TIMES DAILY AS NEEDED FOR MUSCLE SPASMS FOR UP TO 30 DAYS. 90 Tab 2    naloxone (NARCAN) 4 mg/actuation nasal spray 1 [place] by IntraNASal route once as needed. Use 1 spray intranasally into 1 nostril. Use a new Narcan nasal spray for subsequent doses and administer into alternating nostrils. May repeat every 2 to 3 minutes as needed.          Allergies   Allergen Reactions    Aspirin Other (comments)     Stomach bleeding    Ativan [Lorazepam] Shortness of Breath    Bactrim [Sulfamethoprim] Rash    Keflex [Cephalexin] Rash    Macrobid [Nitrofurantoin Monohyd/M-Cryst] Nausea and Vomiting    Nsaids (Non-Steroidal Anti-Inflammatory Drug) Nausea and Vomiting    Opana [Oxymorphone] Other (comments)     Insomnia, weight loss, nightmares    Pcn [Penicillins] Hives         PE:     R Wrist:     Tenderness L R Test L R   1st Ext Comp - + Finkelstein's - +   Snuff Box - - Sánchez - -   2nd Ext Comp - - S-L Shear - -   S-L Joint - - L-T Shear - -   L-T Joint - - DRUJ Sup - -   6th Ext Comp - - DRUJ Pro - -   Ulnar Snuff - - DRUJ Grind - -   Fovea - - TFCC - -   STT Joint - - Mid-Carp Inst - -   FCR - - P-T Grind - -   Intersection - - ECU Sublux. - -      Dorsal Ganglion: +    Volar Ganglion: -        Imaging:     Previous Plain films of R wrist are negative for acute processes, or osteoarthritis    MRI reviewed that was done on 8/22 is + for 1st DC tenosynovitis with an associated ganglion cyst.      ICD-10-CM ICD-9-CM    1. De Quervain's tenosynovitis, right M65.4 727.04 TRIAMCINOLONE ACETONIDE INJ      triamcinolone acetonide (KENALOG) 10 mg/mL injection      INJECT TENDON SHEATH/LIGAMENT      AMB SUPPLY ORDER   2. Ganglion cyst of dorsum of right wrist M67.431 727.41 TRIAMCINOLONE ACETONIDE INJ      triamcinolone acetonide (KENALOG) 10 mg/mL injection      INJECT TENDON SHEATH/LIGAMENT   3. Rheumatoid arthritis involving right wrist with positive rheumatoid factor ([facility] M05.731 714.0        Plan    At this point I had a discussion with patient regarding operative versus nonoperative treatment for her tendinitis of her wrist.    She would like to have an injection with possible decompression of the cyst.    I have told her to follow-up with the rheumatologist and we have given her a copy of her labs today to take with her.     Injection of first dorsal compartment on right as well as ganglion cyst.    Plan was reviewed with patient, who verbalized agreement and understanding of the plan    [address] NOTE        Chart reviewed for the following:   IJames DO, have reviewed the History, Physical and updated the Allergic reactions for [address] performed immediately prior to start of procedure:   Pan SANTILLAN DO, have performed the following reviews on [address] prior to the start of the procedure:            * Patient was identified by name and date of birth   * Agreement on procedure being performed was verified  * Risks and Benefits explained to the patient  * Procedure site verified and marked as necessary  * Patient was positioned for comfort  * Consent was signed and verified     Time: 11:02 AM      Date of procedure: 3/4/2020    Procedure performed by: Maurice Boxer, DO    Provider assisted by: Rosi Bartlett LPN    Patient assisted by: self    How tolerated by patient: tolerated the procedure well with no complications    Post Procedural Pain Scale: 0 - No Hurt    Comments: none    Procedure:  After consent was obtained, using sterile technique the tendon was prepped. Local anesthetic used: 1% lidocaine. Kenalog 5 mg and was then injected and the needle withdrawn. The procedure was well tolerated. The patient is asked to continue to rest the area for a few more days before resuming regular activities. It may be more painful for the first 1-2 days. Watch for fever, or increased swelling or persistent pain in the joint. Call or return to clinic prn if such symptoms occur or there is failure to improve as anticipated.

## 2020-03-05 ENCOUNTER — PATIENT OUTREACH (OUTPATIENT)
Dept: CASE MANAGEMENT | Age: 65
End: 2020-03-05

## 2020-03-05 NOTE — PROGRESS NOTES
ACM attempted to contact patient at listed number. VM left identifying self. Direct contact information given with request for return call. EMR reviewed. Patient attended ortho appointment on 3/4/20. ACM will continue to follow.

## 2020-03-19 DIAGNOSIS — B37.83 ANGULAR CHEILITIS WITH CANDIDIASIS: ICD-10-CM

## 2020-03-20 RX ORDER — KETOCONAZOLE 20 MG/G
CREAM TOPICAL
Qty: 15 G | Refills: 2 | Status: SHIPPED | OUTPATIENT
Start: 2020-03-20 | End: 2021-01-18

## 2020-03-20 NOTE — TELEPHONE ENCOUNTER
Called patient had her verify  asked if she was requesting refills on this she says she is asking that this be filled for her she says when she smiles the corners of her mouth get irritated.

## 2020-04-29 ENCOUNTER — TELEPHONE (OUTPATIENT)
Dept: FAMILY MEDICINE CLINIC | Age: 65
End: 2020-04-29

## 2020-04-29 NOTE — TELEPHONE ENCOUNTER
Patient called the office today c/o chest pain she says she has been having this pain x 2 weeks, she has talked with her GI physician who told her she may need to be referred to cardiology. She says pain is in the center of her chest and feels like gas pain. She says this seems to be worse when she first wakes up in the morning. She denies any SOB, no arm, jaw or facial pain associated she is not having any nausea, or vomiting and says pain does not radiate anywhere is just in the center for her chest. She denies pain worsening with exertion. Patient told I will send a message to Dr. Gurmeet Gatica about this and call her back with recommendations. She has been told if her pain gets worse, or if this starts to radiate anywhere from her chest, she developes facial/jaw pain or arm pain, SOB, nausea and or vomiting to go to call 911 or go to the ED right away. She has expressed understanding please advise.

## 2020-04-29 NOTE — TELEPHONE ENCOUNTER
She had a reassuring evaluation by Dr. Ula Gaucher last year, including a stress echo. Please request the GI notes and set up a VV for her with me either Friday or next Tuesday.  ERIKA

## 2020-04-30 NOTE — TELEPHONE ENCOUNTER
Patient has been scheduled to see Dr. Vidales on 5/5/2020 at 1:30 PM. She is familiar with VV as she has seen other physicians via [id number]. She already has my chart renetta downloaded. Called [facility] patient had VV on 4/20/2020 notes have been requested.

## 2020-05-02 NOTE — TELEPHONE ENCOUNTER
Dr. Kay Ocampo notes reviewed. Recommended contacting cardiology - Dr. Tee Ochoa and if symptoms thought not to be cardiac, he would be proceeding with GI testing. No appointment with me needed unless she desires for other concerns. Please fax notes to Dr. Tee Ochoa.    ERIKA

## 2020-05-04 NOTE — TELEPHONE ENCOUNTER
OV from [facility] has been faxed to Dr. Melina Bowles office with a consult request.

## 2020-05-06 ENCOUNTER — VIRTUAL VISIT (OUTPATIENT)
Dept: ORTHOPEDIC SURGERY | Age: 65
End: 2020-05-06

## 2020-05-06 ENCOUNTER — TELEPHONE (OUTPATIENT)
Dept: CARDIOLOGY CLINIC | Age: 65
End: 2020-05-06

## 2020-05-06 DIAGNOSIS — M05.731 RHEUMATOID ARTHRITIS INVOLVING RIGHT WRIST WITH POSITIVE RHEUMATOID FACTOR (HCC): ICD-10-CM

## 2020-05-06 DIAGNOSIS — M65.4 DE QUERVAIN'S TENOSYNOVITIS, RIGHT: Primary | ICD-10-CM

## 2020-05-06 DIAGNOSIS — M67.431 GANGLION CYST OF DORSUM OF RIGHT WRIST: ICD-10-CM

## 2020-05-06 NOTE — PROGRESS NOTES
Since your last office visit have you had any    1. New medical diagnoses No  2. Changes in your medications  No  3. Surgical procedures No  4. Changes in your Allergies to medication No  5. What is your pain level today 0/10      Gustavo Kaplan is a 76 y.o. female who was seen by synchronous (real-time) audio-video technology on 5/6/2020. Consent: Gustavo Kaplan, who was seen by synchronous (real-time) audio-video technology, and/or her healthcare decision maker, is aware that this patient-initiated, Telehealth encounter on 5/6/2020 is a billable service, with coverage as determined by her insurance carrier. She is aware that she may receive a bill and has provided verbal consent to proceed: Yes. Patient seen from home via doxy. me    Assessment & Plan:     Diagnoses and all orders for this visit:    1. De Quervain's tenosynovitis, right    2. Ganglion cyst of dorsum of right wrist    3. 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 I instructed the patient on daily exercises for the next 2 months    I spent at least 23 minutes on this visit with this established patient. [phone]  Subjective:   Gustavo Kaplan is a 76 y.o. female who was seen for No chief complaint on file. HPI: Pt is seen virtually today regarding her right de quervain's tenosynovitis. She reports complete resolution of symptoms after injection and brace wear. Prior to Admission medications    Medication Sig Start Date End Date Taking? Authorizing Provider   ketoconazole (NIZORAL) 2 % topical cream APPLY TOPICALLY TO AFFECTED AREA(S) TWO TIMES A DAY 3/20/20   Lori Martins MD   promethazine (PHENERGAN) 12.5 mg tablet Take 12.5 mg by mouth.  11/2/19   Provider, Historical   losartan (COZAAR) 25 mg tablet TAKE ONE TABLET BY MOUTH ONE TIME DAILY 1/17/20   Lori Martins MD   dicyclomine (BENTYL) 20 mg tablet Take 20 mg by mouth four (4) times daily as needed for Abdominal Cramps. Provider, Historical   metoclopramide HCl (REGLAN) 5 mg tablet Take 5 mg by mouth Before breakfast, lunch, and dinner. Provider, Historical   QUEtiapine (SEROQUEL) 200 mg tablet Take 1 Tab by mouth daily. 10/18/19   Joe Hernandez MD   cholecalciferol, vitamin D3, 2,000 unit tab Take  by mouth. Provider, Historical   atorvastatin (LIPITOR) 40 mg tablet TAKE ONE TABLET BY MOUTH ONE TIME DAILY 10/17/19   Joe Hernandez MD   fluticasone propionate (FLONASE) 50 mcg/actuation nasal spray 2 Sprays by Both Nostrils route as needed for Rhinitis. 10/17/19   Joe Hernandez MD   cloNIDine HCL (CATAPRES) 0.3 mg tablet Take 0.3 mg by mouth nightly. 9/16/19   Provider, Historical   polyethylene glycol (MIRALAX) 17 gram packet Take 1 Packet by mouth daily. 5/6/19   Joe Hernandez MD   oxyCODONE IR (ROXICODONE) 10 mg tab immediate release tablet 10 mg every eight (8) hours as needed. 2/12/19   Provider, Historical   valACYclovir (VALTREX) 500 mg tablet Take 1 Tab by mouth daily. Patient taking differently: Take  by mouth daily. 2/12/19   Joe Hernandez MD   DEXILANT 60 mg CpDB capsule (delayed release) Take 120 mg by mouth daily. 2/3/19   Provider, Historical   morphine CR (MS CONTIN) 60 mg CR tablet Take 30 mg by mouth every twelve (12) hours. Provider, Historical   diclofenac (VOLTAREN) 1 % gel Apply  to affected area four (4) times daily. 12/27/18   Anabel BURTON DO   cyclobenzaprine (FLEXERIL) 10 mg tablet TAKE 1 TABLET BY MOUTH THREE TIMES DAILY AS NEEDED FOR MUSCLE SPASMS FOR UP TO 30 DAYS. 10/1/18   BETO Payan   naloxone [facility]) 4 mg/actuation nasal spray 1 [place] by IntraNASal route once as needed. Use 1 spray intranasally into 1 nostril. Use a new Narcan nasal spray for subsequent doses and administer into alternating nostrils. May repeat every 2 to 3 minutes as needed.     Other, MD Xenia     Allergies Allergen Reactions    Aspirin Other (comments)     Stomach bleeding    Ativan [Lorazepam] Shortness of Breath    Bactrim [Sulfamethoprim] Rash    Keflex [Cephalexin] Rash    Macrobid [Nitrofurantoin Monohyd/M-Cryst] Nausea and Vomiting    Nsaids (Non-Steroidal Anti-Inflammatory Drug) Nausea and Vomiting    Opana [Oxymorphone] Other (comments)     Insomnia, weight loss, nightmares    Pcn [Penicillins] Hives       Past Medical History:   Diagnosis Date    Annular tear of lumbar disc 11/10/2014    Asthma     Bronchitis    Benign tumor of throat     Bone spur     right ankle    Cervicalgia 11/10/2014    Chronic pain     back    Degeneration of lumbar or lumbosacral intervertebral disc 11/10/2014    Degenerative disc disease     Depression     Displacement of lumbar intervertebral disc without myelopathy 11/10/2014    Elevated white blood cell count     Fibromyalgia     GERD (gastroesophageal reflux disease)     Hypertension     Insomnia     Nausea & vomiting     Pain in joint, multiple sites 11/10/2014    Postlaminectomy syndrome, cervical region 11/10/2014    Sinus infection 10/5/15    Ulcer      Past Surgical History:   Procedure Laterality Date    COLONOSCOPY N/A 4/13/2017    COLONOSCOPY performed by Martín Oconnell MD at [facility] ENDOSCOPY    HX CATARACT REMOVAL      HX CERVICAL FUSION      HX HYSTERECTOMY      HX ORTHOPAEDIC      ganglion cyst removed, right hand    HX OTHER SURGICAL      cyst left thigh removed    HX WRIST FRACTURE TX      LAP,CHOLECYSTECTOMY N/A 02/27/2017    Dr. Sarah Mendez     Family History   Problem Relation Age of Onset    Hypertension Other     Heart Attack Other     Heart Disease Other     Stroke Other     Headache Other     Seizures Other     Arthritis Father     Migraines Granddaughter     Migraines Daughter      Social History     Tobacco Use    Smoking status: Former Smoker     Packs/day: 0.50     Years: 3.00     Pack years: 1.50     Last attempt to quit: 3/6/1993     Years since quittin.1    Smokeless tobacco: Never Used   Substance Use Topics    Alcohol use: No       Review of Systems   Constitutional: Negative. Negative for chills and fever. HENT: Negative. Eyes: Negative. Respiratory: Negative. Negative for cough, shortness of breath and wheezing. Cardiovascular: Negative. Gastrointestinal: Negative. Negative for abdominal pain, heartburn, nausea and vomiting. Genitourinary: Negative. Musculoskeletal: Negative. Skin: Negative. Neurological: Negative. Negative for tingling. Endo/Heme/Allergies: Negative. Psychiatric/Behavioral: Negative. Objective: There were no vitals taken for this visit. General: alert, cooperative, no distress   Mental  status: normal mood, behavior, speech, dress, motor activity, and thought processes, able to follow commands   HENT: NCAT   Neck: no visualized mass   Resp: no respiratory distress   Neuro: no gross deficits   Skin: no discoloration or lesions of concern on visible areas   Psychiatric: normal affect, consistent with stated mood, no evidence of hallucinations     Additional exam findings:     Right Wrist: Negative ttp 1st DC. Sensation grossly intact. Cap refill brisk. ROM Full. We discussed the expected course, resolution and complications of the diagnosis(es) in detail. Medication risks, benefits, costs, interactions, and alternatives were discussed as indicated. I advised her to contact the office if her condition worsens, changes or fails to improve as anticipated. She expressed understanding with the diagnosis(es) and plan. Dino Sheehan is a 76 y.o. female who was evaluated by a video visit encounter for concerns as above. Patient identification was verified prior to start of the visit. A caregiver was present when appropriate.  Due to this being a TeleHealth encounter (During [facility]- public health emergency), evaluation of the following organ systems was limited: Vitals/Constitutional/EENT/Resp/CV/GI//MS/Neuro/Skin/Heme-Lymph-Imm. Pursuant to the emergency declaration under the [facility], [facility] waiver authority and the [employer] and [employer] Act, this Virtual  Visit was conducted, with patient's (and/or legal guardian's) consent, to reduce the patient's risk of exposure to COVID-19 and provide necessary medical care. Services were provided through a video synchronous discussion virtually to substitute for in-person clinic visit. Patient and provider were located at their individual homes.       Noé Hobson DO

## 2020-05-18 ENCOUNTER — TELEPHONE (OUTPATIENT)
Dept: FAMILY MEDICINE CLINIC | Age: 65
End: 2020-05-18

## 2020-05-18 NOTE — TELEPHONE ENCOUNTER
I see that she's scheduled with Dr. Torrie Snellen on 5/21. She should proceed to the ER for any new or worsening symptoms in the interim.  ERIKA

## 2020-05-18 NOTE — TELEPHONE ENCOUNTER
Patient called the office c/o hypotension x 3 days, she says she just checked her blood pressure and this was at 98/67 and it has been running around the same all weekend. She denies feeling any weakness, or dizziness, does not feel light headed she still c/o chest pain has VV with GI tomorrow, patient has not called cardiology for follow up yet. Asked patient had she already taken her Losartan 25 mg she says she takes this every morning and did take this today. She has been advised to hold ff on her Losartan until she hears back from the office, she has been given telephone number to /. Jac Ball [facility] office to call and was told to mention her hypotension when she does call them. Told I will send message to Dr. Azael Lira and call her back with her recommendations. Please advise.

## 2020-05-19 NOTE — TELEPHONE ENCOUNTER
Called patient  verified she has been told Dr. Keisha Juarez has reviewed message and did see where patient has been scheduled with cardiology on the . Told patient to definitely keep this appointment, patient will ask Dr. Hayley Dumont about resuming her Losartan. Patient has been told to go to ED for any new or worsening symptoms and has expressed understanding.

## 2020-05-21 ENCOUNTER — VIRTUAL VISIT (OUTPATIENT)
Dept: CARDIOLOGY CLINIC | Age: 65
End: 2020-05-21

## 2020-05-21 ENCOUNTER — TELEPHONE (OUTPATIENT)
Dept: CARDIOLOGY CLINIC | Age: 65
End: 2020-05-21

## 2020-05-21 DIAGNOSIS — R07.9 CHEST PAIN AT REST: Primary | ICD-10-CM

## 2020-05-21 DIAGNOSIS — I10 ESSENTIAL HYPERTENSION: ICD-10-CM

## 2020-05-21 DIAGNOSIS — R07.9 CHEST PAIN, UNSPECIFIED TYPE: Primary | ICD-10-CM

## 2020-05-21 DIAGNOSIS — R00.0 TACHYCARDIA: ICD-10-CM

## 2020-05-21 NOTE — TELEPHONE ENCOUNTER
Instructions    Patients Name:  Devon Watts    1. You are scheduled to have a Left Heart Cath on Maria A 3, 2020  at 10:30am Please check in at 9:30am  .     2. Please go to [facility] and park in the outpatient parking lot that is located around to the back of the hospital and enter through the [employer]. Once you enter through the [facility] check in with the  there. The  will either give you directions or assist you in getting to the cath holding area. 3. You are not to eat or drink anything after midnight the night before your procedure. Small sips of water to take your medications is ok. 4. If you are diabetic, do not take your insulin/sugar pill the morning of the procedure. 5. MEDICATION INSTRUCTIONS:   Please take your morning medications with the following special instructions:    [x]          Please make sure to take your Blood pressure medication. 6. We encourage families to wait in the waiting room on the first floor while the procedure is being done. The Doctor will come out and talk with you as soon as the procedure is over. 7. There is the possibility that you may spend the night in the hospital, depending on the results of the procedure. This will be determined after the procedure is done. If angioplasty or stent is planned, you will stay at least one day. 8. If you or your family have any questions, please call our office Monday -Friday, 9:00 a.m.-4:30 p.m.,  At [phone], and ask to speak to one of the nurses. Patient made aware of pre procedure lab work and chest xray. This has been fully explained to the patient, who indicates understanding.

## 2020-05-21 NOTE — PROGRESS NOTES
Mabel Winters is a 76 y.o. female who was seen by synchronous (real-time) audio-video technology on 5/21/2020. Consent: Mabel Winters, who was seen by synchronous (real-time) audio-video technology, and/or her healthcare decision maker, is aware that this patient-initiated, Telehealth encounter on 5/21/2020 is a billable service, with coverage as determined by her insurance carrier. She is aware that she may receive a bill and has provided verbal consent to proceed: Yes. Pt seen via DOXY video for follow up of CP    Assessment & Plan:     1.) Atypical but persistent CP, assoc with tachycardia, nausea and SOB, cannot rule out unstable angina especially based on risk factors  2.) Low risk stress echo, see results  3.) +FH premature CAD  4.) h/o polysubstance, cocaine/ tobacco  5.) HTN    1.) Plan for outpatient cath due to persistent and worsening CP  2.) Continue current BP meds  3.) Further recs to follow, if LHC neg may try low dose BB and wean off clonidine     RTC with me in office in 2 months  Pt also educated that if symptoms escalate before we can schedule cath- please go to ER      I spent at least 40 minutes on this visit with this established patient. ([id number])    Subjective:   Mabel Winters was seen for CP:    Mabel Winters is a 71 y.o. AAF with no personal history of heart disease seen for evaluation of persistent chest discomfort. As you know patient has been experiencing some chest discomfort off and on again since January. I saw her last year for similar symptoms and obtained a stress echocardiogram mostly due to the persistence and severity of her complaints. Symptoms even then were quite atypical -she said her shirt was drenched with sweat. She does have fibromyalgia and chronic pain and there was tenderness involved with palpation of her chest wall.     Regardless her stress echocardiogram was low risk and we called her with the results of that and coincidentally her pain somewhat resolved. So again she says it is been happening since the beginning of the year off and on but now it is so intense she is in tears over her virtual video. This is happening about 3 times a week and has scared her so much she almost called an ambulance again. She describes a sudden onset chest discomfort right in the center of her chest that is 10 out of 10 that's a \"soreness. \" It does seem to happen at rest and she believes aspirin makes it better. If she takes two aspirins it doesn't happen but she doesn't want to keep doing that. She has associated SOB and her heart races when she has the discomfort. She walks every evening and says she feels great doing that. I reviewed her most recent labs, stable Hb and SCr. Otherwise she's been taking her meds- I specifically made sure she didn't abruptly stop her clonidine and she said no- she has a lower dose but never stopped. Reviewed Dr. Spring Montanez as well. Cardiac RFs: postmenopausal/ age, HTN, remote polysubstance abuse incl tobacco and cocaine, +FH premature CAD       Prior to Admission medications    Medication Sig Start Date End Date Taking? Authorizing Provider   ketoconazole (NIZORAL) 2 % topical cream APPLY TOPICALLY TO AFFECTED AREA(S) TWO TIMES A DAY 3/20/20   Cal Gerardo MD   promethazine (PHENERGAN) 12.5 mg tablet Take 12.5 mg by mouth. 11/2/19   Provider, Historical   losartan (COZAAR) 25 mg tablet TAKE ONE TABLET BY MOUTH ONE TIME DAILY 1/17/20   Cal Gerardo MD   dicyclomine (BENTYL) 20 mg tablet Take 20 mg by mouth four (4) times daily as needed for Abdominal Cramps. Provider, Historical   metoclopramide HCl (REGLAN) 5 mg tablet Take 5 mg by mouth Before breakfast, lunch, and dinner. Provider, Historical   QUEtiapine (SEROQUEL) 200 mg tablet Take 1 Tab by mouth daily. 10/18/19   Cal Gerardo MD   cholecalciferol, vitamin D3, 2,000 unit tab Take  by mouth.     Provider, Historical   atorvastatin (LIPITOR) 40 mg tablet TAKE ONE TABLET BY MOUTH ONE TIME DAILY 10/17/19   Rosy Tolliver MD   fluticasone propionate (FLONASE) 50 mcg/actuation nasal spray 2 Sprays by Both Nostrils route as needed for Rhinitis. 10/17/19   Rosy Tolliver MD   cloNIDine HCL (CATAPRES) 0.3 mg tablet Take 0.3 mg by mouth nightly. 9/16/19   Provider, Historical   polyethylene glycol (MIRALAX) 17 gram packet Take 1 Packet by mouth daily. 5/6/19   Rosy Tolliver MD   oxyCODONE IR (ROXICODONE) 10 mg tab immediate release tablet 10 mg every eight (8) hours as needed. 2/12/19   Provider, Historical   valACYclovir (VALTREX) 500 mg tablet Take 1 Tab by mouth daily. Patient taking differently: Take  by mouth daily. 2/12/19   Rosy Tolliver MD   DEXILANT 60 mg CpDB capsule (delayed release) Take 120 mg by mouth daily. 2/3/19   Provider, Historical   morphine CR (MS CONTIN) 60 mg CR tablet Take 30 mg by mouth every twelve (12) hours. Provider, Historical   diclofenac (VOLTAREN) 1 % gel Apply  to affected area four (4) times daily. 12/27/18   Alfredo Ellington A,    cyclobenzaprine (FLEXERIL) 10 mg tablet TAKE 1 TABLET BY MOUTH THREE TIMES DAILY AS NEEDED FOR MUSCLE SPASMS FOR UP TO 30 DAYS. 10/1/18   Shanita Spencer., PA   naloxone [facility]) 4 mg/actuation nasal spray 1 [place] by IntraNASal route once as needed. Use 1 spray intranasally into 1 nostril. Use a new Narcan nasal spray for subsequent doses and administer into alternating nostrils. May repeat every 2 to 3 minutes as needed.     Other, MD Xenia     Allergies   Allergen Reactions    Aspirin Other (comments)     Stomach bleeding    Ativan [Lorazepam] Shortness of Breath    Bactrim [Sulfamethoprim] Rash    Keflex [Cephalexin] Rash    Macrobid [Nitrofurantoin Monohyd/M-Cryst] Nausea and Vomiting    Nsaids (Non-Steroidal Anti-Inflammatory Drug) Nausea and Vomiting    Opana [Oxymorphone] Other (comments)     Insomnia, weight loss, nightmares    Pcn [Penicillins] Hives       ROS-14 neg    Objective:   Vital Signs: (As obtained by patient/caregiver at home)  There were no vitals taken for this visit. Constitutional: [x] Appears well-developed and well-nourished [x] No apparent distress      [] Abnormal -     Mental status: [x] Alert and awake  [x] Oriented to person/place/time [x] Able to follow commands    [] Abnormal -     Eyes:   EOM    [x]  Normal    [] Abnormal -   Sclera  [x]  Normal    [] Abnormal -          Discharge [x]  None visible   [] Abnormal -     HENT: [x] Normocephalic, atraumatic  [] Abnormal -   [x] Mouth/Throat: Mucous membranes are moist    External Ears [x] Normal  [] Abnormal -    Neck: [x] No visualized mass [] Abnormal -     Pulmonary/Chest: [x] Respiratory effort normal   [x] No visualized signs of difficulty breathing or respiratory distress        [] Abnormal -      Musculoskeletal:   [x] Normal gait with no signs of ataxia         [x] Normal range of motion of neck        [] Abnormal -     Neurological:        [x] No Facial Asymmetry (Cranial nerve 7 motor function) (limited exam due to video visit)          [x] No gaze palsy        [] Abnormal -          Skin:        [x] No significant exanthematous lesions or discoloration noted on facial skin         [] Abnormal -            Psychiatric:       [x] Normal Affect [] Abnormal -        [x] No Hallucinations    Other pertinent observable physical exam findings:-    05/30/19   ECHO STRESS 05/30/2019 5/30/2019    Narrative · Baseline ECG: Sinus tachycardia. Rightward axis  · Negative stress test.  · Normal stress echocardiogram. Low risk study.         Signed by: Angely Temple MD     Lab Results   Component Value Date/Time    Sodium 143 10/17/2019 11:15 AM    Potassium 5.3 (H) 10/17/2019 11:15 AM    Chloride 101 10/17/2019 11:15 AM    CO2 23 10/17/2019 11:15 AM    Anion gap 5 08/06/2019 12:55 PM    Glucose 82 10/17/2019 11:15 AM    BUN 14 10/17/2019 11:15 AM    Creatinine 0.95 10/17/2019 11:15 AM    BUN/Creatinine ratio 15 10/17/2019 11:15 AM    GFR est AA 73 10/17/2019 11:15 AM    GFR est non-AA 63 10/17/2019 11:15 AM    Calcium 10.3 10/17/2019 11:15 AM    Bilirubin, total 0.3 10/17/2019 11:15 AM    AST (SGOT) 25 10/17/2019 11:15 AM    Alk. phosphatase 171 (H) 10/17/2019 11:15 AM    Protein, total 8.1 10/17/2019 11:15 AM    Albumin 4.8 10/17/2019 11:15 AM    Globulin 4.4 (H) 08/06/2019 12:55 PM    A-G Ratio 1.5 10/17/2019 11:15 AM    ALT (SGPT) 20 10/17/2019 11:15 AM     Lab Results   Component Value Date/Time    WBC 7.6 10/17/2019 11:15 AM    HGB 13.8 10/17/2019 11:15 AM    HCT 40.7 10/17/2019 11:15 AM    PLATELET 365 48/83/5148 11:15 AM    MCV 86 10/17/2019 11:15 AM         We discussed the expected course, resolution and complications of the diagnosis(es) in detail. Medication risks, benefits, costs, interactions, and alternatives were discussed as indicated. I advised her to contact the office if her condition worsens, changes or fails to improve as anticipated. She expressed understanding with the diagnosis(es) and plan. Arcadio Nation is a 76 y.o. female who was evaluated by a video visit encounter for concerns as above. Patient identification was verified prior to start of the visit. A caregiver was present when appropriate. Due to this being a TeleHealth encounter (During [facility]-14 public health emergency), evaluation of the following organ systems was limited: Vitals/Constitutional/EENT/Resp/CV/GI//MS/Neuro/Skin/Heme-Lymph-Imm. Pursuant to the emergency declaration under the [facility], 1135 waiver authority and the [employer] and [employer] Act, this Virtual  Visit was conducted, with patient's (and/or legal guardian's) consent, to reduce the patient's risk of exposure to COVID-19 and provide necessary medical care.      Services were provided through a video synchronous discussion virtually to substitute for in-person clinic visit. Patient and provider were located at their individual homes.       Perla Blanc DO

## 2020-05-29 ENCOUNTER — HOSPITAL ENCOUNTER (OUTPATIENT)
Dept: GENERAL RADIOLOGY | Age: 65
Discharge: HOME | End: 2020-05-29
Payer: MEDICARE

## 2020-05-29 ENCOUNTER — HOSPITAL ENCOUNTER (OUTPATIENT)
Dept: LAB | Age: 65
Discharge: HOME | End: 2020-05-29
Payer: MEDICARE

## 2020-05-29 DIAGNOSIS — R07.9 CHEST PAIN, UNSPECIFIED TYPE: ICD-10-CM

## 2020-05-29 LAB
ALBUMIN SERPL-MCNC: 3.5 G/DL (ref 3.4–5)
ALBUMIN/GLOB SERPL: 1 {RATIO} (ref 0.8–1.7)
ALP SERPL-CCNC: 117 U/L (ref 45–117)
ALT SERPL-CCNC: 23 U/L (ref 13–56)
ANION GAP SERPL CALC-SCNC: 4 MMOL/L (ref 3–18)
AST SERPL-CCNC: 20 U/L (ref 10–38)
BASOPHILS # BLD: 0 K/UL (ref 0–0.1)
BASOPHILS NFR BLD: 0 % (ref 0–2)
BILIRUB SERPL-MCNC: 0.3 MG/DL (ref 0.2–1)
BUN SERPL-MCNC: 13 MG/DL (ref 7–18)
BUN/CREAT SERPL: 15 (ref 12–20)
CALCIUM SERPL-MCNC: 8.7 MG/DL (ref 8.5–10.1)
CHLORIDE SERPL-SCNC: 106 MMOL/L (ref 100–111)
CO2 SERPL-SCNC: 29 MMOL/L (ref 21–32)
CREAT SERPL-MCNC: 0.84 MG/DL (ref 0.6–1.3)
DIFFERENTIAL METHOD BLD: ABNORMAL
EOSINOPHIL # BLD: 0.2 K/UL (ref 0–0.4)
EOSINOPHIL NFR BLD: 2 % (ref 0–5)
ERYTHROCYTE [DISTWIDTH] IN BLOOD BY AUTOMATED COUNT: 13.6 % (ref 11.6–14.5)
GLOBULIN SER CALC-MCNC: 3.5 G/DL (ref 2–4)
GLUCOSE SERPL-MCNC: 97 MG/DL (ref 74–99)
HCT VFR BLD AUTO: 34.4 % (ref 35–45)
HGB BLD-MCNC: 11.2 G/DL (ref 12–16)
INR PPP: 1 (ref 0.8–1.2)
LYMPHOCYTES # BLD: 4.1 K/UL (ref 0.9–3.6)
LYMPHOCYTES NFR BLD: 51 % (ref 21–52)
MCH RBC QN AUTO: 28.6 PG (ref 24–34)
MCHC RBC AUTO-ENTMCNC: 32.6 G/DL (ref 31–37)
MCV RBC AUTO: 88 FL (ref 74–97)
MONOCYTES # BLD: 0.6 K/UL (ref 0.05–1.2)
MONOCYTES NFR BLD: 8 % (ref 3–10)
NEUTS SEG # BLD: 3.1 K/UL (ref 1.8–8)
NEUTS SEG NFR BLD: 39 % (ref 40–73)
PLATELET # BLD AUTO: 308 K/UL (ref 135–420)
PMV BLD AUTO: 9.6 FL (ref 9.2–11.8)
POTASSIUM SERPL-SCNC: 4.5 MMOL/L (ref 3.5–5.5)
PROT SERPL-MCNC: 7 G/DL (ref 6.4–8.2)
PROTHROMBIN TIME: 13.3 SEC (ref 11.5–15.2)
RBC # BLD AUTO: 3.91 M/UL (ref 4.2–5.3)
SODIUM SERPL-SCNC: 139 MMOL/L (ref 136–145)
WBC # BLD AUTO: 8 K/UL (ref 4.6–13.2)

## 2020-05-29 PROCEDURE — 85610 PROTHROMBIN TIME: CPT

## 2020-05-29 PROCEDURE — 85025 COMPLETE CBC W/AUTO DIFF WBC: CPT

## 2020-05-29 PROCEDURE — 36415 COLL VENOUS BLD VENIPUNCTURE: CPT

## 2020-05-29 PROCEDURE — 71046 X-RAY EXAM CHEST 2 VIEWS: CPT

## 2020-05-29 PROCEDURE — 80053 COMPREHEN METABOLIC PANEL: CPT

## 2020-06-03 ENCOUNTER — HOSPITAL ENCOUNTER (OUTPATIENT)
Age: 65
Setting detail: OUTPATIENT SURGERY
Discharge: HOME | End: 2020-06-03
Attending: INTERNAL MEDICINE | Admitting: INTERNAL MEDICINE
Payer: MEDICARE

## 2020-06-03 ENCOUNTER — TELEPHONE (OUTPATIENT)
Dept: CARDIOLOGY CLINIC | Age: 65
End: 2020-06-03

## 2020-06-03 VITALS
RESPIRATION RATE: 13 BRPM | DIASTOLIC BLOOD PRESSURE: 94 MMHG | SYSTOLIC BLOOD PRESSURE: 168 MMHG | HEART RATE: 94 BPM | BODY MASS INDEX: 30.05 KG/M2 | OXYGEN SATURATION: 98 % | HEIGHT: 64 IN | WEIGHT: 176 LBS

## 2020-06-03 DIAGNOSIS — R07.9 CHEST PAIN: ICD-10-CM

## 2020-06-03 PROCEDURE — 77030013797 HC KT TRNSDUC PRSSR EDWD -A: Performed by: INTERNAL MEDICINE

## 2020-06-03 PROCEDURE — 74011000250 HC RX REV CODE- 250: Performed by: INTERNAL MEDICINE

## 2020-06-03 PROCEDURE — 74011250636 HC RX REV CODE- 250/636: Performed by: INTERNAL MEDICINE

## 2020-06-03 PROCEDURE — 74011636320 HC RX REV CODE- 636/320: Performed by: INTERNAL MEDICINE

## 2020-06-03 PROCEDURE — C1894 INTRO/SHEATH, NON-LASER: HCPCS | Performed by: INTERNAL MEDICINE

## 2020-06-03 PROCEDURE — C1769 GUIDE WIRE: HCPCS | Performed by: INTERNAL MEDICINE

## 2020-06-03 PROCEDURE — 99153 MOD SED SAME PHYS/QHP EA: CPT | Performed by: INTERNAL MEDICINE

## 2020-06-03 PROCEDURE — 99152 MOD SED SAME PHYS/QHP 5/>YRS: CPT | Performed by: INTERNAL MEDICINE

## 2020-06-03 PROCEDURE — 74011250637 HC RX REV CODE- 250/637: Performed by: PHYSICIAN ASSISTANT

## 2020-06-03 PROCEDURE — 93458 L HRT ARTERY/VENTRICLE ANGIO: CPT | Performed by: INTERNAL MEDICINE

## 2020-06-03 PROCEDURE — 77030015766: Performed by: INTERNAL MEDICINE

## 2020-06-03 PROCEDURE — 74011250637 HC RX REV CODE- 250/637: Performed by: INTERNAL MEDICINE

## 2020-06-03 PROCEDURE — 77030027845 HC BND COM RDL D-STAT TELE -B: Performed by: INTERNAL MEDICINE

## 2020-06-03 RX ORDER — MORPHINE SULFATE 2 MG/ML
2 INJECTION, SOLUTION INTRAMUSCULAR; INTRAVENOUS
Status: COMPLETED | OUTPATIENT
Start: 2020-06-03 | End: 2020-06-03

## 2020-06-03 RX ORDER — SODIUM CHLORIDE 0.9 % (FLUSH) 0.9 %
5-40 SYRINGE (ML) INJECTION AS NEEDED
Status: DISCONTINUED | OUTPATIENT
Start: 2020-06-03 | End: 2020-06-03 | Stop reason: HOSPADM

## 2020-06-03 RX ORDER — LIDOCAINE HYDROCHLORIDE 10 MG/ML
INJECTION, SOLUTION EPIDURAL; INFILTRATION; INTRACAUDAL; PERINEURAL AS NEEDED
Status: DISCONTINUED | OUTPATIENT
Start: 2020-06-03 | End: 2020-06-03 | Stop reason: HOSPADM

## 2020-06-03 RX ORDER — MIDAZOLAM HYDROCHLORIDE 1 MG/ML
INJECTION, SOLUTION INTRAMUSCULAR; INTRAVENOUS AS NEEDED
Status: DISCONTINUED | OUTPATIENT
Start: 2020-06-03 | End: 2020-06-03 | Stop reason: HOSPADM

## 2020-06-03 RX ORDER — GUAIFENESIN 100 MG/5ML
81 LIQUID (ML) ORAL ONCE
Status: COMPLETED | OUTPATIENT
Start: 2020-06-03 | End: 2020-06-03

## 2020-06-03 RX ORDER — FENTANYL CITRATE 50 UG/ML
INJECTION, SOLUTION INTRAMUSCULAR; INTRAVENOUS AS NEEDED
Status: DISCONTINUED | OUTPATIENT
Start: 2020-06-03 | End: 2020-06-03 | Stop reason: HOSPADM

## 2020-06-03 RX ORDER — SODIUM CHLORIDE 0.9 % (FLUSH) 0.9 %
5-40 SYRINGE (ML) INJECTION EVERY 8 HOURS
Status: DISCONTINUED | OUTPATIENT
Start: 2020-06-03 | End: 2020-06-03 | Stop reason: HOSPADM

## 2020-06-03 RX ORDER — ACETAMINOPHEN 325 MG/1
650 TABLET ORAL ONCE
Status: COMPLETED | OUTPATIENT
Start: 2020-06-03 | End: 2020-06-03

## 2020-06-03 RX ORDER — VERAPAMIL HYDROCHLORIDE 2.5 MG/ML
INJECTION, SOLUTION INTRAVENOUS AS NEEDED
Status: DISCONTINUED | OUTPATIENT
Start: 2020-06-03 | End: 2020-06-03 | Stop reason: HOSPADM

## 2020-06-03 RX ORDER — HEPARIN SODIUM 1000 [USP'U]/ML
INJECTION, SOLUTION INTRAVENOUS; SUBCUTANEOUS AS NEEDED
Status: DISCONTINUED | OUTPATIENT
Start: 2020-06-03 | End: 2020-06-03 | Stop reason: HOSPADM

## 2020-06-03 RX ADMIN — MORPHINE SULFATE 2 MG: 2 INJECTION, SOLUTION INTRAMUSCULAR; INTRAVENOUS at 13:58

## 2020-06-03 RX ADMIN — Medication 81 MG: at 10:52

## 2020-06-03 RX ADMIN — ACETAMINOPHEN 650 MG: 325 TABLET ORAL at 12:56

## 2020-06-03 NOTE — Clinical Note
Contrast Dose Calculator:   Patient's age: 76.   Patient's sex: Female. Patient weight (kg) = 80. Creatinine level (mg/dL) = 0.84. Creatinine clearance (mL/min): 83.2. Max Contrast dose per Creatinine Cl calculator = 187.2 mL.

## 2020-06-03 NOTE — PROGRESS NOTES
Cath holding summary     Patient escorted to cath holding from waiting area ambulatory, alert and oriented x 4, voicing no complaints. Changed into gown and placed on monitor. NPO since MN. Lab results, med rec and H&P reviewed on chart. PIV x 2 inserted without difficulty.

## 2020-06-03 NOTE — PROGRESS NOTES
I have reviewed discharge instructions with the patient. The patient verbalized understanding. Patient was given the opportunity to ask questions and she stated that she did not have any at this time. This nurse escorted the patient, who was ambulatory, to the front of the heart pavilion to her \"daughters car\" with no complications.

## 2020-06-03 NOTE — PROGRESS NOTES
TRANSFER - IN REPORT:    Verbal report received from [place] (name) on Ceasar Villegas  being received from Cath Lab (unit) for routine post - op      Report consisted of patients Situation, Background, Assessment and   Recommendations(SBAR). Information from the following report(s) SBAR, Procedure Summary and MAR was reviewed with the receiving nurse. Opportunity for questions and clarification was provided. Assessment completed upon patients arrival to unit and care assumed. Patient had a LHC that was diagnostic only with no complications. Patient has access to the right wrist with a D-stat in place at this time. See MAR for medications during procedure. 2,000 Heparin given during procedure.  Last set of vitals are as follows:    BP - 129/73  HR - 89  95% RA

## 2020-06-03 NOTE — PROGRESS NOTES
TRANSFER - OUT REPORT:    Verbal report given to Terri Zamorano (name) on Arcadio Kayock  being transferred to Cath Lab (unit) for ordered procedure       Report consisted of patients Situation, Background, Assessment and   Recommendations(SBAR). Information from the following report(s) SBAR, Intake/Output, MAR and Pre Procedure Checklist was reviewed with the receiving nurse. Lines:       Opportunity for questions and clarification was provided.       Patient transported with:   [email or web address]

## 2020-06-03 NOTE — Clinical Note
TRANSFER - OUT REPORT:     Verbal report given to: Manuel Beckman RN. Report consisted of patient's Situation, Background, Assessment and   Recommendations(SBAR). Opportunity for questions and clarification was provided. Patient transported with a Cardiac Cath Tech / Patient Care Tech. Patient transported to: [address]

## 2020-06-03 NOTE — Clinical Note
TRANSFER - IN REPORT:     Verbal report received from: Elmer Boothe RN. Report consisted of patient's Situation, Background, Assessment and   Recommendations(SBAR). Opportunity for questions and clarification was provided. Assessment completed upon patient's arrival to unit and care assumed. Patient transported with a Cardiac Cath Tech / Patient Care Tech.

## 2020-06-03 NOTE — H&P
[facility] - Consult Note    Consultation request by Toshia Porras MD for advice/opinion related to evaluating Chest pain [R07.9]    Date of  Admission: 6/3/2020  9:31 AM   Primary Care Physician:  Viviana Abdul MD     Assessment:     Patient Active Problem List   Diagnosis Code    Lumbago M54.5    Other affections of shoulder region, not elsewhere classified M75.80    Other chronic pain G89.29    Bipolar 2 disorder ([facility] [address] F31.81    Chronic pain syndrome G89.4    Carpal tunnel syndrome G56.00    Degeneration of lumbar or lumbosacral intervertebral disc M51.37    Displacement of lumbar intervertebral disc without myelopathy M51.26    Pain in joint, multiple sites M25.50    Cervicalgia M54.2    Postlaminectomy syndrome, cervical region M96.1    Annular tear of lumbar disc M51.36    Lung nodule seen on imaging study R91.1    Hypercholesterolemia E78.00    Nausea R11.0    Myalgia M79.10    Vitamin D deficiency E55.9    Urinary retention R33.9    Gastroesophageal reflux disease without esophagitis K21.9    Diverticulosis of large intestine without hemorrhage K57.30    Hiatal hernia K44.9    Hepatic steatosis K76.0    Calculus of gallbladder without cholecystitis K80.20    Obesity (BMI 30.0-34. 9) E66.9    Prediabetes R73.03    Angular cheilitis with candidiasis B37.0    Essential hypertension I10    Migraine without aura and without status migrainosus, not intractable G43.009    Gastroparesis K31.84    Urinary incontinence R32    Non-cardiac chest pain R07.89        Plan: We will proceed with coronary angiography as recommended and planned per her primary cardiologist.  All questions answered. If coronary angiography reveals no significant abnormalities, she will be discharged home later today. History of Present Illness: This is a 76 y.o. female admitted for Chest pain [R07.9].     Patient complains of: Chest pain    Patient is a 57-year-old female with atypical but persistent chest pains off and on for over a year. Is getting to the point where now she is teary-eyed and has affected her lifestyle. She does have a history of exercise echocardiogram about 1 year ago which was unremarkable with normal LV function. Because of multiple coronary risk factors including prior use of tobacco and family history of early CAD as well as hypertension, she is now scheduled for coronary angiography for definitive diagnosis or exclusion of CAD as etiology of her chest pains. Cardiac risk factors: smoking/ tobacco exposure, family history, hypertension, post-menopausal      Review of Symptoms:  Except as stated above include:  Constitutional:  negative  Respiratory:  negative  Cardiovascular:  negative  Gastrointestinal: negative  Genitourinary:  negative  Musculoskeletal:  Negative  Neurological:  Negative  Dermatological:  Negative  Endocrinological: Negative  Psychological:  Negative    A comprehensive review of systems was negative except for that written in the HPI.      Past Medical History:     Past Medical History:   Diagnosis Date    Annular tear of lumbar disc 11/10/2014    Asthma     Bronchitis    Benign tumor of throat     Bone spur     right ankle    Cervicalgia 11/10/2014    Chronic pain     back    Degeneration of lumbar or lumbosacral intervertebral disc 11/10/2014    Degenerative disc disease     Depression     Displacement of lumbar intervertebral disc without myelopathy 11/10/2014    Elevated white blood cell count     Fibromyalgia     GERD (gastroesophageal reflux disease)     Hypertension     Insomnia     Nausea & vomiting     Pain in joint, multiple sites 11/10/2014    Postlaminectomy syndrome, cervical region 11/10/2014    Sinus infection 10/5/15    Ulcer          Social History:     Social History     Socioeconomic History    Marital status:      Spouse name: Not on file    Number of children: Not on file    Years of education: Not on file    Highest education level: Not on file   Tobacco Use    Smoking status: Former Smoker     Packs/day: 0.50     Years: 3.00     Pack years: 1.50     Last attempt to quit: 3/6/1993     Years since quittin.2    Smokeless tobacco: Never Used   Substance and Sexual Activity    Alcohol use: No    Drug use: No     Comment: Last smoked in -cocaine    Sexual activity: Never        Family History:     Family History   Problem Relation Age of Onset    Hypertension Other     Heart Attack Other     Heart Disease Other     Stroke Other     Headache Other     Seizures Other     Arthritis Father     Migraines Granddaughter     Migraines Daughter         Medications: Allergies   Allergen Reactions    Aspirin Other (comments)     Stomach bleeding    Ativan [Lorazepam] Shortness of Breath    Bactrim [Sulfamethoprim] Rash    Keflex [Cephalexin] Rash    Macrobid [Nitrofurantoin Monohyd/M-Cryst] Nausea and Vomiting    Nsaids (Non-Steroidal Anti-Inflammatory Drug) Nausea and Vomiting    Opana [Oxymorphone] Other (comments)     Insomnia, weight loss, nightmares    Pcn [Penicillins] Hives        No current facility-administered medications for this encounter. Physical Exam:   There were no vitals taken for this visit. BP Readings from Last 3 Encounters:   20 109/73   20 (!) 141/91   20 122/68     Pulse Readings from Last 3 Encounters:   20 (!) 108   20 100   20 96     Wt Readings from Last 3 Encounters:   20 78.8 kg (173 lb 12.8 oz)   20 78.8 kg (173 lb 12.8 oz)   20 78.3 kg (172 lb 9.6 oz)       General:  alert, cooperative, no distress, appears stated age  Neck:  no JVD  Lungs:  clear to auscultation bilaterally  Heart:  regular rate and rhythm  Abdomen:  no guarding or rigidity  Extremities:  no edema  Skin: Warm and dry.  no hyperpigmentation, vitiligo, or suspicious lesions  Neuro: alert, oriented x3, affect appropriate, no focal neurological deficits, moves all extremities well, no involuntary movements  Psych: non focal     Data Review:     No results for input(s): WBC, HGB, HCT, PLT, HGBEXT, HCTEXT, PLTEXT in the last 72 hours. No results for input(s): NA, K, CL, CO2, GLU, BUN, CREA, CA, MG, PHOS, ALB, TBIL, ALT, INR, INREXT in the last 72 hours. No lab exists for component: SGOT,  BNP    Results for orders placed or performed during the hospital encounter of 19   EKG, 12 LEAD, INITIAL   Result Value Ref Range    Ventricular Rate 75 BPM    Atrial Rate 75 BPM    P-R Interval 128 ms    QRS Duration 82 ms    Q-T Interval 406 ms    QTC Calculation (Bezet) 453 ms    Calculated P Axis 16 degrees    Calculated R Axis -5 degrees    Calculated T Axis 1 degrees    Diagnosis       Normal sinus rhythm  Normal ECG  When compared with ECG of 2019 15:39,  No significant change was found  Confirmed by Neema Galicia MD, Patrick Hawkins ([id number]) on 2019 2:20:49 PM     Results for orders placed or performed during the hospital encounter of 18   ECG HOLTER MONITOR, [address] [facility]                    [address] [facility], [address]                                         Test Date:    2018  Pat Name:     [facility]             Department:     Patient ID:   [id number]                Room:           Gender:       Female                   Technician:     :          22-               Requested By: Dr. Jasmeet Llamas Number:                          Maria Del Rosario MD:   Tonny Caldera MD                    Interpretive Statements  Cathie Nicolas was in Rhythm is Sinus. The average heart rate, excluding ectopy, was 97 BPM with a minimum of 69 BPM   at  03:34 D2 and a maximum of 142 BPM at  12:14 D1. Heart beats, including ectopy, totaled [id number] beats.   There were no VENTRICULAR ectopics found.    SUPRAVENTRICULAR ECTOPICS totaled 4  ,with 1 single and 0 paired beats. There was 1 SUPRAVENTRICULAR TACHYCARDIA with 3 beats at 11:07 D2 at a rate   of 149 BPM.      1. Rhythm is sinus. 2. IN and QRS are within normal limits. 3. (1) single SVEs, (1) triplet. 4. No patient diary was submitted, no patient triggered events noted.         Electronically signed by Janee Weathers MD on Aug  6 2018  8:10AM CDT       All Cardiac Markers in the last 24 hours:  No results found for: CPK, CK, CKMMB, CKMB, RCK3, CKMBT, CKNDX, CKND1, JUICE, TROPT, TROIQ, GUME, TROPT, TNIPOC, BNP, BNPP    Last Lipid:    Lab Results   Component Value Date/Time    Cholesterol, total 189 10/17/2019 11:15 AM    HDL Cholesterol 60 10/17/2019 11:15 AM    LDL, calculated 102 (H) 10/17/2019 11:15 AM    Triglyceride 137 10/17/2019 11:15 AM    CHOL/HDL Ratio 2.9 03/07/2018 02:41 PM       Signed By: Nash Delarosa MD     Maria A 3, 2020

## 2020-06-03 NOTE — Clinical Note
Right groin and right radial prepped with ChloraPrep and draped. Wet prep solution applied at: 1110. Wet prep solution dried at: 1113. Wet prep elapsed drying time: 3 mins.

## 2020-06-04 ENCOUNTER — TELEPHONE (OUTPATIENT)
Dept: FAMILY MEDICINE CLINIC | Age: 65
End: 2020-06-04

## 2020-06-04 NOTE — TELEPHONE ENCOUNTER
Shared findings of normal cath, as communicated through Dr. Cesar Jose, with Dr. Manuel Haskins. Will continue to take physical complaints seriously, but multiple studies over the years for similar concerns have excluded serious pathology.  ERIKA

## 2020-06-15 DIAGNOSIS — E78.00 HYPERCHOLESTEROLEMIA: ICD-10-CM

## 2020-06-15 DIAGNOSIS — B00.2 RECURRENT ORAL HERPES SIMPLEX: ICD-10-CM

## 2020-06-16 ENCOUNTER — TELEPHONE (OUTPATIENT)
Dept: CARDIOLOGY CLINIC | Age: 65
End: 2020-06-16

## 2020-06-16 NOTE — TELEPHONE ENCOUNTER
Automated refill request. Please schedule an appointment for reevaluation of recurrent HSV. No labs prior. Interval supply will be authorized.  ERIKA

## 2020-06-16 NOTE — TELEPHONE ENCOUNTER
----- Message from Ramírez Mcmillan DO sent at 6/3/2020  3:27 PM EDT -----  Regarding: fyi update  Hi Dr. Bello Leal,    Just fyi I had low suspicion for acute coronary syndrome but as you know she's had worsening symptoms and (was crying on my virtual visit with her). I decided to just nip the situation in the bud- and sent her for heart cath which confirmed normal patent coronaries. We did notice a hiatal hernia (please see CXR), though again I doubt is a major contributor to her atypical symptoms. Wanted to keep you in the loop  Thanks! Joyce Pizano  ----- Message -----  From: Chip Delacruz LPN  Sent: 5/2/2929   2:50 PM EDT  To: Ramírez Mcmillan DO    Chest xray was ordered for cath should I forward to PCP to address the hiatal hernia it showed.

## 2020-06-17 RX ORDER — VALACYCLOVIR HYDROCHLORIDE 500 MG/1
500 TABLET, FILM COATED ORAL DAILY
Qty: 30 TAB | Refills: 0 | Status: SHIPPED | OUTPATIENT
Start: 2020-06-17 | End: 2020-07-14 | Stop reason: SDUPTHER

## 2020-06-17 RX ORDER — ATORVASTATIN CALCIUM 40 MG/1
TABLET, FILM COATED ORAL
Qty: 30 TAB | Refills: 0 | Status: SHIPPED | OUTPATIENT
Start: 2020-06-17 | End: 2020-07-14 | Stop reason: SDUPTHER

## 2020-06-17 NOTE — TELEPHONE ENCOUNTER
Called patient  verified she has been scheduled for follow up on 2020 at 3:30 PM. She says she will also need refills on her Atorvastatin. Told patient she may need labs done before her appt she says she just had labs drawn with dr. Rashad Pitts, told Dr. Baldemar Givens will look at those labs and I will let her know if she will need additional labs done.

## 2020-06-29 ENCOUNTER — TELEPHONE (OUTPATIENT)
Dept: FAMILY MEDICINE CLINIC | Age: 65
End: 2020-06-29

## 2020-06-29 NOTE — TELEPHONE ENCOUNTER
Spoke with patient (all identifiers verified) to advise she may be seen at the red clinic or urgent care. Patient would like to be seen at [facility]-declined to be seen at [facility]; aware [facility] is closed for the day and we will call her tomorrow with appointment information. Patient verbalized understanding and stated she would prefer a Wednesday afternoon appointment. Routing message to [place] for scheduling.

## 2020-06-29 NOTE — TELEPHONE ENCOUNTER
Ms. Mena Bryant called stating that the right side of her head hurts. She has an ear ache & her throat both hurt on the right side. OTC Drops aren't working. Please advise. Her call back number is: [phone].

## 2020-06-29 NOTE — TELEPHONE ENCOUNTER
Per green protocol, we are still instructed not to see patients with acute HEENT symptoms in our office. Please schedule red clinic or direct to UC.  ERIKA

## 2020-06-29 NOTE — TELEPHONE ENCOUNTER
Right ear pain, right side sore throat and right side of head pain x 2 weeks. No Cough, No SOB, No Fever. Patient denies any contact with COVID-19 patient. Pain level 8 at time of call for all areas. Patient states she is using OTC ear drops, but not helping with pain. She is aware a call will be routed to Dr. Hawa Dennison and will return call with physician recommendation. Patient verbalized understanding.

## 2020-06-30 NOTE — TELEPHONE ENCOUNTER
Patient is scheduled for tomorrow 7/1 at the [facility]. She is aware of date and time of appt and has their address and phone #. She is aware to wear a mask and states she has her own.  Lanny Chery

## 2020-07-01 ENCOUNTER — OFFICE VISIT (OUTPATIENT)
Dept: FAMILY MEDICINE CLINIC | Facility: CLINIC | Age: 65
End: 2020-07-01

## 2020-07-01 VITALS
HEART RATE: 115 BPM | SYSTOLIC BLOOD PRESSURE: 128 MMHG | DIASTOLIC BLOOD PRESSURE: 94 MMHG | RESPIRATION RATE: 18 BRPM | OXYGEN SATURATION: 98 % | TEMPERATURE: 98.4 F

## 2020-07-01 DIAGNOSIS — R00.0 TACHYCARDIA: ICD-10-CM

## 2020-07-01 DIAGNOSIS — J02.9 SORE THROAT: ICD-10-CM

## 2020-07-01 DIAGNOSIS — H65.00 ACUTE SEROUS OTITIS MEDIA, RECURRENCE NOT SPECIFIED, UNSPECIFIED LATERALITY: Primary | ICD-10-CM

## 2020-07-01 DIAGNOSIS — R51.9 DAILY HEADACHE: ICD-10-CM

## 2020-07-01 LAB
S PYO AG THROAT QL: NEGATIVE
VALID INTERNAL CONTROL?: YES

## 2020-07-01 RX ORDER — AZITHROMYCIN 250 MG/1
TABLET, FILM COATED ORAL
Qty: 6 TAB | Refills: 0 | Status: SHIPPED | OUTPATIENT
Start: 2020-07-01 | End: 2020-07-02 | Stop reason: ALTCHOICE

## 2020-07-01 NOTE — PROGRESS NOTES
Bhavna Calhoun presents today for   Chief Complaint   Patient presents with    Sore Throat     right side, pt states intermittent    Ear Pain     right ear x 2 weeks    Headache     more severe on right side x 2 weeks       Is someone accompanying this pt? no    Is the patient using any DME equipment during OV? no    Travel and Exposure Screening was performed during check in or rooming process Yes  Yes: Comment: patient hasnt traveled. Depression Screening:  3 most recent PHQ Screens 1/4/2019   PHQ Not Done -   Little interest or pleasure in doing things Several days   Feeling down, depressed, irritable, or hopeless Not at all   Total Score PHQ 2 1   Trouble falling or staying asleep, or sleeping too much Not at all   Feeling tired or having little energy Not at all   Poor appetite, weight loss, or overeating Not at all   Feeling bad about yourself - or that you are a failure or have let yourself or your family down Not at all   Trouble concentrating on things such as school, work, reading, or watching TV Not at all   Moving or speaking so slowly that other people could have noticed; or the opposite being so fidgety that others notice Not at all   Thoughts of being better off dead, or hurting yourself in some way Not at all   PHQ 9 Score 1   How difficult have these problems made it for you to do your work, take care of your home and get along with others Somewhat difficult       Fall Risk  Fall Risk Assessment, last 12 mths 7/1/2020   Able to walk? Yes   Fall in past 12 months?  No   Fall with injury? -   Number of falls in past 12 months -   Fall Risk Score -       This Visit Test  Results for orders placed or performed in visit on 07/01/20   AMB POC RAPID STREP A   Result Value Ref Range    VALID INTERNAL CONTROL POC Yes     Group A Strep Ag Negative Negative

## 2020-07-01 NOTE — PROGRESS NOTES
Chief Complaint   Patient presents with    Sore Throat     right side, pt states intermittent    Ear Pain     right ear x 2 weeks    Headache     more severe on right side x 2 weeks       SUBJECTIVE:    The patient presents to our specialty flu / COVID-19 clinic with a focused complaint of the following: For the past 2 weeks she has experienced intermittent sore throat, ear pain on the right, headache. She denies fever, shortness of breath, cough, GI/ issues, fatigue. Patient denies recent travel. Pt exposed to sick contacts under investigations for possible Covid NO. Pt is not a current smoker. OBJECTIVE    Visit Vitals  BP (!) 128/94 (BP 1 Location: Left arm, BP Patient Position: Sitting)   Pulse (!) 115   Temp 98.4 °F (36.9 °C) (Oral)   Resp 18   SpO2 98%      General:  alert, well nourished, cooperative, pleasant and in mild distress. Sick but not toxic appearing. Eyes:   The lids are without swelling, lesions, or drainage. The conjunctiva is clear and noninjected. ENT: Unable to thoroughly assess her right ear as patient was in too much pain plus too much cerumen. Left ear was normal..   Neck: normal.  Lungs/CV: clear to auscultation, no wheezes or rales and unlabored breathing. Heart: regular rhythm tachycardia. Skin:  No rashes, no jaundice. ASSESSMENT / PLAN     ICD-10-CM ICD-9-CM    1. Acute serous otitis media, recurrence not specified, unspecified laterality H65.00 381.01 azithromycin (ZITHROMAX) 250 mg tablet      NOVEL CORONAVIRUS (COVID-19)   2. Sore throat J02.9 462 AMB POC RAPID STREP A      NOVEL CORONAVIRUS (COVID-19)   3. Daily headache R51 784.0    4. Tachycardia R00.0 785.0        not tested for influenza. negative for strep. Based on CDC recommendations and limited testing supplies, only those patients who meet criteria will be tested for Covid.      High priority groups for testing   Symptomatic and/or Exposure /Test for Covid  Immunocompromised host (on prednisone, biological therapy, blood cancer, metastatic cancer or active chemotherapy)   [facility] care worker in the home    Other high-risk group: o age >47   o Uncontrolled DM   o Uncontrolled HTN   o BMI >40, CKD/ESRD    Dialysis patients (patients going to HD units, not asymptomatic home HD/PD)    Anyone living in a congregate setting     Non High-risk patient category           Test for COVID-19   Asymptomatic, no known exposure  No    Asymptomatic, possible exposure  No    Asymptomatic, definite exposure  Provider discretion    Symptomatic, no known exposure  Yes    Symptomatic, + exposure  Yes      Patient does  meet criteria for Covid testing. COVID-19 testing was completed. Work note was given until [facility] are available. If Covid testing was completed and is negative, patient may return back to work despite quarantine originally set in place if applicable. Awaiting Covid 19 results at this time. Instructed pt on the importance of rest, fluid intake (with avoidance of red fluids), and vit C supplements. Instructed pt to check temp if possible and to take acetaminophen if fevers are noted, avoid NSAIDs. Remember to wash hands, disinfect surroundings, and avoid touching face. Instructed pt to remain home until Covid results are negative and all symptoms have improved or subsided. If they must leave home, wear a mask. Patient verbalized understanding. We have provided the patient with a detailed after visit summary which was reviewed, and red flag symptoms that would warrant an ER visit were emphasized. CC'd chart to PCP: Yes: Comment: K stone     I do not suspect that this patient has COVID but due to her age a test was completed. She is negative for strep. It was very difficult to assess her right ear completely due to patient being in a lot of pain and ear wax. She is allergic to multiple antibiotics so I had to compromise for azithromycin.   I do hope that this antibiotic therapy is effective for her as she is uncomfortable to her right ear. Her heart rate was slightly elevated today as well at 584, diastolic blood pressure slightly elevated at 94. ANTIONETTE Mcdowell, DNP      This visit was provided as a focused evaluation during the COVID -19 pandemic/national emergency. A comprehensive review of all previous patient history and testing was not conducted. Pertinent findings were elicited during the visit.

## 2020-07-01 NOTE — Clinical Note
I do not suspect that this patient has COVID but due to her age a test was completed. She is negative for strep. It was very difficult to assess her right ear completely due to patient being in a lot of pain and ear wax. She is allergic to multiple antibiotics so I had to compromise for azithromycin. I do hope that this antibiotic therapy is effective for her as she is uncomfortable to her right ear. Her heart rate was slightly elevated today as well at 139, diastolic blood pressure slightly elevated at 94.

## 2020-07-01 NOTE — Clinical Note
7/1/2020 Received a call from Edmond Cordon pharmacist at [facility] in [place] stating patient's Seroquel may interact with the azithromycin. He also states the patient has a prescription for Cipro that her GI provider prescribed for 'gut issues\". Patient failed to mention the Cipro prescription during the visit. He states that the azithromycin and the Cipro together also flags interaction. After much discussion, decided to discontinue the azithromycin order and add doxycycline 100 mg twice daily x7 days. With this patient can take both the Doxy and the Cipro without any interaction. Patient to obtain these prescriptions today. Patient did not obtain the azithromycin.

## 2020-07-02 RX ORDER — DOXYCYCLINE 100 MG/1
100 CAPSULE ORAL 2 TIMES DAILY
Qty: 14 CAP | Refills: 0 | Status: SHIPPED | OUTPATIENT
Start: 2020-07-02 | End: 2020-07-09

## 2020-07-02 NOTE — PROGRESS NOTES
7/1/2020 Received a call from Kerri Bar pharmacist at [facility] in [place] stating patient's Seroquel may interact with the azithromycin. He also states the patient has a prescription for Cipro that her GI provider prescribed for 'gut issues\". Patient failed to mention the Cipro prescription during the visit. He states that the azithromycin and the Cipro together also flags interaction. After much discussion, decided to discontinue the azithromycin order and add doxycycline 100 mg twice daily x7 days. With this patient can take both the Doxy and the Cipro without any interaction. Patient to obtain these prescriptions today. Patient did not obtain the azithromycin.

## 2020-07-08 LAB — SARS-COV-2, NAA: NOT DETECTED

## 2020-07-09 ENCOUNTER — TELEPHONE (OUTPATIENT)
Dept: FAMILY MEDICINE CLINIC | Facility: CLINIC | Age: 65
End: 2020-07-09

## 2020-07-09 NOTE — PROGRESS NOTES
Tyesha Bryan, Please notify pt their Covid test was Negative. Remind pt to stay home but if they must leave home take all precautions: wear a mask, continue social distancing, disinfect home/work areas, avoid touching the face, and sanitize hands frequently. If they need a return to work note, please provide. Thank you.

## 2020-07-14 ENCOUNTER — VIRTUAL VISIT (OUTPATIENT)
Dept: FAMILY MEDICINE CLINIC | Age: 65
End: 2020-07-14

## 2020-07-14 DIAGNOSIS — J30.1 ALLERGIC RHINITIS DUE TO POLLEN, UNSPECIFIED SEASONALITY: ICD-10-CM

## 2020-07-14 DIAGNOSIS — H92.01 EAR PAIN, RIGHT: ICD-10-CM

## 2020-07-14 DIAGNOSIS — B00.2 RECURRENT ORAL HERPES SIMPLEX: ICD-10-CM

## 2020-07-14 DIAGNOSIS — E78.00 HYPERCHOLESTEROLEMIA: Primary | ICD-10-CM

## 2020-07-14 RX ORDER — VALACYCLOVIR HYDROCHLORIDE 500 MG/1
500 TABLET, FILM COATED ORAL DAILY
Qty: 90 TAB | Refills: 4 | Status: SHIPPED | OUTPATIENT
Start: 2020-07-14 | End: 2021-09-20 | Stop reason: SDUPTHER

## 2020-07-14 RX ORDER — ATORVASTATIN CALCIUM 40 MG/1
TABLET, FILM COATED ORAL
Qty: 90 TAB | Refills: 4 | Status: SHIPPED | OUTPATIENT
Start: 2020-07-14 | End: 2021-08-26

## 2020-07-14 RX ORDER — FLUTICASONE PROPIONATE 50 MCG
2 SPRAY, SUSPENSION (ML) NASAL AS NEEDED
Qty: 3 BOTTLE | Refills: 4 | Status: SHIPPED | OUTPATIENT
Start: 2020-07-14 | End: 2021-09-20 | Stop reason: SDUPTHER

## 2020-07-14 NOTE — PROGRESS NOTES
Porsche Muller is a 76 y.o. female, evaluated via audio-only technology on 7/14/2020 for Cholesterol Problem; Hypertension; and Mouth Lesions  . Assessment & Plan:     Diagnoses and all orders for this visit:    1. Hypercholesterolemia  -     atorvastatin (LIPITOR) 40 mg tablet; TAKE ONE TABLET BY MOUTH ONE TIME DAILY    2. Ear pain, right  -     REFERRAL TO ENT-OTOLARYNGOLOGY    3. Allergic rhinitis due to pollen, unspecified seasonality  -     fluticasone propionate (Flonase) 50 mcg/actuation nasal spray; 2 Sprays by Both Nostrils route as needed for Rhinitis. 4. Recurrent oral herpes simplex  -     valACYclovir (VALTREX) 500 mg tablet; Take 1 Tab by mouth daily. lipids: Well controlled, continue present management    Recommended daily nasal steroid which may both prevent her recurrent headaches and also resolve the ear pain. Referral as plan B. Recurrent HSV: recommend return to daily prophylaxis. Follow-up and Dispositions    · Return in about 6 months (around 1/14/2021) for chronic medical conditions, MWV same day, no labs prior. 12  Subjective:     FU lipids on statin. Been worked up for chest pain. Determined non cardiac.  Pain free since eval.  Lab Results   Component Value Date/Time    Cholesterol, total 189 10/17/2019 11:15 AM    HDL Cholesterol 60 10/17/2019 11:15 AM    LDL, calculated 102 (H) 10/17/2019 11:15 AM    VLDL, calculated 27 10/17/2019 11:15 AM    Triglyceride 137 10/17/2019 11:15 AM    CHOL/HDL Ratio 2.9 03/07/2018 02:41 PM     Lab Results   Component Value Date/Time    Sodium 139 05/29/2020 12:31 PM    Potassium 4.5 05/29/2020 12:31 PM    Chloride 106 05/29/2020 12:31 PM    CO2 29 05/29/2020 12:31 PM    Anion gap 4 05/29/2020 12:31 PM    Glucose 97 05/29/2020 12:31 PM    BUN 13 05/29/2020 12:31 PM    Creatinine 0.84 05/29/2020 12:31 PM    BUN/Creatinine ratio 15 05/29/2020 12:31 PM    GFR est AA >60 05/29/2020 12:31 PM    GFR est non-AA >60 05/29/2020 12:31 PM Calcium 8.7 05/29/2020 12:31 PM    Bilirubin, total 0.3 05/29/2020 12:31 PM    Alk. phosphatase 117 05/29/2020 12:31 PM    Protein, total 7.0 05/29/2020 12:31 PM    Albumin 3.5 05/29/2020 12:31 PM    Globulin 3.5 05/29/2020 12:31 PM    A-G Ratio 1.0 05/29/2020 12:31 PM    ALT (SGPT) 23 05/29/2020 12:31 PM    AST (SGOT) 20 05/29/2020 12:31 PM       Seen [facility] 7/1 for right ear pain , present x 2 weeks at presentation. antibiotics. No change. Uses nasal steroids as needed for headache, twice last week. Cold sores: most recently 4-5 months ago, 2x/year. Prior to daily suppression would have them perhaps q2 months. Hasn't been taking recently. Prior to Admission medications    Medication Sig Start Date End Date Taking? Authorizing Provider   valACYclovir (VALTREX) 500 mg tablet Take 1 Tab by mouth daily. 6/17/20  Yes Jo Hernandez MD   atorvastatin (LIPITOR) 40 mg tablet TAKE ONE TABLET BY MOUTH ONE TIME DAILY 6/17/20  Yes Jo Hernandez MD   ketoconazole (NIZORAL) 2 % topical cream APPLY TOPICALLY TO AFFECTED AREA(S) TWO TIMES A DAY 3/20/20  Yes Jo Hernandez MD   promethazine (PHENERGAN) 12.5 mg tablet Take 12.5 mg by mouth. 11/2/19  Yes Provider, Historical   losartan (COZAAR) 25 mg tablet TAKE ONE TABLET BY MOUTH ONE TIME DAILY 1/17/20  Yes Jo Hernandez MD   dicyclomine (BENTYL) 20 mg tablet Take 20 mg by mouth four (4) times daily as needed for Abdominal Cramps. Yes Provider, Historical   metoclopramide HCl (REGLAN) 5 mg tablet Take 5 mg by mouth Before breakfast, lunch, and dinner. Yes Provider, Historical   QUEtiapine (SEROQUEL) 200 mg tablet Take 1 Tab by mouth daily. 10/18/19  Yes Jo Hernandez MD   cholecalciferol, vitamin D3, 2,000 unit tab Take  by mouth. Yes Provider, Historical   fluticasone propionate (FLONASE) 50 mcg/actuation nasal spray 2 Sprays by Both Nostrils route as needed for Rhinitis.  10/17/19  Yes Jo Hernandez MD   cloNIDine HCL (CATAPRES) 0.3 mg tablet Take 0.3 mg by mouth nightly. 9/16/19  Yes Provider, Historical   polyethylene glycol (MIRALAX) 17 gram packet Take 1 Packet by mouth daily. 5/6/19  Yes Ameya Mejia MD   oxyCODONE IR (ROXICODONE) 10 mg tab immediate release tablet 10 mg every eight (8) hours as needed. 2/12/19  Yes Provider, Historical   DEXILANT 60 mg CpDB capsule (delayed release) Take 120 mg by mouth daily. 2/3/19  Yes Provider, Historical   morphine CR (MS CONTIN) 60 mg CR tablet Take 30 mg by mouth every twelve (12) hours. Yes Provider, Historical   diclofenac (VOLTAREN) 1 % gel Apply  to affected area four (4) times daily. 12/27/18  Yes James Alarcon,    cyclobenzaprine (FLEXERIL) 10 mg tablet TAKE 1 TABLET BY MOUTH THREE TIMES DAILY AS NEEDED FOR MUSCLE SPASMS FOR UP TO 30 DAYS. 10/1/18  Yes BETO Peres   naloxone [facility]) 4 mg/actuation nasal spray 1 [place] by IntraNASal route once as needed. Use 1 spray intranasally into 1 nostril. Use a new Narcan nasal spray for subsequent doses and administer into alternating nostrils. May repeat every 2 to 3 minutes as needed. Other, MD Xenia         Review of Systems   Constitutional: Negative for fever. HENT: Negative for ear discharge and sore throat. No purulent nasal drainage       No flowsheet data found. Rubina Salazar, who was evaluated through a patient-initiated, synchronous (real-time) audio only encounter, and/or her healthcare decision maker, is aware that it is a billable service, with coverage as determined by her insurance carrier. She provided verbal consent to proceed: Yes. She has not had a related appointment within my department in the past 7 days or scheduled within the next 24 hours.       Total Time: minutes: 21-30 minutes    Som Patterson MD

## 2020-07-14 NOTE — PROGRESS NOTES
Patient is being seen today for follow up on her HTN, Cholesterol and cold sores. She says she still has ear pain. No other concerns. Pain-right ear 7    1. Have you been to the ER, urgent care clinic since your last visit? Hospitalized since your last visit? No  2. Have you seen or consulted any other health care providers outside of the [address] [place] [address] since your last visit? Include any pap smears or colon screening. No    Medication reconciliation has been completed with patient. Care team discussed/updated as well as pharmacy.     Health Maintenance Due   Topic Date Due    DTaP/Tdap/Td series (1 - Tdap) 03/30/1975    Shingrix Vaccine Age 50> (1 of 2) 03/30/2004    GLAUCOMA SCREENING Q2Y  03/30/2019    Bone Densitometry (Dexa) Screening  03/30/2019

## 2020-10-12 ENCOUNTER — TELEPHONE (OUTPATIENT)
Dept: FAMILY MEDICINE CLINIC | Age: 65
End: 2020-10-12

## 2020-10-12 DIAGNOSIS — R20.0 NUMBNESS AND TINGLING OF BOTH FEET: ICD-10-CM

## 2020-10-12 DIAGNOSIS — R20.2 NUMBNESS AND TINGLING IN BOTH HANDS: Primary | ICD-10-CM

## 2020-10-12 DIAGNOSIS — R20.2 NUMBNESS AND TINGLING OF BOTH FEET: ICD-10-CM

## 2020-10-12 DIAGNOSIS — R20.0 NUMBNESS AND TINGLING IN BOTH HANDS: Primary | ICD-10-CM

## 2020-10-12 NOTE — TELEPHONE ENCOUNTER
Patient called the office asking that a referral be put in for her to be seen at [facility] in [facility] with Dr. Stefanie Do. She says she has called this office and can be seen on 10/13/20 if her PCM can put in a referral for this. Patient c/o numbness and tingling in her hands and feet. Referral has been pended please review and sign if appropriate.

## 2020-10-19 NOTE — TELEPHONE ENCOUNTER
Called patient  verified she has been scheduled for next available in office on  at 1:30 PM. In office visit protocols.

## 2020-10-23 ENCOUNTER — HOSPITAL ENCOUNTER (OUTPATIENT)
Dept: LAB | Age: 65
Discharge: HOME | End: 2020-10-23
Payer: MEDICARE

## 2020-10-23 PROCEDURE — 87635 SARS-COV-2 COVID-19 AMP PRB: CPT

## 2020-10-24 LAB — SARS-COV-2, COV2NT: NOT DETECTED

## 2020-11-20 ENCOUNTER — OFFICE VISIT (OUTPATIENT)
Dept: FAMILY MEDICINE CLINIC | Age: 65
End: 2020-11-20
Payer: MEDICARE

## 2020-11-20 VITALS
BODY MASS INDEX: 32.72 KG/M2 | DIASTOLIC BLOOD PRESSURE: 70 MMHG | WEIGHT: 190.6 LBS | RESPIRATION RATE: 14 BRPM | SYSTOLIC BLOOD PRESSURE: 138 MMHG | OXYGEN SATURATION: 99 % | TEMPERATURE: 98.7 F | HEART RATE: 100 BPM

## 2020-11-20 DIAGNOSIS — R73.03 PREDIABETES: ICD-10-CM

## 2020-11-20 DIAGNOSIS — Z13.220 SCREENING, LIPID: ICD-10-CM

## 2020-11-20 DIAGNOSIS — E55.9 VITAMIN D DEFICIENCY: ICD-10-CM

## 2020-11-20 DIAGNOSIS — R20.2 PARESTHESIA: Primary | ICD-10-CM

## 2020-11-20 DIAGNOSIS — Z78.0 POSTMENOPAUSAL STATUS: ICD-10-CM

## 2020-11-20 DIAGNOSIS — M20.11 HALLUX VALGUS, RIGHT: ICD-10-CM

## 2020-11-20 PROCEDURE — 1090F PRES/ABSN URINE INCON ASSESS: CPT | Performed by: FAMILY MEDICINE

## 2020-11-20 PROCEDURE — G9717 DOC PT DX DEP/BP F/U NT REQ: HCPCS | Performed by: FAMILY MEDICINE

## 2020-11-20 PROCEDURE — G9899 SCRN MAM PERF RSLTS DOC: HCPCS | Performed by: FAMILY MEDICINE

## 2020-11-20 PROCEDURE — 3017F COLORECTAL CA SCREEN DOC REV: CPT | Performed by: FAMILY MEDICINE

## 2020-11-20 PROCEDURE — G8752 SYS BP LESS 140: HCPCS | Performed by: FAMILY MEDICINE

## 2020-11-20 PROCEDURE — G8536 NO DOC ELDER MAL SCRN: HCPCS | Performed by: FAMILY MEDICINE

## 2020-11-20 PROCEDURE — 99214 OFFICE O/P EST MOD 30 MIN: CPT | Performed by: FAMILY MEDICINE

## 2020-11-20 PROCEDURE — G8754 DIAS BP LESS 90: HCPCS | Performed by: FAMILY MEDICINE

## 2020-11-20 PROCEDURE — G8400 PT W/DXA NO RESULTS DOC: HCPCS | Performed by: FAMILY MEDICINE

## 2020-11-20 PROCEDURE — G8427 DOCREV CUR MEDS BY ELIG CLIN: HCPCS | Performed by: FAMILY MEDICINE

## 2020-11-20 PROCEDURE — 1101F PT FALLS ASSESS-DOCD LE1/YR: CPT | Performed by: FAMILY MEDICINE

## 2020-11-20 PROCEDURE — G8417 CALC BMI ABV UP PARAM F/U: HCPCS | Performed by: FAMILY MEDICINE

## 2020-11-20 NOTE — PATIENT INSTRUCTIONS
[employer] [phone] Bunions: Care Instructions Your Care Instructions A bunion is a bump on the outside of the joint at the bottom of your big toe. It can cause pain and swelling in the toe. A bunion forms when bone or tissue around the joint becomes swollen from too much pressure. You also can have a bunionette, or tailor's bunion, which forms on the joint of the little toe. Sometimes, a bunion on the big toe turns the toe in toward the second toe. This is called displacement. It can lead to problems with the other toes. You can get a bunion from having an unusual walking style, having flatfeet, or wearing tight-fitting shoes. You can treat most bunions at home with a few simple steps. If you have a lot of pain, your doctor may inject medicine into the bunion to reduce swelling for a while. If you still have pain, you may need to have surgery. Follow-up care is a key part of your treatment and safety. Be sure to make and go to all appointments, and call your doctor if you are having problems. It's also a good idea to know your test results and keep a list of the medicines you take. How can you care for yourself at home? · Ask your doctor if you can take an over-the-counter pain medicine, such as acetaminophen (Tylenol), ibuprofen (Advil, Motrin), or naproxen (Aleve). Be safe with medicines. Read and follow all instructions on the label. · Wear shoes that have a wide and deep space for the toes. Also, wear shoes that have low or flat heels and good arch supports. Do not wear tight, narrow, or high-heeled shoes. · Try bunion pads, arch supports, toe spacers, or shoe inserts. They can help shift your weight when you walk to take pressure off your big toe. · Put moleskin or another type of cushion on or around the bunion to keep it from rubbing against your shoe.  
· Put ice or a cold pack on the area for 10 to 20 minutes at a time as needed. Put a thin cloth between the ice and your skin. · Prop up your foot on a pillow when you ice your toe or anytime you sit or lie down. Try to keep it above the level of your heart. This will help reduce swelling. When should you call for help? Call your doctor now or seek immediate medical care if: 
  · You have severe pain.  
  · Your toe is cool or pale or changes color.  
  · You have tingling, weakness, or numbness in the toe. Watch closely for changes in your health, and be sure to contact your doctor if: 
  · Pain and swelling get worse.  
  · You do not get better as expected. Where can you learn more? Go to http://www.gray.com/ Enter H210 in the search box to learn more about \"Bunions: Care Instructions. \" Current as of: March 2, 2020               Content Version: 12.6 © 9023-1201 [employer]. Care instructions adapted under license by [employer] (which disclaims liability or warranty for this information). If you have questions about a medical condition or this instruction, always ask your healthcare professional. [address] any warranty or liability for your use of this information.

## 2020-11-20 NOTE — PROGRESS NOTES
Johnnye Gowers is a 76 y.o. female who was seen in clinic today (11/20/2020) for Tingling and Numbness  . Assessment & Plan:   Diagnoses and all orders for this visit:    1. Paresthesia  -     VITAMIN B12 & FOLATE; Future  -     METABOLIC PANEL, COMPREHENSIVE; Future  -     CBC WITH AUTOMATED DIFF; Future  -     TSH W/ REFLX FREE T4 IF ABNORMAL; Future  -     MAGNESIUM; Future    2. Prediabetes  -     HEMOGLOBIN A1C WITH EAG; Future  -     METABOLIC PANEL, COMPREHENSIVE; Future    3. Vitamin D deficiency  -     METABOLIC PANEL, COMPREHENSIVE; Future  -     VITAMIN D, 25 HYDROXY; Future    4. Screening, lipid  -     LIPID PANEL W/ REFLX DIRECT LDL; Future    5. Postmenopausal status  -     DEXA BONE DENSITY STUDY AXIAL; Future    6. Hallux valgus, right  -     REFERRAL TO PODIATRY      New, uncontrolled  DDx includes but not limited to DM neuropathy, B12 deficiency, hypothyroidism    Watch for ergonomic contributors      Follow-up and Dispositions    · Return in 2 weeks (on 12/4/2020) for lab follow up via virtual visit, Medicare Wellness Visit in January.              Subjective:   Tingling hands and feet x 2-3 months  Pain and tenderness medial aspect right great toe    Worse in hands with sitting in her chair, using cell phone    Seeing rheumatologist for hand arthritis    Lab Results   Component Value Date/Time    Hemoglobin A1c 6.2 (H) 10/17/2019 11:15 AM    Hemoglobin A1c 6.0 (H) 11/26/2018 12:00 AM    Hemoglobin A1c 6.1 (H) 07/06/2018 01:18 PM    Hemoglobin A1c, External 5.9 06/27/2016    Glucose 97 05/29/2020 12:31 PM    Microalb/Creat ratio (ug/mg creat.) 8.1 10/18/2019 02:17 AM    LDL, calculated 102 (H) 10/17/2019 11:15 AM    Creatinine, POC 0.9 09/06/2017 11:43 AM    Creatinine 0.84 05/29/2020 12:31 PM     Lab Results   Component Value Date/Time    Sodium 139 05/29/2020 12:31 PM    Potassium 4.5 05/29/2020 12:31 PM    Chloride 106 05/29/2020 12:31 PM    CO2 29 05/29/2020 12:31 PM    Anion gap 4 05/29/2020 12:31 PM    Glucose 97 05/29/2020 12:31 PM    BUN 13 05/29/2020 12:31 PM    Creatinine 0.84 05/29/2020 12:31 PM    BUN/Creatinine ratio 15 05/29/2020 12:31 PM    GFR est AA >60 05/29/2020 12:31 PM    GFR est non-AA >60 05/29/2020 12:31 PM    Calcium 8.7 05/29/2020 12:31 PM    Bilirubin, total 0.3 05/29/2020 12:31 PM    Alk. phosphatase 117 05/29/2020 12:31 PM    Protein, total 7.0 05/29/2020 12:31 PM    Albumin 3.5 05/29/2020 12:31 PM    Globulin 3.5 05/29/2020 12:31 PM    A-G Ratio 1.0 05/29/2020 12:31 PM    ALT (SGPT) 23 05/29/2020 12:31 PM    AST (SGOT) 20 05/29/2020 12:31 PM        Lab Results   Component Value Date/Time    TSH 1.43 08/06/2019 12:55 PM     No results found for: B12LT, FOL, RBCF    No results found for: MG  Lab Results   Component Value Date/Time    Vitamin D 25-Hydroxy 67.8 03/07/2018 02:41 PM    Vitamin D 25-Hydroxy 45.4 11/15/2017 02:08 PM         Prior to Admission medications    Medication Sig Start Date End Date Taking? Authorizing Provider   carbamide peroxide (DEBROX) 6.5 % otic solution Administer 5 Drops in right ear two (2) times a day. 7/14/20   Wilfredo Weston MD   atorvastatin (LIPITOR) 40 mg tablet TAKE ONE TABLET BY MOUTH ONE TIME DAILY 7/14/20   Wilfredo Weston MD   fluticasone propionate (Flonase) 50 mcg/actuation nasal spray 2 Sprays by Both Nostrils route as needed for Rhinitis. 7/14/20   Wilfredo Weston MD   valACYclovir (VALTREX) 500 mg tablet Take 1 Tab by mouth daily. 7/14/20   Wilfredo Weston MD   ketoconazole (NIZORAL) 2 % topical cream APPLY TOPICALLY TO AFFECTED AREA(S) TWO TIMES A DAY 3/20/20   Wilfredo Weston MD   promethazine (PHENERGAN) 12.5 mg tablet Take 12.5 mg by mouth. 11/2/19   Provider, Historical   losartan (COZAAR) 25 mg tablet TAKE ONE TABLET BY MOUTH ONE TIME DAILY 1/17/20   Wilfredo Weston MD   dicyclomine (BENTYL) 20 mg tablet Take 20 mg by mouth four (4) times daily as needed for Abdominal Cramps.     Provider, Historical metoclopramide HCl (REGLAN) 5 mg tablet Take 5 mg by mouth Before breakfast, lunch, and dinner. Provider, Historical   QUEtiapine (SEROQUEL) 200 mg tablet Take 1 Tab by mouth daily. 10/18/19   José Manuel Garrido MD   cholecalciferol, vitamin D3, 2,000 unit tab Take  by mouth. Provider, Historical   cloNIDine HCL (CATAPRES) 0.3 mg tablet Take 0.3 mg by mouth nightly. 9/16/19   Provider, Historical   polyethylene glycol (MIRALAX) 17 gram packet Take 1 Packet by mouth daily. 5/6/19   José Manuel Garrido MD   oxyCODONE IR (ROXICODONE) 10 mg tab immediate release tablet 10 mg every eight (8) hours as needed. 2/12/19   Provider, Historical   DEXILANT 60 mg CpDB capsule (delayed release) Take 120 mg by mouth daily. 2/3/19   Provider, Historical   morphine CR (MS CONTIN) 60 mg CR tablet Take 30 mg by mouth every twelve (12) hours. Provider, Historical   diclofenac (VOLTAREN) 1 % gel Apply  to affected area four (4) times daily. 12/27/18   Sterling Fret A, DO   cyclobenzaprine (FLEXERIL) 10 mg tablet TAKE 1 TABLET BY MOUTH THREE TIMES DAILY AS NEEDED FOR MUSCLE SPASMS FOR UP TO 30 DAYS. 10/1/18   BETO Marcus   naloxone [facility]) 4 mg/actuation nasal spray 1 [place] by IntraNASal route once as needed. Use 1 spray intranasally into 1 nostril. Use a new Narcan nasal spray for subsequent doses and administer into alternating nostrils. May repeat every 2 to 3 minutes as needed. Other, MD Xenia         The following sections were reviewed & updated as appropriate: PMH, PSH, FH, and SH.      ROS        Objective:     Visit Vitals  /70 (BP 1 Location: Left arm, BP Patient Position: Sitting)   Pulse 100   Temp 98.7 °F (37.1 °C) (Oral)   Resp 14   Wt 190 lb 9.6 oz (86.5 kg)   SpO2 99%   BMI 32.72 kg/m²      Physical Exam  Constitutional:       General: She is not in acute distress. Appearance: She is well-developed. She is obese. HENT:      Head: Normocephalic and atraumatic.    Cardiovascular: Pulses:           Dorsalis pedis pulses are 2+ on the right side and 2+ on the left side. Pulmonary:      Effort: Pulmonary effort is normal.   Musculoskeletal:      Right hand: Decreased sensation noted. Decreased sensation is present in the medial distribution. Decreased sensation is not present in the ulnar distribution. Left hand: Decreased sensation noted. Decreased sensation is present in the medial distribution. Decreased sensation is not present in the ulnar distribution. Comments: Neg Phalen's and Tinel's signs     Neurological:      Mental Status: She is alert and oriented to person, place, and time. Psychiatric:         Behavior: Behavior normal.         Thought Content: Thought content normal.         Judgment: Judgment normal.       Diabetic foot exam:     Left Foot:   Visual Exam: normal    Pulse DP: 2+ (normal)   Filament test: reduced sensation    Dystrophic nails      Right Foot:   Visual Exam: hallux valgus otherwise normal    Pulse DP: 2+ (normal)   Filament test: normal sensation    Tenderness medial great toe and MTP    Dystrophic nails    Disclaimer: The patient understands our medical plan. Alternatives have been explained and offered. The risks, benefits and significant side effects of all medications have been reviewed. Anticipated time course and progression of condition reviewed. All questions have been addressed. She is encouraged to employ the information provided in the after visit summary, which was reviewed. Where applicable, she is instructed to call the clinic if she has not been notified either by phone or through [address] with the results of her tests or with an appointment plan for any referrals within 1 week(s). No news is not good news; it's no news. The patient  is to call if her condition worsens or fails to improve or if significant side effects are experienced. Aspects of this note may have been generated using voice recognition software. Despite editing, there may be unrecognized errors.        Tram Herrera MD

## 2020-11-20 NOTE — PROGRESS NOTES
Patient c/o hands and feet tingling on and off x 2-3 months she says she also has numbness associated. She has pins and needle sensations as well. 1. Have you been to the ER, urgent care clinic since your last visit? Hospitalized since your last visit? No  2. Have you seen or consulted any other health care providers outside of the [address] [place] [address] since your last visit? Include any pap smears or colon screening. Says she had a VV with pysh yesterday    Medication reconciliation has been completed with patient. Care team discussed/updated as well as pharmacy. Health Maintenance Due   Topic Date Due    DTaP/Tdap/Td series (1 - Tdap) 03/30/1975    Shingrix Vaccine Age 50> (1 of 2) 03/30/2004    GLAUCOMA SCREENING Q2Y  03/30/2019    Bone Densitometry (Dexa) Screening  03/30/2019    Flu Vaccine (1) 09/01/2020    A1C test (Diabetic or Prediabetic)  10/17/2020    Lipid Screen  10/17/2020     Health Maintenance reviewed - Has gotten both flu and Shingrix at [facility].  Will request.

## 2020-11-21 DIAGNOSIS — Z78.0 POSTMENOPAUSAL STATUS: ICD-10-CM

## 2020-12-28 ENCOUNTER — HOSPITAL ENCOUNTER (OUTPATIENT)
Dept: LAB | Age: 65
Discharge: HOME | End: 2020-12-28
Payer: MEDICARE

## 2020-12-28 DIAGNOSIS — R73.03 PREDIABETES: ICD-10-CM

## 2020-12-28 DIAGNOSIS — D64.9 ANEMIA, UNSPECIFIED TYPE: Primary | ICD-10-CM

## 2020-12-28 DIAGNOSIS — E55.9 VITAMIN D DEFICIENCY: ICD-10-CM

## 2020-12-28 DIAGNOSIS — R20.2 PARESTHESIA: ICD-10-CM

## 2020-12-28 DIAGNOSIS — Z13.220 SCREENING, LIPID: ICD-10-CM

## 2020-12-28 LAB
25(OH)D3 SERPL-MCNC: 40.5 NG/ML (ref 30–100)
ALBUMIN SERPL-MCNC: 3.8 G/DL (ref 3.4–5)
ALBUMIN/GLOB SERPL: 0.9 {RATIO} (ref 0.8–1.7)
ALP SERPL-CCNC: 151 U/L (ref 45–117)
ALT SERPL-CCNC: 35 U/L (ref 13–56)
ANION GAP SERPL CALC-SCNC: 6 MMOL/L (ref 3–18)
AST SERPL-CCNC: 40 U/L (ref 10–38)
BASOPHILS # BLD: 0 K/UL (ref 0–0.1)
BASOPHILS NFR BLD: 0 % (ref 0–2)
BILIRUB SERPL-MCNC: 0.2 MG/DL (ref 0.2–1)
BUN SERPL-MCNC: 9 MG/DL (ref 7–18)
BUN/CREAT SERPL: 9 (ref 12–20)
CALCIUM SERPL-MCNC: 9.2 MG/DL (ref 8.5–10.1)
CHLORIDE SERPL-SCNC: 106 MMOL/L (ref 100–111)
CHOLEST SERPL-MCNC: 157 MG/DL
CO2 SERPL-SCNC: 28 MMOL/L (ref 21–32)
CREAT SERPL-MCNC: 0.99 MG/DL (ref 0.6–1.3)
DIFFERENTIAL METHOD BLD: ABNORMAL
EOSINOPHIL # BLD: 0.1 K/UL (ref 0–0.4)
EOSINOPHIL NFR BLD: 1 % (ref 0–5)
ERYTHROCYTE [DISTWIDTH] IN BLOOD BY AUTOMATED COUNT: 14.7 % (ref 11.6–14.5)
EST. AVERAGE GLUCOSE BLD GHB EST-MCNC: 157 MG/DL
FOLATE SERPL-MCNC: >20 NG/ML (ref 3.1–17.5)
GLOBULIN SER CALC-MCNC: 4.4 G/DL (ref 2–4)
GLUCOSE SERPL-MCNC: 130 MG/DL (ref 74–99)
HBA1C MFR BLD: 7.1 % (ref 4.2–5.6)
HCT VFR BLD AUTO: 33.6 % (ref 35–45)
HDLC SERPL-MCNC: 59 MG/DL (ref 40–60)
HDLC SERPL: 2.7 {RATIO} (ref 0–5)
HGB BLD-MCNC: 10.8 G/DL (ref 12–16)
LDLC SERPL CALC-MCNC: 78 MG/DL (ref 0–100)
LIPID PROFILE,FLP: NORMAL
LYMPHOCYTES # BLD: 2.7 K/UL (ref 0.9–3.6)
LYMPHOCYTES NFR BLD: 37 % (ref 21–52)
MAGNESIUM SERPL-MCNC: 1.9 MG/DL (ref 1.6–2.6)
MCH RBC QN AUTO: 25.8 PG (ref 24–34)
MCHC RBC AUTO-ENTMCNC: 32.1 G/DL (ref 31–37)
MCV RBC AUTO: 80.4 FL (ref 74–97)
MONOCYTES # BLD: 0.4 K/UL (ref 0.05–1.2)
MONOCYTES NFR BLD: 6 % (ref 3–10)
NEUTS SEG # BLD: 4 K/UL (ref 1.8–8)
NEUTS SEG NFR BLD: 56 % (ref 40–73)
PLATELET # BLD AUTO: 396 K/UL (ref 135–420)
PMV BLD AUTO: 9.7 FL (ref 9.2–11.8)
POTASSIUM SERPL-SCNC: 4 MMOL/L (ref 3.5–5.5)
PROT SERPL-MCNC: 8.2 G/DL (ref 6.4–8.2)
RBC # BLD AUTO: 4.18 M/UL (ref 4.2–5.3)
SODIUM SERPL-SCNC: 140 MMOL/L (ref 136–145)
TRIGL SERPL-MCNC: 100 MG/DL (ref ?–150)
TSH SERPL-ACNC: 1.3 UIU/ML (ref 0.36–3.74)
VIT B12 SERPL-MCNC: 510 PG/ML (ref 211–911)
VLDLC SERPL CALC-MCNC: 20 MG/DL
WBC # BLD AUTO: 7.2 K/UL (ref 4.6–13.2)

## 2020-12-28 PROCEDURE — 85025 COMPLETE CBC W/AUTO DIFF WBC: CPT

## 2020-12-28 PROCEDURE — 36415 COLL VENOUS BLD VENIPUNCTURE: CPT

## 2020-12-28 PROCEDURE — 84443 ASSAY THYROID STIM HORMONE: CPT

## 2020-12-28 PROCEDURE — 80053 COMPREHEN METABOLIC PANEL: CPT

## 2020-12-28 PROCEDURE — 82306 VITAMIN D 25 HYDROXY: CPT

## 2020-12-28 PROCEDURE — 80061 LIPID PANEL: CPT

## 2020-12-28 PROCEDURE — 83036 HEMOGLOBIN GLYCOSYLATED A1C: CPT

## 2020-12-28 PROCEDURE — 82607 VITAMIN B-12: CPT

## 2020-12-28 PROCEDURE — 83735 ASSAY OF MAGNESIUM: CPT

## 2020-12-29 ENCOUNTER — TELEPHONE (OUTPATIENT)
Dept: FAMILY MEDICINE CLINIC | Age: 65
End: 2020-12-29

## 2020-12-29 DIAGNOSIS — D64.9 ANEMIA, UNSPECIFIED TYPE: ICD-10-CM

## 2020-12-29 DIAGNOSIS — R20.2 PARESTHESIA: Primary | ICD-10-CM

## 2020-12-29 DIAGNOSIS — R89.9 ABNORMAL LABORATORY TEST: ICD-10-CM

## 2020-12-29 NOTE — TELEPHONE ENCOUNTER
Called [facility] to add on test was asked to faxed orders to [phone]. Order has been pended please review and sing if appropriate.

## 2020-12-29 NOTE — PROGRESS NOTES
Add on orders have been faxed to [facility], fax confirmation has been received.

## 2020-12-30 DIAGNOSIS — B37.83 ANGULAR CHEILITIS WITH CANDIDIASIS: ICD-10-CM

## 2021-01-12 ENCOUNTER — TELEPHONE (OUTPATIENT)
Dept: FAMILY MEDICINE CLINIC | Age: 66
End: 2021-01-12

## 2021-01-12 NOTE — TELEPHONE ENCOUNTER
Called patient to get her ready for 1:30 appt with Dr. Kelly Staley no answer unable to leave message mailbox is full.

## 2021-01-12 NOTE — TELEPHONE ENCOUNTER
Patient called the office back says she was never called for her appt says she was sitting there waiting with her phone in her hand. Told patient I attempted to call her three times but was unable to leave message due to her mailbox being full. Patient says nobody tried to call her. She has been rescheduled on 2/9/21 for her f/u as well has her [address]

## 2021-01-12 NOTE — TELEPHONE ENCOUNTER
Called patient again to get ready for her VV with Dr. Quintanilla at 1:30 PM. No answer unable to leave message mailbox is full.

## 2021-01-14 ENCOUNTER — OFFICE VISIT (OUTPATIENT)
Dept: ORTHOPEDIC SURGERY | Age: 66
End: 2021-01-14
Payer: MEDICARE

## 2021-01-14 VITALS
DIASTOLIC BLOOD PRESSURE: 87 MMHG | RESPIRATION RATE: 16 BRPM | BODY MASS INDEX: 32.44 KG/M2 | HEIGHT: 64 IN | WEIGHT: 190 LBS | OXYGEN SATURATION: 100 % | HEART RATE: 109 BPM | TEMPERATURE: 97.5 F | SYSTOLIC BLOOD PRESSURE: 144 MMHG

## 2021-01-14 DIAGNOSIS — M25.462 EFFUSION OF LEFT KNEE: ICD-10-CM

## 2021-01-14 DIAGNOSIS — M17.12 PRIMARY OSTEOARTHRITIS OF LEFT KNEE: Primary | ICD-10-CM

## 2021-01-14 DIAGNOSIS — G89.29 CHRONIC PAIN OF LEFT KNEE: ICD-10-CM

## 2021-01-14 DIAGNOSIS — M17.11 PRIMARY OSTEOARTHRITIS OF RIGHT KNEE: ICD-10-CM

## 2021-01-14 DIAGNOSIS — M25.561 CHRONIC PAIN OF RIGHT KNEE: ICD-10-CM

## 2021-01-14 DIAGNOSIS — G89.29 CHRONIC PAIN OF RIGHT KNEE: ICD-10-CM

## 2021-01-14 DIAGNOSIS — M25.562 CHRONIC PAIN OF LEFT KNEE: ICD-10-CM

## 2021-01-14 PROCEDURE — G8427 DOCREV CUR MEDS BY ELIG CLIN: HCPCS | Performed by: SPECIALIST

## 2021-01-14 PROCEDURE — G9899 SCRN MAM PERF RSLTS DOC: HCPCS | Performed by: SPECIALIST

## 2021-01-14 PROCEDURE — 20610 DRAIN/INJ JOINT/BURSA W/O US: CPT | Performed by: SPECIALIST

## 2021-01-14 PROCEDURE — G8754 DIAS BP LESS 90: HCPCS | Performed by: SPECIALIST

## 2021-01-14 PROCEDURE — 1101F PT FALLS ASSESS-DOCD LE1/YR: CPT | Performed by: SPECIALIST

## 2021-01-14 PROCEDURE — G9717 DOC PT DX DEP/BP F/U NT REQ: HCPCS | Performed by: SPECIALIST

## 2021-01-14 PROCEDURE — G8400 PT W/DXA NO RESULTS DOC: HCPCS | Performed by: SPECIALIST

## 2021-01-14 PROCEDURE — G8417 CALC BMI ABV UP PARAM F/U: HCPCS | Performed by: SPECIALIST

## 2021-01-14 PROCEDURE — 73562 X-RAY EXAM OF KNEE 3: CPT | Performed by: SPECIALIST

## 2021-01-14 PROCEDURE — 99213 OFFICE O/P EST LOW 20 MIN: CPT | Performed by: SPECIALIST

## 2021-01-14 PROCEDURE — G8753 SYS BP > OR = 140: HCPCS | Performed by: SPECIALIST

## 2021-01-14 PROCEDURE — G8536 NO DOC ELDER MAL SCRN: HCPCS | Performed by: SPECIALIST

## 2021-01-14 PROCEDURE — 1090F PRES/ABSN URINE INCON ASSESS: CPT | Performed by: SPECIALIST

## 2021-01-14 PROCEDURE — 3017F COLORECTAL CA SCREEN DOC REV: CPT | Performed by: SPECIALIST

## 2021-01-14 RX ORDER — BETAMETHASONE SODIUM PHOSPHATE AND BETAMETHASONE ACETATE 3; 3 MG/ML; MG/ML
3 INJECTION, SUSPENSION INTRA-ARTICULAR; INTRALESIONAL; INTRAMUSCULAR; SOFT TISSUE ONCE
Status: COMPLETED | OUTPATIENT
Start: 2021-01-14 | End: 2021-01-14

## 2021-01-14 RX ADMIN — BETAMETHASONE SODIUM PHOSPHATE AND BETAMETHASONE ACETATE 3 MG: 3; 3 INJECTION, SUSPENSION INTRA-ARTICULAR; INTRALESIONAL; INTRAMUSCULAR; SOFT TISSUE at 15:55

## 2021-01-14 NOTE — PROGRESS NOTES
Patient: Malachi Ortiz                MRN: [id number]       SSN: [id number]  YOB: 1955        AGE: 76 y.o. SEX: female    PCP: Monserrat Campbell MD  01/14/21    Chief Complaint   Patient presents with    Knee Pain     bilateral knee pain      HISTORY:  Malachi Ortiz is a 76 y.o. female female who is seen for bilateral knee pain L>R. She has been experiencing bilateral knee pain for the past several months. She does not recall any injury. She feels pain with standing, walking and stair climbing. She experiences startup pain after sitting. She says her knee pain feels like a headache. She was previously seen for  increased wrist pain and swelling. She was seen by Dr. Michael Ahmadi on 1/31/19 who she states told her she has tender skin. She responded to her cortisone injection last ov.       She was previously being seen by Dr. Harvey Gold for a left toe fracture. She has seen Dr. Ct Arechiga in 2000 for fibromyalgia. She is currently in pain management. She is s/p anterior cervical fusion by Dr. Tanner Anderson. She reports she has been dropping things recently. She is in pain management with Dr. Marcellus Gutierrez in [facility]. Pain Assessment  1/14/2021   Location of Pain Knee   Pain Location Comment -   Location Modifiers Right;Left   Severity of Pain 9   Quality of Pain Throbbing   Quality of Pain Comment -   Duration of Pain Persistent   Frequency of Pain Constant   Aggravating Factors (No Data)   Aggravating Factors Comment no matter wait same level of pain   Limiting Behavior No   Relieving Factors Heat   Relieving Factors Comment topicals   Result of Injury No   Work-Related Injury -   Type of Injury -     Occupation, etc:  Ms. Jose Hoyt has received social security disability benefits for low back pain and OA since 2000. She previously worked at [employer] and was training for management. She lives alone in [place].  Her daughter is employed as her aid and her granddaughter takes care of her at night. She has another adult daughter. She also lost a son due to a horrific motorcycle accident a few years ago. She has 5 grandchildren- two of which are adopted. Her grandson is in the [employer] and is currently deployed. Current weight is 190  pounds. She is 5'4\" tall. She is a diet controlled diabetic. She is not hypertensive. She is left handed. She reports a h/o acid reflux and a stomach ulcer. Lab Results   Component Value Date/Time    Hemoglobin A1c 7.1 (H) 12/28/2020 01:29 PM    Hemoglobin A1c, External 5.9 06/27/2016     Weight Metrics 1/14/2021 11/20/2020 6/3/2020 3/4/2020 2/20/2020 1/17/2020 1/17/2020   Weight 190 lb 190 lb 9.6 oz 176 lb 173 lb 12.8 oz 173 lb 12.8 oz 172 lb 9.6 oz 172 lb 9.6 oz   BMI 32.61 kg/m2 32.72 kg/m2 30.21 kg/m2 29.83 kg/m2 29.83 kg/m2 29.63 kg/m2 29.63 kg/m2       Patient Active Problem List   Diagnosis Code    Lumbago M54.5    Other affections of shoulder region, not elsewhere classified M25.819    Other chronic pain G89.29    [facility] 2 [facility] ([facility] F31.81    Chronic pain syndrome G89.4    Carpal tunnel syndrome G56.00    Degeneration of lumbar or lumbosacral intervertebral disc M51.37    Displacement of lumbar intervertebral disc without myelopathy M51.26    Pain in joint, multiple sites M25.50    Cervicalgia M54.2    Postlaminectomy syndrome, cervical region M96.1    Annular tear of lumbar disc M51.36    Lung nodule seen on imaging study R91.1    Hypercholesterolemia E78.00    Nausea R11.0    Myalgia M79.10    Vitamin D deficiency E55.9    Urinary retention R33.9    Gastroesophageal reflux disease without esophagitis K21.9    Diverticulosis of large intestine without hemorrhage K57.30    Hiatal hernia K44.9    Hepatic steatosis K76.0    Calculus of gallbladder without cholecystitis K80.20    Obesity (BMI 30.0-34. 9) E66.9    Prediabetes R73.03    Angular cheilitis with candidiasis B37.0    Essential hypertension I10    Migraine without aura and without status migrainosus, not intractable G43.009    Gastroparesis K31.84    Urinary incontinence R32    Non-cardiac chest pain R07.89     REVIEW OF SYSTEMS: All Below are Negative except: See HPI   Constitutional: negative for fever, chills, and weight loss. Cardiovascular: negative for chest pain, claudication, leg swelling, SOB, VILLAGOMEZ   Gastrointestinal: Negative for pain, N/V/C/D, Blood in stool or urine, dysuria,  hematuria, incontinence, pelvic pain. Musculoskeletal: See HPI   Neurological: Negative for dizziness and weakness. Negative for headaches, Visual changes, confusion, seizures   Phychiatric/Behavioral: Negative for depression, memory loss, substance  abuse. Extremities: Negative for hair changes, rash, or skin lesion changes. Hematologic: Negative for bleeding problems, bruising, pallor or swollen lymph  nodes   Peripheral Vascular: No calf pain, no circulation deficits.     Social History     Socioeconomic History    Marital status:      Spouse name: Not on file    Number of children: Not on file    Years of education: Not on file    Highest education level: Not on file   Occupational History    Not on file   Social Needs    Financial resource strain: Not on file    Food insecurity     Worry: Not on file     Inability: Not on file    Transportation needs     Medical: Not on file     Non-medical: Not on file   Tobacco Use    Smoking status: Former Smoker     Packs/day: 0.50     Years: 3.00     Pack years: 1.50     Quit date: 3/6/1993     Years since quittin.8    Smokeless tobacco: Never Used   Substance and Sexual Activity    Alcohol use: No    Drug use: No     Comment: Last smoked in -cocaine    Sexual activity: Never   Lifestyle    Physical activity     Days per week: Not on file     Minutes per session: Not on file    Stress: Not on file   Relationships    Social connections     Talks on phone: Not on file     Gets together: Not on file     Attends Sabianist service: Not on file     Active member of club or organization: Not on file     Attends meetings of clubs or organizations: Not on file     Relationship status: Not on file    Intimate partner violence     Fear of current or ex partner: Not on file     Emotionally abused: Not on file     Physically abused: Not on file     Forced sexual activity: Not on file   Other Topics Concern    Not on file   Social History Narrative    Not on file      Allergies   Allergen Reactions    Aspirin Other (comments)     Stomach bleeding    Ativan [Lorazepam] Shortness of Breath    Bactrim [Sulfamethoprim] Rash    Keflex [Cephalexin] Rash    Macrobid [Nitrofurantoin Monohyd/M-Cryst] Nausea and Vomiting    Nsaids (Non-Steroidal Anti-Inflammatory Drug) Nausea and Vomiting    Opana [Oxymorphone] Other (comments)     Insomnia, weight loss, nightmares    Pcn [Penicillins] Hives      Current Outpatient Medications   Medication Sig    carbamide peroxide (DEBROX) 6.5 % otic solution Administer 5 Drops in right ear two (2) times a day.  atorvastatin (LIPITOR) 40 mg tablet TAKE ONE TABLET BY MOUTH ONE TIME DAILY    fluticasone propionate (Flonase) 50 mcg/actuation nasal spray 2 Sprays by Both Nostrils route as needed for Rhinitis.  valACYclovir (VALTREX) 500 mg tablet Take 1 Tab by mouth daily.  ketoconazole (NIZORAL) 2 % topical cream APPLY TOPICALLY TO AFFECTED AREA(S) TWO TIMES A DAY    promethazine (PHENERGAN) 12.5 mg tablet Take 12.5 mg by mouth.  losartan (COZAAR) 25 mg tablet TAKE ONE TABLET BY MOUTH ONE TIME DAILY    dicyclomine (BENTYL) 20 mg tablet Take 20 mg by mouth four (4) times daily as needed for Abdominal Cramps.  metoclopramide HCl (REGLAN) 5 mg tablet Take 5 mg by mouth Before breakfast, lunch, and dinner.  QUEtiapine (SEROQUEL) 200 mg tablet Take 1 Tab by mouth daily.  cholecalciferol, vitamin D3, 2,000 unit tab Take  by mouth.     cloNIDine HCL (CATAPRES) 0.3 mg tablet Take 0.3 mg by mouth nightly.  polyethylene glycol (MIRALAX) 17 gram packet Take 1 Packet by mouth daily.  oxyCODONE IR (ROXICODONE) 10 mg tab immediate release tablet 10 mg every eight (8) hours as needed.  DEXILANT 60 mg CpDB capsule (delayed release) Take 120 mg by mouth daily.  morphine CR (MS CONTIN) 60 mg CR tablet Take 30 mg by mouth every twelve (12) hours.  diclofenac (VOLTAREN) 1 % gel Apply  to affected area four (4) times daily.  cyclobenzaprine (FLEXERIL) 10 mg tablet TAKE 1 TABLET BY MOUTH THREE TIMES DAILY AS NEEDED FOR MUSCLE SPASMS FOR UP TO 30 DAYS.  naloxone (NARCAN) 4 mg/actuation nasal spray 1 [place] by IntraNASal route once as needed. Use 1 spray intranasally into 1 nostril. Use a new Narcan nasal spray for subsequent doses and administer into alternating nostrils. May repeat every 2 to 3 minutes as needed. No current facility-administered medications for this visit.        PHYSICAL EXAMINATION:  Visit Vitals  BP (!) 144/87 (BP 1 Location: Left arm, BP Patient Position: Sitting)   Pulse (!) 109   Temp 97.5 °F (36.4 °C) (Temporal)   Resp 16   Ht 5' 4\" (1.626 m)   Wt 190 lb (86.2 kg)   SpO2 100%   BMI 32.61 kg/m²     ORTHO EXAMINATION:Flat affect  Examination Right knee Left knee   Skin Intact Intact, well healed incision site   Range of motion 120-0 120-0   Effusion - +   Medial joint line tenderness + +   Lateral joint line tenderness - -   Popliteal tenderness - -   Osteophytes palpable + +   Qings - -   Patella crepitus - -   Anterior drawer - -   Lateral laxity - -   Medial laxity - -   Varus deformity - -   Valgus deformity - -   Pretibial edema - -   Calf tenderness - -      TIME OUT:   Chart reviewed for the following:   Juhi Hernández MD, have reviewed the History, Physical and updated the Allergic reactions for [address] performed immediately prior to start of procedure:  Juhi Hernández MD, have performed the following reviews on Michael Parker prior to the start of the procedure:  * Patient was identified by name and date of birth   * Agreement on procedure being performed was verified  * Risks and Benefits explained to the patient  * Procedure site verified and marked as necessary  * Patient was positioned for comfort  * Consent was obtained     Time: 3:42 PM     Date of procedure: 1/14/2021  Procedure performed by:  Magalie Mccarty MD  Ms. Hutchinson tolerated the procedure well with no complications. MRI LEFT WRIST WO CONT 1/22/18  IMPRESSION:   1. Extensive fluid surrounding the fourth extensor compartment tendons, consistent with tenosynovitis. Perhaps minimal tenosynovitis of the second extensor compartment as well. -Marrow edema in the dorsal lip of the distal radius, extending towards styloid, of uncertain etiology. If tenosynovitis is inflammatory or infectious, these findings may be reactive. Also consider contusion in the appropriate setting.    2. Probable degeneration of the TFCC and scapholunate ligament. Mild triscaphe degenerative change. RADIOGRAPHS:  XR LEFT KNEE 1/14/21 LIZETH  IMPRESSION:  Three views with bilateral knees on AP view - No fractures, no effusion, moderately severe L, moderate R joint space narrowing, no osteophytes present. Kellgren Brandon grade 3     IMPRESSION:      ICD-10-CM ICD-9-CM    1. Primary osteoarthritis of left knee  M17.12 715.16 PROCEDURE AUTHORIZATION TO       betamethasone (CELESTONE) injection 3 mg      DRAIN/INJECT LARGE JOINT/BURSA   2. Chronic pain of left knee  M25.562 719.46 AMB POC X-RAY KNEE 3 VIEW    G89.29 338.29    3. Effusion of left knee  M25.462 719.06    4. Primary osteoarthritis of right knee  M17.11 715.16 PROCEDURE AUTHORIZATION TO    5. Chronic pain of right knee  M25.561 719.46     G89.29 338.29      PLAN:  Consider visco supplementation if pain continues.   After timeout and under sterile conditions, left knee aspirated 45 cc of blood tinged light yellow fluid. The fluid was discarded. After discussing treatment options, patient's left knee was injected with 4 cc Marcaine and 1/2 cc Celestone. There is no need for surgery at this time. She will follow up as needed.      Scribed by Felecia Felton  (Ashwin Foley) as dictated by Lilly Harper MD

## 2021-01-18 ENCOUNTER — TELEPHONE (OUTPATIENT)
Dept: FAMILY MEDICINE CLINIC | Age: 66
End: 2021-01-18

## 2021-01-18 NOTE — LETTER
1/20/2021 11:33 AM 
 
Ms. [address] 
[address] [place] [address] - [address] [facility] [address] [place] [id number] Dear Ms. Jasper: 
 
We've missed you! Please call our office at [phone] and schedule a follow up appointment for your continued care. I have been trying to contact you to reschedule your follow up appointment with Dr. Mike Markham as well as to discuss a refill request.  
 
 
Sincerely, Supriya Swanson CMA

## 2021-01-18 NOTE — TELEPHONE ENCOUNTER
Patient called requesting a refill on ketoconazole (NIZORAL) 2 % topical cream. Please review and advise.  Patient can be reached at [phone]

## 2021-01-20 NOTE — TELEPHONE ENCOUNTER
Called patient no answer unable to leave message will mail patient letter asking her to contact the office.

## 2021-01-28 RX ORDER — KETOCONAZOLE 20 MG/G
CREAM TOPICAL
Qty: 15 G | Refills: 0 | Status: SHIPPED | OUTPATIENT
Start: 2021-01-28 | End: 2021-02-09 | Stop reason: SDUPTHER

## 2021-01-28 NOTE — TELEPHONE ENCOUNTER
Patient called the office asking for refills on her cream. She has rescheduled her f/u and MWV for 2/9/21 at 3:30.

## 2021-02-08 ENCOUNTER — OFFICE VISIT (OUTPATIENT)
Dept: ORTHOPEDIC SURGERY | Age: 66
End: 2021-02-08
Payer: MEDICARE

## 2021-02-08 VITALS
SYSTOLIC BLOOD PRESSURE: 135 MMHG | RESPIRATION RATE: 16 BRPM | HEIGHT: 64 IN | HEART RATE: 104 BPM | TEMPERATURE: 97.2 F | DIASTOLIC BLOOD PRESSURE: 85 MMHG | BODY MASS INDEX: 32.64 KG/M2 | WEIGHT: 191.2 LBS | OXYGEN SATURATION: 100 %

## 2021-02-08 DIAGNOSIS — G89.29 CHRONIC PAIN OF RIGHT KNEE: ICD-10-CM

## 2021-02-08 DIAGNOSIS — M25.562 CHRONIC PAIN OF LEFT KNEE: ICD-10-CM

## 2021-02-08 DIAGNOSIS — M25.561 CHRONIC PAIN OF RIGHT KNEE: ICD-10-CM

## 2021-02-08 DIAGNOSIS — M17.11 PRIMARY OSTEOARTHRITIS OF RIGHT KNEE: ICD-10-CM

## 2021-02-08 DIAGNOSIS — M17.12 PRIMARY OSTEOARTHRITIS OF LEFT KNEE: Primary | ICD-10-CM

## 2021-02-08 DIAGNOSIS — G89.29 CHRONIC PAIN OF LEFT KNEE: ICD-10-CM

## 2021-02-08 PROCEDURE — 20610 DRAIN/INJ JOINT/BURSA W/O US: CPT | Performed by: SPECIALIST

## 2021-02-08 NOTE — PROGRESS NOTES
Patient: Jr Samuel                MRN: [id number]       SSN: [id number]  YOB: 1955        AGE: 76 y.o. SEX: female    PCP: Neeraj Kirk MD  02/08/21    Chief Complaint   Patient presents with    Knee Pain     gulshan knee pain      HISTORY:  Jr Samuel is a 76 y.o. female female who is seen for bilateral knee pain L>R. She has been experiencing bilateral knee pain for the past several months. She does not recall any injury. She feels pain with standing, walking and stair climbing. She experiences startup pain after sitting. She says her knee pain feels like a headache. She also feels posterior left knee pain. She was previously seen for  increased wrist pain and swelling. She was seen by Dr. Severa Marking on 1/31/19 who she states told her she has tender skin. She responded to her cortisone injection last ov.       She was previously being seen by Dr. Armen Feliz for a left toe fracture. She has seen Dr. Tennille Herrera in 2000 for fibromyalgia. She is currently in pain management. She is s/p anterior cervical fusion by Dr. Deidra David. She reports she has been dropping things recently. She is in pain management with Dr. Katerina Amador in [facility]. Her PCP recently referred her to a new rheumatologist in [facility]. Pain Assessment  2/8/2021   Location of Pain Knee   Pain Location Comment -   Location Modifiers Right;Left   Severity of Pain 8   Quality of Pain Throbbing; Livier Pennant; Aching   Quality of Pain Comment swelling   Duration of Pain Persistent   Frequency of Pain Constant   Aggravating Factors Walking;Standing   Aggravating Factors Comment -   Limiting Behavior -   Relieving Factors Nothing   Relieving Factors Comment -   Result of Injury No   Work-Related Injury -   Type of Injury -     Occupation, etc:  Ms. Chuck Odom has received social security disability benefits for low back pain and OA since 2000. She previously worked at [employer] and was training for management.  She lives alone in [place]. Her daughter is employed as her aid and her granddaughter takes care of her at night. She has another adult daughter. She also lost a son due to a horrific motorcycle accident a few years ago. She has 5 grandchildren- two of which are adopted. Her grandson is in the [employer] and is currently deployed. Current weight is 191 pounds.  She is 5'4\" tall. She is a diet controlled diabetic. She is not hypertensive.  She is left handed. She reports a h/o acid reflux and a stomach ulcer. She walks for exercise.    Lab Results   Component Value Date/Time    Hemoglobin A1c 7.1 (H) 12/28/2020 01:29 PM    Hemoglobin A1c, External 5.9 06/27/2016     Weight Metrics 2/8/2021 1/14/2021 11/20/2020 6/3/2020 3/4/2020 2/20/2020 1/17/2020   Weight 191 lb 3.2 oz 190 lb 190 lb 9.6 oz 176 lb 173 lb 12.8 oz 173 lb 12.8 oz 172 lb 9.6 oz   BMI 32.82 kg/m2 32.61 kg/m2 32.72 kg/m2 30.21 kg/m2 29.83 kg/m2 29.83 kg/m2 29.63 kg/m2       Patient Active Problem List   Diagnosis Code   • Lumbago M54.5   • Other affections of shoulder region, not elsewhere classified M25.819   • Other chronic pain G89.29   • Bipolar 2 disorder (HCC) F31.81   • Chronic pain syndrome G89.4   • Carpal tunnel syndrome G56.00   • Degeneration of lumbar or lumbosacral intervertebral disc M51.37   • Displacement of lumbar intervertebral disc without myelopathy M51.26   • Pain in joint, multiple sites M25.50   • Cervicalgia M54.2   • Postlaminectomy syndrome, cervical region M96.1   • Annular tear of lumbar disc M51.36   • Lung nodule seen on imaging study R91.1   • Hypercholesterolemia E78.00   • Nausea R11.0   • Myalgia M79.10   • Vitamin D deficiency E55.9   • Urinary retention R33.9   • Gastroesophageal reflux disease without esophagitis K21.9   • Diverticulosis of large intestine without hemorrhage K57.30   • Hiatal hernia K44.9   • Hepatic steatosis K76.0   • Calculus of gallbladder without cholecystitis K80.20   • Obesity (BMI 30.0-34.9) E66.9   •  Prediabetes R73.03    Angular cheilitis with candidiasis B37.0    Essential hypertension I10    Migraine without aura and without status migrainosus, not intractable G43.009    Gastroparesis K31.84    Urinary incontinence R32    Non-cardiac chest pain R07.89     REVIEW OF SYSTEMS: All Below are Negative except: See HPI   Constitutional: negative for fever, chills, and weight loss. Cardiovascular: negative for chest pain, claudication, leg swelling, SOB, VILLAGOMEZ   Gastrointestinal: Negative for pain, N/V/C/D, Blood in stool or urine, dysuria,  hematuria, incontinence, pelvic pain. Musculoskeletal: See HPI   Neurological: Negative for dizziness and weakness. Negative for headaches, Visual changes, confusion, seizures   Phychiatric/Behavioral: Negative for depression, memory loss, substance  abuse. Extremities: Negative for hair changes, rash, or skin lesion changes. Hematologic: Negative for bleeding problems, bruising, pallor or swollen lymph  nodes   Peripheral Vascular: No calf pain, no circulation deficits.     Social History     Socioeconomic History    Marital status:      Spouse name: Not on file    Number of children: Not on file    Years of education: Not on file    Highest education level: Not on file   Occupational History    Not on file   Social Needs    Financial resource strain: Not on file    Food insecurity     Worry: Not on file     Inability: Not on file    Transportation needs     Medical: Not on file     Non-medical: Not on file   Tobacco Use    Smoking status: Former Smoker     Packs/day: 0.50     Years: 3.00     Pack years: 1.50     Quit date: 3/6/1993     Years since quittin.9    Smokeless tobacco: Never Used   Substance and Sexual Activity    Alcohol use: No    Drug use: No     Comment: Last smoked in -cocaine    Sexual activity: Never   Lifestyle    Physical activity     Days per week: Not on file     Minutes per session: Not on file    Stress: Not on file   Relationships    Social connections     Talks on phone: Not on file     Gets together: Not on file     Attends Islam service: Not on file     Active member of club or organization: Not on file     Attends meetings of clubs or organizations: Not on file     Relationship status: Not on file    Intimate partner violence     Fear of current or ex partner: Not on file     Emotionally abused: Not on file     Physically abused: Not on file     Forced sexual activity: Not on file   Other Topics Concern    Not on file   Social History Narrative    Not on file      Allergies   Allergen Reactions    Aspirin Other (comments)     Stomach bleeding    Ativan [Lorazepam] Shortness of Breath    Bactrim [Sulfamethoprim] Rash    Keflex [Cephalexin] Rash    Macrobid [Nitrofurantoin Monohyd/M-Cryst] Nausea and Vomiting    Nsaids (Non-Steroidal Anti-Inflammatory Drug) Nausea and Vomiting    Opana [Oxymorphone] Other (comments)     Insomnia, weight loss, nightmares    Pcn [Penicillins] Hives      Current Outpatient Medications   Medication Sig    ketoconazole (NIZORAL) 2 % topical cream APPLY TO AFFECTED AREA(S) TWO TIMES A DAY    carbamide peroxide (DEBROX) 6.5 % otic solution Administer 5 Drops in right ear two (2) times a day.  atorvastatin (LIPITOR) 40 mg tablet TAKE ONE TABLET BY MOUTH ONE TIME DAILY    fluticasone propionate (Flonase) 50 mcg/actuation nasal spray 2 Sprays by Both Nostrils route as needed for Rhinitis.  valACYclovir (VALTREX) 500 mg tablet Take 1 Tab by mouth daily.  promethazine (PHENERGAN) 12.5 mg tablet Take 12.5 mg by mouth.  losartan (COZAAR) 25 mg tablet TAKE ONE TABLET BY MOUTH ONE TIME DAILY    dicyclomine (BENTYL) 20 mg tablet Take 20 mg by mouth four (4) times daily as needed for Abdominal Cramps.  metoclopramide HCl (REGLAN) 5 mg tablet Take 5 mg by mouth Before breakfast, lunch, and dinner.  QUEtiapine (SEROQUEL) 200 mg tablet Take 1 Tab by mouth daily.     cholecalciferol, vitamin D3, 2,000 unit tab Take  by mouth.  cloNIDine HCL (CATAPRES) 0.3 mg tablet Take 0.3 mg by mouth nightly.  polyethylene glycol (MIRALAX) 17 gram packet Take 1 Packet by mouth daily.  oxyCODONE IR (ROXICODONE) 10 mg tab immediate release tablet 10 mg every eight (8) hours as needed.  DEXILANT 60 mg CpDB capsule (delayed release) Take 120 mg by mouth daily.  morphine CR (MS CONTIN) 60 mg CR tablet Take 30 mg by mouth every twelve (12) hours.  diclofenac (VOLTAREN) 1 % gel Apply  to affected area four (4) times daily.  cyclobenzaprine (FLEXERIL) 10 mg tablet TAKE 1 TABLET BY MOUTH THREE TIMES DAILY AS NEEDED FOR MUSCLE SPASMS FOR UP TO 30 DAYS.  naloxone (NARCAN) 4 mg/actuation nasal spray 1 [place] by IntraNASal route once as needed. Use 1 spray intranasally into 1 nostril. Use a new Narcan nasal spray for subsequent doses and administer into alternating nostrils. May repeat every 2 to 3 minutes as needed. No current facility-administered medications for this visit.        PHYSICAL EXAMINATION:  Visit Vitals  /85 (BP 1 Location: Left upper arm, BP Patient Position: Sitting, BP Cuff Size: Large adult)   Pulse (!) 104   Temp 97.2 °F (36.2 °C) (Temporal)   Resp 16   Ht 5' 4\" (1.626 m)   Wt 191 lb 3.2 oz (86.7 kg)   SpO2 100%   BMI 32.82 kg/m²     ORTHO EXAMINATION:Flat affect  Examination Right knee Left knee   Skin Intact Intact, well healed incision site   Range of motion 120-0 120-0   Effusion - +   Medial joint line tenderness + +   Lateral joint line tenderness - -   Popliteal tenderness - -   Osteophytes palpable + +   Qings - -   Patella crepitus - -   Anterior drawer - -   Lateral laxity - -   Medial laxity - -   Varus deformity - -   Valgus deformity - -   Pretibial edema - -   Calf tenderness - -      TIME OUT:   Chart reviewed for the following:   Michell Hayes MD, have reviewed the History, Physical and updated the Allergic reactions for Johnny Karli Hutchinson   TIME OUT performed immediately prior to start of procedure:  Chay Bella MD, have performed the following reviews on [address] prior to the start of the procedure:  * Patient was identified by name and date of birth   * Agreement on procedure being performed was verified  * Risks and Benefits explained to the patient  * Procedure site verified and marked as necessary  * Patient was positioned for comfort  * Consent was obtained     Time: 2:49 PM     Date of procedure: 2/8/2021  Procedure performed by:  Bandar Ga MD  Ms. Hutchinson tolerated the procedure well with no complications. MRI LEFT WRIST WO CONT 1/22/18  IMPRESSION:   1. Extensive fluid surrounding the fourth extensor compartment tendons, consistent with tenosynovitis. Perhaps minimal tenosynovitis of the second extensor compartment as well. -Marrow edema in the dorsal lip of the distal radius, extending towards styloid, of uncertain etiology. If tenosynovitis is inflammatory or infectious, these findings may be reactive. Also consider contusion in the appropriate setting.    2. Probable degeneration of the TFCC and scapholunate ligament. Mild triscaphe degenerative change. RADIOGRAPHS:  XR LEFT KNEE 1/14/21 LIZETH  IMPRESSION:  Three views with bilateral knees on AP view - No fractures, no effusion, moderately severe L, moderate R joint space narrowing, no osteophytes present. Kellgren Brandon grade 3     IMPRESSION:      ICD-10-CM ICD-9-CM    1. Primary osteoarthritis of left knee  M17.12 715.16 DRAIN/INJECT LARGE JOINT/BURSA      sodium hyaluronate (SUPARTZ FX/EUFLEXXA/HYALGAN) 10 mg/mL injection syrg 20 mg   2. Chronic pain of left knee  M25.562 719.46 DRAIN/INJECT LARGE JOINT/BURSA    G89.29 338.29 sodium hyaluronate (SUPARTZ FX/EUFLEXXA/HYALGAN) 10 mg/mL injection syrg 20 mg   3.  Primary osteoarthritis of right knee  M17.11 715.16 DRAIN/INJECT LARGE JOINT/BURSA      sodium hyaluronate (SUPARTZ FX/EUFLEXXA/HYALGAN) 10 mg/mL injection syrg 20 mg   4. Chronic pain of right knee  M25.561 719.46 DRAIN/INJECT LARGE JOINT/BURSA    G89.29 338.29 sodium hyaluronate (SUPARTZ FX/EUFLEXXA/HYALGAN) 10 mg/mL injection syrg 20 mg     PLAN:  After discussing treatment options, patient's knees were injected with 2 cc Euflexxa. There is no need for surgery at this time. She will follow up in 1 week for Euflexxa #2.      Scribed by Maribel Bailey  ([id number]) as dictated by Rani Curtis MD

## 2021-02-09 ENCOUNTER — VIRTUAL VISIT (OUTPATIENT)
Dept: FAMILY MEDICINE CLINIC | Age: 66
End: 2021-02-09
Payer: MEDICARE

## 2021-02-09 ENCOUNTER — TELEPHONE (OUTPATIENT)
Dept: FAMILY MEDICINE CLINIC | Age: 66
End: 2021-02-09

## 2021-02-09 DIAGNOSIS — D64.9 ANEMIA, UNSPECIFIED TYPE: ICD-10-CM

## 2021-02-09 DIAGNOSIS — F31.81 BIPOLAR 2 DISORDER (HCC): ICD-10-CM

## 2021-02-09 DIAGNOSIS — R73.03 PREDIABETES: ICD-10-CM

## 2021-02-09 DIAGNOSIS — E78.5 HYPERLIPIDEMIA, UNSPECIFIED HYPERLIPIDEMIA TYPE: ICD-10-CM

## 2021-02-09 DIAGNOSIS — Z78.0 POSTMENOPAUSAL STATE: ICD-10-CM

## 2021-02-09 DIAGNOSIS — M05.731 RHEUMATOID ARTHRITIS INVOLVING RIGHT WRIST WITH POSITIVE RHEUMATOID FACTOR (HCC): ICD-10-CM

## 2021-02-09 DIAGNOSIS — E55.9 VITAMIN D DEFICIENCY: ICD-10-CM

## 2021-02-09 DIAGNOSIS — I10 ESSENTIAL HYPERTENSION: Primary | ICD-10-CM

## 2021-02-09 DIAGNOSIS — Z00.00 MEDICARE ANNUAL WELLNESS VISIT, SUBSEQUENT: Primary | ICD-10-CM

## 2021-02-09 DIAGNOSIS — B37.83 ANGULAR CHEILITIS WITH CANDIDIASIS: ICD-10-CM

## 2021-02-09 PROBLEM — R73.09 ELEVATED HEMOGLOBIN A1C: Status: ACTIVE | Noted: 2021-02-09

## 2021-02-09 PROCEDURE — 1101F PT FALLS ASSESS-DOCD LE1/YR: CPT | Performed by: FAMILY MEDICINE

## 2021-02-09 PROCEDURE — G9899 SCRN MAM PERF RSLTS DOC: HCPCS | Performed by: FAMILY MEDICINE

## 2021-02-09 PROCEDURE — G9717 DOC PT DX DEP/BP F/U NT REQ: HCPCS | Performed by: FAMILY MEDICINE

## 2021-02-09 PROCEDURE — G8756 NO BP MEASURE DOC: HCPCS | Performed by: FAMILY MEDICINE

## 2021-02-09 PROCEDURE — 99214 OFFICE O/P EST MOD 30 MIN: CPT | Performed by: FAMILY MEDICINE

## 2021-02-09 PROCEDURE — G8400 PT W/DXA NO RESULTS DOC: HCPCS | Performed by: FAMILY MEDICINE

## 2021-02-09 PROCEDURE — 90000 NO LOS: CPT | Performed by: FAMILY MEDICINE

## 2021-02-09 PROCEDURE — G8427 DOCREV CUR MEDS BY ELIG CLIN: HCPCS | Performed by: FAMILY MEDICINE

## 2021-02-09 PROCEDURE — 3017F COLORECTAL CA SCREEN DOC REV: CPT | Performed by: FAMILY MEDICINE

## 2021-02-09 PROCEDURE — G0439 PPPS, SUBSEQ VISIT: HCPCS | Performed by: FAMILY MEDICINE

## 2021-02-09 RX ORDER — LOSARTAN POTASSIUM 50 MG/1
TABLET ORAL
Qty: 30 TAB | Refills: 1 | Status: SHIPPED | OUTPATIENT
Start: 2021-02-09 | End: 2021-04-09

## 2021-02-09 RX ORDER — KETOCONAZOLE 20 MG/G
CREAM TOPICAL
Qty: 15 G | Refills: 0 | Status: SHIPPED | OUTPATIENT
Start: 2021-02-09 | End: 2021-03-22 | Stop reason: SDUPTHER

## 2021-02-09 NOTE — TELEPHONE ENCOUNTER
Called patient to discuss VV today that she has at 3:30 PM no answer unable to leave message mailbox is full.

## 2021-02-09 NOTE — ACP (ADVANCE CARE PLANNING)
Advance Care Planning     General Advance Care Planning (ACP) Conversation      Date of Conversation: 2/9/2021  Conducted with: Patient with Decision Making Capacity    Healthcare Decision Maker:     Primary Decision Maker: Jhoan Homer - Child - [phone]    Secondary Decision Maker: Salma Moctezuma - Child - [phone]  Click here to complete [place] including selection of the Healthcare Decision Maker Relationship (ie \"Primary\")      Content/Action Overview:   desires care for curable conditions, focus on alleviation of suffering at end of life           Length of Voluntary ACP Conversation in minutes:  <16 minutes (Non-Billable)    Brett Montiel MD

## 2021-02-09 NOTE — ASSESSMENT & PLAN NOTE
Unclear etiology. normal B12. If iron stores are normal, would attribute to RA. Plan to follow based on results.

## 2021-02-09 NOTE — PROGRESS NOTES
Gustavo Kaplan (: 1955) is a 76 y.o. female, established patient, here for evaluation of the following chief complaint(s):   Follow Up Chronic Condition       ASSESSMENT/PLAN:  1. Essential hypertension  Assessment & Plan:  Borderline control, not at target if/when DM2 confirmed. Doubling ARB. Check BP and labs 3-4 weeks. Orders:  -     losartan (COZAAR) 50 mg tablet; TAKE ONE TABLET BY MOUTH ONE TIME DAILY, Normal, Disp-30 Tab, R-1  -     MICROALBUMIN, UR, RAND W/ MICROALB/CREAT RATIO; Future    2. Prediabetes  Assessment & Plan: Today's A1c most consistent with progression to DM2. Reassess with plan to follow. Orders:  -     HEMOGLOBIN A1C WITH EAG; Future    3. Hyperlipidemia, unspecified hyperlipidemia type  Assessment & Plan:  Well controlled, continue present management         4. Vitamin D deficiency  Assessment & Plan:  Well controlled, continue present management       5. Angular cheilitis with candidiasis  Assessment & Plan:  Uncontrolled. Focus on petrolatum barrier qhs with prn use of ketoconazole cream.    Orders:  -     ketoconazole (NIZORAL) 2 % topical cream; APPLY TO AFFECTED AREA(S) TWO TIMES A DAY, Normal, Disp-15 g, R-0    6. 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 Encouraged to follow up with rheumatologist referral by Dr. Jus Anderson and reinforced by Dr. Jessica Saavedra      7. Anemia, unspecified type  Assessment & Plan:  Unclear etiology. normal B12. If iron stores are normal, would attribute to RA. Plan to follow based on results. Orders:  -     CBC WITH AUTOMATED DIFF; Future  -     FERRITIN; Future  -     IRON PROFILE; Future    8. Bipolar 2 disorder [facility])  Assessment & Plan:  Being evaluated/managed by Dr. Roula Rushing. No acute findings today meriting change in management plan.           Return in about 3 weeks (around 3/2/2021) for nurse visit blood pressure check, blood draw, followed by a virtual visit with me to review. SUBJECTIVE/OBJECTIVE:  HPI  follow up angular cheilitis - applies ketoconazole nightly, petrolatum occasionally. Recognizes it's her saliva that \"burns\"      bilateral knee pain particularly problematic recently. Receiving serial injections by Dr. Susan Whitfield    Results for orders placed or performed during the hospital encounter of 12/28/20   HEMOGLOBIN A1C WITH EAG   Result Value Ref Range    Hemoglobin A1c 7.1 (H) 4.2 - 5.6 %    Est. average glucose 157 mg/dL   VITAMIN B12 & FOLATE   Result Value Ref Range    Vitamin B12 510 211 - 911 pg/mL    Folate >20.0 (H) 3.10 - 45.38 ng/mL   METABOLIC PANEL, COMPREHENSIVE   Result Value Ref Range    Sodium 140 136 - 145 mmol/L    Potassium 4.0 3.5 - 5.5 mmol/L    Chloride 106 100 - 111 mmol/L    CO2 28 21 - 32 mmol/L    Anion gap 6 3.0 - 18 mmol/L    Glucose 130 (H) 74 - 99 mg/dL    BUN 9 7.0 - 18 MG/DL    Creatinine 0.99 0.6 - 1.3 MG/DL    BUN/Creatinine ratio 9 (L) 12 - 20      GFR est AA >60 >60 ml/min/1.73m2    GFR est non-AA 56 (L) >60 ml/min/1.73m2    Calcium 9.2 8.5 - 10.1 MG/DL    Bilirubin, total 0.2 0.2 - 1.0 MG/DL    ALT (SGPT) 35 13 - 56 U/L    AST (SGOT) 40 (H) 10 - 38 U/L    Alk. phosphatase 151 (H) 45 - 117 U/L    Protein, total 8.2 6.4 - 8.2 g/dL    Albumin 3.8 3.4 - 5.0 g/dL    Globulin 4.4 (H) 2.0 - 4.0 g/dL    A-G Ratio 0.9 0.8 - 1.7     CBC WITH AUTOMATED DIFF   Result Value Ref Range    WBC 7.2 4.6 - 13.2 K/uL    RBC 4.18 (L) 4.20 - 5.30 M/uL    HGB 10.8 (L) 12.0 - 16.0 g/dL    HCT 33.6 (L) 35.0 - 45.0 %    MCV 80.4 74.0 - 97.0 FL    MCH 25.8 24.0 - 34.0 PG    MCHC 32.1 31.0 - 37.0 g/dL    RDW 14.7 (H) 11.6 - 14.5 %    PLATELET 076 780 - 454 K/uL    MPV 9.7 9.2 - 11.8 FL    NEUTROPHILS 56 40 - 73 %    LYMPHOCYTES 37 21 - 52 %    MONOCYTES 6 3 - 10 %    EOSINOPHILS 1 0 - 5 %    BASOPHILS 0 0 - 2 %    ABS. NEUTROPHILS 4.0 1.8 - 8.0 K/UL    ABS. LYMPHOCYTES 2.7 0.9 - 3.6 K/UL    ABS. MONOCYTES 0.4 0.05 - 1.2 K/UL    ABS.  EOSINOPHILS 0.1 0.0 - 0.4 K/UL    ABS. BASOPHILS 0.0 0.0 - 0.1 K/UL    DF AUTOMATED     TSH W/ REFLX FREE T4 IF ABNORMAL   Result Value Ref Range    TSH 1.30 0.36 - 3.74 uIU/mL   MAGNESIUM   Result Value Ref Range    Magnesium 1.9 1.6 - 2.6 mg/dL   LIPID PANEL W/ REFLX DIRECT LDL   Result Value Ref Range    LIPID PROFILE          Cholesterol, total 157 <200 MG/DL    Triglyceride 100 <150 MG/DL    HDL Cholesterol 59 40 - 60 MG/DL    LDL, calculated 78 0 - 100 MG/DL    VLDL, calculated 20 MG/DL    CHOL/HDL Ratio 2.7 0 - 5.0     VITAMIN D, 25 HYDROXY   Result Value Ref Range    Vitamin D 25-Hydroxy 40.5 30 - 100 ng/mL           Review of Systems     No flowsheet data found. BP Readings from Last 3 Encounters:   02/08/21 135/85   01/14/21 (!) 144/87   11/20/20 138/70       Physical Exam  Objective:      General: alert, cooperative, no distress   Mental  status: normal mood, behavior, speech, dress, motor activity, and thought processes, able to follow commands   HENT: NCAT, cracking corners of the mouth   Neck: no visualized mass   Resp: no respiratory distress   Neuro: no gross deficits   Skin: no discoloration or lesions of concern on visible areas   Psychiatric: normal affect, consistent with stated mood, no evidence of hallucinations     Additional exam findings:                     Gallo Do is being evaluated by a Virtual Visit (video visit) encounter to address concerns as mentioned above. A caregiver was present when appropriate. Due to this being a TeleHealth encounter (During [facility]- public health emergency), evaluation of the following organ systems was limited: Vitals/Constitutional/EENT/Resp/CV/GI//MS/Neuro/Skin/Heme-Lymph-Imm.   Pursuant to the emergency declaration under the [facility], 1135 waiver authority and the [employer] Act, this Virtual Visit was conducted with patient's (and/or legal guardian's) consent, to reduce the patient's risk of exposure to COVID-19 and provide necessary medical care. The patient (and/or legal guardian) has also been advised to contact this office for worsening conditions or problems, and seek emergency medical treatment and/or call 911 if deemed necessary. Patient identification was verified at the start of the visit: YES    Services were provided through a video synchronous discussion virtually to substitute for in-person clinic visit. Patient was located at home and provider was located in office or at home. An electronic signature was used to authenticate this note.   -- David Humphrey MD

## 2021-02-09 NOTE — PATIENT INSTRUCTIONS
Medicare Wellness Visit, Female The best way to live healthy is to have a lifestyle where you eat a well-balanced diet, exercise regularly, limit alcohol use, and quit all forms of tobacco/nicotine, if applicable. Regular preventive services are another way to keep healthy. Preventive services (vaccines, screening tests, monitoring & exams) can help personalize your care plan, which helps you manage your own care. Screening tests can find health problems at the earliest stages, when they are easiest to treat. [facility] follows the current, evidence-based guidelines published by the [facility] ([facility]) when recommending preventive services for our patients. Because we follow these guidelines, sometimes recommendations change over time as research supports it. (For example, mammograms used to be recommended annually. Even though Medicare will still pay for an annual mammogram, the newer guidelines recommend a mammogram every two years for women of average risk). Of course, you and your doctor may decide to screen more often for some diseases, based on your risk and your co-morbidities (chronic disease you are already diagnosed with). Preventive services for you include: - Medicare offers their members a free annual wellness visit, which is time for you and your primary care provider to discuss and plan for your preventive service needs. Take advantage of this benefit every year! 
-All adults over the age of 72 should receive the recommended pneumonia vaccines. Current USPSTF guidelines recommend a series of two vaccines for the best pneumonia protection.  
-All adults should have a flu vaccine yearly and a tetanus vaccine every 10 years.  
-All adults age 48 and older should receive the shingles vaccines (series of two vaccines). -All adults age 38-68 who are overweight should have a diabetes screening test once every three years. -All adults born between 80 and 1965 should be screened once for Hepatitis C. 
-Other screening tests and preventive services for persons with diabetes include: an eye exam to screen for diabetic retinopathy, a kidney function test, a foot exam, and stricter control over your cholesterol.  
-Cardiovascular screening for adults with routine risk involves an electrocardiogram (ECG) at intervals determined by your doctor.  
-Colorectal cancer screenings should be done for adults age 54-65 with no increased risk factors for colorectal cancer. There are a number of acceptable methods of screening for this type of cancer. Each test has its own benefits and drawbacks. Discuss with your doctor what is most appropriate for you during your annual wellness visit. The different tests include: colonoscopy (considered the best screening method), a fecal occult blood test, a fecal DNA test, and sigmoidoscopy. 
 
-A bone mass density test is recommended when a woman turns 65 to screen for osteoporosis. This test is only recommended one time, as a screening. Some providers will use this same test as a disease monitoring tool if you already have osteoporosis. -Breast cancer screenings are recommended every other year for women of normal risk, age 54-69. 
-Cervical cancer screenings for women over age 72 are only recommended with certain risk factors. Here is a list of your current Health Maintenance items (your personalized list of preventive services) with a due date: 
Health Maintenance Due Topic Date Due [facility] Eye Exam  03/30/1964  COVID-19 Vaccine (1 of 2) 03/30/1970  
 DTaP/Tdap/Td  (1 - Tdap) 03/30/1975  Shingles Vaccine (1 of 2) 03/30/2004  Glaucoma Screening   03/30/2019  Bone Mineral Density   03/30/2019  Albumin Urine Test  10/18/2020 [facility] H[facility] Annual Well Visit  01/17/2021 Medicare Wellness Visit, Female The best way to live healthy is to have a lifestyle where you eat a well-balanced diet, exercise regularly, limit alcohol use, and quit all forms of tobacco/nicotine, if applicable. Regular preventive services are another way to keep healthy. Preventive services (vaccines, screening tests, monitoring & exams) can help personalize your care plan, which helps you manage your own care. Screening tests can find health problems at the earliest stages, when they are easiest to treat. [facility] follows the current, evidence-based guidelines published by the [facility] ([facility]) when recommending preventive services for our patients. Because we follow these guidelines, sometimes recommendations change over time as research supports it. (For example, mammograms used to be recommended annually. Even though Medicare will still pay for an annual mammogram, the newer guidelines recommend a mammogram every two years for women of average risk). Of course, you and your doctor may decide to screen more often for some diseases, based on your risk and your co-morbidities (chronic disease you are already diagnosed with). Preventive services for you include: - Medicare offers their members a free annual wellness visit, which is time for you and your primary care provider to discuss and plan for your preventive service needs. Take advantage of this benefit every year! 
-All adults over the age of 72 should receive the recommended pneumonia vaccines. Current USPSTF guidelines recommend a series of two vaccines for the best pneumonia protection.  
-All adults should have a flu vaccine yearly and a tetanus vaccine every 10 years.  
-All adults age 48 and older should receive the shingles vaccines (series of two vaccines). -All adults age 38-68 who are overweight should have a diabetes screening test once every three years. -All adults born between 80 and 1965 should be screened once for Hepatitis C. 
-Other screening tests and preventive services for persons with diabetes include: an eye exam to screen for diabetic retinopathy, a kidney function test, a foot exam, and stricter control over your cholesterol.  
-Cardiovascular screening for adults with routine risk involves an electrocardiogram (ECG) at intervals determined by your doctor.  
-Colorectal cancer screenings should be done for adults age 54-65 with no increased risk factors for colorectal cancer. There are a number of acceptable methods of screening for this type of cancer. Each test has its own benefits and drawbacks. Discuss with your doctor what is most appropriate for you during your annual wellness visit. The different tests include: colonoscopy (considered the best screening method), a fecal occult blood test, a fecal DNA test, and sigmoidoscopy. 
 
-A bone mass density test is recommended when a woman turns 65 to screen for osteoporosis. This test is only recommended one time, as a screening. Some providers will use this same test as a disease monitoring tool if you already have osteoporosis. -Breast cancer screenings are recommended every other year for women of normal risk, age 54-69. 
-Cervical cancer screenings for women over age 72 are only recommended with certain risk factors. Here is a list of your current Health Maintenance items (your personalized list of preventive services) with a due date: 
Health Maintenance Due Topic Date Due [facility] Eye Exam  03/30/1964  COVID-19 Vaccine (1 of 2) 03/30/1970  
 DTaP/Tdap/Td  (1 - Tdap) 03/30/1975  Shingles Vaccine (1 of 2) 03/30/2004  Glaucoma Screening   03/30/2019  Bone Mineral Density   03/30/2019  Albumin Urine Test  10/18/2020

## 2021-02-09 NOTE — TELEPHONE ENCOUNTER
Called patient no answer unable to leave message mailbox is full, will send my chart message to patient.

## 2021-02-09 NOTE — ASSESSMENT & PLAN NOTE
Uncontrolled.  Encouraged to follow up with rheumatologist referral by Dr. Michael Ahmadi and reinforced by Dr. Homar Vazquez

## 2021-02-09 NOTE — TELEPHONE ENCOUNTER
Patient called the office while I was not available tried to call her back to review info before her visit no answer unable to leave message.

## 2021-02-09 NOTE — ASSESSMENT & PLAN NOTE
Being evaluated/managed by Dr. Rosie Nath. No acute findings today meriting change in management plan.

## 2021-02-09 NOTE — PROGRESS NOTES
This is the Subsequent Medicare Annual Wellness Exam, performed 12 months or more after the Initial AWV or the last Subsequent AWV    I have reviewed the patient's medical history in detail and updated the computerized patient record. Depression Risk Factor Screening:     3 most recent PHQ Screens 2/9/2021   PHQ Not Done -   Little interest or pleasure in doing things Not at all   Feeling down, depressed, irritable, or hopeless Several days   Total Score PHQ 2 1   Trouble falling or staying asleep, or sleeping too much Not at all   Feeling tired or having little energy Not at all   Poor appetite, weight loss, or overeating Several days   Feeling bad about yourself - or that you are a failure or have let yourself or your family down Not at all   Trouble concentrating on things such as school, work, reading, or watching TV Not at all   Moving or speaking so slowly that other people could have noticed; or the opposite being so fidgety that others notice Not at all   Thoughts of being better off dead, or hurting yourself in some way Not at all   PHQ 9 Score 2   How difficult have these problems made it for you to do your work, take care of your home and get along with others Not difficult at all       Alcohol Risk Screen    Do you average more than 1 drink per night or more than 7 drinks a week:  No    On any one occasion in the past three months have you have had more than 3 drinks containing alcohol:  No        Functional Ability and Level of Safety:    Hearing: Hearing is good. Activities of Daily Living: The home contains: grab bars  Patient does total self care      Ambulation: with mild difficulty     Fall Risk:  Fall Risk Assessment, last 12 mths 2/9/2021   Able to walk? Yes   Fall in past 12 months? 0   Do you feel unsteady? 0   Are you worried about falling 1   Number of falls in past 12 months -   Fall with injury?  -      Abuse Screen:  Patient is not abused       Cognitive Screening    Has your family/caregiver stated any concerns about your memory: no    Cognitive Screening: Normal - obs    Assessment/Plan   Education and counseling provided:  Are appropriate based on today's review and evaluation  Declined ACP    Diagnoses and all orders for this visit:    1. Medicare annual wellness visit, subsequent    2. Postmenopausal state  -     DEXA BONE DENSITY STUDY AXIAL;  Future        Health Maintenance Due     Health Maintenance Due   Topic Date Due    Eye Exam Retinal or Dilated  03/30/1964    COVID-19 Vaccine (1 of 2) 03/30/1970    DTaP/Tdap/Td series (1 - Tdap) 03/30/1975    Shingrix Vaccine Age 50> (1 of 2) 03/30/2004    GLAUCOMA SCREENING Q2Y  03/30/2019    Bone Densitometry (Dexa) Screening  03/30/2019    MICROALBUMIN Q1  10/18/2020    Medicare Yearly Exam  01/17/2021   [facility] last week    Patient Care Team   Patient Care Team:  Harriet Lynn MD as PCP - General (Family Medicine)  Harriet Lynn MD as PCP - [facility] Empaneled Provider  Emile Sandhoff, MD (Gastroenterology)  Jennifer Scott NP as Nurse Practitioner (Psychiatry)  Danuta Lawson MD (Physical Medicine and Rehabilitation)  Dagmar Durant MD (Psychiatry)  Cullen Urias NP as Nurse Practitioner (Gastroenterology)  Ignacio Long MD (Rheumatology)    History     Patient Active Problem List   Diagnosis Code    Lumbago M54.5    Other affections of shoulder region, not elsewhere classified M25.819    Other chronic pain G89.29    Bipolar 2 disorder ([facility] [address] F31.81    Chronic pain syndrome G89.4    Carpal tunnel syndrome G56.00    Degeneration of lumbar or lumbosacral intervertebral disc M51.37    Displacement of lumbar intervertebral disc without myelopathy M51.26    Pain in joint, multiple sites M25.50    Cervicalgia M54.2    Postlaminectomy syndrome, cervical region M96.1    Annular tear of lumbar disc M51.36    Lung nodule seen on imaging study R91.1    Hypercholesterolemia E78.00    Nausea R11.0  Myalgia M79.10    Vitamin D deficiency E55.9    Urinary retention R33.9    Gastroesophageal reflux disease without esophagitis K21.9    Diverticulosis of large intestine without hemorrhage K57.30    Hiatal hernia K44.9    Hepatic steatosis K76.0    Calculus of gallbladder without cholecystitis K80.20    Obesity (BMI 30.0-34. 9) E66.9    Prediabetes R73.03    Angular cheilitis with candidiasis B37.0    Essential hypertension I10    Migraine without aura and without status migrainosus, not intractable G43.009    Gastroparesis K31.84    Urinary incontinence R32    Non-cardiac chest pain R07.89     Past Medical History:   Diagnosis Date    Annular tear of lumbar disc 11/10/2014    Asthma     Bronchitis    Benign tumor of throat     Bone spur     right ankle    Cervicalgia 11/10/2014    Chronic pain     back    Degeneration of lumbar or lumbosacral intervertebral disc 11/10/2014    Degenerative disc disease     Depression     Displacement of lumbar intervertebral disc without myelopathy 11/10/2014    Elevated white blood cell count     Fibromyalgia     GERD (gastroesophageal reflux disease)     Hypertension     Insomnia     Nausea & vomiting     Pain in joint, multiple sites 11/10/2014    Postlaminectomy syndrome, cervical region 11/10/2014    Sinus infection 10/5/15    Ulcer       Past Surgical History:   Procedure Laterality Date    COLONOSCOPY N/A 4/13/2017    COLON[facility]OSCOPY    HX CATARACT REMOVAL      HX CERVICAL FUSION      HX HYSTERECTOMY      HX ORTHOPAEDIC      ganglion cyst removed, right hand    HX OTHER SURGICAL      cyst left thigh removed    HX WRIST FRACTURE TX      UT LAP,CHOLECYSTECTOMY N/A 02/27/2017    Dr. Isauro Roe     Current Outpatient Medications   Medication Sig Dispense Refill    ketoconazole (NIZORAL) 2 % topical cream APPLY TO AFFECTED AREA(S) TWO TIMES A DAY 15 g 0    carbamide peroxide (DEBROX) 6.5 % otic solution Administer 5 Drops in right ear two (2) times a day.  0    atorvastatin (LIPITOR) 40 mg tablet TAKE ONE TABLET BY MOUTH ONE TIME DAILY 90 Tab 4    fluticasone propionate (Flonase) 50 mcg/actuation nasal spray 2 Sprays by Both Nostrils route as needed for Rhinitis. 3 Bottle 4    valACYclovir (VALTREX) 500 mg tablet Take 1 Tab by mouth daily. 90 Tab 4    promethazine (PHENERGAN) 12.5 mg tablet Take 12.5 mg by mouth.  losartan (COZAAR) 25 mg tablet TAKE ONE TABLET BY MOUTH ONE TIME DAILY 90 Tab 4    dicyclomine (BENTYL) 20 mg tablet Take 20 mg by mouth four (4) times daily as needed for Abdominal Cramps.  metoclopramide HCl (REGLAN) 5 mg tablet Take 5 mg by mouth Before breakfast, lunch, and dinner.  QUEtiapine (SEROQUEL) 200 mg tablet Take 1 Tab by mouth daily. 30 Tab 0    cholecalciferol, vitamin D3, 2,000 unit tab Take  by mouth.  cloNIDine HCL (CATAPRES) 0.3 mg tablet Take 0.3 mg by mouth nightly.  polyethylene glycol (MIRALAX) 17 gram packet Take 1 Packet by mouth daily. 30 Each 6    oxyCODONE IR (ROXICODONE) 10 mg tab immediate release tablet 10 mg every eight (8) hours as needed.  DEXILANT 60 mg CpDB capsule (delayed release) Take 120 mg by mouth daily.  morphine CR (MS CONTIN) 60 mg CR tablet Take 30 mg by mouth every twelve (12) hours.  diclofenac (VOLTAREN) 1 % gel Apply  to affected area four (4) times daily. 1 g 1    cyclobenzaprine (FLEXERIL) 10 mg tablet TAKE 1 TABLET BY MOUTH THREE TIMES DAILY AS NEEDED FOR MUSCLE SPASMS FOR UP TO 30 DAYS. 90 Tab 2    naloxone (NARCAN) 4 mg/actuation nasal spray 1 [place] by IntraNASal route once as needed. Use 1 spray intranasally into 1 nostril. Use a new Narcan nasal spray for subsequent doses and administer into alternating nostrils. May repeat every 2 to 3 minutes as needed.        Allergies   Allergen Reactions    Aspirin Other (comments)     Stomach bleeding    Ativan [Lorazepam] Shortness of Breath    Bactrim [Sulfamethoprim] Rash    Keflex [Cephalexin] Rash    Macrobid [Nitrofurantoin Monohyd/M-Cryst] Nausea and Vomiting    Nsaids (Non-Steroidal Anti-Inflammatory Drug) Nausea and Vomiting    Opana [Oxymorphone] Other (comments)     Insomnia, weight loss, nightmares    Pcn [Penicillins] Hives       Family History   Problem Relation Age of Onset    Hypertension Other     Heart Attack Other     Heart Disease Other     Stroke Other     Headache Other     Seizures Other     Arthritis Father     Migraines Granddaughter     Migraines Daughter      Social History     Tobacco Use    Smoking status: Former Smoker     Packs/day: 0.50     Years: 3.00     Pack years: 1.50     Quit date: 3/6/1993     Years since quittin.9    Smokeless tobacco: Never Used   Substance Use Topics    Alcohol use: No       Deliliah Seip, who was evaluated through a synchronous (real-time) audio-video encounter, and/or her healthcare decision maker, is aware that it is a billable service, with coverage as determined by her insurance carrier. She provided verbal consent to proceed: Yes, and patient identification was verified. It was conducted pursuant to the emergency declaration under the [facility], [address] [place] [address] authority and the [employer] Act. A caregiver was present when appropriate. Ability to conduct physical exam was limited. I was at home. The patient was at home.     Jim Appiah

## 2021-02-09 NOTE — Clinical Note
3-4 weeks nurse visit blood pressure check, blood draw, followed by a virtual visit with me to review

## 2021-02-12 DIAGNOSIS — Z78.0 POSTMENOPAUSAL STATE: ICD-10-CM

## 2021-02-22 ENCOUNTER — OFFICE VISIT (OUTPATIENT)
Dept: ORTHOPEDIC SURGERY | Age: 66
End: 2021-02-22
Payer: MEDICARE

## 2021-02-22 VITALS
OXYGEN SATURATION: 100 % | DIASTOLIC BLOOD PRESSURE: 86 MMHG | RESPIRATION RATE: 16 BRPM | TEMPERATURE: 96.7 F | SYSTOLIC BLOOD PRESSURE: 143 MMHG | HEIGHT: 64 IN | HEART RATE: 100 BPM | BODY MASS INDEX: 32.68 KG/M2 | WEIGHT: 191.4 LBS

## 2021-02-22 DIAGNOSIS — M25.561 CHRONIC PAIN OF RIGHT KNEE: ICD-10-CM

## 2021-02-22 DIAGNOSIS — M17.12 PRIMARY OSTEOARTHRITIS OF LEFT KNEE: Primary | ICD-10-CM

## 2021-02-22 DIAGNOSIS — M17.11 PRIMARY OSTEOARTHRITIS OF RIGHT KNEE: ICD-10-CM

## 2021-02-22 DIAGNOSIS — G89.29 CHRONIC PAIN OF RIGHT KNEE: ICD-10-CM

## 2021-02-22 DIAGNOSIS — M25.562 CHRONIC PAIN OF LEFT KNEE: ICD-10-CM

## 2021-02-22 DIAGNOSIS — G89.29 CHRONIC PAIN OF LEFT KNEE: ICD-10-CM

## 2021-02-22 PROCEDURE — 20610 DRAIN/INJ JOINT/BURSA W/O US: CPT | Performed by: SPECIALIST

## 2021-02-22 NOTE — PROGRESS NOTES
Patient: Gustavo Kaplan                MRN: [id number]       SSN: [id number]  YOB: 1955        AGE: 76 y.o. SEX: female  Body mass index is 32.85 kg/m². PCP: Lori Martins MD  02/22/21    Chief Complaint   Patient presents with    Knee Pain     gulshan knee pain     HISTORY:  Gustavo Kaplan is a 76 y.o. female who is seen for bilateral knee pain. ICD-10-CM ICD-9-CM    1. Primary osteoarthritis of left knee  M17.12 715.16 sodium hyaluronate (SUPARTZ FX/EUFLEXXA/HYALGAN) 10 mg/mL injection syrg 20 mg      DRAIN/INJECT LARGE JOINT/BURSA   2. Chronic pain of left knee  M25.562 719.46 sodium hyaluronate (SUPARTZ FX/EUFLEXXA/HYALGAN) 10 mg/mL injection syrg 20 mg    G89.29 338.29 DRAIN/INJECT LARGE JOINT/BURSA   3. Primary osteoarthritis of right knee  M17.11 715.16 sodium hyaluronate (SUPARTZ FX/EUFLEXXA/HYALGAN) 10 mg/mL injection syrg 20 mg      DRAIN/INJECT LARGE JOINT/BURSA   4. Chronic pain of right knee  M25.561 719.46 sodium hyaluronate (SUPARTZ FX/EUFLEXXA/HYALGAN) 10 mg/mL injection syrg 20 mg    G89.29 338.29 DRAIN/INJECT LARGE JOINT/BURSA       Chart reviewed for the following:   Tobi Charlton MD, have reviewed the History, Physical and updated the Allergic reactions for Vene [address] performed immediately prior to start of procedure:  Tobi Charlton MD, have performed the following reviews on Gustavo Kaplan prior to the start of the procedure:            * Patient was identified by name and date of birth   * Agreement on procedure being performed was verified  * Risks and Benefits explained to the patient  * Procedure site verified and marked as necessary  * Patient was positioned for comfort  * Consent was obtained     Time: 3:47 PM      Date of procedure: 2/22/2021    Procedure performed by:  Shelley Lau MD    Ms. Hutchinson tolerated the procedure well with no complications.     PLAN:  After discussing treatment options, patient's knees were injected with 2 cc of Euflexxa. Ms. Franky Street will follow up in one week to complete her visco supplementation injection series.       Scribed by Johanny Dupree ([facility]) as dictated by Isabel Salazar MD

## 2021-02-26 ENCOUNTER — OFFICE VISIT (OUTPATIENT)
Dept: ORTHOPEDIC SURGERY | Age: 66
End: 2021-02-26
Payer: MEDICARE

## 2021-02-26 VITALS — WEIGHT: 192.2 LBS | BODY MASS INDEX: 32.99 KG/M2 | TEMPERATURE: 96.6 F

## 2021-02-26 DIAGNOSIS — G89.29 CHRONIC PAIN OF RIGHT KNEE: ICD-10-CM

## 2021-02-26 DIAGNOSIS — M25.561 CHRONIC PAIN OF RIGHT KNEE: ICD-10-CM

## 2021-02-26 DIAGNOSIS — M25.562 CHRONIC PAIN OF LEFT KNEE: ICD-10-CM

## 2021-02-26 DIAGNOSIS — G89.29 CHRONIC PAIN OF LEFT KNEE: ICD-10-CM

## 2021-02-26 DIAGNOSIS — M17.12 PRIMARY OSTEOARTHRITIS OF LEFT KNEE: Primary | ICD-10-CM

## 2021-02-26 DIAGNOSIS — M17.11 PRIMARY OSTEOARTHRITIS OF RIGHT KNEE: ICD-10-CM

## 2021-02-26 PROCEDURE — 20610 DRAIN/INJ JOINT/BURSA W/O US: CPT | Performed by: SPECIALIST

## 2021-02-26 NOTE — PROGRESS NOTES
Patient: Ceasar Villegas                MRN: [id number]       SSN: [id number]  YOB: 1955        AGE: 76 y.o. SEX: female  Body mass index is 32.99 kg/m². PCP: Rosy Tolliver MD  02/26/21    Chief Complaint   Patient presents with    Knee Pain     gulshan knee pain     HISTORY:  Ceasar Villegas is a 76 y.o. female who is seen for bilateral knee pain. TIME OUT performed immediately prior to start of procedure:  Tika Obregon MD, have performed the following reviews on Ceasar Villegas prior to the start of the procedure:            * Patient was identified by name and date of birth   * Agreement on procedure being performed was verified  * Risks and Benefits explained to the patient  * Procedure site verified and marked as necessary  * Patient was positioned for comfort  * Consent was obtained     Time: 11:21 AM     Date of procedure: 2/26/2021    Procedure performed by:  Che Gabriel MD    Ms. Hutchinson tolerated the procedure well with no complications      HOLDEN-35-BQ ICD-9-CM    1. Primary osteoarthritis of left knee  M17.12 715.16 sodium hyaluronate (SUPARTZ FX/EUFLEXXA/HYALGAN) 10 mg/mL injection syrg 20 mg      DRAIN/INJECT LARGE JOINT/BURSA   2. Chronic pain of left knee  M25.562 719.46 sodium hyaluronate (SUPARTZ FX/EUFLEXXA/HYALGAN) 10 mg/mL injection syrg 20 mg    G89.29 338.29 DRAIN/INJECT LARGE JOINT/BURSA   3. Primary osteoarthritis of right knee  M17.11 715.16 sodium hyaluronate (SUPARTZ FX/EUFLEXXA/HYALGAN) 10 mg/mL injection syrg 20 mg      DRAIN/INJECT LARGE JOINT/BURSA   4. Chronic pain of right knee  M25.561 719.46 sodium hyaluronate (SUPARTZ FX/EUFLEXXA/HYALGAN) 10 mg/mL injection syrg 20 mg    G89.29 338.29 DRAIN/INJECT LARGE JOINT/BURSA     PLAN:  After discussing treatment options, patient's knees were injected with 2 cc of Euflexxa. Ms. Ari Christopher will follow up PRN now that she has completed her visco supplementation injection series. Scribed by Earl Mo ([address]) as dictated by Severo Vargas MD
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
## 2021-03-01 ENCOUNTER — HOSPITAL ENCOUNTER (OUTPATIENT)
Dept: LAB | Age: 66
Discharge: HOME | End: 2021-03-01
Payer: MEDICARE

## 2021-03-01 PROCEDURE — U0003 INFECTIOUS AGENT DETECTION BY NUCLEIC ACID (DNA OR RNA); SEVERE ACUTE RESPIRATORY SYNDROME CORONAVIRUS 2 (SARS-COV-2) (CORONAVIRUS DISEASE [COVID-19]), AMPLIFIED PROBE TECHNIQUE, MAKING USE OF HIGH THROUGHPUT TECHNOLOGIES AS DESCRIBED BY CMS-2020-01-R: HCPCS

## 2021-03-02 LAB — SARS-COV-2, COV2NT: NOT DETECTED

## 2021-03-04 ENCOUNTER — ANESTHESIA EVENT (OUTPATIENT)
Dept: ENDOSCOPY | Age: 66
End: 2021-03-04
Payer: MEDICARE

## 2021-03-05 ENCOUNTER — HOSPITAL ENCOUNTER (OUTPATIENT)
Age: 66
Setting detail: OUTPATIENT SURGERY
Discharge: HOME | End: 2021-03-05
Attending: INTERNAL MEDICINE | Admitting: INTERNAL MEDICINE
Payer: MEDICARE

## 2021-03-05 ENCOUNTER — ANESTHESIA (OUTPATIENT)
Dept: ENDOSCOPY | Age: 66
End: 2021-03-05
Payer: MEDICARE

## 2021-03-05 VITALS
SYSTOLIC BLOOD PRESSURE: 142 MMHG | BODY MASS INDEX: 32.68 KG/M2 | HEART RATE: 84 BPM | HEIGHT: 64 IN | RESPIRATION RATE: 20 BRPM | WEIGHT: 191.4 LBS | OXYGEN SATURATION: 100 % | DIASTOLIC BLOOD PRESSURE: 91 MMHG | TEMPERATURE: 98.1 F

## 2021-03-05 LAB
AMPHET UR QL SCN: NEGATIVE
BARBITURATES UR QL SCN: NEGATIVE
BENZODIAZ UR QL: NEGATIVE
CANNABINOIDS UR QL SCN: NEGATIVE
COCAINE UR QL SCN: NEGATIVE
HDSCOM,HDSCOM: ABNORMAL
METHADONE UR QL: NEGATIVE
OPIATES UR QL: POSITIVE
PCP UR QL: NEGATIVE

## 2021-03-05 PROCEDURE — 2709999900 HC NON-CHARGEABLE SUPPLY: Performed by: INTERNAL MEDICINE

## 2021-03-05 PROCEDURE — 77030008565 HC TBNG SUC IRR ERBE -B: Performed by: INTERNAL MEDICINE

## 2021-03-05 PROCEDURE — 74011250636 HC RX REV CODE- 250/636: Performed by: NURSE ANESTHETIST, CERTIFIED REGISTERED

## 2021-03-05 PROCEDURE — 00731 ANES UPR GI NDSC PX NOS: CPT | Performed by: NURSE ANESTHETIST, CERTIFIED REGISTERED

## 2021-03-05 PROCEDURE — 76040000019: Performed by: INTERNAL MEDICINE

## 2021-03-05 PROCEDURE — 00731 ANES UPR GI NDSC PX NOS: CPT | Performed by: ANESTHESIOLOGY

## 2021-03-05 PROCEDURE — 77030019988 HC FCPS ENDOSC DISP BSC -B: Performed by: INTERNAL MEDICINE

## 2021-03-05 PROCEDURE — 74011000250 HC RX REV CODE- 250: Performed by: NURSE ANESTHETIST, CERTIFIED REGISTERED

## 2021-03-05 PROCEDURE — 76060000031 HC ANESTHESIA FIRST 0.5 HR: Performed by: INTERNAL MEDICINE

## 2021-03-05 PROCEDURE — 88305 TISSUE EXAM BY PATHOLOGIST: CPT

## 2021-03-05 PROCEDURE — 80307 DRUG TEST PRSMV CHEM ANLYZR: CPT

## 2021-03-05 RX ORDER — SODIUM CHLORIDE, SODIUM LACTATE, POTASSIUM CHLORIDE, CALCIUM CHLORIDE 600; 310; 30; 20 MG/100ML; MG/100ML; MG/100ML; MG/100ML
50 INJECTION, SOLUTION INTRAVENOUS CONTINUOUS
Status: DISCONTINUED | OUTPATIENT
Start: 2021-03-05 | End: 2021-03-05 | Stop reason: HOSPADM

## 2021-03-05 RX ORDER — ONDANSETRON 2 MG/ML
4 INJECTION INTRAMUSCULAR; INTRAVENOUS ONCE
Status: CANCELLED | OUTPATIENT
Start: 2021-03-05 | End: 2021-03-05

## 2021-03-05 RX ORDER — PROPOFOL 10 MG/ML
INJECTION, EMULSION INTRAVENOUS AS NEEDED
Status: DISCONTINUED | OUTPATIENT
Start: 2021-03-05 | End: 2021-03-05 | Stop reason: HOSPADM

## 2021-03-05 RX ORDER — INSULIN LISPRO 100 [IU]/ML
INJECTION, SOLUTION INTRAVENOUS; SUBCUTANEOUS ONCE
Status: DISCONTINUED | OUTPATIENT
Start: 2021-03-05 | End: 2021-03-05 | Stop reason: HOSPADM

## 2021-03-05 RX ORDER — SODIUM CHLORIDE 0.9 % (FLUSH) 0.9 %
5-40 SYRINGE (ML) INJECTION AS NEEDED
Status: CANCELLED | OUTPATIENT
Start: 2021-03-05

## 2021-03-05 RX ORDER — MAGNESIUM SULFATE 100 %
4 CRYSTALS MISCELLANEOUS AS NEEDED
Status: CANCELLED | OUTPATIENT
Start: 2021-03-05

## 2021-03-05 RX ORDER — INSULIN LISPRO 100 [IU]/ML
INJECTION, SOLUTION INTRAVENOUS; SUBCUTANEOUS ONCE
Status: CANCELLED | OUTPATIENT
Start: 2021-03-05 | End: 2021-03-05

## 2021-03-05 RX ORDER — LIDOCAINE HYDROCHLORIDE 20 MG/ML
INJECTION, SOLUTION EPIDURAL; INFILTRATION; INTRACAUDAL; PERINEURAL AS NEEDED
Status: DISCONTINUED | OUTPATIENT
Start: 2021-03-05 | End: 2021-03-05 | Stop reason: HOSPADM

## 2021-03-05 RX ORDER — DEXTROSE 50 % IN WATER (D50W) INTRAVENOUS SYRINGE
25-50 AS NEEDED
Status: CANCELLED | OUTPATIENT
Start: 2021-03-05

## 2021-03-05 RX ORDER — DIPHENHYDRAMINE HYDROCHLORIDE 50 MG/ML
12.5 INJECTION, SOLUTION INTRAMUSCULAR; INTRAVENOUS
Status: CANCELLED | OUTPATIENT
Start: 2021-03-05

## 2021-03-05 RX ORDER — SODIUM CHLORIDE, SODIUM LACTATE, POTASSIUM CHLORIDE, CALCIUM CHLORIDE 600; 310; 30; 20 MG/100ML; MG/100ML; MG/100ML; MG/100ML
100 INJECTION, SOLUTION INTRAVENOUS CONTINUOUS
Status: CANCELLED | OUTPATIENT
Start: 2021-03-05

## 2021-03-05 RX ORDER — SODIUM CHLORIDE 0.9 % (FLUSH) 0.9 %
5-40 SYRINGE (ML) INJECTION EVERY 8 HOURS
Status: CANCELLED | OUTPATIENT
Start: 2021-03-05

## 2021-03-05 RX ADMIN — FAMOTIDINE: 10 INJECTION INTRAVENOUS at 11:02

## 2021-03-05 RX ADMIN — SODIUM CHLORIDE, SODIUM LACTATE, POTASSIUM CHLORIDE, AND CALCIUM CHLORIDE 50 ML/HR: 600; 310; 30; 20 INJECTION, SOLUTION INTRAVENOUS at 11:01

## 2021-03-05 RX ADMIN — PROPOFOL 50 MG: 10 INJECTION, EMULSION INTRAVENOUS at 11:27

## 2021-03-05 RX ADMIN — PROPOFOL 50 MG: 10 INJECTION, EMULSION INTRAVENOUS at 11:30

## 2021-03-05 RX ADMIN — PROPOFOL 50 MG: 10 INJECTION, EMULSION INTRAVENOUS at 11:33

## 2021-03-05 RX ADMIN — LIDOCAINE HYDROCHLORIDE 80 MG: 20 INJECTION, SOLUTION EPIDURAL; INFILTRATION; INTRACAUDAL; PERINEURAL at 11:27

## 2021-03-05 NOTE — H&P
[employer]  [phone]      GASTROENTEROLOGY Pre-Procedure H and P      Impression/Plan:   1.  This patient is consented for an EGD for abdominal pain    Addendum: All lab tests orders pertaining to the procedure have been ordered by the anesthesia personnel and results will be addressed by the anesthesia team  Chief Complaint: abdominal pain      HPI:  Griselda Cho is a 76 y.o. female who is being is having an EGD for abdominal pain  PMH:   Past Medical History:   Diagnosis Date    Annular tear of lumbar disc 11/10/2014    Benign tumor of throat     UNAWARE    Bone spur     right ankle    Cervicalgia 11/10/2014    Chronic pain     back    Degeneration of lumbar or lumbosacral intervertebral disc 11/10/2014    Degenerative disc disease     Depression     Displacement of lumbar intervertebral disc without myelopathy 11/10/2014    Elevated white blood cell count     Fibromyalgia     GERD (gastroesophageal reflux disease)     Hypertension     Insomnia     Lung nodule     Nausea & vomiting     Pain in joint, multiple sites 11/10/2014    Postlaminectomy syndrome, cervical region 11/10/2014    Sinus infection 10/5/15    Ulcer        PSH:   Past Surgical History:   Procedure Laterality Date    COLONOSCOPY N/A 4/13/2017    COLON[facility] performed by [facility] at [facility] ENDOSCOPY    HX CATARACT REMOVAL      HX CERVICAL FUSION      HX HYSTERECTOMY      HX ORTHOPAEDIC      ganglion cyst removed, right hand    HX OTHER SURGICAL      cyst left thigh removed    HX WRIST FRACTURE TX      NE LAP,CHOLECYSTECTOMY N/A 02/27/2017    Dr. Edelmira Ortez       Social HX:   Social History     Socioeconomic History    Marital status:      Spouse name: Not on file    Number of children: Not on file    Years of education: Not on file    Highest education level: Not on file   Occupational History    Not on file   Social Needs    Financial resource strain: Not on file    Food insecurity Worry: Not on file     Inability: Not on file    Transportation needs     Medical: Not on file     Non-medical: Not on file   Tobacco Use    Smoking status: Former Smoker     Packs/day: 0.50     Years: 3.00     Pack years: 1.50     Quit date: 3/6/1993     Years since quittin.0    Smokeless tobacco: Never Used   Substance and Sexual Activity    Alcohol use: No    Drug use: No     Comment: Last smoked in -cocaine    Sexual activity: Never   Lifestyle    Physical activity     Days per week: Not on file     Minutes per session: Not on file    Stress: Not on file   Relationships    Social connections     Talks on phone: Not on file     Gets together: Not on file     Attends Quaker service: Not on file     Active member of club or organization: Not on file     Attends meetings of clubs or organizations: Not on file     Relationship status: Not on file    Intimate partner violence     Fear of current or ex partner: Not on file     Emotionally abused: Not on file     Physically abused: Not on file     Forced sexual activity: Not on file   Other Topics Concern    Not on file   Social History Narrative    Not on file       FHX:   Family History   Problem Relation Age of Onset    Hypertension Other     Heart Attack Other     Heart Disease Other     Stroke Other     Headache Other     Seizures Other     Arthritis Father     Migraines Granddaughter     Migraines Daughter        Allergy:   Allergies   Allergen Reactions    Aspirin Other (comments)     Stomach bleeding    Ativan [Lorazepam] Shortness of Breath    Bactrim [Sulfamethoprim] Rash    Keflex [Cephalexin] Rash    Macrobid [Nitrofurantoin Monohyd/M-Cryst] Nausea and Vomiting    Nsaids (Non-Steroidal Anti-Inflammatory Drug) Nausea and Vomiting    Opana [Oxymorphone] Other (comments)     Insomnia, weight loss, nightmares    Pcn [Penicillins] Hives       Home Medications:     Medications Prior to Admission   Medication Sig    ketoconazole (NIZORAL) 2 % topical cream APPLY TO AFFECTED AREA(S) TWO TIMES A DAY    losartan (COZAAR) 50 mg tablet TAKE ONE TABLET BY MOUTH ONE TIME DAILY    atorvastatin (LIPITOR) 40 mg tablet TAKE ONE TABLET BY MOUTH ONE TIME DAILY    fluticasone propionate (Flonase) 50 mcg/actuation nasal spray 2 Sprays by Both Nostrils route as needed for Rhinitis.  valACYclovir (VALTREX) 500 mg tablet Take 1 Tab by mouth daily.  promethazine (PHENERGAN) 12.5 mg tablet Take 12.5 mg by mouth.  dicyclomine (BENTYL) 20 mg tablet Take 20 mg by mouth four (4) times daily as needed for Abdominal Cramps.  metoclopramide HCl (REGLAN) 5 mg tablet Take 5 mg by mouth Before breakfast, lunch, and dinner.  QUEtiapine (SEROQUEL) 200 mg tablet Take 1 Tab by mouth daily.  cholecalciferol, vitamin D3, 2,000 unit tab Take  by mouth.  cloNIDine HCL (CATAPRES) 0.3 mg tablet Take 0.3 mg by mouth nightly.  polyethylene glycol (MIRALAX) 17 gram packet Take 1 Packet by mouth daily.  oxyCODONE IR (ROXICODONE) 10 mg tab immediate release tablet 10 mg every eight (8) hours as needed.  DEXILANT 60 mg CpDB capsule (delayed release) Take 120 mg by mouth daily.  morphine CR (MS CONTIN) 60 mg CR tablet Take 30 mg by mouth every twelve (12) hours.  diclofenac (VOLTAREN) 1 % gel Apply  to affected area four (4) times daily.  cyclobenzaprine (FLEXERIL) 10 mg tablet TAKE 1 TABLET BY MOUTH THREE TIMES DAILY AS NEEDED FOR MUSCLE SPASMS FOR UP TO 30 DAYS.  naloxone (NARCAN) 4 mg/actuation nasal spray 1 [place] by IntraNASal route once as needed. Use 1 spray intranasally into 1 nostril. Use a new Narcan nasal spray for subsequent doses and administer into alternating nostrils. May repeat every 2 to 3 minutes as needed.  carbamide peroxide (DEBROX) 6.5 % otic solution Administer 5 Drops in right ear two (2) times a day. Review of Systems:     Constitutional: No fevers, chills, weight loss, fatigue.    Skin: No rashes, pruritis, jaundice, ulcerations, erythema. HENT: No headaches, nosebleeds, sinus pressure, rhinorrhea, sore throat. Eyes: No visual changes, blurred vision, eye pain, photophobia, jaundice. Cardiovascular: No chest pain, heart palpitations. Respiratory: No cough, SOB, wheezing, chest discomfort, orthopnea. Gastrointestinal:    Genitourinary: No dysuria, bleeding, discharge, pyuria. Musculoskeletal: No weakness, arthralgias, wasting. Endo: No sweats. Heme: No bruising, easy bleeding. Allergies: As noted. Neurological: Cranial nerves intact. Alert and oriented. Gait not assessed. Psychiatric:  No anxiety, depression, hallucinations. Visit Vitals  BP (!) 146/88 (BP 1 Location: Left arm, BP Patient Position: At rest)   Pulse (!) 105   Temp 99.2 °F (37.3 °C)   Resp 18   Ht 5' 4\" (1.626 m)   Wt 86.8 kg (191 lb 6.4 oz)   SpO2 96%   BMI 32.85 kg/m²       Physical Assessment:     constitutional: appearance: well developed, well nourished, normal habitus, no deformities, in no acute distress. skin: inspection: no rashes, ulcers, icterus or other lesions; no clubbing or telangiectasias. palpation: no induration or subcutaneos nodules. eyes: inspection: normal conjunctivae and lids; no jaundice pupils: normal  ENMT: mouth: normal oral mucosa,lips and gums; good dentition. oropharynx: normal tongue, hard and soft palate; posterior pharynx without erithema, exudate or lesions. neck: thyroid: normal size, consistency and position; no masses or tenderness. respiratory: effort: normal chest excursion; no intercostal retraction or accessory muscle use. cardiovascular: abdominal aorta: normal size and position; no bruits. palpation: PMI of normal size and position; normal rhythm; no thrill or murmurs. abdominal: abdomen: normal consistency; no tenderness or masses. hernias: no hernias appreciated. liver: normal size and consistency. spleen: not palpable.    rectal: hemoccult/guaiac: not performed. musculoskeletal: digits and nails: no clubbing, cyanosis, petechiae or other inflammatory conditions. gait: normal gait and station head and neck: normal range of motion; no pain, crepitation or contracture. spine/ribs/pelvis: normal range of motion; no pain, deformity or contracture. neurologic: cranial nerves: II-XII normal.   psychiatric: judgement/insight: within normal limits. memory: within normal limits for recent and remote events. mood and affect: no evidence of depression, anxiety or agitation. orientation: oriented to time, space and person. Basic Metabolic Profile   No results for input(s): NA, K, CL, CO2, BUN, GLU, CA, MG, PHOS in the last 72 hours. No lab exists for component: CREAT      CBC w/Diff    No results for input(s): WBC, RBC, HGB, HCT, MCV, MCH, MCHC, RDW, PLT, HGBEXT, HCTEXT, PLTEXT in the last 72 hours. No lab exists for component: MPV No results for input(s): GRANS, LYMPH, EOS, PRO, MYELO, METAS, BLAST in the last 72 hours. No lab exists for component: MONO, BASO     Hepatic Function   No results for input(s): ALB, TP, TBILI, AP, AML, LPSE in the last 72 hours. No lab exists for component: DBILI, GPT, SGOT     Coags   No results for input(s): PTP, INR, APTT, INREXT in the last 72 hours. Geoffrey Mast MD  [facility] [address], [facility]  Cell: [phone]  Direct pager: [phone]  [email or web address]

## 2021-03-05 NOTE — ANESTHESIA PREPROCEDURE EVALUATION
Relevant Problems   No relevant active problems       Anesthetic History     PONV          Review of Systems / Medical History  Patient summary reviewed and pertinent labs reviewed    Pulmonary  Within defined limits                 Neuro/Psych         Psychiatric history     Cardiovascular    Hypertension              Exercise tolerance: >4 METS  Comments: Stress test last week.  Clearded by cardiology   GI/Hepatic/Renal     GERD      Liver disease     Endo/Other        Arthritis     Other Findings              Physical Exam    Airway  Mallampati: III  TM Distance: 4 - 6 cm  Neck ROM: normal range of motion   Mouth opening: Normal     Cardiovascular  Regular rate and rhythm,  S1 and S2 normal,  no murmur, click, rub, or gallop  Rhythm: regular  Rate: normal         Dental    Dentition: Lower partial plate and Upper partial plate     Pulmonary  Breath sounds clear to auscultation               Abdominal  GI exam deferred       Other Findings            Anesthetic Plan    ASA: 3  Anesthesia type: MAC          Induction: Intravenous

## 2021-03-05 NOTE — ANESTHESIA POSTPROCEDURE EVALUATION
Procedure(s):  UPPER ENDOSCOPY w bx's.     MAC    Anesthesia Post Evaluation      Multimodal analgesia: multimodal analgesia used between 6 hours prior to anesthesia start to PACU discharge  Patient location during evaluation: bedside  Patient participation: complete - patient participated  Level of consciousness: awake  Pain management: adequate  Airway patency: patent  Anesthetic complications: no  Cardiovascular status: stable  Respiratory status: acceptable  Hydration status: acceptable  Post anesthesia nausea and vomiting:  controlled  Final Post Anesthesia Temperature Assessment:  Normothermia (36.0-37.5 degrees C)      INITIAL Post-op Vital signs:   Vitals Value Taken Time   /84 03/05/21 1145   Temp 36.4 °C (97.6 °F) 03/05/21 1145   Pulse 92 03/05/21 1145   Resp 18 03/05/21 1145   SpO2 100 % 03/05/21 1145

## 2021-03-05 NOTE — DISCHARGE INSTRUCTIONS
DISCHARGE SUMMARY from Nurse    PATIENT INSTRUCTIONS:    After general anesthesia or intravenous sedation, for 24 hours or while taking prescription Narcotics:  · Limit your activities  · Do not drive and operate hazardous machinery  · Do not make important personal or business decisions  · Do  not drink alcoholic beverages  · If you have not urinated within 8 hours after discharge, please contact your surgeon on call. Report the following to your surgeon:  · Excessive pain, swelling, redness or odor of or around the surgical area  · Temperature over 100.5  · Nausea and vomiting lasting longer than 4 hours or if unable to take medications  · Any signs of decreased circulation or nerve impairment to extremity: change in color, persistent  numbness, tingling, coldness or increase pain  · Any questions    *  Please give a list of your current medications to your Primary Care Provider. *  Please update this list whenever your medications are discontinued, doses are      changed, or new medications (including over-the-counter products) are added. *  Please carry medication information at all times in case of emergency situations. These are general instructions for a healthy lifestyle:    No smoking/ No tobacco products/ Avoid exposure to second hand smoke  Surgeon General's Warning:  Quitting smoking now greatly reduces serious risk to your health. Obesity, smoking, and sedentary lifestyle greatly increases your risk for illness    A healthy diet, regular physical exercise & weight monitoring are important for maintaining a healthy lifestyle    You may be retaining fluid if you have a history of heart failure or if you experience any of the following symptoms:  Weight gain of 3 pounds or more overnight or 5 pounds in a week, increased swelling in our hands or feet or shortness of breath while lying flat in bed.   Please call your doctor as soon as you notice any of these symptoms; do not wait until your next office visit. The discharge information has been reviewed with the patient. The patient verbalized understanding. Discharge medications reviewed with the patient and appropriate educational materials and side effects teaching were provided.   ___________________________________________________________________________________________________________________________________

## 2021-03-05 NOTE — PROGRESS NOTES
[facility] [address]  [address], [address]      Brief Procedure Note    Bailey Gan  1955  [id number]    Date of Procedure: 3/5/2021    Preoperative diagnosis: Abdominal pain:  789.00 - R10.9  GERD:  530.81 - K21.9  Encounter for screening for other viral diseases:  V73.0 - Z11.59    Postoperative diagnosis: hial hernia P 38 z31, Lg duodenal diverticula, Duodenal bx's, Gastric Bx's, Esophageal bx's    Type of Anesthesia: MAC (Monitored anesthesia care)    Description of findings: same as post op dx    Procedure: Procedure(s):  UPPER ENDOSCOPY w bx's    :  Dr. Yoseph Thomas MD    Assistant(s): Endoscopy Technician-1: Samira Sutherland  Endoscopy RN-1: Bobby Jeter RN; Stanley Alexis RN    Devices/implants/grafts/tissues/prosthesis: None    EBL:None    Specimens:   ID Type Source Tests Collected by Time Destination   1 : Duodenal bx's Preservative Duodenum  Farzad Velazquez MD 3/5/2021 1129 Pathology   2 : gastric bx'e Preservative Gastric  Farzad Velazquez MD 3/5/2021 1129 Pathology   3 : Esophageal bx's Preservative Esophagus  Farzad Velazquez MD 3/5/2021 1130 Pathology       Findings: See printed and scanned procedure note    Complications: None    Dr. Yoseph Thomas MD  3/5/2021  11:52 AM

## 2021-03-10 ENCOUNTER — TELEPHONE (OUTPATIENT)
Dept: FAMILY MEDICINE CLINIC | Age: 66
End: 2021-03-10

## 2021-03-10 NOTE — LETTER
3/10/2021 11:16 AM 
 
Ms. Kayla Hutchinson 
[address] [address] Dear Ms. Hutchinson, 
 
I am sending this letter because I have not been able to contact you via telephone. Dr. Jimmy Dominguez has received an order on you from [address] [place] [address] for incontinence supplies. She is not able to sign these orders as she has referred you to a Urologists for this condition. I have re-faxed a consult request to [facility] who will be calling you to set up a new patient appointment. I am also enclosing a copy of your referral as well as their contact information. If you do not receive a call from [facility] to schedule an appointment, please reach out to their office to initiate scheduling. Feel free to call me with any questions and/or concerns. Sincerely, Enrike Mcclure, CMA

## 2021-03-10 NOTE — TELEPHONE ENCOUNTER
Received faxed order on patient for her incontinence supplies, called [facility] where patient was referred to in 2019. Spoke with Jennifer who says they made multiple attempts to contact patient and mailed a letter to her in May 2020 however patient never responded. She last saw Dr. Vinay Amaya for her incontinence on 1/17/20 and it was noted at that time etiology was unclear and patient was to pursue her referral to Urology. Called patient to discuss no answer unable to leave a message mailbox is full. Order has been placed in Dr. Edenilson Rivers office for review. Please review and advise.

## 2021-03-10 NOTE — TELEPHONE ENCOUNTER
Her incontinence needs to be evaluated. Prescribing supplies without investigating etiology is irresponsible.  ERIKA

## 2021-03-10 NOTE — TELEPHONE ENCOUNTER
Consult request has been faxed to [facility] with OV note, ins info, supply order request, and referral order. Letter mailed to patient making her aware she will need to see Urology for her supplies and we have sent a consult request on her behalf. She has also been given the telephone number to call [facility] to set up her appt. Fax confirmation has been received.

## 2021-03-22 DIAGNOSIS — B37.83 ANGULAR CHEILITIS WITH CANDIDIASIS: ICD-10-CM

## 2021-03-22 NOTE — TELEPHONE ENCOUNTER
This patient contacted office for the following prescriptions to be filled:    Medication requested :   Requested Prescriptions     Pending Prescriptions Disp Refills    ketoconazole (NIZORAL) 2 % topical cream 15 g 0     Sig: APPLY TO AFFECTED AREA(S) TWO TIMES A DAY     PCP: Dr. William Carvalho or Print: nAn Patel  Mail order or Local pharmacy: [phone]    Scheduled appointment if not seen by current providers in office: LOV 2/9/21, pt was supposed to have follow up for NV and then VV the beginning of March. I had her schedule this with me during call. She states this cream helped a lot with her issue that she was using it for. I told patient normally Dr. Keisha Juarez gives enough refills and if she is still having issues she may need to f/u again but I would forward message back. She also states she received her second shingles shot about 3 weeks ago and is still hurting where the shot was given. I also told her an appt may be required for this. Sending back to the [address] [place], [place] [address]alexander and Dr. Keisha Juarez to see how they want to handle this and if she needs to be fot into schedule to evaluate further.

## 2021-03-23 RX ORDER — KETOCONAZOLE 20 MG/G
CREAM TOPICAL
Qty: 15 G | Refills: 2 | Status: SHIPPED | OUTPATIENT
Start: 2021-03-23 | End: 2021-09-07

## 2021-03-23 NOTE — TELEPHONE ENCOUNTER
The plan for her mouth was to use vaseline every day and the ketoconazole sparingly as needed. Plan visit to reassess when needs new order. I will need to examine her arm to render an opinion about the pain.  ERIKA

## 2021-03-24 ENCOUNTER — TELEPHONE (OUTPATIENT)
Dept: FAMILY MEDICINE CLINIC | Age: 66
End: 2021-03-24

## 2021-03-24 NOTE — TELEPHONE ENCOUNTER
Patient called the office today about her daughters Tramadol and was told Dr. Christy Franklin is not able to prescribe her supplies until she has been evaluated buy Urology she says she is not able to be seen with them until 4/20 and does not have money to buy her incontinence supplies. She was made aware her referral had been placed to Urology a while ago and Dr. Christy Franklin feels she really needs to be evaluated to find out why she is having the incontinence, she says she is out and asked if Dr. Christy Franklin could send in her supplies since her appt is not until the 20th told I will send the message and call her back please advise.

## 2021-03-24 NOTE — TELEPHONE ENCOUNTER
If there is no need to document a diagnosis more specific than \"incontinence\" I will sign, without refills.  ERIKA

## 2021-03-24 NOTE — TELEPHONE ENCOUNTER
Patient called the office to ask about her daughters pain medication and was informed her cream has been sent in.

## 2021-03-24 NOTE — TELEPHONE ENCOUNTER
Home care delivered rep called asking the status of the form for supplies they faxed over a while back. Informed the rep I have to take a msg and someone would call her back. [phone]. She asked for a turn around time, informed her I do not have one, but I would forward a msg to nurse & provider. She said \"i'll notate that. \"   And disconnected the line.

## 2021-03-25 NOTE — TELEPHONE ENCOUNTER
Order for incontinence supplies has been received and placed in Dr. Stanford Crespo office for review and signature.

## 2021-03-25 NOTE — TELEPHONE ENCOUNTER
Called [facility] requested another order to be faxed to the office for patients incontinence supplies.

## 2021-03-29 ENCOUNTER — CLINICAL SUPPORT (OUTPATIENT)
Dept: FAMILY MEDICINE CLINIC | Age: 66
End: 2021-03-29
Payer: MEDICARE

## 2021-03-29 ENCOUNTER — HOSPITAL ENCOUNTER (OUTPATIENT)
Dept: LAB | Age: 66
Discharge: HOME | End: 2021-03-29
Payer: MEDICARE

## 2021-03-29 VITALS — SYSTOLIC BLOOD PRESSURE: 118 MMHG | TEMPERATURE: 98.5 F | DIASTOLIC BLOOD PRESSURE: 68 MMHG

## 2021-03-29 DIAGNOSIS — D64.9 ANEMIA, UNSPECIFIED TYPE: ICD-10-CM

## 2021-03-29 DIAGNOSIS — R73.03 PREDIABETES: Primary | ICD-10-CM

## 2021-03-29 DIAGNOSIS — I10 ESSENTIAL HYPERTENSION: ICD-10-CM

## 2021-03-29 DIAGNOSIS — R73.03 PREDIABETES: ICD-10-CM

## 2021-03-29 LAB
BASOPHILS # BLD: 0 K/UL (ref 0–0.1)
BASOPHILS NFR BLD: 0 % (ref 0–2)
CREAT UR-MCNC: 251 MG/DL (ref 30–125)
DIFFERENTIAL METHOD BLD: ABNORMAL
EOSINOPHIL # BLD: 0.1 K/UL (ref 0–0.4)
EOSINOPHIL NFR BLD: 1 % (ref 0–5)
ERYTHROCYTE [DISTWIDTH] IN BLOOD BY AUTOMATED COUNT: 16.7 % (ref 11.6–14.5)
EST. AVERAGE GLUCOSE BLD GHB EST-MCNC: 163 MG/DL
FERRITIN SERPL-MCNC: 13 NG/ML (ref 8–388)
HBA1C MFR BLD: 7.3 % (ref 4.2–5.6)
HCT VFR BLD AUTO: 30 % (ref 35–45)
HGB BLD-MCNC: 9.1 G/DL (ref 12–16)
IRON SATN MFR SERPL: 5 % (ref 20–50)
IRON SERPL-MCNC: 24 UG/DL (ref 50–175)
LYMPHOCYTES # BLD: 3.3 K/UL (ref 0.9–3.6)
LYMPHOCYTES NFR BLD: 38 % (ref 21–52)
MCH RBC QN AUTO: 24.4 PG (ref 24–34)
MCHC RBC AUTO-ENTMCNC: 30.3 G/DL (ref 31–37)
MCV RBC AUTO: 80.4 FL (ref 74–97)
MICROALBUMIN UR-MCNC: 2.28 MG/DL (ref 0–3)
MICROALBUMIN/CREAT UR-RTO: 9 MG/G (ref 0–30)
MONOCYTES # BLD: 0.5 K/UL (ref 0.05–1.2)
MONOCYTES NFR BLD: 6 % (ref 3–10)
NEUTS SEG # BLD: 4.7 K/UL (ref 1.8–8)
NEUTS SEG NFR BLD: 55 % (ref 40–73)
PLATELET # BLD AUTO: 418 K/UL (ref 135–420)
PMV BLD AUTO: 9.1 FL (ref 9.2–11.8)
RBC # BLD AUTO: 3.73 M/UL (ref 4.2–5.3)
TIBC SERPL-MCNC: 490 UG/DL (ref 250–450)
WBC # BLD AUTO: 8.5 K/UL (ref 4.6–13.2)

## 2021-03-29 PROCEDURE — 36415 COLL VENOUS BLD VENIPUNCTURE: CPT | Performed by: FAMILY MEDICINE

## 2021-03-29 PROCEDURE — 36415 COLL VENOUS BLD VENIPUNCTURE: CPT

## 2021-03-29 PROCEDURE — 83036 HEMOGLOBIN GLYCOSYLATED A1C: CPT

## 2021-03-29 PROCEDURE — 83540 ASSAY OF IRON: CPT

## 2021-03-29 PROCEDURE — 85025 COMPLETE CBC W/AUTO DIFF WBC: CPT

## 2021-03-29 PROCEDURE — 82043 UR ALBUMIN QUANTITATIVE: CPT

## 2021-03-29 PROCEDURE — 82728 ASSAY OF FERRITIN: CPT

## 2021-03-29 NOTE — PROGRESS NOTES
Patient here for NV blood pressure check and lab draw. Name and  verified blood pressure checked manually left arm. Venipuncture performed on patients right arm was successful patient tolerated well.

## 2021-04-09 DIAGNOSIS — I10 ESSENTIAL HYPERTENSION: ICD-10-CM

## 2021-04-09 RX ORDER — LOSARTAN POTASSIUM 50 MG/1
TABLET ORAL
Qty: 60 TAB | Refills: 0 | Status: SHIPPED | OUTPATIENT
Start: 2021-04-09 | End: 2021-04-13 | Stop reason: SDUPTHER

## 2021-04-13 ENCOUNTER — VIRTUAL VISIT (OUTPATIENT)
Dept: FAMILY MEDICINE CLINIC | Age: 66
End: 2021-04-13
Payer: MEDICARE

## 2021-04-13 ENCOUNTER — TELEPHONE (OUTPATIENT)
Dept: FAMILY MEDICINE CLINIC | Age: 66
End: 2021-04-13

## 2021-04-13 DIAGNOSIS — I10 ESSENTIAL HYPERTENSION: Primary | ICD-10-CM

## 2021-04-13 DIAGNOSIS — E11.9 TYPE 2 DIABETES MELLITUS WITHOUT COMPLICATION, WITHOUT LONG-TERM CURRENT USE OF INSULIN (HCC): ICD-10-CM

## 2021-04-13 DIAGNOSIS — D50.9 IRON DEFICIENCY ANEMIA, UNSPECIFIED IRON DEFICIENCY ANEMIA TYPE: ICD-10-CM

## 2021-04-13 PROCEDURE — 99442 PR PHYS/QHP TELEPHONE EVALUATION 11-20 MIN: CPT | Performed by: FAMILY MEDICINE

## 2021-04-13 RX ORDER — LOSARTAN POTASSIUM 50 MG/1
TABLET ORAL
Qty: 90 TAB | Refills: 4 | Status: SHIPPED | OUTPATIENT
Start: 2021-04-13 | End: 2022-04-12 | Stop reason: SDUPTHER

## 2021-04-13 RX ORDER — METFORMIN HYDROCHLORIDE 500 MG/1
500 TABLET, EXTENDED RELEASE ORAL
Qty: 90 TAB | Refills: 2 | Status: SHIPPED | OUTPATIENT
Start: 2021-04-13 | End: 2021-09-20 | Stop reason: SDUPTHER

## 2021-04-13 RX ORDER — FERROUS SULFATE 325(65) MG
325 TABLET, DELAYED RELEASE (ENTERIC COATED) ORAL
Qty: 270 TAB | Refills: 1 | Status: SHIPPED | OUTPATIENT
Start: 2021-04-13 | End: 2021-12-30

## 2021-04-13 NOTE — ASSESSMENT & PLAN NOTE
New. Uncontrolled. Presumably blood loss. normal CBC 2019. Mild anemia 5/2020. Iron studies 3/2021. [place]/EGD 2017, EGD 2019, 3/2021. Replacing iron. I will forward labs/notes to Dr. Georgia Jacobson to consider whether there is need for further diagnostic evaluation. Check iron stores 3 months.

## 2021-04-13 NOTE — PROGRESS NOTES
Patient being seen today for follow up she says she has been having issues with her feet swelling no other concerns. 1. Have you been to the ER, urgent care clinic since your last visit? Hospitalized since your last visit? No    2. Have you seen or consulted any other health care providers outside of the [address] [place] [address] since your last visit? Include any pap smears or colon screening. No    Medication reconciliation has been completed with patient. Care team discussed/updated as well as pharmacy.     Health Maintenance Due   Topic Date Due    Eye Exam Retinal or Dilated  Never done    COVID-19 Vaccine (1) Never done    DTaP/Tdap/Td series (1 - Tdap) Never done    Shingrix Vaccine Age 50> (1 of 2) Never done    Bone Densitometry (Dexa) Screening  Never done

## 2021-04-13 NOTE — ASSESSMENT & PLAN NOTE
[address]. Would prefer to see <7. Initiating metformin. Referring for DM education. Follow up 6 months.  Labs prior

## 2021-04-13 NOTE — TELEPHONE ENCOUNTER
Patient not responding to text sent for VV, called her x 2 no answer unable to leave message mailbox is full.

## 2021-04-13 NOTE — PROGRESS NOTES
Iveth Wynn is a 78 y.o. female, evaluated via audio-only technology on 4/13/2021 for   Chief Complaint   Patient presents with    Hypertension        Assessment & Plan:   1. Essential hypertension  Assessment & Plan:  Well controlled, continue present management   Orders:  -     losartan (COZAAR) 50 mg tablet; TAKE ONE TABLET BY MOUTH DAILY, Normal, Disp-90 Tab, R-4  2. Iron deficiency anemia, unspecified iron deficiency anemia type  Assessment & Plan:  New. Uncontrolled. Presumably blood loss. normal CBC 2019. Mild anemia 5/2020. Iron studies 3/2021. [place]/EGD 2017, EGD 2019, 3/2021. Replacing iron. I will forward labs/notes to Dr. Turner Gray to consider whether there is need for further diagnostic evaluation. Check iron stores 3 months. Orders:  -     ferrous sulfate (IRON) 325 mg (65 mg iron) EC tablet; Take 1 Tab by mouth three (3) times daily (with meals). Start with once daily and increase as tolerated, Normal, Disp-270 Tab, R-1  -     CBC WITH AUTOMATED DIFF; Future  -     FERRITIN; Future  -     IRON PROFILE; Future  3. Type 2 diabetes mellitus without complication, without long-term current use of insulin ([facility] [address]  Assessment & Plan:  New. Fair control. Would prefer to see <7. Initiating metformin. Referring for DM education. Follow up 6 months. Labs prior  Orders:  -     metFORMIN ER (GLUCOPHAGE XR) 500 mg tablet; Take 1 Tab by mouth daily (with dinner). , Normal, Disp-90 Tab, R-2  -     REFERRAL TO DIABETIC EDUCATION      Follow-up and Dispositions    · Return for evaluation of feet swelling , IN OFFICE; 3 months for iron deficiency follow up, labs prior. 712  Subjective:     complains of bilateral pedal edema.  otherwise well     Recent reassuring EGD with Dr. Turner Gray  No gross blood losses    BP Readings from Last 3 Encounters:   03/29/21 118/68   03/05/21 (!) 142/91   02/22/21 (!) 143/86       Results for orders placed or performed during the hospital encounter of 03/29/21   HEMOGLOBIN A1C WITH EAG   Result Value Ref Range    Hemoglobin A1c 7.3 (H) 4.2 - 5.6 %    Est. average glucose 163 mg/dL   CBC WITH AUTOMATED DIFF   Result Value Ref Range    WBC 8.5 4.6 - 13.2 K/uL    RBC 3.73 (L) 4.20 - 5.30 M/uL    HGB 9.1 (L) 12.0 - 16.0 g/dL    HCT 30.0 (L) 35.0 - 45.0 %    MCV 80.4 74.0 - 97.0 FL    MCH 24.4 24.0 - 34.0 PG    MCHC 30.3 (L) 31.0 - 37.0 g/dL    RDW 16.7 (H) 11.6 - 14.5 %    PLATELET 045 179 - 910 K/uL    MPV 9.1 (L) 9.2 - 11.8 FL    NEUTROPHILS 55 40 - 73 %    LYMPHOCYTES 38 21 - 52 %    MONOCYTES 6 3 - 10 %    EOSINOPHILS 1 0 - 5 %    BASOPHILS 0 0 - 2 %    ABS. NEUTROPHILS 4.7 1.8 - 8.0 K/UL    ABS. LYMPHOCYTES 3.3 0.9 - 3.6 K/UL    ABS. MONOCYTES 0.5 0.05 - 1.2 K/UL    ABS. EOSINOPHILS 0.1 0.0 - 0.4 K/UL    ABS.  BASOPHILS 0.0 0.0 - 0.1 K/UL    DF AUTOMATED     FERRITIN   Result Value Ref Range    Ferritin 13 8 - 388 NG/ML   MICROALBUMIN, UR, RAND W/ MICROALB/CREAT RATIO   Result Value Ref Range    Microalbumin,urine random 2.28 0 - 3.0 MG/DL    Creatinine, urine 251.00 (H) 30 - 125 mg/dL    Microalbumin/Creat ratio (mg/g creat) 9 0 - 30 mg/g   IRON PROFILE   Result Value Ref Range    Iron 24 (L) 50 - 175 ug/dL    TIBC 490 (H) 250 - 450 ug/dL    Iron % saturation 5 (L) 20 - 50 %      Lab Results   Component Value Date/Time    Cholesterol, total 157 12/28/2020 01:29 PM    HDL Cholesterol 59 12/28/2020 01:29 PM    LDL, calculated 78 12/28/2020 01:29 PM    VLDL, calculated 20 12/28/2020 01:29 PM    Triglyceride 100 12/28/2020 01:29 PM    CHOL/HDL Ratio 2.7 12/28/2020 01:29 PM       Lab Results   Component Value Date/Time    Sodium 140 12/28/2020 01:29 PM    Potassium 4.0 12/28/2020 01:29 PM    Chloride 106 12/28/2020 01:29 PM    CO2 28 12/28/2020 01:29 PM    Anion gap 6 12/28/2020 01:29 PM    Glucose 130 (H) 12/28/2020 01:29 PM    BUN 9 12/28/2020 01:29 PM    Creatinine 0.99 12/28/2020 01:29 PM    BUN/Creatinine ratio 9 (L) 12/28/2020 01:29 PM    GFR est AA >60 12/28/2020 01:29 PM    GFR est non-AA 56 (L) 12/28/2020 01:29 PM    Calcium 9.2 12/28/2020 01:29 PM    Bilirubin, total 0.2 12/28/2020 01:29 PM    Alk. phosphatase 151 (H) 12/28/2020 01:29 PM    Protein, total 8.2 12/28/2020 01:29 PM    Albumin 3.8 12/28/2020 01:29 PM    Globulin 4.4 (H) 12/28/2020 01:29 PM    A-G Ratio 0.9 12/28/2020 01:29 PM    ALT (SGPT) 35 12/28/2020 01:29 PM    AST (SGOT) 40 (H) 12/28/2020 01:29 PM       Prior to Admission medications    Medication Sig Start Date End Date Taking? Authorizing Provider   losartan (COZAAR) 50 mg tablet TAKE ONE TABLET BY MOUTH DAILY 4/9/21  Yes Carlos Ortez MD   ketoconazole (NIZORAL) 2 % topical cream APPLY TO AFFECTED AREA(S) TWO TIMES A DAY 3/23/21  Yes Carlos Ortez MD   carbamide peroxide (DEBROX) 6.5 % otic solution Administer 5 Drops in right ear two (2) times a day. 7/14/20  Yes Carlos Ortez MD   atorvastatin (LIPITOR) 40 mg tablet TAKE ONE TABLET BY MOUTH ONE TIME DAILY 7/14/20  Yes Carlos Ortez MD   fluticasone propionate (Flonase) 50 mcg/actuation nasal spray 2 Sprays by Both Nostrils route as needed for Rhinitis. 7/14/20  Yes Carlos Ortez MD   valACYclovir (VALTREX) 500 mg tablet Take 1 Tab by mouth daily. 7/14/20  Yes Carlos Ortez MD   promethazine (PHENERGAN) 12.5 mg tablet Take 12.5 mg by mouth. 11/2/19  Yes Provider, Historical   dicyclomine (BENTYL) 20 mg tablet Take 20 mg by mouth four (4) times daily as needed for Abdominal Cramps. Yes Provider, Historical   metoclopramide HCl (REGLAN) 5 mg tablet Take 5 mg by mouth Before breakfast, lunch, and dinner. Yes Provider, Historical   QUEtiapine (SEROQUEL) 200 mg tablet Take 1 Tab by mouth daily. 10/18/19  Yes Carlos Ortez MD   cholecalciferol, vitamin D3, 2,000 unit tab Take  by mouth. Yes Provider, Historical   cloNIDine HCL (CATAPRES) 0.3 mg tablet Take 0.3 mg by mouth nightly. 9/16/19  Yes Provider, Historical   polyethylene glycol (MIRALAX) 17 gram packet Take 1 Packet by mouth daily. 5/6/19  Yes Evin Johnson MD   oxyCODONE IR (ROXICODONE) 10 mg tab immediate release tablet 10 mg every eight (8) hours as needed. 2/12/19  Yes Provider, Historical   DEXILANT 60 mg CpDB capsule (delayed release) Take 120 mg by mouth daily. 2/3/19  Yes Provider, Historical   morphine CR (MS CONTIN) 60 mg CR tablet Take 30 mg by mouth every twelve (12) hours. Yes Provider, Historical   diclofenac (VOLTAREN) 1 % gel Apply  to affected area four (4) times daily. 12/27/18  Yes James Alarcon,    cyclobenzaprine (FLEXERIL) 10 mg tablet TAKE 1 TABLET BY MOUTH THREE TIMES DAILY AS NEEDED FOR MUSCLE SPASMS FOR UP TO 30 DAYS. 10/1/18  Yes BETO Lafleur   naloxone [facility]) 4 mg/actuation nasal spray 1 [place] by IntraNASal route once as needed. Use 1 spray intranasally into 1 nostril. Use a new Narcan nasal spray for subsequent doses and administer into alternating nostrils. May repeat every 2 to 3 minutes as needed. Other, MD Romel Michaels, who was evaluated through a patient-initiated, synchronous (real-time) audio only encounter, and/or her healthcare decision maker, is aware that it is a billable service, with coverage as determined by her insurance carrier. She provided verbal consent to proceed. She has not had a related appointment within my department in the past 7 days or scheduled within the next 24 hours.       Total Time: minutes: 11-20 minutes      Lise Hodgkin, MD

## 2021-04-13 NOTE — Clinical Note
JUANITA Arthur, new iron deficiency anemia. I know I've seen her recent EGD report. It's not yet been scanned into the system, but it was my impression that it was reassuring and would not explain blood loss. [place] was 2017. I hope you're well.  Pranav Michael

## 2021-04-13 NOTE — TELEPHONE ENCOUNTER
Called patient to get her ready for her 4 PM VV with Dr. Margo Carter no answer unable to leave message mailbox is full.

## 2021-04-13 NOTE — Clinical Note
Schedule eval of feet swelling , IN OFFICE; 3 mo for iron deficiency, labs prior; 6 mo diabetes, labs prior

## 2021-04-26 ENCOUNTER — TRANSCRIBE ORDER (OUTPATIENT)
Dept: SCHEDULING | Age: 66
End: 2021-04-26

## 2021-04-26 DIAGNOSIS — H92.01 OTALGIA, RIGHT EAR: Primary | ICD-10-CM

## 2021-05-08 ENCOUNTER — HOSPITAL ENCOUNTER (OUTPATIENT)
Dept: CT IMAGING | Age: 66
Discharge: HOME | End: 2021-05-08
Attending: PHYSICIAN ASSISTANT
Payer: MEDICARE

## 2021-05-08 DIAGNOSIS — H92.01 OTALGIA, RIGHT EAR: ICD-10-CM

## 2021-05-08 LAB — CREAT UR-MCNC: 0.8 MG/DL (ref 0.6–1.3)

## 2021-05-08 PROCEDURE — 74011000636 HC RX REV CODE- 636

## 2021-05-08 PROCEDURE — 70491 CT SOFT TISSUE NECK W/DYE: CPT

## 2021-05-08 PROCEDURE — 82565 ASSAY OF CREATININE: CPT

## 2021-05-08 RX ADMIN — IOPAMIDOL 80 ML: 612 INJECTION, SOLUTION INTRAVENOUS at 13:41

## 2021-05-13 ENCOUNTER — TELEPHONE (OUTPATIENT)
Dept: FAMILY MEDICINE CLINIC | Age: 66
End: 2021-05-13

## 2021-05-13 ENCOUNTER — TRANSCRIBE ORDER (OUTPATIENT)
Dept: SCHEDULING | Age: 66
End: 2021-05-13

## 2021-05-13 DIAGNOSIS — E11.9 TYPE 2 DIABETES MELLITUS WITHOUT COMPLICATION, WITHOUT LONG-TERM CURRENT USE OF INSULIN (HCC): Primary | ICD-10-CM

## 2021-05-13 DIAGNOSIS — E04.1 NONTOXIC SINGLE THYROID NODULE: Primary | ICD-10-CM

## 2021-05-13 NOTE — TELEPHONE ENCOUNTER
Patient is requesting (New) referral to the following office:    Speciality: Endo                                                 Specialist Name: Dr. Nicole Goldman  Office Location:   Phone (if available):   Fax (if available):   Diagnosis:   Date of appointment (if scheduled): not scheduled      She has never seen Dr. Nicole Goldman or and endocrinologist. She states she is asking this on her own because she feels she needs a monitor to check her sugar.

## 2021-05-14 NOTE — TELEPHONE ENCOUNTER
Called patients daughter's phone was able to speak with patient she has been scheduled for a VV with Dr. Jodie Vides on 5/21 to discuss her referral request she is asking in the meantime can she have an order sent in for meter told I will have to find out what meter her insurance covers and will send this to Dr. Jodie Vides.

## 2021-05-15 ENCOUNTER — HOSPITAL ENCOUNTER (EMERGENCY)
Age: 66
Discharge: HOME | End: 2021-05-15
Attending: EMERGENCY MEDICINE
Payer: MEDICARE

## 2021-05-15 VITALS
BODY MASS INDEX: 34.6 KG/M2 | SYSTOLIC BLOOD PRESSURE: 124 MMHG | RESPIRATION RATE: 18 BRPM | HEART RATE: 101 BPM | HEIGHT: 62 IN | OXYGEN SATURATION: 100 % | TEMPERATURE: 99 F | WEIGHT: 188 LBS | DIASTOLIC BLOOD PRESSURE: 80 MMHG

## 2021-05-15 DIAGNOSIS — J02.9 SORE THROAT: ICD-10-CM

## 2021-05-15 DIAGNOSIS — H92.01 RIGHT EAR PAIN: Primary | ICD-10-CM

## 2021-05-15 PROCEDURE — 99285 EMERGENCY DEPT VISIT HI MDM: CPT

## 2021-05-15 PROCEDURE — 74011250637 HC RX REV CODE- 250/637: Performed by: PHYSICIAN ASSISTANT

## 2021-05-15 PROCEDURE — 74011000250 HC RX REV CODE- 250: Performed by: PHYSICIAN ASSISTANT

## 2021-05-15 RX ORDER — LIDOCAINE HYDROCHLORIDE 20 MG/ML
15 SOLUTION OROPHARYNGEAL
Status: COMPLETED | OUTPATIENT
Start: 2021-05-15 | End: 2021-05-15

## 2021-05-15 RX ORDER — LIDOCAINE HYDROCHLORIDE 20 MG/ML
15 SOLUTION OROPHARYNGEAL AS NEEDED
Qty: 1 BOTTLE | Refills: 0 | Status: SHIPPED | OUTPATIENT
Start: 2021-05-15 | End: 2021-08-18

## 2021-05-15 RX ORDER — OXYCODONE AND ACETAMINOPHEN 5; 325 MG/1; MG/1
1 TABLET ORAL
Status: COMPLETED | OUTPATIENT
Start: 2021-05-15 | End: 2021-05-15

## 2021-05-15 RX ADMIN — OXYCODONE HYDROCHLORIDE AND ACETAMINOPHEN 1 TABLET: 5; 325 TABLET ORAL at 18:58

## 2021-05-15 RX ADMIN — LIDOCAINE HYDROCHLORIDE 15 ML: 20 SOLUTION ORAL; TOPICAL at 18:58

## 2021-05-15 NOTE — ED PROVIDER NOTES
EMERGENCY DEPARTMENT HISTORY AND PHYSICAL EXAM    Date: 5/15/2021  Patient Name: Claire Meza    History of Presenting Illness     Chief Complaint   Patient presents with    Ear Pain    Sore Throat    Nausea         History Provided By: patient     Chief Complaint: ear pain and sore throat   Duration: several  weeks  Timing:acute  Location:  R ear and R side of the throat   Quality: sharp pain   Severity: moderate  Modifying Factors: taking home pain meds with little relief in sx   Associated Symptoms: ear pain, sore throat       Additional History (Context): Claire Meza is a 78 y.o. female with PMH htn, chronic pain in pain management, depression, GERD, and bipolar disorder who presents with c/o continued R ear and R sided throat pain x several weeks. Pt is currently seeing her PCP and ENT for this issue. She had a CT soft tissue neck done 1 week ago which only showed a cystic structure in the thyroid gland. Pt states she takes oxy and morphine at home for her chronic pain. This has minimally alleviated her sx. She has an US later this week with ENT follow-up afterwards. No other complaints reported at this time. PCP: Stephanie Millard MD    Current Facility-Administered Medications   Medication Dose Route Frequency Provider Last Rate Last Admin    oxyCODONE-acetaminophen (PERCOCET) 5-325 mg per tablet 1 Tab  1 Tab Oral NOW Cee Arriaza PA-C        lidocaine (XYLOCAINE) 2 % viscous solution 15 mL  15 mL Mouth/Throat NOW Cee Arriaza PA-C         Current Outpatient Medications   Medication Sig Dispense Refill    lidocaine (Lidocaine Viscous) 2 % solution Take 15 mL by mouth as needed for Pain. 1 Bottle 0    ferrous sulfate (IRON) 325 mg (65 mg iron) EC tablet Take 1 Tab by mouth three (3) times daily (with meals). Start with once daily and increase as tolerated 270 Tab 1    metFORMIN ER (GLUCOPHAGE XR) 500 mg tablet Take 1 Tab by mouth daily (with dinner).  90 Tab 2    losartan (COZAAR) 50 mg tablet TAKE ONE TABLET BY MOUTH DAILY 90 Tab 4    ketoconazole (NIZORAL) 2 % topical cream APPLY TO AFFECTED AREA(S) TWO TIMES A DAY 15 g 2    atorvastatin (LIPITOR) 40 mg tablet TAKE ONE TABLET BY MOUTH ONE TIME DAILY 90 Tab 4    fluticasone propionate (Flonase) 50 mcg/actuation nasal spray 2 Sprays by Both Nostrils route as needed for Rhinitis. 3 Bottle 4    valACYclovir (VALTREX) 500 mg tablet Take 1 Tab by mouth daily. 90 Tab 4    promethazine (PHENERGAN) 12.5 mg tablet Take 12.5 mg by mouth.  dicyclomine (BENTYL) 20 mg tablet Take 20 mg by mouth four (4) times daily as needed for Abdominal Cramps.  metoclopramide HCl (REGLAN) 5 mg tablet Take 5 mg by mouth Before breakfast, lunch, and dinner.  QUEtiapine (SEROQUEL) 200 mg tablet Take 1 Tab by mouth daily. 30 Tab 0    cholecalciferol, vitamin D3, 2,000 unit tab Take  by mouth.  cloNIDine HCL (CATAPRES) 0.3 mg tablet Take 0.3 mg by mouth nightly.  polyethylene glycol (MIRALAX) 17 gram packet Take 1 Packet by mouth daily. 30 Each 6    oxyCODONE IR (ROXICODONE) 10 mg tab immediate release tablet 10 mg every eight (8) hours as needed.  DEXILANT 60 mg CpDB capsule (delayed release) Take 120 mg by mouth daily.  morphine CR (MS CONTIN) 60 mg CR tablet Take 30 mg by mouth every twelve (12) hours.  diclofenac (VOLTAREN) 1 % gel Apply  to affected area four (4) times daily. 1 g 1    cyclobenzaprine (FLEXERIL) 10 mg tablet TAKE 1 TABLET BY MOUTH THREE TIMES DAILY AS NEEDED FOR MUSCLE SPASMS FOR UP TO 30 DAYS. 90 Tab 2    naloxone (NARCAN) 4 mg/actuation nasal spray 1 [place] by IntraNASal route once as needed. Use 1 spray intranasally into 1 nostril. Use a new Narcan nasal spray for subsequent doses and administer into alternating nostrils. May repeat every 2 to 3 minutes as needed.          Past History     Past Medical History:  Past Medical History:   Diagnosis Date    Annular tear of lumbar disc 11/10/2014  Benign tumor of throat     UNAWARE    Bipolar disorder (HCC)     Bone spur     right ankle    Cervicalgia 11/10/2014    Chronic pain     back    Degeneration of lumbar or lumbosacral intervertebral disc 11/10/2014    Degenerative disc disease     Depression     Displacement of lumbar intervertebral disc without myelopathy 11/10/2014    Diverticular disease     Elevated white blood cell count     Fatty liver     Fibromyalgia     GERD (gastroesophageal reflux disease)     Hepatic cyst     Hepatic steatosis     Herpes labialis     Hypertension     Hypertension     IBS (irritable bowel syndrome)     Insomnia     Lung nodule     Nausea & vomiting     Pain in joint, multiple sites 11/10/2014    Postlaminectomy syndrome, cervical region 11/10/2014    Sinus infection 10/5/15    Ulcer     Urinary incontinence     Urinary retention        Past Surgical History:  Past Surgical History:   Procedure Laterality Date    COLONOSCOPY N/A 2017    COLONOSCOPY performed by Pat Nicholas MD at [facility]    HX CATARACT REMOVAL      HX CERVICAL FUSION      HX HYSTERECTOMY      HX ORTHOPAEDIC      ganglion cyst removed, right hand    HX OTHER SURGICAL      cyst left thigh removed    HX TONSIL AND ADENOIDECTOMY      HX TUBAL LIGATION      HX WRIST FRACTURE TX      PA LAP,CHOLECYSTECTOMY N/A 2017    Dr. Socrates Ayon       Family History:  Family History   Problem Relation Age of Onset    Hypertension Other     Heart Attack Other     Heart Disease Other     Stroke Other     Headache Other     Seizures Other     Arthritis Father     Migraines Granddaughter     Migraines Daughter     Breast Cancer Daughter        Social History:  Social History     Tobacco Use    Smoking status: Former Smoker     Packs/day: 0.50     Years: 3.00     Pack years: 1.50     Types: Cigarettes     Quit date: 3/6/1993     Years since quittin.2    Smokeless tobacco: Never Used   Substance Use Topics  Alcohol use: No    Drug use: No     Comment: Last smoked in 1993-cocaine       Allergies: Allergies   Allergen Reactions    Aspirin Other (comments)     Stomach bleeding    Ativan [Lorazepam] Shortness of Breath    Bactrim [Sulfamethoprim] Rash    Keflex [Cephalexin] Rash    Macrobid [Nitrofurantoin Monohyd/M-Cryst] Nausea and Vomiting    Nsaids (Non-Steroidal Anti-Inflammatory Drug) Nausea and Vomiting    Opana [Oxymorphone] Other (comments)     Insomnia, weight loss, nightmares    Pcn [Penicillins] Hives         Review of Systems   Review of Systems   Constitutional: Negative. Negative for chills and fever. HENT: Positive for ear pain and sore throat. Negative for congestion and rhinorrhea. Eyes: Negative. Negative for pain and redness. Respiratory: Negative. Negative for cough, shortness of breath, wheezing and stridor. Cardiovascular: Negative. Negative for chest pain and leg swelling. Gastrointestinal: Negative. Negative for abdominal pain, constipation, diarrhea, nausea and vomiting. Genitourinary: Negative. Negative for dysuria and frequency. Musculoskeletal: Negative. Negative for back pain and neck pain. Skin: Negative. Negative for rash and wound. Neurological: Negative. Negative for dizziness, seizures, syncope and headaches. All other systems reviewed and are negative. All Other Systems Negative  Physical Exam     Vitals:    05/15/21 1728 05/15/21 1735 05/15/21 1830   BP: 124/74  118/78   Pulse: (!) 101     Resp: 18     Temp: 99.3 °F (37.4 °C)     SpO2: 97% 100% 100%   Weight: 85.3 kg (188 lb)     Height: 5' 2\" (1.575 m)       Physical Exam  Vitals signs and nursing note reviewed. Constitutional:       General: She is not in acute distress. Appearance: She is well-developed. She is not diaphoretic. HENT:      Head: Normocephalic and atraumatic. Right Ear: Tympanic membrane, ear canal and external ear normal. There is no impacted cerumen. Nose: Nose normal. No congestion or rhinorrhea. Mouth/Throat:      Mouth: Mucous membranes are moist.      Pharynx: No oropharyngeal exudate or posterior oropharyngeal erythema. Eyes:      General: No scleral icterus. Right eye: No discharge. Left eye: No discharge. Conjunctiva/sclera: Conjunctivae normal.   Neck:      Musculoskeletal: Normal range of motion and neck supple. No neck rigidity or muscular tenderness. Cardiovascular:      Rate and Rhythm: Normal rate and regular rhythm. Heart sounds: Normal heart sounds. No murmur. No friction rub. No gallop. Pulmonary:      Effort: Pulmonary effort is normal. No respiratory distress. Breath sounds: Normal breath sounds. No stridor. No wheezing, rhonchi or rales. Musculoskeletal: Normal range of motion. Lymphadenopathy:      Cervical: No cervical adenopathy. Skin:     General: Skin is warm and dry. Findings: No erythema or rash. Neurological:      Mental Status: She is alert and oriented to person, place, and time. Coordination: Coordination normal.      Comments: Gait is steady and patient exhibits no evidence of ataxia. Patient is able to ambulate without difficulty. No focal neurological deficit noted. No facial droop, slurred speech, or evidence of altered mentation noted on exam.     Psychiatric:         Behavior: Behavior normal.         Thought Content: Thought content normal.                Diagnostic Study Results     Labs -   No results found for this or any previous visit (from the past 12 hour(s)). Radiologic Studies -   No orders to display     CT Results  (Last 48 hours)    None        CXR Results  (Last 48 hours)    None            Medical Decision Making   I am the first provider for this patient. I reviewed the vital signs, available nursing notes, past medical history, past surgical history, family history and social history.     Vital Signs-Reviewed the patient's vital signs. Records Reviewed: Cee Arriaza PA-C     Procedures:  Procedures    Provider Notes (Medical Decision Making): Impression:  Sore throat, ear pain    Will treat pain with single dose of pain medication in the ED and viscous lidocaine at home. ENT follow-up recommended. Pt agrees. Cee Arriaza PA-C     MED RECONCILIATION:  Current Facility-Administered Medications   Medication Dose Route Frequency    oxyCODONE-acetaminophen (PERCOCET) 5-325 mg per tablet 1 Tab  1 Tab Oral NOW    lidocaine (XYLOCAINE) 2 % viscous solution 15 mL  15 mL Mouth/Throat NOW     Current Outpatient Medications   Medication Sig    lidocaine (Lidocaine Viscous) 2 % solution Take 15 mL by mouth as needed for Pain.  ferrous sulfate (IRON) 325 mg (65 mg iron) EC tablet Take 1 Tab by mouth three (3) times daily (with meals). Start with once daily and increase as tolerated    metFORMIN ER (GLUCOPHAGE XR) 500 mg tablet Take 1 Tab by mouth daily (with dinner).  losartan (COZAAR) 50 mg tablet TAKE ONE TABLET BY MOUTH DAILY    ketoconazole (NIZORAL) 2 % topical cream APPLY TO AFFECTED AREA(S) TWO TIMES A DAY    atorvastatin (LIPITOR) 40 mg tablet TAKE ONE TABLET BY MOUTH ONE TIME DAILY    fluticasone propionate (Flonase) 50 mcg/actuation nasal spray 2 Sprays by Both Nostrils route as needed for Rhinitis.  valACYclovir (VALTREX) 500 mg tablet Take 1 Tab by mouth daily.  promethazine (PHENERGAN) 12.5 mg tablet Take 12.5 mg by mouth.  dicyclomine (BENTYL) 20 mg tablet Take 20 mg by mouth four (4) times daily as needed for Abdominal Cramps.  metoclopramide HCl (REGLAN) 5 mg tablet Take 5 mg by mouth Before breakfast, lunch, and dinner.  QUEtiapine (SEROQUEL) 200 mg tablet Take 1 Tab by mouth daily.  cholecalciferol, vitamin D3, 2,000 unit tab Take  by mouth.  cloNIDine HCL (CATAPRES) 0.3 mg tablet Take 0.3 mg by mouth nightly.  polyethylene glycol (MIRALAX) 17 gram packet Take 1 Packet by mouth daily.  oxyCODONE IR (ROXICODONE) 10 mg tab immediate release tablet 10 mg every eight (8) hours as needed.  DEXILANT 60 mg CpDB capsule (delayed release) Take 120 mg by mouth daily.  morphine CR (MS CONTIN) 60 mg CR tablet Take 30 mg by mouth every twelve (12) hours.  diclofenac (VOLTAREN) 1 % gel Apply  to affected area four (4) times daily.  cyclobenzaprine (FLEXERIL) 10 mg tablet TAKE 1 TABLET BY MOUTH THREE TIMES DAILY AS NEEDED FOR MUSCLE SPASMS FOR UP TO 30 DAYS.  naloxone (NARCAN) 4 mg/actuation nasal spray 1 [place] by IntraNASal route once as needed. Use 1 spray intranasally into 1 nostril. Use a new Narcan nasal spray for subsequent doses and administer into alternating nostrils. May repeat every 2 to 3 minutes as needed. Disposition:  D/c    DISCHARGE NOTE:   Patient is stable for discharge at this time. I have discussed all the findings from today's work up with the patient, including lab results and imaging. I have answered all questions. Rx for viscous lidocaine given. Rest and close follow-up with the ENT doctor recommended this week. Return to the ED immediately for any new or worsening symptoms. Cee Ramirez PA-C     Follow-up Information     Follow up With Specialties Details Why Contact Info    Dawson Logan MD Otolaryngology, Surgery In 1 week  [facility]  [address] [address] [address] [phone] [address] [facility] EMERGENCY DEPT Emergency Medicine  As needed, If symptoms worsen [address] [facility]  [phone]          Current Discharge Medication List      START taking these medications    Details   lidocaine (Lidocaine Viscous) 2 % solution Take 15 mL by mouth as needed for Pain. Qty: 1 Bottle, Refills: 0  Start date: 5/15/2021               Diagnosis     Clinical Impression:   1. Right ear pain    2.  Sore throat

## 2021-05-15 NOTE — ED TRIAGE NOTES
Patient states being evaluated at [facility] and advised to report to ER for \"IV\" related to elevated heart rate in 120's. Patient states that she has anxiety and is currently experiencing pain to ear and throat. She states ear and throat pain has been present x 3 months. She is currently under care of Dr. Madeline Cole ENT. She states that she is having US of neck and ear on Friday.

## 2021-05-15 NOTE — DISCHARGE INSTRUCTIONS
REPUCOM Activation    Thank you for requesting access to REPUCOM. Please follow the instructions below to securely access and download your online medical record. REPUCOM allows you to send messages to your doctor, view your test results, renew your prescriptions, schedule appointments, and more. How Do I Sign Up? In your internet browser, go to [email or web address]  Click on the First Time User? Click Here link in the Sign In box. You will be redirect to the New Member Sign Up page. Enter your REPUCOM Access Code exactly as it appears below. You will not need to use this code after youve completed the sign-up process. If you do not sign up before the expiration date, you must request a new code. REPUCOM Access Code: [unfilled] (This is the date your REPUCOM access code will )    Enter the last four digits of your Social Security Number (xxxx) and Date of Birth (mm/dd/yyyy) as indicated and click Submit. You will be taken to the next sign-up page. Create a REPUCOM ID. This will be your REPUCOM login ID and cannot be changed, so think of one that is secure and easy to remember. Create a REPUCOM password. You can change your password at any time. Enter your Password Reset Question and Answer. This can be used at a later time if you forget your password. Enter your e-mail address. You will receive e-mail notification when new information is available in [address] Click Sign Up. You can now view and download portions of your medical record. Click the [place] link to download a portable copy of your medical information. Additional Information    If you have questions, please visit the Frequently Asked Questions section of the REPUCOM website at [email or web address] Remember, REPUCOM is NOT to be used for urgent needs. For medical emergencies, dial 911.

## 2021-05-17 ENCOUNTER — PATIENT OUTREACH (OUTPATIENT)
Dept: CASE MANAGEMENT | Age: 66
End: 2021-05-17

## 2021-05-17 NOTE — PROGRESS NOTES
Complex Case Management      Date/Time:  5/17/2021 11:13 AM     Attempted to reach Blank Single Select Template: patient by telephone. Left HIPPA compliant message requesting a return call. Will attempt to reach patient at a later time.

## 2021-05-18 ENCOUNTER — PATIENT OUTREACH (OUTPATIENT)
Dept: CASE MANAGEMENT | Age: 66
End: 2021-05-18

## 2021-05-18 NOTE — PROGRESS NOTES
Complex Case Management      Date/Time:  5/18/2021 11:31 AM     Attempted to reach patient by telephone. Woman answered phone and informed ACM that this is wrong number.   ACM will make no further attempts to contact patient at this time

## 2021-05-19 RX ORDER — BLOOD-GLUCOSE METER
EACH MISCELLANEOUS
Qty: 1 EACH | Refills: 0 | Status: SHIPPED | OUTPATIENT
Start: 2021-05-19

## 2021-05-19 RX ORDER — BLOOD SUGAR DIAGNOSTIC
STRIP MISCELLANEOUS
Qty: 100 STRIP | Refills: 4 | Status: SHIPPED | OUTPATIENT
Start: 2021-05-19

## 2021-05-19 NOTE — TELEPHONE ENCOUNTER
Patient requesting meter to be sent in, order has been pended please add directions for use and sign if appropriate.

## 2021-05-19 NOTE — TELEPHONE ENCOUNTER
Called patients daughter's phone no answer left message asking to have patient call me back here at the office, office number has been left.

## 2021-05-20 NOTE — PERIOP NOTES
PRE-SURGICAL INSTRUCTIONS        Patient's Name:  Moose Carvalho    [facility]'S Date:  5/20             Surgery and Surgery Date:  Colonoscopy 5/27              1. Do NOT eat or drink anything, including candy, gum, or ice chips after midnight on 5/27, unless you have specific instructions from your surgeon or anesthesia provider to do so. 2. Brush your teeth before coming to the hospital.  3. No smoking 24 hours prior to the day of surgery. 4. No alcohol 24 hours prior to the day of surgery. 5. No recreational drugs for one week prior to the day of surgery. 6. Leave all valuables, including money/purse, at home. 7. Remove all jewelry, nail polish, acrylic nails, and makeup (including mascara); no lotions powders, deodorant, or perfume/cologne/after shave on the skin. 8. Glasses/contact lenses and dentures may be worn to the hospital.  They will be removed prior to surgery. 9. Call your doctor if symptoms of a cold or illness develop within 24-48 hours prior to your surgery. 10.  AN ADULT MUST DRIVE YOU HOME AFTER OUTPATIENT SURGERY. 11.  If you are having an outpatient procedure, please make arrangements for a responsible adult to be with you for 24 hours after your surgery. 12.  NO VISITORS in the hospital at this time for outpatient procedures. Exceptions may be made for surgical admissions, per nursing unit guidelines      Special Instructions:      Bring list of CURRENT medications. Bring any pertinent legal medical records. Take these medications the morning of surgery with a sip of water:  Per office    Complete bowel prep per MD instructions. On the day of surgery, come in the main entrance of [facility]. Let the  at the desk know you are there for surgery. A staff member will come escort you to the surgical area on the second floor.     If you have any questions or concerns, please do not hesitate to call:     (Prior to the day of surgery) PAT department: [phone]   (Day of surgery) Pre-Op department:  [phone]    These surgical instructions were reviewed with the patient during the PAT phone call.

## 2021-05-21 ENCOUNTER — HOSPITAL ENCOUNTER (OUTPATIENT)
Dept: ULTRASOUND IMAGING | Age: 66
Discharge: HOME | End: 2021-05-21
Attending: OTOLARYNGOLOGY
Payer: MEDICARE

## 2021-05-21 ENCOUNTER — VIRTUAL VISIT (OUTPATIENT)
Dept: FAMILY MEDICINE CLINIC | Age: 66
End: 2021-05-21
Payer: MEDICARE

## 2021-05-21 ENCOUNTER — HOSPITAL ENCOUNTER (OUTPATIENT)
Dept: LAB | Age: 66
Discharge: HOME | End: 2021-05-21
Payer: MEDICARE

## 2021-05-21 DIAGNOSIS — R00.0 TACHYCARDIA: ICD-10-CM

## 2021-05-21 DIAGNOSIS — E04.1 NONTOXIC SINGLE THYROID NODULE: ICD-10-CM

## 2021-05-21 DIAGNOSIS — E11.9 TYPE 2 DIABETES MELLITUS WITHOUT COMPLICATION, WITHOUT LONG-TERM CURRENT USE OF INSULIN (HCC): Primary | ICD-10-CM

## 2021-05-21 PROCEDURE — G9717 DOC PT DX DEP/BP F/U NT REQ: HCPCS | Performed by: FAMILY MEDICINE

## 2021-05-21 PROCEDURE — G8400 PT W/DXA NO RESULTS DOC: HCPCS | Performed by: FAMILY MEDICINE

## 2021-05-21 PROCEDURE — 1101F PT FALLS ASSESS-DOCD LE1/YR: CPT | Performed by: FAMILY MEDICINE

## 2021-05-21 PROCEDURE — G8756 NO BP MEASURE DOC: HCPCS | Performed by: FAMILY MEDICINE

## 2021-05-21 PROCEDURE — 76536 US EXAM OF HEAD AND NECK: CPT

## 2021-05-21 PROCEDURE — G8427 DOCREV CUR MEDS BY ELIG CLIN: HCPCS | Performed by: FAMILY MEDICINE

## 2021-05-21 PROCEDURE — 3051F HG A1C>EQUAL 7.0%<8.0%: CPT | Performed by: FAMILY MEDICINE

## 2021-05-21 PROCEDURE — 1090F PRES/ABSN URINE INCON ASSESS: CPT | Performed by: FAMILY MEDICINE

## 2021-05-21 PROCEDURE — 2022F DILAT RTA XM EVC RTNOPTHY: CPT | Performed by: FAMILY MEDICINE

## 2021-05-21 PROCEDURE — 3017F COLORECTAL CA SCREEN DOC REV: CPT | Performed by: FAMILY MEDICINE

## 2021-05-21 PROCEDURE — G9899 SCRN MAM PERF RSLTS DOC: HCPCS | Performed by: FAMILY MEDICINE

## 2021-05-21 PROCEDURE — 99213 OFFICE O/P EST LOW 20 MIN: CPT | Performed by: FAMILY MEDICINE

## 2021-05-21 PROCEDURE — U0003 INFECTIOUS AGENT DETECTION BY NUCLEIC ACID (DNA OR RNA); SEVERE ACUTE RESPIRATORY SYNDROME CORONAVIRUS 2 (SARS-COV-2) (CORONAVIRUS DISEASE [COVID-19]), AMPLIFIED PROBE TECHNIQUE, MAKING USE OF HIGH THROUGHPUT TECHNOLOGIES AS DESCRIBED BY CMS-2020-01-R: HCPCS

## 2021-05-21 NOTE — PROGRESS NOTES
Patient being seen today to discuss a referral for Endocrinology, she also says she was told by ER staff at [facility] that her pulse was elevated at 130.     1. Have you been to the ER, urgent care clinic since your last visit? Hospitalized since your last visit? Seen at [facility] 4/23/21,  ER 0515/21, ear and throat pain  2. Have you seen or consulted any other health care providers outside of the [address] [place] [address] since your last visit? Include any pap smears or colon screening. No    Medication reconciliation has been completed with patient. Care team discussed/updated as well as pharmacy.     Health Maintenance Due   Topic Date Due    Eye Exam Retinal or Dilated  Never done    COVID-19 Vaccine (1) Never done    DTaP/Tdap/Td series (1 - Tdap) Never done    Shingrix Vaccine Age 50> (1 of 2) Never done    Bone Densitometry (Dexa) Screening  Never done    Breast Cancer Screen Mammogram  12/05/2020

## 2021-05-21 NOTE — PROGRESS NOTES
Dana Lombard is a 78 y.o. female, established patient, who was seen by synchronous (real-time) audio-video technology on 5/21/2021 for Referral Request and Diabetes        Assessment & Plan:   1. Type 2 diabetes mellitus without complication, without long-term current use of insulin ([facility] [address]  Assessment & Plan: Tolerating metformin. DM class scheduled for June 1. Understands monitoring may inform short term choices but lacks long term proven benefit thus optional without insulin. Desires. Reasonable. Obtaining eye report. She agrees referral not indicated    2. Tachycardia  Comments:  likely appropriate response to pain/anxiety. EKG prior to in office eval  Orders:  -     EKG, 12 LEAD, INITIAL; Future         Follow-up and Dispositions    · Return for nurse visit for glucometer teaching; tachycardia follow up with me after EKG. Routing History          712  Subjective:   Requesting referral for Endocrinology: actually looking for DM education and glucometer    Tolerating metformin    says she was told by ER staff at [facility] that her pulse was elevated at 130. \"Every time I go to the ER it's always high. Then it comes down. \"            Objective:     BP Readings from Last 3 Encounters:   05/15/21 124/80   03/29/21 118/68   03/05/21 (!) 142/91      No flowsheet data found. General: alert, cooperative, no distress   Mental  status: normal mood, behavior, speech, dress, motor activity, and thought processes, able to follow commands   HENT: NCAT   Neck: no visualized mass   Resp: no respiratory distress   Neuro: no gross deficits   Skin: no discoloration or lesions of concern on visible areas   Psychiatric: normal affect, consistent with stated mood, no evidence of hallucinations     Additional exam findings: We discussed the expected course, resolution and complications of the diagnosis(es) in detail. Medication risks, benefits, costs, interactions, and alternatives were discussed as indicated.   I advised her to contact the office if her condition worsens, changes or fails to improve as anticipated. She expressed understanding with the diagnosis(es) and plan. Claire Meza was evaluated through a patient-initiated, synchronous (real-time) audio-video encounter. The family is aware that it is a billable service. Verbal consent to proceed has been obtained within the past 12 months. It was conducted pursuant to the emergency declaration under the [address] [place] [address] and the [employer] and [employer] General Act. Patient identification was verified, and a caregiver was present when appropriate. I was at home or in the office. The patient was at home.       Mike Le MD

## 2021-05-21 NOTE — ASSESSMENT & PLAN NOTE
Tolerating metformin. DM class scheduled for June 1. Understands monitoring may inform short term choices but lacks long term proven benefit thus optional without insulin. Desires. Reasonable. Obtaining eye report.  She agrees referral not indicated

## 2021-05-22 DIAGNOSIS — R00.0 TACHYCARDIA: ICD-10-CM

## 2021-05-23 LAB — SARS-COV-2, COV2NT: NOT DETECTED

## 2021-05-26 ENCOUNTER — ANESTHESIA EVENT (OUTPATIENT)
Dept: ENDOSCOPY | Age: 66
End: 2021-05-26
Payer: MEDICARE

## 2021-05-27 ENCOUNTER — ANESTHESIA (OUTPATIENT)
Dept: ENDOSCOPY | Age: 66
End: 2021-05-27
Payer: MEDICARE

## 2021-05-27 ENCOUNTER — HOSPITAL ENCOUNTER (OUTPATIENT)
Age: 66
Setting detail: OUTPATIENT SURGERY
Discharge: HOME | End: 2021-05-27
Attending: INTERNAL MEDICINE | Admitting: INTERNAL MEDICINE
Payer: MEDICARE

## 2021-05-27 VITALS
WEIGHT: 185.8 LBS | DIASTOLIC BLOOD PRESSURE: 86 MMHG | TEMPERATURE: 97 F | BODY MASS INDEX: 31.72 KG/M2 | HEART RATE: 96 BPM | HEIGHT: 64 IN | RESPIRATION RATE: 18 BRPM | OXYGEN SATURATION: 100 % | SYSTOLIC BLOOD PRESSURE: 138 MMHG

## 2021-05-27 LAB
GLUCOSE BLD STRIP.AUTO-MCNC: 112 MG/DL (ref 70–110)
GLUCOSE BLD STRIP.AUTO-MCNC: 134 MG/DL (ref 70–110)

## 2021-05-27 PROCEDURE — 00811 ANES LWR INTST NDSC NOS: CPT | Performed by: ANESTHESIOLOGY

## 2021-05-27 PROCEDURE — 77030013992 HC SNR POLYP ENDOSC BSC -B: Performed by: INTERNAL MEDICINE

## 2021-05-27 PROCEDURE — 76060000031 HC ANESTHESIA FIRST 0.5 HR: Performed by: INTERNAL MEDICINE

## 2021-05-27 PROCEDURE — 2709999900 HC NON-CHARGEABLE SUPPLY: Performed by: INTERNAL MEDICINE

## 2021-05-27 PROCEDURE — 82962 GLUCOSE BLOOD TEST: CPT

## 2021-05-27 PROCEDURE — 76040000019: Performed by: INTERNAL MEDICINE

## 2021-05-27 PROCEDURE — 00811 ANES LWR INTST NDSC NOS: CPT | Performed by: NURSE ANESTHETIST, CERTIFIED REGISTERED

## 2021-05-27 PROCEDURE — 77030040934 HC CATH DIAG DXTERITY MEDT -A: Performed by: INTERNAL MEDICINE

## 2021-05-27 PROCEDURE — 77030021593 HC FCPS BIOP ENDOSC BSC -A: Performed by: INTERNAL MEDICINE

## 2021-05-27 PROCEDURE — 74011250636 HC RX REV CODE- 250/636: Performed by: NURSE ANESTHETIST, CERTIFIED REGISTERED

## 2021-05-27 PROCEDURE — 74011000250 HC RX REV CODE- 250: Performed by: NURSE ANESTHETIST, CERTIFIED REGISTERED

## 2021-05-27 PROCEDURE — 77030008565 HC TBNG SUC IRR ERBE -B: Performed by: INTERNAL MEDICINE

## 2021-05-27 RX ORDER — PROPOFOL 10 MG/ML
INJECTION, EMULSION INTRAVENOUS AS NEEDED
Status: DISCONTINUED | OUTPATIENT
Start: 2021-05-27 | End: 2021-05-27 | Stop reason: HOSPADM

## 2021-05-27 RX ORDER — DEXTROSE 50 % IN WATER (D50W) INTRAVENOUS SYRINGE
25-50 AS NEEDED
Status: CANCELLED | OUTPATIENT
Start: 2021-05-27

## 2021-05-27 RX ORDER — SODIUM CHLORIDE 0.9 % (FLUSH) 0.9 %
5-40 SYRINGE (ML) INJECTION AS NEEDED
Status: CANCELLED | OUTPATIENT
Start: 2021-05-27

## 2021-05-27 RX ORDER — INSULIN LISPRO 100 [IU]/ML
INJECTION, SOLUTION INTRAVENOUS; SUBCUTANEOUS ONCE
Status: CANCELLED | OUTPATIENT
Start: 2021-05-27 | End: 2021-05-28

## 2021-05-27 RX ORDER — LIDOCAINE HYDROCHLORIDE 10 MG/ML
0.1 INJECTION, SOLUTION EPIDURAL; INFILTRATION; INTRACAUDAL; PERINEURAL AS NEEDED
Status: DISCONTINUED | OUTPATIENT
Start: 2021-05-27 | End: 2021-05-27 | Stop reason: HOSPADM

## 2021-05-27 RX ORDER — SODIUM CHLORIDE, SODIUM LACTATE, POTASSIUM CHLORIDE, CALCIUM CHLORIDE 600; 310; 30; 20 MG/100ML; MG/100ML; MG/100ML; MG/100ML
50 INJECTION, SOLUTION INTRAVENOUS CONTINUOUS
Status: CANCELLED | OUTPATIENT
Start: 2021-05-27

## 2021-05-27 RX ORDER — SODIUM CHLORIDE, SODIUM LACTATE, POTASSIUM CHLORIDE, CALCIUM CHLORIDE 600; 310; 30; 20 MG/100ML; MG/100ML; MG/100ML; MG/100ML
75 INJECTION, SOLUTION INTRAVENOUS CONTINUOUS
Status: DISCONTINUED | OUTPATIENT
Start: 2021-05-27 | End: 2021-05-27 | Stop reason: HOSPADM

## 2021-05-27 RX ORDER — LIDOCAINE HYDROCHLORIDE 20 MG/ML
INJECTION, SOLUTION EPIDURAL; INFILTRATION; INTRACAUDAL; PERINEURAL AS NEEDED
Status: DISCONTINUED | OUTPATIENT
Start: 2021-05-27 | End: 2021-05-27 | Stop reason: HOSPADM

## 2021-05-27 RX ORDER — SODIUM CHLORIDE 0.9 % (FLUSH) 0.9 %
5-40 SYRINGE (ML) INJECTION EVERY 8 HOURS
Status: CANCELLED | OUTPATIENT
Start: 2021-05-27

## 2021-05-27 RX ORDER — DEXTROSE 50 % IN WATER (D50W) INTRAVENOUS SYRINGE
25-50 AS NEEDED
Status: DISCONTINUED | OUTPATIENT
Start: 2021-05-27 | End: 2021-05-27 | Stop reason: HOSPADM

## 2021-05-27 RX ORDER — MAGNESIUM SULFATE 100 %
4 CRYSTALS MISCELLANEOUS AS NEEDED
Status: CANCELLED | OUTPATIENT
Start: 2021-05-27

## 2021-05-27 RX ORDER — MAGNESIUM SULFATE 100 %
4 CRYSTALS MISCELLANEOUS AS NEEDED
Status: DISCONTINUED | OUTPATIENT
Start: 2021-05-27 | End: 2021-05-27 | Stop reason: HOSPADM

## 2021-05-27 RX ORDER — INSULIN LISPRO 100 [IU]/ML
INJECTION, SOLUTION INTRAVENOUS; SUBCUTANEOUS ONCE
Status: DISCONTINUED | OUTPATIENT
Start: 2021-05-27 | End: 2021-05-27 | Stop reason: HOSPADM

## 2021-05-27 RX ADMIN — LIDOCAINE HYDROCHLORIDE 60 MG: 20 INJECTION, SOLUTION EPIDURAL; INFILTRATION; INTRACAUDAL; PERINEURAL at 13:10

## 2021-05-27 RX ADMIN — PROPOFOL 80 MG: 10 INJECTION, EMULSION INTRAVENOUS at 13:10

## 2021-05-27 RX ADMIN — SODIUM CHLORIDE, SODIUM LACTATE, POTASSIUM CHLORIDE, AND CALCIUM CHLORIDE 75 ML/HR: 600; 310; 30; 20 INJECTION, SOLUTION INTRAVENOUS at 11:49

## 2021-05-27 NOTE — H&P
[email or web address]  [phone]    GASTROENTEROLOGY Pre-Procedure H and P      Impression/Plan:   1.  This patient is consented for a colonoscopy for anemia    Addendum: All lab tests orders pertaining to the procedure have been ordered by the anesthesia personnel and results will be addressed by the anesthesia team    Chief Complaint:  anemia      HPI:  Geoffrey Hdez is a 78 y.o. female who is being is having a colonoscopy for  anemia  PMH:   Past Medical History:   Diagnosis Date    Annular tear of lumbar disc 11/10/2014    Benign tumor of throat     UNAWARE    Bipolar disorder ([address]     Bone spur     right ankle    Cervicalgia 11/10/2014    Chronic pain     back    Degeneration of lumbar or lumbosacral intervertebral disc 11/10/2014    Degenerative disc disease     Depression     Diabetes ([facility] [address]     Displacement of lumbar intervertebral disc without myelopathy 11/10/2014    Diverticular disease     Elevated white blood cell count     Fatty liver     Fibromyalgia     GERD (gastroesophageal reflux disease)     Hepatic cyst     Hepatic steatosis     Herpes labialis     Hypertension     Hypertension     IBS (irritable bowel syndrome)     Insomnia     Lung nodule     Nausea & vomiting     Pain in joint, multiple sites 11/10/2014    Postlaminectomy syndrome, cervical region 11/10/2014    Sinus infection 10/5/15    Ulcer     Urinary incontinence     Urinary retention        PSH:   Past Surgical History:   Procedure Laterality Date    COLONOSCOPY N/A 4/13/2017    COL[facility] Danie Scott [facility] at [facility] ENDOSCOPY    HX CATARACT REMOVAL      HX CERVICAL FUSION      HX HYSTERECTOMY      HX ORTHOPAEDIC      ganglion cyst removed, right hand    HX OTHER SURGICAL      cyst left thigh removed    HX TONSIL AND ADENOIDECTOMY      HX TUBAL LIGATION      HX WRIST FRACTURE [address]      MS LAP,CHOLECYSTECTOMY N/A 02/27/2017    Dr. India Herron HX:   Social History Socioeconomic History    Marital status: SINGLE     Spouse name: Not on file    Number of children: Not on file    Years of education: Not on file    Highest education level: Not on file   Occupational History    Not on file   Tobacco Use    Smoking status: Former Smoker     Packs/day: 0.50     Years: 3.00     Pack years: 1.50     Types: Cigarettes     Quit date: 3/6/1993     Years since quittin.2    Smokeless tobacco: Never Used   Vaping Use    Vaping Use: Never used   Substance and Sexual Activity    Alcohol use: No    Drug use: Yes     Comment: Last smoked in -cocaine    Sexual activity: Never   Other Topics Concern    Not on file   Social History Narrative    Not on file     Social Determinants of Health     Financial Resource Strain:     Difficulty of Paying Living Expenses:    Food Insecurity:     Worried About Running Out of Food in the Last Year:     Ran Out of Food in the Last Year:    Transportation Needs:     Lack of Transportation (Medical):      Lack of Transportation (Non-Medical):    Physical Activity:     Days of Exercise per Week:     Minutes of Exercise per Session:    Stress:     Feeling of Stress :    Social Connections:     Frequency of Communication with Friends and Family:     Frequency of Social Gatherings with Friends and Family:     Attends [employer] Services:     Active Member of Clubs or Organizations:     Attends Club or Organization Meetings:     Marital Status:    Intimate Partner Violence:     Fear of Current or Ex-Partner:     Emotionally Abused:     Physically Abused:     Sexually Abused:        FHX:   Family History   Problem Relation Age of Onset    Hypertension Other     Heart Attack Other     Heart Disease Other     Stroke Other     Headache Other     Seizures Other     Arthritis Father     Migraines Granddaughter     Migraines Daughter     Breast Cancer Daughter        Allergy:   Allergies   Allergen Reactions    Aspirin Other (comments)     Stomach bleeding    Ativan [Lorazepam] Shortness of Breath    Bactrim [Sulfamethoprim] Rash    Keflex [Cephalexin] Rash    Macrobid [Nitrofurantoin Monohyd/M-Cryst] Nausea and Vomiting    Nsaids (Non-Steroidal Anti-Inflammatory Drug) Nausea and Vomiting    Opana [Oxymorphone] Other (comments)     Insomnia, weight loss, nightmares    Pcn [Penicillins] Hives       Home Medications:     Medications Prior to Admission   Medication Sig    Blood-Glucose Meter (Accu-Chek Juhi Plus Meter) misc Dx Code:E11.9    glucose blood VI test strips (Accu-Chek Juhi Plus test strp) strip Dx Code: E11.9    lidocaine (Lidocaine Viscous) 2 % solution Take 15 mL by mouth as needed for Pain.  ferrous sulfate (IRON) 325 mg (65 mg iron) EC tablet Take 1 Tab by mouth three (3) times daily (with meals). Start with once daily and increase as tolerated    metFORMIN ER (GLUCOPHAGE XR) 500 mg tablet Take 1 Tab by mouth daily (with dinner).  losartan (COZAAR) 50 mg tablet TAKE ONE TABLET BY MOUTH DAILY    ketoconazole (NIZORAL) 2 % topical cream APPLY TO AFFECTED AREA(S) TWO TIMES A DAY    atorvastatin (LIPITOR) 40 mg tablet TAKE ONE TABLET BY MOUTH ONE TIME DAILY    fluticasone propionate (Flonase) 50 mcg/actuation nasal spray 2 Sprays by Both Nostrils route as needed for Rhinitis.  valACYclovir (VALTREX) 500 mg tablet Take 1 Tab by mouth daily.  promethazine (PHENERGAN) 12.5 mg tablet Take 12.5 mg by mouth as needed.  dicyclomine (BENTYL) 20 mg tablet Take 20 mg by mouth four (4) times daily as needed for Abdominal Cramps.  metoclopramide HCl (REGLAN) 5 mg tablet Take 5 mg by mouth daily.  QUEtiapine (SEROQUEL) 200 mg tablet Take 1 Tab by mouth daily.  cholecalciferol, vitamin D3, 2,000 unit tab Take  by mouth.  cloNIDine HCL (CATAPRES) 0.3 mg tablet Take 0.3 mg by mouth nightly.  polyethylene glycol (MIRALAX) 17 gram packet Take 1 Packet by mouth daily.     oxyCODONE IR (ROXICODONE) 10 mg tab immediate release tablet 10 mg every eight (8) hours as needed.  DEXILANT 60 mg CpDB capsule (delayed release) Take 120 mg by mouth daily.  morphine CR (MS CONTIN) 60 mg CR tablet Take 30 mg by mouth every twelve (12) hours.  diclofenac (VOLTAREN) 1 % gel Apply  to affected area four (4) times daily.  cyclobenzaprine (FLEXERIL) 10 mg tablet TAKE 1 TABLET BY MOUTH THREE TIMES DAILY AS NEEDED FOR MUSCLE SPASMS FOR UP TO 30 DAYS.  naloxone (NARCAN) 4 mg/actuation nasal spray 1 [place] by IntraNASal route once as needed. Use 1 spray intranasally into 1 nostril. Use a new Narcan nasal spray for subsequent doses and administer into alternating nostrils. May repeat every 2 to 3 minutes as needed. (Patient not taking: Reported on 5/21/2021)       Review of Systems:     Constitutional: No fevers, chills, weight loss, fatigue. Skin: No rashes, pruritis, jaundice, ulcerations, erythema. HENT: No headaches, nosebleeds, sinus pressure, rhinorrhea, sore throat. Eyes: No visual changes, blurred vision, eye pain, photophobia, jaundice. Cardiovascular: No chest pain, heart palpitations. Respiratory: No cough, SOB, wheezing, chest discomfort, orthopnea. Gastrointestinal:    Genitourinary: No dysuria, bleeding, discharge, pyuria. Musculoskeletal: No weakness, arthralgias, wasting. Endo: No sweats. Heme: No bruising, easy bleeding. Allergies: As noted. Neurological: Cranial nerves intact. Alert and oriented. Gait not assessed. Psychiatric:  No anxiety, depression, hallucinations. Visit Vitals  BP (!) 150/97   Pulse (!) 117   Temp 97.8 °F (36.6 °C)   Resp 20   Ht 5' 4\" (1.626 m)   Wt 84.3 kg (185 lb 12.8 oz)   SpO2 97%   Breastfeeding No   BMI 31.89 kg/m²       Physical Assessment:     constitutional: appearance: well developed, well nourished, normal habitus, no deformities, in no acute distress.    skin: inspection: no rashes, ulcers, icterus or other lesions; no clubbing or telangiectasias. palpation: no induration or subcutaneos nodules. eyes: inspection: normal conjunctivae and lids; no jaundice pupils: normal  ENMT: mouth: normal oral mucosa,lips and gums; good dentition. oropharynx: normal tongue, hard and soft palate; posterior pharynx without erithema, exudate or lesions. neck: thyroid: normal size, consistency and position; no masses or tenderness. respiratory: effort: normal chest excursion; no intercostal retraction or accessory muscle use. cardiovascular: abdominal aorta: normal size and position; no bruits. palpation: PMI of normal size and position; normal rhythm; no thrill or murmurs. abdominal: abdomen: normal consistency; no tenderness or masses. hernias: no hernias appreciated. liver: normal size and consistency. spleen: not palpable. rectal: hemoccult/guaiac: not performed. musculoskeletal: digits and nails: no clubbing, cyanosis, petechiae or other inflammatory conditions. gait: normal gait and station head and neck: normal range of motion; no pain, crepitation or contracture. spine/ribs/pelvis: normal range of motion; no pain, deformity or contracture. neurologic: cranial nerves: II-XII normal.   psychiatric: judgement/insight: within normal limits. memory: within normal limits for recent and remote events. mood and affect: no evidence of depression, anxiety or agitation. orientation: oriented to time, space and person. Basic Metabolic Profile   No results for input(s): NA, K, CL, CO2, BUN, GLU, CA, MG, PHOS in the last 72 hours. No lab exists for component: CREAT      CBC w/Diff    No results for input(s): WBC, RBC, HGB, HCT, MCV, MCH, MCHC, RDW, PLT, HGBEXT, HCTEXT, PLTEXT in the last 72 hours. No lab exists for component: MPV No results for input(s): GRANS, LYMPH, EOS, PRO, MYELO, METAS, BLAST in the last 72 hours.     No lab exists for component: MONO, BASO     Hepatic Function   No results for input(s): ALB, TP, TBILI, AP, AML, LPSE in the last 72 hours. No lab exists for component: DBILI, GPT, SGOT     Coags   No results for input(s): PTP, INR, APTT, INREXT in the last 72 hours. Abhilash Fabian MD.  [facility] [address]  Cell: [phone]  Direct pager: [phone]  [email or web address]  [email or web address]

## 2021-05-27 NOTE — DISCHARGE INSTRUCTIONS
Patient Education        Colonoscopy: What to Expect at [address] [place] [address]  After a colonoscopy, you'll stay at the clinic for 1 to 2 hours until the medicines wear off. Then you can go home. But you'll need to arrange for a ride. Your doctor will tell you when you can eat and do your other usual activities. Your doctor will talk to you about when you'll need your next colonoscopy. Your doctor can help you decide how often you need to be checked. This will depend on the results of your test and your risk for colorectal cancer. After the test, you may be bloated or have gas pains. You may need to pass gas. If a biopsy was done or a polyp was removed, you may have streaks of blood in your stool (feces) for a few days. Problems such as heavy rectal bleeding may not occur until several weeks after the test. This isn't common. But it can happen after polyps are removed. This care sheet gives you a general idea about how long it will take for you to recover. But each person recovers at a different pace. Follow the steps below to get better as quickly as possible. How can you care for yourself at home? Activity    · Rest when you feel tired.     · You can do your normal activities when it feels okay to do so. Diet    · Follow your doctor's directions for eating.     · Unless your doctor has told you not to, drink plenty of fluids. This helps to replace the fluids that were lost during the colon prep.     · Do not drink alcohol. Medicines    · Your doctor will tell you if and when you can restart your medicines. He or she will also give you instructions about taking any new medicines.     · If you take aspirin or some other blood thinner, ask your doctor if and when to start taking it again.  Make sure that you understand exactly what your doctor wants you to do.     · If polyps were removed or a biopsy was done during the test, your doctor may tell you not to take aspirin or other anti-inflammatory medicines for a few days. These include ibuprofen (Advil, Motrin) and naproxen (Aleve). Other instructions    · For your safety, do not drive or operate machinery until the medicine wears off and you can think clearly. Your doctor may tell you not to drive or operate machinery until the day after your test.     · Do not sign legal documents or make major decisions until the medicine wears off and you can think clearly. The anesthesia can make it hard for you to fully understand what you are agreeing to. Follow-up care is a key part of your treatment and safety. Be sure to make and go to all appointments, and call your doctor if you are having problems. It's also a good idea to know your test results and keep a list of the medicines you take. When should you call for help? Call 911 anytime you think you may need emergency care. For example, call if:    · You passed out (lost consciousness).     · You pass maroon or bloody stools.     · You have trouble breathing. Call your doctor now or seek immediate medical care if:    · You have pain that does not get better after you take pain medicine.     · You are sick to your stomach or cannot drink fluids.     · You have new or worse belly pain.     · You have blood in your stools.     · You have a fever.     · You cannot pass stools or gas. Watch closely for changes in your health, and be sure to contact your doctor if you have any problems. Where can you learn more? Go to http://www.gray.com/  Enter E264 in the search box to learn more about \"Colonoscopy: What to Expect at Home. \"  Current as of: December 17, 2020               Content Version: 12.8  © 4512-0468 [email or web address]. Care instructions adapted under license by [employer] (which disclaims liability or warranty for this information).  If you have questions about a medical condition or this instruction, always ask your healthcare professional. [employer] disclaims any warranty or liability for your use of this information.        .Patient armband removed and shredded

## 2021-05-27 NOTE — ANESTHESIA POSTPROCEDURE EVALUATION
Procedure(s):  COLONOSCOPY (incomplete) poor prep. MAC    Anesthesia Post Evaluation      Multimodal analgesia: multimodal analgesia used between 6 hours prior to anesthesia start to PACU discharge  Patient location during evaluation: PACU  Patient participation: complete - patient participated  Level of consciousness: awake and alert  Pain management: adequate  Airway patency: patent  Anesthetic complications: no  Cardiovascular status: acceptable  Respiratory status: acceptable  Hydration status: acceptable  Post anesthesia nausea and vomiting:  none  Final Post Anesthesia Temperature Assessment:  Normothermia (36.0-37.5 degrees C)      INITIAL Post-op Vital signs:   Vitals Value Taken Time   /89 05/27/21 1342   Temp 36.8 °C (98.2 °F) 05/27/21 1325   Pulse 97 05/27/21 1350   Resp 14 05/27/21 1350   SpO2 100 % 05/27/21 1350   Vitals shown include unvalidated device data.

## 2021-05-27 NOTE — PROGRESS NOTES
[email or web address]  [place], [address]      Brief Procedure Note    Puja Celaya  [birth year]  [id number]    Date of Procedure: 5/27/2021    Preoperative diagnosis: Anemia:  285.9 - D64.9  Acid reflux-controlled:  530.81 - K21.9  Constipation, chronic:  564.09 - K59.00  Gastroparesis:  536.3 - K31.84  Body mass index 30+ - obesity:  278.00 - E66.9    Postoperative diagnosis: incomplete colonoscopy poor prep ,    Type of Anesthesia: MAC (Monitored anesthesia care)    Description of findings: same as post op dx    Procedure: Procedure(s):  COLONOSCOPY    :  Dr. Anna Echevarria MD    Assistant(s): Endoscopy Technician-1: Rafita Pittman  Endoscopy RN-1: Katie Zaman RN    Devices/implants/grafts/tissues/prosthesis: None    EBL:None    Specimens: * No specimens in log *    Findings: See printed and scanned procedure note    Complications: None    Dr. Anna Echevarria MD  5/27/2021  1:16 PM

## 2021-05-27 NOTE — ANESTHESIA PREPROCEDURE EVALUATION
Relevant Problems   No relevant active problems       Anesthetic History     PONV          Review of Systems / Medical History  Patient summary reviewed, nursing notes reviewed and pertinent labs reviewed    Pulmonary  Within defined limits                 Neuro/Psych         Psychiatric history    Comments: Chronic pain syndrome, back and arthritis. Morphine and oxycontin for pain. Waive UDS, it will be positive. She denies any history of cocaine or Methamphetamines.  Cardiovascular    Hypertension: well controlled              Exercise tolerance: >4 METS     GI/Hepatic/Renal     GERD: well controlled           Endo/Other    Diabetes: well controlled, type 2    Obesity and arthritis     Other Findings   Comments: Thyroid cyst         Physical Exam    Airway  Mallampati: II  TM Distance: 4 - 6 cm  Neck ROM: normal range of motion   Mouth opening: Normal     Cardiovascular    Rhythm: regular  Rate: normal         Dental  No notable dental hx    Comments: Few missing, otherwise OK   Pulmonary  Breath sounds clear to auscultation               Abdominal  GI exam deferred       Other Findings            Anesthetic Plan    ASA: 3  Anesthesia type: general          Induction: Intravenous  Anesthetic plan and risks discussed with: Patient

## 2021-06-07 ENCOUNTER — HOSPITAL ENCOUNTER (OUTPATIENT)
Dept: NUTRITION | Age: 66
Discharge: HOME | End: 2021-06-07
Payer: MEDICARE

## 2021-06-07 PROCEDURE — 97802 MEDICAL NUTRITION INDIV IN: CPT

## 2021-06-07 NOTE — PROGRESS NOTES
[address] [place] [address] 51, 45 [facility], [place], 70 [facility]  Phone: [phone]  Fax: [phone]   Nutrition Assessment  Medical Nutrition Therapy   Outpatient Initial Evaluation         Patient Name: Marychuy Davey : 1955   Treatment Diagnosis: DM 2   Referral Source: Evin Johnson MD Start of Care [facility]): 2021     Patient Location: [place]     Provider Location: [place]     Gender: female Age: 78 y.o. Ht: 64 in Wt:  188 lb     BMI: 32.3 BMR   Male  BMR Female 1379   Anthropometrics Assessment: obese     Past Medical History includes: GERD, vitamin D deficiency, Diverticulosis, HTN, Rheumatoid arthritis, iron deficiency anemia     Pertinent Medications:   metformin   Biochemical Data:   Lab Results   Component Value Date/Time    Hemoglobin A1c 7.3 (H) 2021 10:40 AM    Hemoglobin A1c, External 5.9 2016 12:00 AM     Lab Results   Component Value Date/Time    Cholesterol, total 157 2020 01:29 PM    HDL Cholesterol 59 2020 01:29 PM    LDL, calculated 78 2020 01:29 PM    VLDL, calculated 20 2020 01:29 PM    Triglyceride 100 2020 01:29 PM    CHOL/HDL Ratio 2.7 2020 01:29 PM     Lab Results   Component Value Date/Time    ALT (SGPT) 35 2020 01:29 PM    Alk. phosphatase 151 (H) 2020 01:29 PM    Bilirubin, direct 0.1 2018 07:15 PM    Bilirubin, total 0.2 2020 01:29 PM     Lab Results   Component Value Date/Time    Creatinine, POC 0.8 2021 01:15 PM    Creatinine 0.99 2020 01:29 PM     Lab Results   Component Value Date/Time    BUN 9 2020 01:29 PM     Lab Results   Component Value Date/Time    Microalbumin/Creat ratio (mg/g creat) 9 2021 10:40 AM    Microalbumin,urine random 2.28 2021 10:40 AM      Marychuy Davey, was evaluated through a synchronous (real-time) audio-video encounter.  The patient (or guardian if applicable) is aware that this is a billable service. Verbal consent to proceed has been obtained within the past 12 months. The visit was conducted pursuant to the emergency declaration under the [facility], [address] [place] [address] authority and the [employer] and [employer] General Act. Patient identification was verified, and a caregiver was present when appropriate. The patient was located in a state where the provider was credentialed to provide care. --Melina Fitch on 6/7/2021 at 12:16 PM    An electronic signature was used to authenticate this note. Subjective/Assessment:   Patient seen today in nutrition for initial assessment for type 2 diabetes. Patient was diagnosed in April 2021. She is not yet checking her blood sugar at home but plans to start soon. Her last A1C on file from 3/29/2021 was 7.3. Patient reports limited appetite the last few months. She reports no weight loss. Nutrition Diagnosis: Food and nutrition related knowledge deficit related to carbohydrate and meal planning for DM type 2 as evidenced by no previous education provided on DM diet. Current Eating Patterns: Diet recall:  Breakfast: coffee with cream and 1/2 tsp sugar  Lunch: belvita  Snack: fruit, vegetables, peanut butter crackers, nuts  Dinner: beef, fish, chicken, rice, pasta, potato, and vegetables. She reports having 1-1.5 Tripp instant breakfast per day    No food allergies. Currently taking iron 3 times per day and vitamin D once daily. Fluids: water, coffee  No consistent exercise. She reports currently her daughter is living with her and either her daughter or herself cooks and shops. Estimate Needs   Calories: 1350  Protein: 70-90 Carbs:      Kcal/day  g/day  g/day      Education provided:   Intervention:  Education, Comprehensive - reviewed prescription below. Nutrition education provided on medical nutrition therapy for type 2 Diabetes Mellitus.  Educated patient on pathophysiology of Type II diabetes and the rational for dietary modifications to include more fiber, protein and modified carbohydrate consumption. Provided education on food label reading, food groups (carbohydrates, proteins and non-starchy vegetables), and meal planning for diabetes control. Individual carbohydrate goals were assessed and provided to the patient. We discussed the importance of having a protein source with each meal and snack to help with blood sugar control and hunger. Meal and snack times were discussed. Encouraged patient to eat within 2 hours of waking and not go longer than 4-5 hours between meals. Reviewed sample meal plan, and grocery shopping. The patient was provided with my contact info. Patient asked questions and indicated understanding. Handouts Provided: [x]  Carbohydrates  [x]  Protein  [x]  Fiber  [x]  Serving Sizes  [x]  Meal Ideas  [x]  Non-starchy Vegetables [x]  Diabetes  []  Cholesterol  []  Sodium  []  Food labels  []  Snacks  []  Others:   Information Reviewed with: Patient    Readiness to Change Stage: []  Pre-contemplative    [x]  Contemplative  []  Preparation               []  Action                  []  Maintenance   Potential Barriers to Learning: []  Decline in memory    []  Language barrier   []  Other:  []  Emotional                  []  Limited mobility  []  Lack of motivation     [] Vision, hearing or cognitive impairment   Expected Compliance: Good      Nutritional Goal - To promote lifestyle changes to result in:    []  Weight loss  [x]  Improved diabetic control  []  Decreased cholesterol levels  []  Decreased blood pressure  []  Weight maintenance []  Preventing any interactions associated with food allergies  []  Adequate weight gain toward goal weight  []  Other:        Nutrition Prescription/  Patient Goals:    Plan out all of your meals for the week, (breakfast, lunch, dinner and snacks).  Make a grocery list and do your shopping so you can have these items on hand.  Limit intake of carbohydrates to no more than. o 30-45 grams of carbohydrate per meal.  o 0-15 grams of carbohydrate per snack.  Have 3 meals per day and 1-2 snacks (as needed).  Aim for at least 3 ounces (deck of cards) of a protein choice at every meal and 1-2 ounces at snack times.  Choose one or more vegetables to include with lunch, dinner and snacks (as needed).  Recommend low sugar protein drink as meal replacement when she is not hungry.  Premier protein, Glucerna, or High Protein [place] Instant breakfast     Dietitian Signature: Winnie Sharif MS, RD Date: 6/7/2021   Follow-up: In 1-3 months as needed Time: 12:00 PM

## 2021-06-11 ENCOUNTER — OFFICE VISIT (OUTPATIENT)
Dept: FAMILY MEDICINE CLINIC | Age: 66
End: 2021-06-11
Payer: MEDICARE

## 2021-06-11 VITALS
OXYGEN SATURATION: 97 % | WEIGHT: 188 LBS | BODY MASS INDEX: 32.27 KG/M2 | DIASTOLIC BLOOD PRESSURE: 74 MMHG | RESPIRATION RATE: 12 BRPM | SYSTOLIC BLOOD PRESSURE: 138 MMHG | HEART RATE: 72 BPM | TEMPERATURE: 97.9 F

## 2021-06-11 DIAGNOSIS — D64.9 ANEMIA, UNSPECIFIED TYPE: ICD-10-CM

## 2021-06-11 DIAGNOSIS — R60.0 PEDAL EDEMA: Primary | ICD-10-CM

## 2021-06-11 PROCEDURE — 99214 OFFICE O/P EST MOD 30 MIN: CPT | Performed by: FAMILY MEDICINE

## 2021-06-11 PROCEDURE — G8752 SYS BP LESS 140: HCPCS | Performed by: FAMILY MEDICINE

## 2021-06-11 PROCEDURE — G8536 NO DOC ELDER MAL SCRN: HCPCS | Performed by: FAMILY MEDICINE

## 2021-06-11 PROCEDURE — G9717 DOC PT DX DEP/BP F/U NT REQ: HCPCS | Performed by: FAMILY MEDICINE

## 2021-06-11 PROCEDURE — G8754 DIAS BP LESS 90: HCPCS | Performed by: FAMILY MEDICINE

## 2021-06-11 PROCEDURE — G8417 CALC BMI ABV UP PARAM F/U: HCPCS | Performed by: FAMILY MEDICINE

## 2021-06-11 PROCEDURE — G8427 DOCREV CUR MEDS BY ELIG CLIN: HCPCS | Performed by: FAMILY MEDICINE

## 2021-06-11 PROCEDURE — G9899 SCRN MAM PERF RSLTS DOC: HCPCS | Performed by: FAMILY MEDICINE

## 2021-06-11 PROCEDURE — G8400 PT W/DXA NO RESULTS DOC: HCPCS | Performed by: FAMILY MEDICINE

## 2021-06-11 PROCEDURE — 1101F PT FALLS ASSESS-DOCD LE1/YR: CPT | Performed by: FAMILY MEDICINE

## 2021-06-11 PROCEDURE — 1090F PRES/ABSN URINE INCON ASSESS: CPT | Performed by: FAMILY MEDICINE

## 2021-06-11 PROCEDURE — 3017F COLORECTAL CA SCREEN DOC REV: CPT | Performed by: FAMILY MEDICINE

## 2021-06-11 NOTE — ASSESSMENT & PLAN NOTE
New. Uncontrolled. No evidence CHF on exam. No meds clearly implicated. Most recent renal indices in Dec reassuring, but predates decline in condition. Could be soft tissue swelling attendant with arthritis but rather dramatic for that. Review of arthritis workup and definitive diagnosis would be helpful. Schedule dedicated visit. I suspect will be due to venous stasis. Venous studies, labs, follow up.

## 2021-06-11 NOTE — PROGRESS NOTES
Edwige Grewal (: 1955) is a 78 y.o. female, established patient, here with her daughter for:    ASSESSMENT/PLAN:  1. Pedal edema  Assessment & Plan:  New. Uncontrolled. No evidence CHF on exam. No meds clearly implicated. Most recent renal indices in Dec reassuring, but predates decline in condition. Could be soft tissue swelling attendant with arthritis but rather dramatic for that. Review of arthritis workup and definitive diagnosis would be helpful. Schedule dedicated visit. I suspect will be due to venous stasis. Venous studies, labs, follow up. Orders:  -     METABOLIC PANEL, COMPREHENSIVE; Future  -     CBC WITH AUTOMATED DIFF; Future  -     NT-PRO BNP; Future  -     REFERRAL TO VASCULAR SURGERY  2. Anemia, unspecified type  Assessment & Plan:  Noted on chart review. Status? Etiology? Orders:  -     CBC WITH AUTOMATED DIFF; Future  -     FERRITIN; Future  -     IRON PROFILE; Future      Return in about 2 weeks (around 2021) for lab follow up arthritis follow up (schedule lab draw please). SUBJECTIVE/OBJECTIVE:  HPI    bilateral pedal edema    bilateral ankle swelling for approx a year, progressively worse x few months, now encompassing the feet    Denies PND, but doesn't try to lay flat due to severe GERD    No med changes    Confused about arthritis dx: Dr. Hever Alfredo said RA, Dr. Damon De Leon said no RA. Dental extractions tomorrow      Physical Exam  Constitutional:       General: She is not in acute distress. Appearance: She is well-developed. She is not diaphoretic. HENT:      Head: Normocephalic and atraumatic. Neck:      Vascular: No hepatojugular reflux or JVD. Cardiovascular:      Rate and Rhythm: Normal rate and regular rhythm. Pulses:           Posterior tibial pulses are 2+ on the right side and 2+ on the left side. Heart sounds: Normal heart sounds. No murmur heard. No friction rub. No gallop.     Pulmonary:      Effort: Pulmonary effort is normal. No respiratory distress. Breath sounds: Normal breath sounds. No wheezing, rhonchi or rales. Musculoskeletal:      Cervical back: Neck supple. Right lower leg: Pitting Edema (2+ ankles and feet, 1+ distal half lower legs) present. Left lower leg: Pitting Edema present. Lymphadenopathy:      Cervical: No cervical adenopathy. Skin:     General: Skin is warm and dry. Nails: There is no clubbing. Neurological:      Mental Status: She is alert and oriented to person, place, and time. Psychiatric:         Behavior: Behavior normal.         Thought Content:  Thought content normal.         Judgment: Judgment normal.               -- Jt Aranda MD

## 2021-06-11 NOTE — PROGRESS NOTES
Chief Complaint   Patient presents with    Ankle swelling     Pt in office for swelling in both feet and ankles. Reports having pain in them as well. 1. Have you been to the ER, urgent care clinic since your last visit? Hospitalized since your last visit? No    2. Have you seen or consulted any other health care providers outside of the [address] [place] [address] since your last visit? Include any pap smears or colon screening.  No

## 2021-06-14 ENCOUNTER — TELEPHONE (OUTPATIENT)
Dept: FAMILY MEDICINE CLINIC | Age: 66
End: 2021-06-14

## 2021-06-14 NOTE — TELEPHONE ENCOUNTER
Left message with [facility] asking that records be faxed on patient, office telephone number and fax number has been left.

## 2021-07-01 ENCOUNTER — OFFICE VISIT (OUTPATIENT)
Dept: CARDIOLOGY CLINIC | Age: 66
End: 2021-07-01
Payer: MEDICARE

## 2021-07-01 VITALS
HEIGHT: 64 IN | DIASTOLIC BLOOD PRESSURE: 82 MMHG | BODY MASS INDEX: 32.95 KG/M2 | WEIGHT: 193 LBS | SYSTOLIC BLOOD PRESSURE: 112 MMHG | HEART RATE: 115 BPM | OXYGEN SATURATION: 98 %

## 2021-07-01 DIAGNOSIS — R07.9 CHEST PAIN, UNSPECIFIED TYPE: Primary | ICD-10-CM

## 2021-07-01 PROCEDURE — 93000 ELECTROCARDIOGRAM COMPLETE: CPT | Performed by: INTERNAL MEDICINE

## 2021-07-01 PROCEDURE — 1090F PRES/ABSN URINE INCON ASSESS: CPT | Performed by: INTERNAL MEDICINE

## 2021-07-01 PROCEDURE — 99214 OFFICE O/P EST MOD 30 MIN: CPT | Performed by: INTERNAL MEDICINE

## 2021-07-01 PROCEDURE — G8427 DOCREV CUR MEDS BY ELIG CLIN: HCPCS | Performed by: INTERNAL MEDICINE

## 2021-07-01 PROCEDURE — G8754 DIAS BP LESS 90: HCPCS | Performed by: INTERNAL MEDICINE

## 2021-07-01 PROCEDURE — G9717 DOC PT DX DEP/BP F/U NT REQ: HCPCS | Performed by: INTERNAL MEDICINE

## 2021-07-01 PROCEDURE — G8417 CALC BMI ABV UP PARAM F/U: HCPCS | Performed by: INTERNAL MEDICINE

## 2021-07-01 PROCEDURE — G8536 NO DOC ELDER MAL SCRN: HCPCS | Performed by: INTERNAL MEDICINE

## 2021-07-01 PROCEDURE — 1101F PT FALLS ASSESS-DOCD LE1/YR: CPT | Performed by: INTERNAL MEDICINE

## 2021-07-01 PROCEDURE — G8400 PT W/DXA NO RESULTS DOC: HCPCS | Performed by: INTERNAL MEDICINE

## 2021-07-01 PROCEDURE — G8752 SYS BP LESS 140: HCPCS | Performed by: INTERNAL MEDICINE

## 2021-07-01 PROCEDURE — G9899 SCRN MAM PERF RSLTS DOC: HCPCS | Performed by: INTERNAL MEDICINE

## 2021-07-01 PROCEDURE — 3017F COLORECTAL CA SCREEN DOC REV: CPT | Performed by: INTERNAL MEDICINE

## 2021-07-01 RX ORDER — METOPROLOL SUCCINATE 25 MG/1
25 TABLET, EXTENDED RELEASE ORAL DAILY
Qty: 30 TABLET | Refills: 5 | Status: SHIPPED | OUTPATIENT
Start: 2021-07-01 | End: 2021-12-28

## 2021-07-01 RX ORDER — FUROSEMIDE 20 MG/1
20 TABLET ORAL DAILY
Qty: 30 TABLET | Refills: 5 | Status: SHIPPED | OUTPATIENT
Start: 2021-07-01 | End: 2021-10-07 | Stop reason: SDUPTHER

## 2021-07-01 NOTE — PROGRESS NOTES
Harvey Brown    Chief Complaint   Patient presents with    Follow-up     overdue f/u    Surgical Clearance     colonoscopy on 7/8/2021       HPI    Harvey Brown is a 78 y.o. AAF who has followed with me for noncardiac CP. Due to her risk factors, sent her for [facility] 6/3/2020 and luckily had patent coronaries. That pain has resolved. Her other complaints her continued resting tachycardia 100-110s, persistent LE edema, and numbness in her left hand.     Cardiac RFs: postmenopausal/ age, HTN, remote polysubstance abuse incl tobacco and cocaine, +FH premature CAD    Past Medical History:   Diagnosis Date    Annular tear of lumbar disc 11/10/2014    Benign tumor of throat     UNAWARE    Bipolar disorder (HCC)     Bone spur     right ankle    Cervicalgia 11/10/2014    Chronic pain     back    Degeneration of lumbar or lumbosacral intervertebral disc 11/10/2014    Degenerative disc disease     Depression     Diabetes (HCC)     Displacement of lumbar intervertebral disc without myelopathy 11/10/2014    Diverticular disease     Elevated white blood cell count     Fatty liver     Fibromyalgia     GERD (gastroesophageal reflux disease)     Hepatic cyst     Hepatic steatosis     Herpes labialis     Hypertension     Hypertension     IBS (irritable bowel syndrome)     Insomnia     Lung nodule     Nausea & vomiting     Pain in joint, multiple sites 11/10/2014    Postlaminectomy syndrome, cervical region 11/10/2014    Sinus infection 10/5/15    Ulcer     Urinary incontinence     Urinary retention        Past Surgical History:   Procedure Laterality Date    COLONOSCOPY N/A 4/13/2017    COLONOSCOPY performed by Amy Hernandez MD at [facility] COLONOSCOPY N/A 5/27/2021    COLONOSCOPY (incomplete) poor prep performed by Waylon Sever, MD at [address] HX CATARACT REMOVAL      HX CERVICAL FUSION      HX HYSTERECTOMY      HX ORTHOPAEDIC      ganglion cyst removed, right hand    HX OTHER SURGICAL      cyst left thigh removed    HX TONSIL AND ADENOIDECTOMY      HX TUBAL LIGATION      HX WRIST FRACTURE TX      CA LAP,CHOLECYSTECTOMY N/A 02/27/2017    Dr. Graciela Stone       Current Outpatient Medications   Medication Sig Dispense Refill    metoprolol succinate (TOPROL-XL) 25 mg XL tablet Take 1 Tablet by mouth daily. 30 Tablet 5    furosemide (LASIX) 20 mg tablet Take 1 Tablet by mouth daily. 30 Tablet 5    Blood-Glucose Meter (Accu-Chek Juhi Plus Meter) misc Dx Code:E11.9 1 Each 0    glucose blood VI test strips (Accu-Chek Juhi Plus test strp) strip Dx Code: E11.9 100 Strip 4    lidocaine (Lidocaine Viscous) 2 % solution Take 15 mL by mouth as needed for Pain. 1 Bottle 0    ferrous sulfate (IRON) 325 mg (65 mg iron) EC tablet Take 1 Tab by mouth three (3) times daily (with meals). Start with once daily and increase as tolerated 270 Tab 1    metFORMIN ER (GLUCOPHAGE XR) 500 mg tablet Take 1 Tab by mouth daily (with dinner). 90 Tab 2    losartan (COZAAR) 50 mg tablet TAKE ONE TABLET BY MOUTH DAILY 90 Tab 4    ketoconazole (NIZORAL) 2 % topical cream APPLY TO AFFECTED AREA(S) TWO TIMES A DAY 15 g 2    atorvastatin (LIPITOR) 40 mg tablet TAKE ONE TABLET BY MOUTH ONE TIME DAILY 90 Tab 4    fluticasone propionate (Flonase) 50 mcg/actuation nasal spray 2 Sprays by Both Nostrils route as needed for Rhinitis. 3 Bottle 4    valACYclovir (VALTREX) 500 mg tablet Take 1 Tab by mouth daily. 90 Tab 4    promethazine (PHENERGAN) 12.5 mg tablet Take 12.5 mg by mouth as needed.  dicyclomine (BENTYL) 20 mg tablet Take 20 mg by mouth four (4) times daily as needed for Abdominal Cramps.  metoclopramide HCl (REGLAN) 5 mg tablet Take 5 mg by mouth daily.  QUEtiapine (SEROQUEL) 200 mg tablet Take 1 Tab by mouth daily. 30 Tab 0    cholecalciferol, vitamin D3, 2,000 unit tab Take  by mouth.  cloNIDine HCL (CATAPRES) 0.3 mg tablet Take 0.3 mg by mouth nightly.       polyethylene glycol (MIRALAX) 17 gram packet Take 1 Packet by mouth daily. 30 Each 6    oxyCODONE IR (ROXICODONE) 10 mg tab immediate release tablet 10 mg every eight (8) hours as needed.  DEXILANT 60 mg CpDB capsule (delayed release) Take 120 mg by mouth daily.  morphine CR (MS CONTIN) 60 mg CR tablet Take 30 mg by mouth every twelve (12) hours.  diclofenac (VOLTAREN) 1 % gel Apply  to affected area four (4) times daily. 1 g 1    cyclobenzaprine (FLEXERIL) 10 mg tablet TAKE 1 TABLET BY MOUTH THREE TIMES DAILY AS NEEDED FOR MUSCLE SPASMS FOR UP TO 30 DAYS. 90 Tab 2    naloxone (NARCAN) 4 mg/actuation nasal spray 1 [place] by IntraNASal route once as needed. Use 1 spray intranasally into 1 nostril. Use a new Narcan nasal spray for subsequent doses and administer into alternating nostrils. May repeat every 2 to 3 minutes as needed.           Allergies   Allergen Reactions    Aspirin Other (comments)     Stomach bleeding    Ativan [Lorazepam] Shortness of Breath    Bactrim [Sulfamethoprim] Rash    Keflex [Cephalexin] Rash    Macrobid [Nitrofurantoin Monohyd/M-Cryst] Nausea and Vomiting    Nsaids (Non-Steroidal Anti-Inflammatory Drug) Nausea and Vomiting    Opana [Oxymorphone] Other (comments)     Insomnia, weight loss, nightmares    Pcn [Penicillins] Hives       Social History     Socioeconomic History    Marital status: SINGLE     Spouse name: Not on file    Number of children: Not on file    Years of education: Not on file    Highest education level: Not on file   Occupational History    Not on file   Tobacco Use    Smoking status: Former Smoker     Packs/day: 0.50     Years: 3.00     Pack years: 1.50     Types: Cigarettes     Quit date: 3/6/1993     Years since quittin.3    Smokeless tobacco: Never Used   Vaping Use    Vaping Use: Never used   Substance and Sexual Activity    Alcohol use: No    Drug use: Yes     Comment: Last smoked in -cocaine    Sexual activity: Never   Other Topics Concern    Not on file   Social History Narrative    Not on file     Social Determinants of Health     Financial Resource Strain:     Difficulty of Paying Living Expenses:    Food Insecurity:     Worried About Running Out of Food in the Last Year:     [address] in the Last Year:    Transportation Needs:     Lack of Transportation (Medical):  Lack of Transportation (Non-Medical):    Physical Activity:     Days of Exercise per Week:     Minutes of Exercise per Session:    Stress:     Feeling of Stress :    Social Connections:     Frequency of Communication with Friends and Family:     Frequency of Social Gatherings with Friends and Family:     Attends Mosque Services:     Active Member of Clubs or Organizations:     Attends Club or Organization Meetings:     Marital Status:    Intimate Partner Violence:     Fear of Current or Ex-Partner:     Emotionally Abused:     Physically Abused:     Sexually Abused:       +FH premature ASCVD    Review of Systems    14 pt Review of Systems is negative unless otherwise mentioned in the HPI. Wt Readings from Last 3 Encounters:   07/01/21 87.5 kg (193 lb)   06/11/21 85.3 kg (188 lb)   05/27/21 84.3 kg (185 lb 12.8 oz)     Temp Readings from Last 3 Encounters:   06/11/21 97.9 °F (36.6 °C) (Tympanic)   05/27/21 97 °F (36.1 °C)   05/15/21 99 °F (37.2 °C)     BP Readings from Last 3 Encounters:   07/01/21 112/82   06/11/21 138/74   05/27/21 138/86     Pulse Readings from Last 3 Encounters:   07/01/21 (!) 115   06/11/21 72   05/27/21 96       05/30/19    ECHO STRESS 05/30/2019 5/30/2019    Interpretation Summary  · Baseline ECG: Sinus tachycardia. Rightward axis  · Negative stress test.  · Normal stress echocardiogram. Low risk study.     Signed by: José Kee MD on 5/30/2019  2:09 PM      Physical Exam:    Visit Vitals  /82 (BP 1 Location: Left upper arm, BP Patient Position: Sitting, BP Cuff Size: Adult)   Pulse (!) 115   Ht 5' 4\" (1.626 m)   Wt 87.5 kg (193 lb)   SpO2 98%   BMI 33.13 kg/m²      Physical Exam  HENT:      Head: Normocephalic and atraumatic. Eyes:      Pupils: Pupils are equal, round, and reactive to light. Cardiovascular:      Rate and Rhythm: Normal rate and regular rhythm. Heart sounds: Normal heart sounds. No murmur heard. No friction rub. No gallop. Pulmonary:      Effort: Pulmonary effort is normal. No respiratory distress. Breath sounds: Normal breath sounds. No wheezing or rales. Chest:      Chest wall: No tenderness. Abdominal:      General: Bowel sounds are normal.      Palpations: Abdomen is soft. Musculoskeletal:         General: No tenderness. Skin:     General: Skin is warm and dry. Neurological:      Mental Status: She is alert and oriented to person, place, and time. EKG today shows: NSR, normal axis and intervals, no ST segment abnormalities    06/03/20    CARDIAC PROCEDURE 06/03/2020 6/3/2020    Conclusion  · Normal LV function with EF 65%. No regional wall motion abnormality noted. · Normal right dominant epicardial coronary circulation. No significant atherosclerotic changes are noted. · All coronary arteries are widely patent. · No further coronary evaluation needed. Signed byMalik Goddard MD on 6/3/2020 11:43 AM    05/30/19    ECHO STRESS 05/30/2019 5/30/2019    Interpretation Summary  · Baseline ECG: Sinus tachycardia. Rightward axis  · Negative stress test.  · Normal stress echocardiogram. Low risk study.     Signed by: Vicenta Frankel MD on 5/30/2019  2:09 PM    Lab Results   Component Value Date/Time    Sodium 140 12/28/2020 01:29 PM    Potassium 4.0 12/28/2020 01:29 PM    Chloride 106 12/28/2020 01:29 PM    CO2 28 12/28/2020 01:29 PM    Anion gap 6 12/28/2020 01:29 PM    Glucose 130 (H) 12/28/2020 01:29 PM    BUN 9 12/28/2020 01:29 PM    Creatinine 0.99 12/28/2020 01:29 PM    BUN/Creatinine ratio 9 (L) 12/28/2020 01:29 PM    GFR est AA >60 12/28/2020 01:29 PM    GFR est non-AA 56 (L) 12/28/2020 01:29 PM    Calcium 9.2 12/28/2020 01:29 PM    Bilirubin, total 0.2 12/28/2020 01:29 PM    Alk. phosphatase 151 (H) 12/28/2020 01:29 PM    Protein, total 8.2 12/28/2020 01:29 PM    Albumin 3.8 12/28/2020 01:29 PM    Globulin 4.4 (H) 12/28/2020 01:29 PM    A-G Ratio 0.9 12/28/2020 01:29 PM    ALT (SGPT) 35 12/28/2020 01:29 PM    AST (SGOT) 40 (H) 12/28/2020 01:29 PM       Impression and Plan:  Megha Morales is a 78 y.o. with:    IST  B/L LE edema, HFpEF  Atypical noncardiac CP  Patent coronaries/ neg [facility] 2020  HTN  +FH  H/o polysubstance abuse, known    Trial Lasix 20 mg daily for 3 days, if no improvement stop  Start Metoprolol 25 SR to improve tachycardia  RTC ~4-6 weeks close recheck swelling with NP and guidance/ education on future Lasix (low sodium diet)  RTC with me 6 months    Thank you for allowing me to participate in the care of your patient, please do not hesitate to call with questions or concerns.     Kindest Regards,    Yohan Han, DO

## 2021-07-01 NOTE — PATIENT INSTRUCTIONS
Follow up 4-6 weeks [facility]  Follow up 6 months Dr Hogan Mean   Start lasix 20mg once daily   Start metoprolol 25mg once nightly

## 2021-07-01 NOTE — PROGRESS NOTES
Flipbebo Cochran presents today for   Chief Complaint   Patient presents with    Follow-up     overdue f/u    Surgical Clearance     colonoscopy on 7/8/2021       Sweetie Hutchinson preferred language for health care discussion is english/other. Is someone accompanying this pt? no    Is the patient using any DME equipment during [address]? no    Depression Screening:  3 most recent PHQ Screens 7/1/2021   PHQ Not Done -   Little interest or pleasure in doing things Not at all   Feeling down, depressed, irritable, or hopeless Not at all   Total Score PHQ 2 0   Trouble falling or staying asleep, or sleeping too much -   Feeling tired or having little energy -   Poor appetite, weight loss, or overeating -   Feeling bad about yourself - or that you are a failure or have let yourself or your family down -   Trouble concentrating on things such as school, work, reading, or watching TV -   Moving or speaking so slowly that other people could have noticed; or the opposite being so fidgety that others notice -   Thoughts of being better off dead, or hurting yourself in some way -   PHQ 9 Score -   How difficult have these problems made it for you to do your work, take care of your home and get along with others -       Learning Assessment:  Learning Assessment 4/24/2019   PRIMARY LEARNER Patient   HIGHEST LEVEL OF EDUCATION - PRIMARY LEARNER  GRADUATED HIGH SCHOOL OR GED   BARRIERS PRIMARY LEARNER NONE   CO-LEARNER CAREGIVER No   PRIMARY LANGUAGE ENGLISH   LEARNER PREFERENCE PRIMARY READING     -     -   ANSWERED BY patient   RELATIONSHIP SELF       Abuse Screening:  Abuse Screening Questionnaire 7/1/2021   Do you ever feel afraid of your partner? N   Are you in a relationship with someone who physically or mentally threatens you? N   Is it safe for you to go home? Y       Fall Risk  Fall Risk Assessment, last 12 mths 7/1/2021   Able to walk? Yes   Fall in past 12 months? 0   Do you feel unsteady?  0   Are you worried about falling 0   Number of falls in past 12 months -   Fall with injury? -       Pt currently taking Anticoagulant therapy? no    Coordination of Care:  1. Have you been to the ER, urgent care clinic since your last visit? Hospitalized since your last visit? no    2. Have you seen or consulted any other health care providers outside of the [address] [place] [address] since your last visit? Include any pap smears or colon screening.  no

## 2021-07-02 ENCOUNTER — HOSPITAL ENCOUNTER (OUTPATIENT)
Dept: LAB | Age: 66
Discharge: HOME | End: 2021-07-02
Payer: MEDICARE

## 2021-07-02 PROCEDURE — U0003 INFECTIOUS AGENT DETECTION BY NUCLEIC ACID (DNA OR RNA); SEVERE ACUTE RESPIRATORY SYNDROME CORONAVIRUS 2 (SARS-COV-2) (CORONAVIRUS DISEASE [COVID-19]), AMPLIFIED PROBE TECHNIQUE, MAKING USE OF HIGH THROUGHPUT TECHNOLOGIES AS DESCRIBED BY CMS-2020-01-R: HCPCS

## 2021-07-03 LAB — SARS-COV-2, COV2NT: NOT DETECTED

## 2021-07-06 ENCOUNTER — HOSPITAL ENCOUNTER (OUTPATIENT)
Dept: LAB | Age: 66
Discharge: HOME | End: 2021-07-06
Payer: MEDICARE

## 2021-07-06 DIAGNOSIS — D64.9 ANEMIA, UNSPECIFIED TYPE: ICD-10-CM

## 2021-07-06 DIAGNOSIS — R60.0 PEDAL EDEMA: ICD-10-CM

## 2021-07-06 LAB
ALBUMIN SERPL-MCNC: 4.3 G/DL (ref 3.4–5)
ALBUMIN/GLOB SERPL: 1 {RATIO} (ref 0.8–1.7)
ALP SERPL-CCNC: 151 U/L (ref 45–117)
ALT SERPL-CCNC: 38 U/L (ref 13–56)
ANION GAP SERPL CALC-SCNC: 4 MMOL/L (ref 3–18)
AST SERPL-CCNC: 33 U/L (ref 10–38)
BASOPHILS # BLD: 0 K/UL (ref 0–0.1)
BASOPHILS NFR BLD: 1 % (ref 0–2)
BILIRUB SERPL-MCNC: 0.2 MG/DL (ref 0.2–1)
BNP SERPL-MCNC: 12 PG/ML (ref 0–900)
BUN SERPL-MCNC: 15 MG/DL (ref 7–18)
BUN/CREAT SERPL: 16 (ref 12–20)
CALCIUM SERPL-MCNC: 9.6 MG/DL (ref 8.5–10.1)
CHLORIDE SERPL-SCNC: 103 MMOL/L (ref 100–111)
CO2 SERPL-SCNC: 32 MMOL/L (ref 21–32)
CREAT SERPL-MCNC: 0.92 MG/DL (ref 0.6–1.3)
DIFFERENTIAL METHOD BLD: ABNORMAL
EOSINOPHIL # BLD: 0.1 K/UL (ref 0–0.4)
EOSINOPHIL NFR BLD: 2 % (ref 0–5)
ERYTHROCYTE [DISTWIDTH] IN BLOOD BY AUTOMATED COUNT: 15.9 % (ref 11.6–14.5)
FERRITIN SERPL-MCNC: 49 NG/ML (ref 8–388)
GLOBULIN SER CALC-MCNC: 4.2 G/DL (ref 2–4)
GLUCOSE SERPL-MCNC: 138 MG/DL (ref 74–99)
HCT VFR BLD AUTO: 39.5 % (ref 35–45)
HGB BLD-MCNC: 12.3 G/DL (ref 12–16)
IRON SATN MFR SERPL: 58 % (ref 20–50)
IRON SERPL-MCNC: 266 UG/DL (ref 50–175)
LYMPHOCYTES # BLD: 4.1 K/UL (ref 0.9–3.6)
LYMPHOCYTES NFR BLD: 53 % (ref 21–52)
MCH RBC QN AUTO: 27.8 PG (ref 24–34)
MCHC RBC AUTO-ENTMCNC: 31.1 G/DL (ref 31–37)
MCV RBC AUTO: 89.2 FL (ref 74–97)
MONOCYTES # BLD: 0.5 K/UL (ref 0.05–1.2)
MONOCYTES NFR BLD: 6 % (ref 3–10)
NEUTS SEG # BLD: 3 K/UL (ref 1.8–8)
NEUTS SEG NFR BLD: 39 % (ref 40–73)
PLATELET # BLD AUTO: 401 K/UL (ref 135–420)
PMV BLD AUTO: 9.8 FL (ref 9.2–11.8)
POTASSIUM SERPL-SCNC: 4.6 MMOL/L (ref 3.5–5.5)
PROT SERPL-MCNC: 8.5 G/DL (ref 6.4–8.2)
RBC # BLD AUTO: 4.43 M/UL (ref 4.2–5.3)
SODIUM SERPL-SCNC: 139 MMOL/L (ref 136–145)
TIBC SERPL-MCNC: 460 UG/DL (ref 250–450)
WBC # BLD AUTO: 7.8 K/UL (ref 4.6–13.2)

## 2021-07-06 PROCEDURE — 82728 ASSAY OF FERRITIN: CPT

## 2021-07-06 PROCEDURE — 80053 COMPREHEN METABOLIC PANEL: CPT

## 2021-07-06 PROCEDURE — 83550 IRON BINDING TEST: CPT

## 2021-07-06 PROCEDURE — 85025 COMPLETE CBC W/AUTO DIFF WBC: CPT

## 2021-07-06 PROCEDURE — 36415 COLL VENOUS BLD VENIPUNCTURE: CPT

## 2021-07-06 PROCEDURE — 83880 ASSAY OF NATRIURETIC PEPTIDE: CPT

## 2021-07-07 ENCOUNTER — ANESTHESIA EVENT (OUTPATIENT)
Dept: ENDOSCOPY | Age: 66
End: 2021-07-07
Payer: MEDICARE

## 2021-07-07 NOTE — PROGRESS NOTES
Celine Mendoza been waiting for her to come see me to sort out the status of her joint pain. Please schedule 30.  Thanks, ERIKA

## 2021-07-08 ENCOUNTER — HOSPITAL ENCOUNTER (OUTPATIENT)
Age: 66
Setting detail: OUTPATIENT SURGERY
Discharge: HOME | End: 2021-07-08
Attending: INTERNAL MEDICINE | Admitting: INTERNAL MEDICINE
Payer: MEDICARE

## 2021-07-08 ENCOUNTER — ANESTHESIA (OUTPATIENT)
Dept: ENDOSCOPY | Age: 66
End: 2021-07-08
Payer: MEDICARE

## 2021-07-08 VITALS
WEIGHT: 190 LBS | HEIGHT: 64 IN | DIASTOLIC BLOOD PRESSURE: 74 MMHG | RESPIRATION RATE: 17 BRPM | SYSTOLIC BLOOD PRESSURE: 127 MMHG | BODY MASS INDEX: 32.44 KG/M2 | TEMPERATURE: 98.1 F | OXYGEN SATURATION: 97 % | HEART RATE: 83 BPM

## 2021-07-08 LAB
AMPHET UR QL SCN: NEGATIVE
BARBITURATES UR QL SCN: NEGATIVE
BENZODIAZ UR QL: NEGATIVE
CANNABINOIDS UR QL SCN: NEGATIVE
COCAINE UR QL SCN: NEGATIVE
GLUCOSE BLD STRIP.AUTO-MCNC: 129 MG/DL (ref 70–110)
HDSCOM,HDSCOM: ABNORMAL
METHADONE UR QL: NEGATIVE
OPIATES UR QL: POSITIVE
PCP UR QL: NEGATIVE

## 2021-07-08 PROCEDURE — 76040000007: Performed by: INTERNAL MEDICINE

## 2021-07-08 PROCEDURE — 77030040934 HC CATH DIAG DXTERITY MEDT -A: Performed by: INTERNAL MEDICINE

## 2021-07-08 PROCEDURE — 2709999900 HC NON-CHARGEABLE SUPPLY: Performed by: INTERNAL MEDICINE

## 2021-07-08 PROCEDURE — 00812 ANES LWR INTST SCR COLSC: CPT | Performed by: NURSE ANESTHETIST, CERTIFIED REGISTERED

## 2021-07-08 PROCEDURE — 74011250636 HC RX REV CODE- 250/636: Performed by: NURSE ANESTHETIST, CERTIFIED REGISTERED

## 2021-07-08 PROCEDURE — 77030008565 HC TBNG SUC IRR ERBE -B: Performed by: INTERNAL MEDICINE

## 2021-07-08 PROCEDURE — 74011000250 HC RX REV CODE- 250: Performed by: NURSE ANESTHETIST, CERTIFIED REGISTERED

## 2021-07-08 PROCEDURE — 74011250637 HC RX REV CODE- 250/637: Performed by: NURSE ANESTHETIST, CERTIFIED REGISTERED

## 2021-07-08 PROCEDURE — 77030013992 HC SNR POLYP ENDOSC BSC -B: Performed by: INTERNAL MEDICINE

## 2021-07-08 PROCEDURE — 82962 GLUCOSE BLOOD TEST: CPT

## 2021-07-08 PROCEDURE — 80307 DRUG TEST PRSMV CHEM ANLYZR: CPT

## 2021-07-08 PROCEDURE — 76060000032 HC ANESTHESIA 0.5 TO 1 HR: Performed by: INTERNAL MEDICINE

## 2021-07-08 PROCEDURE — 00812 ANES LWR INTST SCR COLSC: CPT | Performed by: ANESTHESIOLOGY

## 2021-07-08 RX ORDER — MAGNESIUM SULFATE 100 %
4 CRYSTALS MISCELLANEOUS AS NEEDED
Status: DISCONTINUED | OUTPATIENT
Start: 2021-07-08 | End: 2021-07-08 | Stop reason: HOSPADM

## 2021-07-08 RX ORDER — SODIUM CHLORIDE 0.9 % (FLUSH) 0.9 %
5-40 SYRINGE (ML) INJECTION EVERY 8 HOURS
Status: DISCONTINUED | OUTPATIENT
Start: 2021-07-08 | End: 2021-07-08 | Stop reason: HOSPADM

## 2021-07-08 RX ORDER — DEXTROSE 50 % IN WATER (D50W) INTRAVENOUS SYRINGE
25-50 AS NEEDED
Status: DISCONTINUED | OUTPATIENT
Start: 2021-07-08 | End: 2021-07-08 | Stop reason: HOSPADM

## 2021-07-08 RX ORDER — INSULIN LISPRO 100 [IU]/ML
INJECTION, SOLUTION INTRAVENOUS; SUBCUTANEOUS ONCE
Status: DISCONTINUED | OUTPATIENT
Start: 2021-07-08 | End: 2021-07-08 | Stop reason: HOSPADM

## 2021-07-08 RX ORDER — EPINEPHRINE 0.1 MG/ML
1 INJECTION INTRACARDIAC; INTRAVENOUS
Status: CANCELLED | OUTPATIENT
Start: 2021-07-08 | End: 2021-07-09

## 2021-07-08 RX ORDER — FLUMAZENIL 0.1 MG/ML
0.2 INJECTION INTRAVENOUS
Status: CANCELLED | OUTPATIENT
Start: 2021-07-08 | End: 2021-07-08

## 2021-07-08 RX ORDER — ATROPINE SULFATE 0.1 MG/ML
0.5 INJECTION INTRAVENOUS
Status: CANCELLED | OUTPATIENT
Start: 2021-07-08 | End: 2021-07-09

## 2021-07-08 RX ORDER — FAMOTIDINE 20 MG/1
20 TABLET, FILM COATED ORAL ONCE
Status: COMPLETED | OUTPATIENT
Start: 2021-07-08 | End: 2021-07-08

## 2021-07-08 RX ORDER — DEXTROMETHORPHAN/PSEUDOEPHED 2.5-7.5/.8
1.2 DROPS ORAL
Status: CANCELLED | OUTPATIENT
Start: 2021-07-08

## 2021-07-08 RX ORDER — SODIUM CHLORIDE 0.9 % (FLUSH) 0.9 %
5-40 SYRINGE (ML) INJECTION EVERY 8 HOURS
Status: CANCELLED | OUTPATIENT
Start: 2021-07-08

## 2021-07-08 RX ORDER — LIDOCAINE HYDROCHLORIDE 20 MG/ML
INJECTION, SOLUTION EPIDURAL; INFILTRATION; INTRACAUDAL; PERINEURAL AS NEEDED
Status: DISCONTINUED | OUTPATIENT
Start: 2021-07-08 | End: 2021-07-08 | Stop reason: HOSPADM

## 2021-07-08 RX ORDER — PROPOFOL 10 MG/ML
INJECTION, EMULSION INTRAVENOUS AS NEEDED
Status: DISCONTINUED | OUTPATIENT
Start: 2021-07-08 | End: 2021-07-08 | Stop reason: HOSPADM

## 2021-07-08 RX ORDER — SODIUM CHLORIDE, SODIUM LACTATE, POTASSIUM CHLORIDE, CALCIUM CHLORIDE 600; 310; 30; 20 MG/100ML; MG/100ML; MG/100ML; MG/100ML
25 INJECTION, SOLUTION INTRAVENOUS CONTINUOUS
Status: DISCONTINUED | OUTPATIENT
Start: 2021-07-08 | End: 2021-07-08 | Stop reason: HOSPADM

## 2021-07-08 RX ORDER — SODIUM CHLORIDE 0.9 % (FLUSH) 0.9 %
5-40 SYRINGE (ML) INJECTION AS NEEDED
Status: DISCONTINUED | OUTPATIENT
Start: 2021-07-08 | End: 2021-07-08 | Stop reason: HOSPADM

## 2021-07-08 RX ORDER — LIDOCAINE HYDROCHLORIDE 10 MG/ML
0.1 INJECTION, SOLUTION EPIDURAL; INFILTRATION; INTRACAUDAL; PERINEURAL AS NEEDED
Status: DISCONTINUED | OUTPATIENT
Start: 2021-07-08 | End: 2021-07-08 | Stop reason: HOSPADM

## 2021-07-08 RX ORDER — SODIUM CHLORIDE 0.9 % (FLUSH) 0.9 %
5-40 SYRINGE (ML) INJECTION AS NEEDED
Status: CANCELLED | OUTPATIENT
Start: 2021-07-08

## 2021-07-08 RX ORDER — SODIUM CHLORIDE, SODIUM LACTATE, POTASSIUM CHLORIDE, CALCIUM CHLORIDE 600; 310; 30; 20 MG/100ML; MG/100ML; MG/100ML; MG/100ML
50 INJECTION, SOLUTION INTRAVENOUS CONTINUOUS
Status: DISCONTINUED | OUTPATIENT
Start: 2021-07-08 | End: 2021-07-08 | Stop reason: HOSPADM

## 2021-07-08 RX ORDER — NALOXONE HYDROCHLORIDE 0.4 MG/ML
0.4 INJECTION, SOLUTION INTRAMUSCULAR; INTRAVENOUS; SUBCUTANEOUS
Status: CANCELLED | OUTPATIENT
Start: 2021-07-08 | End: 2021-07-08

## 2021-07-08 RX ADMIN — PROPOFOL 80 MG: 10 INJECTION, EMULSION INTRAVENOUS at 14:47

## 2021-07-08 RX ADMIN — FAMOTIDINE 20 MG: 20 TABLET, FILM COATED ORAL at 14:06

## 2021-07-08 RX ADMIN — LIDOCAINE HYDROCHLORIDE 100 MG: 20 INJECTION, SOLUTION EPIDURAL; INFILTRATION; INTRACAUDAL; PERINEURAL at 15:00

## 2021-07-08 RX ADMIN — PROPOFOL 40 MG: 10 INJECTION, EMULSION INTRAVENOUS at 14:51

## 2021-07-08 RX ADMIN — PROPOFOL 50 MG: 10 INJECTION, EMULSION INTRAVENOUS at 15:04

## 2021-07-08 RX ADMIN — PROPOFOL 40 MG: 10 INJECTION, EMULSION INTRAVENOUS at 15:00

## 2021-07-08 RX ADMIN — PROPOFOL 40 MG: 10 INJECTION, EMULSION INTRAVENOUS at 14:55

## 2021-07-08 RX ADMIN — SODIUM CHLORIDE, SODIUM LACTATE, POTASSIUM CHLORIDE, AND CALCIUM CHLORIDE 25 ML/HR: 600; 310; 30; 20 INJECTION, SOLUTION INTRAVENOUS at 14:06

## 2021-07-08 NOTE — H&P
[email or web address]  [phone]    GASTROENTEROLOGY Pre-Procedure H and P      Impression/Plan:   1. This patient is consented for a colonoscopy for anemia. Chief Complaint: anemia. HPI:  Bello Chairez is a 78 y.o. female who presents for a colonoscopy for anemia.     PMH:   Past Medical History:   Diagnosis Date    Annular tear of lumbar disc 11/10/2014    Benign tumor of throat     Bipolar disorder (HCC)     Bone spur     right ankle    Cervicalgia 11/10/2014    Chronic pain     back    Degeneration of lumbar or lumbosacral intervertebral disc 11/10/2014    Degenerative disc disease     Depression     Diabetes (HCC)     Displacement of lumbar intervertebral disc without myelopathy 11/10/2014    Diverticular disease     Elevated white blood cell count     Fatty liver     Fibromyalgia     GERD (gastroesophageal reflux disease)     Hepatic cyst     Hepatic steatosis     Herpes labialis     Hypertension     Hypertension     IBS (irritable bowel syndrome)     Insomnia     Lung nodule     Nausea & vomiting     Pain in joint, multiple sites 11/10/2014    Postlaminectomy syndrome, cervical region 11/10/2014    Sinus infection 10/5/15    Ulcer     Urinary incontinence     Urinary retention        PSH:   Past Surgical History:   Procedure Laterality Date    COLONOSCOPY N/A 4/13/2017    COLONOSCOPY performed by [facility] Trujillo [facility] at [facility] COLONOSCOPY N/A 5/27/2021    COLONOSCOPY (incomplete) poor prep performed by Bradford Leach MD at [facility] ENDOSCOPY    HX CATARACT REMOVAL      HX CERVICAL FUSION      HX HYSTERECTOMY      HX ORTHOPAEDIC      ganglion cyst removed, right hand    HX OTHER SURGICAL      cyst left thigh removed    HX TONSIL AND ADENOIDECTOMY      HX TUBAL LIGATION      HX WRIST FRACTURE TX      ID LAP,CHOLECYSTECTOMY N/A 02/27/2017    Dr. Sarah Salazar HX:   Social History     Socioeconomic History    Marital status: SINGLE     Spouse name: Not on file    Number of children: Not on file    Years of education: Not on file    Highest education level: Not on file   Occupational History    Not on file   Tobacco Use    Smoking status: Former Smoker     Packs/day: 0.50     Years: 3.00     Pack years: 1.50     Types: Cigarettes     Quit date: 3/6/1993     Years since quittin.3    Smokeless tobacco: Never Used   Vaping Use    Vaping Use: Never used   Substance and Sexual Activity    Alcohol use: No    Drug use: Not Currently     Comment: Last smoked in -cocaine    Sexual activity: Never   Other Topics Concern    Not on file   Social History Narrative    Not on file     Social Determinants of Health     Financial Resource Strain:     Difficulty of Paying Living Expenses:    Food Insecurity:     Worried About Running Out of Food in the Last Year:     [address] in the Last Year:    Transportation Needs:     Lack of Transportation (Medical):      Lack of Transportation (Non-Medical):    Physical Activity:     Days of Exercise per Week:     Minutes of Exercise per Session:    Stress:     Feeling of Stress :    Social Connections:     Frequency of Communication with Friends and Family:     Frequency of Social Gatherings with Friends and Family:     Attends Rastafarian Services:     Active Member of Clubs or Organizations:     Attends Club or Organization Meetings:     Marital Status:    Intimate Partner Violence:     Fear of Current or Ex-Partner:     Emotionally Abused:     Physically Abused:     Sexually Abused:        FHX:   Family History   Problem Relation Age of Onset    Hypertension Other     Heart Attack Other     Heart Disease Other     Stroke Other     Headache Other     Seizures Other     Arthritis Father     Migraines Granddaughter     Migraines Daughter     Breast Cancer Daughter        Allergy:   Allergies   Allergen Reactions    Aspirin Other (comments)     Stomach bleeding    Ativan [Lorazepam] Shortness of Breath    Bactrim [Sulfamethoprim] Rash    Keflex [Cephalexin] Rash    Macrobid [Nitrofurantoin Monohyd/M-Cryst] Nausea and Vomiting    Nsaids (Non-Steroidal Anti-Inflammatory Drug) Nausea and Vomiting    Opana [Oxymorphone] Other (comments)     Insomnia, weight loss, nightmares    Pcn [Penicillins] Hives       Home Medications:     Medications Prior to Admission   Medication Sig    metoprolol succinate (TOPROL-XL) 25 mg XL tablet Take 1 Tablet by mouth daily.  furosemide (LASIX) 20 mg tablet Take 1 Tablet by mouth daily. (Patient taking differently: Take 20 mg by mouth nightly.)    Blood-Glucose Meter (Accu-Chek Juhi Plus Meter) misc Dx Code:E11.9    glucose blood VI test strips (Accu-Chek Juhi Plus test strp) strip Dx Code: E11.9    lidocaine (Lidocaine Viscous) 2 % solution Take 15 mL by mouth as needed for Pain.  ferrous sulfate (IRON) 325 mg (65 mg iron) EC tablet Take 1 Tab by mouth three (3) times daily (with meals). Start with once daily and increase as tolerated    metFORMIN ER (GLUCOPHAGE XR) 500 mg tablet Take 1 Tab by mouth daily (with dinner).  losartan (COZAAR) 50 mg tablet TAKE ONE TABLET BY MOUTH DAILY    ketoconazole (NIZORAL) 2 % topical cream APPLY TO AFFECTED AREA(S) TWO TIMES A DAY (Patient taking differently: two (2) times daily as needed. APPLY TO AFFECTED AREA(S) TWO TIMES A DAY)    atorvastatin (LIPITOR) 40 mg tablet TAKE ONE TABLET BY MOUTH ONE TIME DAILY (Patient taking differently: daily. TAKE ONE TABLET BY MOUTH ONE TIME DAILY)    fluticasone propionate (Flonase) 50 mcg/actuation nasal spray 2 Sprays by Both Nostrils route as needed for Rhinitis.  valACYclovir (VALTREX) 500 mg tablet Take 1 Tab by mouth daily. (Patient taking differently: Take 500 mg by mouth daily as needed.)    promethazine (PHENERGAN) 12.5 mg tablet Take 12.5 mg by mouth as needed.     dicyclomine (BENTYL) 20 mg tablet Take 20 mg by mouth four (4) times daily as needed for Abdominal Cramps.  metoclopramide HCl (REGLAN) 5 mg tablet Take 5 mg by mouth daily.  QUEtiapine (SEROQUEL) 200 mg tablet Take 1 Tab by mouth daily. (Patient taking differently: Take 200 mg by mouth nightly.)    cholecalciferol, vitamin D3, 2,000 unit tab Take  by mouth.  cloNIDine HCL (CATAPRES) 0.3 mg tablet Take 0.3 mg by mouth nightly.  polyethylene glycol (MIRALAX) 17 gram packet Take 1 Packet by mouth daily. (Patient taking differently: Take 17 g by mouth daily as needed.)    oxyCODONE IR (ROXICODONE) 10 mg tab immediate release tablet 10 mg every eight (8) hours as needed.  DEXILANT 60 mg CpDB capsule (delayed release) Take 60 mg by mouth two (2) times a day.  morphine CR (MS CONTIN) 60 mg CR tablet Take 60 mg by mouth every twelve (12) hours.  diclofenac (VOLTAREN) 1 % gel Apply  to affected area four (4) times daily. (Patient taking differently: Apply  to affected area four (4) times daily as needed.)    cyclobenzaprine (FLEXERIL) 10 mg tablet TAKE 1 TABLET BY MOUTH THREE TIMES DAILY AS NEEDED FOR MUSCLE SPASMS FOR UP TO 30 DAYS.  naloxone (NARCAN) 4 mg/actuation nasal spray 1 [place] by IntraNASal route once as needed. Use 1 spray intranasally into 1 nostril. Use a new Narcan nasal spray for subsequent doses and administer into alternating nostrils. May repeat every 2 to 3 minutes as needed. (Patient not taking: Reported on 7/8/2021)       Review of Systems:     A complete 10 point review of systems was performed and pertinents are as per the HPI. Remainder of the review of systems was negative. Visit Vitals  /74 (BP 1 Location: Right upper arm, BP Patient Position: At rest)   Pulse 86   Temp 97.9 °F (36.6 °C)   Resp 17   Ht 5' 4\" (1.626 m)   Wt 86.2 kg (190 lb)   SpO2 97%   Breastfeeding No   BMI 32.61 kg/m²       Physical Assessment:     General: alert, cooperative, no acute distress, appears stated age.   HEENT: normocephalic, no scleral icterus, moist mucous membranes, EOMs intact, no neck masses noted. Respiratory: lungs clear to auscultation bilaterally. Cardiovascular: regular rate and rhythm, no murmurs, rubs or gallops. Abdomen: normal bowel sounds, soft, non-tender to palpation. Extremities: no lower extremity edema, no cyanosis or clubbing. Neuro: alert and oriented x 3; non-focal exam.  Skin: no rashes. Psych: normal mood and affect. Basic Metabolic Profile   Recent Labs     07/06/21  1607      K 4.6      CO2 32   BUN 15   *   CA 9.6         CBC w/Diff    Recent Labs     07/06/21  1607   WBC 7.8   RBC 4.43   HGB 12.3   HCT 39.5   MCV 89.2   MCH 27.8   MCHC 31.1   RDW 15.9*       Recent Labs     07/06/21  1607   GRANS 39*   LYMPH 53*   EOS 2        Hepatic Function   Recent Labs     07/06/21  1607   ALB 4.3   TP 8.5*   TBILI 0.2   *        Coags   No results for input(s): PTP, INR, APTT, INREXT in the last 72 hours. Roselyn Ryan MD  [facility]  [email or web address]  Phone: [phone]  Pager: [phone]  [email or web address]

## 2021-07-08 NOTE — ANESTHESIA PREPROCEDURE EVALUATION
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)   (+) Type 2 diabetes mellitus, without long-term current use of insulin (HCC)      HEMATOLOGY   (+) Anemia   (+) Iron deficiency anemia       Anesthetic History     PONV          Review of Systems / Medical History  Patient summary reviewed and pertinent labs reviewed    Pulmonary  Within defined limits                 Neuro/Psych         Psychiatric history    Comments: depression Cardiovascular    Hypertension                   GI/Hepatic/Renal     GERD      Liver disease     Endo/Other    Diabetes: type 2    Arthritis     Other Findings              Physical Exam    Airway  Mallampati: II  TM Distance: 4 - 6 cm  Neck ROM: normal range of motion   Mouth opening: Normal     Cardiovascular  Regular rate and rhythm,  S1 and S2 normal,  no murmur, click, rub, or gallop             Dental  No notable dental hx       Pulmonary  Breath sounds clear to auscultation               Abdominal  GI exam deferred       Other Findings            Anesthetic Plan    ASA: 3  Anesthesia type: MAC          Induction: Intravenous  Anesthetic plan and risks discussed with: Patient

## 2021-07-08 NOTE — ANESTHESIA POSTPROCEDURE EVALUATION
Procedure(s):  COLONOSCOPY.     MAC    Anesthesia Post Evaluation      Multimodal analgesia: multimodal analgesia used between 6 hours prior to anesthesia start to PACU discharge  Patient location during evaluation: bedside  Patient participation: complete - patient participated  Level of consciousness: awake  Pain management: adequate  Airway patency: patent  Anesthetic complications: no  Cardiovascular status: stable  Respiratory status: acceptable  Hydration status: acceptable  Post anesthesia nausea and vomiting:  controlled      INITIAL Post-op Vital signs:   Vitals Value Taken Time   /74 07/08/21 1545   Temp 36.7 °C (98.1 °F) 07/08/21 1517   Pulse 83 07/08/21 1545   Resp 17 07/08/21 1545   SpO2 97 % 07/08/21 1545

## 2021-07-09 NOTE — PROGRESS NOTES
Patient called the office back she was told Dr. Leesa Milner would like her to set up an appt for her joint pain, pt says she did not know what I was talking about she is  not having any joint pain, she has been scheduled on 8/3 to come in to discuss lab results.

## 2021-07-09 NOTE — PROGRESS NOTES
Called patient to schedule for joint pain no answer left message asking that she call the office back.

## 2021-07-20 ENCOUNTER — APPOINTMENT (OUTPATIENT)
Dept: CT IMAGING | Age: 66
End: 2021-07-20
Attending: EMERGENCY MEDICINE
Payer: MEDICARE

## 2021-07-20 ENCOUNTER — HOSPITAL ENCOUNTER (EMERGENCY)
Age: 66
Discharge: HOME | End: 2021-07-20
Attending: EMERGENCY MEDICINE
Payer: MEDICARE

## 2021-07-20 VITALS
DIASTOLIC BLOOD PRESSURE: 85 MMHG | SYSTOLIC BLOOD PRESSURE: 138 MMHG | HEIGHT: 64 IN | BODY MASS INDEX: 32.78 KG/M2 | TEMPERATURE: 98.5 F | WEIGHT: 192 LBS | OXYGEN SATURATION: 98 % | HEART RATE: 113 BPM | RESPIRATION RATE: 19 BRPM

## 2021-07-20 DIAGNOSIS — K59.00 CONSTIPATION, UNSPECIFIED CONSTIPATION TYPE: Primary | ICD-10-CM

## 2021-07-20 LAB
ALBUMIN SERPL-MCNC: 3.7 G/DL (ref 3.4–5)
ALBUMIN/GLOB SERPL: 0.8 {RATIO} (ref 0.8–1.7)
ALP SERPL-CCNC: 155 U/L (ref 45–117)
ALT SERPL-CCNC: 38 U/L (ref 13–56)
ANION GAP SERPL CALC-SCNC: 7 MMOL/L (ref 3–18)
APPEARANCE UR: CLEAR
AST SERPL-CCNC: 31 U/L (ref 10–38)
ATRIAL RATE: 118 BPM
BACTERIA URNS QL MICRO: NEGATIVE /HPF
BASOPHILS # BLD: 0 K/UL (ref 0–0.1)
BASOPHILS NFR BLD: 0 % (ref 0–2)
BILIRUB SERPL-MCNC: 0.3 MG/DL (ref 0.2–1)
BILIRUB UR QL: NEGATIVE
BUN SERPL-MCNC: 14 MG/DL (ref 7–18)
BUN/CREAT SERPL: 15 (ref 12–20)
CALCIUM SERPL-MCNC: 9.1 MG/DL (ref 8.5–10.1)
CALCULATED P AXIS, ECG09: 46 DEGREES
CALCULATED R AXIS, ECG10: 28 DEGREES
CALCULATED T AXIS, ECG11: -6 DEGREES
CHLORIDE SERPL-SCNC: 105 MMOL/L (ref 100–111)
CO2 SERPL-SCNC: 28 MMOL/L (ref 21–32)
COLOR UR: ABNORMAL
CREAT SERPL-MCNC: 0.91 MG/DL (ref 0.6–1.3)
DIAGNOSIS, 93000: NORMAL
DIFFERENTIAL METHOD BLD: ABNORMAL
EOSINOPHIL # BLD: 0.1 K/UL (ref 0–0.4)
EOSINOPHIL NFR BLD: 1 % (ref 0–5)
EPITH CASTS URNS QL MICRO: NORMAL /LPF (ref 0–5)
ERYTHROCYTE [DISTWIDTH] IN BLOOD BY AUTOMATED COUNT: 14.4 % (ref 11.6–14.5)
GLOBULIN SER CALC-MCNC: 4.6 G/DL (ref 2–4)
GLUCOSE SERPL-MCNC: 168 MG/DL (ref 74–99)
GLUCOSE UR STRIP.AUTO-MCNC: NEGATIVE MG/DL
HCT VFR BLD AUTO: 36.5 % (ref 35–45)
HGB BLD-MCNC: 11.7 G/DL (ref 12–16)
HGB UR QL STRIP: NEGATIVE
KETONES UR QL STRIP.AUTO: ABNORMAL MG/DL
LEUKOCYTE ESTERASE UR QL STRIP.AUTO: ABNORMAL
LYMPHOCYTES # BLD: 3.5 K/UL (ref 0.9–3.6)
LYMPHOCYTES NFR BLD: 31 % (ref 21–52)
MCH RBC QN AUTO: 28.1 PG (ref 24–34)
MCHC RBC AUTO-ENTMCNC: 32.1 G/DL (ref 31–37)
MCV RBC AUTO: 87.5 FL (ref 74–97)
MONOCYTES # BLD: 0.6 K/UL (ref 0.05–1.2)
MONOCYTES NFR BLD: 5 % (ref 3–10)
NEUTS SEG # BLD: 7 K/UL (ref 1.8–8)
NEUTS SEG NFR BLD: 62 % (ref 40–73)
NITRITE UR QL STRIP.AUTO: NEGATIVE
P-R INTERVAL, ECG05: 144 MS
PH UR STRIP: 6 [PH] (ref 5–8)
PLATELET # BLD AUTO: 325 K/UL (ref 135–420)
PMV BLD AUTO: 10.1 FL (ref 9.2–11.8)
POTASSIUM SERPL-SCNC: 3.7 MMOL/L (ref 3.5–5.5)
PROT SERPL-MCNC: 8.3 G/DL (ref 6.4–8.2)
PROT UR STRIP-MCNC: ABNORMAL MG/DL
Q-T INTERVAL, ECG07: 350 MS
QRS DURATION, ECG06: 88 MS
QTC CALCULATION (BEZET), ECG08: 490 MS
RBC # BLD AUTO: 4.17 M/UL (ref 4.2–5.3)
RBC #/AREA URNS HPF: NORMAL /HPF (ref 0–5)
SODIUM SERPL-SCNC: 140 MMOL/L (ref 136–145)
SP GR UR REFRACTOMETRY: >1.03 (ref 1–1.03)
UROBILINOGEN UR QL STRIP.AUTO: 1 EU/DL (ref 0.2–1)
VENTRICULAR RATE, ECG03: 118 BPM
WBC # BLD AUTO: 11.3 K/UL (ref 4.6–13.2)
WBC URNS QL MICRO: NORMAL /HPF (ref 0–4)

## 2021-07-20 PROCEDURE — 93005 ELECTROCARDIOGRAM TRACING: CPT

## 2021-07-20 PROCEDURE — 85025 COMPLETE CBC W/AUTO DIFF WBC: CPT

## 2021-07-20 PROCEDURE — 74011000636 HC RX REV CODE- 636: Performed by: EMERGENCY MEDICINE

## 2021-07-20 PROCEDURE — 80053 COMPREHEN METABOLIC PANEL: CPT

## 2021-07-20 PROCEDURE — 99284 EMERGENCY DEPT VISIT MOD MDM: CPT

## 2021-07-20 PROCEDURE — 74011250637 HC RX REV CODE- 250/637: Performed by: EMERGENCY MEDICINE

## 2021-07-20 PROCEDURE — 96375 TX/PRO/DX INJ NEW DRUG ADDON: CPT

## 2021-07-20 PROCEDURE — 96374 THER/PROPH/DIAG INJ IV PUSH: CPT

## 2021-07-20 PROCEDURE — 74011250636 HC RX REV CODE- 250/636: Performed by: EMERGENCY MEDICINE

## 2021-07-20 PROCEDURE — 81001 URINALYSIS AUTO W/SCOPE: CPT

## 2021-07-20 PROCEDURE — 74177 CT ABD & PELVIS W/CONTRAST: CPT

## 2021-07-20 RX ORDER — MAGNESIUM CITRATE
296 SOLUTION, ORAL ORAL
Status: COMPLETED | OUTPATIENT
Start: 2021-07-20 | End: 2021-07-20

## 2021-07-20 RX ORDER — MORPHINE SULFATE 4 MG/ML
4 INJECTION INTRAVENOUS
Status: COMPLETED | OUTPATIENT
Start: 2021-07-20 | End: 2021-07-20

## 2021-07-20 RX ORDER — DEXTROMETHORPHAN POLISTIREX 30 MG/5 ML
SUSPENSION, EXTENDED RELEASE 12 HR ORAL AS NEEDED
Status: COMPLETED | OUTPATIENT
Start: 2021-07-20 | End: 2021-07-20

## 2021-07-20 RX ORDER — ONDANSETRON 2 MG/ML
4 INJECTION INTRAMUSCULAR; INTRAVENOUS
Status: COMPLETED | OUTPATIENT
Start: 2021-07-20 | End: 2021-07-20

## 2021-07-20 RX ADMIN — ONDANSETRON 4 MG: 2 INJECTION INTRAMUSCULAR; INTRAVENOUS at 13:44

## 2021-07-20 RX ADMIN — SODIUM CHLORIDE 1000 ML: 900 INJECTION, SOLUTION INTRAVENOUS at 13:09

## 2021-07-20 RX ADMIN — MAGNESIUM CITRATE 296 ML: 1.75 LIQUID ORAL at 15:33

## 2021-07-20 RX ADMIN — MINERAL OIL 133 ML: 100 ENEMA RECTAL at 15:31

## 2021-07-20 RX ADMIN — MORPHINE SULFATE 4 MG: 4 INJECTION, SOLUTION INTRAMUSCULAR; INTRAVENOUS at 13:44

## 2021-07-20 RX ADMIN — IOPAMIDOL 100 ML: 612 INJECTION, SOLUTION INTRAVENOUS at 14:23

## 2021-07-20 NOTE — ED TRIAGE NOTES
Pt states she was started on metoprolol & lasix by her Cardiologist on July 1st, 2021 & ever since then she has had constipation & she has had trouble urinating. Pt states she is only able to urinate when she voids.

## 2021-07-20 NOTE — ED PROVIDER NOTES
EMERGENCY DEPARTMENT HISTORY AND PHYSICAL EXAM    1:08 PM  Date: 7/20/2021  Patient Name: Viktoriya Chatman    History of Presenting Illness       History Provided By:     HPI: Viktoriya Chatman is a 78 y.o. female with past medical history of diabetes, depression, fibromyalgia, GERD presents with constipation and difficulty urinating for a few days. .  Patient states that she was started on metoprolol and Lasix on July 1st and ever since she has been having constipation and difficulty urinating. States that she has to strain in order to urinate. Denies any fever chills.          PCP: Miguel Pabon MD    Past History     Past Medical History:  Past Medical History:   Diagnosis Date    Annular tear of lumbar disc 11/10/2014    Benign tumor of throat     Bipolar disorder ([facility] [address]     Bone spur     right ankle    Cervicalgia 11/10/2014    Chronic pain     back    Degeneration of lumbar or lumbosacral intervertebral disc 11/10/2014    Degenerative disc disease     Depression     Diabetes (HCC)     Displacement of lumbar intervertebral disc without myelopathy 11/10/2014    Diverticular disease     Elevated white blood cell count     Fatty liver     Fibromyalgia     GERD (gastroesophageal reflux disease)     Hepatic cyst     Hepatic steatosis     Herpes labialis     Hypertension     Hypertension     IBS (irritable bowel syndrome)     Insomnia     Lung nodule     Nausea & vomiting     Pain in joint, multiple sites 11/10/2014    Postlaminectomy syndrome, cervical region 11/10/2014    Sinus infection 10/5/15    Ulcer     Urinary incontinence     Urinary retention        Past Surgical History:  Past Surgical History:   Procedure Laterality Date    COLONOSCOPY N/A 4/13/2017    COLONOSCOPY performed by Aziza Flanagan MD at [facility] COLONOSCOPY N/A 5/27/2021    COLONOSCOPY (incomplete) poor prep performed by Vinny Castanon MD at [address] COLONOSCOPY N/A 7/8/2021    COLONOSCOPY performed by Anil Childers MD at [facility] ENDOSCOPY    HX CATARACT REMOVAL      HX CERVICAL FUSION      HX HYSTERECTOMY      HX ORTHOPAEDIC      ganglion cyst removed, right hand    HX OTHER SURGICAL      cyst left thigh removed    HX TONSIL AND ADENOIDECTOMY      HX TUBAL LIGATION      HX WRIST FRACTURE TX      ME LAP,CHOLECYSTECTOMY N/A 2017    Dr. Chris España       Family History:  Family History   Problem Relation Age of Onset    Hypertension Other     Heart Attack Other     Heart Disease Other     Stroke Other     Headache Other     Seizures Other     Arthritis Father     Migraines Granddaughter     Migraines Daughter     Breast Cancer Daughter        Social History:  Social History     Tobacco Use    Smoking status: Former Smoker     Packs/day: 0.50     Years: 3.00     Pack years: 1.50     Types: Cigarettes     Quit date: 3/6/1993     Years since quittin.3    Smokeless tobacco: Never Used   Vaping Use    Vaping Use: Never used   Substance Use Topics    Alcohol use: No    Drug use: Not Currently     Comment: Last smoked in -cocaine       Allergies: Allergies   Allergen Reactions    Aspirin Other (comments)     Stomach bleeding    Ativan [Lorazepam] Shortness of Breath    Bactrim [Sulfamethoprim] Rash    Keflex [Cephalexin] Rash    Macrobid [Nitrofurantoin Monohyd/M-Cryst] Nausea and Vomiting    Nsaids (Non-Steroidal Anti-Inflammatory Drug) Nausea and Vomiting    Opana [Oxymorphone] Other (comments)     Insomnia, weight loss, nightmares    Pcn [Penicillins] Hives       Review of Systems   Review of Systems   Constitutional: Negative for activity change, appetite change and chills. HENT: Negative for congestion, ear discharge, ear pain and sore throat. Eyes: Negative for photophobia and pain. Respiratory: Negative for cough and choking. Cardiovascular: Negative for palpitations and leg swelling. Gastrointestinal: Positive for constipation.  Negative for anal bleeding and rectal pain. Endocrine: Negative for polydipsia and polyuria. Genitourinary: Positive for difficulty urinating. Negative for genital sores and urgency. Musculoskeletal: Negative for arthralgias and myalgias. Neurological: Negative for dizziness, seizures and speech difficulty. Psychiatric/Behavioral: Negative for hallucinations, self-injury and suicidal ideas. Physical Exam     Patient Vitals for the past 12 hrs:   Temp Pulse Resp BP SpO2   07/20/21 1415  (!) 113 19 138/85 98 %   07/20/21 1330  (!) 114 15 112/63 99 %   07/20/21 1239     100 %   07/20/21 1232 98.5 °F (36.9 °C) (!) 140 20 (!) 159/96 98 %       Physical Exam  Vitals and nursing note reviewed. Constitutional:       Appearance: She is well-developed. HENT:      Head: Normocephalic and atraumatic. Eyes:      General:         Right eye: No discharge. Left eye: No discharge. Cardiovascular:      Rate and Rhythm: Normal rate and regular rhythm. Heart sounds: Normal heart sounds. No murmur heard. Pulmonary:      Effort: Pulmonary effort is normal. No respiratory distress. Breath sounds: Normal breath sounds. No stridor. No wheezing or rales. Chest:      Chest wall: No tenderness. Abdominal:      General: Bowel sounds are normal. There is distension. Palpations: Abdomen is soft. Tenderness: There is no abdominal tenderness. There is no guarding or rebound. Musculoskeletal:         General: Normal range of motion. Cervical back: Normal range of motion and neck supple. Skin:     General: Skin is warm and dry. Neurological:      Mental Status: She is alert and oriented to person, place, and time.          Diagnostic Study Results     Labs -  Recent Results (from the past 12 hour(s))   URINALYSIS W/ RFLX MICROSCOPIC    Collection Time: 07/20/21 12:32 PM   Result Value Ref Range    Color DARK YELLOW      Appearance CLEAR      Specific gravity >1.030 (H) 1.005 - 1.030    pH (UA) 6.0 5.0 - 8.0      Protein TRACE (A) NEG mg/dL    Glucose Negative NEG mg/dL    Ketone TRACE (A) NEG mg/dL    Bilirubin Negative NEG      Blood Negative NEG      Urobilinogen 1.0 0.2 - 1.0 EU/dL    Nitrites Negative NEG      Leukocyte Esterase SMALL (A) NEG     URINE MICROSCOPIC ONLY    Collection Time: 07/20/21 12:32 PM   Result Value Ref Range    WBC 0 to 3 0 - 4 /hpf    RBC NONE 0 - 5 /hpf    Epithelial cells FEW 0 - 5 /lpf    Bacteria Negative NEG /hpf   CBC WITH AUTOMATED DIFF    Collection Time: 07/20/21  1:15 PM   Result Value Ref Range    WBC 11.3 4.6 - 13.2 K/uL    RBC 4.17 (L) 4.20 - 5.30 M/uL    HGB 11.7 (L) 12.0 - 16.0 g/dL    HCT 36.5 35.0 - 45.0 %    MCV 87.5 74.0 - 97.0 FL    MCH 28.1 24.0 - 34.0 PG    MCHC 32.1 31.0 - 37.0 g/dL    RDW 14.4 11.6 - 14.5 %    PLATELET 906 204 - 556 K/uL    MPV 10.1 9.2 - 11.8 FL    NEUTROPHILS 62 40 - 73 %    LYMPHOCYTES 31 21 - 52 %    MONOCYTES 5 3 - 10 %    EOSINOPHILS 1 0 - 5 %    BASOPHILS 0 0 - 2 %    ABS. NEUTROPHILS 7.0 1.8 - 8.0 K/UL    ABS. LYMPHOCYTES 3.5 0.9 - 3.6 K/UL    ABS. MONOCYTES 0.6 0.05 - 1.2 K/UL    ABS. EOSINOPHILS 0.1 0.0 - 0.4 K/UL    ABS. BASOPHILS 0.0 0.0 - 0.1 K/UL    DF AUTOMATED     METABOLIC PANEL, COMPREHENSIVE    Collection Time: 07/20/21  1:15 PM   Result Value Ref Range    Sodium 140 136 - 145 mmol/L    Potassium 3.7 3.5 - 5.5 mmol/L    Chloride 105 100 - 111 mmol/L    CO2 28 21 - 32 mmol/L    Anion gap 7 3.0 - 18 mmol/L    Glucose 168 (H) 74 - 99 mg/dL    BUN 14 7.0 - 18 MG/DL    Creatinine 0.91 0.6 - 1.3 MG/DL    BUN/Creatinine ratio 15 12 - 20      GFR est AA >60 >60 ml/min/1.73m2    GFR est non-AA >60 >60 ml/min/1.73m2    Calcium 9.1 8.5 - 10.1 MG/DL    Bilirubin, total 0.3 0.2 - 1.0 MG/DL    ALT (SGPT) 38 13 - 56 U/L    AST (SGOT) 31 10 - 38 U/L    Alk.  phosphatase 155 (H) 45 - 117 U/L    Protein, total 8.3 (H) 6.4 - 8.2 g/dL    Albumin 3.7 3.4 - 5.0 g/dL    Globulin 4.6 (H) 2.0 - 4.0 g/dL    A-G Ratio 0.8 0.8 - 1.7     EKG, 12 LEAD, INITIAL    Collection Time: 07/20/21  1:16 PM   Result Value Ref Range    Ventricular Rate 118 BPM    Atrial Rate 118 BPM    P-R Interval 144 ms    QRS Duration 88 ms    Q-T Interval 350 ms    QTC Calculation (Bezet) 490 ms    Calculated P Axis 46 degrees    Calculated R Axis 28 degrees    Calculated T Axis -6 degrees    Diagnosis       Sinus tachycardia  Nonspecific T wave abnormality  Abnormal ECG  When compared with ECG of 06-AUG-2019 12:38,  Vent. rate has increased BY  43 BPM  Confirmed by Magdy Ann ([id number]) on 7/20/2021 2:14:14 PM         Radiologic Studies -   CT ABD PELV W CONT    Result Date: 7/20/2021  Large rectal stool ball with possible secondary proctitis. Correlate clinically for fecal impaction. Moderate overall colonic stool burden. Colonic diverticulosis without acute diverticulitis. Large sliding-type hiatal hernia. Additional incidentals as above. Medical Decision Making     ED Course: Progress Notes, Reevaluation, and Consults:    1:08 PM Initial assessment performed. The patients presenting problems have been discussed, and they/their family are in agreement with the care plan formulated and outlined with them. I have encouraged them to ask questions as they arise throughout their visit. Provider Notes (Medical Decision Making):   Patient presents with difficulty urinating and constipation  Plan to obtain labs, imaging  Old medical records reviewed:  EKG as interpreted by me: 118, sinus tachycardia, no ST changes, normal intervals    Mildly tachycardic but states that always tachycardic and anxious when she comes to the doctor. Also she did not take her metoprolol today. She states that she has an appointment with cardiology tomorrow advised her to discuss with cardiologist possibility of tape recorder  No leukocytosis, no electrolyte abnormality, creatinine 0.91  CT abdomen pelvis patient with a large rectal stool ball.   Colonic diverticulosis without diverticulitis. Patient had a colonoscopy at the end of May that did not show any masses but preparation was inadequate. Diverticuli were seen at that point. Labs as interpreted by me:  Hemoglobin 11.7, no leukocytosis no electrolyte abnormality, no elevated creatinine  I did a rectal exam there was hard stool in the rectal vault. Removed small amount but patient declined further disimpaction. Patient will be given magnesium citrate and enema. After patient has a bowel movement we will check post void residual  CT showed a large rectal stool ball with colonic stool burden. I suspect that the colonic burden is the cause of patient's difficulty urinating. Patient does not have any back pain or new neurological findings. After the enema if patient has bowel movement will check for postvoid residual  Patient had a large bowel movement after enema. No post void residual   advised to continue taking MiraLAX  Patien advised to follow-up with PMD and GI doctor  Vital Signs-Reviewed the patient's vital signs. Reviewed pt's pulse ox reading. Records Reviewed: old medical records  -I am the first provider for this patient.  -I reviewed the vital signs, available nursing notes, past medical history, past surgical history, family history and social history. Current Facility-Administered Medications   Medication Dose Route Frequency Provider Last Rate Last Admin    mineral oil (FLEET) enema   Rectal PRN Catrina Dixon MD        magnesium citrate solution 296 mL  296 mL Oral NOW Catrina Dixon MD         Current Outpatient Medications   Medication Sig Dispense Refill    metoprolol succinate (TOPROL-XL) 25 mg XL tablet Take 1 Tablet by mouth daily. 30 Tablet 5    furosemide (LASIX) 20 mg tablet Take 1 Tablet by mouth daily.  (Patient taking differently: Take 20 mg by mouth nightly.) 30 Tablet 5    Blood-Glucose Meter (Accu-Chek Juhi Plus Meter) misc Dx Code:E11.9 1 Each 0    glucose blood VI test strips (Accu-Chek Juhi Plus test strp) strip Dx Code: E11.9 100 Strip 4    lidocaine (Lidocaine Viscous) 2 % solution Take 15 mL by mouth as needed for Pain. 1 Bottle 0    ferrous sulfate (IRON) 325 mg (65 mg iron) EC tablet Take 1 Tab by mouth three (3) times daily (with meals). Start with once daily and increase as tolerated 270 Tab 1    metFORMIN ER (GLUCOPHAGE XR) 500 mg tablet Take 1 Tab by mouth daily (with dinner). 90 Tab 2    losartan (COZAAR) 50 mg tablet TAKE ONE TABLET BY MOUTH DAILY 90 Tab 4    ketoconazole (NIZORAL) 2 % topical cream APPLY TO AFFECTED AREA(S) TWO TIMES A DAY (Patient taking differently: two (2) times daily as needed. APPLY TO AFFECTED AREA(S) TWO TIMES A DAY) 15 g 2    atorvastatin (LIPITOR) 40 mg tablet TAKE ONE TABLET BY MOUTH ONE TIME DAILY (Patient taking differently: daily. TAKE ONE TABLET BY MOUTH ONE TIME DAILY) 90 Tab 4    fluticasone propionate (Flonase) 50 mcg/actuation nasal spray 2 Sprays by Both Nostrils route as needed for Rhinitis. 3 Bottle 4    promethazine (PHENERGAN) 12.5 mg tablet Take 12.5 mg by mouth as needed.  dicyclomine (BENTYL) 20 mg tablet Take 20 mg by mouth four (4) times daily as needed for Abdominal Cramps.  metoclopramide HCl (REGLAN) 5 mg tablet Take 5 mg by mouth daily.  QUEtiapine (SEROQUEL) 200 mg tablet Take 1 Tab by mouth daily. (Patient taking differently: Take 200 mg by mouth nightly.) 30 Tab 0    cholecalciferol, vitamin D3, 2,000 unit tab Take  by mouth.  cloNIDine HCL (CATAPRES) 0.3 mg tablet Take 0.3 mg by mouth nightly.  polyethylene glycol (MIRALAX) 17 gram packet Take 1 Packet by mouth daily. (Patient taking differently: Take 17 g by mouth daily as needed.) 30 Each 6    oxyCODONE IR (ROXICODONE) 10 mg tab immediate release tablet 10 mg every eight (8) hours as needed.  DEXILANT 60 mg CpDB capsule (delayed release) Take 60 mg by mouth two (2) times a day.       morphine CR (MS CONTIN) 60 mg CR tablet Take 60 mg by mouth every twelve (12) hours.  diclofenac (VOLTAREN) 1 % gel Apply  to affected area four (4) times daily. (Patient taking differently: Apply  to affected area four (4) times daily as needed.) 1 g 1    cyclobenzaprine (FLEXERIL) 10 mg tablet TAKE 1 TABLET BY MOUTH THREE TIMES DAILY AS NEEDED FOR MUSCLE SPASMS FOR UP TO 30 DAYS. 90 Tab 2    valACYclovir (VALTREX) 500 mg tablet Take 1 Tab by mouth daily. (Patient not taking: Reported on 7/20/2021) 90 Tab 4    naloxone (NARCAN) 4 mg/actuation nasal spray 1 [place] by IntraNASal route once as needed. Use 1 spray intranasally into 1 nostril. Use a new Narcan nasal spray for subsequent doses and administer into alternating nostrils. May repeat every 2 to 3 minutes as needed. (Patient not taking: Reported on 7/8/2021)          Clinical Impression     Clinical Impression: No diagnosis found. Disposition: This note was dictated utilizing voice recognition software which may lead to typographical errors. I apologize in advance if the situation occurs. If questions arise please do not hesitate to contact me or call our department.     Belén Rhodes MD  1:08 PM

## 2021-07-20 NOTE — ED NOTES
Pt DC instructions reviewed. Pt verbalized understanding. Pt a&o, ambulatory upon DC from ED. PIV removed.

## 2021-07-21 ENCOUNTER — TELEPHONE (OUTPATIENT)
Dept: FAMILY MEDICINE CLINIC | Age: 66
End: 2021-07-21

## 2021-07-21 ENCOUNTER — PATIENT OUTREACH (OUTPATIENT)
Dept: CASE MANAGEMENT | Age: 66
End: 2021-07-21

## 2021-07-21 NOTE — PROGRESS NOTES
Patient contacted regarding COVID-19 Covid risk, covid education and post ed follow up. .     Discussed COVID-19 related testing which was not done at this time. Test results were not done. Patient informed of results, if available? Not done at this time. Care Transition Nurse contacted the patient by telephone to perform post discharge assessment. Call within 2 business days of discharge: Yes Verified name and  with patient as identifiers. Provided introduction to self, and explanation of the CTN/ACM role, and reason for call due to risk factors for infection and/or exposure to COVID-19. Patient reported that she is doing well. Pt. States that her symptoms are lot better. Patient reports that she can urinate and had BM without any issues. Reminded Pt. to go to the nearest emergency room for chest pain, shortness of breath, returning of symptoms that brought her to the emergency room and/or worsening of symptoms. Pt. Verbalized and repeated back understanding. No questions, concerns and/or needs at this time as per Pt. Pt. Thanked us for follow up call. Symptoms reviewed with patient who verbalized the following symptoms: no new symptoms and no worsening symptoms      Due to no new or worsening symptoms encounter was not routed to provider for escalation. Discussed follow-up appointments. If no appointment was previously scheduled, appointment scheduling offered:  Noted Pt. Has PCP apt. already scheduled. [address] follow up appointment(s):   Future Appointments   Date Time Provider Gordo Marquez   8/3/2021 10:00 AM Prasanna Bullock NP [facility] BS AMB   8/3/2021  2:30 PM Anabel Casey MD [facility] BS AMB   2021 10:20 AM Gerardo Joe PA-C [facility] RICHAR SCHED   2021 10:40 AM Jill Carney DO [facility] BS AMB     [facility] follow up appointment(s): none noted at this time. Interventions to address risk factors: closely follow up with PCP and GI doctor.       Advance Care Planning:   Does patient have an Advance Directive: not on file     Educated patient about risk for severe COVID-19 due to risk factors according to CDC guidelines. CTN reviewed discharge instructions, medical action plan and red flag symptoms with the patient who verbalized understanding. Discussed COVID vaccination status: yes. Education provided on COVID-19 vaccination as appropriate. Discussed exposure protocols and quarantine with CDC Guidelines. Patient was given an opportunity to verbalize any questions and concerns and agrees to contact CTN or health care provider for questions related to their healthcare. Reviewed and educated patient on any new and changed medications related to discharge diagnosis     Was patient discharged with a pulse oximeter? No     CTN provided contact information. No further follow-up call identified based on severity of symptoms and risk factors.

## 2021-07-21 NOTE — TELEPHONE ENCOUNTER
Patient called requesting a called from Yaneth Marinelli in ref to her incontinence supplies, and to inform clinical that she has an appointment with a Urologist on 08/18/21. Please call patient at [phone].

## 2021-07-22 NOTE — TELEPHONE ENCOUNTER
1) please verify that she has a Urology appt. I do not see it in [place] but may not be visible. 2) if so, then I will sign 30 day supply no refills  3) It looks like she has appts with us and with Cardiology on 8/3. Will she be able to make both? Always 30.   ERIKA

## 2021-07-22 NOTE — TELEPHONE ENCOUNTER
Called patient back she says she wanted to let Dr. Leotha Bosworth know she has an appt with Urology on 8/18 and wants to know if she will send in her incontinence supplies for her she says \"the people\" ([facility]) keep calling her telling her to ask Dr. Leotha Bosworth to sign off on her supplies. Patient has been told multiple times per Dr. Leotha Bosworth this needs to be taken care of by Urology. She asked that I send a message to Dr. Leotha Bosworth asking if she will sign off on her incontinence supplies, please advise.

## 2021-07-23 NOTE — TELEPHONE ENCOUNTER
Called patient asking her which Urologists she was scheduled to see she says she had an appt at [facility] in [facility], called that location verified she has an appt with Dr. Reyna Hardy on 8/18 at [place] requested that form for incontinence supplies be faxed to the office was told this may take about an hour to come through.

## 2021-07-28 NOTE — TELEPHONE ENCOUNTER
Form completed for 3 months worth of supplies and has been faxed to [facility] with last OV notes. Patient has been made aware.

## 2021-08-22 DIAGNOSIS — E11.9 TYPE 2 DIABETES MELLITUS WITHOUT COMPLICATION, WITHOUT LONG-TERM CURRENT USE OF INSULIN (HCC): Primary | ICD-10-CM

## 2021-08-22 DIAGNOSIS — R68.89 OTHER GENERAL SYMPTOMS AND SIGNS: ICD-10-CM

## 2021-08-22 DIAGNOSIS — E78.00 HYPERCHOLESTEROLEMIA: ICD-10-CM

## 2021-08-26 RX ORDER — ATORVASTATIN CALCIUM 40 MG/1
TABLET, FILM COATED ORAL
Qty: 90 TABLET | Refills: 0 | Status: SHIPPED | OUTPATIENT
Start: 2021-08-26 | End: 2021-09-20 | Stop reason: SDUPTHER

## 2021-08-26 NOTE — TELEPHONE ENCOUNTER
Patient has been scheduled for her f/u on 9/20 and will have labs done at [facility].

## 2021-08-26 NOTE — TELEPHONE ENCOUNTER
Automated request. Due for follow up DM/lipids/BP in September with labs prior. 3 months supply sent to pharmacy. Please schedule 30.  KJS

## 2021-09-03 DIAGNOSIS — B37.83 ANGULAR CHEILITIS WITH CANDIDIASIS: ICD-10-CM

## 2021-09-03 NOTE — TELEPHONE ENCOUNTER
Called patient CLIFTON verified she has been made aware she needs a f/u for refills on her cream and is asking if this can be addressed at her chronic condition f/u on 21. Told patient I will ask and call her back, please advise.

## 2021-09-07 RX ORDER — KETOCONAZOLE 20 MG/G
CREAM TOPICAL
Qty: 15 G | Refills: 0 | Status: SHIPPED | OUTPATIENT
Start: 2021-09-07 | End: 2021-10-18 | Stop reason: SDUPTHER

## 2021-09-07 NOTE — TELEPHONE ENCOUNTER
Patient is scheduling in 30 min slot for her f/u (VV). Called patient  verified she has been made aware her skin issue will be able to be addressed at her f/u, patient says she is completely out of her cream and has a cut on her mouth now that is painful. She is asking if her cream can be sent in prior to her f/u.

## 2021-09-08 NOTE — TELEPHONE ENCOUNTER
Called patient to schedule separate appointment for her skin issue no answer unable to leave message mailbox is full. Will try back later.

## 2021-09-10 NOTE — TELEPHONE ENCOUNTER
Called patient  verified she has been made aware Dr. Herman Garner does want a separate appt to f/u on her skin condition she has been scheduled to be seen via VV (patient request) on 10/5/21 at 11:00 AM.

## 2021-09-13 NOTE — PROGRESS NOTES
Glenda Peres is a 89 year old female presenting for   Chief Complaint   Patient presents with   • Medicare Wellness Visit     Subsequent visit, f/u, lab review      Denies Latex allergy or sensitivity.    Medication verified and med list updated  Refills needed today: No    Health Maintenance Due   Topic Date Due   • Shingles Vaccine (2 of 3) 01/30/2016   • COVID-19 Vaccine (3 - Pfizer risk 3-dose series) 04/23/2021   • Influenza Vaccine (1) 09/01/2021         Last lab results:   No results found for: HGBA1C  CHOLESTEROL (mg/dL)   Date Value   08/11/2014 220 (H)     HDL (mg/dL)   Date Value   08/11/2014 92     TRIGLYCERIDE (mg/dL)   Date Value   08/11/2014 76     CALCULATED LDL (mg/dL)   Date Value   08/11/2014 113     No results found for: URMIC, UCR, MALBCR  No results found for: IFOB    3.) During the past 4 weeks, how would you rate your health?: Fair     4.) During the past 4 weeks, what was the hardest physical activity you could do for at least 2 minutes?: Light     6 a.) How many servings of Fruits and Vegetables do you have each day ( 1 serving = 1 piece of fruit, 1/2 cup fruits or vegetables): 1 per day     6 b.) How many servings of High Fiber / Whole Grain Foods to you have each day ( 1 serving = 1 cup cold cereal, 1/2 cup cooked cereal, 1 slice bread): 1 per day     8.) During the past 4 weeks, has your physical and emotional health limited your social activities with family, friends, neighbors, or other groups?: Quite a bit     11b.) Bowel control problems: Often     11e.) Tiredness or Fatigue: Often     13.) Do you need help with any of the following activities?: Do your housework or laundry, Prepare a meal     15.) How confident are you that you can control and manage most of your health problems?: Somewhat confident         Depression Screening:  Over the last 2 weeks, how often have you been bothered by the following problems?          PHQ2 Score: 2  PHQ2 Score Interpretation: No further  In [address]  [place] [address] [place], [address] 
[phone] fax Occupational Therapy Physician Update [x] Progress Note  [] Discharge Summary Patient name: Tomi Wheatley Start of Care: 2019 Referral source: Aneeshtabitha BraxtonDO marek : 1955 Medical/Treatment Diagnosis: Synovitis and tenosynovitis, unspecified [M65.9] Payor: Brayan Kruger / Plan: [employer] / Product Type: Medicare /  Onset Date:2017 Prior Hospitalization: see medical history Provider#: [id number] Medications: Verified on Patient Summary List   
Comorbidities: Fibromyalgia, depression, arthritis, HTN Prior Level of Function: Independent with ADL. IADL tasks without functional limitations on bilateral hands Visits from Start of Care: 6    Missed Visits: 2 Progress towards Goals: Short Term Goals: To be accomplished in 2  weeks: 1. Patient will be compliant with initial home exercise program to take an active role in their rehabilitation process. Goal Met 19 - pt verbalized and demonstrated compliance with HEP Status at Eval: pt educated in wrist ROM. 2. Patient will demonstrate a good understanding of their condition and strategies for self-management.   
Status at Eval: pt educated in activity modification and pain relief strategies such as moist heat application. Goal Met  - pt verbalized taking pain medication as needed for pian relief as well as hot pack use. Long Term Goals: To be accomplished in 4 weeks:             
1. Patient will have 40 degrees of left wrist extension in order to increase ease with ADL's such as bathing, eating and dressing and to eventually bear weight thru the left upper extremity as in pushing up from a chair. Goal Met 19 - 42 degrees 2. Patient will have 40 degrees of left wrist flexion in order to perform hygiene tasks and /or work with tools such as a screwdriver. Goal Met  - 52 degrees 3. Patient will have 60 degrees of right forearm supination in order to perform ADL's including washing face and accepting change. 4. Patient will show a 13 point improvement on FOTO functional status measure to improve overall functional performance. ASSESSMENT/RECOMMENDATIONS: Patient came in to skilled OT today with pain rating 7-10/10 pain level in left hand/wrist with pain worsening since 3 days ago. Last week she did not report pain in wrists and tolerated therex well. She reports she did not come for her last skilled OT session because her pain level was high. She presented today, unable to flex and extend her left wrist without increased pain. She is c/o pain on the dorsum left hand where there is a possible ganglion cyst that was identified on x-ray in August 2018. The cyst appears to be larger since beginning skilled OT. Very poor tolerance to touch and unable to perform IASTM or US to decrease pain level. I have advised patient to see physician before continuing skilled OT services. []Continue therapy per initial plan/protocol at a frequency of   x per week for  weeks []Continue therapy with the following recommended changes:_____________________      _____________________________________________________________________ []Discontinue therapy progressing towards or have reached established goals []Discontinue therapy due to lack of appreciable progress towards goals []Discontinue therapy due to lack of attendance or compliance [x]Await Physician's recommendations/decisions regarding therapy []Other:________________________________________________________________ Thank you for this referral. Heber Jorge OT 1/31/2019 2:26 PM 
NOTE TO PHYSICIAN:  PLEASE COMPLETE THE ORDERS BELOW AND  
FAX TO [facility]: [phone] If you are unable to process this request in 24 hours please contact our office: [phone] screening needed  1. Little interest or pleasure in activity?: 1  2. Feeling down, depressed, or hopeless?: 1          Advance Directives:  on file     ? I have read the above report and request that my patient continue as recommended. ? I have read the above report and request that my patient continue therapy with the following changes/special instructions:___________________________________________________________ ? I have read the above report and request that my patient be discharged from therapy.  
 
[de-identified] Signature:____________Date:_________TIME:________ 
 
[employer], Date and Time must be completed for valid certification **

## 2021-09-14 ENCOUNTER — TELEPHONE (OUTPATIENT)
Dept: FAMILY MEDICINE CLINIC | Age: 66
End: 2021-09-14

## 2021-09-14 NOTE — TELEPHONE ENCOUNTER
[facility]ed [facility] told order needs to be sent to [facility] of [address] [place] [address].

## 2021-09-14 NOTE — TELEPHONE ENCOUNTER
Alexia from [address] delivered, called checking on the status of a order for incontinence supply. Please review and advise , she can reached at [phone].

## 2021-09-16 ENCOUNTER — TELEPHONE (OUTPATIENT)
Dept: FAMILY MEDICINE CLINIC | Age: 66
End: 2021-09-16

## 2021-09-16 NOTE — TELEPHONE ENCOUNTER
Patient has an appt for chr condition f/u on 9/20, she has not had her labs done, called patient to remind to get labs done no answer unable to leave message mailbox is full.

## 2021-09-16 NOTE — TELEPHONE ENCOUNTER
Called patient she has been reminded she needs labs done for her f/u with Dr. Verenice Jarvis on Monday 9/20, she says she will get her labs done at [facility] tomorrow.

## 2021-09-17 ENCOUNTER — HOSPITAL ENCOUNTER (OUTPATIENT)
Dept: LAB | Age: 66
Discharge: HOME | End: 2021-09-17
Payer: MEDICARE

## 2021-09-17 DIAGNOSIS — R68.89 OTHER GENERAL SYMPTOMS AND SIGNS: ICD-10-CM

## 2021-09-17 DIAGNOSIS — E11.9 TYPE 2 DIABETES MELLITUS WITHOUT COMPLICATION, WITHOUT LONG-TERM CURRENT USE OF INSULIN (HCC): ICD-10-CM

## 2021-09-17 LAB
ALBUMIN SERPL-MCNC: 3.8 G/DL (ref 3.4–5)
ALBUMIN/GLOB SERPL: 1 {RATIO} (ref 0.8–1.7)
ALP SERPL-CCNC: 142 U/L (ref 45–117)
ALT SERPL-CCNC: 38 U/L (ref 13–56)
ANION GAP SERPL CALC-SCNC: 5 MMOL/L (ref 3–18)
AST SERPL-CCNC: 30 U/L (ref 10–38)
BILIRUB SERPL-MCNC: 0.3 MG/DL (ref 0.2–1)
BUN SERPL-MCNC: 16 MG/DL (ref 7–18)
BUN/CREAT SERPL: 17 (ref 12–20)
CALCIUM SERPL-MCNC: 8.9 MG/DL (ref 8.5–10.1)
CHLORIDE SERPL-SCNC: 105 MMOL/L (ref 100–111)
CHOLEST SERPL-MCNC: 160 MG/DL
CO2 SERPL-SCNC: 28 MMOL/L (ref 21–32)
CREAT SERPL-MCNC: 0.92 MG/DL (ref 0.6–1.3)
CREAT UR-MCNC: 138 MG/DL (ref 30–125)
EST. AVERAGE GLUCOSE BLD GHB EST-MCNC: 160 MG/DL
FOLATE SERPL-MCNC: >20 NG/ML (ref 3.1–17.5)
GLOBULIN SER CALC-MCNC: 3.9 G/DL (ref 2–4)
GLUCOSE SERPL-MCNC: 127 MG/DL (ref 74–99)
HBA1C MFR BLD: 7.2 % (ref 4.2–5.6)
HDLC SERPL-MCNC: 52 MG/DL (ref 40–60)
HDLC SERPL: 3.1 {RATIO} (ref 0–5)
LDLC SERPL CALC-MCNC: 74.6 MG/DL (ref 0–100)
LIPID PROFILE,FLP: ABNORMAL
MAGNESIUM SERPL-MCNC: 2 MG/DL (ref 1.6–2.6)
MICROALBUMIN UR-MCNC: 0.59 MG/DL (ref 0–3)
MICROALBUMIN/CREAT UR-RTO: 4 MG/G (ref 0–30)
POTASSIUM SERPL-SCNC: 4.5 MMOL/L (ref 3.5–5.5)
PROT SERPL-MCNC: 7.7 G/DL (ref 6.4–8.2)
SODIUM SERPL-SCNC: 138 MMOL/L (ref 136–145)
TRIGL SERPL-MCNC: 167 MG/DL (ref ?–150)
VIT B12 SERPL-MCNC: 665 PG/ML (ref 211–911)
VLDLC SERPL CALC-MCNC: 33.4 MG/DL

## 2021-09-17 PROCEDURE — 82043 UR ALBUMIN QUANTITATIVE: CPT

## 2021-09-17 PROCEDURE — 82607 VITAMIN B-12: CPT

## 2021-09-17 PROCEDURE — 36415 COLL VENOUS BLD VENIPUNCTURE: CPT

## 2021-09-17 PROCEDURE — 83735 ASSAY OF MAGNESIUM: CPT

## 2021-09-17 PROCEDURE — 80061 LIPID PANEL: CPT

## 2021-09-17 PROCEDURE — 83036 HEMOGLOBIN GLYCOSYLATED A1C: CPT

## 2021-09-17 PROCEDURE — 80053 COMPREHEN METABOLIC PANEL: CPT

## 2021-09-20 ENCOUNTER — VIRTUAL VISIT (OUTPATIENT)
Dept: FAMILY MEDICINE CLINIC | Age: 66
End: 2021-09-20
Payer: MEDICARE

## 2021-09-20 DIAGNOSIS — I10 ESSENTIAL HYPERTENSION: ICD-10-CM

## 2021-09-20 DIAGNOSIS — E11.9 TYPE 2 DIABETES MELLITUS WITHOUT COMPLICATION, WITHOUT LONG-TERM CURRENT USE OF INSULIN (HCC): Primary | ICD-10-CM

## 2021-09-20 DIAGNOSIS — E78.5 HYPERLIPIDEMIA, UNSPECIFIED HYPERLIPIDEMIA TYPE: ICD-10-CM

## 2021-09-20 DIAGNOSIS — M05.731 RHEUMATOID ARTHRITIS INVOLVING RIGHT WRIST WITH POSITIVE RHEUMATOID FACTOR (HCC): ICD-10-CM

## 2021-09-20 DIAGNOSIS — J30.1 ALLERGIC RHINITIS DUE TO POLLEN, UNSPECIFIED SEASONALITY: ICD-10-CM

## 2021-09-20 DIAGNOSIS — B00.2 RECURRENT ORAL HERPES SIMPLEX: ICD-10-CM

## 2021-09-20 PROCEDURE — G8400 PT W/DXA NO RESULTS DOC: HCPCS | Performed by: FAMILY MEDICINE

## 2021-09-20 PROCEDURE — G9717 DOC PT DX DEP/BP F/U NT REQ: HCPCS | Performed by: FAMILY MEDICINE

## 2021-09-20 PROCEDURE — 2022F DILAT RTA XM EVC RTNOPTHY: CPT | Performed by: FAMILY MEDICINE

## 2021-09-20 PROCEDURE — 3017F COLORECTAL CA SCREEN DOC REV: CPT | Performed by: FAMILY MEDICINE

## 2021-09-20 PROCEDURE — 99214 OFFICE O/P EST MOD 30 MIN: CPT | Performed by: FAMILY MEDICINE

## 2021-09-20 PROCEDURE — 3051F HG A1C>EQUAL 7.0%<8.0%: CPT | Performed by: FAMILY MEDICINE

## 2021-09-20 PROCEDURE — 1101F PT FALLS ASSESS-DOCD LE1/YR: CPT | Performed by: FAMILY MEDICINE

## 2021-09-20 PROCEDURE — G9899 SCRN MAM PERF RSLTS DOC: HCPCS | Performed by: FAMILY MEDICINE

## 2021-09-20 PROCEDURE — 1090F PRES/ABSN URINE INCON ASSESS: CPT | Performed by: FAMILY MEDICINE

## 2021-09-20 PROCEDURE — G8756 NO BP MEASURE DOC: HCPCS | Performed by: FAMILY MEDICINE

## 2021-09-20 PROCEDURE — G8427 DOCREV CUR MEDS BY ELIG CLIN: HCPCS | Performed by: FAMILY MEDICINE

## 2021-09-20 RX ORDER — METFORMIN HYDROCHLORIDE 500 MG/1
1000 TABLET, EXTENDED RELEASE ORAL
Qty: 180 TABLET | Refills: 4 | Status: SHIPPED | OUTPATIENT
Start: 2021-09-20 | End: 2022-09-27

## 2021-09-20 RX ORDER — VALACYCLOVIR HYDROCHLORIDE 500 MG/1
500 TABLET, FILM COATED ORAL DAILY
Qty: 90 TABLET | Refills: 4 | Status: SHIPPED | OUTPATIENT
Start: 2021-09-20

## 2021-09-20 RX ORDER — FLUTICASONE PROPIONATE 50 MCG
2 SPRAY, SUSPENSION (ML) NASAL AS NEEDED
Qty: 3 EACH | Refills: 4 | Status: SHIPPED | OUTPATIENT
Start: 2021-09-20 | End: 2022-09-27

## 2021-09-20 RX ORDER — ATORVASTATIN CALCIUM 40 MG/1
TABLET, FILM COATED ORAL
Qty: 90 TABLET | Refills: 0 | Status: SHIPPED | OUTPATIENT
Start: 2021-09-20 | End: 2022-02-24

## 2021-09-20 NOTE — ASSESSMENT & PLAN NOTE
Not at target with A1c 7.2. Reportedly adhering to diet. Doubling metformin to 1000 mg daily.  follow up 3 months

## 2021-09-20 NOTE — PROGRESS NOTES
Ngoc Johnston is a 78 y.o. female, established patient, who was seen by synchronous (real-time) audio-video technology on 9/20/2021 for     Assessment & Plan:   1. Type 2 diabetes mellitus without complication, without long-term current use of insulin (HCC)  Assessment & Plan:  Not at target with A1c 7.2. Reportedly adhering to diet. Doubling metformin to 1000 mg daily. follow up 3 months   Orders:  -     metFORMIN ER (GLUCOPHAGE XR) 500 mg tablet; Take 2 Tablets by mouth daily (with dinner). , Normal, Disp-180 Tablet, R-4  -     atorvastatin (LIPITOR) 40 mg tablet; TAKE ONE TABLET BY MOUTH DAILY, Normal, Disp-90 Tablet, R-0  -     HEMOGLOBIN A1C WITH EAG; Future  2. 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 Lost to follow up with confusion at the time of rheumatology consult. Labs 2/2019: Elevated , ESR 45, RF 17.5. Referring again. Orders:  -     REFERRAL TO RHEUMATOLOGY  3. Hyperlipidemia, unspecified hyperlipidemia type  Assessment & Plan:  Well controlled, continue present management    Orders:  -     atorvastatin (LIPITOR) 40 mg tablet; TAKE ONE TABLET BY MOUTH DAILY, Normal, Disp-90 Tablet, R-0  4. Essential hypertension  Assessment & Plan:  Most recent in office value in July well controlled, continue present management    5. Allergic rhinitis due to pollen, unspecified seasonality  Assessment & Plan:  Well controlled, continue present management with nasal steroids   Orders:  -     fluticasone propionate (Flonase) 50 mcg/actuation nasal spray; 2 Sprays by Both Nostrils route as needed for Rhinitis., Normal, Disp-3 Each, R-4  6. Recurrent oral herpes simplex  Assessment & Plan:  Well controlled with daily suppression with valacyclovir. Continue   Orders:  -     valACYclovir (VALTREX) 500 mg tablet; Take 1 Tablet by mouth daily. , Normal, Disp-90 Tablet, R-4        Follow-up and Dispositions    · Return for angular cheiliis follow up (mouth sores) (30); diabetes follow up 3 months, labs prior (30). 712  Subjective:   Saw Dr. Karlene Noel for bilateral pedal edema 7/2021 , HFpEF, atypical noncardiac chest pain. Doing well with furosemide, metoprolol and compression stockings.  follow up 6 months  Was seen by urology overactive bladder but meds $    DM2 - on metformin  Had a nutrition call  Lab Results   Component Value Date/Time    Hemoglobin A1c 7.2 (H) 09/17/2021 01:42 PM    Hemoglobin A1c 7.3 (H) 03/29/2021 10:40 AM    Hemoglobin A1c 7.1 (H) 12/28/2020 01:29 PM    Hemoglobin A1c, External 5.9 06/27/2016 12:00 AM    Glucose 127 (H) 09/17/2021 01:42 PM    Glucose (POC) 129 (H) 07/08/2021 01:02 PM    Microalbumin/Creat ratio (mg/g creat) 4 09/17/2021 01:42 PM    Microalbumin,urine random 0.59 09/17/2021 01:42 PM    LDL, calculated 74.6 09/17/2021 01:42 PM    Creatinine, POC 0.8 05/08/2021 01:15 PM    Creatinine 0.92 09/17/2021 01:42 PM       follow up RA - lost to follow up   follow up hypertension - asymptomatic  follow up oral HSV - no breakthroughs with suppression    Results for orders placed or performed during the hospital encounter of 09/17/21   HEMOGLOBIN A1C WITH EAG   Result Value Ref Range    Hemoglobin A1c 7.2 (H) 4.2 - 5.6 %    Est. average glucose 160 mg/dL   LIPID PANEL W/ REFLX DIRECT LDL   Result Value Ref Range    LIPID PROFILE          Cholesterol, total 160 <200 MG/DL    Triglyceride 167 (H) <150 MG/DL    HDL Cholesterol 52 40 - 60 MG/DL    LDL, calculated 74.6 0 - 100 MG/DL    VLDL, calculated 33.4 MG/DL    CHOL/HDL Ratio 3.1 0 - 5.0     MAGNESIUM   Result Value Ref Range    Magnesium 2.0 1.6 - 2.6 mg/dL   METABOLIC PANEL, COMPREHENSIVE   Result Value Ref Range    Sodium 138 136 - 145 mmol/L    Potassium 4.5 3.5 - 5.5 mmol/L    Chloride 105 100 - 111 mmol/L    CO2 28 21 - 32 mmol/L    Anion gap 5 3.0 - 18 mmol/L    Glucose 127 (H) 74 - 99 mg/dL    BUN 16 7.0 - 18 MG/DL    Creatinine 0.92 0.6 - 1.3 MG/DL BUN/Creatinine ratio 17 12 - 20      GFR est AA >60 >60 ml/min/1.73m2    GFR est non-AA >60 >60 ml/min/1.73m2    Calcium 8.9 8.5 - 10.1 MG/DL    Bilirubin, total 0.3 0.2 - 1.0 MG/DL    ALT (SGPT) 38 13 - 56 U/L    AST (SGOT) 30 10 - 38 U/L    Alk. phosphatase 142 (H) 45 - 117 U/L    Protein, total 7.7 6.4 - 8.2 g/dL    Albumin 3.8 3.4 - 5.0 g/dL    Globulin 3.9 2.0 - 4.0 g/dL    A-G Ratio 1.0 0.8 - 1.7     MICROALBUMIN, UR, RAND W/ MICROALB/CREAT RATIO   Result Value Ref Range    Microalbumin,urine random 0.59 0 - 3.0 MG/DL    Creatinine, urine 138.00 (H) 30 - 125 mg/dL    Microalbumin/Creat ratio (mg/g creat) 4 0 - 30 mg/g   VITAMIN B12 & FOLATE   Result Value Ref Range    Vitamin B12 665 211 - 911 pg/mL    Folate >20.0 (H) 3.10 - 17.50 ng/mL      Lab Results   Component Value Date/Time    Iron 266 (H) 07/06/2021 04:07 PM    TIBC 460 (H) 07/06/2021 04:07 PM    Iron % saturation 58 (H) 07/06/2021 04:07 PM    Ferritin 49 07/06/2021 04:07 PM     Lab Results   Component Value Date/Time    Cholesterol, total 160 09/17/2021 01:42 PM    HDL Cholesterol 52 09/17/2021 01:42 PM    LDL, calculated 74.6 09/17/2021 01:42 PM    VLDL, calculated 33.4 09/17/2021 01:42 PM    Triglyceride 167 (H) 09/17/2021 01:42 PM    CHOL/HDL Ratio 3.1 09/17/2021 01:42 PM               Objective:     BP Readings from Last 3 Encounters:   07/20/21 138/85   07/08/21 127/74   07/01/21 112/82      No flowsheet data found. General: alert, cooperative, no distress   Mental  status: normal mood, behavior, speech, dress, motor activity, and thought processes, able to follow commands   HENT: NCAT   Neck: no visualized mass   Resp: no respiratory distress   Neuro: no gross deficits   Skin: no discoloration or lesions of concern on visible areas   Psychiatric: normal affect, consistent with stated mood, no evidence of hallucinations     Additional exam findings:      We discussed the expected course, resolution and complications of the diagnosis(es) in detail. Medication risks, benefits, costs, interactions, and alternatives were discussed as indicated. I advised her to contact the office if her condition worsens, changes or fails to improve as anticipated. She expressed understanding with the diagnosis(es) and plan. Anne Barnes was evaluated through a patient-initiated, synchronous (real-time) audio-video encounter. The family is aware that it is a billable service. Verbal consent to proceed has been obtained within the past 12 months. It was conducted pursuant to the emergency declaration under the [address] [place] [address], [address] [place] [address] authority and the [employer] General Act. Patient identification was verified, and a caregiver was present when appropriate. I was at home or in the office. The patient was at home.       Juani Thapa MD

## 2021-09-20 NOTE — PROGRESS NOTES
Patient being seen via VV today for chronic condition follow up no concerns. 1. \"Have you been to the ER, urgent care clinic since your last visit? Hospitalized since your last visit? \" No    2. \"Have you seen or consulted any other health care providers outside of the [address] [place] [address] since your last visit? \" Has been seen by Urology     3. For patients aged 39-70: Has the patient had a colonoscopy? Yes, HM satisfied with blue hyperlink     If the patient is female:    4. For patients aged 41-77: Has the patient had a mammogram within the past 2 years? No     5. For patients aged 21-30: Has the patient had a pap smear?  No  n/a Hysterectomy

## 2021-09-20 NOTE — ASSESSMENT & PLAN NOTE
Uncontrolled. Lost to follow up with confusion at the time of rheumatology consult. Labs 2/2019: Elevated , ESR 45, RF 17.5. Referring again.

## 2021-09-23 NOTE — PROGRESS NOTES
Mouth Sores follow up scheduled for 10/18/2021  diabetes follow up 3 months scheduled for 12/21/2021 w/labs prior 12/16/2021

## 2021-10-04 ENCOUNTER — VIRTUAL VISIT (OUTPATIENT)
Dept: FAMILY MEDICINE CLINIC | Age: 66
End: 2021-10-04

## 2021-10-04 ENCOUNTER — TELEPHONE (OUTPATIENT)
Dept: FAMILY MEDICINE CLINIC | Age: 66
End: 2021-10-04

## 2021-10-04 RX ORDER — LINACLOTIDE 145 UG/1
145 CAPSULE, GELATIN COATED ORAL DAILY
COMMUNITY
Start: 2021-09-27

## 2021-10-04 RX ORDER — CLINDAMYCIN HYDROCHLORIDE 300 MG/1
CAPSULE ORAL
COMMUNITY
Start: 2021-09-29 | End: 2021-10-18 | Stop reason: ALTCHOICE

## 2021-10-04 RX ORDER — SUCRALFATE 1 G/1
1 TABLET ORAL
COMMUNITY
Start: 2021-09-28

## 2021-10-04 NOTE — PROGRESS NOTES
Patient being seen today via VV to follow up on her skin condition. She does not have any concerns. 1. \"Have you been to the ER, urgent care clinic since your last visit? Hospitalized since your last visit? \" No    2. \"Have you seen or consulted any other health care providers outside of the [address] [place] [address] since your last visit? \" Has seen her dentist and GI     3. For patients aged 39-70: Has the patient had a colonoscopy? Yes, HM satisfied with blue hyperlink     If the patient is female:    4. For patients aged 41-77: Has the patient had a mammogram within the past 2 years? No    5. For patients aged 21-65: Has the patient had a pap smear?  No n/a

## 2021-10-07 ENCOUNTER — TELEPHONE (OUTPATIENT)
Dept: FAMILY MEDICINE CLINIC | Age: 66
End: 2021-10-07

## 2021-10-07 RX ORDER — FUROSEMIDE 20 MG/1
20 TABLET ORAL DAILY
Qty: 30 TABLET | Refills: 6 | Status: SHIPPED | OUTPATIENT
Start: 2021-10-07 | End: 2021-12-28

## 2021-10-07 NOTE — TELEPHONE ENCOUNTER
Called [facility] to schedule an appt for patient no answer left message requesting return call, office number has been left.

## 2021-10-11 NOTE — PATIENT INSTRUCTIONS
Abdominal Pain: Care Instructions  Your Care Instructions    Abdominal pain has many possible causes. Some aren't serious and get better on their own in a few days. Others need more testing and treatment. If your pain continues or gets worse, you need to be rechecked and may need more tests to find out what is wrong. You may need surgery to correct the problem. Don't ignore new symptoms, such as fever, nausea and vomiting, urination problems, pain that gets worse, and dizziness. These may be signs of a more serious problem. Your doctor may have recommended a follow-up visit in the next 8 to 12 hours. If you are not getting better, you may need more tests or treatment. The doctor has checked you carefully, but problems can develop later. If you notice any problems or new symptoms, get medical treatment right away. Follow-up care is a key part of your treatment and safety. Be sure to make and go to all appointments, and call your doctor if you are having problems. It's also a good idea to know your test results and keep a list of the medicines you take. How can you care for yourself at home? · Rest until you feel better. · To prevent dehydration, drink plenty of fluids, enough so that your urine is light yellow or clear like water. Choose water and other caffeine-free clear liquids until you feel better. If you have kidney, heart, or liver disease and have to limit fluids, talk with your doctor before you increase the amount of fluids you drink. · If your stomach is upset, eat mild foods, such as rice, dry toast or crackers, bananas, and applesauce. Try eating several small meals instead of two or three large ones. · Wait until 48 hours after all symptoms have gone away before you have spicy foods, alcohol, and drinks that contain caffeine. · Do not eat foods that are high in fat. · Avoid anti-inflammatory medicines such as aspirin, ibuprofen (Advil, Motrin), and naproxen (Aleve).  These can cause Patient with blood pressure still 135/95  He is in hydroureteral once a day  Continue with them Norvasc 5 mg daily  Continue with metoprolol SR 50 mg daily   stomach upset. Talk to your doctor if you take daily aspirin for another health problem. When should you call for help? Call 911 anytime you think you may need emergency care. For example, call if:  · You passed out (lost consciousness). · You pass maroon or very bloody stools. · You vomit blood or what looks like coffee grounds. · You have new, severe belly pain. Call your doctor now or seek immediate medical care if:  · Your pain gets worse, especially if it becomes focused in one area of your belly. · You have a new or higher fever. · Your stools are black and look like tar, or they have streaks of blood. · You have unexpected vaginal bleeding. · You have symptoms of a urinary tract infection. These may include:  ¨ Pain when you urinate. ¨ Urinating more often than usual.  ¨ Blood in your urine. · You are dizzy or lightheaded, or you feel like you may faint. Watch closely for changes in your health, and be sure to contact your doctor if:  · You are not getting better after 1 day (24 hours). Where can you learn more? Go to http://diana-lashonda.info/. Enter Q922 in the search box to learn more about \"Abdominal Pain: Care Instructions. \"  Current as of: May 27, 2016  Content Version: 11.2  © 4828-7949 [employer]. Care instructions adapted under license by [employer] (which disclaims liability or warranty for this information). If you have questions about a medical condition or this instruction, always ask your healthcare professional. [place] [address] any warranty or liability for your use of this information.

## 2021-10-18 ENCOUNTER — OFFICE VISIT (OUTPATIENT)
Dept: FAMILY MEDICINE CLINIC | Age: 66
End: 2021-10-18
Payer: MEDICARE

## 2021-10-18 VITALS
HEART RATE: 122 BPM | RESPIRATION RATE: 12 BRPM | BODY MASS INDEX: 32.44 KG/M2 | TEMPERATURE: 97.7 F | DIASTOLIC BLOOD PRESSURE: 76 MMHG | SYSTOLIC BLOOD PRESSURE: 124 MMHG | WEIGHT: 190 LBS | OXYGEN SATURATION: 97 % | HEIGHT: 64 IN

## 2021-10-18 DIAGNOSIS — B37.83 ANGULAR CHEILITIS WITH CANDIDIASIS: Primary | ICD-10-CM

## 2021-10-18 DIAGNOSIS — Z12.31 SCREENING MAMMOGRAM, ENCOUNTER FOR: ICD-10-CM

## 2021-10-18 PROCEDURE — 3017F COLORECTAL CA SCREEN DOC REV: CPT | Performed by: FAMILY MEDICINE

## 2021-10-18 PROCEDURE — G8536 NO DOC ELDER MAL SCRN: HCPCS | Performed by: FAMILY MEDICINE

## 2021-10-18 PROCEDURE — 99213 OFFICE O/P EST LOW 20 MIN: CPT | Performed by: FAMILY MEDICINE

## 2021-10-18 PROCEDURE — G8417 CALC BMI ABV UP PARAM F/U: HCPCS | Performed by: FAMILY MEDICINE

## 2021-10-18 PROCEDURE — G9717 DOC PT DX DEP/BP F/U NT REQ: HCPCS | Performed by: FAMILY MEDICINE

## 2021-10-18 PROCEDURE — 1090F PRES/ABSN URINE INCON ASSESS: CPT | Performed by: FAMILY MEDICINE

## 2021-10-18 PROCEDURE — G8752 SYS BP LESS 140: HCPCS | Performed by: FAMILY MEDICINE

## 2021-10-18 PROCEDURE — G8754 DIAS BP LESS 90: HCPCS | Performed by: FAMILY MEDICINE

## 2021-10-18 PROCEDURE — 1101F PT FALLS ASSESS-DOCD LE1/YR: CPT | Performed by: FAMILY MEDICINE

## 2021-10-18 PROCEDURE — G9899 SCRN MAM PERF RSLTS DOC: HCPCS | Performed by: FAMILY MEDICINE

## 2021-10-18 PROCEDURE — G8427 DOCREV CUR MEDS BY ELIG CLIN: HCPCS | Performed by: FAMILY MEDICINE

## 2021-10-18 PROCEDURE — G8400 PT W/DXA NO RESULTS DOC: HCPCS | Performed by: FAMILY MEDICINE

## 2021-10-18 RX ORDER — KETOCONAZOLE 20 MG/G
CREAM TOPICAL
Qty: 15 G | Refills: 3 | Status: SHIPPED | OUTPATIENT
Start: 2021-10-18 | End: 2022-09-27

## 2021-10-18 NOTE — PROGRESS NOTES
Patient being seen today for follow up on her angular cheilitis. Cuurent treatment works well no concerns. 1. \"Have you been to the ER, urgent care clinic since your last visit? Hospitalized since your last visit? \" No    2. \"Have you seen or consulted any other health care providers outside of the [address] [place] [address] since your last visit? \" No     3. For patients aged 39-70: Has the patient had a colonoscopy? Yes, HM satisfied with blue hyperlink     If the patient is female:    4. For patients aged 41-77: Has the patient had a mammogram within the past 2 years? No    5. For patients aged 21-65: Has the patient had a pap smear?  No n/a

## 2021-10-18 NOTE — PROGRESS NOTES
Marc Carrillo (: 1955) is a 78 y.o. female, established patient, here for:    ASSESSMENT/PLAN:  1. Angular cheilitis with candidiasis  Assessment & Plan:  Discussed risk factors of dry mouth (quiescent currently) and dental caries. Current regimen suboptimal with symptoms twice weekly. Recommended DAILY application of Vaseline, honey at night, reserving ketoconazole for prn use with disease activity. Orders:  -     ketoconazole (NIZORAL) 2 % topical cream; APPLY TO AFFECTED AREA(S) TOPICALLY TWO TIMES A DAY, Normal, Disp-15 g, R-3  2. Screening mammogram, encounter for  -     [facility] MAMMO BI SCREENING INCL CAD; Future    Patient Instructions   [facility] Sched[facility]  [phone]    Please schedule your bone density test and your mammogram.    Use Vaseline at the corners of your mouth EVERY day to help prevent the cuts from returning. Use the cream to treat them. Schedule your follow up visit with the Rheumatologist regarding your joint pains. Make sure they have the lab reports from us for him to review. SUBJECTIVE/OBJECTIVE:  HPI    follow up angular cheilitis - \"cuts\" corners of the mouth approx 2x/week. Resolve with ketoconazole    Feeling well      Physical Exam  Constitutional:       General: She is not in acute distress. Appearance: She is well-developed. HENT:      Head: Normocephalic and atraumatic. Mouth/Throat:      Lips: Pink. No lesions. Pulmonary:      Effort: Pulmonary effort is normal.   Neurological:      Mental Status: She is alert and oriented to person, place, and time. Psychiatric:         Behavior: Behavior normal.         Thought Content:  Thought content normal.         Judgment: Judgment normal.               -- Annamarie Vidal MD

## 2021-10-18 NOTE — ASSESSMENT & PLAN NOTE
Discussed risk factors of dry mouth (quiescent currently) and dental caries. Current regimen suboptimal with symptoms twice weekly. Recommended DAILY application of Vaseline, honey at night, reserving ketoconazole for prn use with disease activity.

## 2021-10-18 NOTE — PATIENT INSTRUCTIONS
[facility] Scheduling  [phone]    Please schedule your bone density test and your mammogram.    Use Vaseline at the corners of your mouth EVERY day to help prevent the cuts from returning. Use the cream to treat them. Schedule your follow up visit with the Rheumatologist regarding your joint pains. Make sure they have the lab reports from us for him to review.

## 2021-11-24 ENCOUNTER — TELEPHONE (OUTPATIENT)
Dept: FAMILY MEDICINE CLINIC | Age: 66
End: 2021-11-24

## 2021-12-16 ENCOUNTER — HOSPITAL ENCOUNTER (OUTPATIENT)
Dept: BONE DENSITY | Age: 66
Discharge: HOME | End: 2021-12-16
Attending: FAMILY MEDICINE
Payer: MEDICARE

## 2021-12-16 ENCOUNTER — HOSPITAL ENCOUNTER (OUTPATIENT)
Dept: MAMMOGRAPHY | Age: 66
Discharge: HOME | End: 2021-12-16
Attending: FAMILY MEDICINE
Payer: MEDICARE

## 2021-12-16 DIAGNOSIS — Z12.31 SCREENING MAMMOGRAM, ENCOUNTER FOR: ICD-10-CM

## 2021-12-16 DIAGNOSIS — D50.9 IRON DEFICIENCY ANEMIA, UNSPECIFIED IRON DEFICIENCY ANEMIA TYPE: ICD-10-CM

## 2021-12-16 PROCEDURE — 77080 DXA BONE DENSITY AXIAL: CPT

## 2021-12-16 PROCEDURE — 77063 BREAST TOMOSYNTHESIS BI: CPT

## 2021-12-17 PROBLEM — M85.80 OSTEOPENIA: Status: ACTIVE | Noted: 2021-12-17

## 2021-12-17 RX ORDER — FERROUS SULFATE 325(65) MG
TABLET, DELAYED RELEASE (ENTERIC COATED) ORAL
Qty: 270 TABLET | Refills: 1 | OUTPATIENT
Start: 2021-12-17

## 2021-12-23 ENCOUNTER — TELEPHONE (OUTPATIENT)
Dept: FAMILY MEDICINE CLINIC | Age: 66
End: 2021-12-23

## 2021-12-27 NOTE — TELEPHONE ENCOUNTER
Called patient  verified she has been made aware her iron tablet will need to be refilled by her GI specialists told I had asked her pharmacy to contact that office for refills and advised that she call Dr. Shay Ying to request refills as well, she has expressed understanding and agrees with this plan.

## 2021-12-28 RX ORDER — METOPROLOL SUCCINATE 25 MG/1
TABLET, EXTENDED RELEASE ORAL
Qty: 30 TABLET | Refills: 1 | Status: SHIPPED | OUTPATIENT
Start: 2021-12-28 | End: 2022-03-11 | Stop reason: SDUPTHER

## 2021-12-29 DIAGNOSIS — D50.9 IRON DEFICIENCY ANEMIA, UNSPECIFIED IRON DEFICIENCY ANEMIA TYPE: ICD-10-CM

## 2021-12-29 NOTE — TELEPHONE ENCOUNTER
Patient is seeing GI for anemia management, she as well as pharmacy have been made aware this request will need to be routed to Dr. Giovanni Kuo.

## 2021-12-30 RX ORDER — FERROUS SULFATE 325(65) MG
TABLET, DELAYED RELEASE (ENTERIC COATED) ORAL
Qty: 270 TABLET | Refills: 1 | Status: SHIPPED | OUTPATIENT
Start: 2021-12-30

## 2021-12-30 NOTE — TELEPHONE ENCOUNTER
I accidentally clicked the Approve button (as I was thinking I approved of your response). No need to do anything.  Thanks, ERIKA

## 2022-02-02 ENCOUNTER — TELEPHONE (OUTPATIENT)
Dept: FAMILY MEDICINE CLINIC | Age: 67
End: 2022-02-02

## 2022-02-23 ENCOUNTER — PATIENT OUTREACH (OUTPATIENT)
Dept: CASE MANAGEMENT | Age: 67
End: 2022-02-23

## 2022-02-23 DIAGNOSIS — E78.5 HYPERLIPIDEMIA, UNSPECIFIED HYPERLIPIDEMIA TYPE: ICD-10-CM

## 2022-02-23 DIAGNOSIS — E11.9 TYPE 2 DIABETES MELLITUS WITHOUT COMPLICATION, WITHOUT LONG-TERM CURRENT USE OF INSULIN (HCC): ICD-10-CM

## 2022-02-23 NOTE — PROGRESS NOTES
Complex Case Management      Date/Time:  2022 3:53 PM    Method of communication with patient: phone    [facility] contacted the Patient by telephone to perform Ambulatory Care Coordination. Verified name and  with Patient as identifiers. Provided introduction to self, and explanation of the Ambulatory Care Manager's role. Reviewed most recent clinic visit w/ Patient who verbalized understanding. Patient given an opportunity to ask questions. Top Challenges reviewed with the patient   1. Introduction of ACM and explanation of case management services  2. Patient needs PCP appointment for hip pain     Appointment scheduled with PCP on 3/28/22 @3:15 pm for hip pain (virtual visit)  Upcoming appointments were reviewed with patient. Patient verbalized acknowledgement. Patient reports taking medications as prescribed and denies needing any refills at this time. The Patient agrees to contact the PCP office or the [address] [place] [address] for questions related to their healthcare. Provided contact information for future reference. Disease Specific:   N/A    Home Health Active: No  3/28 virtual visit  3:15     DME Active: No     Barriers to care? None identified at this outreach    Advance Care Planning:   Does patient have an Advance Directive:  not on file     Medication(s):   Medication reconciliation was not performed with patient, who verbalizes understanding of administration of home medications. There were no barriers to obtaining medications identified at this time.     Referral to Pharm D needed: no     Current Outpatient Medications   Medication Sig    metoprolol succinate (TOPROL-XL) 25 mg XL tablet TAKE ONE TABLET BY MOUTH DAILY (TOPROL-XL)    ferrous sulfate (IRON) 325 mg (65 mg iron) EC tablet TAKE ONE TABLET BY MOUTH THREE TIMES A DAY WITH A MEAL - START WITH ONCE DAILY AND INCREASE AS TOLERATED    furosemide (LASIX) 20 mg tablet TAKE ONE TABLET BY MOUTH DAILY (LASIX)    ketoconazole (NIZORAL) 2 % topical cream APPLY TO AFFECTED AREA(S) TOPICALLY TWO TIMES A DAY    sucralfate (CARAFATE) 1 gram tablet     Linzess 145 mcg cap capsule     valACYclovir (VALTREX) 500 mg tablet Take 1 Tablet by mouth daily.  metFORMIN ER (GLUCOPHAGE XR) 500 mg tablet Take 2 Tablets by mouth daily (with dinner).  fluticasone propionate (Flonase) 50 mcg/actuation nasal spray 2 Sprays by Both Nostrils route as needed for Rhinitis.  atorvastatin (LIPITOR) 40 mg tablet TAKE ONE TABLET BY MOUTH DAILY    chlorhexidine (PERIDEX) 0.12 % solution     doxepin (SINEquan) 50 mg capsule     omeprazole (PRILOSEC) 40 mg capsule     ondansetron hcl (ZOFRAN) 4 mg tablet TAKE 1 TO 2 TABLETS BY MOUTH AS NEEDED BEFORE BOWEL PREP IF YOU GET NAUSEOUS    trospium (SANCTURA XL) 60 mg capsule Take 1 Capsule by mouth Daily (before breakfast).  Blood-Glucose Meter (Accu-Chek Juhi Plus Meter) misc Dx Code:E11.9    glucose blood VI test strips (Accu-Chek Juhi Plus test strp) strip Dx Code: E11.9    losartan (COZAAR) 50 mg tablet TAKE ONE TABLET BY MOUTH DAILY    promethazine (PHENERGAN) 12.5 mg tablet Take 12.5 mg by mouth as needed.  dicyclomine (BENTYL) 20 mg tablet Take 20 mg by mouth four (4) times daily as needed for Abdominal Cramps.  metoclopramide HCl (REGLAN) 5 mg tablet Take 5 mg by mouth daily.  QUEtiapine (SEROQUEL) 200 mg tablet Take 1 Tab by mouth daily. (Patient taking differently: Take 200 mg by mouth nightly.)    cholecalciferol, vitamin D3, 2,000 unit tab Take  by mouth.  cloNIDine HCL (CATAPRES) 0.3 mg tablet Take 0.3 mg by mouth nightly.  polyethylene glycol (MIRALAX) 17 gram packet Take 1 Packet by mouth daily. (Patient taking differently: Take 17 g by mouth daily as needed.)    oxyCODONE IR (ROXICODONE) 10 mg tab immediate release tablet 10 mg every eight (8) hours as needed.  DEXILANT 60 mg CpDB capsule (delayed release) Take 60 mg by mouth two (2) times a day.     morphine CR (MS CONTIN) 60 mg CR tablet Take 60 mg by mouth every twelve (12) hours.  diclofenac (VOLTAREN) 1 % gel Apply  to affected area four (4) times daily. (Patient taking differently: Apply  to affected area four (4) times daily as needed.)    cyclobenzaprine (FLEXERIL) 10 mg tablet TAKE 1 TABLET BY MOUTH THREE TIMES DAILY AS NEEDED FOR MUSCLE SPASMS FOR UP TO 30 DAYS.  naloxone (NARCAN) 4 mg/actuation nasal spray 1 [place] by IntraNASal route once as needed. Use 1 spray intranasally into 1 nostril. Use a new Narcan nasal spray for subsequent doses and administer into alternating nostrils. May repeat every 2 to 3 minutes as needed. (Patient not taking: Reported on 9/20/2021)     No current facility-administered medications for this visit. [facility] follow up appointment(s):   Future Appointments   Date Time Provider Gordo Marquez   3/9/2022  1:20 PM Tracie Parker DO [facility] BS AMB        Non-[facility] follow up appointment(s): n/a    Goals      Attends follow up appointments on schedule      2/23/22 PCP appointment scheduled for 3/28/22 @3:15       Take all medications as ordered      2/23/22 Patient reports taking medications as prescribed and denies needing any refills at this time.

## 2022-02-24 RX ORDER — ATORVASTATIN CALCIUM 40 MG/1
TABLET, FILM COATED ORAL
Qty: 90 TABLET | Refills: 0 | Status: SHIPPED | OUTPATIENT
Start: 2022-02-24 | End: 2022-05-24

## 2022-02-24 NOTE — TELEPHONE ENCOUNTER
Patient was last seen for her DM on 21 with 3 month f/u plan, medication was sent in same day 90 with no refills. Called patient to schedule her 3 month follow up  verified she has been scheduled for her follow up on 22 and will have her labs done a HV. Patient says she does need refills sent in before her appt.

## 2022-03-02 ENCOUNTER — PATIENT OUTREACH (OUTPATIENT)
Dept: CASE MANAGEMENT | Age: 67
End: 2022-03-02

## 2022-03-02 NOTE — PROGRESS NOTES
Complex Case Management  Follow-Up    Date/Time:  3/2/2022 9:45 AM     Ambulatory Care Manager contacted patient for Complex Case Management  follow up. Spoke to patient. Introduced self/role and reason for call. Patient reported:   States she feels like her stomach is \"inflammed\". States her upper endoscopy did not show h.pylori. She returns to GI on 3/24/22. States she does not eat spicy or acidic foods. She does get relief from Carafate. Upcoming appointments were reviewed with patient. Patient verbalized acknowledgement. Patient reports taking medications as prescribed and denies needing any refills at this time. Pertinent negatives:  n/a    Specialist appointments since last outreach? no  If so, specialist and date: n/a    Medications:   New medications since last outreach: no  Does patient need refills on any medications: no  Medication changes since last outreach (dose adjustments or discontinued meds): no       Barriers to care? None identified at this outreach      Patient reminded that there are physicians on call 24 hours a day / 7 days a week (M-F 5pm to 8am and from Friday 5pm until Monday 8a for the weekend) should the patient have questions or concerns. Future Appointments   Date Time Provider Gordo Marquez   3/9/2022  1:20 PM Jessica Larsen DO [facility] BS AMB   3/28/2022  3:15 PM Uzma Edwards MD [facility] BS AMB   4/5/2022  2:00 PM Uzma Edwards MD [facility] BS AMB        Other upcoming appointments:  GI 3/24/22    Goals      Attends follow up appointments on schedule      2/23/22 PCP appointment scheduled for 3/28/22 @3:15  3/2/22 Upcoming appointments were reviewed with patient. Patient verbalized acknowledgement.  Take all medications as ordered      2/23/22 Patient reports taking medications as prescribed and denies needing any refills at this time. 3/2/22 Patient reports taking medications as prescribed and denies needing any refills at this time.

## 2022-03-09 ENCOUNTER — OFFICE VISIT (OUTPATIENT)
Dept: CARDIOLOGY CLINIC | Age: 67
End: 2022-03-09
Payer: MEDICARE

## 2022-03-09 VITALS
BODY MASS INDEX: 32.1 KG/M2 | SYSTOLIC BLOOD PRESSURE: 130 MMHG | DIASTOLIC BLOOD PRESSURE: 78 MMHG | WEIGHT: 188 LBS | HEIGHT: 64 IN | HEART RATE: 102 BPM | OXYGEN SATURATION: 97 %

## 2022-03-09 DIAGNOSIS — I20.8 MICROVASCULAR ANGINA (HCC): Primary | ICD-10-CM

## 2022-03-09 DIAGNOSIS — I10 ESSENTIAL HYPERTENSION: ICD-10-CM

## 2022-03-09 DIAGNOSIS — R07.9 CHEST PAIN AT REST: ICD-10-CM

## 2022-03-09 PROCEDURE — G8399 PT W/DXA RESULTS DOCUMENT: HCPCS | Performed by: INTERNAL MEDICINE

## 2022-03-09 PROCEDURE — G8427 DOCREV CUR MEDS BY ELIG CLIN: HCPCS | Performed by: INTERNAL MEDICINE

## 2022-03-09 PROCEDURE — 1101F PT FALLS ASSESS-DOCD LE1/YR: CPT | Performed by: INTERNAL MEDICINE

## 2022-03-09 PROCEDURE — G8754 DIAS BP LESS 90: HCPCS | Performed by: INTERNAL MEDICINE

## 2022-03-09 PROCEDURE — G8417 CALC BMI ABV UP PARAM F/U: HCPCS | Performed by: INTERNAL MEDICINE

## 2022-03-09 PROCEDURE — 1090F PRES/ABSN URINE INCON ASSESS: CPT | Performed by: INTERNAL MEDICINE

## 2022-03-09 PROCEDURE — 3017F COLORECTAL CA SCREEN DOC REV: CPT | Performed by: INTERNAL MEDICINE

## 2022-03-09 PROCEDURE — G9717 DOC PT DX DEP/BP F/U NT REQ: HCPCS | Performed by: INTERNAL MEDICINE

## 2022-03-09 PROCEDURE — 99215 OFFICE O/P EST HI 40 MIN: CPT | Performed by: INTERNAL MEDICINE

## 2022-03-09 PROCEDURE — G8752 SYS BP LESS 140: HCPCS | Performed by: INTERNAL MEDICINE

## 2022-03-09 PROCEDURE — G8536 NO DOC ELDER MAL SCRN: HCPCS | Performed by: INTERNAL MEDICINE

## 2022-03-09 PROCEDURE — G9899 SCRN MAM PERF RSLTS DOC: HCPCS | Performed by: INTERNAL MEDICINE

## 2022-03-09 RX ORDER — NITROGLYCERIN 0.4 MG/1
0.4 TABLET SUBLINGUAL
Qty: 25 TABLET | Refills: 3 | Status: SHIPPED | OUTPATIENT
Start: 2022-03-09

## 2022-03-09 NOTE — PROGRESS NOTES
Eusebio Edge presents today for   Chief Complaint   Patient presents with    Follow-up     6  month    Chest Pain     sharp pain in the center of chest at times       Eusebio Edge preferred language for health care discussion is english/other. Is someone accompanying this pt? yes    Is the patient using any DME equipment during [address]? no    Depression Screening:  3 most recent PHQ Screens 3/9/2022   PHQ Not Done -   Little interest or pleasure in doing things Not at all   Feeling down, depressed, irritable, or hopeless Not at all   Total Score PHQ 2 0   Trouble falling or staying asleep, or sleeping too much -   Feeling tired or having little energy -   Poor appetite, weight loss, or overeating -   Feeling bad about yourself - or that you are a failure or have let yourself or your family down -   Trouble concentrating on things such as school, work, reading, or watching TV -   Moving or speaking so slowly that other people could have noticed; or the opposite being so fidgety that others notice -   Thoughts of being better off dead, or hurting yourself in some way -   PHQ 9 Score -   How difficult have these problems made it for you to do your work, take care of your home and get along with others -       Learning Assessment:  Learning Assessment 3/9/2022   PRIMARY LEARNER Patient   HIGHEST LEVEL OF EDUCATION - PRIMARY LEARNER  -   BARRIERS PRIMARY LEARNER -   CO-LEARNER CAREGIVER -   PRIMARY LANGUAGE ENGLISH   LEARNER PREFERENCE PRIMARY DEMONSTRATION     -     -   ANSWERED BY patient   RELATIONSHIP SELF       Abuse Screening:  Abuse Screening Questionnaire 7/1/2021   Do you ever feel afraid of your partner? N   Are you in a relationship with someone who physically or mentally threatens you? N   Is it safe for you to go home? Y       Fall Risk  Fall Risk Assessment, last 12 mths 3/9/2022   Able to walk? Yes   Fall in past 12 months? 0   Do you feel unsteady?  0   Are you worried about falling 0   Number of falls in past 12 months -   Fall with injury? -           Pt currently taking Anticoagulant therapy? no    Pt currently taking Antiplatelet therapy ? no      Coordination of Care:  1. Have you been to the ER, urgent care clinic since your last visit? Hospitalized since your last visit? no    2. Have you seen or consulted any other health care providers outside of the [address] [place] [address] since your last visit? Include any pap smears or colon screening.  no

## 2022-03-09 NOTE — PROGRESS NOTES
[facility]    Chief Complaint   Patient presents with    Follow-up     6  month    Chest Pain     sharp pain in the center of chest at times       HPI    [facility] is a 78 y.o. AAF who has followed with me for noncardiac CP. Due to her risk factors, sent her for [employer] 6/3/2020 and luckily had patent coronaries. Overall her chest pain greatly improved when I started her on metoprolol says it happens much less and with less severity. Unfortunately does still happen and she has well improved continue tachycardia. She comes with her daughter today also has concerns about if she should be taking aspirin said that she has some issues with her stomach and things look inflamed but has ongoing work-up. Also takes daily Lasix which has improved her swelling.     Cardiac RFs: postmenopausal/ age, HTN, remote polysubstance abuse incl tobacco and cocaine, +FH premature CAD    Past Medical History:   Diagnosis Date    Annular tear of lumbar disc 11/10/2014    Benign tumor of throat     Bipolar disorder (HCC)     Bone spur     right ankle    Cervicalgia 11/10/2014    Chronic pain     back    Degeneration of lumbar or lumbosacral intervertebral disc 11/10/2014    Degenerative disc disease     Depression     Diabetes (HCC)     Displacement of lumbar intervertebral disc without myelopathy 11/10/2014    Diverticular disease     Elevated white blood cell count     Fatty liver     Fibromyalgia     GERD (gastroesophageal reflux disease)     Hepatic cyst     Hepatic steatosis     Herpes labialis     Hypertension     Hypertension     IBS (irritable bowel syndrome)     Insomnia     Lung nodule     Nausea & vomiting     Pain in joint, multiple sites 11/10/2014    Postlaminectomy syndrome, cervical region 11/10/2014    Sinus infection 10/5/15    Ulcer     Urinary incontinence     Urinary retention        Past Surgical History:   Procedure Laterality Date    COLONOSCOPY N/A 4/13/2017 COLONOSCOPY performed by Arnulfo Armijo [facility] at [facility] COLONOSCOPY N/A 5/27/2021    COLONOSCOPY (incomplete) poor prep performed by Arnie Curtis MD at [address] COLONOSCOPY N/A 7/8/2021    COLONOSCOPY performed by Christelle Kenyon MD at [address] HX CATARACT REMOVAL      HX CERVICAL FUSION      HX HYSTERECTOMY      HX ORTHOPAEDIC      ganglion cyst removed, right hand    HX OTHER SURGICAL      cyst left thigh removed    HX TONSIL AND ADENOIDECTOMY      HX TUBAL LIGATION      HX WRIST FRACTURE TX      MS LAP,CHOLECYSTECTOMY N/A 02/27/2017    Dr. Lianna Thomas       Current Outpatient Medications   Medication Sig Dispense Refill    atorvastatin (LIPITOR) 40 mg tablet TAKE ONE TABLET BY MOUTH DAILY 90 Tablet 0    ferrous sulfate (IRON) 325 mg (65 mg iron) EC tablet TAKE ONE TABLET BY MOUTH THREE TIMES A DAY WITH A MEAL - START WITH ONCE DAILY AND INCREASE AS TOLERATED 270 Tablet 1    metoprolol succinate (TOPROL-XL) 25 mg XL tablet TAKE ONE TABLET BY MOUTH DAILY (TOPROL-XL) (Patient taking differently: Take 50 mg by mouth daily.) 30 Tablet 1    furosemide (LASIX) 20 mg tablet TAKE ONE TABLET BY MOUTH DAILY (LASIX) 30 Tablet 6    ketoconazole (NIZORAL) 2 % topical cream APPLY TO AFFECTED AREA(S) TOPICALLY TWO TIMES A DAY 15 g 3    sucralfate (CARAFATE) 1 gram tablet Take 1 g by mouth two (2) times daily as needed.  Linzess 145 mcg cap capsule Take 145 mcg by mouth daily.  valACYclovir (VALTREX) 500 mg tablet Take 1 Tablet by mouth daily. (Patient taking differently: Take 500 mg by mouth as needed.) 90 Tablet 4    metFORMIN ER (GLUCOPHAGE XR) 500 mg tablet Take 2 Tablets by mouth daily (with dinner). 180 Tablet 4    fluticasone propionate (Flonase) 50 mcg/actuation nasal spray 2 Sprays by Both Nostrils route as needed for Rhinitis. 3 Each 4    chlorhexidine (PERIDEX) 0.12 % solution Take 15 mL by mouth as needed.       doxepin (SINEquan) 50 mg capsule Take 2 capsules by mouth daily.      omeprazole (PRILOSEC) 40 mg capsule Take 40 mg by mouth daily.  trospium (SANCTURA XL) 60 mg capsule Take 1 Capsule by mouth Daily (before breakfast). 90 Capsule 3    Blood-Glucose Meter (Accu-Chek Juhi Plus Meter) misc Dx Code:E11.9 1 Each 0    glucose blood VI test strips (Accu-Chek Juhi Plus test strp) strip Dx Code: E11.9 100 Strip 4    losartan (COZAAR) 50 mg tablet TAKE ONE TABLET BY MOUTH DAILY 90 Tab 4    promethazine (PHENERGAN) 12.5 mg tablet Take 12.5 mg by mouth as needed.  dicyclomine (BENTYL) 20 mg tablet Take 20 mg by mouth four (4) times daily as needed for Abdominal Cramps.  metoclopramide HCl (REGLAN) 5 mg tablet Take 5 mg by mouth daily.  QUEtiapine (SEROQUEL) 200 mg tablet Take 1 Tab by mouth daily. 30 Tab 0    cholecalciferol, vitamin D3, 2,000 unit tab Take 2,000 Units by mouth daily.  cloNIDine HCL (CATAPRES) 0.3 mg tablet Take 0.3 mg by mouth nightly.  polyethylene glycol (MIRALAX) 17 gram packet Take 1 Packet by mouth daily. (Patient taking differently: Take 17 g by mouth daily as needed.) 30 Each 6    oxyCODONE IR (ROXICODONE) 10 mg tab immediate release tablet 10 mg every eight (8) hours as needed.  DEXILANT 60 mg CpDB capsule (delayed release) Take 60 mg by mouth two (2) times a day.  morphine CR (MS CONTIN) 60 mg CR tablet Take 60 mg by mouth every twelve (12) hours.  diclofenac (VOLTAREN) 1 % gel Apply  to affected area four (4) times daily. (Patient taking differently: Apply  to affected area four (4) times daily as needed.) 1 g 1    cyclobenzaprine (FLEXERIL) 10 mg tablet TAKE 1 TABLET BY MOUTH THREE TIMES DAILY AS NEEDED FOR MUSCLE SPASMS FOR UP TO 30 DAYS. 90 Tab 2    naloxone (NARCAN) 4 mg/actuation nasal spray 1 [place] by IntraNASal route once as needed. Use 1 spray intranasally into 1 nostril. Use a new Narcan nasal spray for subsequent doses and administer into alternating nostrils.  May repeat every 2 to 3 minutes as needed. Allergies   Allergen Reactions    Aspirin Other (comments)     Stomach bleeding    Ativan [Lorazepam] Shortness of Breath    Bactrim [Sulfamethoprim] Rash    Keflex [Cephalexin] Rash    Macrobid [Nitrofurantoin Monohyd/M-Cryst] Nausea and Vomiting    Nsaids (Non-Steroidal Anti-Inflammatory Drug) Nausea and Vomiting    Opana [Oxymorphone] Other (comments)     Insomnia, weight loss, nightmares    Pcn [Penicillins] Hives       Social History     Socioeconomic History    Marital status: SINGLE     Spouse name: Not on file    Number of children: Not on file    Years of education: Not on file    Highest education level: Not on file   Occupational History    Not on file   Tobacco Use    Smoking status: Former Smoker     Packs/day: 0.50     Years: 3.00     Pack years: 1.50     Types: Cigarettes     Quit date: 3/6/1993     Years since quittin.0    Smokeless tobacco: Never Used   Vaping Use    Vaping Use: Never used   Substance and Sexual Activity    Alcohol use: No    Drug use: Not Currently     Comment: Last smoked in -cocaine    Sexual activity: Never   Other Topics Concern    Not on file   Social History Narrative    Not on file     Social Determinants of Health     Financial Resource Strain:     Difficulty of Paying Living Expenses: Not on file   Food Insecurity:     Worried About Running Out of Food in the Last Year: Not on file    Ganesh of Food in the Last Year: Not on file   Transportation Needs:     Lack of Transportation (Medical): Not on file    Lack of Transportation (Non-Medical):  Not on file   Physical Activity:     Days of Exercise per Week: Not on file    Minutes of Exercise per Session: Not on file   Stress:     Feeling of Stress : Not on file   Social Connections:     Frequency of Communication with Friends and Family: Not on file    Frequency of Social Gatherings with Friends and Family: Not on file    Attends [employer] Services: Not on file   [facility] [employer] or Organizations: Not on file    Attends Club or Organization Meetings: Not on file    Marital Status: Not on file   Intimate Partner Violence:     Fear of Current or Ex-Partner: Not on file    Emotionally Abused: Not on file    Physically Abused: Not on file    Sexually Abused: Not on file   Housing Stability:     Unable to Pay for Housing in the Last Year: Not on file    Number of [place] in the Last Year: Not on file    Unstable Housing in the Last Year: Not on file      +FH premature ASCVD    Review of Systems    14 pt Review of Systems is negative unless otherwise mentioned in the HPI. Wt Readings from Last 3 Encounters:   03/09/22 85.3 kg (188 lb)   10/18/21 86.2 kg (190 lb)   08/18/21 79.4 kg (175 lb)     Temp Readings from Last 3 Encounters:   10/18/21 97.7 °F (36.5 °C) (Oral)   08/18/21 97.6 °F (36.4 °C)   07/20/21 98.5 °F (36.9 °C)     BP Readings from Last 3 Encounters:   03/09/22 130/78   10/18/21 124/76   07/20/21 138/85     Pulse Readings from Last 3 Encounters:   03/09/22 (!) 102   10/18/21 (!) 122   07/20/21 (!) 113       05/30/19    ECHO STRESS 05/30/2019 5/30/2019    Interpretation Summary  · Baseline ECG: Sinus tachycardia. Rightward axis  · Negative stress test.  · Normal stress echocardiogram. Low risk study. Signed by: Samina Rey MD on 5/30/2019  2:09 PM      Physical Exam:    Visit Vitals  /78 (BP 1 Location: Left upper arm, BP Patient Position: Sitting, BP Cuff Size: Large adult)   Pulse (!) 102   Ht 5' 4\" (1.626 m)   Wt 85.3 kg (188 lb)   SpO2 97%   BMI 32.27 kg/m²      Physical Exam  HENT:      Head: Normocephalic and atraumatic. Eyes:      Pupils: Pupils are equal, round, and reactive to light. Cardiovascular:      Rate and Rhythm: Normal rate and regular rhythm. Heart sounds: Normal heart sounds. No murmur heard. No friction rub. No gallop. Pulmonary:      Effort: Pulmonary effort is normal. No respiratory distress. Breath sounds: Normal breath sounds. No wheezing or rales. Chest:      Chest wall: No tenderness. Abdominal:      General: Bowel sounds are normal.      Palpations: Abdomen is soft. Musculoskeletal:         General: No tenderness. Skin:     General: Skin is warm and dry. Neurological:      Mental Status: She is alert and oriented to person, place, and time. EKG today shows: NSR, normal axis and intervals, no ST segment abnormalities    06/03/20    CARDIAC PROCEDURE 06/03/2020 6/3/2020    Conclusion  · Normal LV function with EF 65%. No regional wall motion abnormality noted. · Normal right dominant epicardial coronary circulation. No significant atherosclerotic changes are noted. · All coronary arteries are widely patent. · No further coronary evaluation needed. Signed byTrav Valenzuela MD on 6/3/2020 11:43 AM    05/30/19    ECHO STRESS 05/30/2019 5/30/2019    Interpretation Summary  · Baseline ECG: Sinus tachycardia. Rightward axis  · Negative stress test.  · Normal stress echocardiogram. Low risk study. Signed by: Rossy Alvarez MD on 5/30/2019  2:09 PM    Lab Results   Component Value Date/Time    Sodium 138 09/17/2021 01:42 PM    Potassium 4.5 09/17/2021 01:42 PM    Chloride 105 09/17/2021 01:42 PM    CO2 28 09/17/2021 01:42 PM    Anion gap 5 09/17/2021 01:42 PM    Glucose 127 (H) 09/17/2021 01:42 PM    BUN 16 09/17/2021 01:42 PM    Creatinine 0.92 09/17/2021 01:42 PM    BUN/Creatinine ratio 17 09/17/2021 01:42 PM    GFR est AA >60 09/17/2021 01:42 PM    GFR est non-AA >60 09/17/2021 01:42 PM    Calcium 8.9 09/17/2021 01:42 PM    Bilirubin, total 0.3 09/17/2021 01:42 PM    Alk.  phosphatase 142 (H) 09/17/2021 01:42 PM    Protein, total 7.7 09/17/2021 01:42 PM    Albumin 3.8 09/17/2021 01:42 PM    Globulin 3.9 09/17/2021 01:42 PM    A-G Ratio 1.0 09/17/2021 01:42 PM    ALT (SGPT) 38 09/17/2021 01:42 PM    AST (SGOT) 30 09/17/2021 01:42 PM     Lab Results   Component Value Date/Time Cholesterol, total 160 09/17/2021 01:42 PM    HDL Cholesterol 52 09/17/2021 01:42 PM    LDL, calculated 74.6 09/17/2021 01:42 PM    VLDL, calculated 33.4 09/17/2021 01:42 PM    Triglyceride 167 (H) 09/17/2021 01:42 PM    CHOL/HDL Ratio 3.1 09/17/2021 01:42 PM     Lab Results   Component Value Date/Time    Hemoglobin A1c 7.2 (H) 09/17/2021 01:42 PM    Hemoglobin A1c, External 5.9 06/27/2016 12:00 AM       Impression and Plan:  Edgar Hernandez is a 78 y.o. with:    Chronic CP, suspect microvascular angina, BB responsive  IST  chronic HFpEF, on daily Lasix  Patent coronaries/ neg [facility] 2020  HTN  preDM2   +FH  H/o polysubstance abuse, known    Increase metoprolol to 50 daily, as 25 mg really did improve her symptoms but we still have room for more improvement  Will add SL NTG, and if responsive, may consider Imdur in future  RTC 3 months closer follow up  Aware ongoing GI workup, I do think she should take ASA 81 mg as no overt bleeding  45 mins spent    Thank you for allowing me to participate in the care of your patient, please do not hesitate to call with questions or concerns. Follow-up and Dispositions    · Return in about 3 months (around 6/9/2022).      Kindest Regards,    Shaji Sanchez, DO

## 2022-03-11 RX ORDER — METOPROLOL SUCCINATE 50 MG/1
50 TABLET, EXTENDED RELEASE ORAL DAILY
Qty: 90 TABLET | Refills: 3 | Status: SHIPPED | OUTPATIENT
Start: 2022-03-11

## 2022-03-15 ENCOUNTER — PATIENT OUTREACH (OUTPATIENT)
Dept: CASE MANAGEMENT | Age: 67
End: 2022-03-15

## 2022-03-15 NOTE — PROGRESS NOTES
Complex Case Management  Follow-Up    Date/Time:  3/15/2022 3:36 PM     Ambulatory Care Manager contacted patient for Complex Case Management  follow up. Spoke to patient. Introduced self/role and reason for call. Patient reported:   She denies any needs or concerns at this time. Patient reports taking medications as prescribed and denies needing any refills at this time. Upcoming appointments were reviewed with patient. Patient verbalized acknowledgement. Pertinent negatives:  n/a    Specialist appointments since last outreach? no  If so, specialist and date: n/a    Medications:   New medications since last outreach: no  Does patient need refills on any medications: no  Medication changes since last outreach (dose adjustments or discontinued meds): no      Barriers to care? None identified at this outreach      Patient reminded that there are physicians on call 24 hours a day / 7 days a week (M-F 5pm to 8am and from Friday 5pm until Monday 8a for the weekend) should the patient have questions or concerns. Future Appointments   Date Time Provider Gordo Marquez   3/28/2022  3:15 PM Daniel Petersen MD [facility] BS AMB   4/5/2022  2:00 PM Daniel Petersen MD [facility] BS AMB   6/15/2022  2:40 PM Ashely Ratliff DO [facility] BS AMB        Other upcoming appointments:  n/a    Goals      Attends follow up appointments on schedule      2/23/22 PCP appointment scheduled for 3/28/22 @3:15  3/2/22 Upcoming appointments were reviewed with patient. Patient verbalized acknowledgement. 3/15/22 Upcoming appointments were reviewed with patient. Patient verbalized acknowledgement.  Take all medications as ordered      2/23/22 Patient reports taking medications as prescribed and denies needing any refills at this time. 3/2/22 Patient reports taking medications as prescribed and denies needing any refills at this time.   3/15/22 Patient reports taking medications as prescribed and denies needing any refills at this time.

## 2022-03-18 PROBLEM — J30.1 ALLERGIC RHINITIS DUE TO POLLEN: Status: ACTIVE | Noted: 2021-09-20

## 2022-03-18 PROBLEM — M79.10 MYALGIA: Status: ACTIVE | Noted: 2017-08-06

## 2022-03-18 PROBLEM — K80.20 CALCULUS OF GALLBLADDER WITHOUT CHOLECYSTITIS: Status: ACTIVE | Noted: 2018-10-15

## 2022-03-18 PROBLEM — D50.9 IRON DEFICIENCY ANEMIA: Status: ACTIVE | Noted: 2021-04-13

## 2022-03-18 PROBLEM — K31.84 GASTROPARESIS: Status: ACTIVE | Noted: 2019-09-03

## 2022-03-18 PROBLEM — G43.009 MIGRAINE WITHOUT AURA AND WITHOUT STATUS MIGRAINOSUS, NOT INTRACTABLE: Status: ACTIVE | Noted: 2019-05-20

## 2022-03-18 PROBLEM — D64.9 ANEMIA: Status: ACTIVE | Noted: 2021-06-11

## 2022-03-19 PROBLEM — E66.811 OBESITY (BMI 30.0-34.9): Status: ACTIVE | Noted: 2018-12-06

## 2022-03-19 PROBLEM — E66.9 OBESITY (BMI 30.0-34.9): Status: ACTIVE | Noted: 2018-12-06

## 2022-03-19 PROBLEM — R11.0 NAUSEA: Status: ACTIVE | Noted: 2017-03-15

## 2022-03-19 PROBLEM — E11.9 TYPE 2 DIABETES MELLITUS, WITHOUT LONG-TERM CURRENT USE OF INSULIN (HCC): Status: ACTIVE | Noted: 2018-12-06

## 2022-03-19 PROBLEM — B00.2 RECURRENT ORAL HERPES SIMPLEX: Status: ACTIVE | Noted: 2021-09-20

## 2022-03-19 PROBLEM — M05.731 RHEUMATOID ARTHRITIS INVOLVING RIGHT WRIST WITH POSITIVE RHEUMATOID FACTOR (HCC): Status: ACTIVE | Noted: 2021-02-09

## 2022-03-19 PROBLEM — R60.0 PEDAL EDEMA: Status: ACTIVE | Noted: 2021-06-11

## 2022-03-19 PROBLEM — E55.9 VITAMIN D DEFICIENCY: Status: ACTIVE | Noted: 2017-11-08

## 2022-03-19 PROBLEM — K76.0 HEPATIC STEATOSIS: Status: ACTIVE | Noted: 2018-10-15

## 2022-03-19 PROBLEM — R32 URINARY INCONTINENCE: Status: ACTIVE | Noted: 2020-01-17

## 2022-03-19 PROBLEM — K44.9 HIATAL HERNIA: Status: ACTIVE | Noted: 2018-10-15

## 2022-03-19 PROBLEM — B37.83 ANGULAR CHEILITIS WITH CANDIDIASIS: Status: ACTIVE | Noted: 2018-12-06

## 2022-03-19 PROBLEM — I10 ESSENTIAL HYPERTENSION: Status: ACTIVE | Noted: 2019-04-18

## 2022-03-20 PROBLEM — R33.9 URINARY RETENTION: Status: ACTIVE | Noted: 2018-06-08

## 2022-03-20 PROBLEM — M85.80 OSTEOPENIA: Status: ACTIVE | Noted: 2021-12-17

## 2022-03-20 PROBLEM — K21.9 GASTROESOPHAGEAL REFLUX DISEASE WITHOUT ESOPHAGITIS: Status: ACTIVE | Noted: 2018-06-12

## 2022-03-20 PROBLEM — R07.89 NON-CARDIAC CHEST PAIN: Status: ACTIVE | Noted: 2020-01-17

## 2022-03-20 PROBLEM — K57.30 DIVERTICULOSIS OF LARGE INTESTINE WITHOUT HEMORRHAGE: Status: ACTIVE | Noted: 2018-10-15

## 2022-03-28 ENCOUNTER — VIRTUAL VISIT (OUTPATIENT)
Dept: FAMILY MEDICINE CLINIC | Age: 67
End: 2022-03-28
Payer: MEDICARE

## 2022-03-28 DIAGNOSIS — M54.42 CHRONIC LOW BACK PAIN WITH LEFT-SIDED SCIATICA, UNSPECIFIED BACK PAIN LATERALITY: ICD-10-CM

## 2022-03-28 DIAGNOSIS — M05.731 RHEUMATOID ARTHRITIS INVOLVING RIGHT WRIST WITH POSITIVE RHEUMATOID FACTOR (HCC): ICD-10-CM

## 2022-03-28 DIAGNOSIS — F31.81 BIPOLAR 2 DISORDER (HCC): ICD-10-CM

## 2022-03-28 DIAGNOSIS — M25.552 HIP PAIN, BILATERAL: Primary | ICD-10-CM

## 2022-03-28 DIAGNOSIS — E11.9 TYPE 2 DIABETES MELLITUS WITHOUT COMPLICATION, WITHOUT LONG-TERM CURRENT USE OF INSULIN (HCC): ICD-10-CM

## 2022-03-28 DIAGNOSIS — M25.551 HIP PAIN, BILATERAL: Primary | ICD-10-CM

## 2022-03-28 DIAGNOSIS — G89.29 CHRONIC LOW BACK PAIN WITH LEFT-SIDED SCIATICA, UNSPECIFIED BACK PAIN LATERALITY: ICD-10-CM

## 2022-03-28 PROCEDURE — G8756 NO BP MEASURE DOC: HCPCS | Performed by: FAMILY MEDICINE

## 2022-03-28 PROCEDURE — 3017F COLORECTAL CA SCREEN DOC REV: CPT | Performed by: FAMILY MEDICINE

## 2022-03-28 PROCEDURE — G8399 PT W/DXA RESULTS DOCUMENT: HCPCS | Performed by: FAMILY MEDICINE

## 2022-03-28 PROCEDURE — 99213 OFFICE O/P EST LOW 20 MIN: CPT | Performed by: FAMILY MEDICINE

## 2022-03-28 PROCEDURE — 1090F PRES/ABSN URINE INCON ASSESS: CPT | Performed by: FAMILY MEDICINE

## 2022-03-28 PROCEDURE — 3046F HEMOGLOBIN A1C LEVEL >9.0%: CPT | Performed by: FAMILY MEDICINE

## 2022-03-28 PROCEDURE — G9717 DOC PT DX DEP/BP F/U NT REQ: HCPCS | Performed by: FAMILY MEDICINE

## 2022-03-28 PROCEDURE — G9899 SCRN MAM PERF RSLTS DOC: HCPCS | Performed by: FAMILY MEDICINE

## 2022-03-28 PROCEDURE — 2022F DILAT RTA XM EVC RTNOPTHY: CPT | Performed by: FAMILY MEDICINE

## 2022-03-28 PROCEDURE — G8427 DOCREV CUR MEDS BY ELIG CLIN: HCPCS | Performed by: FAMILY MEDICINE

## 2022-03-28 PROCEDURE — 1101F PT FALLS ASSESS-DOCD LE1/YR: CPT | Performed by: FAMILY MEDICINE

## 2022-03-28 NOTE — Clinical Note
Please change the 4/5 reason for visit to hip/back pain evaluation. Please reschedule DM follow up. She needs to have labs done prior, but she also needs labs for her rheumatologist, which we can't draw for her. Please ensure one of her visits with me has time for the [address]  Silvina, ERIKA

## 2022-03-28 NOTE — ASSESSMENT & PLAN NOTE
Being evaluated/managed by Dr. Katlyn Byers. No acute findings today meriting change in management plan.

## 2022-03-28 NOTE — PROGRESS NOTES
Vitaly Parikh is a 78 y.o. female, established patient, who was seen by synchronous (real-time) audio-video technology on 3/28/2022 for     Assessment & Plan:   1. Hip pain, bilateral  Comments:  understandably alarmed, based on brother's cancer dx. Plain films and in person evaluation. Orders:  -     XR HIP LT W OR WO PELV 2-3 VWS; Future  -     XR HIP RT W OR WO PELV 2-3 VWS; Future  2. Chronic low back pain with left-sided sciatica, unspecified back pain laterality  -     XR SPINE LUMB MIN 4 V; Future  3. Rheumatoid arthritis involving right wrist with positive rheumatoid factor ([facility]  Assessment & Plan:  Has reestablished with Rheumatologist. Encouraged to follow through with the recommended labs. 4. Bipolar 2 disorder [facility])  Assessment & Plan:  Being evaluated/managed by Dr. Cecilia Pickett. No acute findings today meriting change in management plan. 5. Type 2 diabetes mellitus without complication, without long-term current use of insulin ([facility]  Assessment & Plan:  Due for follow up. Labs prior   Orders:  -     HEMOGLOBIN A1C WITH EAG; Future          712  Subjective:    \"when I wake up if I've been sleeping on my right side my hip hurts\"   Also with left hip pain that radiates down the leg    Brother recently diagnosed with cancer in his hip and lungs. That's her greatest concern for herself. Meanwhile, followed up with rheumatologist. Due for labs. Objective:     General: alert, cooperative, no distress   Mental  status: normal mood, behavior, speech, dress, motor activity, and thought processes, able to follow commands   HENT: NCAT   Neck: no visualized mass   Resp: no respiratory distress   Neuro: no gross deficits   Skin: no discoloration or lesions of concern on visible areas   Psychiatric: normal affect, consistent with stated mood, no evidence of hallucinations     Additional exam findings: We discussed the expected course, resolution and complications of the diagnosis(es) in detail. Medication risks, benefits, costs, interactions, and alternatives were discussed as indicated. I advised her to contact the office if her condition worsens, changes or fails to improve as anticipated. She expressed understanding with the diagnosis(es) and plan. WHEAT HAKAN [facility] SAINTS [facility], was evaluated through a synchronous (real-time) audio-video encounter. The patient (or guardian if applicable) is aware that this is a billable service, which includes applicable co-pays. Verbal consent to proceed has been obtained. The visit was conducted pursuant to the emergency declaration under the [facility], [address] [place] [address] [facility] authority and the [employer] General Act. Patient identification was verified, and a caregiver was present when appropriate. The patient was located at home in a state where the provider was licensed to provide care.       Washington Vargas MD

## 2022-03-28 NOTE — PROGRESS NOTES
Patient says she has bilateral hip pain thay has gotten worse over the pasta 2 months and is now radiating down her legs. She also says she has pain in her back as well. Nothing seems to relieve patients hip pain. 1. \"Have you been to the ER, urgent care clinic since your last visit? Hospitalized since your last visit? \" [facility]  for ear pain    2. \"Have you seen or consulted any other health care providers outside of the [address] [place] [address] since your last visit? \" Has seen her pain specialist Dr. Bartolo Butt. 3. For patients aged 39-70: Has the patient had a colonoscopy / FIT/ Cologuard? Yes - Care Gap present. Rooming MA/LPN to request most recent results Done by Dr. Rebecca Suarez. If the patient is female:    4. For patients aged 41-77: Has the patient had a mammogram within the past 2 years? Yes - no Care Gap present      5. For patients aged 21-65: Has the patient had a pap smear?  NA - based on age or sex

## 2022-03-29 ENCOUNTER — PATIENT OUTREACH (OUTPATIENT)
Dept: CASE MANAGEMENT | Age: 67
End: 2022-03-29

## 2022-03-29 NOTE — PROGRESS NOTES
Patient attended virtual visit on 3/28/22 with primary care provider. EMR reviewed. Will continue to monitor.

## 2022-04-08 ENCOUNTER — TELEPHONE (OUTPATIENT)
Dept: FAMILY MEDICINE CLINIC | Age: 67
End: 2022-04-08

## 2022-04-08 NOTE — TELEPHONE ENCOUNTER
Called patient no answer left message reminding her of her appt with Dr. Lisa Gutierrez on 4/12 and asked that she have her labs done either today or Monday morning at [facility]. Asked that she call the office back if she needs to reschedule.

## 2022-04-11 ENCOUNTER — HOSPITAL ENCOUNTER (OUTPATIENT)
Dept: LAB | Age: 67
Discharge: HOME | End: 2022-04-11

## 2022-04-11 LAB — SENTARA SPECIMEN COL,SENBCF: NORMAL

## 2022-04-11 PROCEDURE — 99001 SPECIMEN HANDLING PT-LAB: CPT

## 2022-04-11 NOTE — TELEPHONE ENCOUNTER
Patient was able to have her labs done at [facility] today for her f/u tomorrow.

## 2022-04-12 ENCOUNTER — VIRTUAL VISIT (OUTPATIENT)
Dept: FAMILY MEDICINE CLINIC | Age: 67
End: 2022-04-12
Payer: MEDICARE

## 2022-04-12 DIAGNOSIS — R05.9 COUGH: ICD-10-CM

## 2022-04-12 DIAGNOSIS — E11.9 TYPE 2 DIABETES MELLITUS WITHOUT COMPLICATION, WITHOUT LONG-TERM CURRENT USE OF INSULIN (HCC): ICD-10-CM

## 2022-04-12 DIAGNOSIS — R68.83 CHILLS: ICD-10-CM

## 2022-04-12 DIAGNOSIS — I10 ESSENTIAL HYPERTENSION: ICD-10-CM

## 2022-04-12 DIAGNOSIS — J02.9 SORE THROAT: Primary | ICD-10-CM

## 2022-04-12 LAB
AVG GLU, 10930: 155 MG/DL (ref 91–123)
HBA1C MFR BLD HPLC: 7 % (ref 4.8–5.6)

## 2022-04-12 PROCEDURE — 99423 OL DIG E/M SVC 21+ MIN: CPT | Performed by: FAMILY MEDICINE

## 2022-04-12 RX ORDER — LOSARTAN POTASSIUM 50 MG/1
TABLET ORAL
Qty: 90 TABLET | Refills: 3 | Status: SHIPPED | OUTPATIENT
Start: 2022-04-12

## 2022-04-12 NOTE — PROGRESS NOTES
Chief Complaint   Patient presents with    Ear Pain    Chills    Cough    Diabetes     Pt in office for DM. However pt reports having ear pain, cough, and chills since Sunday.

## 2022-04-12 NOTE — PROGRESS NOTES
Hayden Azar is a 75 y.o. female, established patient, who was evaluated by audio only on 4/12/2022 after in person rooming for:     Assessment & Plan:   1. Sore throat  2. Cough  3. Chills  4. Essential hypertension  -     losartan (COZAAR) 50 mg tablet; TAKE ONE TABLET BY MOUTH DAILY, Normal, Disp-90 Tablet, R-3  5. Type 2 diabetes mellitus without complication, without long-term current use of insulin ([facility])  Assessment & Plan:  A1c acceptable. Will consider increasing metformin next visit when not acutely ill       Explained cannot exclude COVID simply because has been vaccinated. Patient Instructions   Administer a home COVID test.   If it's positive, you have COVID. Follow precautions. If it's negative, repeat it the next day to be sure. If you are still sick, Urgent Care. Try Mylanta to see if it will coat the area where the pill scratched your throat. Follow-up and Dispositions    · Return in about 1 month (around 5/12/2022) for Medicare Wellness Visit; 6 months for diabetes, labs prior. 712  Subjective: Had trouble swallowing a dose a reglan 2 weeks ago. It scratched her throat. Next day started coughing, \"I couldn't breath. I thought I was dying. \" Significant relief with use of her inhaler. No medical evaluation. Continues to have pain: Throat, ear and back of tongue    Alternating hot and cold chills yesterday    Previously found to have dental caries thought to be the cause of pain in the ear that is currently bothering her. Extraction planned in May. Vaccinated against COVID        We discussed the expected course, resolution and complications of the diagnosis(es) in detail. Medication risks, benefits, costs, interactions, and alternatives were discussed as indicated. I advised her to contact the office if her condition worsens, changes or fails to improve as anticipated. She expressed understanding with the diagnosis(es) and plan.      Hayden Azar who was evaluated through a patient-initiated, synchronous (real-time) audio only encounter, and/or her healthcare decision maker, is aware that it is a billable service, with coverage as determined by her insurance carrier. She provided verbal consent to proceed. She has not had a related appointment within my department in the past 7 days or scheduled within the next 24 hours.       Total Time: minutes: 21-30 minutes          Clotilda Dance, MD

## 2022-04-12 NOTE — PATIENT INSTRUCTIONS
Administer a home COVID test.   If it's positive, you have COVID. Follow precautions. If it's negative, repeat it the next day to be sure. If you are still sick, Urgent Care. Try Mylanta to see if it will coat the area where the pill scratched your throat.

## 2022-04-13 ENCOUNTER — PATIENT OUTREACH (OUTPATIENT)
Dept: CASE MANAGEMENT | Age: 67
End: 2022-04-13

## 2022-04-13 NOTE — PROGRESS NOTES
Complex Case Management  Follow-Up    Date/Time:  4/13/2022 10:05 AM     Ambulatory Care Manager contacted patient for Complex Case Management  follow up. Spoke to patient. Introduced self/role and reason for call. Patient reported:   She has a sore throat. She attended a virtual visit yesterday with Dr. Esther Steve and was advised to take Mylanta. [facility] also advised patient to do warm salt water gargle. She agreed to do so. Patient states she may go to an urgent care because she has a \"knot behind my ear\". Patient denies fever, sob. Patient reports taking medications as prescribed and denies needing any refills at this time. Upcoming appointments were reviewed with patient. Patient verbalized acknowledgement. Pertinent negatives:  n/a    Specialist appointments since last outreach? no  If so, specialist and date: n/a    Medications:   New medications since last outreach: no  Does patient need refills on any medications: no  Medication changes since last outreach (dose adjustments or discontinued meds): no      Barriers to care? None identified at this outreach      Patient reminded that there are physicians on call 24 hours a day / 7 days a week (M-F 5pm to 8am and from Friday 5pm until Monday 8a for the weekend) should the patient have questions or concerns. Future Appointments   Date Time Provider Gordo Marquez   5/17/2022  3:00 PM Stefanie Gutiérrez MD [facility] BS AMB   6/15/2022  2:40 PM Brianna Benedict DO [facility] BS AMB        Other upcoming appointments:  n/a    Goals      Attends follow up appointments on schedule      2/23/22 PCP appointment scheduled for 3/28/22 @3:15  3/2/22 Upcoming appointments were reviewed with patient. Patient verbalized acknowledgement. 3/15/22 Upcoming appointments were reviewed with patient. Patient verbalized acknowledgement.    4/13/22 Patient attended appointment with PCP yesterday       Take all medications as ordered      2/23/22 Patient reports taking medications as prescribed and denies needing any refills at this time. 3/2/22 Patient reports taking medications as prescribed and denies needing any refills at this time. 3/15/22 Patient reports taking medications as prescribed and denies needing any refills at this time. 4/13/22 Patient reports taking medications as prescribed and denies needing any refills at this time.

## 2022-05-02 ENCOUNTER — TELEPHONE (OUTPATIENT)
Dept: FAMILY MEDICINE CLINIC | Age: 67
End: 2022-05-02

## 2022-05-02 NOTE — TELEPHONE ENCOUNTER
----- Message from Nedra Andrews sent at 5/2/2022 12:18 PM EDT -----  Subject: Referral Request    QUESTIONS   Reason for referral request? Would like a referral to Dr. Pedro Luis Tena Neurologist. States her hand feet and hands go numb   periodically. Has the physician seen you for this condition before? Yes  Select a date? 2022-04-05  Select the Provider the patient wants to be referred to, if known (PCP or   Specialist)? Shilpi Cervantes   Preferred Specialist (if applicable)? Do you already have an appointment scheduled? Additional Information for Provider?   ---------------------------------------------------------------------------  --------------  CALL BACK INFO  What is the best way for the office to contact you? OK to leave message on   voicemail  Preferred Call Back Phone Number? [phone]  ---------------------------------------------------------------------------  --------------  SCRIPT ANSWERS  Relationship to Patient?  Self

## 2022-05-03 NOTE — TELEPHONE ENCOUNTER
Called patient  verified asked if Dr. Nelly Conn has seen her for the numbness in her hands in feet before she says Dr. Nelly Conn is aware she has numbness in her hands and feet and had ordered imaging \"a long time ago\" that she never had done because she had gotten sick. Patient has been made aware Dr. Nelly Conn may want an appointment before referring her to Neurology patient states Dr. Nelly Conn knows she has numbness in her hands and feet. Told patient I will send a message to Dr. Nelly Conn and will call her back, referral has been pended please advise.

## 2022-05-04 NOTE — TELEPHONE ENCOUNTER
Hands: She has carpal tunnel syndrome, compounded by her Rheumatoid Arthritis. She does not need to see anyone new. The best course to alleviating her suffering is to follow up with the rheumatologist and with Dr. Chanelle Rockwell.    Feet: needs to see me  ERIKA

## 2022-05-04 NOTE — TELEPHONE ENCOUNTER
Called patient  verified she has been made aware of Dr. Rubio Peoples response she is planning to schedule f/u with Jinny Hallman and [facility] for her hands. Asked if she wanted to schedule appt with Dr. Alonso Paris for the numbness in her feet she says she is on her way to [address] [place] [address] right now and will call the office back to schedule with Dr. Alonso Paris.

## 2022-05-17 ENCOUNTER — TELEPHONE (OUTPATIENT)
Dept: FAMILY MEDICINE CLINIC | Age: 67
End: 2022-05-17

## 2022-05-17 NOTE — TELEPHONE ENCOUNTER
Called patient to reschedule her [address] no answer left message letting her know I was calling her to reschedule her appt asked that she call the office back. Office number has been left.

## 2022-05-17 NOTE — TELEPHONE ENCOUNTER
----- Message from Jumana Reece sent at 5/16/2022  4:27 PM EDT -----  Subject: Appointment Request    Reason for Call: Routine Medicare AWV    QUESTIONS  Type of Appointment? Established Patient  Reason for appointment request? Available appointments did not meet   patient need  Additional Information for Provider? pt needs r/s her awv visit apt can   not make apt tomorrow. ---------------------------------------------------------------------------  --------------  Conrad Arrow INFO  What is the best way for the office to contact you? OK to leave message on   voicemail  Preferred Call Back Phone Number? [phone]  ---------------------------------------------------------------------------  --------------  SCRIPT ANSWERS  Relationship to Patient? Self  Have your symptoms changed? No  (If the patient has Medicare as their primary insurance coverage ask this   question) Are you requesting a Medicare Annual Wellness Visit? Yes   (Is the patient requesting a pap smear with their physical exam?)? No  (Is the patient requesting their annual physical and does not need PAP or   AWV per above?)? No  Have you been diagnosed with, awaiting test results for, or told that you   are suspected of having COVID-19 (Coronavirus)? (If patient has tested   negative or was tested as a requirement for work, school, or travel and   not based on symptoms, answer no)? No  Within the past 10 days have you developed any of the following symptoms   (answer no if symptoms have been present longer than 10 days or began   more than 10 days ago)? Fever or Chills, Cough, Shortness of breath or   difficulty breathing, Loss of taste or smell, Sore throat, Nasal   congestion, Sneezing or runny nose, Fatigue or generalized body aches   (answer no if pain is specific to a body part e.g. back pain), Diarrhea,   Headache? No  Have you had close contact with someone with COVID-19 in the last 7 days?    No  (Service Expert  click yes below to proceed with Isis Spaulding As Usual   Scheduling)?  Yes

## 2022-05-19 ENCOUNTER — PATIENT OUTREACH (OUTPATIENT)
Dept: CASE MANAGEMENT | Age: 67
End: 2022-05-19

## 2022-05-19 NOTE — PROGRESS NOTES
Complex Case Management  Follow-Up    Date/Time:  5/19/2022 3:12 PM     Ambulatory Care Manager contacted patient for Complex Case Management  follow up. Spoke to patient. Introduced self/role and reason for call. Patient reported:   Patient seen in ED on 5/17/22 for abdominal muscle strain. She states pain is much better now. She is taking all medications as prescribed. Denies any needs or concerns at this time. Upcoming appointments were reviewed with patient. Patient verbalized acknowledgement. Pertinent negatives:  n/a    Specialist appointments since last outreach? no  If so, specialist and date: n/a    Medications:   New medications since last outreach: no  Does patient need refills on any medications: no  Medication changes since last outreach (dose adjustments or discontinued meds): no      Barriers to care? None identified at this outreach      Patient reminded that there are physicians on call 24 hours a day / 7 days a week (M-F 5pm to 8am and from Friday 5pm until Monday 8a for the weekend) should the patient have questions or concerns.      Future Appointments   Date Time Provider Gordo Marquez   6/10/2022 11:15 AM Matthew Pavon MD [facility] BS AMB   6/15/2022  2:40 PM Qing Yi DO [facility] BS AMB        Other upcoming appointments:  n/a    Goals    None

## 2022-05-19 NOTE — TELEPHONE ENCOUNTER
Called patient  verified she has asked if she can be scheduled for her MWV and numbness in her feet same day.  She has been scheduled for both on 6/10 at 11:15 AM.

## 2022-05-24 DIAGNOSIS — E78.5 HYPERLIPIDEMIA, UNSPECIFIED HYPERLIPIDEMIA TYPE: ICD-10-CM

## 2022-05-24 DIAGNOSIS — E11.9 TYPE 2 DIABETES MELLITUS WITHOUT COMPLICATION, WITHOUT LONG-TERM CURRENT USE OF INSULIN (HCC): ICD-10-CM

## 2022-05-24 RX ORDER — ATORVASTATIN CALCIUM 40 MG/1
TABLET, FILM COATED ORAL
Qty: 90 TABLET | Refills: 3 | Status: SHIPPED | OUTPATIENT
Start: 2022-05-24

## 2022-05-24 NOTE — TELEPHONE ENCOUNTER
Patient had visit for her DM on 4/12/22 with 6 month f/u. Her medication was sent in on 4/12/22 90 with on refills. Please review and sign if appropriate.

## 2022-06-10 ENCOUNTER — OFFICE VISIT (OUTPATIENT)
Dept: FAMILY MEDICINE CLINIC | Age: 67
End: 2022-06-10

## 2022-06-10 ENCOUNTER — OFFICE VISIT (OUTPATIENT)
Dept: FAMILY MEDICINE CLINIC | Age: 67
End: 2022-06-10
Payer: MEDICARE

## 2022-06-10 VITALS
DIASTOLIC BLOOD PRESSURE: 74 MMHG | SYSTOLIC BLOOD PRESSURE: 112 MMHG | TEMPERATURE: 96 F | BODY MASS INDEX: 32.96 KG/M2 | WEIGHT: 192 LBS | HEART RATE: 112 BPM | OXYGEN SATURATION: 99 %

## 2022-06-10 VITALS
SYSTOLIC BLOOD PRESSURE: 112 MMHG | OXYGEN SATURATION: 99 % | DIASTOLIC BLOOD PRESSURE: 74 MMHG | HEART RATE: 112 BPM | BODY MASS INDEX: 32.96 KG/M2 | TEMPERATURE: 96 F | WEIGHT: 192 LBS

## 2022-06-10 DIAGNOSIS — R20.2 PARESTHESIA OF BOTH FEET: ICD-10-CM

## 2022-06-10 DIAGNOSIS — M79.89 FOOT SWELLING: ICD-10-CM

## 2022-06-10 DIAGNOSIS — Z00.00 MEDICARE ANNUAL WELLNESS VISIT, SUBSEQUENT: Primary | ICD-10-CM

## 2022-06-10 DIAGNOSIS — M25.473 ANKLE SWELLING, UNSPECIFIED LATERALITY: Primary | ICD-10-CM

## 2022-06-10 PROCEDURE — 1123F ACP DISCUSS/DSCN MKR DOCD: CPT | Performed by: FAMILY MEDICINE

## 2022-06-10 PROCEDURE — G8417 CALC BMI ABV UP PARAM F/U: HCPCS | Performed by: FAMILY MEDICINE

## 2022-06-10 PROCEDURE — G9899 SCRN MAM PERF RSLTS DOC: HCPCS | Performed by: FAMILY MEDICINE

## 2022-06-10 PROCEDURE — 3017F COLORECTAL CA SCREEN DOC REV: CPT | Performed by: FAMILY MEDICINE

## 2022-06-10 PROCEDURE — G8536 NO DOC ELDER MAL SCRN: HCPCS | Performed by: FAMILY MEDICINE

## 2022-06-10 PROCEDURE — 1101F PT FALLS ASSESS-DOCD LE1/YR: CPT | Performed by: FAMILY MEDICINE

## 2022-06-10 PROCEDURE — G8399 PT W/DXA RESULTS DOCUMENT: HCPCS | Performed by: FAMILY MEDICINE

## 2022-06-10 PROCEDURE — G8427 DOCREV CUR MEDS BY ELIG CLIN: HCPCS | Performed by: FAMILY MEDICINE

## 2022-06-10 PROCEDURE — G9717 DOC PT DX DEP/BP F/U NT REQ: HCPCS | Performed by: FAMILY MEDICINE

## 2022-06-10 PROCEDURE — 1090F PRES/ABSN URINE INCON ASSESS: CPT | Performed by: FAMILY MEDICINE

## 2022-06-10 PROCEDURE — G8752 SYS BP LESS 140: HCPCS | Performed by: FAMILY MEDICINE

## 2022-06-10 PROCEDURE — 99212 OFFICE O/P EST SF 10 MIN: CPT | Performed by: FAMILY MEDICINE

## 2022-06-10 PROCEDURE — G8754 DIAS BP LESS 90: HCPCS | Performed by: FAMILY MEDICINE

## 2022-06-10 PROCEDURE — G0439 PPPS, SUBSEQ VISIT: HCPCS | Performed by: FAMILY MEDICINE

## 2022-06-10 NOTE — PROGRESS NOTES
Marbin Flaherty (: 1955) is a 75 y.o. female, established patient, here with her daughter Hermelinda Jensen by phone for:    ASSESSMENT/PLAN:  1. Ankle swelling, unspecified laterality  2. Foot swelling  3. Paresthesia of both feet    Suspect ankle swelling due to RA  Foot swelling is limited to distal to her sandal strap on exam and after prolonged sitting/pressure back of legs, as is the numbness and tingling, implicating physical impairment of otherwise normal circulation    Patient Instructions   Schedule your follow up with the Rheumatologist for official diagnosis and treatment of Rheumatoid Arthritis    Install a soft toilet seat    Loosen the straps on your shoes    Avoid prolonged sitting    Schedule a separate visit to review the recommendations from ROBYN Aguero        SUBJECTIVE/OBJECTIVE:  HPI    complains of episodic foot swelling x 6 months  bilateral ankle swelling constant x 2-3 years    associated with numbness and tingling bilateral feet x approx 5 months    Notes both after prolonged sitting, especially if on the toilet for 10 minutes    Foot symptoms relieved with walking around or shaking leg    Hasn't followed up with Dr. Jake Pathak (rheum)     States ROBYN Aguero has recommendations for us to review      Physical Exam  Constitutional:       General: She is not in acute distress. Appearance: She is well-developed. HENT:      Head: Normocephalic and atraumatic. Pulmonary:      Effort: Pulmonary effort is normal.   Neurological:      Mental Status: She is alert and oriented to person, place, and time. Psychiatric:         Behavior: Behavior normal.         Thought Content:  Thought content normal.         Judgment: Judgment normal.     Diabetic foot exam/ankle exam:     Left Foot:   Visual Exam: soft edema over lateral malleolus, dorsum of foot distal to sandal strap   Pulse DP: 2+ (normal)   Filament test: normal sensation          Right Foot:   Visual Exam: soft edema over lateral malleolus, dorsum of foot distal to sandal strap   Pulse DP: 2+ (normal)   Filament test: normal sensation                  -- Alexandru Dhaliwal MD

## 2022-06-10 NOTE — ACP (ADVANCE CARE PLANNING)
Advance Care Planning     General Advance Care Planning (ACP) Conversation      Date of Conversation: 6/10/2022  Conducted with: Patient with Decision Making Capacity and Healthcare Decision Maker: Next of Kin by law (only applies in absence of a Healthcare Power of  or Legal Guardian) Avanell Cranker on phone    Healthcare Decision Maker:     Primary Decision Maker: [address] [place] [address] [phone]    Secondary Decision Maker: [address] [facility] [phone]  Click here to complete [address] including selection of the Healthcare Decision Maker Relationship (ie \"Primary\")      Content/Action Overview:   No documents. Philosophy is care focused on alleviation of suffering at the end of life without life prolonging procedures.     Length of Voluntary ACP Conversation in minutes:  <16 minutes (Non-Billable)    Turner Mota MD

## 2022-06-10 NOTE — PROGRESS NOTES
Pt here for [address] today. Pt reports intermittent swelling in both feet. 1. \"Have you been to the ER, urgent care clinic since your last visit? Hospitalized since your last visit? \" Yes [place] 5/2022    2. \"Have you seen or consulted any other health care providers outside of the [address] [place] [address] since your last visit? \" No     3. For patients aged 39-70: Has the patient had a colonoscopy / FIT/ Cologuard? Yes - no Care Gap present      If the patient is female:    4. For patients aged 41-77: Has the patient had a mammogram within the past 2 years? Yes - no Care Gap present      5. For patients aged 21-65: Has the patient had a pap smear?  NA - based on age or sex

## 2022-06-10 NOTE — PATIENT INSTRUCTIONS
Schedule your follow up with the Rheumatologist for official diagnosis and treatment of Rheumatoid Arthritis    Install a soft toilet seat    Loosen the straps on your shoes    Avoid prolonged sitting    Schedule a separate visit to review the recommendations from ROBYN Kaplan

## 2022-06-10 NOTE — PATIENT INSTRUCTIONS
Medicare Wellness Visit, Female     The best way to live healthy is to have a lifestyle where you eat a well-balanced diet, exercise regularly, limit alcohol use, and quit all forms of tobacco/nicotine, if applicable. Regular preventive services are another way to keep healthy. Preventive services (vaccines, screening tests, monitoring & exams) can help personalize your care plan, which helps you manage your own care. Screening tests can find health problems at the earliest stages, when they are easiest to treat. [facility] follows the current, evidence-based guidelines published by the [facility] ([facility]) when recommending preventive services for our patients. Because we follow these guidelines, sometimes recommendations change over time as research supports it. (For example, mammograms used to be recommended annually. Even though Medicare will still pay for an annual mammogram, the newer guidelines recommend a mammogram every two years for women of average risk). Of course, you and your doctor may decide to screen more often for some diseases, based on your risk and your co-morbidities (chronic disease you are already diagnosed with). Preventive services for you include:  - Medicare offers their members a free annual wellness visit, which is time for you and your primary care provider to discuss and plan for your preventive service needs. Take advantage of this benefit every year!  -All adults over the age of 72 should receive the recommended pneumonia vaccines. Current USPSTF guidelines recommend a series of two vaccines for the best pneumonia protection.   -All adults should have a flu vaccine yearly and a tetanus vaccine every 10 years.   -All adults age 48 and older should receive the shingles vaccines (series of two vaccines).       -All adults age 38-68 who are overweight should have a diabetes screening test once every three years.   -All adults born between [birth year] and 1966 should be screened once for Hepatitis C.  -Other screening tests and preventive services for persons with diabetes include: an eye exam to screen for diabetic retinopathy, a kidney function test, a foot exam, and stricter control over your cholesterol.   -Cardiovascular screening for adults with routine risk involves an electrocardiogram (ECG) at intervals determined by your doctor.   -Colorectal cancer screenings should be done for adults age 54-65 with no increased risk factors for colorectal cancer. There are a number of acceptable methods of screening for this type of cancer. Each test has its own benefits and drawbacks. Discuss with your doctor what is most appropriate for you during your annual wellness visit. The different tests include: colonoscopy (considered the best screening method), a fecal occult blood test, a fecal DNA test, and sigmoidoscopy.    -A bone mass density test is recommended when a woman turns 65 to screen for osteoporosis. This test is only recommended one time, as a screening. Some providers will use this same test as a disease monitoring tool if you already have osteoporosis. -Breast cancer screenings are recommended every other year for women of normal risk, age 54-69.  -Cervical cancer screenings for women over age 72 are only recommended with certain risk factors.      Here is a list of your current Health Maintenance items (your personalized list of preventive services) with a due date:  Health Maintenance Due   Topic Date Due    COVID-19 Vaccine (1) Never done    DTaP/Tdap/Td  (1 - Tdap) Never done    Shingles Vaccine (1 of 2) Never done    Diabetic Foot Care  11/20/2021    Colorectal Screening  01/08/2022    Annual Well Visit  02/10/2022       Ask Medicaid if they pay for PREVENTIVE tetanus shot (not just with a wound) and the Shingles shot

## 2022-06-10 NOTE — PROGRESS NOTES
This is the Subsequent Medicare Annual Wellness Exam, performed 12 months or more after the Initial AWV or the last Subsequent AWV    I have reviewed the patient's medical history in detail and updated the computerized patient record. Assessment/Plan   Education and counseling provided:  Are appropriate based on today's review and evaluation    1. Medicare annual wellness visit, subsequent     [place] scheduled October    Depression Risk Factor Screening     3 most recent PHQ Screens 6/10/2022   PHQ Not Done -   Little interest or pleasure in doing things Not at all   Feeling down, depressed, irritable, or hopeless Not at all   Total Score PHQ 2 0   Trouble falling or staying asleep, or sleeping too much -   Feeling tired or having little energy -   Poor appetite, weight loss, or overeating -   Feeling bad about yourself - or that you are a failure or have let yourself or your family down -   Trouble concentrating on things such as school, work, reading, or watching TV -   Moving or speaking so slowly that other people could have noticed; or the opposite being so fidgety that others notice -   Thoughts of being better off dead, or hurting yourself in some way -   PHQ 9 Score -   How difficult have these problems made it for you to do your work, take care of your home and get along with others -       Alcohol & Drug Abuse Risk Screen    Do you average more than 1 drink per night or more than 7 drinks a week:  No    On any one occasion in the past three months have you have had more than 3 drinks containing alcohol:  No          Functional Ability and Level of Safety    Hearing: Hearing is good. Activities of Daily Living:   The home contains: cane and 3in1 commode  Patient needs help with:  transportation, shopping, preparing meals, laundry, housework, managing medications, managing money, dressing, bathing, bathroom needs and walking   daughter [address] [facility] is also her official aid   Ambulation: with mild difficulty     Fall Risk:  Fall Risk Assessment, last 12 mths 6/10/2022   Able to walk? Yes   Fall in past 12 months? 0   Do you feel unsteady? 0   Are you worried about falling 0   Number of falls in past 12 months -   Fall with injury?  -      Abuse Screen:  Patient is not abused       Cognitive Screening    Has your family/caregiver stated any concerns about your memory: no     Cognitive Screening: Normal - obs    Health Maintenance Due     Health Maintenance Due   Topic Date Due    COVID-19 Vaccine (1) Never done    DTaP/Tdap/Td series (1 - Tdap) Never done    Shingrix Vaccine Age 50> (1 of 2) Never done    Foot Exam Q1  11/20/2021    Colorectal Cancer Screening Combo  01/08/2022    Medicare Yearly Exam  02/10/2022       Patient Care Team   Patient Care Team:  Matty Gary MD as PCP - General (Family Medicine)  Matty Gary MD as PCP [facility] Empaneled Provider  Nereida Valdivia MD (Gastroenterology)  Rimma Malik NP as Nurse Practitioner (Psychiatry)  Nate Mandujano MD (Physical Medicine and Rehabilitation Physician)  Donya Hardy NP as Nurse Practitioner (Gastroenterology)  Ashley Jaffe MD (Rheumatology Internal Medicine)  Lesly Romero MD (Orthopedic Surgery)  Madison Cook DO (Orthopedic Surgery)  Trista Johnson DO (Cardiovascular Disease Physician)  [address]  History     Patient Active Problem List   Diagnosis Code    Other affections of shoulder region, not elsewhere classified M25.819    Other chronic pain G89.29    Bipolar 2 disorder ([facility] [address] F31.81    Chronic pain syndrome G89.4    Carpal tunnel syndrome G56.00    Degeneration of lumbar or lumbosacral intervertebral disc M51.37    Displacement of lumbar intervertebral disc without myelopathy M51.26    Pain in joint, multiple sites M25.50    Cervicalgia M54.2    Postlaminectomy syndrome, cervical region M96.1    Annular tear of lumbar disc M51.36    Lung nodule seen on imaging study R91.1    Hyperlipidemia E78.5    Nausea R11.0    Myalgia M79.10    Vitamin D deficiency E55.9    Urinary retention R33.9    Gastroesophageal reflux disease without esophagitis K21.9    Diverticulosis of large intestine without hemorrhage K57.30    Hiatal hernia K44.9    Hepatic steatosis K76.0    Calculus of gallbladder without cholecystitis K80.20    Obesity (BMI 30.0-34. 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 (HCC) M05.731    Iron deficiency anemia D50.9    Pedal edema R60.0    Anemia D64.9    Recurrent oral herpes simplex B00.2    Allergic rhinitis due to pollen J30.1    Osteopenia M85.80     Past Medical History:   Diagnosis Date    Annular tear of lumbar disc 11/10/2014    Benign tumor of throat     Bipolar disorder (HCC)     Bone spur     right ankle    Cervicalgia 11/10/2014    Chronic pain     back    Degeneration of lumbar or lumbosacral intervertebral disc 11/10/2014    Degenerative disc disease     Depression     Diabetes (HCC)     Displacement of lumbar intervertebral disc without myelopathy 11/10/2014    Diverticular disease     Elevated white blood cell count     Fatty liver     Fibromyalgia     GERD (gastroesophageal reflux disease)     Hepatic cyst     Hepatic steatosis     Herpes labialis     Hypertension     Hypertension     IBS (irritable bowel syndrome)     Insomnia     Lung nodule     Nausea & vomiting     Pain in joint, multiple sites 11/10/2014    Postlaminectomy syndrome, cervical region 11/10/2014    Sinus infection 10/5/15    Ulcer     Urinary incontinence     Urinary retention Past Surgical History:   Procedure Laterality Date    COLONOSCOPY N/A 4/13/2017    COLONOSCOPY performed by Loyd Whaley MD at [facility] COLONOSCOPY N/A 5/27/2021    COLONOSCOPY (incomplete) poor prep performed by Jerry Hicks MD at [address] [place] [address] N/A 7/8/2021    COLONOSCOPY performed by Tegan Ruelas MD at [address] HX CATARACT REMOVAL      HX CERVICAL FUSION      HX HYSTERECTOMY      HX ORTHOPAEDIC      ganglion cyst removed, right hand    HX OTHER SURGICAL      cyst left thigh removed    HX TONSIL AND ADENOIDECTOMY      HX TUBAL LIGATION      HX WRIST FRACTURE TX      [place] LAP,CHOLECYSTECTOMY N/A 02/27/2017    Dr. Katharine Goodpasture     Current Outpatient Medications   Medication Sig Dispense Refill    atorvastatin (LIPITOR) 40 mg tablet TAKE ONE TABLET BY MOUTH DAILY 90 Tablet 3    losartan (COZAAR) 50 mg tablet TAKE ONE TABLET BY MOUTH DAILY 90 Tablet 3    metoprolol succinate (TOPROL-XL) 50 mg XL tablet Take 1 Tablet by mouth daily. 90 Tablet 3    nitroglycerin (NITROSTAT) 0.4 mg SL tablet 1 Tablet by SubLINGual route every five (5) minutes as needed for Chest Pain. Up to 3 doses. (Patient not taking: Reported on 3/28/2022) 25 Tablet 3    ferrous sulfate (IRON) 325 mg (65 mg iron) EC tablet TAKE ONE TABLET BY MOUTH THREE TIMES A DAY WITH A MEAL - START WITH ONCE DAILY AND INCREASE AS TOLERATED 270 Tablet 1    furosemide (LASIX) 20 mg tablet TAKE ONE TABLET BY MOUTH DAILY (LASIX) 30 Tablet 6    ketoconazole (NIZORAL) 2 % topical cream APPLY TO AFFECTED AREA(S) TOPICALLY TWO TIMES A DAY 15 g 3    sucralfate (CARAFATE) 1 gram tablet Take 1 g by mouth two (2) times daily as needed.  Linzess 145 mcg cap capsule Take 145 mcg by mouth daily.  valACYclovir (VALTREX) 500 mg tablet Take 1 Tablet by mouth daily.  (Patient taking differently: Take 500 mg by mouth as needed.) 90 Tablet 4    metFORMIN ER (GLUCOPHAGE XR) 500 mg tablet Take 2 Tablets by mouth daily (with dinner). 180 Tablet 4    fluticasone propionate (Flonase) 50 mcg/actuation nasal spray 2 Sprays by Both Nostrils route as needed for Rhinitis. 3 Each 4    chlorhexidine (PERIDEX) 0.12 % solution Take 15 mL by mouth as needed.  doxepin (SINEquan) 50 mg capsule Take 2 capsules by mouth daily.  omeprazole (PRILOSEC) 40 mg capsule Take 40 mg by mouth daily.  trospium (SANCTURA XL) 60 mg capsule Take 1 Capsule by mouth Daily (before breakfast). 90 Capsule 3    Blood-Glucose Meter (Accu-Chek Juhi Plus Meter) misc Dx Code:E11.9 1 Each 0    glucose blood VI test strips (Accu-Chek Juhi Plus test strp) strip Dx Code: E11.9 100 Strip 4    promethazine (PHENERGAN) 12.5 mg tablet Take 12.5 mg by mouth as needed.  dicyclomine (BENTYL) 20 mg tablet Take 20 mg by mouth four (4) times daily as needed for Abdominal Cramps.  metoclopramide HCl (REGLAN) 5 mg tablet Take 5 mg by mouth daily.  QUEtiapine (SEROQUEL) 200 mg tablet Take 1 Tab by mouth daily. 30 Tab 0    cholecalciferol, vitamin D3, 2,000 unit tab Take 2,000 Units by mouth daily.  cloNIDine HCL (CATAPRES) 0.3 mg tablet Take 0.3 mg by mouth nightly.  polyethylene glycol (MIRALAX) 17 gram packet Take 1 Packet by mouth daily. (Patient taking differently: Take 17 g by mouth daily as needed.) 30 Each 6    oxyCODONE IR (ROXICODONE) 10 mg tab immediate release tablet 10 mg every eight (8) hours as needed.  DEXILANT 60 mg CpDB capsule (delayed release) Take 60 mg by mouth two (2) times a day.  morphine CR (MS CONTIN) 60 mg CR tablet Take 60 mg by mouth every twelve (12) hours.  diclofenac (VOLTAREN) 1 % gel Apply  to affected area four (4) times daily. (Patient taking differently: Apply  to affected area four (4) times daily as needed.) 1 g 1    cyclobenzaprine (FLEXERIL) 10 mg tablet TAKE 1 TABLET BY MOUTH THREE TIMES DAILY AS NEEDED FOR MUSCLE SPASMS FOR UP TO 30 DAYS.  90 Tab 2    naloxone (NARCAN) 4 mg/actuation nasal spray 1 [place] by IntraNASal route once as needed. Use 1 spray intranasally into 1 nostril. Use a new Narcan nasal spray for subsequent doses and administer into alternating nostrils. May repeat every 2 to 3 minutes as needed. (Patient not taking: Reported on 3/28/2022)       Allergies   Allergen Reactions    Aspirin Other (comments)     Stomach bleeding    Ativan [Lorazepam] Shortness of Breath    Bactrim [Sulfamethoprim] Rash    Keflex [Cephalexin] Rash    Macrobid [Nitrofurantoin Monohyd/M-Cryst] Nausea and Vomiting    Nsaids (Non-Steroidal Anti-Inflammatory Drug) Nausea and Vomiting    Opana [Oxymorphone] Other (comments)     Insomnia, weight loss, nightmares    Pcn [Penicillins] Hives       Family History   Problem Relation Age of Onset    Hypertension Other     Heart Attack Other     Heart Disease Other     Stroke Other     Headache Other     Seizures Other     Arthritis Father     Migraines Granddaughter     Migraines Daughter     Breast Cancer Daughter      Social History     Tobacco Use    Smoking status: Former Smoker     Packs/day: 0.50     Years: 3.00     Pack years: 1.50     Types: Cigarettes     Quit date: 3/6/1993     Years since quittin.2    Smokeless tobacco: Never Used   Substance Use Topics    Alcohol use:  No

## 2022-09-14 ENCOUNTER — PATIENT OUTREACH (OUTPATIENT)
Dept: CASE MANAGEMENT | Age: 67
End: 2022-09-14

## 2022-09-19 ENCOUNTER — PATIENT OUTREACH (OUTPATIENT)
Dept: CASE MANAGEMENT | Age: 67
End: 2022-09-19

## 2022-09-19 NOTE — PROGRESS NOTES
Complex Case Management      Date/Time:  2022 9:46 AM    Method of communication with patient:phone    [facility] ([facility]) contacted the patient by telephone to perform Ambulatory Care Coordination. Verified name and  (PHI) with patient as identifiers. Provided introduction to self, and explanation of the Ambulatory Care Manager's role. Reviewed most recent clinic visit w/ patient who verbalized understanding. Patient given an opportunity to ask questions. Top Challenges reviewed with the Provider   N/a     Hx of bipolar d/o, cervicalgia, DDD, DM2, gibromyalgia, GERD, htn, hld, IBS, RA (new referral to Rheum is scheduled)  Recommended for CCM  by PCP. Pt states doing ok, currently  Of note, patient no showed ortho follow up. States will schedule after upcoming cardiology appt. No other questions/issues currently    The patient agrees to contact the PCP office or the [facility] for questions related to their healthcare. Provided contact information for future reference. Disease Specific:   N/A    Home Health Active: No    DME Active: No    Barriers to care? Utilization of services    Advance Care Planning:   Does patient have an Advance Directive:  not on file; education provided     Medication(s):   Medication reconciliation was not performed with patient, who verbalizes understanding of administration of home medications. There were no barriers to obtaining medications identified at this time. Referral to Pharm D needed: no     Current Outpatient Medications   Medication Sig    atorvastatin (LIPITOR) 40 mg tablet TAKE ONE TABLET BY MOUTH DAILY    losartan (COZAAR) 50 mg tablet TAKE ONE TABLET BY MOUTH DAILY    metoprolol succinate (TOPROL-XL) 50 mg XL tablet Take 1 Tablet by mouth daily. nitroglycerin (NITROSTAT) 0.4 mg SL tablet 1 Tablet by SubLINGual route every five (5) minutes as needed for Chest Pain. Up to 3 doses.  (Patient not taking: Reported on 6/10/2022) ferrous sulfate (IRON) 325 mg (65 mg iron) EC tablet TAKE ONE TABLET BY MOUTH THREE TIMES A DAY WITH A MEAL - START WITH ONCE DAILY AND INCREASE AS TOLERATED    furosemide (LASIX) 20 mg tablet TAKE ONE TABLET BY MOUTH DAILY (LASIX)    ketoconazole (NIZORAL) 2 % topical cream APPLY TO AFFECTED AREA(S) TOPICALLY TWO TIMES A DAY    sucralfate (CARAFATE) 1 gram tablet Take 1 g by mouth two (2) times daily as needed. (Patient not taking: Reported on 6/10/2022)    Linzess 145 mcg cap capsule Take 145 mcg by mouth daily. valACYclovir (VALTREX) 500 mg tablet Take 1 Tablet by mouth daily. (Patient taking differently: Take 500 mg by mouth as needed.)    metFORMIN ER (GLUCOPHAGE XR) 500 mg tablet Take 2 Tablets by mouth daily (with dinner). fluticasone propionate (Flonase) 50 mcg/actuation nasal spray 2 Sprays by Both Nostrils route as needed for Rhinitis. chlorhexidine (PERIDEX) 0.12 % solution Take 15 mL by mouth as needed. doxepin (SINEquan) 50 mg capsule Take 2 capsules by mouth daily. omeprazole (PRILOSEC) 40 mg capsule Take 40 mg by mouth daily. trospium (SANCTURA XL) 60 mg capsule Take 1 Capsule by mouth Daily (before breakfast). Blood-Glucose Meter (Accu-Chek Juhi Plus Meter) misc Dx Code:E11.9    glucose blood VI test strips (Accu-Chek Juhi Plus test strp) strip Dx Code: E11.9    promethazine (PHENERGAN) 12.5 mg tablet Take 12.5 mg by mouth as needed. dicyclomine (BENTYL) 20 mg tablet Take 20 mg by mouth four (4) times daily as needed for Abdominal Cramps. metoclopramide HCl (REGLAN) 5 mg tablet Take 5 mg by mouth daily. QUEtiapine (SEROQUEL) 200 mg tablet Take 1 Tab by mouth daily. cholecalciferol, vitamin D3, 2,000 unit tab Take 2,000 Units by mouth daily. cloNIDine HCL (CATAPRES) 0.3 mg tablet Take 0.3 mg by mouth nightly. polyethylene glycol (MIRALAX) 17 gram packet Take 1 Packet by mouth daily.  (Patient taking differently: Take 17 g by mouth daily as needed.)    oxyCODONE IR (ROXICODONE) 10 mg tab immediate release tablet 10 mg every eight (8) hours as needed. DEXILANT 60 mg CpDB capsule (delayed release) Take 60 mg by mouth two (2) times a day. morphine CR (MS CONTIN) 60 mg CR tablet Take 60 mg by mouth every twelve (12) hours. diclofenac (VOLTAREN) 1 % gel Apply  to affected area four (4) times daily. (Patient taking differently: Apply  to affected area four (4) times daily as needed.)    cyclobenzaprine (FLEXERIL) 10 mg tablet TAKE 1 TABLET BY MOUTH THREE TIMES DAILY AS NEEDED FOR MUSCLE SPASMS FOR UP TO 30 DAYS.    naloxone (NARCAN) 4 mg/actuation nasal spray 1 [place] by IntraNASal route once as needed. Use 1 spray intranasally into 1 nostril. Use a new Narcan nasal spray for subsequent doses and administer into alternating nostrils. May repeat every 2 to 3 minutes as needed. (Patient not taking: Reported on 3/28/2022)     No current facility-administered medications for this visit.        [facility] follow up appointment(s):   Future Appointments   Date Time Provider Gordo Marquez   9/22/2022 11:20 AM Sukh Augustin DO [facility]        Non-[facility] follow up appointment(s):      Goals Addressed                   This Visit's Progress     Attends follow up appointments on schedule        9/19/22 Patient will attend all scheduled appointments through 12/19/22       Knowledge and adherence of prescribed medication (ie. action, side effects, missed dose, etc.).        9/19/22 Pt will take all medications prescribed to be evaluated on each outreach through 12/19/22       Prepare patients and caregivers for end of life decisions (ie. need for hospice, pain management, symptom relief, advance directives etc.)        9/19/22 Pt will complete an ACP and have it scanned into their EMR by 12/19/22

## 2022-09-22 DIAGNOSIS — R68.89 OTHER GENERAL SYMPTOMS AND SIGNS: ICD-10-CM

## 2022-09-22 DIAGNOSIS — E55.9 VITAMIN D DEFICIENCY: ICD-10-CM

## 2022-09-22 DIAGNOSIS — J30.1 ALLERGIC RHINITIS DUE TO POLLEN, UNSPECIFIED SEASONALITY: ICD-10-CM

## 2022-09-22 DIAGNOSIS — E11.9 TYPE 2 DIABETES MELLITUS WITHOUT COMPLICATION, WITHOUT LONG-TERM CURRENT USE OF INSULIN (HCC): Primary | ICD-10-CM

## 2022-09-22 DIAGNOSIS — B37.83 ANGULAR CHEILITIS WITH CANDIDIASIS: ICD-10-CM

## 2022-09-22 DIAGNOSIS — Z51.81 ENCOUNTER FOR MEDICATION MONITORING: ICD-10-CM

## 2022-09-22 DIAGNOSIS — M85.80 OSTEOPENIA, UNSPECIFIED LOCATION: ICD-10-CM

## 2022-09-25 DIAGNOSIS — E11.9 TYPE 2 DIABETES MELLITUS WITHOUT COMPLICATION, WITHOUT LONG-TERM CURRENT USE OF INSULIN (HCC): ICD-10-CM

## 2022-09-26 NOTE — TELEPHONE ENCOUNTER
Patient due for 6 month chronic medical in October. Called patient [place] verified she has been scheduled for her f/u on 10/18/22 and is asking that her medication be sent in prior to that visit. Will patient need labs done prior if so please order and I will call her back to let her know.

## 2022-09-27 RX ORDER — FLUTICASONE PROPIONATE 50 MCG
SPRAY, SUSPENSION (ML) NASAL
Qty: 3 EACH | Refills: 3 | Status: SHIPPED | OUTPATIENT
Start: 2022-09-27

## 2022-09-27 RX ORDER — KETOCONAZOLE 20 MG/G
CREAM TOPICAL
Qty: 15 G | Refills: 3 | Status: SHIPPED | OUTPATIENT
Start: 2022-09-27

## 2022-09-27 RX ORDER — METFORMIN HYDROCHLORIDE 500 MG/1
TABLET, EXTENDED RELEASE ORAL
Qty: 180 TABLET | Refills: 4 | Status: SHIPPED | OUTPATIENT
Start: 2022-09-27

## 2022-09-27 NOTE — TELEPHONE ENCOUNTER
Called patient  verified she has been made aware she will need labs done prior to her 10/18 visit and will walk in at [facility] to have these done.

## 2022-09-30 ENCOUNTER — PATIENT OUTREACH (OUTPATIENT)
Dept: CASE MANAGEMENT | Age: 67
End: 2022-09-30

## 2022-09-30 NOTE — PROGRESS NOTES
Complex Case Management      Date/Time:  2022 9:46 AM    Method of communication with patient:phone    [address] [facility] ([facility]) contacted the patient by telephone to perform Ambulatory Care Coordination. Verified name and  (PHI) with patient as identifiers. Provided introduction to self, and explanation of the Ambulatory Care Manager's role. Reviewed most recent clinic visit w/ patient who verbalized understanding. Patient given an opportunity to ask questions. Top Challenges reviewed with the Provider   N/a     Hx of bipolar d/o, cervicalgia, DDD, DM2, gibromyalgia, GERD, htn, hld, IBS, RA (new referral to Rheum is scheduled)  Pt states doing ok, currently  Pt reports was able to get all medications recent refilled. No other questions/issues currently    The patient agrees to contact the PCP office or the [facility] for questions related to their healthcare. Provided contact information for future reference. Disease Specific:   N/A    Home Health Active: No    DME Active: No    Barriers to care? Utilization of services    Advance Care Planning:   Does patient have an Advance Directive:  not on file; education provided     Medication(s):   Medication reconciliation was not performed with patient, who verbalizes understanding of administration of home medications. There were no barriers to obtaining medications identified at this time.     Referral to Pharm D needed: no     Current Outpatient Medications   Medication Sig    fluticasone propionate (FLONASE) 50 mcg/actuation nasal spray SPRAY 2 SPRAYS INTO BOTH NOSTRILS DAILY AS NEEDED FOR RHINITIS    ketoconazole (NIZORAL) 2 % topical cream APPLY A SMALL AMOUNT TO AFFECTED AREA(S) TWO TIMES A DAY    metFORMIN ER (GLUCOPHAGE XR) 500 mg tablet TAKE 2 TABLETS BY MOUTH DAILY WITH DINNER    atorvastatin (LIPITOR) 40 mg tablet TAKE ONE TABLET BY MOUTH DAILY    losartan (COZAAR) 50 mg tablet TAKE ONE TABLET BY MOUTH DAILY    metoprolol succinate (TOPROL-XL) 50 mg XL tablet Take 1 Tablet by mouth daily. nitroglycerin (NITROSTAT) 0.4 mg SL tablet 1 Tablet by SubLINGual route every five (5) minutes as needed for Chest Pain. Up to 3 doses. (Patient not taking: Reported on 6/10/2022)    ferrous sulfate (IRON) 325 mg (65 mg iron) EC tablet TAKE ONE TABLET BY MOUTH THREE TIMES A DAY WITH A MEAL - START WITH ONCE DAILY AND INCREASE AS TOLERATED    furosemide (LASIX) 20 mg tablet TAKE ONE TABLET BY MOUTH DAILY (LASIX)    sucralfate (CARAFATE) 1 gram tablet Take 1 g by mouth two (2) times daily as needed. (Patient not taking: Reported on 6/10/2022)    Linzess 145 mcg cap capsule Take 145 mcg by mouth daily. valACYclovir (VALTREX) 500 mg tablet Take 1 Tablet by mouth daily. (Patient taking differently: Take 500 mg by mouth as needed.)    chlorhexidine (PERIDEX) 0.12 % solution Take 15 mL by mouth as needed. doxepin (SINEquan) 50 mg capsule Take 2 capsules by mouth daily. omeprazole (PRILOSEC) 40 mg capsule Take 40 mg by mouth daily. trospium (SANCTURA XL) 60 mg capsule Take 1 Capsule by mouth Daily (before breakfast). Blood-Glucose Meter (Accu-Chek Juhi Plus Meter) misc Dx Code:E11.9    glucose blood VI test strips (Accu-Chek Juhi Plus test strp) strip Dx Code: E11.9    promethazine (PHENERGAN) 12.5 mg tablet Take 12.5 mg by mouth as needed. dicyclomine (BENTYL) 20 mg tablet Take 20 mg by mouth four (4) times daily as needed for Abdominal Cramps. metoclopramide HCl (REGLAN) 5 mg tablet Take 5 mg by mouth daily. QUEtiapine (SEROQUEL) 200 mg tablet Take 1 Tab by mouth daily. cholecalciferol, vitamin D3, 2,000 unit tab Take 2,000 Units by mouth daily. cloNIDine HCL (CATAPRES) 0.3 mg tablet Take 0.3 mg by mouth nightly. polyethylene glycol (MIRALAX) 17 gram packet Take 1 Packet by mouth daily.  (Patient taking differently: Take 17 g by mouth daily as needed.)    oxyCODONE IR (ROXICODONE) 10 mg tab immediate release tablet 10 mg every eight (8) hours as needed. DEXILANT 60 mg CpDB capsule (delayed release) Take 60 mg by mouth two (2) times a day. morphine CR (MS CONTIN) 60 mg CR tablet Take 60 mg by mouth every twelve (12) hours. diclofenac (VOLTAREN) 1 % gel Apply  to affected area four (4) times daily. (Patient taking differently: Apply  to affected area four (4) times daily as needed.)    cyclobenzaprine (FLEXERIL) 10 mg tablet TAKE 1 TABLET BY MOUTH THREE TIMES DAILY AS NEEDED FOR MUSCLE SPASMS FOR UP TO 30 DAYS.    naloxone (NARCAN) 4 mg/actuation nasal spray 1 [place] by IntraNASal route once as needed. Use 1 spray intranasally into 1 nostril. Use a new Narcan nasal spray for subsequent doses and administer into alternating nostrils. May repeat every 2 to 3 minutes as needed. (Patient not taking: Reported on 3/28/2022)     No current facility-administered medications for this visit. [facility] follow up appointment(s):   Future Appointments   Date Time Provider Gordo Marquez   10/18/2022 11:45 AM Rayray Sousa MD [facility] BS AMB   10/20/2022 10:00 AM Archie Bradley DO [facility] BS AMB        Non-[facility] follow up appointment(s):      Goals Addressed                   This Visit's Progress     Attends follow up appointments on schedule   On track     9/19/22 Patient will attend all scheduled appointments through 12/19/22       Knowledge and adherence of prescribed medication (ie. action, side effects, missed dose, etc.).    On track     9/19/22 Pt will take all medications prescribed to be evaluated on each outreach through 12/19/22       Prepare patients and caregivers for end of life decisions (ie. need for hospice, pain management, symptom relief, advance directives etc.)   On track     9/19/22 Pt will complete an ACP and have it scanned into their EMR by 12/19/22

## 2022-10-11 ENCOUNTER — TELEPHONE (OUTPATIENT)
Dept: FAMILY MEDICINE CLINIC | Age: 67
End: 2022-10-11

## 2022-10-11 NOTE — TELEPHONE ENCOUNTER
Called patient  verified she was reminded of her appt with Dr. Stephen Esteban on 10/18 and need for las prior, patient says she is planning to go to [facility] today to have her labs drawn.

## 2022-10-13 NOTE — TELEPHONE ENCOUNTER
Called patient asked had she gotten her labs done she says she had not due to her daughter being sick but she will be able to go to [facility] tomorrow AM to have labs done.

## 2022-10-17 NOTE — TELEPHONE ENCOUNTER
Patient has not had her labs done, called patient to reschedule her appt she says she has not been feeling well and has a HA and an earache. She does have an appt with \"the doctor\" today. She has opted to reschedule her appt and will call the office back to do so.

## 2022-10-18 ENCOUNTER — TRANSCRIBE ORDER (OUTPATIENT)
Dept: SCHEDULING | Age: 67
End: 2022-10-18

## 2022-10-18 DIAGNOSIS — G50.1 ATYPICAL FACIAL PAIN: Primary | ICD-10-CM

## 2022-10-18 DIAGNOSIS — H92.03 OTALGIA, BILATERAL: ICD-10-CM

## 2022-11-02 ENCOUNTER — PATIENT OUTREACH (OUTPATIENT)
Dept: CASE MANAGEMENT | Age: 67
End: 2022-11-02

## 2022-11-02 NOTE — PROGRESS NOTES
Complex Case Management      Date/Time:  2022 9:46 AM    Method of communication with patient:phone    [facility] ([facility]) contacted the patient by telephone to perform Ambulatory Care Coordination. Verified name and  (PHI) with patient as identifiers. Provided introduction to self, and explanation of the Ambulatory Care Manager's role. Reviewed most recent clinic visit w/ patient who verbalized understanding. Patient given an opportunity to ask questions. Top Challenges reviewed with the Provider   N/a     Hx of bipolar d/o, cervicalgia, DDD, DM2, gibromyalgia, GERD, htn, hld, IBS, RA (new referral to Rheum is scheduled)  Pt states doing ok, currently  Pt recently cancelled PCP appt and no showed card appt. States will schedule after upcoming dental appt this week. No other questions/issues currently    The patient agrees to contact the PCP office or the [facility] for questions related to their healthcare. Provided contact information for future reference. Disease Specific:   N/A    Home Health Active: No    DME Active: No    Barriers to care? Utilization of services    Advance Care Planning:   Does patient have an Advance Directive:  not on file; education provided     Medication(s):   Medication reconciliation was not performed with patient, who verbalizes understanding of administration of home medications. There were no barriers to obtaining medications identified at this time.     Referral to Pharm D needed: no     Current Outpatient Medications   Medication Sig    fluticasone propionate (FLONASE) 50 mcg/actuation nasal spray SPRAY 2 SPRAYS INTO BOTH NOSTRILS DAILY AS NEEDED FOR RHINITIS    ketoconazole (NIZORAL) 2 % topical cream APPLY A SMALL AMOUNT TO AFFECTED AREA(S) TWO TIMES A DAY    metFORMIN ER (GLUCOPHAGE XR) 500 mg tablet TAKE 2 TABLETS BY MOUTH DAILY WITH DINNER    atorvastatin (LIPITOR) 40 mg tablet TAKE ONE TABLET BY MOUTH DAILY    losartan (COZAAR) 50 mg tablet TAKE ONE TABLET BY MOUTH DAILY    metoprolol succinate (TOPROL-XL) 50 mg XL tablet Take 1 Tablet by mouth daily. nitroglycerin (NITROSTAT) 0.4 mg SL tablet 1 Tablet by SubLINGual route every five (5) minutes as needed for Chest Pain. Up to 3 doses. (Patient not taking: Reported on 6/10/2022)    ferrous sulfate (IRON) 325 mg (65 mg iron) EC tablet TAKE ONE TABLET BY MOUTH THREE TIMES A DAY WITH A MEAL - START WITH ONCE DAILY AND INCREASE AS TOLERATED    furosemide (LASIX) 20 mg tablet TAKE ONE TABLET BY MOUTH DAILY (LASIX)    sucralfate (CARAFATE) 1 gram tablet Take 1 g by mouth two (2) times daily as needed. (Patient not taking: Reported on 6/10/2022)    Linzess 145 mcg cap capsule Take 145 mcg by mouth daily. valACYclovir (VALTREX) 500 mg tablet Take 1 Tablet by mouth daily. (Patient taking differently: Take 500 mg by mouth as needed.)    chlorhexidine (PERIDEX) 0.12 % solution Take 15 mL by mouth as needed. doxepin (SINEquan) 50 mg capsule Take 2 capsules by mouth daily. omeprazole (PRILOSEC) 40 mg capsule Take 40 mg by mouth daily. trospium (SANCTURA XL) 60 mg capsule Take 1 Capsule by mouth Daily (before breakfast). Blood-Glucose Meter (Accu-Chek Juhi Plus Meter) misc Dx Code:E11.9    glucose blood VI test strips (Accu-Chek Juhi Plus test strp) strip Dx Code: E11.9    promethazine (PHENERGAN) 12.5 mg tablet Take 12.5 mg by mouth as needed. dicyclomine (BENTYL) 20 mg tablet Take 20 mg by mouth four (4) times daily as needed for Abdominal Cramps. metoclopramide HCl (REGLAN) 5 mg tablet Take 5 mg by mouth daily. QUEtiapine (SEROQUEL) 200 mg tablet Take 1 Tab by mouth daily. cholecalciferol, vitamin D3, 2,000 unit tab Take 2,000 Units by mouth daily. cloNIDine HCL (CATAPRES) 0.3 mg tablet Take 0.3 mg by mouth nightly. polyethylene glycol (MIRALAX) 17 gram packet Take 1 Packet by mouth daily.  (Patient taking differently: Take 17 g by mouth daily as needed.)    oxyCODONE IR (ROXICODONE) 10 mg tab immediate release tablet 10 mg every eight (8) hours as needed. DEXILANT 60 mg CpDB capsule (delayed release) Take 60 mg by mouth two (2) times a day. morphine CR (MS CONTIN) 60 mg CR tablet Take 60 mg by mouth every twelve (12) hours. diclofenac (VOLTAREN) 1 % gel Apply  to affected area four (4) times daily. (Patient taking differently: Apply  to affected area four (4) times daily as needed.)    cyclobenzaprine (FLEXERIL) 10 mg tablet TAKE 1 TABLET BY MOUTH THREE TIMES DAILY AS NEEDED FOR MUSCLE SPASMS FOR UP TO 30 DAYS.    naloxone (NARCAN) 4 mg/actuation nasal spray 1 [place] by IntraNASal route once as needed. Use 1 spray intranasally into 1 nostril. Use a new Narcan nasal spray for subsequent doses and administer into alternating nostrils. May repeat every 2 to 3 minutes as needed. (Patient not taking: Reported on 3/28/2022)     No current facility-administered medications for this visit. [facility] follow up appointment(s):   Future Appointments   Date Time Provider Gordo Marquez   11/12/2022  4:30 PM [facility]        Non-[facility] follow up appointment(s):      Goals Addressed                   This Visit's Progress     Attends follow up appointments on schedule   No change     9/19/22 Patient will attend all scheduled appointments through 12/19/22       Knowledge and adherence of prescribed medication (ie. action, side effects, missed dose, etc.).    On track     9/19/22 Pt will take all medications prescribed to be evaluated on each outreach through 12/19/22       Prepare patients and caregivers for end of life decisions (ie. need for hospice, pain management, symptom relief, advance directives etc.)   On track     9/19/22 Pt will complete an ACP and have it scanned into their EMR by 12/19/22

## 2022-11-03 RX ORDER — FUROSEMIDE 20 MG/1
TABLET ORAL
Qty: 90 TABLET | Refills: 0 | Status: SHIPPED | OUTPATIENT
Start: 2022-11-03

## 2022-11-08 ENCOUNTER — TELEPHONE (OUTPATIENT)
Dept: FAMILY MEDICINE CLINIC | Age: 67
End: 2022-11-08

## 2022-11-09 NOTE — TELEPHONE ENCOUNTER
Called patient  verified she says she needed to reschedule her VV she had to cancel on 10/18. She has been rescheduled to  and told I will call her the week before to remind her to get her blood work done at [facility].

## 2022-11-12 ENCOUNTER — HOSPITAL ENCOUNTER (OUTPATIENT)
Age: 67
Discharge: HOME | End: 2022-11-12
Attending: PHYSICIAN ASSISTANT
Payer: MEDICARE

## 2022-11-12 DIAGNOSIS — H92.03 OTALGIA, BILATERAL: ICD-10-CM

## 2022-11-12 DIAGNOSIS — G50.1 ATYPICAL FACIAL PAIN: ICD-10-CM

## 2022-11-12 PROCEDURE — 74011250636 HC RX REV CODE- 250/636

## 2022-11-12 PROCEDURE — A9575 INJ GADOTERATE MEGLUMI 0.1ML: HCPCS

## 2022-11-12 PROCEDURE — 70553 MRI BRAIN STEM W/O & W/DYE: CPT

## 2022-11-12 PROCEDURE — 82565 ASSAY OF CREATININE: CPT

## 2022-11-12 RX ADMIN — GADOTERATE MEGLUMINE 17 ML: 376.9 INJECTION INTRAVENOUS at 16:34

## 2022-11-14 LAB — CREAT UR-MCNC: 0.7 MG/DL (ref 0.6–1.3)

## 2022-11-28 ENCOUNTER — PATIENT OUTREACH (OUTPATIENT)
Dept: CASE MANAGEMENT | Age: 67
End: 2022-11-28

## 2022-11-28 NOTE — PROGRESS NOTES
Complex Case Management      Date/Time:  2022 9:46 AM    Method of communication with patient:phone    [address] [facility] ([facility]) contacted the patient by telephone to perform Ambulatory Care Coordination. Verified name and  (PHI) with patient as identifiers. Provided introduction to self, and explanation of the Ambulatory Care Manager's role. Reviewed most recent clinic visit w/ patient who verbalized understanding. Patient given an opportunity to ask questions. Top Challenges reviewed with the Provider   N/a     Hx of bipolar d/o, cervicalgia, DDD, DM2, gibromyalgia, GERD, htn, hld, IBS, RA (new referral to Rheum is scheduled)  Pt states doing ok, currently  Reports currently at [facility] for colonoscopy. Kept call short  Has rescheduled PCP visit, but still needs Card resched. No other questions/issues currently    The patient agrees to contact the PCP office or the [address] for questions related to their healthcare. Provided contact information for future reference. Disease Specific:   N/A    Home Health Active: No    DME Active: No    Barriers to care? Utilization of services    Advance Care Planning:   Does patient have an Advance Directive:  not on file; education provided     Medication(s):   Medication reconciliation was not performed with patient, who verbalizes understanding of administration of home medications. There were no barriers to obtaining medications identified at this time.     Referral to Pharm D needed: no     Current Outpatient Medications   Medication Sig    furosemide (LASIX) 20 mg tablet TAKE ONE TABLET BY MOUTH DAILY    fluticasone propionate (FLONASE) 50 mcg/actuation nasal spray SPRAY 2 SPRAYS INTO BOTH NOSTRILS DAILY AS NEEDED FOR RHINITIS    ketoconazole (NIZORAL) 2 % topical cream APPLY A SMALL AMOUNT TO AFFECTED AREA(S) TWO TIMES A DAY    metFORMIN ER (GLUCOPHAGE XR) 500 mg tablet TAKE 2 TABLETS BY MOUTH DAILY WITH DINNER    atorvastatin (LIPITOR) 40 mg tablet TAKE ONE TABLET BY MOUTH DAILY    losartan (COZAAR) 50 mg tablet TAKE ONE TABLET BY MOUTH DAILY    metoprolol succinate (TOPROL-XL) 50 mg XL tablet Take 1 Tablet by mouth daily. nitroglycerin (NITROSTAT) 0.4 mg SL tablet 1 Tablet by SubLINGual route every five (5) minutes as needed for Chest Pain. Up to 3 doses. (Patient not taking: Reported on 6/10/2022)    ferrous sulfate (IRON) 325 mg (65 mg iron) EC tablet TAKE ONE TABLET BY MOUTH THREE TIMES A DAY WITH A MEAL - START WITH ONCE DAILY AND INCREASE AS TOLERATED    sucralfate (CARAFATE) 1 gram tablet Take 1 g by mouth two (2) times daily as needed. (Patient not taking: Reported on 6/10/2022)    Linzess 145 mcg cap capsule Take 145 mcg by mouth daily. valACYclovir (VALTREX) 500 mg tablet Take 1 Tablet by mouth daily. (Patient taking differently: Take 500 mg by mouth as needed.)    chlorhexidine (PERIDEX) 0.12 % solution Take 15 mL by mouth as needed. doxepin (SINEquan) 50 mg capsule Take 2 capsules by mouth daily. omeprazole (PRILOSEC) 40 mg capsule Take 40 mg by mouth daily. trospium (SANCTURA XL) 60 mg capsule Take 1 Capsule by mouth Daily (before breakfast). Blood-Glucose Meter (Accu-Chek Juhi Plus Meter) misc Dx Code:E11.9    glucose blood VI test strips (Accu-Chek Juhi Plus test strp) strip Dx Code: E11.9    promethazine (PHENERGAN) 12.5 mg tablet Take 12.5 mg by mouth as needed. dicyclomine (BENTYL) 20 mg tablet Take 20 mg by mouth four (4) times daily as needed for Abdominal Cramps. metoclopramide HCl (REGLAN) 5 mg tablet Take 5 mg by mouth daily. QUEtiapine (SEROQUEL) 200 mg tablet Take 1 Tab by mouth daily. cholecalciferol, vitamin D3, 2,000 unit tab Take 2,000 Units by mouth daily. cloNIDine HCL (CATAPRES) 0.3 mg tablet Take 0.3 mg by mouth nightly. polyethylene glycol (MIRALAX) 17 gram packet Take 1 Packet by mouth daily.  (Patient taking differently: Take 17 g by mouth daily as needed.)    oxyCODONE IR (ROXICODONE) 10 mg tab immediate release tablet 10 mg every eight (8) hours as needed. DEXILANT 60 mg CpDB capsule (delayed release) Take 60 mg by mouth two (2) times a day. morphine CR (MS CONTIN) 60 mg CR tablet Take 60 mg by mouth every twelve (12) hours. diclofenac (VOLTAREN) 1 % gel Apply  to affected area four (4) times daily. (Patient taking differently: Apply  to affected area four (4) times daily as needed.)    cyclobenzaprine (FLEXERIL) 10 mg tablet TAKE 1 TABLET BY MOUTH THREE TIMES DAILY AS NEEDED FOR MUSCLE SPASMS FOR UP TO 30 DAYS.    naloxone (NARCAN) 4 mg/actuation nasal spray 1 [place] by IntraNASal route once as needed. Use 1 spray intranasally into 1 nostril. Use a new Narcan nasal spray for subsequent doses and administer into alternating nostrils. May repeat every 2 to 3 minutes as needed. (Patient not taking: Reported on 3/28/2022)     No current facility-administered medications for this visit. [facility] follow up appointment(s):   Future Appointments   Date Time Provider Gordo Marquez   12/20/2022  3:30 PM Herman Herrera MD [facility] BS AMB        Non-[facility] follow up appointment(s):      Goals Addressed                   This Visit's Progress     Attends follow up appointments on schedule   On track     9/19/22 Patient will attend all scheduled appointments through 12/19/22       Knowledge and adherence of prescribed medication (ie. action, side effects, missed dose, etc.).    On track     9/19/22 Pt will take all medications prescribed to be evaluated on each outreach through 12/19/22       Prepare patients and caregivers for end of life decisions (ie. need for hospice, pain management, symptom relief, advance directives etc.)   On track     9/19/22 Pt will complete an ACP and have it scanned into their EMR by 12/19/22

## 2022-12-12 ENCOUNTER — PATIENT OUTREACH (OUTPATIENT)
Dept: CASE MANAGEMENT | Age: 67
End: 2022-12-12

## 2022-12-12 NOTE — PROGRESS NOTES
Complex Case Management      Date/Time:  2022 9:46 AM    Method of communication with patient:phone    [facility] ([facility]) contacted the patient by telephone to perform Ambulatory Care Coordination. Verified name and  (PHI) with patient as identifiers. Provided introduction to self, and explanation of the Ambulatory Care Manager's role. Reviewed most recent clinic visit w/ patient who verbalized understanding. Patient given an opportunity to ask questions. Top Challenges reviewed with the Provider   N/a     Hx of bipolar d/o, cervicalgia, DDD, DM2, gibromyalgia, GERD, htn, hld, IBS, RA (new referral to Rheum is scheduled)  Pt states doing ok, currently  Reminded patient to reschedule card appt. No other questions/issues currently    The patient agrees to contact the PCP office or the [facility] for questions related to their healthcare. Provided contact information for future reference. Disease Specific:   N/A    Home Health Active: No    DME Active: No    Barriers to care? Utilization of services    Advance Care Planning:   Does patient have an Advance Directive:  not on file; education provided     Medication(s):   Medication reconciliation was not performed with patient, who verbalizes understanding of administration of home medications. There were no barriers to obtaining medications identified at this time.     Referral to Pharm D needed: no     Current Outpatient Medications   Medication Sig    furosemide (LASIX) 20 mg tablet TAKE ONE TABLET BY MOUTH DAILY    fluticasone propionate (FLONASE) 50 mcg/actuation nasal spray SPRAY 2 SPRAYS INTO BOTH NOSTRILS DAILY AS NEEDED FOR RHINITIS    ketoconazole (NIZORAL) 2 % topical cream APPLY A SMALL AMOUNT TO AFFECTED AREA(S) TWO TIMES A DAY    metFORMIN ER (GLUCOPHAGE XR) 500 mg tablet TAKE 2 TABLETS BY MOUTH DAILY WITH DINNER    atorvastatin (LIPITOR) 40 mg tablet TAKE ONE TABLET BY MOUTH DAILY    losartan (COZAAR) 50 mg tablet TAKE ONE TABLET BY MOUTH DAILY    metoprolol succinate (TOPROL-XL) 50 mg XL tablet Take 1 Tablet by mouth daily. nitroglycerin (NITROSTAT) 0.4 mg SL tablet 1 Tablet by SubLINGual route every five (5) minutes as needed for Chest Pain. Up to 3 doses. (Patient not taking: Reported on 6/10/2022)    ferrous sulfate (IRON) 325 mg (65 mg iron) EC tablet TAKE ONE TABLET BY MOUTH THREE TIMES A DAY WITH A MEAL - START WITH ONCE DAILY AND INCREASE AS TOLERATED    sucralfate (CARAFATE) 1 gram tablet Take 1 g by mouth two (2) times daily as needed. (Patient not taking: Reported on 6/10/2022)    Linzess 145 mcg cap capsule Take 145 mcg by mouth daily. valACYclovir (VALTREX) 500 mg tablet Take 1 Tablet by mouth daily. (Patient taking differently: Take 500 mg by mouth as needed.)    chlorhexidine (PERIDEX) 0.12 % solution Take 15 mL by mouth as needed. doxepin (SINEquan) 50 mg capsule Take 2 capsules by mouth daily. omeprazole (PRILOSEC) 40 mg capsule Take 40 mg by mouth daily. trospium (SANCTURA XL) 60 mg capsule Take 1 Capsule by mouth Daily (before breakfast). Blood-Glucose Meter (Accu-Chek Juhi Plus Meter) misc Dx Code:E11.9    glucose blood VI test strips (Accu-Chek Juhi Plus test strp) strip Dx Code: E11.9    promethazine (PHENERGAN) 12.5 mg tablet Take 12.5 mg by mouth as needed. dicyclomine (BENTYL) 20 mg tablet Take 20 mg by mouth four (4) times daily as needed for Abdominal Cramps. metoclopramide HCl (REGLAN) 5 mg tablet Take 5 mg by mouth daily. QUEtiapine (SEROQUEL) 200 mg tablet Take 1 Tab by mouth daily. cholecalciferol, vitamin D3, 2,000 unit tab Take 2,000 Units by mouth daily. cloNIDine HCL (CATAPRES) 0.3 mg tablet Take 0.3 mg by mouth nightly. polyethylene glycol (MIRALAX) 17 gram packet Take 1 Packet by mouth daily.  (Patient taking differently: Take 17 g by mouth daily as needed.)    oxyCODONE IR (ROXICODONE) 10 mg tab immediate release tablet 10 mg every eight (8) hours as needed. DEXILANT 60 mg CpDB capsule (delayed release) Take 60 mg by mouth two (2) times a day. morphine CR (MS CONTIN) 60 mg CR tablet Take 60 mg by mouth every twelve (12) hours. diclofenac (VOLTAREN) 1 % gel Apply  to affected area four (4) times daily. (Patient taking differently: Apply  to affected area four (4) times daily as needed.)    cyclobenzaprine (FLEXERIL) 10 mg tablet TAKE 1 TABLET BY MOUTH THREE TIMES DAILY AS NEEDED FOR MUSCLE SPASMS FOR UP TO 30 DAYS.    naloxone (NARCAN) 4 mg/actuation nasal spray 1 [place] by IntraNASal route once as needed. Use 1 spray intranasally into 1 nostril. Use a new Narcan nasal spray for subsequent doses and administer into alternating nostrils. May repeat every 2 to 3 minutes as needed. (Patient not taking: Reported on 3/28/2022)     No current facility-administered medications for this visit. [facility] follow up appointment(s):   Future Appointments   Date Time Provider Gordo Marquez   1/20/2023 10:30 AM Gallo Cervantes MD [facility] BS AMB        Non-[facility] follow up appointment(s):      Goals Addressed                   This Visit's Progress     Attends follow up appointments on schedule   On track     9/19/22 Patient will attend all scheduled appointments through 12/19/22       Knowledge and adherence of prescribed medication (ie. action, side effects, missed dose, etc.).    On track     9/19/22 Pt will take all medications prescribed to be evaluated on each outreach through 12/19/22       Prepare patients and caregivers for end of life decisions (ie. need for hospice, pain management, symptom relief, advance directives etc.)   On track     9/19/22 Pt will complete an ACP and have it scanned into their EMR by 12/19/22

## 2022-12-13 PROBLEM — Z86.010 HISTORY OF COLON POLYPS: Status: ACTIVE | Noted: 2022-12-13

## 2022-12-13 PROBLEM — Z86.0100 HISTORY OF COLON POLYPS: Status: ACTIVE | Noted: 2022-12-13

## 2022-12-19 ENCOUNTER — TELEPHONE (OUTPATIENT)
Dept: FAMILY MEDICINE CLINIC | Age: 67
End: 2022-12-19

## 2022-12-19 NOTE — TELEPHONE ENCOUNTER
I was able to communicate with Dr. Buddy Juarez who has agreed to see patient tomorrow at 4 PM. Patient has been notified.

## 2022-12-19 NOTE — TELEPHONE ENCOUNTER
Patient called stating she has a Schedule appointment on 12/20/22 at 3:30, and  would like to know if possible to come at:4:00., she has a dental appointment already schedule on this day at 2:00. Please review and advise.

## 2022-12-20 ENCOUNTER — OFFICE VISIT (OUTPATIENT)
Dept: FAMILY MEDICINE CLINIC | Age: 67
End: 2022-12-20
Payer: MEDICARE

## 2022-12-20 VITALS
SYSTOLIC BLOOD PRESSURE: 124 MMHG | WEIGHT: 194.8 LBS | TEMPERATURE: 97.8 F | HEART RATE: 99 BPM | DIASTOLIC BLOOD PRESSURE: 82 MMHG | OXYGEN SATURATION: 97 % | RESPIRATION RATE: 14 BRPM | HEIGHT: 64 IN | BODY MASS INDEX: 33.26 KG/M2

## 2022-12-20 DIAGNOSIS — Z01.818 PRE-OP EVALUATION: Primary | ICD-10-CM

## 2022-12-20 DIAGNOSIS — J45.909 MILD ASTHMA WITHOUT COMPLICATION, UNSPECIFIED WHETHER PERSISTENT: ICD-10-CM

## 2022-12-20 DIAGNOSIS — E11.9 TYPE 2 DIABETES MELLITUS WITHOUT COMPLICATION, WITHOUT LONG-TERM CURRENT USE OF INSULIN (HCC): ICD-10-CM

## 2022-12-20 DIAGNOSIS — H25.11 NUCLEAR SCLEROTIC CATARACT OF RIGHT EYE: ICD-10-CM

## 2022-12-20 DIAGNOSIS — H25.011 CORTICAL AGE-RELATED CATARACT OF RIGHT EYE: ICD-10-CM

## 2022-12-20 PROBLEM — R11.0 NAUSEA: Status: RESOLVED | Noted: 2017-03-15 | Resolved: 2022-12-20

## 2022-12-20 PROBLEM — R07.89 NON-CARDIAC CHEST PAIN: Status: RESOLVED | Noted: 2020-01-17 | Resolved: 2022-12-20

## 2022-12-20 PROBLEM — M79.10 MYALGIA: Status: RESOLVED | Noted: 2017-08-06 | Resolved: 2022-12-20

## 2022-12-20 PROBLEM — J45.30 MILD PERSISTENT ASTHMA WITHOUT COMPLICATION: Status: ACTIVE | Noted: 2022-12-20

## 2022-12-20 PROCEDURE — 3074F SYST BP LT 130 MM HG: CPT | Performed by: FAMILY MEDICINE

## 2022-12-20 PROCEDURE — G9899 SCRN MAM PERF RSLTS DOC: HCPCS | Performed by: FAMILY MEDICINE

## 2022-12-20 PROCEDURE — 1101F PT FALLS ASSESS-DOCD LE1/YR: CPT | Performed by: FAMILY MEDICINE

## 2022-12-20 PROCEDURE — G8754 DIAS BP LESS 90: HCPCS | Performed by: FAMILY MEDICINE

## 2022-12-20 PROCEDURE — 2022F DILAT RTA XM EVC RTNOPTHY: CPT | Performed by: FAMILY MEDICINE

## 2022-12-20 PROCEDURE — G8399 PT W/DXA RESULTS DOCUMENT: HCPCS | Performed by: FAMILY MEDICINE

## 2022-12-20 PROCEDURE — 1090F PRES/ABSN URINE INCON ASSESS: CPT | Performed by: FAMILY MEDICINE

## 2022-12-20 PROCEDURE — 3051F HG A1C>EQUAL 7.0%<8.0%: CPT | Performed by: FAMILY MEDICINE

## 2022-12-20 PROCEDURE — G8427 DOCREV CUR MEDS BY ELIG CLIN: HCPCS | Performed by: FAMILY MEDICINE

## 2022-12-20 PROCEDURE — 99214 OFFICE O/P EST MOD 30 MIN: CPT | Performed by: FAMILY MEDICINE

## 2022-12-20 PROCEDURE — G9717 DOC PT DX DEP/BP F/U NT REQ: HCPCS | Performed by: FAMILY MEDICINE

## 2022-12-20 PROCEDURE — 3078F DIAST BP <80 MM HG: CPT | Performed by: FAMILY MEDICINE

## 2022-12-20 PROCEDURE — 3017F COLORECTAL CA SCREEN DOC REV: CPT | Performed by: FAMILY MEDICINE

## 2022-12-20 PROCEDURE — G8536 NO DOC ELDER MAL SCRN: HCPCS | Performed by: FAMILY MEDICINE

## 2022-12-20 PROCEDURE — 1123F ACP DISCUSS/DSCN MKR DOCD: CPT | Performed by: FAMILY MEDICINE

## 2022-12-20 PROCEDURE — G8417 CALC BMI ABV UP PARAM F/U: HCPCS | Performed by: FAMILY MEDICINE

## 2022-12-20 PROCEDURE — G8752 SYS BP LESS 140: HCPCS | Performed by: FAMILY MEDICINE

## 2022-12-20 RX ORDER — ALBUTEROL SULFATE 90 UG/1
2 AEROSOL, METERED RESPIRATORY (INHALATION)
Qty: 1 EACH | Refills: 1 | Status: SHIPPED | OUTPATIENT
Start: 2022-12-20

## 2022-12-20 NOTE — PROGRESS NOTES
Preoperative Evaluation    Date of Exam: 2022    Conrado Rendon is a 75 y.o. female (:1955) who presents for preoperative evaluation.    Procedure/Surgery: Right cataract removal  Date of Procedure/Surgery: 2023  Surgeon: Dr. 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 ([address] 2021    Urinary incontinence 2020    Gastroparesis 2019    Migraine without aura and without status migrainosus, not intractable 2019    Essential hypertension 2019    Obesity (BMI 30.0-34.9) 2018    Type 2 diabetes mellitus, without long-term current use of insulin ([address] 2018    Angular cheilitis with candidiasis 2018    Diverticulosis of large intestine without hemorrhage 10/15/2018    Hiatal hernia 10/15/2018    Hepatic steatosis 10/15/2018    Calculus of gallbladder without cholecystitis 10/15/2018    Gastroesophageal reflux disease without esophagitis 2018    Urinary retention 2018    Vitamin D deficiency 2017    Hyperlipidemia 2016    Lung nodule seen on imaging study 2016    Degeneration of lumbar or lumbosacral intervertebral disc 11/10/2014    Displacement of lumbar intervertebral disc without myelopathy 11/10/2014    Pain in joint, multiple sites 11/10/2014    Cervicalgia 11/10/2014    Postlaminectomy syndrome, cervical region 11/10/2014    Annular tear of lumbar disc 11/10/2014    Chronic pain syndrome 02/21/2014    Carpal tunnel syndrome 02/21/2014    Bipolar 2 disorder ([facility] 01/04/2012    Other affections of shoulder region, not elsewhere classified     Other chronic pain      Allergies: Allergies   Allergen Reactions    Aspirin Other (comments)     Stomach bleeding    Ativan [Lorazepam] Shortness of Breath    Bactrim [Sulfamethoprim] Rash    Keflex [Cephalexin] Rash    Macrobid [Nitrofurantoin Monohyd/M-Cryst] Nausea and Vomiting    Nsaids (Non-Steroidal Anti-Inflammatory Drug) Nausea and Vomiting    Opana [Oxymorphone] Other (comments)     Insomnia, weight loss, nightmares    Pcn [Penicillins] Hives      Medications:     Current Outpatient Medications   Medication Sig    albuterol (ProAir HFA) 90 mcg/actuation inhaler Take 2 Puffs by inhalation every four (4) hours as needed for Wheezing. furosemide (LASIX) 20 mg tablet TAKE ONE TABLET BY MOUTH DAILY    fluticasone propionate (FLONASE) 50 mcg/actuation nasal spray SPRAY 2 SPRAYS INTO BOTH NOSTRILS DAILY AS NEEDED FOR RHINITIS    ketoconazole (NIZORAL) 2 % topical cream APPLY A SMALL AMOUNT TO AFFECTED AREA(S) TWO TIMES A DAY    metFORMIN ER (GLUCOPHAGE XR) 500 mg tablet TAKE 2 TABLETS BY MOUTH DAILY WITH DINNER    atorvastatin (LIPITOR) 40 mg tablet TAKE ONE TABLET BY MOUTH DAILY    losartan (COZAAR) 50 mg tablet TAKE ONE TABLET BY MOUTH DAILY    metoprolol succinate (TOPROL-XL) 50 mg XL tablet Take 1 Tablet by mouth daily. ferrous sulfate (IRON) 325 mg (65 mg iron) EC tablet TAKE ONE TABLET BY MOUTH THREE TIMES A DAY WITH A MEAL - START WITH ONCE DAILY AND INCREASE AS TOLERATED    Linzess 145 mcg cap capsule Take 145 mcg by mouth daily. valACYclovir (VALTREX) 500 mg tablet Take 1 Tablet by mouth daily. (Patient taking differently: Take 500 mg by mouth as needed.)    chlorhexidine (PERIDEX) 0.12 % solution Take 15 mL by mouth as needed. doxepin (SINEquan) 50 mg capsule Take 2 capsules by mouth daily.     omeprazole (PRILOSEC) 40 mg capsule Take 40 mg by mouth daily. trospium (SANCTURA XL) 60 mg capsule Take 1 Capsule by mouth Daily (before breakfast). Blood-Glucose Meter (Accu-Chek Juhi Plus Meter) misc Dx Code:E11.9    glucose blood VI test strips (Accu-Chek Juhi Plus test strp) strip Dx Code: E11.9    promethazine (PHENERGAN) 12.5 mg tablet Take 12.5 mg by mouth as needed. dicyclomine (BENTYL) 20 mg tablet Take 20 mg by mouth four (4) times daily as needed for Abdominal Cramps. metoclopramide HCl (REGLAN) 5 mg tablet Take 5 mg by mouth daily. QUEtiapine (SEROQUEL) 200 mg tablet Take 1 Tab by mouth daily. cholecalciferol, vitamin D3, 2,000 unit tab Take 2,000 Units by mouth daily. cloNIDine HCL (CATAPRES) 0.3 mg tablet Take 0.3 mg by mouth nightly. polyethylene glycol (MIRALAX) 17 gram packet Take 1 Packet by mouth daily. (Patient taking differently: Take 17 g by mouth daily as needed.)    oxyCODONE IR (ROXICODONE) 10 mg tab immediate release tablet 10 mg every eight (8) hours as needed. DEXILANT 60 mg CpDB capsule (delayed release) Take 60 mg by mouth two (2) times a day. morphine CR (MS CONTIN) 60 mg CR tablet Take 60 mg by mouth every twelve (12) hours. diclofenac (VOLTAREN) 1 % gel Apply  to affected area four (4) times daily. (Patient taking differently: Apply  to affected area four (4) times daily as needed.)    cyclobenzaprine (FLEXERIL) 10 mg tablet TAKE 1 TABLET BY MOUTH THREE TIMES DAILY AS NEEDED FOR MUSCLE SPASMS FOR UP TO 30 DAYS. nitroglycerin (NITROSTAT) 0.4 mg SL tablet 1 Tablet by SubLINGual route every five (5) minutes as needed for Chest Pain. Up to 3 doses. (Patient not taking: No sig reported)    sucralfate (CARAFATE) 1 gram tablet Take 1 g by mouth two (2) times daily as needed. (Patient not taking: No sig reported)    naloxone (NARCAN) 4 mg/actuation nasal spray 1 [place] by IntraNASal route once as needed. Use 1 spray intranasally into 1 nostril.  Use a new Narcan nasal spray for subsequent doses and administer into alternating nostrils. May repeat every 2 to 3 minutes as needed. (Patient not taking: No sig reported)     No current facility-administered medications for this visit. Recent use of: No recent use of aspirin (ASA), NSAIDS or steroids    Tetanus up to date: last tetanus booster within 10 years      Anesthesia Complications: None  History of abnormal bleeding : None  History of Blood Transfusions: no  Health Care Directive or Living Will: no    REVIEW OF SYSTEMS:  Exertional shortness of breath relieved with albuterol consistent with known asthma - approx 2x/week. No fevers, chills, malaise. No chest pain on exertion. No worsening/character change of known chronic abdominal pain, change in bowel habits, black or bloody stools. No dysuria. No neurological complaints. EXAM:   Visit Vitals  /82 (BP 1 Location: Left upper arm, BP Patient Position: Sitting)   Pulse 99   Temp 97.8 °F (36.6 °C) (Tympanic)   Resp 14   Ht 5' 4\" (1.626 m)   Wt 194 lb 12.8 oz (88.4 kg)   SpO2 97%   BMI 33.44 kg/m²     Physical Exam  Constitutional:       General: She is not in acute distress. Appearance: She is well-developed. She is not diaphoretic. HENT:      Head: Normocephalic and atraumatic. Cardiovascular:      Rate and Rhythm: Normal rate and regular rhythm. Heart sounds: Normal heart sounds. No murmur heard. No friction rub. No gallop. Pulmonary:      Effort: Pulmonary effort is normal. No respiratory distress. Breath sounds: Normal breath sounds. No wheezing, rhonchi or rales. Musculoskeletal:      Cervical back: Neck supple. Right lower leg: No edema. Left lower leg: No edema. Skin:     General: Skin is warm and dry. Nails: There is no clubbing. Neurological:      Mental Status: She is alert and oriented to person, place, and time. Psychiatric:         Behavior: Behavior normal.         Thought Content:  Thought content normal.         Judgment: Judgment normal.         DIAGNOSTICS:   Not indicated    IMPRESSION:     No contraindications to planned surgery    Olivia Paris MD   12/20/2022

## 2022-12-20 NOTE — PROGRESS NOTES
Bayron Orellana (: 1955) is a 75 y.o. female, established patient, here for:    ASSESSMENT/PLAN:  1. Pre-op evaluation  2. Cortical age-related cataract of right eye  3. Nuclear sclerotic cataract of right eye  4. Mild asthma without complication, unspecified whether persistent  Assessment & Plan:  Acceptable control albuterol as needed, currently approx 2x/week - but limiting activity. Schedule visit to better assess allergy status and consider which would be better overall controller agent: montelukast vs ICS (considering chronic angular cheilitis)  Orders:  -     albuterol (ProAir HFA) 90 mcg/actuation inhaler; Take 2 Puffs by inhalation every four (4) hours as needed for Wheezing., Normal, Disp-1 Each, R-1  5. Type 2 diabetes mellitus without complication, without long-term current use of insulin ([facility]  Assessment & Plan:  Acceptable control on metformin alone when A1c last checked in 2022 - 7.0. Update labs, schedule visit. Anticipate dc'ing regular finger sticks. Return for asthma follow up, (30), diabetes follow up (30), labs prior - separate visits. SUBJECTIVE/OBJECTIVE:  HPI  Here for preop for cataract extraction. Also reports GI symptoms have been improving since following Dr. Beata Gayle advice. Trying to exercise daily. Limited by chest tightness, relieved with albuterol     Asthma management lost to follow up. Notes in 2018 - had been on montelukast, flovent and albuterol. Now only with albuterol (through ED). Using 2x/week. Requesting more. DM - Asking about a continuous glucose monitor.  Tired of finger sticks    Lab Results   Component Value Date/Time    Hemoglobin A1c 7.0 (H) 2022 02:54 PM    Hemoglobin A1c 7.2 (H) 2021 01:42 PM    Hemoglobin A1c 7.3 (H) 2021 10:40 AM    Hemoglobin A1c, External 5.9 2016 12:00 AM    Glucose 127 (H) 2021 01:42 PM    Glucose (POC) 129 (H) 2021 01:02 PM    Microalbumin/Creat ratio (mg/g creat) 4 09/17/2021 01:42 PM    Microalbumin,urine random 0.59 09/17/2021 01:42 PM    LDL, calculated 74.6 09/17/2021 01:42 PM    Creatinine, POC 0.7 11/12/2022 04:10 PM    Creatinine 0.92 09/17/2021 01:42 PM         Results for orders placed or performed during the hospital encounter of 11/12/22   CREATININE, POC   Result Value Ref Range    Creatinine, POC 0.7 0.6 - 1.3 MG/DL    eGFR (POC) >60 >60 ml/min/1.73m2     Prior to Admission medications    Medication Sig Start Date End Date Taking? Authorizing Provider   albuterol (ProAir HFA) 90 mcg/actuation inhaler Take 2 Puffs by inhalation every four (4) hours as needed for Wheezing. 12/20/22  Yes Tomasz Abrams MD   furosemide (LASIX) 20 mg tablet TAKE ONE TABLET BY MOUTH DAILY 11/3/22  Yes Cassie DOAN, NP   fluticasone propionate (FLONASE) 50 mcg/actuation nasal spray SPRAY 2 SPRAYS INTO BOTH NOSTRILS DAILY AS NEEDED FOR RHINITIS 9/27/22  Yes Tomasz Abrams MD   ketoconazole (NIZORAL) 2 % topical cream APPLY A SMALL AMOUNT TO AFFECTED AREA(S) TWO TIMES A DAY 9/27/22  Yes Tomasz Abrams MD   metFORMIN ER (GLUCOPHAGE XR) 500 mg tablet TAKE 2 TABLETS BY MOUTH DAILY WITH DINNER 9/27/22  Yes Tomasz Abrams MD   atorvastatin (LIPITOR) 40 mg tablet TAKE ONE TABLET BY MOUTH DAILY 5/24/22  Yes Tomasz Abrams MD   losartan (COZAAR) 50 mg tablet TAKE ONE TABLET BY MOUTH DAILY 4/12/22  Yes Tomasz Abrams MD   metoprolol succinate (TOPROL-XL) 50 mg XL tablet Take 1 Tablet by mouth daily. 3/11/22  Yes Jill Zuñiga DO   ferrous sulfate (IRON) 325 mg (65 mg iron) EC tablet TAKE ONE TABLET BY MOUTH THREE TIMES A DAY WITH A MEAL - START WITH ONCE DAILY AND INCREASE AS TOLERATED 12/30/21  Yes Tomasz Abrams MD   Linzess 145 mcg cap capsule Take 145 mcg by mouth daily. 9/27/21  Yes Provider, Historical   valACYclovir (VALTREX) 500 mg tablet Take 1 Tablet by mouth daily. Patient taking differently: Take 500 mg by mouth as needed.  9/20/21  Yes Lou Leach MD   chlorhexidine (PERIDEX) 0.12 % solution Take 15 mL by mouth as needed. 6/12/21  Yes Provider, Historical   doxepin (SINEquan) 50 mg capsule Take 2 capsules by mouth daily. 7/19/21  Yes Provider, Historical   omeprazole (PRILOSEC) 40 mg capsule Take 40 mg by mouth daily. 6/28/21  Yes Provider, Historical   trospium (SANCTURA XL) 60 mg capsule Take 1 Capsule by mouth Daily (before breakfast). 8/18/21  Yes Savage Aburto PA-C   Blood-Glucose Meter (Accu-Chek Juhi Plus Meter) misc Dx Code:E11.9 5/19/21  Yes Lou Leach MD   glucose blood VI test strips (Accu-Chek Juhi Plus test strp) strip Dx Code: E11.9 5/19/21  Yes Lou Leach MD   promethazine (PHENERGAN) 12.5 mg tablet Take 12.5 mg by mouth as needed. 11/2/19  Yes Provider, Historical   dicyclomine (BENTYL) 20 mg tablet Take 20 mg by mouth four (4) times daily as needed for Abdominal Cramps. Yes Provider, Historical   metoclopramide HCl (REGLAN) 5 mg tablet Take 5 mg by mouth daily. Yes Provider, Historical   QUEtiapine (SEROQUEL) 200 mg tablet Take 1 Tab by mouth daily. 10/18/19  Yes Lou Leach MD   cholecalciferol, vitamin D3, 2,000 unit tab Take 2,000 Units by mouth daily. Yes Provider, Historical   cloNIDine HCL (CATAPRES) 0.3 mg tablet Take 0.3 mg by mouth nightly. 9/16/19  Yes Provider, Historical   polyethylene glycol (MIRALAX) 17 gram packet Take 1 Packet by mouth daily. Patient taking differently: Take 17 g by mouth daily as needed. 5/6/19  Yes Lou Leach MD   oxyCODONE IR (ROXICODONE) 10 mg tab immediate release tablet 10 mg every eight (8) hours as needed. 2/12/19  Yes Provider, Historical   DEXILANT 60 mg CpDB capsule (delayed release) Take 60 mg by mouth two (2) times a day. 2/3/19  Yes Provider, Historical   morphine CR (MS CONTIN) 60 mg CR tablet Take 60 mg by mouth every twelve (12) hours.    Yes Provider, Historical   diclofenac (VOLTAREN) 1 % gel Apply  to affected area four (4) times daily. Patient taking differently: Apply  to affected area four (4) times daily as needed. 12/27/18  Yes James Alarcon DO   cyclobenzaprine (FLEXERIL) 10 mg tablet TAKE 1 TABLET BY MOUTH THREE TIMES DAILY AS NEEDED FOR MUSCLE SPASMS FOR UP TO 30 DAYS. 10/1/18  Yes BETO Mcgraw   nitroglycerin (NITROSTAT) 0.4 mg SL tablet 1 Tablet by SubLINGual route every five (5) minutes as needed for Chest Pain. Up to 3 doses. Patient not taking: No sig reported 3/9/22   Tomy WEATHERS DO   sucralfate (CARAFATE) 1 gram tablet Take 1 g by mouth two (2) times daily as needed. Patient not taking: No sig reported 9/28/21   Provider, Historical   naloxone [facility]) 4 mg/actuation nasal spray 1 [place] by IntraNASal route once as needed. Use 1 spray intranasally into 1 nostril. Use a new Narcan nasal spray for subsequent doses and administer into alternating nostrils. May repeat every 2 to 3 minutes as needed.   Patient not taking: No sig reported    Other, MD Xenia       Physical Exam          -- Jono Lopez MD

## 2022-12-20 NOTE — ASSESSMENT & PLAN NOTE
Acceptable control albuterol as needed, currently approx 2x/week - but limiting activity.  Schedule visit to better assess allergy status and consider which would be better overall controller agent: montelukast vs ICS (considering chronic angular cheilitis)

## 2022-12-20 NOTE — ASSESSMENT & PLAN NOTE
Acceptable control on metformin alone when A1c last checked in April 2022 - 7.0. Update labs, schedule visit. Anticipate dc'ing regular finger sticks.

## 2022-12-20 NOTE — PROGRESS NOTES
Chief Complaint   Patient presents with    Pre-op Exam     Patent here for pre op exam no concerns. Patient would also like prescription sent to her pharmacy for a Dexcon glucose system. 1. \"Have you been to the ER, urgent care clinic since your last visit? Hospitalized since your last visit? \" No    2. \"Have you seen or consulted any other health care providers outside of the [address] [place] [address] since your last visit? \"  Has seen Dr. Cesar Jones for colonoscopy. 3. For patients aged 39-70: Has the patient had a colonoscopy / FIT/ Cologuard? Yes - Care Gap present. Rooming MA/LPN to request most recent results      If the patient is female:    4. For patients aged 41-77: Has the patient had a mammogram within the past 2 years? No      5. For patients aged 21-65: Has the patient had a pap smear?  NA - based on age or sex

## 2022-12-30 ENCOUNTER — PATIENT OUTREACH (OUTPATIENT)
Dept: CASE MANAGEMENT | Age: 67
End: 2022-12-30

## 2022-12-30 NOTE — PROGRESS NOTES
Patient has graduated from the Complex Case Management  program on 12/30/22. Patient/family has the ability to self-manage at this time. Care management goals have been completed. No further Ambulatory Care Manager follow up scheduled. Goals Addressed                   This Visit's Progress     COMPLETED: Attends follow up appointments on schedule        9/19/22 Patient will attend all scheduled appointments through 12/19/22       COMPLETED: Knowledge and adherence of prescribed medication (ie. action, side effects, missed dose, etc.).        9/19/22 Pt will take all medications prescribed to be evaluated on each outreach through 12/19/22       COMPLETED: Prepare patients and caregivers for end of life decisions (ie. need for hospice, pain management, symptom relief, advance directives etc.)        9/19/22 Pt will complete an ACP and have it scanned into their EMR by 12/19/22              Patient has Ambulatory Care Manager's contact information for any further questions, concerns, or needs.   Patients upcoming visits:    Future Appointments   Date Time Provider Gordo Marquez   1/20/2023 10:30 AM Zita Stein MD [facility]

## 2023-01-16 ENCOUNTER — TELEPHONE (OUTPATIENT)
Dept: FAMILY MEDICINE CLINIC | Age: 68
End: 2023-01-16

## 2023-01-16 DIAGNOSIS — J30.1 ALLERGIC RHINITIS DUE TO POLLEN, UNSPECIFIED SEASONALITY: ICD-10-CM

## 2023-01-16 DIAGNOSIS — D50.9 IRON DEFICIENCY ANEMIA, UNSPECIFIED IRON DEFICIENCY ANEMIA TYPE: ICD-10-CM

## 2023-01-16 NOTE — TELEPHONE ENCOUNTER
This patient contacted office for the following prescriptions to be filled:    Last office visit: 12/20/22  Follow up appointment: 1/20/23 (if overdue call patient to schedule appointment)  Medication requested :   Requested Prescriptions     Pending Prescriptions Disp Refills    ferrous sulfate (IRON) 325 mg (65 mg iron) EC tablet 270 Tablet 1    fluticasone propionate (FLONASE) 50 mcg/actuation nasal spray 3 Each 3      PCP: Dwight   Mail order or Local pharmacy name [facility]

## 2023-01-16 NOTE — TELEPHONE ENCOUNTER
Patient has not had her labs done for appt on 23. Called patient  verified she was reminded of her appt with Dr. Teddy Medley on 23 (VV @ 10:30 AM) and asked to have her labs done at [facility] before her appt. Patient says she will try to get the labs done today.

## 2023-01-17 RX ORDER — FERROUS SULFATE 325(65) MG
325 TABLET, DELAYED RELEASE (ENTERIC COATED) ORAL
Qty: 270 TABLET | Refills: 1 | Status: SHIPPED | OUTPATIENT
Start: 2023-01-17

## 2023-01-17 RX ORDER — FLUTICASONE PROPIONATE 50 MCG
2 SPRAY, SUSPENSION (ML) NASAL DAILY
Qty: 3 EACH | Refills: 3 | Status: SHIPPED | OUTPATIENT
Start: 2023-01-17

## 2023-01-17 NOTE — TELEPHONE ENCOUNTER
Patient has an appt on 1/20/23. She needs new prescriptions sent in to [address] [place] [address] no longer accepts her insurance. She was reminded to have her labs done this week as soon as possible. Please review and sign if appropriate.

## 2023-01-18 ENCOUNTER — OFFICE VISIT (OUTPATIENT)
Dept: CARDIOLOGY CLINIC | Age: 68
End: 2023-01-18
Payer: MEDICARE

## 2023-01-18 VITALS
SYSTOLIC BLOOD PRESSURE: 142 MMHG | HEIGHT: 64 IN | OXYGEN SATURATION: 98 % | WEIGHT: 193 LBS | HEART RATE: 96 BPM | DIASTOLIC BLOOD PRESSURE: 98 MMHG | BODY MASS INDEX: 32.95 KG/M2

## 2023-01-18 DIAGNOSIS — R07.9 CHEST PAIN AT REST: ICD-10-CM

## 2023-01-18 DIAGNOSIS — I20.8 MICROVASCULAR ANGINA (HCC): Primary | ICD-10-CM

## 2023-01-18 DIAGNOSIS — I10 ESSENTIAL HYPERTENSION: ICD-10-CM

## 2023-01-18 RX ORDER — MORPHINE SULFATE 30 MG/1
30 TABLET, FILM COATED, EXTENDED RELEASE ORAL 2 TIMES DAILY
COMMUNITY
Start: 2022-12-23

## 2023-01-18 RX ORDER — HYDROXYZINE 25 MG/1
50 TABLET, FILM COATED ORAL DAILY
COMMUNITY

## 2023-01-18 RX ORDER — ISOSORBIDE MONONITRATE 30 MG/1
30 TABLET, EXTENDED RELEASE ORAL
Qty: 30 TABLET | Refills: 6 | Status: SHIPPED | OUTPATIENT
Start: 2023-01-18

## 2023-01-18 RX ORDER — PROMETHAZINE HYDROCHLORIDE 25 MG/1
25 TABLET ORAL
COMMUNITY

## 2023-01-18 RX ORDER — QUETIAPINE FUMARATE 400 MG/1
400 TABLET, FILM COATED ORAL DAILY
COMMUNITY
Start: 2022-12-23

## 2023-01-18 NOTE — PROGRESS NOTES
Ana Maria Moreno presents today for   Chief Complaint   Patient presents with    Follow-up     Overdue follow up - Last seen on 03-09-22       Ana Maria Moreno preferred language for health care discussion is english/other. Is someone accompanying this pt? yes    Is the patient using any DME equipment during [address]? no    Depression Screening:  3 most recent PHQ Screens 1/18/2023   PHQ Not Done -   Little interest or pleasure in doing things Not at all   Feeling down, depressed, irritable, or hopeless Not at all   Total Score PHQ 2 0   Trouble falling or staying asleep, or sleeping too much -   Feeling tired or having little energy -   Poor appetite, weight loss, or overeating -   Feeling bad about yourself - or that you are a failure or have let yourself or your family down -   Trouble concentrating on things such as school, work, reading, or watching TV -   Moving or speaking so slowly that other people could have noticed; or the opposite being so fidgety that others notice -   Thoughts of being better off dead, or hurting yourself in some way -   PHQ 9 Score -   How difficult have these problems made it for you to do your work, take care of your home and get along with others -       Learning Assessment:  Learning Assessment 1/18/2023   PRIMARY LEARNER Patient   HIGHEST LEVEL OF EDUCATION - PRIMARY LEARNER  -   BARRIERS PRIMARY LEARNER -   CO-LEARNER CAREGIVER -   PRIMARY LANGUAGE ENGLISH   LEARNER PREFERENCE PRIMARY DEMONSTRATION     -     -   ANSWERED BY patient   RELATIONSHIP SELF       Abuse Screening:  Abuse Screening Questionnaire 1/18/2023   Do you ever feel afraid of your partner? N   Are you in a relationship with someone who physically or mentally threatens you? N   Is it safe for you to go home? Y       Fall Risk  Fall Risk Assessment, last 12 mths 1/18/2023   Able to walk? Yes   Fall in past 12 months? 0   Do you feel unsteady?  0   Are you worried about falling 0   Number of falls in past 12 months -   Fall with injury? -           Pt currently taking Anticoagulant therapy? no    Pt currently taking Antiplatelet therapy ? no      Coordination of Care:  1. Have you been to the ER, urgent care clinic since your last visit? Hospitalized since your last visit? no    2. Have you seen or consulted any other health care providers outside of the [address] [place] [address] since your last visit? Include any pap smears or colon screening.  no

## 2023-01-18 NOTE — PROGRESS NOTES
James Cornejo    Chief Complaint   Patient presents with    Follow-up     Overdue follow up - Last seen on 03-09-22       HPI    aJmes Cornejo is a 75 y.o. AAF who has followed with me for noncardiac CP. Due to her risk factors, sent her for [facility] 6/3/2020 and luckily had patent coronaries. Overall her chest pain greatly improved when I started her on metoprolol says it happens much less and with less severity. Unfortunately does still happen and she has well improved continue tachycardia. She comes with her daughter today also has concerns about if she should be taking aspirin said that she has some issues with her stomach and things look inflamed but has ongoing work-up. Also takes daily Lasix which has improved her swelling.     Cardiac RFs: postmenopausal/ age, HTN, remote polysubstance abuse incl tobacco and cocaine, +FH premature CAD    Past Medical History:   Diagnosis Date    Annular tear of lumbar disc 11/10/2014    Benign tumor of throat     Bipolar disorder ([address]     Bone spur     right ankle    Cervicalgia 11/10/2014    Chronic pain     back    Degeneration of lumbar or lumbosacral intervertebral disc 11/10/2014    Degenerative disc disease     Depression     Diabetes ([address]     Displacement of lumbar intervertebral disc without myelopathy 11/10/2014    Diverticular disease     Elevated white blood cell count     Fatty liver     Fibromyalgia     GERD (gastroesophageal reflux disease)     Hepatic cyst     Hepatic steatosis     Herpes labialis     Hypertension     Hypertension     IBS (irritable bowel syndrome)     Insomnia     Lung nodule     Nausea & vomiting     Non-cardiac chest pain 1/17/2020    Pain in joint, multiple sites 11/10/2014    Postlaminectomy syndrome, cervical region 11/10/2014    Sinus infection 10/5/15    Ulcer     Urinary incontinence     Urinary retention        Past Surgical History:   Procedure Laterality Date    COLONOSCOPY N/A 4/13/2017    COLONOSCOPY performed by Enedina Coleman MD at [facility] ENDOSCOPY    COLONOSCOPY N/A 5/27/2021    COLONOSCOPY (incomplete) poor prep performed by Aida Velasquez MD at [facility] ENDOSCOPY    COLONOSCOPY N/A 7/8/2021    COLONOSCOPY performed by Bryon Olszewski, MD at [facility] ENDOSCOPY    HX CATARACT REMOVAL      HX CERVICAL FUSION      HX HYSTERECTOMY      HX ORTHOPAEDIC      ganglion cyst removed, right hand    HX OTHER SURGICAL      cyst left thigh removed    HX TONSIL AND ADENOIDECTOMY      HX TUBAL LIGATION      HX WRIST FRACTURE TX      MO LAPAROSCOPY SURG CHOLECYSTECTOMY N/A 02/27/2017    Dr. Niru Moe       Current Outpatient Medications   Medication Sig Dispense Refill    morphine ER (MS CONTIN) 30 mg CR tablet Take 30 mg by mouth two (2) times a day. QUEtiapine (SEROquel) 400 mg tablet Take 400 mg by mouth daily. promethazine (PHENERGAN) 25 mg tablet Take 25 mg by mouth every six (6) hours as needed for Nausea. hydrOXYzine HCL (ATARAX) 25 mg tablet Take 50 mg by mouth daily. Take 2 tablets by mouth at night      ferrous sulfate (IRON) 325 mg (65 mg iron) EC tablet Take 1 Tablet by mouth three (3) times daily (with meals). 270 Tablet 1    fluticasone propionate (FLONASE) 50 mcg/actuation nasal spray 2 Sprays by Both Nostrils route daily. 3 Each 3    albuterol (ProAir HFA) 90 mcg/actuation inhaler Take 2 Puffs by inhalation every four (4) hours as needed for Wheezing. 1 Each 1    furosemide (LASIX) 20 mg tablet TAKE ONE TABLET BY MOUTH DAILY 90 Tablet 0    ketoconazole (NIZORAL) 2 % topical cream APPLY A SMALL AMOUNT TO AFFECTED AREA(S) TWO TIMES A DAY 15 g 3    metFORMIN ER (GLUCOPHAGE XR) 500 mg tablet TAKE 2 TABLETS BY MOUTH DAILY WITH DINNER 180 Tablet 4    atorvastatin (LIPITOR) 40 mg tablet TAKE ONE TABLET BY MOUTH DAILY 90 Tablet 3    losartan (COZAAR) 50 mg tablet TAKE ONE TABLET BY MOUTH DAILY 90 Tablet 3    metoprolol succinate (TOPROL-XL) 50 mg XL tablet Take 1 Tablet by mouth daily.  90 Tablet 3    nitroglycerin (NITROSTAT) 0.4 mg SL tablet 1 Tablet by SubLINGual route every five (5) minutes as needed for Chest Pain. Up to 3 doses. 25 Tablet 3    sucralfate (CARAFATE) 1 gram tablet Take 1 g by mouth two (2) times daily as needed. Linzess 145 mcg cap capsule Take 145 mcg by mouth daily. valACYclovir (VALTREX) 500 mg tablet Take 1 Tablet by mouth daily. (Patient taking differently: Take 500 mg by mouth as needed.) 90 Tablet 4    doxepin (SINEquan) 50 mg capsule Take 2 capsules by mouth daily. Blood-Glucose Meter (Accu-Chek Juhi Plus Meter) misc Dx Code:E11.9 1 Each 0    glucose blood VI test strips (Accu-Chek Juhi Plus test strp) strip Dx Code: E11.9 100 Strip 4    dicyclomine (BENTYL) 20 mg tablet Take 20 mg by mouth four (4) times daily as needed for Abdominal Cramps. metoclopramide HCl (REGLAN) 5 mg tablet Take 5 mg by mouth daily. cholecalciferol, vitamin D3, 2,000 unit tab Take 2,000 Units by mouth daily. cloNIDine HCL (CATAPRES) 0.3 mg tablet Take 0.3 mg by mouth nightly. polyethylene glycol (MIRALAX) 17 gram packet Take 1 Packet by mouth daily. (Patient taking differently: Take 17 g by mouth daily as needed.) 30 Each 6    oxyCODONE IR (ROXICODONE) 10 mg tab immediate release tablet 10 mg every eight (8) hours as needed. DEXILANT 60 mg CpDB capsule (delayed release) Take 60 mg by mouth two (2) times a day. diclofenac (VOLTAREN) 1 % gel Apply  to affected area four (4) times daily. (Patient taking differently: Apply  to affected area four (4) times daily as needed.) 1 g 1    cyclobenzaprine (FLEXERIL) 10 mg tablet TAKE 1 TABLET BY MOUTH THREE TIMES DAILY AS NEEDED FOR MUSCLE SPASMS FOR UP TO 30 DAYS. 90 Tab 2    naloxone (NARCAN) 4 mg/actuation nasal spray 1 [place] by IntraNASal route once as needed. Use 1 spray intranasally into 1 nostril. Use a new Narcan nasal spray for subsequent doses and administer into alternating nostrils. May repeat every 2 to 3 minutes as needed.          Allergies Allergen Reactions    Aspirin Other (comments)     Stomach bleeding    Ativan [Lorazepam] Shortness of Breath    Bactrim [Sulfamethoprim] Rash    Keflex [Cephalexin] Rash    Macrobid [Nitrofurantoin Monohyd/M-Cryst] Nausea and Vomiting    Nsaids (Non-Steroidal Anti-Inflammatory Drug) Nausea and Vomiting    Opana [Oxymorphone] Other (comments)     Insomnia, weight loss, nightmares    Pcn [Penicillins] Hives       Social History     Socioeconomic History    Marital status: SINGLE     Spouse name: Not on file    Number of children: Not on file    Years of education: Not on file    Highest education level: Not on file   Occupational History    Not on file   Tobacco Use    Smoking status: Former     Packs/day: 0.50     Years: 3.00     Pack years: 1.50     Types: Cigarettes     Quit date: 3/6/1993     Years since quittin.8    Smokeless tobacco: Never   Vaping Use    Vaping Use: Never used   Substance and Sexual Activity    Alcohol use: No    Drug use: Not Currently     Comment: Last smoked in -cocaine    Sexual activity: Never   Other Topics Concern    Not on file   Social History Narrative    Not on file     Social Determinants of Health     Financial Resource Strain: Not on file   Food Insecurity: Not on file   Transportation Needs: Not on file   Physical Activity: Not on file   Stress: Not on file   Social Connections: Not on file   Intimate Partner Violence: Not on file   Housing Stability: Not on file      +FH premature ASCVD    Review of Systems    14 pt Review of Systems is negative unless otherwise mentioned in the HPI.     Wt Readings from Last 3 Encounters:   23 87.5 kg (193 lb)   22 88.4 kg (194 lb 12.8 oz)   06/10/22 87.1 kg (192 lb)     Temp Readings from Last 3 Encounters:   22 97.8 °F (36.6 °C) (Tympanic)   06/10/22 (!) 96 °F (35.6 °C) (Tympanic)   06/10/22 (!) 96 °F (35.6 °C) (Tympanic)     BP Readings from Last 3 Encounters:   23 (!) 162/98   22 124/82   06/10/22 112/74     Pulse Readings from Last 3 Encounters:   01/18/23 96   12/20/22 99   06/10/22 (!) 112       05/30/19    ECHO STRESS 05/30/2019 5/30/2019    Interpretation Summary  · Baseline ECG: Sinus tachycardia. Rightward axis  · Negative stress test.  · Normal stress echocardiogram. Low risk study. Signed by: Gumaro Lockhart MD on 5/30/2019  2:09 PM      Physical Exam:    Visit Vitals  BP (!) 162/98 (BP 1 Location: Right arm, BP Patient Position: Sitting, BP Cuff Size: Large adult)   Pulse 96   Ht 5' 4\" (1.626 m)   Wt 87.5 kg (193 lb)   SpO2 98%   BMI 33.13 kg/m²      Physical Exam  HENT:      Head: Normocephalic and atraumatic. Eyes:      Pupils: Pupils are equal, round, and reactive to light. Cardiovascular:      Rate and Rhythm: Normal rate and regular rhythm. Heart sounds: Normal heart sounds. No murmur heard. No friction rub. No gallop. Pulmonary:      Effort: Pulmonary effort is normal. No respiratory distress. Breath sounds: Normal breath sounds. No wheezing or rales. Chest:      Chest wall: No tenderness. Abdominal:      General: Bowel sounds are normal.      Palpations: Abdomen is soft. Musculoskeletal:         General: No tenderness. Skin:     General: Skin is warm and dry. Neurological:      Mental Status: She is alert and oriented to person, place, and time. EKG today shows: NSR, normal axis and intervals, no ST segment abnormalities    06/03/20    CARDIAC PROCEDURE 06/03/2020 6/3/2020    Conclusion  · Normal LV function with EF 65%. No regional wall motion abnormality noted. · Normal right dominant epicardial coronary circulation. No significant atherosclerotic changes are noted. · All coronary arteries are widely patent. · No further coronary evaluation needed. Signed byHaroldo Pitts MD on 6/3/2020 11:43 AM    05/30/19    ECHO STRESS 05/30/2019 5/30/2019    Interpretation Summary  · Baseline ECG: Sinus tachycardia. Rightward axis  · Negative stress test.  · Normal stress echocardiogram. Low risk study. Signed by: Shu Mckeon MD on 5/30/2019  2:09 PM    Lab Results   Component Value Date/Time    Sodium 138 09/17/2021 01:42 PM    Potassium 4.5 09/17/2021 01:42 PM    Chloride 105 09/17/2021 01:42 PM    CO2 28 09/17/2021 01:42 PM    Anion gap 5 09/17/2021 01:42 PM    Glucose 127 (H) 09/17/2021 01:42 PM    BUN 16 09/17/2021 01:42 PM    Creatinine 0.92 09/17/2021 01:42 PM    BUN/Creatinine ratio 17 09/17/2021 01:42 PM    GFR est AA >60 09/17/2021 01:42 PM    GFR est non-AA >60 09/17/2021 01:42 PM    Calcium 8.9 09/17/2021 01:42 PM    Bilirubin, total 0.3 09/17/2021 01:42 PM    Alk. phosphatase 142 (H) 09/17/2021 01:42 PM    Protein, total 7.7 09/17/2021 01:42 PM    Albumin 3.8 09/17/2021 01:42 PM    Globulin 3.9 09/17/2021 01:42 PM    A-G Ratio 1.0 09/17/2021 01:42 PM    ALT (SGPT) 38 09/17/2021 01:42 PM    AST (SGOT) 30 09/17/2021 01:42 PM     Lab Results   Component Value Date/Time    Cholesterol, total 160 09/17/2021 01:42 PM    HDL Cholesterol 52 09/17/2021 01:42 PM    LDL, calculated 74.6 09/17/2021 01:42 PM    VLDL, calculated 33.4 09/17/2021 01:42 PM    Triglyceride 167 (H) 09/17/2021 01:42 PM    CHOL/HDL Ratio 3.1 09/17/2021 01:42 PM     Lab Results   Component Value Date/Time    Hemoglobin A1c 7.0 (H) 04/11/2022 02:54 PM    Hemoglobin A1c, External 5.9 06/27/2016 12:00 AM       Impression and Plan:  Andria Clifton is a 75 y.o. with:    Chronic CP, suspect microvascular angina component, BB responsive  IST  chronic HFpEF, on daily Lasix  Patent coronaries/ neg LHC 2020  HTN  preDM2   +FH  H/o polysubstance abuse, known    She is having pressure in her chest with exertion, but confounding the clinical picture- she says starts with pressure in her rectum a lot. Beta blocker was helping, I increased the last time but says not helping anymore. She has not tried SL NTG like I directed her. She is also seeing GI, says on Miralax, Reglan.  Her BP is elevated today, so lets go ahead and start her on Imdur 30 daily and see if it helps. RTC 3 months NP, 6 months with me. Call sooner if needed. Thank you for allowing me to participate in the care of your patient, please do not hesitate to call with questions or concerns.       Kindest Regards,    Fariha Robles, DO

## 2023-01-19 ENCOUNTER — HOSPITAL ENCOUNTER (OUTPATIENT)
Dept: LAB | Age: 68
Discharge: HOME | End: 2023-01-19

## 2023-01-19 LAB — SENTARA SPECIMEN COL,SENBCF: NORMAL

## 2023-01-19 PROCEDURE — 99001 SPECIMEN HANDLING PT-LAB: CPT

## 2023-01-20 LAB
25(OH)D3 SERPL-MCNC: 42.8 NG/ML (ref 32–100)
A-G RATIO,AGRAT: 1.6 RATIO (ref 1.1–2.6)
ALBUMIN SERPL-MCNC: 4.3 G/DL (ref 3.5–5)
ALP SERPL-CCNC: 135 U/L (ref 40–120)
ALT SERPL-CCNC: 20 U/L (ref 5–40)
ANION GAP SERPL CALC-SCNC: 11 MMOL/L (ref 3–15)
AST SERPL W P-5'-P-CCNC: 31 U/L (ref 10–37)
AVG GLU, 10930: 155 MG/DL (ref 91–123)
BILIRUB SERPL-MCNC: 0.2 MG/DL (ref 0.2–1.2)
BUN SERPL-MCNC: 10 MG/DL (ref 6–22)
CALCIUM SERPL-MCNC: 9.1 MG/DL (ref 8.4–10.5)
CHLORIDE SERPL-SCNC: 102 MMOL/L (ref 98–110)
CHOLEST SERPL-MCNC: 147 MG/DL (ref 110–200)
CO2 SERPL-SCNC: 26 MMOL/L (ref 20–32)
CREAT SERPL-MCNC: 0.7 MG/DL (ref 0.8–1.4)
CREATININE, URINE: 227 MG/DL
FOLATE,FOL: 19.3 NG/ML
GLOBULIN,GLOB: 2.7 G/DL (ref 2–4)
GLOMERULAR FILTRATION RATE: >60 ML/MIN/1.73 SQ.M.
GLUCOSE SERPL-MCNC: 178 MG/DL (ref 70–99)
HBA1C MFR BLD HPLC: 7 % (ref 4.8–5.6)
HDLC SERPL-MCNC: 3.1 MG/DL (ref 0–5)
HDLC SERPL-MCNC: 47 MG/DL
LDL/HDL RATIO,LDHD: 1.7
LDLC SERPL CALC-MCNC: 78 MG/DL (ref 50–99)
MAGNESIUM SERPL-MCNC: 1.7 MG/DL (ref 1.6–2.5)
MICROALB/CREAT RATIO, 140286: NORMAL
MICROALBUMIN,URINE RANDOM 140054: <12 MG/L (ref 0.1–17)
NON-HDL CHOLESTEROL, 011976: 100 MG/DL
POTASSIUM SERPL-SCNC: 4.5 MMOL/L (ref 3.5–5.5)
PROT SERPL-MCNC: 7 G/DL (ref 6.2–8.1)
SODIUM SERPL-SCNC: 139 MMOL/L (ref 133–145)
TRIGL SERPL-MCNC: 108 MG/DL (ref 40–149)
VIT B12 SERPL-MCNC: 544 PG/ML (ref 211–911)
VLDLC SERPL CALC-MCNC: 22 MG/DL (ref 8–30)

## 2023-01-20 NOTE — TELEPHONE ENCOUNTER
Patient had her labs done on yesterday. Results are not back [facility] says [facility] are [facility] in process. Patient was notified and has opted to reschedule her appt. She has been rescheduled for 1/27/23.

## 2023-01-27 ENCOUNTER — TELEPHONE (OUTPATIENT)
Dept: CARDIOLOGY CLINIC | Age: 68
End: 2023-01-27

## 2023-01-27 NOTE — PATIENT INSTRUCTIONS

## 2023-01-27 NOTE — TELEPHONE ENCOUNTER
Patient calling to state she stopped the imdur on Monday due to bad headaches. She would like to know what to do know.     Verbal order and read back per Gricel Fishman, DO  She can increase her metoprolol to 100 mg or to 75 mg   Patient states she will try 75 mg daily of the metoprolol     She will call to let Dr Iqra Soto know how it is working

## 2023-01-30 ENCOUNTER — TRANSCRIBE ORDER (OUTPATIENT)
Dept: SCHEDULING | Age: 68
End: 2023-01-30

## 2023-01-30 DIAGNOSIS — Z12.31 VISIT FOR SCREENING MAMMOGRAM: Primary | ICD-10-CM

## 2023-01-30 RX ORDER — FUROSEMIDE 20 MG/1
20 TABLET ORAL DAILY
Qty: 90 TABLET | Refills: 0 | Status: SHIPPED | OUTPATIENT
Start: 2023-01-30

## 2023-02-03 ENCOUNTER — VIRTUAL VISIT (OUTPATIENT)
Dept: FAMILY MEDICINE CLINIC | Age: 68
End: 2023-02-03
Payer: MEDICARE

## 2023-02-03 DIAGNOSIS — I10 ESSENTIAL HYPERTENSION: ICD-10-CM

## 2023-02-03 DIAGNOSIS — M05.731 RHEUMATOID ARTHRITIS INVOLVING RIGHT WRIST WITH POSITIVE RHEUMATOID FACTOR (HCC): ICD-10-CM

## 2023-02-03 DIAGNOSIS — E11.9 TYPE 2 DIABETES MELLITUS WITHOUT COMPLICATION, WITHOUT LONG-TERM CURRENT USE OF INSULIN (HCC): Primary | ICD-10-CM

## 2023-02-03 NOTE — ASSESSMENT & PLAN NOTE
Uncontrolled at recent visit with Dr. Michaela Valencia. Imdur caused headaches. Metoprolol adjusted.  Continue present management pending nurse visit 2 weeks

## 2023-02-03 NOTE — ASSESSMENT & PLAN NOTE
Well controlled with A1c 7.0, continue present management with metformin. May dc finger sticks.  follow up 6 months

## 2023-02-03 NOTE — PROGRESS NOTES
Aida Wynne is a 75 y.o. female, established patient, who was seen by synchronous (real-time) audio-video technology on 2/3/2023 for     Assessment & Plan:   1. Type 2 diabetes mellitus without complication, without long-term current use of insulin ([address]  Assessment & Plan:  Well controlled with A1c 7.0, continue present management with metformin. May dc finger sticks. follow up 6 months   2. Essential hypertension  Assessment & Plan:  Uncontrolled at recent visit with Dr. Farrah Shaw. Imdur caused headaches. Metoprolol adjusted. Continue present management pending nurse visit 2 weeks  3. Rheumatoid arthritis involving right wrist with positive rheumatoid factor ([address]  -     REFERRAL TO RHEUMATOLOGY   Follow-up and Dispositions    Return in about 2 weeks (around 2/17/2023) for nurse visit blood pressure check; 6 months with [facility], labs prior (30). 712  Subjective:   Having trouble with her eyes. Being seen by Dr. Jes Tellez    DM2 - on metformin  RA - Not currently being managed. Thinks Dr. Evan Morris left the practice    Seeing Dr. Farrah Shaw for bilateral pedal edema 7/2021 , HFpEF, atypical noncardiac chest pain. Doing well with furosemide, metoprolol and compression stockings.  Metoprolol increased last week       Results for orders placed or performed in visit on 01/19/23   LIPID PANEL   Result Value Ref Range    Triglyceride 108 40 - 149 mg/dL    HDL Cholesterol 47 >=40 mg/dL    Cholesterol, total 147 110 - 200 mg/dL    CHOLESTEROL/HDL 3.1 0.0 - 5.0    Non-HDL Cholesterol 100 <130 mg/dL    LDL, calculated 78 50 - 99 mg/dL    VLDL, calculated 22 8 - 30 mg/dL    LDL/HDL Ratio 1.7    METABOLIC PANEL, COMPREHENSIVE   Result Value Ref Range    Glucose 178 (H) 70 - 99 mg/dL    BUN 10 6 - 22 mg/dL    Creatinine 0.7 (L) 0.8 - 1.4 mg/dL    Sodium 139 133 - 145 mmol/L    Potassium 4.5 3.5 - 5.5 mmol/L    Chloride 102 98 - 110 mmol/L    CO2 26 20 - 32 mmol/L    AST (SGOT) 31 10 - 37 U/L    ALT (SGPT) 20 5 - 40 U/L    Alk. phosphatase 135 (H) 40 - 120 U/L    Bilirubin, total 0.2 0.2 - 1.2 mg/dL    Calcium 9.1 8.4 - 10.5 mg/dL    Protein, total 7.0 6.2 - 8.1 g/dL    Albumin 4.3 3.5 - 5.0 g/dL    A-G Ratio 1.6 1.1 - 2.6 ratio    Globulin 2.7 2.0 - 4.0 g/dL    GLOMERULAR FILTRATION RATE >60.0 >60.0 mL/min/1.73 sq.m. Anion gap 11.0 3.0 - 15.0 mmol/L   FOLATE   Result Value Ref Range    Folate 19.30 >=3.10 ng/mL   MAGNESIUM   Result Value Ref Range    Magnesium 1.7 1.6 - 2.5 mg/dL   VITAMIN B12   Result Value Ref Range    Vitamin B12 544 211 - 911 pg/mL   VITAMIN D, 25 HYDROXY   Result Value Ref Range    VITAMIN D, 25-HYDROXY 42.8 32.0 - 100.0 ng/mL   HEMOGLOBIN A1C W/O EAG   Result Value Ref Range    Hemoglobin A1c 7.0 (H) 4.8 - 5.6 %    AVG  (H) 91 - 123 mg/dL   MICROALBUMIN, UR, RAND W/ MICROALB/CREAT RATIO   Result Value Ref Range    Creatinine, urine random 227 mg/dL    Microalbumin, urine <12.0 0.1 - 17.0 mg/L    Microalb/Creat ratio (ug/mg creat.)            RHEUMATOID FACTOR, QL [[id number]] (Order [id number])  Lab  Date: 2/13/2019 Department: [facility] Released By: Al Chandler Authorizing: Pam Boyd MD      Contains abnormal data RHEUMATOID FACTOR, QL  Order: [id number]  Collected 2/13/2019 16:25    Status: Final result    Next appt: None    Dx:  Tenosynovitis of hand    0 Result Notes  Component Ref Range & Units 2/13/19 1625    Rheumatoid factor 0.0 - 13.9 IU/mL 17.5 High     Comment: (NOTE)   Performed At: [address] [id number]   Bebeto Bal MD QM:[id number]           Objective:     General: alert, cooperative, no distress   Mental  status: normal mood, behavior, speech, dress, motor activity, and thought processes, able to follow commands   HENT: NCAT   Neck: no visualized mass   Resp: no respiratory distress   Neuro: no gross deficits   Skin: no discoloration or lesions of concern on visible areas   Psychiatric: normal affect, consistent with stated mood, no evidence of hallucinations     Additional exam findings: We discussed the expected course, resolution and complications of the diagnosis(es) in detail. Medication risks, benefits, costs, interactions, and alternatives were discussed as indicated. I advised her to contact the office if her condition worsens, changes or fails to improve as anticipated. She expressed understanding with the diagnosis(es) and plan. Nataly Aldana, was evaluated through a synchronous (real-time) audio-video encounter. The patient (or guardian if applicable) is aware that this is a billable service, which includes applicable co-pays. This Virtual Visit was conducted with patient's (and/or legal guardian's) consent. The visit was conducted pursuant to the emergency declaration under the [address] [place] [address] authority and the [employer] and [employer] General Act. Patient identification was verified, and a caregiver was present when appropriate. The patient was located at: Home: [address] [place] [address]  The provider was located at:  Facility (Appt Department): [address] [place] [address] [address]  [address] [facility]      Radha Dinh MD

## 2023-02-03 NOTE — Clinical Note
Please send Dr. Maksim Thurston notes as well as my own along with the referral to rheum.  Thanks, ERIKA

## 2023-02-03 NOTE — PROGRESS NOTES
Chief Complaint   Patient presents with    Hypertension    Diabetes     Patient being seen today for her chronic condition follow up and lab review, no concerns. 1. \"Have you been to the ER, urgent care clinic since your last visit? Hospitalized since your last visit? \" No    2. \"Have you seen or consulted any other health care providers outside of the [address] [place] [address] since your last visit? \" No     3. For patients aged 39-70: Has the patient had a colonoscopy / FIT/ Cologuard? Yes - no Care Gap present      If the patient is female:    4. For patients aged 41-77: Has the patient had a mammogram within the past 2 years? No Scheduled 2/21/23       5. For patients aged 21-65: Has the patient had a pap smear?  NA - based on age or sex

## 2023-02-05 DIAGNOSIS — Z12.31 VISIT FOR SCREENING MAMMOGRAM: Primary | ICD-10-CM

## 2023-02-27 RX ORDER — METOPROLOL SUCCINATE 50 MG/1
75 TABLET, EXTENDED RELEASE ORAL DAILY
Qty: 45 TABLET | Refills: 6 | Status: SHIPPED | OUTPATIENT
Start: 2023-02-27

## 2023-03-13 RX ORDER — METOPROLOL SUCCINATE 50 MG/1
TABLET, EXTENDED RELEASE ORAL
Qty: 90 TABLET | Refills: 3 | Status: SHIPPED | OUTPATIENT
Start: 2023-03-13

## 2023-05-08 ENCOUNTER — TELEPHONE (OUTPATIENT)
Facility: CLINIC | Age: 68
End: 2023-05-08

## 2023-05-08 NOTE — TELEPHONE ENCOUNTER
Patient called requesting a refill request on her metFORMIN (GLUCOPHAGE-XR) 500 MG extended release tablet , I show that on 09/27/22 180 QTY with 4 refills was  sent to [facility], however Ms. Santoyo insurance has changed and adriana is no longer in network with her insurance. Please review and advise and send all medication refills to   [address] [place] [address] #[id number] [address], [address] [place] [id number]   Phone:  [phone]  Fax:  [phone].

## 2023-05-08 NOTE — TELEPHONE ENCOUNTER
On 2023, Kenna CRENSHAW MA scribed the services personally performed by Justina Franco MD       Patient:  Zachary Wilder Jr.  :  1975  PCP:  Hero Choi MD  Date of visit: 2023    Chief Complaint   Patient presents with   • Diabetes Mellitus     Type 2     History of Present Illness:   Zachary Wilder Jr. is a 47 year old male presents for follow-up of uncontrolled type 2 diabetes with hyperglycemia.    The patient's most recent A1C is 12.4% on 2023.  Previously 11.5% on 2022   Lab Results   Component Value Date    [id number] 10.6 (A) 2022       Patient is a 44-year-old gentleman with anoxic brain injury who lives independently. 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 He is responsible for his own meals. He reports that he talks with his mother a few times a week.    Patient is on Lantus insulin 8 units daily. (patient is unsure when lantus was decreased, possibly by rehab facility)   The patient is prescribed Humalog insulin 4 units before breakfast, lunch, and dinner.  Sliding scale correction is 1 unit for Humalog for every 50 mg/dl over 150 mg/dl. Patient reports he does not take Humalog insulin if BS < 250 mg/dL. Patient did not take Humalog insulin today for meals. He is also on Metformin  mg 2 tablets twice a day and actos 15 mg daily.(actos started 2022) Tolerates well.  Patient also has gastroparesis and follows with GI.     FreeStyle Cristino reviewed from -2023. Only 3 day blood sugar average was 195 mg/dL. (patient has only worn Cristino sensor since 2023 because he was in physical rehab and no readings are available from facility).   12% BS were >250 mg/dL.   43% BS were 180-250 mg/dL  45% BS were in target range.  0% BS were 54-69 mg/dL.  0% BS were <54 mg/dL.  No severe hypoglycemia seen.  Random BS in clinic is 209 mg/dL on Cristino.  Patient is checking BS 5-16 times a day for the last 3 days.   Blood sugars range from  mg/dL.  Patient has not had her labs done for her appt tomorrow. Called patient to reschedule her f/u she says she is on her way to the doctor now and will try to get her labs done today at [facility]. She has been asked to call the office back if she is not able to get her labs done today so we can reschedule her appt.   Pattern of BS:  High blood sugars    The patient's insulin treatment regimen requires frequent adjustments by the patient on the basis of therapeutic blood glucose monitoring or continuous glucose monitoring testing results.    Patient was in physical rehab for 2.5 months for a foot ulcer. The staff was checking his blood sugar by finger stick and glucometer.     He does have hypoglycemia awareness and carries quick sugar. Patient is not working.  He stays at home. Lives alone.  Patient is fearful of sugars dropping too quickly overnight.    As far as chronic complications of diabetes are concerned, the patient denies numbness or tingling in his feet. His last random microalbumin to creatinine ratio was    Microalbumin/ Creatinine Ratio (mg/g)   Date Value   08/11/2022 328.4 (H)     Patient has diabetic nephropathy.  Could not use Ace or ARB due to hypotension.  Sees Nephrology.  Last serum creatinine 1.27 on 11/26/2022. Patient is no longer on losartan 25 mg.      Patient has diabetic eye disease.  patient saw Dr. Mccloud 07/13/2022 and has mild to moderate nonproliferative diabetic retinopathy with macular edema in both eyes. History of VEGF injections.    The patient denies any chest pain, shortness of breath, CAD (coronary artery disease), or stroke.  On atorvastatin 40 mg daily. Last  12/06/21.    Has history of foot ulcer. Patient is seeing Dr. Hagen for wound care. Was hospitalized 02/08-02/23/2023 for the foot ulcers with fevers.    Patient has hypoglycemic awareness. He has never used Glucagon.     Has history of lumbar compression fracture after a fall  August 2018. Patient had DEXA scan done June 12, 2019 which shows lowest T score -2.0 in the left femoral neck with bone density 0.810 g/cm². FRAX score is 6.1% for major osteoporotic fracture and 0.8% for hip fracture in the next 10 years. Patient is on calcium supplement 500 mg daily and vitamin D3 2000 international units daily. Dietary calcium  intake includes cheese and cottage cheese.    Most recent bone mineral density completed June 2021 and does show osteoporosis with the lowest BMD in the left femoral neck with a T-score of-2.6.  Risk factors of diabetes and vitamin-D deficiency. Took ergocalciferol for 3 months but not completed his labs again.    REVIEW OF SYSTEMS: Per history of present illness, all other review of systems reviewed and negative.     HISTORIES:  I have reviewed the patient's medications and allergies, past medical, surgical, social and family history, updating these as appropriate.  See Histories section of the EMR for a display of this information.    ALLERGIES:  Kiwi and Lisinopril    MEDICATIONS:  Current Outpatient Medications   Medication Sig   • Lantus SoloStar 100 UNIT/ML pen-injector Inject 26 units one a day. Increase dose every 3-4 days until fasting range closer to 120 mg/dL   • midodrine (PROAMATINE) 5 MG tablet Take 1 tablet by mouth 2 times daily (before meals).   • tamsulosin (FLOMAX) 0.4 MG Cap Take 1 capsule by mouth at bedtime.   • clobetasol (TEMOVATE) 0.05 % cream Apply twice a day bilateral lower legs as needed until rash is resolved   • pantoprazole (PROTONIX) 40 MG tablet Take 1 tablet by mouth in the morning and 1 tablet in the evening. 30 minutes before meals.   • Continuous Blood Gluc Sensor (FreeStyle Cristino 3 Sensor) Misc 1 each every 14 days.   • Insulin Pen Needle (Umu Pen Needles) 31G X 8 MM Misc USE TO INJECT INSULIN 4 TIMES DAILY (LANTUS AND NOVOLOG)   • acetaminophen (TYLENOL) 500 MG tablet Take 1 tablet by mouth every 6 hours as needed for Pain. OTC: 1 tablet every 6-8 hours as needed for back pain per the patient   • Continuous Blood Gluc Sensor (FreeStyle Cristino 2 Sensor) Misc 1 each every 14 days. Alternate every other week to the back of the opposite upper arm   • Cholecalciferol (Vitamin D3) 50 mcg (2,000 units) capsule Take 1 capsule by mouth daily.   • calcium citrate 250 MG tablet Take 1  tablet by mouth 2 times daily.   • pioglitazone (Actos) 15 MG tablet Take 1 tablet by mouth daily.   • Insulin Lispro, 1 Unit Dial, (HumaLOG KwikPen) 100 UNIT/ML pen-injector Inject 4 Units into the skin in the morning and 4 Units at noon and 4 Units in the evening. Inject before meals. Prime 2 units before each dose. Add sliding scale 1 unit for every 50 mg> 150 mg/dl   • metFORMIN (GLUCOPHAGE) 500 MG tablet Take 2 tablets by mouth in the morning and 2 tablets in the evening. Take with meals.   • atorvastatin (LIPITOR) 40 MG tablet Take 1 tablet by mouth daily.   • Insulin Pen Needle (Pen Needles 5/16\") 31G X 8 MM Misc use with insulin as directed   • DISPENSE Take 12.5 mg by mouth as needed (Pain). 25 mg CBD Gummy. Takes half a gummy as needed.   • diclofenac (VOLTAREN) 1 % gel Apply 4 g to back, up to 4 times daily as needed for back pain.   • Continuous Blood Gluc  (FreeStyle Cristino 2 [place]) Device 1 Device daily. Use with sensor to swipe at least 4 times daily.   • blood glucose test strip Test blood sugar 3 times daily  Diagnosis: E11.8. Meter: One Touch Verio   • Insulin Pen Needle 32G X 4 MM Misc Use 4 times daily with insulin   • Lancets (FREESTYLE) Misc Test blood sugar 4 times a day-prior to meals and at bedtime   • Blood Glucose Monitoring Suppl Misc One Touch Verio Lancets- test blood sugar 3 times daily for dx E11.8   • DISPENSE Eye injections monthly for retinopathy     No current facility-administered medications for this visit.     PHYSICAL EXAM:  Visit Vitals  BP 92/60   Pulse 80   Resp 16   Ht 5' 10\" (1.778 m)   Wt 69.9 kg (154 lb)   BMI 22.10 kg/m²     HEENT:  Pupils are equal and reactive to light and accommodation.  Extraocular muscles are intact.  There is no lid lag.  There is no stare or proptosis.  Atraumatic.  LUNGS: Unlabored breathing.  HEART:  Regular rate and rhythm.    ABDOMEN:  Soft. Nontender.  EXTREMITIES:  No tremors.  Bilateral peripheral pulses palpable.  No pedal edema.    SKIN:  No rash.  Normal.  Moist.  Good turgor.    DATABASE: Pertinent labs/images reviewed in EMR.  Hemoglobin A1C (%)   Date Value   02/08/2023 12.4 (H)     Cholesterol (mg/dL)   Date Value   12/06/2021 278 (H)     Triglycerides (mg/dL)   Date Value   12/06/2021 162 (H)      LDL (mg/dL)   Date Value   12/06/2021 205 (H)       HDL (mg/dL)   Date Value   12/06/2021 41       Creatinine (mg/dL)   Date Value   02/22/2023 0.92        Microalbumin/ Creatinine Ratio (mg/g)   Date Value   08/11/2022 328.4 (H)       TSH (mcUnits/mL)   Date Value   01/13/2020 1.702       Vitamin D, 25-Hydroxy (ng/mL)   Date Value   12/06/2021 19.7 (L)     ASSESSMENT:  1. Uncontrolled diabetes mellitus type 2 with hyperglycemia.  SKY and islet cell antibody negative in 2015.  The patient's most recent A1C is 12.4% on 02/08/2023.  Previously 11.5% on 09/07/2022  Patient is a 44-year-old gentleman with anoxic brain injury who lives independently. 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 He is responsible for his own meals. Patient is on Lantus insulin 8 units daily. (patient is unsure when lantus was decreased, possibly by rehab facility) The patient is prescribed Humalog insulin 4 units before breakfast, lunch, and dinner.  Sliding scale correction is 1 unit for Humalog for every 50 mg/dl over 150 mg/dl. Patient reports he does not take Humalog insulin if BS < 250 mg/dL. Patient did not take Humalog insulin today for meals. He is also on Metformin  mg 2 tablets twice a day and actos 15 mg daily.(actos started 09/2022) Tolerates well.  Patient also has gastroparesis and follows with GI. FreeStyle Cristino reviewed from 04/11-05/08/2023. 30 day blood sugar average was 195 mg/dL. (patient has only worn Cristino sensor since 05/06/2023 because he was in physical rehab and no readings are available from facility). Patient is checking BS 5-16 times a day for the last 3 days.   2.  Diabetic nephropathy for which patient is no longer on Losartan 25 mg  daily. Allergic to lisinopril. Last creatinine was 0.92 on 02/22/2023.  3. Diabetic polyneuropathy with fairly impressive nerve damage, bilateral lower extremities.  4. Bilateral foot ulcers: Has history of foot ulcer. Patient is seeing Dr. Hagen for wound care. Was hospitalized 02/08-02/23/2023 for the foot ulcers with fevers.  5. Diabetic proliferative retinopathy, for which patient sees Dr. Mayfield and Dr. Carrera.   6. Hyperlipidemia: Patient takes atorvastatin 40 mg 1 tablet daily. Last lipid profile 12/2021.  7. Fall and lumbar L1 compression fracture in August 2018.  8. Osteoporosis per most recent bone mineral density done June 2021.  9. Vitamin-D deficiency:  Patient is on vitamin D3 2000 international units daily.    10. Question of diabetic gastroparesis.  GLP agonist may worsen the same. Patient follows with GI.         PLAN:  1. I will increase Lantus insulin 9 units daily.  2. I will continue Humalog insulin 4 units before breakfast, lunch, and dinner.   3. I will have patient continue sliding scale correction Humalog insulin of 1 unit for every 50 mg/dL > 150 mg/dL.   4. I will continue Metformin  mg take 2 tablet(s) twice daily.  5. I will continue Actos 15 mg 1 tablet daily.   6. I will continue FreeStyle noni sensor.   7. I discussed the Actos will help make the insulin more effective.   8. I discussed the side effects of Actos of swelling of the legs and can cause increased risk for bladder cancer.   9. I advised the patient to call the endocrine clinic if they experience swelling of the legs or notices blood in her urine.   10. I advised the patient side effects of Metformin include increased gassiness and diarrhea.   11. I advised the patient to call the endocrine clinic if the patient experiences any diarrhea or gassiness.    12. I explained that chronic high glucose levels can have detrimental effects on the body, including: large blood vessel (macrovascular) complications, such as:  heart disease (coronary artery disease), stroke (cerebrovascular disease) and encephalopathy, and problems with blood circulation in the extremities (peripheral vascular disease).   13. I explained small blood vessel (microvascular) complications, such as: eye disease (diabetic retinopathy), kidney disease (nephropathy), nerve disease (neuropathy), foot ulcers, and amputation of lower extremity.   14. I discussed the importance of a well-balanced diet and avoidance of high-glycemic index foods. I cautioned the patient against skipping meals.   15. I advised the patient to continue to check blood sugar 4 times a day. (patient currently checking blood sugar 6-16 times a day for the last 30 days.)  16. I advised the patient to rotate injection/insulin pump sites.  17. Goals for fasting blood sugar are  mg/dL, before meals 120 mg/dL and before bed 140 mg/dL.   18. I reviewed the signs and symptoms of hypoglycemia (low blood sugar): sweating, tremors, tachycardia (very fast heart rate), palpitations, nervousness, and hunger.   19. For treatment of hypoglycemia (<70 mg/dL), I instructed the patient to immediately treat with 15 g of a fast-acting concentrated source of carbohydrate such as: 3 or 4 glucose tablets, glucose gel, 4 to 6 oz of fruit juice or regular soda, 6 to 10 candies, or 2 to 3 tsp of sugar or honey. Then retest the blood glucose level in 15 minutes and retreat if the level is less than 70 mg/dL. Once symptoms resolve, consume a snack that contains protein, unless it is time to eat a regular meal.   20. Treatment and management of hypoglycemia is discussed with the patient in great detail.  21. I recommend an annual flu vaccine, annual dilated eye exam, and dental cleaning every 6 months.   22. Call the endocrine clinic if blood glucose is over 350 mg/dL or less than 70 mg/dL consistently and anytime you require assistance from someone for hypoglycemia or if you have a  911 call for  hypoglycemia.  23. I will have patient see CDE RN in 1 month and July 2023.   24. I advised the patient to be sure to enter all insulin doses into Cristino.  25. I will have the patient return to endocrine clinic in September 2023  26.      ORDERS:  No orders of the defined types were placed in this encounter.    An extended discussion of the above diagnoses, workup as well as the risk/benefits of the treatment plan was conducted with the patient, who verbalized understanding.  All questions were answered. Patient is to call if there are any problems. Return to clinic: September 2023    I have spent 30 minutes  with the patient. This includes pre-charting, chart review, documenting and referring/communicating with other health care professionals.          The documentation recorded by the scribe accurately and completely reflects the service(s) I personally performed and the decisions made by me.       FOLLOWUP:  No follow-ups on file.    The documentation recorded by the scribe accurately and completely reflects the service(s) I personally performed and the decisions made by me.     Justina Franco MD

## 2023-05-08 NOTE — TELEPHONE ENCOUNTER
This patient contacted office for the following prescriptions to be filled:    Medication requested :   Requested Prescriptions     Pending Prescriptions Disp Refills    metFORMIN (GLUCOPHAGE-XR) 500 MG extended release tablet 30 tablet       PCP: Dr. Rc Granados or Print: [facility]  Mail order or Local pharmacy: [phone]    Scheduled appointment if not seen by current providers in office: LOV  2/3/23, next appt 5/30/23

## 2023-05-09 RX ORDER — METFORMIN HYDROCHLORIDE 500 MG/1
TABLET, EXTENDED RELEASE ORAL
Qty: 90 TABLET | Refills: 1 | Status: SHIPPED | OUTPATIENT
Start: 2023-05-09

## 2023-06-06 ENCOUNTER — OFFICE VISIT (OUTPATIENT)
Facility: CLINIC | Age: 68
End: 2023-06-06
Payer: MEDICARE

## 2023-06-06 VITALS
DIASTOLIC BLOOD PRESSURE: 66 MMHG | HEART RATE: 97 BPM | HEIGHT: 64 IN | WEIGHT: 182 LBS | TEMPERATURE: 96.9 F | BODY MASS INDEX: 31.07 KG/M2 | SYSTOLIC BLOOD PRESSURE: 99 MMHG | OXYGEN SATURATION: 97 % | RESPIRATION RATE: 16 BRPM

## 2023-06-06 DIAGNOSIS — B37.2 INTERTRIGINOUS CANDIDIASIS: Primary | ICD-10-CM

## 2023-06-06 PROCEDURE — 1123F ACP DISCUSS/DSCN MKR DOCD: CPT | Performed by: FAMILY MEDICINE

## 2023-06-06 PROCEDURE — 3074F SYST BP LT 130 MM HG: CPT | Performed by: FAMILY MEDICINE

## 2023-06-06 PROCEDURE — 3078F DIAST BP <80 MM HG: CPT | Performed by: FAMILY MEDICINE

## 2023-06-06 PROCEDURE — 99213 OFFICE O/P EST LOW 20 MIN: CPT | Performed by: FAMILY MEDICINE

## 2023-06-06 RX ORDER — KETOCONAZOLE 20 MG/G
CREAM TOPICAL
Qty: 60 G | Refills: 1 | Status: SHIPPED | OUTPATIENT
Start: 2023-06-06

## 2023-06-06 RX ORDER — METHOCARBAMOL 500 MG/1
TABLET, FILM COATED ORAL
COMMUNITY
Start: 2022-05-17

## 2023-06-06 RX ORDER — VENLAFAXINE HYDROCHLORIDE 37.5 MG/1
CAPSULE, EXTENDED RELEASE ORAL EVERY MORNING
COMMUNITY
Start: 2023-05-25

## 2023-06-06 RX ORDER — HYDROXYZINE HYDROCHLORIDE 25 MG/1
TABLET, FILM COATED ORAL DAILY
COMMUNITY

## 2023-06-06 RX ORDER — MORPHINE SULFATE 30 MG/1
TABLET, FILM COATED, EXTENDED RELEASE ORAL
COMMUNITY
Start: 2023-05-23

## 2023-06-06 RX ORDER — HYDROCORTISONE 25 MG/ML
LOTION TOPICAL
Qty: 59 ML | Refills: 1 | Status: SHIPPED | OUTPATIENT
Start: 2023-06-06

## 2023-06-06 RX ORDER — POLYETHYLENE GLYCOL 3350 17 G/17G
POWDER, FOR SOLUTION ORAL
COMMUNITY
Start: 2023-05-15

## 2023-06-06 RX ORDER — LIDOCAINE 50 MG/G
PATCH TOPICAL
COMMUNITY
Start: 2023-05-06

## 2023-06-06 SDOH — ECONOMIC STABILITY: INCOME INSECURITY: HOW HARD IS IT FOR YOU TO PAY FOR THE VERY BASICS LIKE FOOD, HOUSING, MEDICAL CARE, AND HEATING?: NOT HARD AT ALL

## 2023-06-06 SDOH — ECONOMIC STABILITY: HOUSING INSECURITY
IN THE LAST 12 MONTHS, WAS THERE A TIME WHEN YOU DID NOT HAVE A STEADY PLACE TO SLEEP OR SLEPT IN A SHELTER (INCLUDING NOW)?: NO

## 2023-06-06 SDOH — ECONOMIC STABILITY: FOOD INSECURITY: WITHIN THE PAST 12 MONTHS, YOU WORRIED THAT YOUR FOOD WOULD RUN OUT BEFORE YOU GOT MONEY TO BUY MORE.: NEVER TRUE

## 2023-06-06 SDOH — ECONOMIC STABILITY: FOOD INSECURITY: WITHIN THE PAST 12 MONTHS, THE FOOD YOU BOUGHT JUST DIDN'T LAST AND YOU DIDN'T HAVE MONEY TO GET MORE.: NEVER TRUE

## 2023-06-06 ASSESSMENT — ANXIETY QUESTIONNAIRES
7. FEELING AFRAID AS IF SOMETHING AWFUL MIGHT HAPPEN: 0
4. TROUBLE RELAXING: 0
3. WORRYING TOO MUCH ABOUT DIFFERENT THINGS: 0
1. FEELING NERVOUS, ANXIOUS, OR ON EDGE: 0
GAD7 TOTAL SCORE: 0
5. BEING SO RESTLESS THAT IT IS HARD TO SIT STILL: 0
6. BECOMING EASILY ANNOYED OR IRRITABLE: 0
2. NOT BEING ABLE TO STOP OR CONTROL WORRYING: 0

## 2023-06-06 ASSESSMENT — PATIENT HEALTH QUESTIONNAIRE - PHQ9
1. LITTLE INTEREST OR PLEASURE IN DOING THINGS: 0
SUM OF ALL RESPONSES TO PHQ QUESTIONS 1-9: 0
SUM OF ALL RESPONSES TO PHQ QUESTIONS 1-9: 0
2. FEELING DOWN, DEPRESSED OR HOPELESS: 0
SUM OF ALL RESPONSES TO PHQ9 QUESTIONS 1 & 2: 0
SUM OF ALL RESPONSES TO PHQ QUESTIONS 1-9: 0
SUM OF ALL RESPONSES TO PHQ QUESTIONS 1-9: 0

## 2023-06-06 NOTE — PROGRESS NOTES
Travis Santoyo (: 1955) is a 70 y.o. female, established patient, here for:    ASSESSMENT/PLAN:  1. Intertriginous candidiasis  -     ketoconazole (NIZORAL) 2 % cream; Apply topically two times daily. , Disp-60 g, R-1, Normal  -     hydrocortisone (HYTONE) 2.5 % lotion; Apply topically 2 times daily for 1 week, then apply topically 1 time daily for 1 week, then every other day for 1 week, Disp-59 mL, R-1, Normal  Gentle cleansing, hair dryer on cool setting    Follow-up and Dispositions    Return for chronic medical conditions, no labs prior in 1-2 weeks, (30). SUBJECTIVE/OBJECTIVE:  HPI    Itchy red rash in skin folds low abd x 1 week  No fever    Applying OTC agents/creams             Physical Exam  Constitutional:       General: She is not in acute distress. Appearance: Normal appearance. HENT:      Head: Normocephalic and atraumatic. Pulmonary:      Effort: Pulmonary effort is normal.   Skin:     Comments: Extensive red rash across low abd within skin fold. scalloped finite margin, satellite lesions. No odor   Neurological:      Mental Status: She is alert and oriented to person, place, and time.              -- Sharan Napier MD

## 2023-06-06 NOTE — PROGRESS NOTES
Gia Mora presents today for   Chief Complaint   Patient presents with    Rash     Stomach rash for a week       Vitals:    06/06/23 1023   BP: 99/66   Site: Left Upper Arm   Position: Sitting   Pulse: 97   Resp: 16   Temp: 96.9 °F (36.1 °C)   TempSrc: Oral   SpO2: 97%   Weight: 182 lb (82.6 kg)   Height: 5' 4\" (1.626 m)        Is someone accompanying this pt? no    Is the patient using any DME equipment during OV? no    Depression Screening:  PHQ-9 Questionaire 6/6/2023   Little interest or pleasure in doing things 0   Feeling down, depressed, or hopeless 0   Trouble falling or staying asleep, or sleeping too much -   Feeling tired or having little energy -   Poor appetite or overeating -   Feeling bad about yourself - or that you are a failure or have let yourself or your family down -   Trouble concentrating on things, such as reading the newspaper or watching television -   Moving or speaking so slowly that other people could have noticed. Or the opposite - being so fidgety or restless that you have been moving around a lot more than usual -   PHQ-9 Total Score 0       Abuse Screening: AMB Abuse Screening 6/6/2023   Do you ever feel afraid of your partner? N   Are you in a relationship with someone who physically or mentally threatens you? N   Is it safe for you to go home? Y       Fall Screening  Fall Risk 6/6/2023   2 or more falls in past year? no   Fall with injury in past year?  no       Generalized Anxiety  MARY-7 SCREENING 6/6/2023   Feeling nervous, anxious, or on edge Not at all   Not being able to stop or control worrying Not at all   Worrying too much about different things Not at all   Trouble relaxing Not at all   Being so restless that it is hard to sit still Not at all   Becoming easily annoyed or irritable Not at all   Feeling afraid as if something awful might happen Not at all   MARY-7 Total Score 0        Health Maintenance Due   Topic Date Due    DTaP/Tdap/Td vaccine (1 - Tdap) Never

## 2023-06-16 RX ORDER — QUETIAPINE FUMARATE 400 MG/1
400 TABLET, FILM COATED ORAL NIGHTLY
COMMUNITY
Start: 2023-05-30

## 2023-06-19 ENCOUNTER — TELEPHONE (OUTPATIENT)
Facility: CLINIC | Age: 68
End: 2023-06-19

## 2023-06-19 NOTE — TELEPHONE ENCOUNTER
She'd asked about this at her last appt (acute [facility]). I had told her then that we would address her rx needs at a follow up appointment I was recommending in 1-2 weeks from that time (also to reassess her rash). We can add her to my schedule today.  JAVI

## 2023-06-19 NOTE — TELEPHONE ENCOUNTER
This patient contacted office for the following prescriptions to be filled:    Medication requested : Cholecalciferol 50 MCG (2000) TABS     PCP: [address] or Print: Pharmacy   Mail order or Local pharmacy Local pharmacy ([address] [place] [address] #[id number] - [place], [address] )    Scheduled appointment if not seen by current providers in office: LOV: 23 [id number]        Please review and advise as needed.

## 2023-06-24 ENCOUNTER — ANESTHESIA EVENT (OUTPATIENT)
Facility: HOSPITAL | Age: 68
End: 2023-06-24
Payer: MEDICARE

## 2023-06-26 ENCOUNTER — ANESTHESIA (OUTPATIENT)
Facility: HOSPITAL | Age: 68
End: 2023-06-26
Payer: MEDICARE

## 2023-06-26 ENCOUNTER — HOSPITAL ENCOUNTER (OUTPATIENT)
Facility: HOSPITAL | Age: 68
Setting detail: OUTPATIENT SURGERY
Discharge: HOME | End: 2023-06-26
Attending: INTERNAL MEDICINE | Admitting: INTERNAL MEDICINE
Payer: MEDICARE

## 2023-06-26 VITALS
TEMPERATURE: 97.8 F | OXYGEN SATURATION: 99 % | WEIGHT: 183 LBS | DIASTOLIC BLOOD PRESSURE: 76 MMHG | HEIGHT: 64 IN | BODY MASS INDEX: 31.24 KG/M2 | SYSTOLIC BLOOD PRESSURE: 129 MMHG | HEART RATE: 82 BPM | RESPIRATION RATE: 18 BRPM

## 2023-06-26 LAB
GLUCOSE BLD STRIP.AUTO-MCNC: 120 MG/DL (ref 70–110)
GLUCOSE BLD STRIP.AUTO-MCNC: 133 MG/DL (ref 70–110)

## 2023-06-26 PROCEDURE — 88305 TISSUE EXAM BY PATHOLOGIST: CPT

## 2023-06-26 PROCEDURE — 2709999900 HC NON-CHARGEABLE SUPPLY: Performed by: INTERNAL MEDICINE

## 2023-06-26 PROCEDURE — 3600007503: Performed by: INTERNAL MEDICINE

## 2023-06-26 PROCEDURE — 7100000000 HC PACU RECOVERY - FIRST 15 MIN: Performed by: INTERNAL MEDICINE

## 2023-06-26 PROCEDURE — 3700000001 HC ADD 15 MINUTES (ANESTHESIA): Performed by: INTERNAL MEDICINE

## 2023-06-26 PROCEDURE — 00813 ANES UPR LWR GI NDSC PX: CPT | Performed by: NURSE ANESTHETIST, CERTIFIED REGISTERED

## 2023-06-26 PROCEDURE — 3600007513: Performed by: INTERNAL MEDICINE

## 2023-06-26 PROCEDURE — 00813 ANES UPR LWR GI NDSC PX: CPT | Performed by: ANESTHESIOLOGY

## 2023-06-26 PROCEDURE — 7100000010 HC PHASE II RECOVERY - FIRST 15 MIN: Performed by: INTERNAL MEDICINE

## 2023-06-26 PROCEDURE — 3700000000 HC ANESTHESIA ATTENDED CARE: Performed by: INTERNAL MEDICINE

## 2023-06-26 PROCEDURE — 82962 GLUCOSE BLOOD TEST: CPT

## 2023-06-26 PROCEDURE — 2500000003 HC RX 250 WO HCPCS: Performed by: NURSE ANESTHETIST, CERTIFIED REGISTERED

## 2023-06-26 PROCEDURE — 2580000003 HC RX 258: Performed by: NURSE ANESTHETIST, CERTIFIED REGISTERED

## 2023-06-26 PROCEDURE — 6360000002 HC RX W HCPCS: Performed by: NURSE ANESTHETIST, CERTIFIED REGISTERED

## 2023-06-26 RX ORDER — SODIUM CHLORIDE 0.9 % (FLUSH) 0.9 %
5-40 SYRINGE (ML) INJECTION PRN
Status: DISCONTINUED | OUTPATIENT
Start: 2023-06-26 | End: 2023-06-26 | Stop reason: HOSPADM

## 2023-06-26 RX ORDER — SODIUM CHLORIDE 9 MG/ML
INJECTION, SOLUTION INTRAVENOUS PRN
Status: DISCONTINUED | OUTPATIENT
Start: 2023-06-26 | End: 2023-06-26 | Stop reason: HOSPADM

## 2023-06-26 RX ORDER — INSULIN LISPRO 100 [IU]/ML
0-4 INJECTION, SOLUTION INTRAVENOUS; SUBCUTANEOUS NIGHTLY
Status: DISCONTINUED | OUTPATIENT
Start: 2023-06-26 | End: 2023-06-26 | Stop reason: HOSPADM

## 2023-06-26 RX ORDER — DEXTROSE MONOHYDRATE 100 MG/ML
INJECTION, SOLUTION INTRAVENOUS CONTINUOUS PRN
Status: DISCONTINUED | OUTPATIENT
Start: 2023-06-26 | End: 2023-06-26 | Stop reason: HOSPADM

## 2023-06-26 RX ORDER — LIDOCAINE HYDROCHLORIDE 20 MG/ML
INJECTION, SOLUTION EPIDURAL; INFILTRATION; INTRACAUDAL; PERINEURAL PRN
Status: DISCONTINUED | OUTPATIENT
Start: 2023-06-26 | End: 2023-06-26 | Stop reason: SDUPTHER

## 2023-06-26 RX ORDER — SODIUM CHLORIDE 0.9 % (FLUSH) 0.9 %
5-40 SYRINGE (ML) INJECTION EVERY 12 HOURS SCHEDULED
Status: DISCONTINUED | OUTPATIENT
Start: 2023-06-26 | End: 2023-06-26 | Stop reason: HOSPADM

## 2023-06-26 RX ORDER — INSULIN LISPRO 100 [IU]/ML
0-4 INJECTION, SOLUTION INTRAVENOUS; SUBCUTANEOUS
Status: DISCONTINUED | OUTPATIENT
Start: 2023-06-26 | End: 2023-06-26 | Stop reason: HOSPADM

## 2023-06-26 RX ORDER — INSULIN LISPRO 100 [IU]/ML
0-4 INJECTION, SOLUTION INTRAVENOUS; SUBCUTANEOUS EVERY 4 HOURS
Status: DISCONTINUED | OUTPATIENT
Start: 2023-06-26 | End: 2023-06-26 | Stop reason: HOSPADM

## 2023-06-26 RX ORDER — SODIUM CHLORIDE, SODIUM LACTATE, POTASSIUM CHLORIDE, CALCIUM CHLORIDE 600; 310; 30; 20 MG/100ML; MG/100ML; MG/100ML; MG/100ML
INJECTION, SOLUTION INTRAVENOUS CONTINUOUS
Status: DISCONTINUED | OUTPATIENT
Start: 2023-06-26 | End: 2023-06-26 | Stop reason: HOSPADM

## 2023-06-26 RX ORDER — PROPOFOL 10 MG/ML
INJECTION, EMULSION INTRAVENOUS PRN
Status: DISCONTINUED | OUTPATIENT
Start: 2023-06-26 | End: 2023-06-26 | Stop reason: SDUPTHER

## 2023-06-26 RX ORDER — PHENYLEPHRINE HCL IN 0.9% NACL 1 MG/10 ML
SYRINGE (ML) INTRAVENOUS PRN
Status: DISCONTINUED | OUTPATIENT
Start: 2023-06-26 | End: 2023-06-26 | Stop reason: SDUPTHER

## 2023-06-26 RX ADMIN — LIDOCAINE HYDROCHLORIDE 60 MG: 20 INJECTION, SOLUTION EPIDURAL; INFILTRATION; INTRACAUDAL; PERINEURAL at 07:41

## 2023-06-26 RX ADMIN — PROPOFOL 100 MG: 10 INJECTION, EMULSION INTRAVENOUS at 07:41

## 2023-06-26 RX ADMIN — Medication 100 MCG: at 07:56

## 2023-06-26 RX ADMIN — Medication 100 MCG: at 07:58

## 2023-06-26 RX ADMIN — SODIUM CHLORIDE, SODIUM LACTATE, POTASSIUM CHLORIDE, AND CALCIUM CHLORIDE: 600; 310; 30; 20 INJECTION, SOLUTION INTRAVENOUS at 07:35

## 2023-06-26 RX ADMIN — PROPOFOL 100 MG: 10 INJECTION, EMULSION INTRAVENOUS at 07:58

## 2023-06-26 RX ADMIN — PROPOFOL 100 MG: 10 INJECTION, EMULSION INTRAVENOUS at 07:48

## 2023-06-27 NOTE — TELEPHONE ENCOUNTER
Patient Education    BRIGHT FUTURES HANDOUT- PATIENT  9 YEAR VISIT  Here are some suggestions from [employer] experts that may be of value to your family.       TAKING CARE OF YOU  Enjoy spending time with your family.  Help out at home and in your community.  If you get angry with someone, try to walk away.  Say  No!  to drugs, alcohol, and cigarettes or e-cigarettes. Walk away if someone offers you some.  Talk with your parents, teachers, or another trusted adult if anyone bullies, threatens, or hurts you.  Go online only when your parents say it s OK. Don t give your name, address, or phone number on a Web site unless your parents say it s OK.  If you want to chat online, tell your parents first.  If you feel scared online, get off and tell your parents.    EATING WELL AND BEING ACTIVE  Brush your teeth at least twice each day, morning and night.  Floss your teeth every day.  Wear your mouth guard when playing sports.  Eat breakfast every day. It helps you learn.  Be a healthy eater. It helps you do well in school and sports.  Have vegetables, fruits, lean protein, and whole grains at meals and snacks.  Eat when you re hungry. Stop when you feel satisfied.  Eat with your family often.  Drink 3 cups of low-fat or fat-free milk or water instead of soda or juice drinks.  Limit high-fat foods and drinks such as candies, snacks, fast food, and soft drinks.  Talk with us if you re thinking about losing weight or using dietary supplements.  Plan and get at least 1 hour of active exercise every day.    GROWING AND DEVELOPING  Ask a parent or trusted adult questions about the changes in your body.  Share your feelings with others. Talking is a good way to handle anger, disappointment, worry, and sadness.  To handle your anger, try  Staying calm  Listening and talking through it  Trying to understand the other person s point of view  Know that it s OK to feel up sometimes and down others, but if you feel sad most of  Called patient she has been told Dr. Baldemar Givens has reviewed GI notes and agrees she should be seen by Cardiology. Told patient her GI notes will be faxed to Cardiology and she should call for an appt. Patient has expressed understanding, appointment with Dr. Baldemar Givens on 5/5/2020 has been cancelled. the time, let us know.  Don t stay friends with kids who ask you to do scary or harmful things.  Know that it s never OK for an older child or an adult to  Show you his or her private parts.  Ask to see or touch your private parts.  Scare you or ask you not to tell your parents.  If that person does any of these things, get away as soon as you can and tell your parent or another adult you trust.    DOING WELL AT SCHOOL  Try your best at school. Doing well in school helps you feel good about yourself.  Ask for help when you need it.  Join clubs and teams, rafael groups, and friends for activities after school.  Tell kids who pick on you or try to hurt you to stop. Then walk away.  Tell adults you trust about bullies.    PLAYING IT SAFE  Wear your lap and shoulder seat belt at all times in the car. Use a booster seat if the lap and shoulder seat belt does not fit you yet.  Sit in the back seat until you are 13 years old. It is the safest place.  Wear your helmet and safety gear when riding scooters, biking, skating, in-line skating, skiing, snowboarding, and horseback riding.  Always wear the right safety equipment for your activities.  Never swim alone. Ask about learning how to swim if you don t already know how.  Always wear sunscreen and a hat when you re outside. Try not to be outside for too long between 11:00 am and 3:00 pm, when it s easy to get a sunburn.  Have friends over only when your parents say it s OK.  Ask to go home if you are uncomfortable at someone else s house or a party.  If you see a gun, don t touch it. Tell your parents right away.        Consistent with Bright Futures: Guidelines for Health Supervision of Infants, Children, and Adolescents, 4th Edition  For more information, go to https://brightfutures.aap.org.           Patient Education    BRIGHT FUTURES HANDOUT- PARENT  10 YEAR VISIT  Here are some suggestions from Bright Futures experts that may be of value to your family.     HOW YOUR  FAMILY IS DOING  Encourage your child to be independent and responsible. Hug and praise him.  Spend time with your child. Get to know his friends and their families.  Take pride in your child for good behavior and doing well in school.  Help your child deal with conflict.  If you are worried about your living or food situation, talk with us. Community agencies and programs such as [employer] can also provide information and assistance.  Don t smoke or use e-cigarettes. Keep your home and car smoke-free. Tobacco-free spaces keep children healthy.  Don t use alcohol or drugs. If you re worried about a family member s use, let us know, or reach out to local or online resources that can help.  Put the family computer in a central place.  Watch your child s computer use.  Know who he talks with online.  Install a safety filter.    STAYING HEALTHY  Take your child to the dentist twice a year.  Give your child a fluoride supplement if the dentist recommends it.  Remind your child to brush his teeth twice a day  After breakfast  Before bed  Use a pea-sized amount of toothpaste with fluoride.  Remind your child to floss his teeth once a day.  Encourage your child to always wear a mouth guard to protect his teeth while playing sports.  Encourage healthy eating by  Eating together often as a family  Serving vegetables, fruits, whole grains, lean protein, and low-fat or fat-free dairy  Limiting sugars, salt, and low-nutrient foods  Limit screen time to 2 hours (not counting schoolwork).  Don t put a TV or computer in your child s bedroom.  Consider making a family media use plan. It helps you make rules for media use and balance screen time with other activities, including exercise.  Encourage your child to play actively for at least 1 hour daily.    YOUR GROWING CHILD  Be a model for your child by saying you are sorry when you make a mistake.  Show your child how to use her words when she is angry.  Teach your child to help  others.  Give your child chores to do and expect them to be done.  Give your child her own personal space.  Get to know your child s friends and their families.  Understand that your child s friends are very important.  Answer questions about puberty. Ask us for help if you don t feel comfortable answering questions.  Teach your child the importance of delaying sexual behavior. Encourage your child to ask questions.  Teach your child how to be safe with other adults.  No adult should ask a child to keep secrets from parents.  No adult should ask to see a child s private parts.  No adult should ask a child for help with the adult s own private parts.    SCHOOL  Show interest in your child s school activities.  If you have any concerns, ask your child s teacher for help.  Praise your child for doing things well at school.  Set a routine and make a quiet place for doing homework.  Talk with your child and her teacher about bullying.    SAFETY  The back seat is the safest place to ride in a car until your child is 13 years old.  Your child should use a belt-positioning booster seat until the vehicle s lap and shoulder belts fit.  Provide a properly fitting helmet and safety gear for riding scooters, biking, skating, in-line skating, skiing, snowboarding, and horseback riding.  Teach your child to swim and watch him in the water.  Use a hat, sun protection clothing, and sunscreen with SPF of 15 or higher on his exposed skin. Limit time outside when the sun is strongest (11:00 am-3:00 pm).  If it is necessary to keep a gun in your home, store it unloaded and locked with the ammunition locked separately from the gun.        Helpful Resources:  Family Media Use Plan: www.healthychildren.org/MediaUsePlan  Smoking Quit Line: 187.952.2315 Information About Car Safety Seats: www.safercar.gov/parents  Toll-free Auto Safety Hotline: 787.253.4463  Consistent with Bright Futures: Guidelines for Health Supervision of Infants,  Children, and Adolescents, 4th Edition  For more information, go to https://brightfutures.aap.org.

## 2023-07-10 RX ORDER — METOPROLOL SUCCINATE 50 MG/1
75 TABLET, EXTENDED RELEASE ORAL DAILY
Qty: 135 TABLET | Refills: 3 | Status: SHIPPED | OUTPATIENT
Start: 2023-07-10

## 2023-08-01 ENCOUNTER — OFFICE VISIT (OUTPATIENT)
Facility: CLINIC | Age: 68
End: 2023-08-01
Payer: MEDICARE

## 2023-08-01 VITALS
TEMPERATURE: 97.6 F | BODY MASS INDEX: 31.24 KG/M2 | HEIGHT: 64 IN | HEART RATE: 76 BPM | DIASTOLIC BLOOD PRESSURE: 84 MMHG | WEIGHT: 183 LBS | SYSTOLIC BLOOD PRESSURE: 110 MMHG

## 2023-08-01 DIAGNOSIS — D50.9 IRON DEFICIENCY ANEMIA, UNSPECIFIED IRON DEFICIENCY ANEMIA TYPE: ICD-10-CM

## 2023-08-01 DIAGNOSIS — Z77.120 MOLD EXPOSURE: ICD-10-CM

## 2023-08-01 DIAGNOSIS — E11.9 TYPE 2 DIABETES MELLITUS WITHOUT COMPLICATION, WITHOUT LONG-TERM CURRENT USE OF INSULIN (HCC): ICD-10-CM

## 2023-08-01 DIAGNOSIS — R11.0 NAUSEA: ICD-10-CM

## 2023-08-01 DIAGNOSIS — J30.1 NON-SEASONAL ALLERGIC RHINITIS DUE TO POLLEN: ICD-10-CM

## 2023-08-01 DIAGNOSIS — E55.9 VITAMIN D DEFICIENCY: ICD-10-CM

## 2023-08-01 DIAGNOSIS — J45.21 MILD INTERMITTENT ASTHMA WITH ACUTE EXACERBATION: Primary | ICD-10-CM

## 2023-08-01 PROCEDURE — 3074F SYST BP LT 130 MM HG: CPT | Performed by: FAMILY MEDICINE

## 2023-08-01 PROCEDURE — 1123F ACP DISCUSS/DSCN MKR DOCD: CPT | Performed by: FAMILY MEDICINE

## 2023-08-01 PROCEDURE — 99214 OFFICE O/P EST MOD 30 MIN: CPT | Performed by: FAMILY MEDICINE

## 2023-08-01 PROCEDURE — 3051F HG A1C>EQUAL 7.0%<8.0%: CPT | Performed by: FAMILY MEDICINE

## 2023-08-01 PROCEDURE — 3079F DIAST BP 80-89 MM HG: CPT | Performed by: FAMILY MEDICINE

## 2023-08-01 RX ORDER — FLUTICASONE PROPIONATE 50 MCG
SPRAY, SUSPENSION (ML) NASAL
Qty: 3 EACH | Refills: 3 | Status: SHIPPED | OUTPATIENT
Start: 2023-08-01

## 2023-08-01 RX ORDER — PROMETHAZINE HYDROCHLORIDE 25 MG/1
25 TABLET ORAL EVERY 6 HOURS PRN
Qty: 90 TABLET | Refills: 3 | Status: SHIPPED | OUTPATIENT
Start: 2023-08-01

## 2023-08-01 RX ORDER — ALBUTEROL SULFATE 90 UG/1
2 AEROSOL, METERED RESPIRATORY (INHALATION) EVERY 4 HOURS PRN
Qty: 18 G | Refills: 0 | Status: SHIPPED | OUTPATIENT
Start: 2023-08-01

## 2023-08-01 ASSESSMENT — PATIENT HEALTH QUESTIONNAIRE - PHQ9
SUM OF ALL RESPONSES TO PHQ9 QUESTIONS 1 & 2: 0
SUM OF ALL RESPONSES TO PHQ QUESTIONS 1-9: 0
SUM OF ALL RESPONSES TO PHQ QUESTIONS 1-9: 0
5. POOR APPETITE OR OVEREATING: 0
8. MOVING OR SPEAKING SO SLOWLY THAT OTHER PEOPLE COULD HAVE NOTICED. OR THE OPPOSITE, BEING SO FIGETY OR RESTLESS THAT YOU HAVE BEEN MOVING AROUND A LOT MORE THAN USUAL: 0
4. FEELING TIRED OR HAVING LITTLE ENERGY: 0
SUM OF ALL RESPONSES TO PHQ QUESTIONS 1-9: 0
9. THOUGHTS THAT YOU WOULD BE BETTER OFF DEAD, OR OF HURTING YOURSELF: 0
7. TROUBLE CONCENTRATING ON THINGS, SUCH AS READING THE NEWSPAPER OR WATCHING TELEVISION: 0
3. TROUBLE FALLING OR STAYING ASLEEP: 0
SUM OF ALL RESPONSES TO PHQ QUESTIONS 1-9: 0
1. LITTLE INTEREST OR PLEASURE IN DOING THINGS: 0
6. FEELING BAD ABOUT YOURSELF - OR THAT YOU ARE A FAILURE OR HAVE LET YOURSELF OR YOUR FAMILY DOWN: 0
2. FEELING DOWN, DEPRESSED OR HOPELESS: 0

## 2023-08-01 NOTE — PROGRESS NOTES
Suma Roa (: 1955) is a 70 y.o. female, established patient, here for:    ASSESSMENT/PLAN:  1. Mild intermittent asthma with acute exacerbation  Assessment & Plan:  Triggered by mold and sewage in apartment. Advised to contact health department. Albuterol prn   Orders:  -     albuterol sulfate HFA (PROVENTIL;VENTOLIN;PROAIR) 108 (90 Base) MCG/ACT inhaler; Inhale 2 puffs into the lungs every 4 hours as needed for Shortness of Breath, Disp-18 g, R-0Normal  2. Mold exposure  -     albuterol sulfate HFA (PROVENTIL;VENTOLIN;PROAIR) 108 (90 Base) MCG/ACT inhaler; Inhale 2 puffs into the lungs every 4 hours as needed for Shortness of Breath, Disp-18 g, R-0Normal  -     fluticasone (FLONASE) 50 MCG/ACT nasal spray; SPRAY 2 SPRAYS INTO BOTH NOSTRILS DAILY AS NEEDED FOR RHINITIS, Disp-3 each, R-3Normal  3. Non-seasonal allergic rhinitis due to pollen  Assessment & Plan:  Now also aggravated by sewage and mold. Nasal steroids   Orders:  -     fluticasone (FLONASE) 50 MCG/ACT nasal spray; SPRAY 2 SPRAYS INTO BOTH NOSTRILS DAILY AS NEEDED FOR RHINITIS, Disp-3 each, R-3Normal  4. Nausea  -     promethazine (PHENERGAN) 25 MG tablet; Take 1 tablet by mouth every 6 hours as needed for Nausea, Disp-90 tablet, R-3Normal  5. Type 2 diabetes mellitus without complication, without long-term current use of insulin (HCC)  -     Comprehensive Metabolic Panel; Future  -     Microalbumin / Creatinine Urine Ratio; Future  -     Lipid Panel; Future  -     Hemoglobin A1C; Future  -     Vitamin D 25 Hydroxy; Future  6. Vitamin D deficiency  -     Vitamin D 25 Hydroxy; Future  7. Iron deficiency anemia, unspecified iron deficiency anemia type  -     Ferritin; Future  -     CBC with Auto Differential; Future  -     Iron and TIBC; Future      Follow-up and Dispositions    Return for chronic medical conditions, MWV same day, labs 1 week prior, (30).             SUBJECTIVE/OBJECTIVE:  HPI  Here with her daughter and

## 2023-08-03 DIAGNOSIS — E11.9 TYPE 2 DIABETES MELLITUS WITHOUT COMPLICATION, WITHOUT LONG-TERM CURRENT USE OF INSULIN (HCC): ICD-10-CM

## 2023-08-03 DIAGNOSIS — E55.9 VITAMIN D DEFICIENCY: Primary | ICD-10-CM

## 2023-08-04 ENCOUNTER — TELEPHONE (OUTPATIENT)
Facility: CLINIC | Age: 68
End: 2023-08-04

## 2023-08-04 DIAGNOSIS — R11.0 NAUSEA: Primary | ICD-10-CM

## 2023-08-04 RX ORDER — ATORVASTATIN CALCIUM 40 MG/1
TABLET, FILM COATED ORAL
Qty: 90 TABLET | Refills: 0 | Status: SHIPPED | OUTPATIENT
Start: 2023-08-04

## 2023-08-04 NOTE — TELEPHONE ENCOUNTER
Patient is due for chronic condition follow up. She has been scheduled on 8/29 and says she does need meds sent in prior. Please review and sign if appropriate.

## 2023-08-04 NOTE — TELEPHONE ENCOUNTER
Pt called to speak to Sergo Jimenez, Informed pt that Sergo Jimenez is currently in patient care at the moment and could return a call at her soonest convenience. Pt states she needs a call back as soon as possible in regards to a letter that she needs to  today. Please review and advise as needed.

## 2023-08-04 NOTE — TELEPHONE ENCOUNTER
Called patient  verified. She says while she was at her visit on  with Dr. Duncan Meza, Dr. Duncan Meza mentioned that she could up her medication and write a letter for her. Patient is asking what was Dr. Duncan Meza talking about. I reviewed Dr. James harris and told patient I was not sure but I can send a message to Dr. Duncan Meza to ask her and will call patient back once Dr. Duncan Meza responds. Patient agrees with this plan. Please advise. Danyell Kwok M.D.,[facility]  Gianni Vázquez D.O., FTRISHOJAMAR., Singh Bergman M.D. Claudette Hoskins M.D., Davey Lloyd M.D. Safia Rocha D.O. Daily Pulmonary Progress Note    Patient:  Creta Dakin 60 y.o. male MRN: [id number]     Date of Service: 1/17/2021        Subjective      Patient was seen and examined. Reports breathing easier since being on diuretic. Able to ambulate better. Thoracentesis on right side today with 850 cc of blood-tinged fluid removed. Not tolerating hospital CPAP. Objective   Vitals: /68   Pulse 79   Temp 98.2 °F (36.8 °C) (Temporal)   Resp 16   Ht 5' 11\" (1.803 m)   Wt 298 lb 12.8 oz (135.5 kg)   SpO2 97%   BMI 41.67 kg/m²     I/O:    Intake/Output Summary (Last 24 hours) at 1/17/2021 1331  Last data filed at 1/17/2021 1149  Gross per 24 hour   Intake 960 ml   Output 3300 ml   Net -2340 ml       CURRENT MEDS :  Scheduled Meds:   [START ON 1/18/2021] apixaban  5 mg Oral BID    [START ON 1/18/2021] methylPREDNISolone  40 mg Intravenous Q12H    guaiFENesin  400 mg Oral TID    aspirin  81 mg Oral Daily    rosuvastatin  40 mg Oral Daily    dilTIAZem  120 mg Oral Daily    dronedarone hcl  400 mg Oral BID     ipratropium-albuterol  1 vial Inhalation Q4H    lisinopril  10 mg Oral Daily    metoprolol succinate  100 mg Oral BID    [START ON 1/18/2021] vitamin D  50,000 Units Oral Once per day on Mon    sodium chloride flush  10 mL Intravenous 2 times per day    furosemide  20 mg Intravenous BID    budesonide  0.5 mg Nebulization BID    And    Arformoterol Tartrate  15 mcg Nebulization BID       Continuous Infusions:      PRN Meds:  sodium chloride flush, magnesium hydroxide, acetaminophen **OR** acetaminophen, prochlorperazine      Physical Exam:  Physical Exam  Constitutional:       Appearance: He is obese. HENT:      Head: Normocephalic and atraumatic.    Eyes:      Conjunctiva/sclera: Conjunctivae normal.   Neck: Musculoskeletal: Neck supple. Trachea: No tracheal deviation. Cardiovascular:      Rate and Rhythm: Normal rate. Rhythm irregular. Heart sounds: Normal heart sounds. Pulmonary:      Breath sounds: Examination of the right-lower field reveals rales. Examination of the left-lower field reveals rales. Decreased breath sounds, wheezing and rales present. Abdominal:      General: Bowel sounds are normal.      Palpations: Abdomen is soft. Tenderness: There is no abdominal tenderness. Musculoskeletal:         General: Swelling present. Lymphadenopathy:      Cervical: No cervical adenopathy. Skin:     General: Skin is warm and dry. Findings: No rash. Neurological:      General: No focal deficit present. Mental Status: He is alert. Psychiatric:         Behavior: Behavior normal.         Pertinent/ New Labs and Imaging Studies     Pulmonary Function Testing personally reviewed and interpreted. PERTINENT LAB RESULTS: Labs reviewed. DIAGNOSTICS: Pertinent imaging reviewed. Assessment:      1. Acute hypoxic respiratory failure secondary to pulmonary edema  2. Acute decompensated CHF, EF to be determined  3. Bilateral pleural effusions right greater than left -status post right thoracentesis 1/17 with 850 mL of blood-tinged fluid removed  4. Acute exacerbation of COPD, patient with severe COPD  5. NSCLC -SCC of medial right lower lobe DDx 6/2020, not surgical candidate s/p SBRT  6. ANTONIO on CPAP  7. Morbid obesity, BMI 41  8. A. fib on Eliquis  9.   GERD      Plan:     · Wean FiO2 for SPO2 88-92%, check ambulatory pulse ox prior to DC  · 2D echo completed -report pending  · Continue IV diuresis  · Await pleural fluid studies, cytology  · Resume Eliquis tomorrow  · CPAP nightly  · Decrease IV steroids 40 mg every 12 hours, taper as tolerated  · Aerosol bronchodilators Pulmicort/Perforomist, DuoNeb every 4 while awake  · EZPAP, mucinex, IS  · GI/DVT      Thank you for allowing me to participate in the care of [facility]. Please feel free to call with questions.      Electronically signed by Shea Patel DO on 1/17/2021 at 1:31 PM

## 2023-08-07 RX ORDER — ONDANSETRON 4 MG/1
4 TABLET, FILM COATED ORAL DAILY PRN
Qty: 30 TABLET | Refills: 0 | Status: SHIPPED | OUTPATIENT
Start: 2023-08-07

## 2023-08-07 NOTE — TELEPHONE ENCOUNTER
Patient called the office back and was made aware of Dr. Bogdan Yost response and says she would like a letter composed and she will come by the office to pick this up. She also says she has been having a lot of nausea and the promethazine is not helping. She is asking if this can be increased or if something else can be sent in for her nausea. Please advise.

## 2023-08-07 NOTE — TELEPHONE ENCOUNTER
Tom Bo MD  You 3 days ago     KS  I was asking if:   1. Her albuterol was relieving her breathing symptoms   2. She needed a letter stating her symptoms could be related to the mold and sewage at her apartment. JAVI        Called patient no answer unable to leave message voicemail has not been set up.

## 2023-08-09 RX ORDER — LANOLIN ALCOHOL/MO/W.PET/CERES
CREAM (GRAM) TOPICAL
Qty: 270 TABLET | Refills: 0 | Status: SHIPPED | OUTPATIENT
Start: 2023-08-09

## 2023-08-09 NOTE — TELEPHONE ENCOUNTER
Called patient  verified she states she will run out of her meds before her f/u on . Please review and sign if appropriate.

## 2023-08-10 DIAGNOSIS — E11.9 TYPE 2 DIABETES MELLITUS WITHOUT COMPLICATION, WITHOUT LONG-TERM CURRENT USE OF INSULIN (HCC): ICD-10-CM

## 2023-08-11 RX ORDER — ATORVASTATIN CALCIUM 40 MG/1
40 TABLET, FILM COATED ORAL DAILY
Qty: 90 TABLET | Refills: 0 | Status: SHIPPED | OUTPATIENT
Start: 2023-08-11

## 2023-08-11 RX ORDER — METOPROLOL SUCCINATE 50 MG/1
75 TABLET, EXTENDED RELEASE ORAL DAILY
Qty: 135 TABLET | Refills: 3 | Status: SHIPPED | OUTPATIENT
Start: 2023-08-11

## 2023-08-28 NOTE — TELEPHONE ENCOUNTER
Patient had chronic condition follow up on 9/11. Medication was last sent in on 6/13/23 90 no refills. Patient will need refills sent in before her visit on 9/11. Please review and sign if appropriate.

## 2023-08-29 RX ORDER — LOSARTAN POTASSIUM 50 MG/1
TABLET ORAL
Qty: 90 TABLET | Refills: 0 | Status: SHIPPED | OUTPATIENT
Start: 2023-08-29

## 2023-09-01 DIAGNOSIS — E11.9 TYPE 2 DIABETES MELLITUS WITHOUT COMPLICATION, WITHOUT LONG-TERM CURRENT USE OF INSULIN (HCC): ICD-10-CM

## 2023-09-01 DIAGNOSIS — E55.9 VITAMIN D DEFICIENCY: ICD-10-CM

## 2023-09-01 DIAGNOSIS — D50.9 IRON DEFICIENCY ANEMIA, UNSPECIFIED IRON DEFICIENCY ANEMIA TYPE: ICD-10-CM

## 2023-09-05 LAB
A/G RATIO: 1.4 RATIO (ref 1.1–2.6)
ALBUMIN SERPL-MCNC: 4.4 G/DL (ref 3.5–5)
ALP BLD-CCNC: 140 U/L (ref 40–120)
ALT SERPL-CCNC: 19 U/L (ref 5–40)
ANION GAP SERPL CALCULATED.3IONS-SCNC: 15 MMOL/L (ref 3–15)
AST SERPL-CCNC: 25 U/L (ref 10–37)
AVERAGE GLUCOSE: 156 MG/DL (ref 91–123)
BASOPHILS # BLD: 1 % (ref 0–2)
BASOPHILS ABSOLUTE: 0.1 K/UL (ref 0–0.2)
BILIRUB SERPL-MCNC: 0.1 MG/DL (ref 0.2–1.2)
BUN BLDV-MCNC: 7 MG/DL (ref 6–22)
CALCIUM SERPL-MCNC: 9.4 MG/DL (ref 8.4–10.5)
CHLORIDE BLD-SCNC: 101 MMOL/L (ref 98–110)
CHOLESTEROL/HDL RATIO: 2.6 (ref 0–5)
CHOLESTEROL: 164 MG/DL (ref 110–200)
CO2: 26 MMOL/L (ref 20–32)
CREAT SERPL-MCNC: 0.7 MG/DL (ref 0.8–1.4)
EOSINOPHIL # BLD: 1 % (ref 0–6)
EOSINOPHILS ABSOLUTE: 0.1 K/UL (ref 0–0.5)
FERRITIN: 175 NG/ML (ref 10–291)
GLOBULIN: 3.1 G/DL (ref 2–4)
GLOMERULAR FILTRATION RATE: >60 ML/MIN/1.73 SQ.M.
GLUCOSE: 115 MG/DL (ref 70–99)
HBA1C MFR BLD: 7.1 % (ref 4.8–5.6)
HCT VFR BLD CALC: 39.9 % (ref 35.1–48.3)
HDLC SERPL-MCNC: 62 MG/DL
HEMOGLOBIN: 12.8 G/DL (ref 11.7–16.1)
IRON % SATURATION: 31 % (ref 20–50)
IRON: 108 MCG/DL (ref 30–160)
LDL CHOLESTEROL CALCULATED: 79 MG/DL (ref 50–99)
LDL/HDL RATIO: 1.3
LYMPHOCYTES # BLD: 37 % (ref 20–45)
LYMPHOCYTES ABSOLUTE: 2.8 K/UL (ref 1–4.8)
MCH RBC QN AUTO: 29 PG (ref 26–34)
MCHC RBC AUTO-ENTMCNC: 32 G/DL (ref 31–36)
MCV RBC AUTO: 90 FL (ref 80–99)
MONOCYTES ABSOLUTE: 0.4 K/UL (ref 0.1–1)
MONOCYTES: 5 % (ref 3–12)
NEUTROPHILS ABSOLUTE: 4.2 K/UL (ref 1.8–7.7)
NEUTROPHILS: 55 % (ref 40–75)
NON-HDL CHOLESTEROL: 102 MG/DL
PDW BLD-RTO: 12.9 % (ref 10–15.5)
PLATELET # BLD: 332 K/UL (ref 140–440)
PMV BLD AUTO: 10 FL (ref 9–13)
POTASSIUM SERPL-SCNC: 4.6 MMOL/L (ref 3.5–5.5)
RBC: 4.42 M/UL (ref 3.8–5.2)
SODIUM BLD-SCNC: 142 MMOL/L (ref 133–145)
TOTAL IRON BINDING CAPACITY: 346 MCG/DL (ref 228–428)
TOTAL PROTEIN: 7.5 G/DL (ref 6.2–8.1)
TRIGL SERPL-MCNC: 113 MG/DL (ref 40–149)
UIBC: 238 MCG/DL (ref 110–370)
VITAMIN D 25-HYDROXY: 35.9 NG/ML (ref 32–100)
VLDLC SERPL CALC-MCNC: 23 MG/DL (ref 8–30)
WBC: 7.6 K/UL (ref 4–11)

## 2023-09-06 LAB
CREATININE URINE: 50 MG/DL
MICROALB/CREAT RATIO (UG/MG CREAT.): 29.2 (ref 0–30)
MICROALBUMIN/CREAT 24H UR: 14.6 MG/L (ref 0.1–17)

## 2023-09-15 DIAGNOSIS — E55.9 VITAMIN D DEFICIENCY: ICD-10-CM

## 2023-09-18 NOTE — TELEPHONE ENCOUNTER
Patient has chronic condition follow up scheduled on 9/26. Medication was last sent in on 8/4/23 30 no refills, called patient to see if she will need refills sent in before her appt she says she will need medication sent in she is completely out. She has had her labs done for her upcoming appt. Please review and sign if appropriate.

## 2023-09-19 RX ORDER — CHOLECALCIFEROL (VITAMIN D3) 125 MCG
1 CAPSULE ORAL DAILY
Qty: 90 TABLET | Refills: 0 | Status: SHIPPED | OUTPATIENT
Start: 2023-09-19

## 2023-09-26 ENCOUNTER — OFFICE VISIT (OUTPATIENT)
Facility: CLINIC | Age: 68
End: 2023-09-26
Payer: MEDICARE

## 2023-09-26 VITALS
BODY MASS INDEX: 31.24 KG/M2 | WEIGHT: 183 LBS | HEIGHT: 64 IN | DIASTOLIC BLOOD PRESSURE: 70 MMHG | TEMPERATURE: 97.8 F | SYSTOLIC BLOOD PRESSURE: 118 MMHG | HEART RATE: 76 BPM

## 2023-09-26 VITALS
WEIGHT: 183 LBS | TEMPERATURE: 97.8 F | HEIGHT: 64 IN | HEART RATE: 76 BPM | SYSTOLIC BLOOD PRESSURE: 118 MMHG | BODY MASS INDEX: 31.24 KG/M2 | DIASTOLIC BLOOD PRESSURE: 70 MMHG

## 2023-09-26 DIAGNOSIS — E55.9 VITAMIN D DEFICIENCY: ICD-10-CM

## 2023-09-26 DIAGNOSIS — M85.80 OSTEOPENIA, UNSPECIFIED LOCATION: ICD-10-CM

## 2023-09-26 DIAGNOSIS — Z23 NEED FOR IMMUNIZATION AGAINST VIRAL HEPATITIS: ICD-10-CM

## 2023-09-26 DIAGNOSIS — E11.9 TYPE 2 DIABETES MELLITUS WITHOUT COMPLICATION, WITHOUT LONG-TERM CURRENT USE OF INSULIN (HCC): Primary | ICD-10-CM

## 2023-09-26 DIAGNOSIS — Z12.31 ENCOUNTER FOR SCREENING MAMMOGRAM FOR MALIGNANT NEOPLASM OF BREAST: ICD-10-CM

## 2023-09-26 DIAGNOSIS — B00.2 RECURRENT ORAL HERPES SIMPLEX: ICD-10-CM

## 2023-09-26 DIAGNOSIS — Z23 NEED FOR INFLUENZA VACCINATION: ICD-10-CM

## 2023-09-26 DIAGNOSIS — B37.83 ANGULAR CHEILITIS WITH CANDIDIASIS: ICD-10-CM

## 2023-09-26 DIAGNOSIS — Z00.00 MEDICARE ANNUAL WELLNESS VISIT, SUBSEQUENT: Primary | ICD-10-CM

## 2023-09-26 DIAGNOSIS — M05.731 RHEUMATOID ARTHRITIS INVOLVING RIGHT WRIST WITH POSITIVE RHEUMATOID FACTOR (HCC): ICD-10-CM

## 2023-09-26 DIAGNOSIS — E11.9 TYPE 2 DIABETES MELLITUS WITHOUT COMPLICATION, WITHOUT LONG-TERM CURRENT USE OF INSULIN (HCC): ICD-10-CM

## 2023-09-26 PROCEDURE — 3078F DIAST BP <80 MM HG: CPT | Performed by: FAMILY MEDICINE

## 2023-09-26 PROCEDURE — 99214 OFFICE O/P EST MOD 30 MIN: CPT | Performed by: FAMILY MEDICINE

## 2023-09-26 PROCEDURE — 90746 HEPB VACCINE 3 DOSE ADULT IM: CPT | Performed by: FAMILY MEDICINE

## 2023-09-26 PROCEDURE — G0010 ADMIN HEPATITIS B VACCINE: HCPCS | Performed by: FAMILY MEDICINE

## 2023-09-26 PROCEDURE — G0439 PPPS, SUBSEQ VISIT: HCPCS | Performed by: FAMILY MEDICINE

## 2023-09-26 PROCEDURE — 3074F SYST BP LT 130 MM HG: CPT | Performed by: FAMILY MEDICINE

## 2023-09-26 PROCEDURE — G0008 ADMIN INFLUENZA VIRUS VAC: HCPCS | Performed by: FAMILY MEDICINE

## 2023-09-26 PROCEDURE — 90694 VACC AIIV4 NO PRSRV 0.5ML IM: CPT | Performed by: FAMILY MEDICINE

## 2023-09-26 PROCEDURE — 3051F HG A1C>EQUAL 7.0%<8.0%: CPT | Performed by: FAMILY MEDICINE

## 2023-09-26 PROCEDURE — 1123F ACP DISCUSS/DSCN MKR DOCD: CPT | Performed by: FAMILY MEDICINE

## 2023-09-26 RX ORDER — METFORMIN HYDROCHLORIDE 500 MG/1
TABLET, EXTENDED RELEASE ORAL
Qty: 180 TABLET | Refills: 3 | Status: SHIPPED | OUTPATIENT
Start: 2023-09-26

## 2023-09-26 RX ORDER — VALACYCLOVIR HYDROCHLORIDE 500 MG/1
500 TABLET, FILM COATED ORAL DAILY PRN
Qty: 20 TABLET | Refills: 3 | Status: SHIPPED | OUTPATIENT
Start: 2023-09-26

## 2023-09-26 RX ORDER — ATORVASTATIN CALCIUM 40 MG/1
40 TABLET, FILM COATED ORAL DAILY
Qty: 90 TABLET | Refills: 3 | Status: SHIPPED | OUTPATIENT
Start: 2023-09-26

## 2023-09-26 RX ORDER — KETOCONAZOLE 20 MG/G
CREAM TOPICAL 2 TIMES DAILY PRN
Qty: 15 G | Refills: 5 | Status: SHIPPED | OUTPATIENT
Start: 2023-09-26

## 2023-09-26 RX ORDER — BUTYROSPERMUM PARKII(SHEA BUTTER), SIMMONDSIA CHINENSIS (JOJOBA) SEED OIL, ALOE BARBADENSIS LEAF EXTRACT .01; 1; 3.5 G/100G; G/100G; G/100G
5000 LIQUID TOPICAL DAILY
Qty: 90 CAPSULE | Refills: 3 | Status: SHIPPED | OUTPATIENT
Start: 2023-09-26

## 2023-09-26 SDOH — ECONOMIC STABILITY: FOOD INSECURITY: WITHIN THE PAST 12 MONTHS, YOU WORRIED THAT YOUR FOOD WOULD RUN OUT BEFORE YOU GOT MONEY TO BUY MORE.: SOMETIMES TRUE

## 2023-09-26 SDOH — ECONOMIC STABILITY: INCOME INSECURITY: HOW HARD IS IT FOR YOU TO PAY FOR THE VERY BASICS LIKE FOOD, HOUSING, MEDICAL CARE, AND HEATING?: PATIENT DECLINED

## 2023-09-26 SDOH — ECONOMIC STABILITY: FOOD INSECURITY: WITHIN THE PAST 12 MONTHS, THE FOOD YOU BOUGHT JUST DIDN'T LAST AND YOU DIDN'T HAVE MONEY TO GET MORE.: SOMETIMES TRUE

## 2023-09-26 ASSESSMENT — PATIENT HEALTH QUESTIONNAIRE - PHQ9
SUM OF ALL RESPONSES TO PHQ QUESTIONS 1-9: 0
5. POOR APPETITE OR OVEREATING: 0
7. TROUBLE CONCENTRATING ON THINGS, SUCH AS READING THE NEWSPAPER OR WATCHING TELEVISION: 0
SUM OF ALL RESPONSES TO PHQ QUESTIONS 1-9: 0
2. FEELING DOWN, DEPRESSED OR HOPELESS: 0
1. LITTLE INTEREST OR PLEASURE IN DOING THINGS: 0
SUM OF ALL RESPONSES TO PHQ QUESTIONS 1-9: 0
2. FEELING DOWN, DEPRESSED OR HOPELESS: 0
6. FEELING BAD ABOUT YOURSELF - OR THAT YOU ARE A FAILURE OR HAVE LET YOURSELF OR YOUR FAMILY DOWN: 0
SUM OF ALL RESPONSES TO PHQ9 QUESTIONS 1 & 2: 0
3. TROUBLE FALLING OR STAYING ASLEEP: 0
1. LITTLE INTEREST OR PLEASURE IN DOING THINGS: 0
9. THOUGHTS THAT YOU WOULD BE BETTER OFF DEAD, OR OF HURTING YOURSELF: 0
SUM OF ALL RESPONSES TO PHQ QUESTIONS 1-9: 0
5. POOR APPETITE OR OVEREATING: 0
9. THOUGHTS THAT YOU WOULD BE BETTER OFF DEAD, OR OF HURTING YOURSELF: 0
SUM OF ALL RESPONSES TO PHQ QUESTIONS 1-9: 0
SUM OF ALL RESPONSES TO PHQ QUESTIONS 1-9: 0
3. TROUBLE FALLING OR STAYING ASLEEP: 0
8. MOVING OR SPEAKING SO SLOWLY THAT OTHER PEOPLE COULD HAVE NOTICED. OR THE OPPOSITE, BEING SO FIGETY OR RESTLESS THAT YOU HAVE BEEN MOVING AROUND A LOT MORE THAN USUAL: 0
4. FEELING TIRED OR HAVING LITTLE ENERGY: 0
8. MOVING OR SPEAKING SO SLOWLY THAT OTHER PEOPLE COULD HAVE NOTICED. OR THE OPPOSITE, BEING SO FIGETY OR RESTLESS THAT YOU HAVE BEEN MOVING AROUND A LOT MORE THAN USUAL: 0
SUM OF ALL RESPONSES TO PHQ QUESTIONS 1-9: 0
SUM OF ALL RESPONSES TO PHQ9 QUESTIONS 1 & 2: 0
7. TROUBLE CONCENTRATING ON THINGS, SUCH AS READING THE NEWSPAPER OR WATCHING TELEVISION: 0
6. FEELING BAD ABOUT YOURSELF - OR THAT YOU ARE A FAILURE OR HAVE LET YOURSELF OR YOUR FAMILY DOWN: 0
4. FEELING TIRED OR HAVING LITTLE ENERGY: 0
SUM OF ALL RESPONSES TO PHQ QUESTIONS 1-9: 0

## 2023-09-26 ASSESSMENT — LIFESTYLE VARIABLES
HOW MANY STANDARD DRINKS CONTAINING ALCOHOL DO YOU HAVE ON A TYPICAL DAY: PATIENT DOES NOT DRINK
HOW OFTEN DO YOU HAVE A DRINK CONTAINING ALCOHOL: NEVER

## 2023-09-26 NOTE — ASSESSMENT & PLAN NOTE
Level of 35 is suboptimal in context of osteopenia.   Increasing to D3 5000 international units daily

## 2023-09-26 NOTE — PROGRESS NOTES
Pedrito Sargent (: 1955) is a 70 y.o. female, established patient, here for:    ASSESSMENT/PLAN:  1. Type 2 diabetes mellitus without complication, without long-term current use of insulin ([facility])  Assessment & Plan:  A1c 7.1 close to target/will be in target for age in a few months. Continue present management metformin 1000 mg daily. Obtaining eye report. Requested to come to clinic without compression stockings next visit for foot exam   Orders:  -     metFORMIN (GLUCOPHAGE-XR) 500 MG extended release tablet; TAKE 2 TABLETS BY MOUTH DAILY WITH DINNER, Disp-180 tablet, R-3Normal  -     atorvastatin (LIPITOR) 40 MG tablet; Take 1 tablet by mouth daily, Disp-90 tablet, R-3Normal  2. 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 I am generating a new referral to a different office to be determined based on insurance coverage. I emphasized that if she has not been contacted within 1 to 2 weeks for scheduling, she is to contact Shy Burton for troubleshooting   Orders:  -     External Referral To Rheumatology  3. Vitamin D deficiency  Assessment & Plan:  Level of 35 is suboptimal in context of osteopenia. Increasing to D3 5000 international units daily   4. Angular cheilitis with candidiasis  Assessment & Plan:  Well controlled, continue present management ketoconazole topical as needed   Orders:  -     ketoconazole (NIZORAL) 2 % cream; Apply topically 2 times daily as needed (rash at the corners of the mouth) Apply topically two times daily. , Topical, 2 TIMES DAILY PRN Starting 2023, Disp-15 g, R-5, Normal  5. Osteopenia, unspecified location  -     vitamin D (D3 5000) 125 MCG (5000 UT) CAPS capsule; Take 1 capsule by mouth daily, Disp-90 capsule, R-3Normal  6.  Recurrent oral herpes simplex  Assessment & Plan:  Well-controlled continue present management with valacyclovir

## 2023-09-26 NOTE — ASSESSMENT & PLAN NOTE
Well-controlled continue present management with valacyclovir abortive agent, currently used successfully once per week in response to tingling without subsequent development of vesicles.

## 2023-09-26 NOTE — ASSESSMENT & PLAN NOTE
It is unclear to me why this remains uncontrolled and lost to follow-up. I am generating a new referral to a different office to be determined based on insurance coverage.   I emphasized that if she has not been contacted within 1 to 2 weeks for scheduling, she is to contact Micky Castano for troubleshooting

## 2023-09-26 NOTE — PROGRESS NOTES
Medicare Annual Wellness Visit    Mert Garcia is here for Medicare AWV    Assessment & Plan   Medicare annual wellness visit, subsequent  Need for influenza vaccination  -     Influenza, FLUAD, (age 72 y+), IM, Preservative Free, 0.5 mL  Encounter for screening mammogram for malignant neoplasm of breast  -     JEFFREY DIGITAL SCREEN W OR WO CAD BILATERAL; Future  Type 2 diabetes mellitus without complication, without long-term current use of insulin (HCC)  -     Hep B, ENGERIX-B, (age 21 yrs+), IM, 1mL, 3-dose  Need for immunization against viral hepatitis  -     Hep B, ENGERIX-B, (age 21 yrs+), IM, 1mL, 3-dose    Recommendations for Preventive Services Due: see orders and patient instructions/AVS.  Recommended screening schedule for the next 5-10 years is provided to the patient in written form: see Patient Instructions/AVS.     Return in 1 year (on 9/26/2024). Subjective       Patient's complete Health Risk Assessment and screening values have been reviewed and are found in Flowsheets. The following problems were reviewed today and where indicated follow up appointments were made and/or referrals ordered. Positive Risk Factor Screenings with Interventions:    Fall Risk:  Do you feel unsteady or are you worried about falling? : (!) yes  2 or more falls in past year?: no  Fall with injury in past year?: no     Interventions:  knee's buckling. Engaged with ortho               Opioid Risk: (Low risk score <55) Opioid risk score: 50    Patient is low risk for opioid use disorder or overdose. Last PDMP [place] as Reviewed:  Review User Review Instant Review Result              Last Controlled Substance Monitoring Documentation      [facility] Office Visit from 9/26/2023 in [address] [place] [address]   Periodic Controlled Substance Monitoring No signs of potential drug abuse or diversion identified.  filed at 09/26/2023 2242            General HRA Questions:  Select all that apply: (!) Stress    Stress

## 2023-09-26 NOTE — ACP (ADVANCE CARE PLANNING)
Advance Care Planning     General Advance Care Planning (ACP) Conversation    Date of Conversation: 9/26/2023  Conducted with: Patient with Decision Making Capacity    Healthcare Decision Maker:    Primary Decision Maker: [employer] - Child - [phone]    Secondary Decision Maker: Skip Downs - Child - [phone]  Click here to complete Healthcare Decision Makers including selection of the Healthcare Decision Maker Relationship (ie \"Primary\").       Content/Action Overview:  desires care for curable conditions, focus on alleviation of suffering at end of life       Length of Voluntary ACP Conversation in minutes:  <16 minutes (Non-Billable)    Marcelino Venegas MD

## 2023-09-26 NOTE — ASSESSMENT & PLAN NOTE
A1c 7.1 close to target/will be in target for age in a few months. Continue present management metformin 1000 mg daily. Obtaining eye report.  Requested to come to clinic without compression stockings next visit for foot exam

## 2023-10-05 ENCOUNTER — TELEPHONE (OUTPATIENT)
Facility: CLINIC | Age: 68
End: 2023-10-05

## 2023-10-05 NOTE — TELEPHONE ENCOUNTER
----- Message from [address] [facility] [address] sent at 10/5/2023  2:24 PM EDT -----  Subject: Referral Request    Reason for referral request? Resend referral - Rheumatology [facility]  Provider patient wants to be referred to(if known):     Provider Phone Number(if known): Additional Information for Provider?  Office said they have not received   referral  ---------------------------------------------------------------------------  --------------  Leonila [place] Radha    [phone]; OK to leave message on voicemail  ---------------------------------------------------------------------------  --------------

## 2023-10-09 NOTE — TELEPHONE ENCOUNTER
Referral has been faxed to the [facility], called patient to let her know no answer at all telephone just rang will call back later to notify.

## 2023-10-19 ENCOUNTER — TELEPHONE (OUTPATIENT)
Facility: CLINIC | Age: 68
End: 2023-10-19

## 2023-10-19 NOTE — TELEPHONE ENCOUNTER
Called patient to see if she would be able to move her appt up to tomorrow at 2:15 no answer left message letting patient know I was calling to see if she could come in to see Dr. Lita Leone tomorrow at 2:15 asked that she call the office back in the morning to let me know.

## 2023-10-19 NOTE — TELEPHONE ENCOUNTER
Patient called in and states she cut her toenails and/or messed with an ingrown toenail and now both of her big toes are aching. She already called [facility] and they can not see her until 10/31. They told her to call her PCP and get them to give her antibiotic. I told her Dr. Rcihar Lora can not prescribe her an antibiotic without seeing her and I did not have any openings for tomorrow. She stated she knew that. She originally didn't want to see another doctor but then did let me schedule an appt with Dr. Barbara Marc for Monday and stated she would call transportation. I spoke with Zara Jean about this to see if there is somewhere that she can be fit in.

## 2023-10-20 NOTE — TELEPHONE ENCOUNTER
Late entry from this AM:  Patient did call the office back this AM and was told unfortunately the appt spot was no longer available. She has been advised to keep her appt with Dr. Freedom Gardner for Monday and if her symptroms get worse to go to ER or UC.

## 2023-10-23 ENCOUNTER — OFFICE VISIT (OUTPATIENT)
Facility: CLINIC | Age: 68
End: 2023-10-23
Payer: MEDICARE

## 2023-10-23 VITALS
BODY MASS INDEX: 31.5 KG/M2 | SYSTOLIC BLOOD PRESSURE: 126 MMHG | WEIGHT: 183.5 LBS | RESPIRATION RATE: 16 BRPM | OXYGEN SATURATION: 96 % | HEART RATE: 96 BPM | TEMPERATURE: 98.2 F | DIASTOLIC BLOOD PRESSURE: 86 MMHG

## 2023-10-23 DIAGNOSIS — R60.0 PEDAL EDEMA: ICD-10-CM

## 2023-10-23 DIAGNOSIS — M79.674 PAIN OF TOE OF RIGHT FOOT: Primary | ICD-10-CM

## 2023-10-23 PROCEDURE — 1123F ACP DISCUSS/DSCN MKR DOCD: CPT | Performed by: STUDENT IN AN ORGANIZED HEALTH CARE EDUCATION/TRAINING PROGRAM

## 2023-10-23 PROCEDURE — 3074F SYST BP LT 130 MM HG: CPT | Performed by: STUDENT IN AN ORGANIZED HEALTH CARE EDUCATION/TRAINING PROGRAM

## 2023-10-23 PROCEDURE — 99214 OFFICE O/P EST MOD 30 MIN: CPT | Performed by: STUDENT IN AN ORGANIZED HEALTH CARE EDUCATION/TRAINING PROGRAM

## 2023-10-23 PROCEDURE — 3079F DIAST BP 80-89 MM HG: CPT | Performed by: STUDENT IN AN ORGANIZED HEALTH CARE EDUCATION/TRAINING PROGRAM

## 2023-10-23 RX ORDER — FUROSEMIDE 20 MG/1
20 TABLET ORAL DAILY
Qty: 90 TABLET | Refills: 3 | Status: SHIPPED | OUTPATIENT
Start: 2023-10-23

## 2023-10-23 NOTE — PROGRESS NOTES
Chief Complaint   Patient presents with    Nail Problem     Ingrown toenail right foot. Pt states she tried to cut the toenail and it's been aching every since. 1. \"Have you been to the ER, urgent care clinic since your last visit? Hospitalized since your last visit? \" No    2. \"Have you seen or consulted any other health care providers outside of the [address] [place] [address] since your last visit? \" No     3. For patients aged 43-73: Has the patient had a colonoscopy / FIT/ Cologuard? Yes - no Care Gap present      If the patient is female:    4. For patients aged 43-66: Has the patient had a mammogram within the past 2 years? Yes - no Care Gap present      5. For patients aged 21-65: Has the patient had a pap smear?  NA - based on age or sex
topically 1 time daily for 1 week, then every other day for 1 week    hydrOXYzine HCl (ATARAX) 50 mg, Oral, EVERY BEDTIME    ketoconazole (NIZORAL) 2 % cream Topical, 2 TIMES DAILY PRN, Apply topically two times daily. linaclotide (LINZESS) 145 mcg, Oral, DAILY    losartan (COZAAR) 50 MG tablet take 1 tablet by mouth once daily    metFORMIN (GLUCOPHAGE-XR) 500 MG extended release tablet TAKE 2 TABLETS BY MOUTH DAILY WITH DINNER    metoclopramide (REGLAN) 5 mg, Oral, DAILY    metoprolol succinate (TOPROL XL) 75 mg, Oral, DAILY    morphine (MS CONTIN) 30 MG extended release tablet take 1 tablet by mouth every 12 hours for chronic pain    naloxone 4 MG/0.1ML LIQD nasal spray 1 spray, ONCE PRN    nitroGLYCERIN (NITROSTAT) 0.4 mg    omeprazole (PRILOSEC) 40 mg, DAILY    ondansetron (ZOFRAN) 4 mg, Oral, DAILY PRN    oxyCODONE HCl (OXY-IR) 10 mg, EVERY 8 HOURS PRN    polyethylene glycol (GLYCOLAX) 17 g, Oral, DAILY    promethazine (PHENERGAN) 25 mg, Oral, EVERY 6 HOURS PRN    QUEtiapine (SEROQUEL) 400 mg, Oral, NIGHTLY, at bedtime. sucralfate (CARAFATE) 1 g, Oral, 2 TIMES DAILY PRN    trospium (SANCTURA) 60 mg, DAILY BEFORE BREAKFAST    valACYclovir (VALTREX) 500 mg, Oral, DAILY PRN    venlafaxine (EFFEXOR XR) 37.5 MG extended release capsule Oral, EVERY MORNING           Note to patient: The purpose of this note is to communicate with the other providers involved in your care. It is written using standard medical terminology. The voice recognition software Dragon was used and quite often unanticipated grammatical, syntax, homophones, and other interpretive errors are inadvertently transcribed. Please disregard these and excuse any that have escaped final proofreading. If you have questions regarding details of the note please call my office at [phone] and make an appointment to discuss your concerns. An electronic signature was used to authenticate this note.   -- Carmen Tucker MD

## 2023-10-23 NOTE — PATIENT INSTRUCTIONS
Take furosemide 20mg twice a day for the next 10 days and then return back to once a day. Please elevate your legs to the level of your heart throughout the day, as able. Please still make an appt with [id number] for continued treatment of your feet and diabetic foot care.

## 2023-10-27 ENCOUNTER — TELEPHONE (OUTPATIENT)
Facility: CLINIC | Age: 68
End: 2023-10-27

## 2023-10-28 DIAGNOSIS — Z77.120 MOLD EXPOSURE: ICD-10-CM

## 2023-10-28 DIAGNOSIS — J45.21 MILD INTERMITTENT ASTHMA WITH ACUTE EXACERBATION: ICD-10-CM

## 2023-10-28 DIAGNOSIS — I10 ESSENTIAL HYPERTENSION: ICD-10-CM

## 2023-10-28 DIAGNOSIS — E55.9 VITAMIN D DEFICIENCY: Primary | ICD-10-CM

## 2023-10-31 ENCOUNTER — TELEPHONE (OUTPATIENT)
Facility: CLINIC | Age: 68
End: 2023-10-31

## 2023-10-31 NOTE — TELEPHONE ENCOUNTER
Patient was last seen for her chronic conditions on 9/26/23 with 3 month follow up plan. Called patient no answer left message letting her know I was calling in reference to her refill request as well as to get her scheduled for her follow up no asked that she call the office back as soon as possible.

## 2023-10-31 NOTE — TELEPHONE ENCOUNTER
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 Katherine Bustamante  not in network with patient insurance.

## 2023-11-01 ENCOUNTER — HOSPITAL ENCOUNTER (OUTPATIENT)
Facility: HOSPITAL | Age: 68
Discharge: HOME | End: 2023-11-04
Attending: FAMILY MEDICINE
Payer: MEDICARE

## 2023-11-01 VITALS — WEIGHT: 183 LBS | BODY MASS INDEX: 30.49 KG/M2 | HEIGHT: 65 IN

## 2023-11-01 DIAGNOSIS — Z12.31 ENCOUNTER FOR SCREENING MAMMOGRAM FOR MALIGNANT NEOPLASM OF BREAST: ICD-10-CM

## 2023-11-01 PROCEDURE — 77067 SCR MAMMO BI INCL CAD: CPT

## 2023-11-01 NOTE — TELEPHONE ENCOUNTER
Called patient no answer left message letting her know her referral has been faxed to [facility] in [place] and also I do need to speak with her about a follow up with Dr. Kevin Harmon as well asked that she call the office back.

## 2023-11-01 NOTE — TELEPHONE ENCOUNTER
Returned call to patient about her Rheumatology referral and asked that she call me back to discuss a follow up with Dr. Lita Leone.

## 2023-11-07 RX ORDER — LOSARTAN POTASSIUM 50 MG/1
TABLET ORAL
Qty: 90 TABLET | Refills: 3 | Status: SHIPPED | OUTPATIENT
Start: 2023-11-07

## 2023-11-07 RX ORDER — ALBUTEROL SULFATE 90 UG/1
AEROSOL, METERED RESPIRATORY (INHALATION)
Qty: 8.5 G | Refills: 1 | Status: SHIPPED | OUTPATIENT
Start: 2023-11-07

## 2023-11-07 NOTE — TELEPHONE ENCOUNTER
DM labs are up to date. Primary concern is Vit D, labs prior. We'll focus on her breathing and ensuring she's connected with rheumatology for her RA (help please).   JAVI

## 2023-11-07 NOTE — TELEPHONE ENCOUNTER
Called patient  verified she has been scheduled for her follow up and states she will need refills sent in before her appointment. Told patient I will also find out if she will need labs prior and will call her back of Dr. Isaiah Bailey places orders for labs.

## 2023-11-08 RX ORDER — LANOLIN ALCOHOL/MO/W.PET/CERES
CREAM (GRAM) TOPICAL
Qty: 180 TABLET | OUTPATIENT
Start: 2023-11-08

## 2023-11-08 NOTE — TELEPHONE ENCOUNTER
Patient has her visit scheduled on 11/20 with labs prior. She will not need refills sent in before her visit.

## 2023-11-08 NOTE — TELEPHONE ENCOUNTER
Called patient she has been made aware she will need labs done before her visit on 11/20 and has been scheduled for [place] on 11/13. Patient states she will need to set up transportation for this appt and will call the office back if she has any issues with her transportation.

## 2023-11-13 ENCOUNTER — NURSE ONLY (OUTPATIENT)
Facility: CLINIC | Age: 68
End: 2023-11-13
Payer: MEDICARE

## 2023-11-13 ENCOUNTER — HOSPITAL ENCOUNTER (OUTPATIENT)
Facility: HOSPITAL | Age: 68
Setting detail: SPECIMEN
Discharge: HOME | End: 2023-11-16

## 2023-11-13 DIAGNOSIS — E55.9 VITAMIN D DEFICIENCY: Primary | ICD-10-CM

## 2023-11-13 LAB — SENTARA SPECIMEN COLLECTION: NORMAL

## 2023-11-13 PROCEDURE — 36415 COLL VENOUS BLD VENIPUNCTURE: CPT | Performed by: FAMILY MEDICINE

## 2023-11-13 NOTE — PROGRESS NOTES
Patient presents today for   Chief Complaint   Patient presents with    Other     Lab draw     Tolerated well, drawn from right arm using 23G butterfly needle.

## 2023-11-14 RX ORDER — NITROGLYCERIN 0.4 MG/1
0.4 TABLET SUBLINGUAL EVERY 5 MIN PRN
Qty: 25 TABLET | Refills: 3 | Status: SHIPPED | OUTPATIENT
Start: 2023-11-14

## 2023-11-15 LAB — VITAMIN D 25-HYDROXY: 57.3 NG/ML (ref 32–100)

## 2023-11-15 RX ORDER — LANOLIN ALCOHOL/MO/W.PET/CERES
CREAM (GRAM) TOPICAL
Qty: 180 TABLET | OUTPATIENT
Start: 2023-11-15

## 2023-11-20 ENCOUNTER — OFFICE VISIT (OUTPATIENT)
Facility: CLINIC | Age: 68
End: 2023-11-20
Payer: MEDICARE

## 2023-11-20 VITALS
BODY MASS INDEX: 35.81 KG/M2 | HEART RATE: 68 BPM | DIASTOLIC BLOOD PRESSURE: 66 MMHG | SYSTOLIC BLOOD PRESSURE: 126 MMHG | WEIGHT: 182.4 LBS | TEMPERATURE: 97.6 F | HEIGHT: 60 IN

## 2023-11-20 DIAGNOSIS — D50.0 IRON DEFICIENCY ANEMIA DUE TO CHRONIC BLOOD LOSS: ICD-10-CM

## 2023-11-20 DIAGNOSIS — E11.9 TYPE 2 DIABETES MELLITUS WITHOUT COMPLICATION, WITHOUT LONG-TERM CURRENT USE OF INSULIN (HCC): ICD-10-CM

## 2023-11-20 DIAGNOSIS — E55.9 VITAMIN D DEFICIENCY: ICD-10-CM

## 2023-11-20 DIAGNOSIS — M85.80 OSTEOPENIA, UNSPECIFIED LOCATION: ICD-10-CM

## 2023-11-20 DIAGNOSIS — Z23 ENCOUNTER FOR IMMUNIZATION: ICD-10-CM

## 2023-11-20 DIAGNOSIS — J45.21 MILD INTERMITTENT ASTHMA WITH ACUTE EXACERBATION: Primary | ICD-10-CM

## 2023-11-20 PROCEDURE — 90746 HEPB VACCINE 3 DOSE ADULT IM: CPT | Performed by: FAMILY MEDICINE

## 2023-11-20 PROCEDURE — G0010 ADMIN HEPATITIS B VACCINE: HCPCS | Performed by: FAMILY MEDICINE

## 2023-11-20 PROCEDURE — 3074F SYST BP LT 130 MM HG: CPT | Performed by: FAMILY MEDICINE

## 2023-11-20 PROCEDURE — 99214 OFFICE O/P EST MOD 30 MIN: CPT | Performed by: FAMILY MEDICINE

## 2023-11-20 PROCEDURE — 3078F DIAST BP <80 MM HG: CPT | Performed by: FAMILY MEDICINE

## 2023-11-20 PROCEDURE — 3051F HG A1C>EQUAL 7.0%<8.0%: CPT | Performed by: FAMILY MEDICINE

## 2023-11-20 PROCEDURE — 1123F ACP DISCUSS/DSCN MKR DOCD: CPT | Performed by: FAMILY MEDICINE

## 2023-11-20 RX ORDER — LANOLIN ALCOHOL/MO/W.PET/CERES
CREAM (GRAM) TOPICAL
Qty: 270 TABLET | Refills: 3 | Status: SHIPPED | OUTPATIENT
Start: 2023-11-20

## 2023-11-20 RX ORDER — FLUTICASONE PROPIONATE 44 UG/1
2 AEROSOL, METERED RESPIRATORY (INHALATION) 2 TIMES DAILY
Qty: 3 EACH | Refills: 1 | Status: SHIPPED | OUTPATIENT
Start: 2023-11-20

## 2023-11-20 RX ORDER — BUTYROSPERMUM PARKII(SHEA BUTTER), SIMMONDSIA CHINENSIS (JOJOBA) SEED OIL, ALOE BARBADENSIS LEAF EXTRACT .01; 1; 3.5 G/100G; G/100G; G/100G
5000 LIQUID TOPICAL DAILY
Qty: 90 CAPSULE | Refills: 3 | Status: SHIPPED | OUTPATIENT
Start: 2023-11-20

## 2023-11-20 RX ORDER — LANOLIN ALCOHOL/MO/W.PET/CERES
CREAM (GRAM) TOPICAL
Qty: 180 TABLET | OUTPATIENT
Start: 2023-11-20

## 2023-11-20 ASSESSMENT — PATIENT HEALTH QUESTIONNAIRE - PHQ9
1. LITTLE INTEREST OR PLEASURE IN DOING THINGS: 1
8. MOVING OR SPEAKING SO SLOWLY THAT OTHER PEOPLE COULD HAVE NOTICED. OR THE OPPOSITE, BEING SO FIGETY OR RESTLESS THAT YOU HAVE BEEN MOVING AROUND A LOT MORE THAN USUAL: 0
6. FEELING BAD ABOUT YOURSELF - OR THAT YOU ARE A FAILURE OR HAVE LET YOURSELF OR YOUR FAMILY DOWN: 0
3. TROUBLE FALLING OR STAYING ASLEEP: 0
5. POOR APPETITE OR OVEREATING: 0
SUM OF ALL RESPONSES TO PHQ QUESTIONS 1-9: 2
SUM OF ALL RESPONSES TO PHQ9 QUESTIONS 1 & 2: 2
9. THOUGHTS THAT YOU WOULD BE BETTER OFF DEAD, OR OF HURTING YOURSELF: 0
10. IF YOU CHECKED OFF ANY PROBLEMS, HOW DIFFICULT HAVE THESE PROBLEMS MADE IT FOR YOU TO DO YOUR WORK, TAKE CARE OF THINGS AT HOME, OR GET ALONG WITH OTHER PEOPLE: 1
4. FEELING TIRED OR HAVING LITTLE ENERGY: 0
DEPRESSION UNABLE TO ASSESS: PT REFUSES
7. TROUBLE CONCENTRATING ON THINGS, SUCH AS READING THE NEWSPAPER OR WATCHING TELEVISION: 0
2. FEELING DOWN, DEPRESSED OR HOPELESS: 1
SUM OF ALL RESPONSES TO PHQ QUESTIONS 1-9: 2

## 2023-11-20 ASSESSMENT — COLUMBIA-SUICIDE SEVERITY RATING SCALE - C-SSRS
4. HAVE YOU HAD THESE THOUGHTS AND HAD SOME INTENTION OF ACTING ON THEM?: NO
3. HAVE YOU BEEN THINKING ABOUT HOW YOU MIGHT KILL YOURSELF?: NO
7. DID THIS OCCUR IN THE LAST THREE MONTHS: NO
5. HAVE YOU STARTED TO WORK OUT OR WORKED OUT THE DETAILS OF HOW TO KILL YOURSELF? DO YOU INTEND TO CARRY OUT THIS PLAN?: NO

## 2023-11-20 NOTE — PROGRESS NOTES
Jasper Baxter (: 1955) is a 70 y.o. female, established patient, here for:    ASSESSMENT/PLAN:  Mild intermittent asthma with acute exacerbation  Assessment & Plan:  Persistent and uncontrolled recently with continued mold and sewage in apartment. Conditions improving. Working with . Adding ICS controlled. Continue albuterol for symptom relief. Orders:  -     fluticasone (FLOVENT HFA) 44 MCG/ACT inhaler; Inhale 2 puffs into the lungs 2 times daily, Disp-3 each, R-1Normal  -     Spacer/Aero-Holding Chambers MELANIE; DAILY PRN Starting Mon 2023, Disp-1 each, R-0, Normal  Vitamin D deficiency  Assessment & Plan:  Context osteopenia. Well controlled, continue present management D3 5000 daily   Orders:  -     vitamin D (D3 5000) 125 MCG (5000 UT) CAPS capsule; Take 1 capsule by mouth daily, Disp-90 capsule, R-3Normal  Osteopenia, unspecified location  Assessment & Plan:  Status? DEXA   Orders:  -     vitamin D (D3 5000) 125 MCG (5000 UT) CAPS capsule; Take 1 capsule by mouth daily, Disp-90 capsule, R-3Normal  -     DEXA BONE DENSITY AXIAL SKELETON; Future  Iron deficiency anemia due to chronic blood loss  Assessment & Plan:  Presumably related to diverticular disease. Well-controlled continue present management with iron replacement. Understands that she is welcome to decrease replacement from 3 times daily to 2 tabs daily   Orders:  -     ferrous sulfate (FE TABS 325) 325 (65 Fe) MG EC tablet; take 1 tablet by mouth three times a day with meals, Disp-270 tablet, R-3Normal  -     Iron and TIBC; Future  Type 2 diabetes mellitus without complication, without long-term current use of insulin (HCC)  -     Comprehensive Metabolic Panel; Future  -     Microalbumin / Creatinine Urine Ratio; Future  -     Lipid Panel;  Future  -     Hemoglobin A1C; Future  Encounter for immunization  -     Hep B, ENGERIX-B, (age 21 yrs+), IM, 1mL, 3-dose    Encouraged to discuss involuntary lip movements at her upcoming

## 2023-11-20 NOTE — ASSESSMENT & PLAN NOTE
Presumably related to diverticular disease. Well-controlled continue present management with iron replacement.   Understands that she is welcome to decrease replacement from 3 times daily to 2 tabs daily

## 2023-11-20 NOTE — ASSESSMENT & PLAN NOTE
Persistent and uncontrolled recently with continued mold and sewage in apartment. Conditions improving. Working with . Adding ICS controlled. Continue albuterol for symptom relief.

## 2023-11-21 ENCOUNTER — TELEPHONE (OUTPATIENT)
Facility: CLINIC | Age: 68
End: 2023-11-21

## 2023-11-21 NOTE — TELEPHONE ENCOUNTER
Called patient to let her know her letter has been composed she is planning to come to the office on tomorrow to pick this up.

## 2023-11-21 NOTE — TELEPHONE ENCOUNTER
Pt called to speak to [place] Nicholas in regards to receiving a letter for [facility]. Pt would like a letter stating that she would be unable to do [facility] due to medical conditions. Please review and advise as soon as possible.

## 2023-11-22 ENCOUNTER — TELEPHONE (OUTPATIENT)
Facility: CLINIC | Age: 68
End: 2023-11-22

## 2023-11-22 NOTE — TELEPHONE ENCOUNTER
Fax received from [employer] stating prior [employer] is required for the  fluticasone (FLOVENT HFA) 44 MCG/ACT inhaler    Submit a PA request  1. Go to key. [employer] and click \"Enter a Key\"  2. Patient last name: Yarelis      : 1955      Key: NO KEY CODE   3.  Click \"start a PA\", complete the form, and \"send to plan\"

## 2023-11-27 NOTE — PROCEDURES
[email or web address]  [place], [address]      Brief Procedure Note    Enrique Rose  [birth year]  [id number]    Date of Procedure: 7/8/2021    Preoperative diagnosis: Anemia:  285.9 - D64.9  Acid reflux-controlled:  530.81 - K21.9  Constipation, chronic:  564.09 - K59.00  Gastroparesis:  536.3 - K31.84  Body mass index 30+ - obesity:  278.00 - E66.9    Postoperative diagnosis: diverticulosis, poor prep    Type of Anesthesia: MAC (Monitored anesthesia care)    Description of findings: same as post op dx    Procedure: Procedure(s):  COLONOSCOPY    :  Dr. Donna Madden MD    Assistant(s): Endoscopy Technician-1: Beckie Acosta  Endoscopy RN-1: Dianna Maddox RN  Float Staff: Austin Gray RN    EBL:None    Specimens:  None    Findings: See printed and scanned procedure note    Complications: None    Dr. Donna Madden MD  7/8/2021  3:18 PM No

## 2023-12-07 DIAGNOSIS — E55.9 VITAMIN D DEFICIENCY: ICD-10-CM

## 2023-12-07 NOTE — TELEPHONE ENCOUNTER
Called patient asked was she able to get her Flovent inhaler she says she was able to get this and does not need PA submitted.

## 2023-12-13 ENCOUNTER — TELEPHONE (OUTPATIENT)
Facility: CLINIC | Age: 68
End: 2023-12-13

## 2023-12-13 NOTE — TELEPHONE ENCOUNTER
[facility] called the office back and states the device dispensed to patient was the Areo Chamber Plus Flow-Vu.

## 2023-12-13 NOTE — TELEPHONE ENCOUNTER
Called [employer] to initiate prior auth for patient. Areo-chambers are not covered by her insurance. Called patient to let her know she says she spoke with [employer] on yesterday and was told this was covered by brand name only. She says she paid out of pocket for this and was told in order to receive reimbursement she would need a PA subitted. She says she called [phone].    Called [facility] to see which Areo chamber was dispensed to patient unable to get anyone on the phone left message asking for a return call. Called number given by patient which is the number for [employer] not [employer].Was on hold for 20:34 before call was ended. Will call back later.

## 2023-12-13 NOTE — TELEPHONE ENCOUNTER
Patient called and states she is requesting a prior auth for the Spacer/Aero-Holding Chambers MELANIE.

## 2023-12-14 NOTE — TELEPHONE ENCOUNTER
Called patients insurance and this is not covered under her Medicare Part D, it has been advised to see if Parts A or B will cover this.

## 2024-01-11 ENCOUNTER — OFFICE VISIT (OUTPATIENT)
Age: 69
End: 2024-01-11

## 2024-01-11 VITALS
WEIGHT: 180 LBS | SYSTOLIC BLOOD PRESSURE: 114 MMHG | DIASTOLIC BLOOD PRESSURE: 80 MMHG | HEART RATE: 90 BPM | HEIGHT: 60 IN | OXYGEN SATURATION: 96 % | BODY MASS INDEX: 35.34 KG/M2

## 2024-01-11 DIAGNOSIS — I20.8 OTHER FORMS OF ANGINA PECTORIS: Primary | ICD-10-CM

## 2024-01-11 DIAGNOSIS — I10 ESSENTIAL (PRIMARY) HYPERTENSION: ICD-10-CM

## 2024-01-11 DIAGNOSIS — R07.9 CHEST PAIN, UNSPECIFIED TYPE: ICD-10-CM

## 2024-01-11 PROCEDURE — 3074F SYST BP LT 130 MM HG: CPT | Performed by: INTERNAL MEDICINE

## 2024-01-11 PROCEDURE — 3079F DIAST BP 80-89 MM HG: CPT | Performed by: INTERNAL MEDICINE

## 2024-01-11 PROCEDURE — 93000 ELECTROCARDIOGRAM COMPLETE: CPT | Performed by: INTERNAL MEDICINE

## 2024-01-11 PROCEDURE — 1123F ACP DISCUSS/DSCN MKR DOCD: CPT | Performed by: INTERNAL MEDICINE

## 2024-01-11 PROCEDURE — 99215 OFFICE O/P EST HI 40 MIN: CPT | Performed by: INTERNAL MEDICINE

## 2024-01-11 ASSESSMENT — PATIENT HEALTH QUESTIONNAIRE - PHQ9
9. THOUGHTS THAT YOU WOULD BE BETTER OFF DEAD, OR OF HURTING YOURSELF: 0
SUM OF ALL RESPONSES TO PHQ QUESTIONS 1-9: 0
7. TROUBLE CONCENTRATING ON THINGS, SUCH AS READING THE NEWSPAPER OR WATCHING TELEVISION: 0
1. LITTLE INTEREST OR PLEASURE IN DOING THINGS: 0
10. IF YOU CHECKED OFF ANY PROBLEMS, HOW DIFFICULT HAVE THESE PROBLEMS MADE IT FOR YOU TO DO YOUR WORK, TAKE CARE OF THINGS AT HOME, OR GET ALONG WITH OTHER PEOPLE: 0
SUM OF ALL RESPONSES TO PHQ QUESTIONS 1-9: 0
4. FEELING TIRED OR HAVING LITTLE ENERGY: 0
6. FEELING BAD ABOUT YOURSELF - OR THAT YOU ARE A FAILURE OR HAVE LET YOURSELF OR YOUR FAMILY DOWN: 0
SUM OF ALL RESPONSES TO PHQ QUESTIONS 1-9: 0
2. FEELING DOWN, DEPRESSED OR HOPELESS: 0
3. TROUBLE FALLING OR STAYING ASLEEP: 0
SUM OF ALL RESPONSES TO PHQ QUESTIONS 1-9: 0
8. MOVING OR SPEAKING SO SLOWLY THAT OTHER PEOPLE COULD HAVE NOTICED. OR THE OPPOSITE, BEING SO FIGETY OR RESTLESS THAT YOU HAVE BEEN MOVING AROUND A LOT MORE THAN USUAL: 0
5. POOR APPETITE OR OVEREATING: 0
SUM OF ALL RESPONSES TO PHQ9 QUESTIONS 1 & 2: 0

## 2024-01-11 NOTE — PROGRESS NOTES
Hillary Santoyo    Chief Complaint   Patient presents with    Follow-up     Overdue follow up - Last seen on 03-09-22       HPI    Hillary Santoyo is a 67 y.o. AAF who has followed with me for noncardiac CP. Due to her risk factors, sent her for [facility] 6/3/2020 and luckily had patent coronaries.    Overall her chest pain greatly improved when I started her on metoprolol says it happens much less and with less severity.  Unfortunately does still happen and she has well improved continue tachycardia.  She comes with her daughter today also has concerns about if she should be taking aspirin said that she has some issues with her stomach and things look inflamed but has ongoing work-up. Also takes daily Lasix which has improved her swelling.    Cardiac RFs: postmenopausal/ age, HTN, remote polysubstance abuse incl tobacco and cocaine, +FH premature CAD    Past Medical History:   Diagnosis Date    Annular tear of lumbar disc 11/10/2014    Benign tumor of throat     Bipolar disorder (HCC)     Bone spur     right ankle    Cervicalgia 11/10/2014    Chronic pain     back    Degeneration of lumbar or lumbosacral intervertebral disc 11/10/2014    Degenerative disc disease     Depression     Diabetes (HCC)     Displacement of lumbar intervertebral disc without myelopathy 11/10/2014    Diverticular disease     Elevated white blood cell count     Fatty liver     Fibromyalgia     GERD (gastroesophageal reflux disease)     Hepatic cyst     Hepatic steatosis     Herpes labialis     Hypertension     Hypertension     IBS (irritable bowel syndrome)     Insomnia     Lung nodule     Nausea & vomiting     Non-cardiac chest pain 1/17/2020    Pain in joint, multiple sites 11/10/2014    Postlaminectomy syndrome, cervical region 11/10/2014    Sinus infection 10/5/15    Ulcer     Urinary incontinence     Urinary retention        Past Surgical History:   Procedure Laterality Date    COLONOSCOPY N/A 4/13/2017    COLONOSCOPY performed by

## 2024-01-11 NOTE — PROGRESS NOTES
Hillary Santoyo presents today for   Chief Complaint   Patient presents with    Follow-up     1 year f/u     Chest Pain     Center chest dull pains on/off    Shortness of Breath     SOB with exertion     Edema     Bilateral ankle/feet edema on/off       Hillary Santoyo preferred language for health care discussion is english/other.    Is someone accompanying this pt? no    Is the patient using any DME equipment during OV? no    Depression Screening:  Depression: Not at risk (1/11/2024)    PHQ-2     PHQ-2 Score: 0        Learning Assessment:  Who is the primary learner? Patient    What is the preferred language for health care of the primary learner? ENGLISH    How does the primary learner prefer to learn new concepts? DEMONSTRATION    Answered By patient    Relationship to Learner SELF           Pt currently taking Anticoagulant therapy? no    Pt currently taking Antiplatelet therapy ? no      Coordination of Care:  1. Have you been to the ER, urgent care clinic since your last visit? Hospitalized since your last visit? no    2. Have you seen or consulted any other health care providers outside of the [facility] since your last visit? Include any pap smears or colon screening. no

## 2024-01-17 DIAGNOSIS — J30.1 NON-SEASONAL ALLERGIC RHINITIS DUE TO POLLEN: ICD-10-CM

## 2024-01-17 DIAGNOSIS — Z77.120 MOLD EXPOSURE: ICD-10-CM

## 2024-01-17 DIAGNOSIS — B37.83 ANGULAR CHEILITIS WITH CANDIDIASIS: ICD-10-CM

## 2024-01-17 RX ORDER — FLUTICASONE PROPIONATE 50 MCG
SPRAY, SUSPENSION (ML) NASAL
Qty: 48 G | OUTPATIENT
Start: 2024-01-17

## 2024-01-17 NOTE — TELEPHONE ENCOUNTER
Patient was last seen on 11/20/23 with 3 month follow up plan medication was last sent in on 8/1/23 for 3 with 3 refills. Called patient pharmacy to verify active refills no answer will call back later.

## 2024-01-17 NOTE — TELEPHONE ENCOUNTER
Patient here with her daughter and asked about her refill and was told she should have refills with her pharmacy she is also asking about refills on her Ketoconazole cream and should have refills on this as well. Called patients pharmacy to verify no answer had to leave a message asking that they activate refills on patients medications and asked that they contact the office with any issues.

## 2024-01-26 ENCOUNTER — TELEPHONE (OUTPATIENT)
Facility: CLINIC | Age: 69
End: 2024-01-26

## 2024-01-26 NOTE — TELEPHONE ENCOUNTER
Called patient  verified she has been made aware per Dr. Vegas that the Magnesium was only intended for her to have 5 doses and she does not need a refill. Patient has expressed understanding.

## 2024-01-26 NOTE — TELEPHONE ENCOUNTER
Patient called and states she was seen recently in the ER and was told her Magnesium was low.  She said they gave her a Magnesium pill but she takes her last one today.  I told her she will need an appointment, I offered her today and next Tuesday but due to transportation and a conflicting appt she could not take those.  She is scheduled for next Friday but I told her I would relay the message about the ER visit and Magnesium and that she has no more pills and see what Dr. Vegas recommends.

## 2024-01-26 NOTE — TELEPHONE ENCOUNTER
Patient was seen at [place] ED on 1/19/2024 and prescribed Magnesium 400 mg # 5 with no refills. She has an appointment scheduled on 2/2/24. Patient is out of the Magnesium and asking if this can be sent in prior to her visit. Please advise.

## 2024-02-20 DIAGNOSIS — D50.0 IRON DEFICIENCY ANEMIA DUE TO CHRONIC BLOOD LOSS: ICD-10-CM

## 2024-02-20 DIAGNOSIS — E11.9 TYPE 2 DIABETES MELLITUS WITHOUT COMPLICATION, WITHOUT LONG-TERM CURRENT USE OF INSULIN (HCC): ICD-10-CM

## 2024-03-05 ENCOUNTER — OFFICE VISIT (OUTPATIENT)
Facility: CLINIC | Age: 69
End: 2024-03-05
Payer: MEDICAID

## 2024-03-05 ENCOUNTER — HOSPITAL ENCOUNTER (OUTPATIENT)
Facility: HOSPITAL | Age: 69
Setting detail: SPECIMEN
Discharge: HOME | End: 2024-03-08

## 2024-03-05 VITALS
TEMPERATURE: 98.4 F | DIASTOLIC BLOOD PRESSURE: 70 MMHG | HEART RATE: 88 BPM | HEIGHT: 60 IN | BODY MASS INDEX: 35.42 KG/M2 | WEIGHT: 180.4 LBS | SYSTOLIC BLOOD PRESSURE: 120 MMHG

## 2024-03-05 DIAGNOSIS — E83.42 HYPOMAGNESEMIA: ICD-10-CM

## 2024-03-05 DIAGNOSIS — J45.21 MILD INTERMITTENT ASTHMA WITH ACUTE EXACERBATION: Primary | ICD-10-CM

## 2024-03-05 DIAGNOSIS — H60.509 ACUTE OTITIS EXTERNA, UNSPECIFIED LATERALITY, UNSPECIFIED TYPE: ICD-10-CM

## 2024-03-05 LAB — SENTARA SPECIMEN COLLECTION: NORMAL

## 2024-03-05 PROCEDURE — 3074F SYST BP LT 130 MM HG: CPT | Performed by: FAMILY MEDICINE

## 2024-03-05 PROCEDURE — G2211 COMPLEX E/M VISIT ADD ON: HCPCS | Performed by: FAMILY MEDICINE

## 2024-03-05 PROCEDURE — 3078F DIAST BP <80 MM HG: CPT | Performed by: FAMILY MEDICINE

## 2024-03-05 PROCEDURE — 1123F ACP DISCUSS/DSCN MKR DOCD: CPT | Performed by: FAMILY MEDICINE

## 2024-03-05 PROCEDURE — 99214 OFFICE O/P EST MOD 30 MIN: CPT | Performed by: FAMILY MEDICINE

## 2024-03-05 RX ORDER — ALBUTEROL SULFATE 90 UG/1
2 AEROSOL, METERED RESPIRATORY (INHALATION)
Qty: 1 EACH | Refills: 3 | Status: SHIPPED | OUTPATIENT
Start: 2024-03-05

## 2024-03-05 RX ORDER — FLUTICASONE PROPIONATE 44 UG/1
2 AEROSOL, METERED RESPIRATORY (INHALATION) 2 TIMES DAILY
Qty: 3 EACH | Refills: 3 | Status: SHIPPED | OUTPATIENT
Start: 2024-03-05

## 2024-03-05 NOTE — ASSESSMENT & PLAN NOTE
Mold/sewage remediation in progress with interim clinical improvement. Current sxs likely due to season changes. Resume ICS - fluticasone 44 mcg 2 bid. Educated not the same as albuterol which should be continued for symptom relief. Follow up 1 month

## 2024-03-05 NOTE — PROGRESS NOTES
Chief Complaint   Patient presents with    Follow-Up from Hospital     Pt says she was seen at the ER in January and told her Magnesium was low. She says she feels shot of breath when she walks.      \"Have you been to the ER, urgent care clinic since your last visit?  Hospitalized since your last visit?\"    YES - When: approximately 2 months ago.  Where and Why: [place], SOB.    “Have you seen or consulted any other health care providers outside of [facility] since your last visit?”    NO    “Have you had a colorectal cancer screening such as a colonoscopy/FIT/Cologuard?    NO

## 2024-03-05 NOTE — PROGRESS NOTES
Hillary Santoyo (: 1955) is a 68 y.o. female, established patient, here for:    ASSESSMENT/PLAN:  1. Mild intermittent asthma with acute exacerbation  Assessment & Plan:  Mold/sewage remediation in progress with interim clinical improvement. Current sxs likely due to season changes. Resume ICS - fluticasone 44 mcg 2 bid. Educated not the same as albuterol which should be continued for symptom relief. Follow up 1 month   Orders:  -     albuterol sulfate HFA (PROVENTIL;VENTOLIN;PROAIR) 108 (90 Base) MCG/ACT inhaler; Inhale 2 puffs into the lungs every 4-6 hours as needed for Wheezing or Shortness of Breath, Disp-1 each, R-3Normal  -     fluticasone (FLOVENT HFA) 44 MCG/ACT inhaler; Inhale 2 puffs into the lungs 2 times daily TO PREVENT SHORTNESS OF BREATH, Disp-3 each, R-3Normal  2. Acute otitis externa, unspecified laterality, unspecified type  -     neomycin-polymyxin-hydrocortisone (CORTISPORIN) 3.5-80131-9 otic solution; Place 4 drops into the right ear 3 times daily for 10 days, Disp-10 mL, R-0Normal  3. Hypomagnesemia  -     Magnesium; Future      Follow-up and Dispositions    Return in about 1 month (around 2024) for asthma follow up.            SUBJECTIVE/OBJECTIVE:  Chief Complaint   Patient presents with    Follow-Up from Hospital     Pt says she was seen at the ER in January and told her Magnesium was low. She says she feels shot of breath when she walks.       Chart review reveals she was evaluated by Dr. Peng on 2024.  She had a left heart cath on 6/3/2020 demonstrating patent coronaries.    \"Impression and plan from that visit:  Chronic CP, suspect microvascular angina component, BB responsive  IST  chronic HFpEF, on daily Lasix  Patent coronaries/ neg [facility]   HTN  preDM2   +FH  H/o polysubstance abuse, known     She does not recall me giving her Imdur/ not dw with me at last visit  I found the SL NTG in her purse- taught her how to use it, if it works to relieve CP she is to

## 2024-03-07 LAB — MAGNESIUM: 1.5 MG/DL (ref 1.6–2.5)

## 2024-03-08 PROBLEM — E83.42 HYPOMAGNESEMIA: Status: ACTIVE | Noted: 2024-03-08

## 2024-03-08 RX ORDER — MAGNESIUM OXIDE 400 MG/1
400 TABLET ORAL DAILY
Qty: 90 TABLET | Refills: 3 | Status: SHIPPED | OUTPATIENT
Start: 2024-03-08

## 2024-03-08 NOTE — RESULT ENCOUNTER NOTE
Magnesium remains low.  I am sending order to resume magnesium replacement.  This still would not explain the shortness of breath which she has been experiencing which stems from uncontrolled asthma.  Please schedule follow-up for both her asthma and her magnesium in 1 month with labs 1 week prior.  Thanks, JAVI

## 2024-04-08 DIAGNOSIS — E83.42 HYPOMAGNESEMIA: ICD-10-CM

## 2024-04-08 DIAGNOSIS — E83.42 HYPOMAGNESEMIA: Primary | ICD-10-CM

## 2024-05-07 DIAGNOSIS — R60.0 PEDAL EDEMA: ICD-10-CM

## 2024-05-07 RX ORDER — FUROSEMIDE 20 MG/1
20 TABLET ORAL DAILY
Qty: 45 TABLET | Refills: 0 | Status: SHIPPED | OUTPATIENT
Start: 2024-05-07

## 2024-05-07 NOTE — TELEPHONE ENCOUNTER
Patient was seen last for her chronic conditions on 11/20/23 with 3 month f/u plan. Patient last visit was for ED follow up and asthma. Patient should have active refills on this medication this was sent in on 10/23/24 for 90 with 3 refills by Dr. Greene, called pharmacy who says patient does not have any refills left on this medication. Please review and sign if appropriate.

## 2024-05-20 NOTE — TELEPHONE ENCOUNTER
Patient: Christina Davila  : 1942    Encounter Date: 2024    PROGRESS NOTE    Subjective  Chief complaint: Christina Davila is a 81 y.o. female who is an acute skilled patient being seen and evaluated for weakness    HPI:  Therapy has been working with the patient to improve strength and endurance with ADLs, transfers, and mobility.  Patient continues to work toward goals. Focused on dynamic balance activities during sitting and static balance activities during sitting on side of bed for 15 minutes with no support support. Bed mobility rolling m mod of1 . Supine - sitting mod of1/ min of1. Patient need cuing to move hips forward on bed with cuing on weight shifiting. Sit- stand from bed / - w/c mod of 1-2. ST working on speech skills. Given mod verbal cues context cues, environmental modifications and structuring, skilled observations with forced and mulitple choice. GOALS: 1)open ended ?s 70% mod cues 2) recall: 70% mod cues P.  Patient is stable and has no new complaints.  Nursing staff voices no new concerns today.      Objective  Vital signs: 114/76,70,97%,     Physical Exam  Constitutional:       General: She is not in acute distress.  Eyes:      Extraocular Movements: Extraocular movements intact.   Cardiovascular:      Rate and Rhythm: Normal rate and regular rhythm.   Pulmonary:      Effort: Pulmonary effort is normal.      Breath sounds: Normal breath sounds.   Abdominal:      General: Bowel sounds are normal.      Palpations: Abdomen is soft.   Genitourinary:     Comments: Rodriguez  Musculoskeletal:      Cervical back: Neck supple.      Right lower leg: No edema.      Left lower leg: No edema.   Neurological:      Mental Status: She is alert.      Motor: Weakness present.   Psychiatric:         Mood and Affect: Mood normal.         Behavior: Behavior is cooperative.         Assessment/Plan  Problem List Items Addressed This Visit       Diabetes mellitus (Multi)     Monitor fasting blood  Please disregard. This has been taken care of in previous encounter. sugar  Glucoscans         Hypertension     Monitor blood pressure  Metoprolol  Hydrochlorothiazide  BP at goal         Weakness     Remains in therapy           Depression     Monitor mood and behaviors  Quetiapine           Medications, treatments, and labs reviewed  Continue medications and treatments as listed in EMR    Scribe Attestation  Chiquita JAY Scribe   attest that this documentation has been prepared under the direction and in the presence of Christian Mazariegos MD.     Provider Attestation - Scribe documentation  All medical record entries made by the Scribe were at my direction and personally dictated by me. I have reviewed the chart and agree that the record accurately reflects my personal performance of the history, physical exam, discussion and plan.   Christian Mazariegos MD      Electronically Signed By: Christian Mazariegos MD   5/20/24  4:55 PM

## 2024-05-22 ENCOUNTER — TELEPHONE (OUTPATIENT)
Facility: CLINIC | Age: 69
End: 2024-05-22

## 2024-05-24 ENCOUNTER — HOSPITAL ENCOUNTER (OUTPATIENT)
Facility: HOSPITAL | Age: 69
Setting detail: SPECIMEN
Discharge: HOME | End: 2024-05-27

## 2024-05-24 ENCOUNTER — CLINICAL DOCUMENTATION (OUTPATIENT)
Facility: CLINIC | Age: 69
End: 2024-05-24

## 2024-05-24 ENCOUNTER — NURSE ONLY (OUTPATIENT)
Facility: CLINIC | Age: 69
End: 2024-05-24
Payer: MEDICAID

## 2024-05-24 DIAGNOSIS — R60.0 PEDAL EDEMA: ICD-10-CM

## 2024-05-24 DIAGNOSIS — E83.42 HYPOMAGNESEMIA: Primary | ICD-10-CM

## 2024-05-24 LAB — SENTARA SPECIMEN COLLECTION: NORMAL

## 2024-05-24 PROCEDURE — 36415 COLL VENOUS BLD VENIPUNCTURE: CPT | Performed by: FAMILY MEDICINE

## 2024-05-24 PROCEDURE — 99001 SPECIMEN HANDLING PT-LAB: CPT

## 2024-05-24 RX ORDER — FUROSEMIDE 20 MG/1
20 TABLET ORAL DAILY
Qty: 45 TABLET | Refills: 0 | OUTPATIENT
Start: 2024-05-24

## 2024-05-24 NOTE — TELEPHONE ENCOUNTER
Patient has an upcoming appt on 6/4/2024. Medication was last sent in on 5/7/24 for 45 tablets no refills. Medication will be refilled at patients upcoming appt.

## 2024-05-24 NOTE — PROGRESS NOTES
Patient here for [place] lab draw name and  verified venipuncture performed on patients right arm was successful patient tolerated well.

## 2024-05-24 NOTE — PROGRESS NOTES
Patient states she had a UDS with pain management Dr. Rakesh Benson and was told her UDS showed positive for cocaine. Patient says she has not and does not use cocaine in at least 31 years. She says her psychiatrists recently put her on some new medication and according to patients research the medication can cause a positive UDS. She says Dr. Benson has decreased her pain medication and she is having a lot of pain. Patient asked that Dr. Vegas be made aware of this.    Preventive Care Visit  [facility]  Tabby Us, KOURTNEY CNP, Family Medicine  Apr 10, 2024      Assessment & Plan     Encounter for Medicare annual wellness exam  Updated      Pure hypercholesterolemia  Recheck today, started on Zetia due to intolerance of statins   Continues on Fish oil as well.   - Lipid panel reflex to direct LDL Fasting; Future  - Comprehensive metabolic panel (BMP + Alb, Alk Phos, ALT, AST, Total. Bili, TP); Future  - Lipid panel reflex to direct LDL Fasting  - Comprehensive metabolic panel (BMP + Alb, Alk Phos, ALT, AST, Total. Bili, TP)    Osteoporosis, unspecified osteoporosis type, unspecified pathological fracture presence  Recheck with insurance on Dexa follow up scan.   Did Fosamax for 5 years previously.     RAMON (generalized anxiety disorder)  Stable  Uses Zoloft more for seasonal is weaning off and would like to restart next fall.       Panic attacks  Xanax as needed  #30 for 1 year, last sent December 2023. Due 2024.   Not due for refill.     Vaginal dryness  Discussed trial of moisturizer 3 times weekly and then add lubrication for intercourse  Does not want to use any hormonal topical  - Replens Vaginal Moisturizer GEL; Place 1 Application vaginally three times a week    Atypical mole  Recommend yearly mole checks.   - Adult Dermatology  Referral; Future    The patient indicates understanding of these issues and agrees with the plan.        Counseling  Appropriate preventive services were discussed with this patient, including applicable screening as appropriate for fall prevention, nutrition, physical activity, Tobacco-use cessation, weight loss and cognition.  Checklist reviewing preventive services available has been given to the patient.  Reviewed patient's diet, addressing concerns and/or questions.           Kike Delatorre is a 72 year old, presenting for the following:  Annual Visit        4/10/2024     8:37 AM   Additional Questions    Roomed by Tamara RANDOLPH   Accompanied by Self        Health Care Directive  Patient does not have a Health Care Directive or Living Will: Discussed advance care planning with patient; information given to patient to review.    HPI        4/10/2024   General Health   How would you rate your overall physical health? Good   Feel stress (tense, anxious, or unable to sleep) Not at all         4/10/2024   Nutrition   Diet: Regular (no restrictions)         4/10/2024   Exercise   Days per week of moderate/strenous exercise 4 days   Average minutes spent exercising at this level 60 min         4/10/2024   Social Factors   Frequency of gathering with friends or relatives Three times a week   Worry food won't last until get money to buy more No   Food not last or not have enough money for food? No   Do you have housing?  Yes   Are you worried about losing your housing? No   Lack of transportation? No   Unable to get utilities (heat,electricity)? No         4/10/2024   Fall Risk   Fallen 2 or more times in the past year? No   Trouble with walking or balance? No          4/10/2024   Activities of Daily Living- Home Safety   Needs help with the following daily activites None of the above   Safety concerns in the home None of the above         4/10/2024   Dental   Dentist two times every year? Yes         4/10/2024   Hearing Screening   Hearing concerns? None of the above         4/10/2024   Driving Risk Screening   Patient/family members have concerns about driving No         4/10/2024   General Alertness/Fatigue Screening   Have you been more tired than usual lately? No         4/10/2024   Urinary Incontinence Screening   Bothered by leaking urine in past 6 months No            Today's PHQ-2 Score:       4/10/2024     8:27 AM   PHQ-2 ( 1999 [employer])   Q1: Little interest or pleasure in doing things 0   Q2: Feeling down, depressed or hopeless 0   PHQ-2 Score 0   Q1: Little interest or pleasure in doing things Not at all   Q2:  Feeling down, depressed or hopeless Not at all   PHQ-2 Score 0           4/10/2024   Substance Use   Alcohol more than 3/day or more than 7/wk Not Applicable   Do you have a current opioid prescription? No   How severe/bad is pain from 1 to 10? 0/10 (No Pain)   Do you use any other substances recreationally? No     Social History     Tobacco Use    Smoking status: Former     Current packs/day: 0.00     Average packs/day: 1 pack/day for 20.0 years (20.0 ttl pk-yrs)     Types: Cigarettes     Start date: 1975     Quit date: 1995     Years since quittin.2    Smokeless tobacco: Never   Vaping Use    Vaping status: Never Used   Substance Use Topics    Alcohol use: No    Drug use: No         2024   LAST FHS-7 RESULTS   1st degree relative breast or ovarian cancer Yes   Any relative bilateral breast cancer No   Any male have breast cancer No   Any ONE woman have BOTH breast AND ovarian cancer No   Any woman with breast cancer before 50yrs No   2 or more relatives with breast AND/OR ovarian cancer No   2 or more relatives with breast AND/OR bowel cancer No     Mammogram Screening - Mammogram every 1-2 years updated in Health Maintenance based on mutual decision making    ASCVD Risk   The 10-year ASCVD risk score (Roberto CHU, et al., 2019) is: 11.5%    Values used to calculate the score:      Age: 72 years      Sex: Female      Is Non- : No      Diabetic: No      Tobacco smoker: No      Systolic Blood Pressure: 118 mmHg      Is BP treated: No      HDL Cholesterol: 98 mg/dL      Total Cholesterol: 288 mg/dL    Fracture Risk Assessment Tool  Link to Frax Calculator  Use the information below to complete the Frax calculator  : 1951  Sex: female  Weight (kg): 55.3 kg (actual weight)  Height (cm): 167 cm  Previous Fragility Fracture:  No  History of parent with fractured hip:  Unknown   Current Smoking:  No  Patient has been on glucocorticoids for more than 3 months (5mg/day  or more): No  Rheumatoid Arthritis on Problem List:  No  Secondary Osteoporosis on Problem List:  No  Consumes 3 or more units of alcohol per day: No  Femoral Neck BMD (g/cm2)            Reviewed and updated as needed this visit by Provider                    Past Medical History:   Diagnosis Date    Anxiety state, unspecified     Anxiety, used to be on Buspar stopped 10/09    Depressive disorder, not elsewhere classified     Depression (non-psychotic), was on Zoloft     Past Surgical History:   Procedure Laterality Date    ORTHOPEDIC SURGERY  1991    addy placed in left leg    ZZC NONSPECIFIC PROCEDURE      left lower leg fracture, addy placement     Current providers sharing in care for this patient include:  Patient Care Team:  Tabby Us APRN CNP as PCP - General (Family Medicine)  Tabby Us APRN CNP as Assigned PCP    The following health maintenance items are reviewed in Epic and correct as of today:  Health Maintenance   Topic Date Due    ZOSTER IMMUNIZATION (1 of 2) Never done    RSV VACCINE (Pregnancy & 60+) (1 - 1-dose 60+ series) Never done    DTAP/TDAP/TD IMMUNIZATION (2 - Td or Tdap) 09/15/2021    COVID-19 Vaccine (3 - 2023-24 season) 09/01/2023    ANNUAL REVIEW OF HM ORDERS  10/27/2023    LIPID  02/27/2024    MAMMO SCREENING  02/01/2025    MEDICARE ANNUAL WELLNESS VISIT  04/10/2025    FALL RISK ASSESSMENT  04/10/2025    GLUCOSE  02/27/2026    COLORECTAL CANCER SCREENING  05/05/2026    ADVANCE CARE PLANNING  02/27/2028    DEXA  05/24/2031    HEPATITIS C SCREENING  Completed    PHQ-2 (once per calendar year)  Completed    INFLUENZA VACCINE  Completed    Pneumococcal Vaccine: 65+ Years  Completed    IPV IMMUNIZATION  Aged Out    HPV IMMUNIZATION  Aged Out    MENINGITIS IMMUNIZATION  Aged Out    RSV MONOCLONAL ANTIBODY  Aged Out         Review of Systems  Constitutional, HEENT, cardiovascular, pulmonary, gi and gu systems are negative, except as otherwise noted.     Objective    Exam  BP  "118/74   Pulse 67   Temp 97.1  F (36.2  C) (Temporal)   Resp 16   Ht 1.67 m (5' 5.75\")   Wt 55.3 kg (122 lb)   SpO2 99%   BMI 19.84 kg/m     Estimated body mass index is 19.84 kg/m  as calculated from the following:    Height as of this encounter: 1.67 m (5' 5.75\").    Weight as of this encounter: 55.3 kg (122 lb).    Physical Exam  GENERAL: alert and no distress  EYES: Eyes grossly normal to inspection, PERRL and conjunctivae and sclerae normal  HENT: ear canals and TM's normal, nose and mouth without ulcers or lesions  NECK: no adenopathy, no asymmetry, masses, or scars  RESP: lungs clear to auscultation - no rales, rhonchi or wheezes  CV: regular rate and rhythm, normal S1 S2, no S3 or S4, no murmur, click or rub, no peripheral edema  NEURO: Normal strength and tone, mentation intact and speech normal  PSYCH: mentation appears normal, affect normal/bright        4/10/2024   Mini Cog   Clock Draw Score 2 Normal   3 Item Recall 3 objects recalled   Mini Cog Total Score 5         Vision Screen  Reason Vision Screen Not Completed: Patient had exam in last 12 months      Signed Electronically by: KOURTNEY Valdez CNP    Answers submitted by the patient for this visit:  RAMON-7 (Submitted on 4/10/2024)  RAMON 7 TOTAL SCORE: 0    "

## 2024-05-25 LAB — MAGNESIUM: 1.5 MG/DL (ref 1.6–2.5)

## 2024-06-03 ENCOUNTER — OFFICE VISIT (OUTPATIENT)
Facility: CLINIC | Age: 69
End: 2024-06-03
Payer: MEDICAID

## 2024-06-03 ENCOUNTER — HOSPITAL ENCOUNTER (OUTPATIENT)
Facility: HOSPITAL | Age: 69
Setting detail: SPECIMEN
Discharge: HOME | End: 2024-06-06

## 2024-06-03 VITALS
BODY MASS INDEX: 35.1 KG/M2 | WEIGHT: 178.8 LBS | DIASTOLIC BLOOD PRESSURE: 88 MMHG | SYSTOLIC BLOOD PRESSURE: 146 MMHG | OXYGEN SATURATION: 97 % | TEMPERATURE: 97.3 F | HEIGHT: 60 IN | HEART RATE: 76 BPM

## 2024-06-03 DIAGNOSIS — J30.1 NON-SEASONAL ALLERGIC RHINITIS DUE TO POLLEN: ICD-10-CM

## 2024-06-03 DIAGNOSIS — J45.31 MILD PERSISTENT ASTHMA WITH ACUTE EXACERBATION: Primary | ICD-10-CM

## 2024-06-03 DIAGNOSIS — H60.501 ACUTE OTITIS EXTERNA OF RIGHT EAR, UNSPECIFIED TYPE: ICD-10-CM

## 2024-06-03 DIAGNOSIS — G89.4 CHRONIC PAIN SYNDROME: ICD-10-CM

## 2024-06-03 DIAGNOSIS — E83.42 HYPOMAGNESEMIA: ICD-10-CM

## 2024-06-03 DIAGNOSIS — J45.31 MILD PERSISTENT ASTHMA WITH ACUTE EXACERBATION: ICD-10-CM

## 2024-06-03 LAB — SENTARA SPECIMEN COLLECTION: NORMAL

## 2024-06-03 PROCEDURE — 3079F DIAST BP 80-89 MM HG: CPT | Performed by: FAMILY MEDICINE

## 2024-06-03 PROCEDURE — 99215 OFFICE O/P EST HI 40 MIN: CPT | Performed by: FAMILY MEDICINE

## 2024-06-03 PROCEDURE — 99001 SPECIMEN HANDLING PT-LAB: CPT

## 2024-06-03 PROCEDURE — 1123F ACP DISCUSS/DSCN MKR DOCD: CPT | Performed by: FAMILY MEDICINE

## 2024-06-03 PROCEDURE — 3077F SYST BP >= 140 MM HG: CPT | Performed by: FAMILY MEDICINE

## 2024-06-03 RX ORDER — METHYLPREDNISOLONE 4 MG/1
TABLET ORAL
Qty: 1 KIT | Refills: 0 | Status: SHIPPED | OUTPATIENT
Start: 2024-06-03

## 2024-06-03 RX ORDER — MAGNESIUM OXIDE 400 MG/1
400 TABLET ORAL 2 TIMES DAILY
Qty: 180 TABLET | Refills: 3 | Status: SHIPPED | OUTPATIENT
Start: 2024-06-03

## 2024-06-03 RX ORDER — NEBULIZER ACCESSORIES
1 KIT MISCELLANEOUS DAILY PRN
Qty: 1 KIT | Refills: 0 | Status: SHIPPED | OUTPATIENT
Start: 2024-06-03

## 2024-06-03 RX ORDER — FEXOFENADINE HCL 180 MG/1
180 TABLET ORAL DAILY
Qty: 90 TABLET | Refills: 3 | Status: SHIPPED | OUTPATIENT
Start: 2024-06-03

## 2024-06-03 NOTE — ASSESSMENT & PLAN NOTE
Encouraged her to promote conversation between YVONNE Huerta and Dr. Benson regarding potential for false positive UDS finding.   -abd pain x 3 days this far from dc of opioids not attributable to withdrawal  -encouraged reengage in rheum eval/tx RA, likely key  of pain

## 2024-06-03 NOTE — TELEPHONE ENCOUNTER
Searched patients insurance formulary and this is what came up as being covered by her plan.    albuterol sulfate inhalation solution for nebulization 0.63 mg/3 ml, 1.25 mg/3 ml, 2.5 mg /3 ml (0.083 %), 2.5 mg/0.5 ml Generic

## 2024-06-03 NOTE — ASSESSMENT & PLAN NOTE
Due to uncontrolled env allergies with lapse in fexofenadine. Resume. The patient will maintain her current regimen of Flovent, administered as two puffs twice daily, and albuterol, administered as two-four puffs q4-6h as needed during current exacerbation. She has been instructed to administer a minimum of 15 to 30 seconds in between each puff. Additionally, supplies for a nebulizer machine and albuterol solution will be provided. The patient will also recommence her Allegra regimen. A Medrol Dosepak will be prescribed. Scheduling follow up 2 weeks. Should her breathing deteriorate next week, she has been advised to contact the clinic. Form for [employer] completed.

## 2024-06-03 NOTE — PROGRESS NOTES
Hillary Santoyo (: 1955) is a 69 y.o. female, established patient, here for:    ASSESSMENT/PLAN:  1. Mild persistent asthma with acute exacerbation  Assessment & Plan:  Due to uncontrolled env allergies with lapse in fexofenadine. Resume. The patient will maintain her current regimen of Flovent, administered as two puffs twice daily, and albuterol, administered as two-four puffs q4-6h as needed during current exacerbation. She has been instructed to administer a minimum of 15 to 30 seconds in between each puff. Additionally, supplies for a nebulizer machine and albuterol solution will be provided. The patient will also recommence her Allegra regimen. A Medrol Dosepak will be prescribed. Scheduling follow up 2 weeks. Should her breathing deteriorate next week, she has been advised to contact the clinic. Form for [employer] completed.  Orders:  -     fexofenadine (ALLEGRA) 180 MG tablet; Take 1 tablet by mouth daily, Disp-90 tablet, R-3Normal  -     methylPREDNISolone (MEDROL, MARY,) 4 MG tablet; Take by mouth., Disp-1 kit, R-0Normal  -     Respiratory Therapy Supplies (NEBULIZER/TUBING/MOUTHPIECE) KIT; DAILY PRN Starting Mon 6/3/2024, Disp-1 kit, R-0, Normal  -     albuterol (PROVENTIL) (5 MG/ML) 0.5% nebulizer solution; Take 1 mL by nebulization 4 times daily as needed for Wheezing, Disp-120 each, R-3Normal  2. Non-seasonal allergic rhinitis due to pollen  -     fexofenadine (ALLEGRA) 180 MG tablet; Take 1 tablet by mouth daily, Disp-90 tablet, R-3Normal  3. Hypomagnesemia  Assessment & Plan:  Uncontrolled. Increase mag ox 400 mg to bid   Orders:  -     magnesium oxide (MAG-OX) 400 MG tablet; Take 1 tablet by mouth 2 times daily, Disp-180 tablet, R-3Normal  4. Acute otitis externa of right ear, unspecified type  Comments:  has otic abx at home. Resume  5. Chronic pain syndrome  Assessment & Plan:  Encouraged her to promote conversation between YVONNE Huerta and Dr. Benson regarding potential for false

## 2024-06-03 NOTE — PROGRESS NOTES
Chief Complaint   Patient presents with    Asthma    Diabetes    Vitamin D def    Osteopenia     Patient here for her follow up and has a form from Laurita Burger she is asking to be completed today.      \"Have you been to the ER, urgent care clinic since your last visit?  Hospitalized since your last visit?\"    NO    “Have you seen or consulted any other health care providers outside of [facility] since your last visit?”    YES - When: approximately 2  weeks ago.  Where and Why: Pain Management Dr. Benson.            Click Here for Release of Records Request

## 2024-06-04 LAB
A/G RATIO: 1.6 RATIO (ref 1.1–2.6)
ALBUMIN: 4.8 G/DL (ref 3.5–5)
ALP BLD-CCNC: 151 U/L (ref 40–120)
ALT SERPL-CCNC: 19 U/L (ref 5–40)
ANION GAP SERPL CALCULATED.3IONS-SCNC: 15 MMOL/L (ref 3–15)
AST SERPL-CCNC: 24 U/L (ref 10–37)
BILIRUB SERPL-MCNC: 0.3 MG/DL (ref 0.2–1.2)
BUN BLDV-MCNC: 8 MG/DL (ref 6–22)
CALCIUM SERPL-MCNC: 10.3 MG/DL (ref 8.4–10.5)
CHLORIDE BLD-SCNC: 101 MMOL/L (ref 98–110)
CHOLESTEROL, TOTAL: 201 MG/DL (ref 110–200)
CHOLESTEROL/HDL RATIO: 2.9 (ref 0–5)
CO2: 27 MMOL/L (ref 20–32)
CREAT SERPL-MCNC: 0.9 MG/DL (ref 0.8–1.4)
CREATININE URINE: 179 MG/DL
ESTIMATED AVERAGE GLUCOSE: 148 MG/DL (ref 91–123)
GFR, ESTIMATED: >60 ML/MIN/1.73 SQ.M.
GLOBULIN: 3 G/DL (ref 2–4)
GLUCOSE: 113 MG/DL (ref 70–99)
HBA1C MFR BLD: 6.8 % (ref 4.8–5.6)
HDLC SERPL-MCNC: 70 MG/DL
IRON % SATURATION: 22 % (ref 20–50)
IRON: 94 MCG/DL (ref 30–160)
LDL CHOLESTEROL: 106 MG/DL (ref 50–99)
LDL/HDL RATIO: 1.5
MICROALB/CREAT RATIO (UG/MG CREAT.): 23.6 (ref 0–30)
MICROALBUMIN/CREAT 24H UR: 42.3 MG/L (ref 0.1–17)
NON-HDL CHOLESTEROL: 131 MG/DL
POTASSIUM SERPL-SCNC: 4.9 MMOL/L (ref 3.5–5.5)
SODIUM BLD-SCNC: 143 MMOL/L (ref 133–145)
TOTAL IRON BINDING CAPACITY: 429 MCG/DL (ref 228–428)
TOTAL PROTEIN: 7.8 G/DL (ref 6.2–8.1)
TRIGL SERPL-MCNC: 122 MG/DL (ref 40–149)
UIBC: 335 MCG/DL (ref 110–370)
VLDLC SERPL CALC-MCNC: 24 MG/DL (ref 8–30)

## 2024-06-05 ENCOUNTER — TELEPHONE (OUTPATIENT)
Facility: CLINIC | Age: 69
End: 2024-06-05

## 2024-06-05 NOTE — TELEPHONE ENCOUNTER
Pt called to speak to Jeanne, informed pt that Jeanne was currently in patient care but a message could be routed to her for her to review. Pt expressed understanding and is looking forward to hearing from Jeanne soon. Please review and advise as needed.

## 2024-06-06 NOTE — TELEPHONE ENCOUNTER
Called patient back she states [employer] would not accept the form that was completed by Dr. Vegas because her name was not printed on the form. This has been corrected and form has been faxed to [phone] as well as emailed to [email or web address].

## 2024-06-11 ENCOUNTER — TELEPHONE (OUTPATIENT)
Facility: CLINIC | Age: 69
End: 2024-06-11

## 2024-06-11 DIAGNOSIS — H60.501 ACUTE OTITIS EXTERNA OF RIGHT EAR, UNSPECIFIED TYPE: Primary | ICD-10-CM

## 2024-06-11 NOTE — TELEPHONE ENCOUNTER
Pt called to request another dose of antibiotic be sent over as she is still experiencing ear aches. Informed pt that I would route this message to Jeanne for review. Please review and advise as needed.

## 2024-06-12 RX ORDER — NEOMYCIN SULFATE, POLYMYXIN B SULFATE AND HYDROCORTISONE 10; 3.5; 1 MG/ML; MG/ML; [USP'U]/ML
3 SUSPENSION/ DROPS AURICULAR (OTIC) 4 TIMES DAILY
Qty: 1 EACH | Refills: 0 | Status: SHIPPED | OUTPATIENT
Start: 2024-06-12 | End: 2024-06-22

## 2024-06-12 NOTE — TELEPHONE ENCOUNTER
Called patient to discuss her symptoms  verified she says she completed her antibiotic drops on Monday and her symptoms have improved some but have not resolved. She denies any decreased hearing but states she did have a white discharge from her ear on yesterday denies any odor. She has been advised I will send this to Dr. Vegas and will call her back with her recommendations. Please advise.

## 2024-06-13 NOTE — TELEPHONE ENCOUNTER
Called pt who has been made aware medication was sent in for her and if her symptoms do not resolve after this round of treatment she will need to come in for a culture.

## 2024-06-18 DIAGNOSIS — R60.0 PEDAL EDEMA: ICD-10-CM

## 2024-06-19 NOTE — TELEPHONE ENCOUNTER
Called patient who say she is requesting refills on her Lasix. She has about 6 pills left. Patient was seen last on 6/3/24 with plan to follow up in 2 weeks. Patient says she will call the office back to schedule her follow up, please review and sign if appropriate.

## 2024-06-21 ENCOUNTER — OFFICE VISIT (OUTPATIENT)
Facility: CLINIC | Age: 69
End: 2024-06-21
Payer: MEDICAID

## 2024-06-21 VITALS
HEIGHT: 60 IN | WEIGHT: 179.2 LBS | BODY MASS INDEX: 35.18 KG/M2 | HEART RATE: 64 BPM | TEMPERATURE: 97.9 F | DIASTOLIC BLOOD PRESSURE: 62 MMHG | SYSTOLIC BLOOD PRESSURE: 98 MMHG

## 2024-06-21 DIAGNOSIS — E78.5 HYPERLIPIDEMIA, UNSPECIFIED HYPERLIPIDEMIA TYPE: ICD-10-CM

## 2024-06-21 DIAGNOSIS — E11.9 TYPE 2 DIABETES MELLITUS WITHOUT COMPLICATION, WITHOUT LONG-TERM CURRENT USE OF INSULIN (HCC): Primary | ICD-10-CM

## 2024-06-21 DIAGNOSIS — I50.32 CHRONIC HEART FAILURE WITH PRESERVED EJECTION FRACTION (HCC): ICD-10-CM

## 2024-06-21 DIAGNOSIS — M05.731 RHEUMATOID ARTHRITIS INVOLVING RIGHT WRIST WITH POSITIVE RHEUMATOID FACTOR (HCC): ICD-10-CM

## 2024-06-21 DIAGNOSIS — I10 ESSENTIAL HYPERTENSION: ICD-10-CM

## 2024-06-21 DIAGNOSIS — R60.0 PEDAL EDEMA: ICD-10-CM

## 2024-06-21 PROCEDURE — 99214 OFFICE O/P EST MOD 30 MIN: CPT | Performed by: FAMILY MEDICINE

## 2024-06-21 PROCEDURE — 1123F ACP DISCUSS/DSCN MKR DOCD: CPT | Performed by: FAMILY MEDICINE

## 2024-06-21 PROCEDURE — 3044F HG A1C LEVEL LT 7.0%: CPT | Performed by: FAMILY MEDICINE

## 2024-06-21 PROCEDURE — 3078F DIAST BP <80 MM HG: CPT | Performed by: FAMILY MEDICINE

## 2024-06-21 PROCEDURE — G2211 COMPLEX E/M VISIT ADD ON: HCPCS | Performed by: FAMILY MEDICINE

## 2024-06-21 PROCEDURE — 3074F SYST BP LT 130 MM HG: CPT | Performed by: FAMILY MEDICINE

## 2024-06-21 RX ORDER — ROSUVASTATIN CALCIUM 20 MG/1
20 TABLET, COATED ORAL DAILY
Qty: 90 TABLET | Refills: 3 | Status: SHIPPED | OUTPATIENT
Start: 2024-06-21

## 2024-06-21 RX ORDER — FUROSEMIDE 20 MG/1
20 TABLET ORAL DAILY
Qty: 45 TABLET | Refills: 0 | OUTPATIENT
Start: 2024-06-21

## 2024-06-21 RX ORDER — FUROSEMIDE 20 MG/1
20 TABLET ORAL DAILY
Qty: 90 TABLET | Refills: 1 | Status: SHIPPED | OUTPATIENT
Start: 2024-06-21

## 2024-06-21 RX ORDER — METFORMIN HYDROCHLORIDE 500 MG/1
TABLET, EXTENDED RELEASE ORAL
Qty: 180 TABLET | Refills: 3 | Status: SHIPPED | OUTPATIENT
Start: 2024-06-21

## 2024-06-21 NOTE — ASSESSMENT & PLAN NOTE
Context HFpEF. Low today. Asymptomatic. No additional benefit in control of pedal edema with increased furosemide to 40 mg daily, likely because the pedal edema is not reflective of HF control status. More likely due to uncontrolled RA. Decrease furosemide back to 20 mg daily. Continue losartan 50 mg, metoprolol XL 50 mg. Clonidine 0.3 mg being prescribed by her mental health provider for insomnia. follow up 1-2 weeks in conjunction with eval of ear pain

## 2024-06-21 NOTE — PROGRESS NOTES
Chief Complaint   Patient presents with    Asthma    Vitamin D def    Hypomagesemia     Diabetes    Hypertension     Patient states she was told by her Podiatrists her BP med needs to be changed due to swelling in her feet.            \"Have you been to the ER, urgent care clinic since your last visit?  Hospitalized since your last visit?\"    NO    “Have you seen or consulted any other health care providers outside of [facility] since your last visit?”    YES - When: approximately 4 days ago.  Where and Why: Podiatry, girma foot swelling. .            Click Here for Release of Records Request

## 2024-06-21 NOTE — PROGRESS NOTES
Hillary Santoyo (: 1955) is a 69 y.o. female, established patient, here for:    ASSESSMENT/PLAN:  1. Type 2 diabetes mellitus without complication, without long-term current use of insulin (HCC)  Assessment & Plan:  Well controlled. Continue present management metformin 1000 mg daily.   Orders:  -     rosuvastatin (CRESTOR) 20 MG tablet; Take 1 tablet by mouth daily, Disp-90 tablet, R-3Normal  -     metFORMIN (GLUCOPHAGE-XR) 500 MG extended release tablet; TAKE 2 TABLETS BY MOUTH DAILY WITH DINNER, Disp-180 tablet, R-3Normal  2. 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 Asking Jannethela to investigate referral barriers.   3. Chronic heart failure with preserved ejection fraction (HCC)  Assessment & Plan:  Being evaluated/managed by Dr. Peng, with prior invitation to double furosemide targeting pedal edema if needed. No change with doubling. Return to 20 mg daily.   Orders:  -     rosuvastatin (CRESTOR) 20 MG tablet; Take 1 tablet by mouth daily, Disp-90 tablet, R-3Normal  4. Essential hypertension  Assessment & Plan:  Context HFpEF. Low today. Asymptomatic. No additional benefit in control of pedal edema with increased furosemide to 40 mg daily, likely because the pedal edema is not reflective of HF control status. More likely due to uncontrolled RA. Decrease furosemide back to 20 mg daily. Continue losartan 50 mg, metoprolol XL 50 mg. Clonidine 0.3 mg being prescribed by her mental health provider for insomnia. follow up 1-2 weeks in conjunction with eval of ear pain   5. Hyperlipidemia, unspecified hyperlipidemia type  -     rosuvastatin (CRESTOR) 20 MG tablet; Take 1 tablet by mouth daily, Disp-90 tablet, R-3Normal  6. Pedal edema  Assessment & Plan:  See hypertension    Orders:  -     furosemide (LASIX) 20 MG tablet; Take 1 tablet by mouth daily Please take an extra pill on days when your legs are more

## 2024-06-21 NOTE — ASSESSMENT & PLAN NOTE
Being evaluated/managed by Dr. Peng, with prior invitation to double furosemide targeting pedal edema if needed. No change with doubling. Return to 20 mg daily.

## 2024-06-24 DIAGNOSIS — Z77.120 MOLD EXPOSURE: ICD-10-CM

## 2024-06-24 DIAGNOSIS — J30.1 NON-SEASONAL ALLERGIC RHINITIS DUE TO POLLEN: ICD-10-CM

## 2024-06-25 NOTE — TELEPHONE ENCOUNTER
Called patient to ask if she needs refills sent in as well as to schedule ear pain and BP follow up, no answer left message letting patient know reason for call and asked that she call the office back.

## 2024-07-09 RX ORDER — FLUTICASONE PROPIONATE 50 MCG
SPRAY, SUSPENSION (ML) NASAL
Qty: 48 G | Refills: 3 | Status: SHIPPED | OUTPATIENT
Start: 2024-07-09

## 2024-07-09 NOTE — TELEPHONE ENCOUNTER
Called patient  verified she says she does need refills sent in on her Flonase prior to her visit. Please review and sign if appropriate.

## 2024-07-15 RX ORDER — METOPROLOL SUCCINATE 50 MG/1
75 TABLET, EXTENDED RELEASE ORAL DAILY
Qty: 135 TABLET | Refills: 3 | Status: SHIPPED | OUTPATIENT
Start: 2024-07-15

## 2024-07-25 ENCOUNTER — TELEPHONE (OUTPATIENT)
Facility: CLINIC | Age: 69
End: 2024-07-25

## 2024-07-25 NOTE — TELEPHONE ENCOUNTER
Ernst from [facility] called to request an update on a order sent by fax. After speaking to Jeanne I informed him that the form has been placed in 's office and we are waiting for her to review and sign it. Ernst expressed understanding and had one more question for Jeanne in regards to this,  I then made him aware that Jeanne was in patient care but I could route a message back requesting her to reach back out. Ernst's contact information has been left below. Please review and advise as needed.       P:[phone]

## 2024-07-26 ENCOUNTER — HOSPITAL ENCOUNTER (OUTPATIENT)
Facility: HOSPITAL | Age: 69
Setting detail: SPECIMEN
Discharge: HOME | End: 2024-07-29

## 2024-07-26 ENCOUNTER — OFFICE VISIT (OUTPATIENT)
Facility: CLINIC | Age: 69
End: 2024-07-26
Payer: MEDICARE

## 2024-07-26 VITALS
HEIGHT: 60 IN | HEART RATE: 78 BPM | SYSTOLIC BLOOD PRESSURE: 150 MMHG | TEMPERATURE: 97.9 F | WEIGHT: 185.8 LBS | BODY MASS INDEX: 36.48 KG/M2 | OXYGEN SATURATION: 97 % | DIASTOLIC BLOOD PRESSURE: 86 MMHG

## 2024-07-26 DIAGNOSIS — N30.00 ACUTE CYSTITIS WITHOUT HEMATURIA: Primary | ICD-10-CM

## 2024-07-26 LAB — SENTARA SPECIMEN COLLECTION: NORMAL

## 2024-07-26 PROCEDURE — 3077F SYST BP >= 140 MM HG: CPT | Performed by: FAMILY MEDICINE

## 2024-07-26 PROCEDURE — 1123F ACP DISCUSS/DSCN MKR DOCD: CPT | Performed by: FAMILY MEDICINE

## 2024-07-26 PROCEDURE — 99213 OFFICE O/P EST LOW 20 MIN: CPT | Performed by: FAMILY MEDICINE

## 2024-07-26 PROCEDURE — 3079F DIAST BP 80-89 MM HG: CPT | Performed by: FAMILY MEDICINE

## 2024-07-26 PROCEDURE — 99001 SPECIMEN HANDLING PT-LAB: CPT

## 2024-07-26 PROCEDURE — G2211 COMPLEX E/M VISIT ADD ON: HCPCS | Performed by: FAMILY MEDICINE

## 2024-07-26 RX ORDER — CIPROFLOXACIN 250 MG/1
250 TABLET, FILM COATED ORAL 2 TIMES DAILY
Qty: 6 TABLET | Refills: 0 | Status: SHIPPED | OUTPATIENT
Start: 2024-07-26 | End: 2024-07-29

## 2024-07-26 NOTE — PROGRESS NOTES
Chief Complaint   Patient presents with    Hypertension    Abdominal Pain     Patient c/o lower mid abdominal pain x 2 days. She denies any urinary symptoms. She denies any nausea. Vomiting, no constipation or diarrhea. Patient states passing gas make her pain worse. Pain is constant and is described as feeling like a spasm         \"Have you been to the ER, urgent care clinic since your last visit?  Hospitalized since your last visit?\"    NO    “Have you seen or consulted any other health care providers outside of [facility] since your last visit?”    NO            Click Here for Release of Records Request

## 2024-07-26 NOTE — PROGRESS NOTES
Hillary Santoyo (: 1955) is a 69 y.o. female, established patient, here for:    ASSESSMENT/PLAN:  1. Acute cystitis without hematuria  Comments:  allergies noted to pcn, nitrofurantoin and trimathaprim sulfa. Ciprofloxaxic 250 mg bid x 3 days  Orders:  -     Culture, Urine; Future  -     Urinalysis with Microscopic; Future  -     ciprofloxacin (CIPRO) 250 MG tablet; Take 1 tablet by mouth 2 times daily for 3 days, Disp-6 tablet, R-0Normal       Follow-up and Dispositions    Return in about 11 days (around 2024) for abdominal pain and blood pressure follow up; proceed to ER for new or worsening symptoms.            SUBJECTIVE/OBJECTIVE:  Chief Complaint   Patient presents with    Hypertension    Abdominal Pain     Patient c/o lower mid abdominal pain x 2 days. She denies any urinary symptoms. She denies any nausea. Vomiting, no constipation or diarrhea. Patient states passing gas make her pain worse. Pain is constant and is described as feeling like a spasm      History of Present Illness  The patient is a 69-year-old female who presents for evaluation of suprapubic pain    The patient began experiencing severe lower back pain on 3 days ago after sleeping on a couch, which was somewhat alleviated by the application of patches. The next day after her usual [employer] dinner time dosing, she noted more difficulty with moving bowels than usual and experienced severe suprapubic pain approximately an hour post-dinner. Currently, she describes a sensation of pressure and spasms, which disrupted her sleep last night. She denies any febrile episodes.     No hx recurrent UTIs         Current Outpatient Medications   Medication Sig Dispense Refill    ciprofloxacin (CIPRO) 250 MG tablet Take 1 tablet by mouth 2 times daily for 3 days 6 tablet 0    metoprolol succinate (TOPROL XL) 50 MG extended release tablet Take 1.5 tablets by mouth daily 135 tablet 3    fluticasone (FLONASE) 50 MCG/ACT nasal spray instill 2

## 2024-07-28 LAB
BACTERIA: PRESENT
BILIRUB SERPL-MCNC: NEGATIVE MG/DL
BLOOD: NEGATIVE
CALCIUM OXALATE CRYSTALS: PRESENT
CLARITY, UA: CLEAR
COLOR, UA: YELLOW
EPITHELIAL CELLS: ABNORMAL /HPF
GLUCOSE: NEGATIVE MG/DL
HYALINE CASTS: ABNORMAL /LPF (ref 0–2)
KETONES, URINE: NEGATIVE MG/DL
LEUKOCYTE ESTERASE, URINE: NEGATIVE
NITRITE, URINE: POSITIVE
PH, URINE: 5.5 PH (ref 5–8)
PROTEIN UA: ABNORMAL MG/DL
RBC URINE: ABNORMAL /HPF
RESULT: NORMAL
SPECIFIC GRAVITY UA: 1.02 (ref 1–1.03)
UROBILINOGEN, URINE: 0.2 MG/DL
WBC UA: ABNORMAL /HPF (ref 0–5)

## 2024-08-16 NOTE — TELEPHONE ENCOUNTER
Patient called requesting a refill on her ferrous sulfate (IRON) 325 mg (65 mg iron) EC tablet . please review and advise. Patient can be reached at [phone]. [FreeTextEntry1] : Merritt Liz is a 59-year-old male, with a PMHx significant for HTN, HLD, DM, ROB not on CPAP  Pt presents for cardiovascular prevention/risk factor management. He is struggling to lose weight, c/o no energy to exercise, says he tried Atkins diet, keto diet, but nothing works. 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  He has sleep apnea but does not tolerate CPAP.

## 2024-08-20 RX ORDER — ALBUTEROL SULFATE 2.5 MG/3ML
2.5 SOLUTION RESPIRATORY (INHALATION) 4 TIMES DAILY PRN
Qty: 120 EACH | Refills: 3 | Status: SHIPPED | OUTPATIENT
Start: 2024-08-20

## 2024-08-20 NOTE — TELEPHONE ENCOUNTER
Patient pharmacy only has Albuterol neb solution on 2.5 mg order for 5 mg was sent in on 6/4/24. New prescription pended for Albuterol 2.5 neb solution please review and sign if appropriate.

## 2024-08-20 NOTE — TELEPHONE ENCOUNTER
Pt called stating that her pharmacy does not have the 5 mg nebulizer but they do have the 2.5 mg and would like to know what  would like to do. After speaking with patient I could not find this medication in her chart. Please review and advise as needed.

## 2024-08-26 ENCOUNTER — TELEPHONE (OUTPATIENT)
Facility: CLINIC | Age: 69
End: 2024-08-26

## 2024-08-26 NOTE — TELEPHONE ENCOUNTER
Called patient to ask if she can complete her MWV questionnaire no answer left her a VM asking if she has a chance could she go into her [employer] account and answer the questions for her MWV.

## 2024-09-10 ENCOUNTER — OFFICE VISIT (OUTPATIENT)
Facility: CLINIC | Age: 69
End: 2024-09-10
Payer: MEDICARE

## 2024-09-10 ENCOUNTER — OFFICE VISIT (OUTPATIENT)
Facility: CLINIC | Age: 69
End: 2024-09-10

## 2024-09-10 VITALS
HEART RATE: 91 BPM | TEMPERATURE: 98.4 F | OXYGEN SATURATION: 96 % | BODY MASS INDEX: 35.57 KG/M2 | HEIGHT: 60 IN | SYSTOLIC BLOOD PRESSURE: 124 MMHG | WEIGHT: 181.2 LBS | DIASTOLIC BLOOD PRESSURE: 68 MMHG

## 2024-09-10 VITALS
SYSTOLIC BLOOD PRESSURE: 124 MMHG | DIASTOLIC BLOOD PRESSURE: 68 MMHG | WEIGHT: 181.2 LBS | TEMPERATURE: 98.4 F | HEART RATE: 91 BPM | HEIGHT: 60 IN | OXYGEN SATURATION: 96 % | BODY MASS INDEX: 35.57 KG/M2

## 2024-09-10 DIAGNOSIS — K57.30 DIVERTICULOSIS OF LARGE INTESTINE WITHOUT HEMORRHAGE: ICD-10-CM

## 2024-09-10 DIAGNOSIS — M75.102 NONTRAUMATIC TEAR OF LEFT ROTATOR CUFF, UNSPECIFIED TEAR EXTENT: ICD-10-CM

## 2024-09-10 DIAGNOSIS — D50.0 IRON DEFICIENCY ANEMIA DUE TO CHRONIC BLOOD LOSS: ICD-10-CM

## 2024-09-10 DIAGNOSIS — E55.9 VITAMIN D DEFICIENCY: ICD-10-CM

## 2024-09-10 DIAGNOSIS — M05.731 RHEUMATOID ARTHRITIS INVOLVING RIGHT WRIST WITH POSITIVE RHEUMATOID FACTOR (HCC): ICD-10-CM

## 2024-09-10 DIAGNOSIS — I10 PRIMARY HYPERTENSION: Primary | ICD-10-CM

## 2024-09-10 DIAGNOSIS — Z00.00 MEDICARE ANNUAL WELLNESS VISIT, SUBSEQUENT: Primary | ICD-10-CM

## 2024-09-10 DIAGNOSIS — E11.9 TYPE 2 DIABETES MELLITUS WITHOUT COMPLICATION, WITHOUT LONG-TERM CURRENT USE OF INSULIN (HCC): ICD-10-CM

## 2024-09-10 PROCEDURE — 3078F DIAST BP <80 MM HG: CPT | Performed by: FAMILY MEDICINE

## 2024-09-10 PROCEDURE — 3074F SYST BP LT 130 MM HG: CPT | Performed by: FAMILY MEDICINE

## 2024-09-10 PROCEDURE — 1123F ACP DISCUSS/DSCN MKR DOCD: CPT | Performed by: FAMILY MEDICINE

## 2024-09-10 PROCEDURE — G0439 PPPS, SUBSEQ VISIT: HCPCS | Performed by: FAMILY MEDICINE

## 2024-09-10 RX ORDER — LOSARTAN POTASSIUM 50 MG/1
50 TABLET ORAL DAILY
Qty: 90 TABLET | Refills: 3 | Status: SHIPPED | OUTPATIENT
Start: 2024-09-10

## 2024-09-10 ASSESSMENT — PATIENT HEALTH QUESTIONNAIRE - PHQ9
10. IF YOU CHECKED OFF ANY PROBLEMS, HOW DIFFICULT HAVE THESE PROBLEMS MADE IT FOR YOU TO DO YOUR WORK, TAKE CARE OF THINGS AT HOME, OR GET ALONG WITH OTHER PEOPLE: SOMEWHAT DIFFICULT
4. FEELING TIRED OR HAVING LITTLE ENERGY: NOT AT ALL
SUM OF ALL RESPONSES TO PHQ9 QUESTIONS 1 & 2: 1
3. TROUBLE FALLING OR STAYING ASLEEP: NOT AT ALL
1. LITTLE INTEREST OR PLEASURE IN DOING THINGS: NOT AT ALL
5. POOR APPETITE OR OVEREATING: NOT AT ALL
7. TROUBLE CONCENTRATING ON THINGS, SUCH AS READING THE NEWSPAPER OR WATCHING TELEVISION: NOT AT ALL
2. FEELING DOWN, DEPRESSED OR HOPELESS: SEVERAL DAYS
9. THOUGHTS THAT YOU WOULD BE BETTER OFF DEAD, OR OF HURTING YOURSELF: NOT AT ALL
6. FEELING BAD ABOUT YOURSELF - OR THAT YOU ARE A FAILURE OR HAVE LET YOURSELF OR YOUR FAMILY DOWN: NOT AT ALL
SUM OF ALL RESPONSES TO PHQ QUESTIONS 1-9: 1
SUM OF ALL RESPONSES TO PHQ QUESTIONS 1-9: 1
8. MOVING OR SPEAKING SO SLOWLY THAT OTHER PEOPLE COULD HAVE NOTICED. OR THE OPPOSITE, BEING SO FIGETY OR RESTLESS THAT YOU HAVE BEEN MOVING AROUND A LOT MORE THAN USUAL: NOT AT ALL
SUM OF ALL RESPONSES TO PHQ QUESTIONS 1-9: 1
SUM OF ALL RESPONSES TO PHQ QUESTIONS 1-9: 1

## 2024-09-14 DIAGNOSIS — R60.0 PEDAL EDEMA: ICD-10-CM

## 2024-09-16 RX ORDER — FUROSEMIDE 20 MG
TABLET ORAL
Qty: 90 TABLET | Refills: 1 | OUTPATIENT
Start: 2024-09-16

## 2024-09-17 ENCOUNTER — TELEPHONE (OUTPATIENT)
Facility: CLINIC | Age: 69
End: 2024-09-17

## 2024-09-17 DIAGNOSIS — G89.4 CHRONIC PAIN SYNDROME: Primary | ICD-10-CM

## 2024-09-29 NOTE — TELEPHONE ENCOUNTER
Robb  has called requesting a refill of their controlled medication, Roxicodone 10 mg tab and MS Contin CR 15 mg tab, for the management of chronic back pain. Last office visit date: 10/1/18 with Ramesh and has a f/u appt on 1/2/19 with Bernadette    Last opioid care agreement 1/9/18  Last UDS was done 10/1/18 (Oral swab)    Date last  was pulled and reviewed : 11/20/18. Last fill date: 10/21/18 and 10/24/18. Was the patient compliant when the above report was pulled? Patient filled Temazepam from another provider on 11/9/18. Analgesia: Per C.S. Patient reports 70% pain relief on current regimen. Aberrancies: None reported in the last 30 days. ADL's: Per C.S. Patient states they have an aid that does everything  For them. Adverse Reaction: Patient reports no adverse reactions at this time. Provider's last note and plan of care reviewed? yes  Request forwarded to provider for review. Prescription pre-printed by KIMBERLEY Jimenes. Statement Selected

## 2024-10-30 ENCOUNTER — TRANSCRIBE ORDERS (OUTPATIENT)
Facility: HOSPITAL | Age: 69
End: 2024-10-30

## 2024-10-30 DIAGNOSIS — Z12.31 VISIT FOR SCREENING MAMMOGRAM: Primary | ICD-10-CM

## 2024-11-05 ENCOUNTER — TELEPHONE (OUTPATIENT)
Age: 69
End: 2024-11-05

## 2024-11-05 DIAGNOSIS — R60.0 PEDAL EDEMA: ICD-10-CM

## 2024-11-05 RX ORDER — FUROSEMIDE 20 MG/1
TABLET ORAL
Qty: 90 TABLET | Refills: 1 | OUTPATIENT
Start: 2024-11-05

## 2024-11-05 NOTE — TELEPHONE ENCOUNTER
Medication was sent in for patient on 6/21/24 90 with one refill. She has an appointment on 12/10/24. She should have enough medication to last until her appt.

## 2024-11-06 ENCOUNTER — TELEPHONE (OUTPATIENT)
Age: 69
End: 2024-11-06

## 2024-11-06 NOTE — TELEPHONE ENCOUNTER
----- Message from Tru VILLAREAL sent at 11/5/2024 11:19 AM EST -----  Lvm to get the patient scheduled with   ----- Message -----  From: Licha Wong RN  Sent: 11/1/2024  11:17 AM EST  To: Tru Moseley    Patient left voicemail wanting to schedule an appointment with Dr. Peng. Please give her a call.     Licha Wong RN  Clinical Coordinator   [facility] at [facility]

## 2024-11-08 ENCOUNTER — TELEPHONE (OUTPATIENT)
Facility: CLINIC | Age: 69
End: 2024-11-08

## 2024-11-08 DIAGNOSIS — R60.0 PEDAL EDEMA: ICD-10-CM

## 2024-11-08 RX ORDER — FUROSEMIDE 20 MG/1
20 TABLET ORAL DAILY
Qty: 90 TABLET | Refills: 0 | Status: SHIPPED | OUTPATIENT
Start: 2024-11-08

## 2024-11-08 NOTE — TELEPHONE ENCOUNTER
Medication was sent in for patient on 6/21/24 90 with 1 refill. Called [facility] to see if patient has active refills which she does not. She picked this up last on 8/8/24 and was given 90 pills. She has follow up in December please review and sign if appropriate.

## 2024-11-08 NOTE — PROGRESS NOTES
Hillary Santoyo presents today for a post ER follow-up of shortness of breath.  Her BNP was 473 (normal </=125 pg/ml).  She was given a dose of IV lasix and she diuresed 1000 cc and her symptoms improved.  She states that she has been on lasix 20mg daily for about a year and was recently instructed to take 2 tablets when she has more swelling. She was last seen by Dr. Peng in Jan. 2024.      She states that she experiences shortness of breath with activity (which has increased lately) and has occasional lower extremity edema. She denies chest pain, palpitations, orthopnea, PND.    She is a 69 year old female with history of noncardiac CP.  She also has history of hypertension, family history of premature CAD, and remote polysubstance abuse (tobacco and cocaine).  Due to her risk factors, she underwent left heart catheterization on 6/3/2020 and was noted to have patent coronaries.  Her chest pain greatly improved when she was started on metoprolol.  She underwent a stress echo in late May 2019 at [facility] and it was normal.  She wore a 48 hour Holter in Aug. 2018 and it showed NSR with no ventricular ectopics noted.     Hillary Santoyo is a 69 y.o. female presents today for :  Chief Complaint   Patient presents with    Follow-up     sob    ED Follow-up       PMH:  Past Medical History:   Diagnosis Date    Annular tear of lumbar disc 11/10/2014    Bipolar disorder (HCC)     Chronic pain     back    Degeneration of lumbar or lumbosacral intervertebral disc 11/10/2014    Depression     Diabetes (HCC)     Diverticular disease     Fibromyalgia     GERD (gastroesophageal reflux disease)     Hepatic cyst     Hepatic steatosis     Herpes labialis     Hypertension     IBS (irritable bowel syndrome)     Insomnia     Lung nodule     Rheumatoid arthritis (HCC)     Ulcer     Urinary incontinence     Urinary retention        PSH:  Past Surgical History:   Procedure Laterality Date    BREAST BIOPSY Left     benign

## 2024-11-08 NOTE — TELEPHONE ENCOUNTER
This patient contacted office for the following prescriptions to be filled:    Medication requested :   furosemide (LASIX) 20 MG tablet   PCP: Stone  Pharmacy or Print: Pharmacy   Mail order or Local pharmacy Local ([facility] [place])    Scheduled appointment if not seen by current providers in office: Lov: 9/10/24 Upcomin/10/24

## 2024-11-12 DIAGNOSIS — J45.21 MILD INTERMITTENT ASTHMA WITH ACUTE EXACERBATION: ICD-10-CM

## 2024-11-12 RX ORDER — ALBUTEROL SULFATE 90 UG/1
INHALANT RESPIRATORY (INHALATION)
Qty: 8.5 G | Refills: 0 | Status: SHIPPED | OUTPATIENT
Start: 2024-11-12

## 2024-11-12 NOTE — TELEPHONE ENCOUNTER
Patient has follow up scheduled. Medication was sent in last on 3/5/24 1 with 3 refills. Patient will need refills before her appt. Please review and sign if appropriate.

## 2024-11-14 ENCOUNTER — OFFICE VISIT (OUTPATIENT)
Age: 69
End: 2024-11-14
Payer: MEDICARE

## 2024-11-14 VITALS
OXYGEN SATURATION: 98 % | HEART RATE: 75 BPM | BODY MASS INDEX: 36.32 KG/M2 | SYSTOLIC BLOOD PRESSURE: 128 MMHG | WEIGHT: 185 LBS | DIASTOLIC BLOOD PRESSURE: 64 MMHG | HEIGHT: 60 IN

## 2024-11-14 DIAGNOSIS — I10 ESSENTIAL (PRIMARY) HYPERTENSION: ICD-10-CM

## 2024-11-14 DIAGNOSIS — R06.02 SHORTNESS OF BREATH: Primary | ICD-10-CM

## 2024-11-14 DIAGNOSIS — I50.32 CHRONIC HEART FAILURE WITH PRESERVED EJECTION FRACTION (HCC): ICD-10-CM

## 2024-11-14 DIAGNOSIS — R60.0 PEDAL EDEMA: ICD-10-CM

## 2024-11-14 PROCEDURE — 93000 ELECTROCARDIOGRAM COMPLETE: CPT | Performed by: NURSE PRACTITIONER

## 2024-11-14 RX ORDER — HYDROXYZINE HYDROCHLORIDE 25 MG/1
25 TABLET, FILM COATED ORAL EVERY 4 HOURS PRN
COMMUNITY

## 2024-11-14 RX ORDER — VENLAFAXINE HYDROCHLORIDE 37.5 MG/1
37.5 CAPSULE, EXTENDED RELEASE ORAL EVERY MORNING
COMMUNITY
Start: 2024-08-14

## 2024-11-14 RX ORDER — FUROSEMIDE 20 MG/1
40 TABLET ORAL DAILY
COMMUNITY

## 2024-11-14 RX ORDER — OXYCODONE HYDROCHLORIDE 5 MG/1
5 TABLET ORAL PRN
COMMUNITY

## 2024-11-14 RX ORDER — SPIRONOLACTONE 25 MG/1
25 TABLET ORAL DAILY
Qty: 90 TABLET | Refills: 3 | Status: SHIPPED | OUTPATIENT
Start: 2024-11-14

## 2024-11-14 RX ORDER — PREDNISONE 10 MG/1
10 TABLET ORAL DAILY
COMMUNITY
Start: 2024-09-06

## 2024-11-14 ASSESSMENT — ANXIETY QUESTIONNAIRES
3. WORRYING TOO MUCH ABOUT DIFFERENT THINGS: NOT AT ALL
7. FEELING AFRAID AS IF SOMETHING AWFUL MIGHT HAPPEN: NOT AT ALL
5. BEING SO RESTLESS THAT IT IS HARD TO SIT STILL: NOT AT ALL
2. NOT BEING ABLE TO STOP OR CONTROL WORRYING: NOT AT ALL
GAD7 TOTAL SCORE: 0
4. TROUBLE RELAXING: NOT AT ALL
6. BECOMING EASILY ANNOYED OR IRRITABLE: NOT AT ALL
1. FEELING NERVOUS, ANXIOUS, OR ON EDGE: NOT AT ALL
IF YOU CHECKED OFF ANY PROBLEMS ON THIS QUESTIONNAIRE, HOW DIFFICULT HAVE THESE PROBLEMS MADE IT FOR YOU TO DO YOUR WORK, TAKE CARE OF THINGS AT HOME, OR GET ALONG WITH OTHER PEOPLE: NOT DIFFICULT AT ALL

## 2024-11-14 ASSESSMENT — ENCOUNTER SYMPTOMS
NAUSEA: 0
SHORTNESS OF BREATH: 1
COUGH: 0
WHEEZING: 0
VOMITING: 0
ABDOMINAL DISTENTION: 0
CONSTIPATION: 0
CHEST TIGHTNESS: 0
DIARRHEA: 0
BLOOD IN STOOL: 0

## 2024-11-14 ASSESSMENT — PATIENT HEALTH QUESTIONNAIRE - PHQ9
SUM OF ALL RESPONSES TO PHQ QUESTIONS 1-9: 0
8. MOVING OR SPEAKING SO SLOWLY THAT OTHER PEOPLE COULD HAVE NOTICED. OR THE OPPOSITE, BEING SO FIGETY OR RESTLESS THAT YOU HAVE BEEN MOVING AROUND A LOT MORE THAN USUAL: NOT AT ALL
SUM OF ALL RESPONSES TO PHQ9 QUESTIONS 1 & 2: 0
3. TROUBLE FALLING OR STAYING ASLEEP: NOT AT ALL
6. FEELING BAD ABOUT YOURSELF - OR THAT YOU ARE A FAILURE OR HAVE LET YOURSELF OR YOUR FAMILY DOWN: NOT AT ALL
4. FEELING TIRED OR HAVING LITTLE ENERGY: NOT AT ALL
10. IF YOU CHECKED OFF ANY PROBLEMS, HOW DIFFICULT HAVE THESE PROBLEMS MADE IT FOR YOU TO DO YOUR WORK, TAKE CARE OF THINGS AT HOME, OR GET ALONG WITH OTHER PEOPLE: NOT DIFFICULT AT ALL
1. LITTLE INTEREST OR PLEASURE IN DOING THINGS: NOT AT ALL
SUM OF ALL RESPONSES TO PHQ QUESTIONS 1-9: 0
SUM OF ALL RESPONSES TO PHQ QUESTIONS 1-9: 0
9. THOUGHTS THAT YOU WOULD BE BETTER OFF DEAD, OR OF HURTING YOURSELF: NOT AT ALL
SUM OF ALL RESPONSES TO PHQ QUESTIONS 1-9: 0
7. TROUBLE CONCENTRATING ON THINGS, SUCH AS READING THE NEWSPAPER OR WATCHING TELEVISION: NOT AT ALL
5. POOR APPETITE OR OVEREATING: NOT AT ALL
2. FEELING DOWN, DEPRESSED OR HOPELESS: NOT AT ALL

## 2024-11-14 NOTE — PATIENT INSTRUCTIONS
Echocardiogram; Dx: SOB, lower extremity edema  Continue furosemide (Lasix) 40mg once a day  Begin spironolactone 25mg once a day  BMP, Mg, NT pro-BNP to be done in 2 weeks  Continue present medication regimen  Follow-up with Dr. Peng as scheduled and as needed  Weigh daily and record  Limit sodium intake to 2000mg per day  Limit fluid intake to no more than 6, eight ounce glasses of any type of fluids per day (total of 48 ounces per day)  Call if you notice sudden or progressive weight gain (3-5 pounds in 2-3 days), increasing shortness of breath, abdominal bloating, increasing lower extremity edema, inability to lie flat or on your normal number of pillows, having to sit up to catch your breath, fatigue, increased somnolence (sleeping more), poor appetite

## 2024-11-14 NOTE — PROGRESS NOTES
Hillary Santoyo presents today for   Chief Complaint   Patient presents with    Follow-up     sob    ED Follow-up       Hillary Santoyo preferred language for health care discussion is english/other.    Is someone accompanying this pt? no    Is the patient using any DME equipment during OV? no    Depression Screening:  Depression: Not at risk (11/14/2024)    PHQ-2     PHQ-2 Score: 0        Learning Assessment:  No question data found.       Pt currently taking Anticoagulant therapy? no    Pt currently taking Antiplatelet therapy ? no      Coordination of Care:  1. Have you been to the ER, urgent care clinic since your last visit? Hospitalized since your last visit? yes    2. Have you seen or consulted any other health care providers outside of the [facility] since your last visit? Include any pap smears or colon screening. no

## 2024-11-28 DIAGNOSIS — R06.02 SHORTNESS OF BREATH: ICD-10-CM

## 2024-11-28 DIAGNOSIS — I10 ESSENTIAL (PRIMARY) HYPERTENSION: ICD-10-CM

## 2024-12-06 ENCOUNTER — HOSPITAL ENCOUNTER (OUTPATIENT)
Facility: HOSPITAL | Age: 69
Setting detail: SPECIMEN
Discharge: HOME | End: 2024-12-09

## 2024-12-06 ENCOUNTER — NURSE ONLY (OUTPATIENT)
Facility: CLINIC | Age: 69
End: 2024-12-06
Payer: MEDICARE

## 2024-12-06 DIAGNOSIS — E11.9 TYPE 2 DIABETES MELLITUS WITHOUT COMPLICATION, WITHOUT LONG-TERM CURRENT USE OF INSULIN (HCC): Primary | ICD-10-CM

## 2024-12-06 DIAGNOSIS — D50.0 IRON DEFICIENCY ANEMIA DUE TO CHRONIC BLOOD LOSS: ICD-10-CM

## 2024-12-06 DIAGNOSIS — E55.9 VITAMIN D DEFICIENCY: ICD-10-CM

## 2024-12-06 LAB — SENTARA SPECIMEN COLLECTION: NORMAL

## 2024-12-06 PROCEDURE — 36415 COLL VENOUS BLD VENIPUNCTURE: CPT | Performed by: FAMILY MEDICINE

## 2024-12-06 PROCEDURE — 99001 SPECIMEN HANDLING PT-LAB: CPT

## 2024-12-06 RX ORDER — FERROUS SULFATE 325(65) MG
TABLET, DELAYED RELEASE (ENTERIC COATED) ORAL
Qty: 270 TABLET | Refills: 0 | Status: SHIPPED | OUTPATIENT
Start: 2024-12-06

## 2024-12-06 NOTE — TELEPHONE ENCOUNTER
Patient has an appointment scheduled next week and will need refills sent in prior. Med was sent in last for her on 11/20/2023 270 with 3 refills. Please review and sign if appropriate.

## 2024-12-08 LAB
A/G RATIO: 1.6 RATIO (ref 1.1–2.6)
ALBUMIN: 4.6 G/DL (ref 3.5–5)
ALP BLD-CCNC: 135 U/L (ref 40–120)
ALT SERPL-CCNC: 16 U/L (ref 5–40)
ANION GAP SERPL CALCULATED.3IONS-SCNC: 18 MMOL/L (ref 3–15)
AST SERPL-CCNC: 23 U/L (ref 10–37)
BILIRUB SERPL-MCNC: 0.1 MG/DL (ref 0.2–1.2)
BUN BLDV-MCNC: 13 MG/DL (ref 6–22)
CALCIUM SERPL-MCNC: 9.8 MG/DL (ref 8.4–10.5)
CHLORIDE BLD-SCNC: 98 MMOL/L (ref 98–110)
CHOLESTEROL, TOTAL: 131 MG/DL (ref 110–200)
CHOLESTEROL/HDL RATIO: 3.2 (ref 0–5)
CO2: 23 MMOL/L (ref 20–32)
CREAT SERPL-MCNC: 0.9 MG/DL (ref 0.8–1.4)
CREATININE URINE: 200 MG/DL
ESTIMATED AVERAGE GLUCOSE: 202 MG/DL (ref 91–123)
FERRITIN: 151 NG/ML (ref 10–291)
GFR, ESTIMATED: >60 ML/MIN/1.73 SQ.M.
GLOBULIN: 2.8 G/DL (ref 2–4)
GLUCOSE: 184 MG/DL (ref 70–99)
HBA1C MFR BLD: 8.7 % (ref 4.8–5.6)
HDLC SERPL-MCNC: 41 MG/DL
IRON % SATURATION: 13 % (ref 20–50)
IRON: 62 MCG/DL (ref 30–160)
LDL CHOLESTEROL: 59 MG/DL (ref 50–99)
LDL/HDL RATIO: 1.4
MICROALB/CREAT RATIO (UG/MG CREAT.): 9.8 (ref 0–30)
MICROALBUMIN/CREAT 24H UR: 19.5 MG/L (ref 0.1–17)
NON-HDL CHOLESTEROL: 90 MG/DL
POTASSIUM SERPL-SCNC: 3.8 MMOL/L (ref 3.5–5.5)
SODIUM BLD-SCNC: 139 MMOL/L (ref 133–145)
TOTAL IRON BINDING CAPACITY: 467 MCG/DL (ref 228–428)
TOTAL PROTEIN: 7.4 G/DL (ref 6.2–8.1)
TRIGL SERPL-MCNC: 155 MG/DL (ref 40–149)
UIBC: 405 MCG/DL (ref 110–370)
VITAMIN D 25-HYDROXY: 62.5 NG/ML (ref 32–100)
VLDLC SERPL CALC-MCNC: 31 MG/DL (ref 8–30)

## 2024-12-10 ENCOUNTER — OFFICE VISIT (OUTPATIENT)
Facility: CLINIC | Age: 69
End: 2024-12-10
Payer: MEDICARE

## 2024-12-10 VITALS
DIASTOLIC BLOOD PRESSURE: 86 MMHG | HEIGHT: 60 IN | WEIGHT: 185.2 LBS | HEART RATE: 84 BPM | OXYGEN SATURATION: 98 % | SYSTOLIC BLOOD PRESSURE: 140 MMHG | BODY MASS INDEX: 36.36 KG/M2 | TEMPERATURE: 97.5 F

## 2024-12-10 DIAGNOSIS — E55.9 VITAMIN D DEFICIENCY: ICD-10-CM

## 2024-12-10 DIAGNOSIS — B00.2 RECURRENT ORAL HERPES SIMPLEX: ICD-10-CM

## 2024-12-10 DIAGNOSIS — J30.1 NON-SEASONAL ALLERGIC RHINITIS DUE TO POLLEN: ICD-10-CM

## 2024-12-10 DIAGNOSIS — I50.32 CHRONIC HEART FAILURE WITH PRESERVED EJECTION FRACTION (HCC): ICD-10-CM

## 2024-12-10 DIAGNOSIS — E11.9 TYPE 2 DIABETES MELLITUS WITHOUT COMPLICATION, WITHOUT LONG-TERM CURRENT USE OF INSULIN (HCC): ICD-10-CM

## 2024-12-10 DIAGNOSIS — B37.83 ANGULAR CHEILITIS WITH CANDIDIASIS: ICD-10-CM

## 2024-12-10 DIAGNOSIS — M85.80 OSTEOPENIA, UNSPECIFIED LOCATION: ICD-10-CM

## 2024-12-10 DIAGNOSIS — I10 PRIMARY HYPERTENSION: ICD-10-CM

## 2024-12-10 DIAGNOSIS — D50.0 IRON DEFICIENCY ANEMIA DUE TO CHRONIC BLOOD LOSS: ICD-10-CM

## 2024-12-10 DIAGNOSIS — J45.31 MILD PERSISTENT ASTHMA WITH ACUTE EXACERBATION: ICD-10-CM

## 2024-12-10 DIAGNOSIS — E11.9 TYPE 2 DIABETES MELLITUS WITHOUT COMPLICATION, WITHOUT LONG-TERM CURRENT USE OF INSULIN (HCC): Primary | ICD-10-CM

## 2024-12-10 PROCEDURE — 3079F DIAST BP 80-89 MM HG: CPT | Performed by: FAMILY MEDICINE

## 2024-12-10 PROCEDURE — 1123F ACP DISCUSS/DSCN MKR DOCD: CPT | Performed by: FAMILY MEDICINE

## 2024-12-10 PROCEDURE — 99214 OFFICE O/P EST MOD 30 MIN: CPT | Performed by: FAMILY MEDICINE

## 2024-12-10 PROCEDURE — 3077F SYST BP >= 140 MM HG: CPT | Performed by: FAMILY MEDICINE

## 2024-12-10 PROCEDURE — 1159F MED LIST DOCD IN RCRD: CPT | Performed by: FAMILY MEDICINE

## 2024-12-10 PROCEDURE — G2211 COMPLEX E/M VISIT ADD ON: HCPCS | Performed by: FAMILY MEDICINE

## 2024-12-10 PROCEDURE — 1160F RVW MEDS BY RX/DR IN RCRD: CPT | Performed by: FAMILY MEDICINE

## 2024-12-10 PROCEDURE — 3052F HG A1C>EQUAL 8.0%<EQUAL 9.0%: CPT | Performed by: FAMILY MEDICINE

## 2024-12-10 RX ORDER — VALACYCLOVIR HYDROCHLORIDE 500 MG/1
500 TABLET, FILM COATED ORAL DAILY PRN
Qty: 20 TABLET | Refills: 3 | Status: SHIPPED | OUTPATIENT
Start: 2024-12-10

## 2024-12-10 RX ORDER — FERROUS SULFATE 325(65) MG
TABLET, DELAYED RELEASE (ENTERIC COATED) ORAL
Qty: 270 TABLET | Refills: 3 | Status: SHIPPED | OUTPATIENT
Start: 2024-12-10

## 2024-12-10 RX ORDER — BUTYROSPERMUM PARKII(SHEA BUTTER), SIMMONDSIA CHINENSIS (JOJOBA) SEED OIL, ALOE BARBADENSIS LEAF EXTRACT .01; 1; 3.5 G/100G; G/100G; G/100G
5000 LIQUID TOPICAL DAILY
Qty: 90 CAPSULE | Refills: 3 | Status: SHIPPED | OUTPATIENT
Start: 2024-12-10

## 2024-12-10 RX ORDER — FEXOFENADINE HCL 180 MG/1
180 TABLET ORAL DAILY
Qty: 90 TABLET | Refills: 3 | Status: SHIPPED | OUTPATIENT
Start: 2024-12-10

## 2024-12-10 RX ORDER — KETOCONAZOLE 20 MG/G
CREAM TOPICAL 2 TIMES DAILY PRN
Qty: 15 G | Refills: 5 | Status: SHIPPED | OUTPATIENT
Start: 2024-12-10

## 2024-12-10 SDOH — ECONOMIC STABILITY: FOOD INSECURITY: WITHIN THE PAST 12 MONTHS, THE FOOD YOU BOUGHT JUST DIDN'T LAST AND YOU DIDN'T HAVE MONEY TO GET MORE.: NEVER TRUE

## 2024-12-10 SDOH — ECONOMIC STABILITY: INCOME INSECURITY: HOW HARD IS IT FOR YOU TO PAY FOR THE VERY BASICS LIKE FOOD, HOUSING, MEDICAL CARE, AND HEATING?: NOT VERY HARD

## 2024-12-10 SDOH — ECONOMIC STABILITY: FOOD INSECURITY: WITHIN THE PAST 12 MONTHS, YOU WORRIED THAT YOUR FOOD WOULD RUN OUT BEFORE YOU GOT MONEY TO BUY MORE.: SOMETIMES TRUE

## 2024-12-10 NOTE — ASSESSMENT & PLAN NOTE
Context HFpEF for which she sees Dr. Peng and takes furosemide 20 mg daily.   - Uncontrolled due to lapse in meds today. Overall well controlled.  - Continue losartan 50 mg, metoprolol XL 50 mg. Clonidine 0.3 mg being prescribed by her mental health provider for insomnia.

## 2024-12-10 NOTE — ASSESSMENT & PLAN NOTE
Inadvertent lapse in fexofenadine. Resuming. Timeframe suggests may be leading to ear pain due to ETD. Already planning to schedule with ENT. Reassess allergy control attendant with asthma follow up after the New Year.

## 2024-12-10 NOTE — ASSESSMENT & PLAN NOTE
Uncontrolled. A1c 8.7  - Continue metformin 1000 mg daily  - Add empagliflozin 10 mg daily  - follow up 3 months

## 2024-12-10 NOTE — PROGRESS NOTES
Chief Complaint   Patient presents with    Hypertension    Diabetes    Vitamin D def    Iron Def     Patient here for her 3 month follow up and lab review.          \"Have you been to the ER, urgent care clinic since your last visit?  Hospitalized since your last visit?\"    NO    “Have you seen or consulted any other health care providers outside our system since your last visit?”    YES - When: approximately 2 months ago.  Where and Why: Dr. Garrison .    Have you had a mammogram?”   NO Scheduled    Date of last Mammogram: 11/1/2023              
tablet 3    fluticasone (FLOVENT HFA) 44 MCG/ACT inhaler Inhale 2 puffs into the lungs 2 times daily TO PREVENT SHORTNESS OF BREATH 3 each 3    nitroGLYCERIN (NITROSTAT) 0.4 MG SL tablet Place 1 tablet under the tongue every 5 minutes as needed for Chest pain 25 tablet 3    ondansetron (ZOFRAN) 4 MG tablet Take 1 tablet by mouth daily as needed for Nausea or Vomiting 30 tablet 0    promethazine (PHENERGAN) 25 MG tablet Take 1 tablet by mouth every 6 hours as needed for Nausea 90 tablet 3    QUEtiapine (SEROQUEL) 300 MG tablet Take 1 tablet by mouth nightly at bedtime.      morphine (MS CONTIN) 30 MG extended release tablet take 1 tablet by mouth every 12 hours for chronic pain      cloNIDine (CATAPRES) 0.3 MG tablet Take 1 tablet by mouth nightly      cyclobenzaprine (FLEXERIL) 10 MG tablet TAKE 1 TABLET BY MOUTH THREE TIMES DAILY AS NEEDED FOR MUSCLE SPASMS FOR UP TO 30 DAYS.      dexlansoprazole (DEXILANT) 60 MG CPDR delayed release capsule Take 1 capsule by mouth 2 times daily      dicyclomine (BENTYL) 20 MG tablet Take 1 tablet by mouth 4 times daily as needed      doxepin (SINEQUAN) 50 MG capsule Take 2 capsules by mouth daily.      linaclotide (LINZESS) 145 MCG capsule Take 1 capsule by mouth daily      metoclopramide (REGLAN) 5 MG tablet Take 1 tablet by mouth daily      naloxone 4 MG/0.1ML LIQD nasal spray 1 spray by Nasal route once as needed      omeprazole (PRILOSEC) 40 MG delayed release capsule Take 1 capsule by mouth daily      polyethylene glycol (GLYCOLAX) 17 GM/SCOOP powder Take 17 g by mouth daily      albuterol (PROVENTIL) (5 MG/ML) 0.5% nebulizer solution INHALE CONTENTS OF 1 VIAL ( 1 MILLILITERS ) IN NEBULIZER BY MOUTH AND INTO THE LUNGS FOUR TIMES A DAY IF NEEDED FOR WHEEZING (Patient not taking: Reported on 12/10/2024) 120 mL 3    Spacer/Aero-Holding Chambers MELANIE 1 Device by Does not apply route daily as needed (cough, chest tightness or wheeze) (Patient not taking: Reported on 12/10/2024) 1

## 2024-12-10 NOTE — ASSESSMENT & PLAN NOTE
Presumably related to diverticular disease.  Well-controlled continue present management with iron replacement.  She is welcome to decrease replacement from 3 times daily to 2 tabs daily

## 2024-12-19 ENCOUNTER — TELEPHONE (OUTPATIENT)
Facility: CLINIC | Age: 69
End: 2024-12-19

## 2024-12-19 DIAGNOSIS — M96.1 POSTLAMINECTOMY SYNDROME, CERVICAL REGION: ICD-10-CM

## 2024-12-19 DIAGNOSIS — M05.731 RHEUMATOID ARTHRITIS INVOLVING RIGHT WRIST WITH POSITIVE RHEUMATOID FACTOR (HCC): ICD-10-CM

## 2024-12-19 DIAGNOSIS — G89.4 CHRONIC PAIN SYNDROME: Primary | ICD-10-CM

## 2024-12-19 NOTE — TELEPHONE ENCOUNTER
Pt called to speak to Jeanne. Informed pt that Jeanne was in patient care at the moment and could return a call at her soonest convenience. Please review and advise as needed

## 2024-12-19 NOTE — TELEPHONE ENCOUNTER
Called patient back who has asked if Dr. Vegas can place a referral to [facility]. Referral has been pended please review and sign if appropriate.

## 2024-12-20 NOTE — TELEPHONE ENCOUNTER
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

## 2025-01-16 RX ORDER — FUROSEMIDE 20 MG/1
TABLET ORAL
Qty: 90 TABLET | OUTPATIENT
Start: 2025-01-16

## 2025-01-16 RX ORDER — FUROSEMIDE 20 MG/1
40 TABLET ORAL DAILY
Qty: 180 TABLET | Refills: 3 | Status: SHIPPED | OUTPATIENT
Start: 2025-01-16

## 2025-01-22 ENCOUNTER — TELEMEDICINE (OUTPATIENT)
Facility: CLINIC | Age: 70
End: 2025-01-22

## 2025-01-22 DIAGNOSIS — J01.90 ACUTE BACTERIAL RHINOSINUSITIS: Primary | ICD-10-CM

## 2025-01-22 DIAGNOSIS — B96.89 ACUTE BACTERIAL RHINOSINUSITIS: Primary | ICD-10-CM

## 2025-01-22 RX ORDER — AZITHROMYCIN 250 MG/1
TABLET, FILM COATED ORAL
Qty: 6 TABLET | Refills: 0 | Status: SHIPPED | OUTPATIENT
Start: 2025-01-22 | End: 2025-01-22 | Stop reason: SDUPTHER

## 2025-01-22 RX ORDER — AZITHROMYCIN 250 MG/1
TABLET, FILM COATED ORAL
Qty: 6 TABLET | Refills: 0 | Status: SHIPPED | OUTPATIENT
Start: 2025-01-22 | End: 2025-02-01

## 2025-01-22 NOTE — PROGRESS NOTES
\"Have you been to the ER, urgent care clinic since your last visit?  Hospitalized since your last visit?\"    YES - When: approximately 6 days ago.  Where and Why: flu ED.    “Have you seen or consulted any other health care providers outside our system since your last visit?”    NO    Have you had a mammogram?”   NO    Date of last Mammogram: 11/1/2023              
FOUR TIMES A DAY IF NEEDED FOR WHEEZING (Patient not taking: Reported on 12/10/2024), Disp: 120 mL, Rfl: 3    Respiratory Therapy Supplies (NEBULIZER/TUBING/MOUTHPIECE) KIT, 1 kit by Does not apply route daily as needed (wheezing or shortness of breath), Disp: 1 kit, Rfl: 0    magnesium oxide (MAG-OX) 400 MG tablet, Take 1 tablet by mouth 2 times daily, Disp: 180 tablet, Rfl: 3    fluticasone (FLOVENT HFA) 44 MCG/ACT inhaler, Inhale 2 puffs into the lungs 2 times daily TO PREVENT SHORTNESS OF BREATH, Disp: 3 each, Rfl: 3    Spacer/Aero-Holding Chambers MELANIE, 1 Device by Does not apply route daily as needed (cough, chest tightness or wheeze) (Patient not taking: Reported on 12/10/2024), Disp: 1 each, Rfl: 0    nitroGLYCERIN (NITROSTAT) 0.4 MG SL tablet, Place 1 tablet under the tongue every 5 minutes as needed for Chest pain, Disp: 25 tablet, Rfl: 3    ondansetron (ZOFRAN) 4 MG tablet, Take 1 tablet by mouth daily as needed for Nausea or Vomiting, Disp: 30 tablet, Rfl: 0    promethazine (PHENERGAN) 25 MG tablet, Take 1 tablet by mouth every 6 hours as needed for Nausea, Disp: 90 tablet, Rfl: 3    QUEtiapine (SEROQUEL) 300 MG tablet, Take 1 tablet by mouth nightly at bedtime., Disp: , Rfl:     morphine (MS CONTIN) 30 MG extended release tablet, take 1 tablet by mouth every 12 hours for chronic pain, Disp: , Rfl:     cloNIDine (CATAPRES) 0.3 MG tablet, Take 1 tablet by mouth nightly, Disp: , Rfl:     cyclobenzaprine (FLEXERIL) 10 MG tablet, TAKE 1 TABLET BY MOUTH THREE TIMES DAILY AS NEEDED FOR MUSCLE SPASMS FOR UP TO 30 DAYS., Disp: , Rfl:     dexlansoprazole (DEXILANT) 60 MG CPDR delayed release capsule, Take 1 capsule by mouth 2 times daily, Disp: , Rfl:     dicyclomine (BENTYL) 20 MG tablet, Take 1 tablet by mouth 4 times daily as needed, Disp: , Rfl:     doxepin (SINEQUAN) 50 MG capsule, Take 2 capsules by mouth daily., Disp: , Rfl:     linaclotide (LINZESS) 145 MCG capsule, Take 1 capsule by mouth daily, Disp: ,

## 2025-02-16 DIAGNOSIS — B00.2 RECURRENT ORAL HERPES SIMPLEX: ICD-10-CM

## 2025-02-18 RX ORDER — VALACYCLOVIR HYDROCHLORIDE 500 MG/1
500 TABLET, FILM COATED ORAL DAILY PRN
Qty: 20 TABLET | Refills: 3 | OUTPATIENT
Start: 2025-02-18

## 2025-02-18 NOTE — TELEPHONE ENCOUNTER
Medication was sent in last on 12/10/24 #20 with 3 refills to be used as needed. Patient should not need refills sent in.

## 2025-02-20 DIAGNOSIS — E11.9 TYPE 2 DIABETES MELLITUS WITHOUT COMPLICATION, WITHOUT LONG-TERM CURRENT USE OF INSULIN (HCC): ICD-10-CM

## 2025-02-21 RX ORDER — ATORVASTATIN CALCIUM 40 MG/1
TABLET, FILM COATED ORAL
Qty: 90 TABLET | Refills: 0 | OUTPATIENT
Start: 2025-02-21

## 2025-03-10 DIAGNOSIS — E11.9 TYPE 2 DIABETES MELLITUS WITHOUT COMPLICATION, WITHOUT LONG-TERM CURRENT USE OF INSULIN: ICD-10-CM

## 2025-03-18 ENCOUNTER — OFFICE VISIT (OUTPATIENT)
Facility: CLINIC | Age: 70
End: 2025-03-18

## 2025-03-18 VITALS
TEMPERATURE: 97.8 F | BODY MASS INDEX: 33.41 KG/M2 | SYSTOLIC BLOOD PRESSURE: 128 MMHG | WEIGHT: 170.2 LBS | HEIGHT: 60 IN | OXYGEN SATURATION: 98 % | DIASTOLIC BLOOD PRESSURE: 68 MMHG | HEART RATE: 104 BPM

## 2025-03-18 VITALS
OXYGEN SATURATION: 98 % | HEART RATE: 104 BPM | WEIGHT: 170.2 LBS | BODY MASS INDEX: 33.41 KG/M2 | DIASTOLIC BLOOD PRESSURE: 68 MMHG | SYSTOLIC BLOOD PRESSURE: 128 MMHG | HEIGHT: 60 IN | TEMPERATURE: 97.8 F

## 2025-03-18 DIAGNOSIS — Z00.00 MEDICARE ANNUAL WELLNESS VISIT, SUBSEQUENT: Primary | ICD-10-CM

## 2025-03-18 DIAGNOSIS — E07.9 THYROID MASS: ICD-10-CM

## 2025-03-18 DIAGNOSIS — Z12.31 ENCOUNTER FOR SCREENING MAMMOGRAM FOR BREAST CANCER: ICD-10-CM

## 2025-03-18 DIAGNOSIS — J45.30 MILD PERSISTENT ASTHMA WITHOUT COMPLICATION: Primary | ICD-10-CM

## 2025-03-18 DIAGNOSIS — Z78.0 POST-MENOPAUSAL: ICD-10-CM

## 2025-03-18 RX ORDER — ALBUTEROL SULFATE 90 UG/1
2 INHALANT RESPIRATORY (INHALATION) EVERY 4 HOURS PRN
Qty: 1 EACH | Refills: 2 | Status: SHIPPED | OUTPATIENT
Start: 2025-03-18

## 2025-03-18 RX ORDER — FLUTICASONE PROPIONATE 44 UG/1
2 AEROSOL, METERED RESPIRATORY (INHALATION) 2 TIMES DAILY
Qty: 3 EACH | Refills: 3 | Status: SHIPPED | OUTPATIENT
Start: 2025-03-18 | End: 2025-03-21 | Stop reason: CLARIF

## 2025-03-18 SDOH — ECONOMIC STABILITY: FOOD INSECURITY: WITHIN THE PAST 12 MONTHS, THE FOOD YOU BOUGHT JUST DIDN'T LAST AND YOU DIDN'T HAVE MONEY TO GET MORE.: NEVER TRUE

## 2025-03-18 SDOH — ECONOMIC STABILITY: FOOD INSECURITY: WITHIN THE PAST 12 MONTHS, YOU WORRIED THAT YOUR FOOD WOULD RUN OUT BEFORE YOU GOT MONEY TO BUY MORE.: NEVER TRUE

## 2025-03-18 ASSESSMENT — ASTHMA QUESTIONNAIRES
QUESTION_4 LAST FOUR WEEKS HOW OFTEN HAVE YOU USED YOUR RESCUE INHALER OR NEBULIZER MEDICATION (SUCH AS ALBUTEROL): 1 OR 2 TIMES PER DAY
QUESTION_1 LAST FOUR WEEKS HOW MUCH OF THE TIME DID YOUR ASTHMA KEEP YOU FROM GETTING AS MUCH DONE AT WORK, SCHOOL OR AT HOME: SOME OF THE TIME
ACT_TOTALSCORE: 14
QUESTION_2 LAST FOUR WEEKS HOW OFTEN HAVE YOU HAD SHORTNESS OF BREATH: MORE THAN ONCE A DAY
QUESTION_3 LAST FOUR WEEKS HOW OFTEN DID YOUR ASTHMA SYMPTOMS (WHEEZING, COUGHING, SHORTNESS OF BREATH, CHEST TIGHTNESS OR PAIN) WAKE YOU UP AT NIGHT OR EARLIER THAN USUAL IN THE MORNING: NOT AT ALL
QUESTION_5 LAST FOUR WEEKS HOW WOULD YOU RATE YOUR ASTHMA CONTROL: SOMEWHAT CONTROLED

## 2025-03-18 ASSESSMENT — PATIENT HEALTH QUESTIONNAIRE - PHQ9
SUM OF ALL RESPONSES TO PHQ QUESTIONS 1-9: 5
SUM OF ALL RESPONSES TO PHQ QUESTIONS 1-9: 5
4. FEELING TIRED OR HAVING LITTLE ENERGY: NOT AT ALL
10. IF YOU CHECKED OFF ANY PROBLEMS, HOW DIFFICULT HAVE THESE PROBLEMS MADE IT FOR YOU TO DO YOUR WORK, TAKE CARE OF THINGS AT HOME, OR GET ALONG WITH OTHER PEOPLE: SOMEWHAT DIFFICULT
5. POOR APPETITE OR OVEREATING: SEVERAL DAYS
9. THOUGHTS THAT YOU WOULD BE BETTER OFF DEAD, OR OF HURTING YOURSELF: NOT AT ALL
1. LITTLE INTEREST OR PLEASURE IN DOING THINGS: SEVERAL DAYS
6. FEELING BAD ABOUT YOURSELF - OR THAT YOU ARE A FAILURE OR HAVE LET YOURSELF OR YOUR FAMILY DOWN: NOT AT ALL
7. TROUBLE CONCENTRATING ON THINGS, SUCH AS READING THE NEWSPAPER OR WATCHING TELEVISION: SEVERAL DAYS
SUM OF ALL RESPONSES TO PHQ QUESTIONS 1-9: 5
2. FEELING DOWN, DEPRESSED OR HOPELESS: SEVERAL DAYS
SUM OF ALL RESPONSES TO PHQ QUESTIONS 1-9: 5
3. TROUBLE FALLING OR STAYING ASLEEP: SEVERAL DAYS
8. MOVING OR SPEAKING SO SLOWLY THAT OTHER PEOPLE COULD HAVE NOTICED. OR THE OPPOSITE, BEING SO FIGETY OR RESTLESS THAT YOU HAVE BEEN MOVING AROUND A LOT MORE THAN USUAL: NOT AT ALL

## 2025-03-18 ASSESSMENT — LIFESTYLE VARIABLES
HOW OFTEN DO YOU HAVE A DRINK CONTAINING ALCOHOL: NEVER
HOW MANY STANDARD DRINKS CONTAINING ALCOHOL DO YOU HAVE ON A TYPICAL DAY: PATIENT DOES NOT DRINK

## 2025-03-18 NOTE — PATIENT INSTRUCTIONS
device in the bathroom with you.   Where can you learn more?  Go to https://www.[employer].net/patientEd and enter G117 to learn more about \"Preventing Falls: Care Instructions.\"  Current as of: July 31, 2024  Content Version: 14.4  © 5035-9764 [employer].   Care instructions adapted under license by [employer]. If you have questions about a medical condition or this instruction, always ask your healthcare professional. [employer] disclaims any warranty or liability for your use of this information.    Personalized Preventive Plan for Hillary Santoyo - 3/18/2025  Medicare offers a range of preventive health benefits. Some of the tests and screenings are paid in full while other may be subject to a deductible, co-insurance, and/or copay.  Some of these benefits include a comprehensive review of your medical history including lifestyle, illnesses that may run in your family, and various assessments and screenings as appropriate.  After reviewing your medical record and screening and assessments performed today your provider may have ordered immunizations, labs, imaging, and/or referrals for you.  A list of these orders (if applicable) as well as your Preventive Care list are included within your After Visit Summary for your review.

## 2025-03-18 NOTE — PROGRESS NOTES
Chief Complaint   Patient presents with    Asthma     Patient here for her 6 week follow up.        \"Have you been to the ER, urgent care clinic since your last visit?  Hospitalized since your last visit?\"    NO    “Have you seen or consulted any other health care providers outside our system since your last visit?”    YES - When: approximately 7 days ago.  Where and Why: 3/11, pain management .    Have you had a mammogram?”   NO    Date of last Mammogram: 11/1/2023

## 2025-03-18 NOTE — ACP (ADVANCE CARE PLANNING)
Advance Care Planning     General Advance Care Planning (ACP) Conversation    Date of Conversation: 3/18/2025  Conducted with: Patient with Decision Making Capacity    Healthcare Decision Maker:    Primary Decision Maker: Buddy Santoyo - Child - [phone]    Secondary Decision Maker: Robert Viveros - Child - [phone]          Content/Action Overview:  desires care for curable conditions, focus on alleviation of suffering at end of life       Length of Voluntary ACP Conversation in minutes:  <16 minutes (Non-Billable)    Yolanda Vegas MD

## 2025-03-18 NOTE — ASSESSMENT & PLAN NOTE
Uncontrolled due to pharmacy related lapse in ICS x 3 months  - resume Flovent 44 mcg 2 puffs bid  - call the office if unable to fill  - continue albuterol prn  - follow up 1 month     3/21/2025 Flovent non formulary. Alternatives are Arnuity Ellipta and Pulmicort Flexhaler. Choosing the former as less expensive per plan.

## 2025-03-18 NOTE — PROGRESS NOTES
Chief Complaint   Patient presents with    Medicare AWV       \"Have you been to the ER, urgent care clinic since your last visit?  Hospitalized since your last visit?\"    NO    “Have you seen or consulted any other health care providers outside our system since your last visit?”    YES - When: approximately 1  weeks ago.  Where and Why: 3/11, pain management.    Have you had a mammogram?”   NO    Date of last Mammogram: 11/1/2023              
index is 33.24 kg/m². (!) Abnormal  Interventions:  Patient advised to follow-up in this office for further evaluation and treatment          Vision Screen:  Do you have difficulty driving, watching TV, or doing any of your daily activities because of your eyesight?: (!) Yes  Have you had an eye exam within the past year?: Appointment is scheduled  Interventions:    scheduled      Advanced Directives:  Do you have a Living Will?: (!) No    Intervention:  see ACP note                     Objective   Vitals:    03/18/25 1341   BP: 128/68   BP Site: Left Upper Arm   Patient Position: Sitting   Pulse: (!) 104   Temp: 97.8 °F (36.6 °C)   TempSrc: Tympanic   SpO2: 98%   Weight: 77.2 kg (170 lb 3.2 oz)   Height: 1.524 m (5')      Body mass index is 33.24 kg/m².      Physical Exam                Allergies   Allergen Reactions    Lorazepam Shortness Of Breath    Aspirin Other (See Comments)     Stomach bleeding    Oxymorphone Other (See Comments)     Insomnia, weight loss, nightmares    Penicillins Hives    Cephalexin Rash    Nitrofurantoin Nausea And Vomiting    Nsaids Nausea And Vomiting    Sulfamethoxazole-Trimethoprim Rash     Prior to Visit Medications    Medication Sig Taking? Authorizing Provider   furosemide (LASIX) 20 MG tablet Take 2 tablets by mouth daily Yes Myra Peng,    fexofenadine (ALLEGRA) 180 MG tablet Take 1 tablet by mouth daily Yes Yolanda Vegas MD   ketoconazole (NIZORAL) 2 % cream Apply topically 2 times daily as needed (rash at the corners of the mouth) Apply topically two times daily. Yes Yolanda Vegas MD   empagliflozin (JARDIANCE) 10 MG tablet Take 1 tablet by mouth daily Yes Yolanda Vegas MD   ferrous sulfate (FE TABS 325) 325 (65 Fe) MG EC tablet take 1 tablet by mouth three times a day with food Yes Yolanda Vegas MD   vitamin D (D3 5000) 125 MCG (5000 UT) CAPS capsule Take 1 capsule by mouth daily Yes Yolanda Vegas MD   valACYclovir (VALTREX) 500 MG tablet Take

## 2025-03-18 NOTE — PROGRESS NOTES
Hillary Santoyo (: 1955) is a 69 y.o. female, established patient, here for:    ASSESSMENT/PLAN:  Mild persistent asthma without complication  Assessment & Plan:  Uncontrolled due to pharmacy related lapse in ICS x 3 months  - resume Flovent 44 mcg 2 puffs bid  - call the office if unable to fill  - continue albuterol prn  - follow up 1 month     3/21/2025 Flovent non formulary. Alternatives are Arnuity Ellipta and Pulmicort Flexhaler. Choosing the former as less expensive per plan.   Orders:  -     albuterol sulfate HFA (PROVENTIL;VENTOLIN;PROAIR) 108 (90 Base) MCG/ACT inhaler; Inhale 2 puffs into the lungs every 4 hours as needed for Wheezing, Disp-1 each, R-2Normal  -     ARNUITY ELLIPTA 100 MCG/ACT AEPB; Inhale 1 puff into the lungs daily, Disp-90 each, R-3, DAWNormal  Thyroid mass  Assessment & Plan:   \"1.2 cm hypodensity left thyroid lobe. Consider thyroid ultrasound on a nonemergent outpatient basis if not previously performed.     Signed By: Marlee Sheriff MD on 2024 2:02 PM \"  - US  Orders:  -     US THYROID; Future        Follow-up and Dispositions    Return in about 1 month (around 2025) for asthma follow up, no labs prior, (30).            SUBJECTIVE/OBJECTIVE:  Chief Complaint   Patient presents with    Asthma     Patient here for her 6 week follow up.       History of Present Illness  The patient presents for a follow-up of her asthma and to update her Medicare wellness.    She has been experiencing significant respiratory distress, which she attributes to the emotional stress of her son's recent traumatic event. She reports that her albuterol inhaler provides inconsistent relief, with some instances of efficacy and others where it appears ineffective.   She has not had access to Flovent for the past 3 months due to it being on backorder at her pharmacy.               3/18/2025     1:38 PM   PHQ-9    Little interest or pleasure in doing things 1   Feeling down, depressed,

## 2025-03-18 NOTE — ASSESSMENT & PLAN NOTE
\"1.2 cm hypodensity left thyroid lobe. Consider thyroid ultrasound on a nonemergent outpatient basis if not previously performed.     Signed By: Marlee Sheriff MD on 11/5/2024 2:02 PM \"  - US

## 2025-03-21 RX ORDER — FLUTICASONE FUROATE 100 UG/1
1 POWDER RESPIRATORY (INHALATION) DAILY
Qty: 90 EACH | Refills: 3 | Status: SHIPPED | OUTPATIENT
Start: 2025-03-21

## 2025-04-26 DIAGNOSIS — I50.32 CHRONIC HEART FAILURE WITH PRESERVED EJECTION FRACTION (HCC): ICD-10-CM

## 2025-04-26 DIAGNOSIS — E78.5 HYPERLIPIDEMIA, UNSPECIFIED HYPERLIPIDEMIA TYPE: ICD-10-CM

## 2025-04-26 DIAGNOSIS — E11.9 TYPE 2 DIABETES MELLITUS WITHOUT COMPLICATION, WITHOUT LONG-TERM CURRENT USE OF INSULIN (HCC): ICD-10-CM

## 2025-04-28 NOTE — TELEPHONE ENCOUNTER
Patient was due for DM follow up in March with labs prior. Patient has been scheduled for follow up and labs. She would like her medication sent in before her visit.

## 2025-04-29 RX ORDER — ROSUVASTATIN CALCIUM 20 MG/1
20 TABLET, COATED ORAL DAILY
Qty: 90 TABLET | Refills: 1 | Status: SHIPPED | OUTPATIENT
Start: 2025-04-29

## 2025-05-12 DIAGNOSIS — E55.9 VITAMIN D DEFICIENCY: ICD-10-CM

## 2025-05-12 DIAGNOSIS — E11.9 TYPE 2 DIABETES MELLITUS WITHOUT COMPLICATION, WITHOUT LONG-TERM CURRENT USE OF INSULIN (HCC): Primary | ICD-10-CM

## 2025-05-12 DIAGNOSIS — E83.42 HYPOMAGNESEMIA: ICD-10-CM

## 2025-06-09 ENCOUNTER — ANESTHESIA EVENT (OUTPATIENT)
Facility: HOSPITAL | Age: 70
End: 2025-06-09
Payer: MEDICARE

## 2025-06-09 RX ORDER — CYPROHEPTADINE HYDROCHLORIDE 4 MG/1
2 TABLET ORAL 2 TIMES DAILY
COMMUNITY
Start: 2025-04-26

## 2025-06-09 NOTE — PROGRESS NOTES
and Blood Pressure/Heart medications.  Bring inhaler.  Bring CPAP machine.    If you have a history of recreational drug use, you may be required to submit a urine sample for drug testing the day of your procedure, as some recreational drugs can interact with anesthetics and increase your surgical risk.    Any questions regarding prep, please call the office at [phone].    For any questions or concerns on the day of procedure, please call the Endo Suite at [phone].    These surgical instructions were reviewed with Hillary Santoyo during the PAT phone call.

## 2025-06-10 ENCOUNTER — HOSPITAL ENCOUNTER (OUTPATIENT)
Facility: HOSPITAL | Age: 70
Setting detail: OUTPATIENT SURGERY
Discharge: HOME | End: 2025-06-10
Attending: INTERNAL MEDICINE | Admitting: INTERNAL MEDICINE
Payer: MEDICARE

## 2025-06-10 ENCOUNTER — ANESTHESIA (OUTPATIENT)
Facility: HOSPITAL | Age: 70
End: 2025-06-10
Payer: MEDICARE

## 2025-06-10 VITALS
RESPIRATION RATE: 16 BRPM | DIASTOLIC BLOOD PRESSURE: 71 MMHG | TEMPERATURE: 97.5 F | OXYGEN SATURATION: 94 % | WEIGHT: 163.6 LBS | BODY MASS INDEX: 27.93 KG/M2 | HEART RATE: 92 BPM | HEIGHT: 64 IN | SYSTOLIC BLOOD PRESSURE: 130 MMHG

## 2025-06-10 LAB — GLUCOSE BLD STRIP.AUTO-MCNC: 112 MG/DL (ref 70–110)

## 2025-06-10 PROCEDURE — 7100000000 HC PACU RECOVERY - FIRST 15 MIN: Performed by: INTERNAL MEDICINE

## 2025-06-10 PROCEDURE — 7100000010 HC PHASE II RECOVERY - FIRST 15 MIN: Performed by: INTERNAL MEDICINE

## 2025-06-10 PROCEDURE — 6360000002 HC RX W HCPCS: Performed by: ANESTHESIOLOGY

## 2025-06-10 PROCEDURE — 3600007512: Performed by: INTERNAL MEDICINE

## 2025-06-10 PROCEDURE — 3600007502: Performed by: INTERNAL MEDICINE

## 2025-06-10 PROCEDURE — 3700000000 HC ANESTHESIA ATTENDED CARE: Performed by: INTERNAL MEDICINE

## 2025-06-10 PROCEDURE — 7100000011 HC PHASE II RECOVERY - ADDTL 15 MIN: Performed by: INTERNAL MEDICINE

## 2025-06-10 PROCEDURE — 82962 GLUCOSE BLOOD TEST: CPT

## 2025-06-10 PROCEDURE — 3700000001 HC ADD 15 MINUTES (ANESTHESIA): Performed by: INTERNAL MEDICINE

## 2025-06-10 PROCEDURE — 88305 TISSUE EXAM BY PATHOLOGIST: CPT

## 2025-06-10 PROCEDURE — 2709999900 HC NON-CHARGEABLE SUPPLY: Performed by: INTERNAL MEDICINE

## 2025-06-10 PROCEDURE — 2580000003 HC RX 258: Performed by: NURSE ANESTHETIST, CERTIFIED REGISTERED

## 2025-06-10 RX ORDER — INSULIN LISPRO 100 [IU]/ML
0-4 INJECTION, SOLUTION INTRAVENOUS; SUBCUTANEOUS EVERY 6 HOURS SCHEDULED
Status: DISCONTINUED | OUTPATIENT
Start: 2025-06-10 | End: 2025-06-10 | Stop reason: HOSPADM

## 2025-06-10 RX ORDER — PROPOFOL 10 MG/ML
INJECTION, EMULSION INTRAVENOUS
Status: DISCONTINUED | OUTPATIENT
Start: 2025-06-10 | End: 2025-06-10 | Stop reason: SDUPTHER

## 2025-06-10 RX ORDER — DEXTROSE MONOHYDRATE 100 MG/ML
INJECTION, SOLUTION INTRAVENOUS CONTINUOUS PRN
Status: DISCONTINUED | OUTPATIENT
Start: 2025-06-10 | End: 2025-06-10 | Stop reason: HOSPADM

## 2025-06-10 RX ORDER — LIDOCAINE HYDROCHLORIDE 10 MG/ML
1 INJECTION, SOLUTION EPIDURAL; INFILTRATION; INTRACAUDAL; PERINEURAL
Status: DISCONTINUED | OUTPATIENT
Start: 2025-06-10 | End: 2025-06-10 | Stop reason: HOSPADM

## 2025-06-10 RX ORDER — SODIUM CHLORIDE, SODIUM LACTATE, POTASSIUM CHLORIDE, CALCIUM CHLORIDE 600; 310; 30; 20 MG/100ML; MG/100ML; MG/100ML; MG/100ML
INJECTION, SOLUTION INTRAVENOUS CONTINUOUS
Status: DISCONTINUED | OUTPATIENT
Start: 2025-06-10 | End: 2025-06-10 | Stop reason: HOSPADM

## 2025-06-10 RX ADMIN — SODIUM CHLORIDE, SODIUM LACTATE, POTASSIUM CHLORIDE, AND CALCIUM CHLORIDE: .6; .31; .03; .02 INJECTION, SOLUTION INTRAVENOUS at 12:01

## 2025-06-10 RX ADMIN — PROPOFOL 50 MG: 10 INJECTION, EMULSION INTRAVENOUS at 12:40

## 2025-06-10 RX ADMIN — PROPOFOL 50 MG: 10 INJECTION, EMULSION INTRAVENOUS at 12:36

## 2025-06-10 RX ADMIN — PROPOFOL 50 MG: 10 INJECTION, EMULSION INTRAVENOUS at 12:47

## 2025-06-10 RX ADMIN — PROPOFOL 50 MG: 10 INJECTION, EMULSION INTRAVENOUS at 12:43

## 2025-06-10 ASSESSMENT — PAIN - FUNCTIONAL ASSESSMENT
PAIN_FUNCTIONAL_ASSESSMENT: WONG-BAKER FACES
PAIN_FUNCTIONAL_ASSESSMENT: 0-10

## 2025-06-10 NOTE — ANESTHESIA POSTPROCEDURE EVALUATION
Department of Anesthesiology  Postprocedure Note    Patient: Hillary Santoyo  MRN: [id number]  YOB: 1955  Date of evaluation: 6/10/2025    Procedure Summary       Date: 06/10/25 Room / Location: [facility] ENDO 02 / [facility] ENDOSCOPY    Anesthesia Start: 1235 Anesthesia Stop: 1255    Procedure: ESOPHAGOGASTRODUODENOSCOPY w/ BXs (Upper GI Region) Diagnosis:       Abdominal pain, unspecified abdominal location      Gastroesophageal reflux disease, unspecified whether esophagitis present      Dyskinesia of esophagus      Early satiety      Hiatal hernia      Flatulence      Obesity, unspecified class, unspecified obesity type, unspecified whether serious comorbidity present      (Abdominal pain, unspecified abdominal location [R10.9])      (Gastroesophageal reflux disease, unspecified whether esophagitis present [K21.9])      (Dyskinesia of esophagus [K22.4])      (Early satiety [R68.81])      (Hiatal hernia [K44.9])      (Flatulence [R14.3])      (Obesity, unspecified class, unspecified obesity type, unspecified whether serious comorbidity present [E66.9])    Surgeons: Lb Gill MD Responsible Provider: Prosper Rodriguez MD    Anesthesia Type: MAC ASA Status: 3            Anesthesia Type: No value filed.    Aissatou Phase I: Aissatou Score: 10    Aissatou Phase II:      Anesthesia Post Evaluation    Patient location during evaluation: bedside  Patient participation: complete - patient participated  Level of consciousness: awake  Airway patency: patent  Nausea & Vomiting: no nausea  Cardiovascular status: hemodynamically stable  Respiratory status: acceptable  Hydration status: euvolemic  Multimodal analgesia pain management approach  Pain management: adequate    No notable events documented.

## 2025-06-10 NOTE — ANESTHESIA PRE PROCEDURE
Department of Anesthesiology  Preprocedure Note       Name:  Hillary Santoyo   Age:  70 y.o.  :  1955                                          MRN:  [id number]         Date:  6/10/2025      Surgeon: Surgeon(s):  Lb Gill MD    Procedure: Procedure(s):  ESOPHAGOGASTRODUODENOSCOPY    Medications prior to admission:   Prior to Admission medications    Medication Sig Start Date End Date Taking? Authorizing Provider   cyproheptadine (PERIACTIN) 4 MG tablet Take 0.5 tablets by mouth 2 times daily 25  Yes Provider, MD Tatiana   diclofenac sodium (VOLTAREN) 1 % GEL Apply 2 g topically 4 times daily 25  Yes Tatiana Marie MD   rosuvastatin (CRESTOR) 20 MG tablet take 1 tablet by mouth once daily 25  Yes Yolanda Veags MD   albuterol sulfate HFA (PROVENTIL;VENTOLIN;PROAIR) 108 (90 Base) MCG/ACT inhaler Inhale 2 puffs into the lungs every 4 hours as needed for Wheezing 3/18/25  Yes Yolanda Vegas MD   furosemide (LASIX) 20 MG tablet Take 2 tablets by mouth daily  Patient taking differently: Take 2 tablets by mouth every evening 25  Yes Myra Peng DO   fexofenadine (ALLEGRA) 180 MG tablet Take 1 tablet by mouth daily  Patient taking differently: Take 1 tablet by mouth every evening 12/10/24  Yes Yolanda Vegas MD   ketoconazole (NIZORAL) 2 % cream Apply topically 2 times daily as needed (rash at the corners of the mouth) Apply topically two times daily. 12/10/24  Yes Yolanda Vegas MD   ferrous sulfate (FE TABS 325) 325 (65 Fe) MG EC tablet take 1 tablet by mouth three times a day with food 12/10/24  Yes Yolanda Vegas MD   vitamin D (D3 5000) 125 MCG (5000 UT) CAPS capsule Take 1 capsule by mouth daily 12/10/24  Yes Yolanda Vegas MD   valACYclovir (VALTREX) 500 MG tablet Take 1 tablet by mouth daily as needed (oral tingling) 12/10/24  Yes Yolanda Vegas MD   FLUoxetine (PROZAC) 20 MG capsule Take 1 capsule by mouth daily   Yes Cynthia

## 2025-06-10 NOTE — H&P
Active (3/18/2025)    Exercise Vital Sign     Days of Exercise per Week: 7 days     Minutes of Exercise per Session: 30 min   Stress: Not on file   Social Connections: Not on file   Intimate Partner Violence: Unknown (1/19/2024)    Received from [facility], [facility]    Humiliation, Afraid, Rape, and Kick questionnaire     Fear of Current or Ex-Partner: Not on file     Emotionally Abused: Not on file     Physically Abused: No     Sexually Abused: Not on file   Housing Stability: Low Risk  (3/18/2025)    Housing Stability Vital Sign     Unable to Pay for Housing in the Last Year: No     Number of Times Moved in the Last Year: 1     Homeless in the Last Year: No       FHX:   Family History   Problem Relation Age of Onset    Arthritis Father     Breast Cancer Daughter 36    Migraines Daughter     Migraines Granddaughter     Hypertension Other     Heart Attack Other     Heart Disease Other     Seizures Other     Headache Other     Stroke Other        Allergy:   Allergies   Allergen Reactions    Lorazepam Shortness Of Breath    Aspirin Other (See Comments)     Stomach bleeding    Oxymorphone Other (See Comments)     Insomnia, weight loss, nightmares    Penicillins Hives    Cephalexin Rash    Nitrofurantoin Nausea And Vomiting    Nsaids Nausea And Vomiting    Sulfamethoxazole-Trimethoprim Rash       [facility] Medications:     [facility]    Review of Systems:     Constitutional: No fevers, chills, weight loss, fatigue.   Skin: No rashes, pruritis, jaundice, ulcerations, erythema.   HENT: No headaches, nosebleeds, sinus pressure, rhinorrhea, sore throat.   Eyes: No visual changes, blurred vision, eye pain, photophobia, jaundice.   Cardiovascular: No chest pain, heart palpitations.   Respiratory: No cough, SOB, wheezing, chest discomfort, orthopnea.   Gastrointestinal: Neg unless noted otherwise in H&P   Genitourinary: No dysuria, bleeding, discharge, pyuria.   Musculoskeletal: No weakness,

## 2025-06-11 ENCOUNTER — HOSPITAL ENCOUNTER (OUTPATIENT)
Dept: WOMENS IMAGING | Facility: HOSPITAL | Age: 70
Discharge: HOME | End: 2025-06-14
Attending: FAMILY MEDICINE
Payer: MEDICARE

## 2025-06-11 ENCOUNTER — HOSPITAL ENCOUNTER (OUTPATIENT)
Facility: HOSPITAL | Age: 70
Setting detail: SPECIMEN
Discharge: HOME | End: 2025-06-14

## 2025-06-11 ENCOUNTER — HOSPITAL ENCOUNTER (OUTPATIENT)
Facility: HOSPITAL | Age: 70
Discharge: HOME | End: 2025-06-14
Attending: FAMILY MEDICINE
Payer: MEDICARE

## 2025-06-11 DIAGNOSIS — Z12.31 ENCOUNTER FOR SCREENING MAMMOGRAM FOR BREAST CANCER: ICD-10-CM

## 2025-06-11 DIAGNOSIS — Z78.0 POST-MENOPAUSAL: ICD-10-CM

## 2025-06-11 LAB
A/G RATIO: 1.7 RATIO (ref 1.1–2.6)
ALBUMIN: 4.6 G/DL (ref 3.5–5)
ALP BLD-CCNC: 135 U/L (ref 40–120)
ALT SERPL-CCNC: 14 U/L (ref 5–40)
ANION GAP SERPL CALCULATED.3IONS-SCNC: 14 MMOL/L (ref 3–15)
AST SERPL-CCNC: 22 U/L (ref 10–37)
BILIRUB SERPL-MCNC: 0.2 MG/DL (ref 0.2–1.2)
BUN BLDV-MCNC: 16 MG/DL (ref 6–22)
CALCIUM SERPL-MCNC: 9.6 MG/DL (ref 8.4–10.5)
CHLORIDE BLD-SCNC: 101 MMOL/L (ref 98–110)
CHOLESTEROL, TOTAL: 140 MG/DL (ref 110–200)
CHOLESTEROL/HDL RATIO: 2.7 (ref 0–5)
CO2: 28 MMOL/L (ref 20–32)
CREAT SERPL-MCNC: 1 MG/DL (ref 0.8–1.4)
CREATININE, URINE  MG/DL: 87 MG/DL
GFR, ESTIMATED: 57.2 ML/MIN/1.73 SQ.M.
GLOBULIN: 2.7 G/DL (ref 2–4)
GLUCOSE: 68 MG/DL (ref 70–99)
HDLC SERPL-MCNC: 52 MG/DL
LDL CHOLESTEROL: 60 MG/DL (ref 50–99)
LDL/HDL RATIO: 1.2
MAGNESIUM: 2.3 MG/DL (ref 1.6–2.5)
MICROALBUMIN/CREAT 24H UR: <12 MG/L (ref 0.1–17)
MICROALBUMIN/CREAT UR-RTO: NORMAL
NON-HDL CHOLESTEROL: 88 MG/DL
POTASSIUM SERPL-SCNC: 4.2 MMOL/L (ref 3.5–5.5)
SODIUM BLD-SCNC: 143 MMOL/L (ref 133–145)
TOTAL PROTEIN: 7.3 G/DL (ref 6.2–8.1)
TRIGL SERPL-MCNC: 140 MG/DL (ref 40–149)
VITAMIN D 25-HYDROXY: 57 NG/ML (ref 32–100)
VLDLC SERPL CALC-MCNC: 28 MG/DL (ref 8–30)

## 2025-06-11 PROCEDURE — 77080 DXA BONE DENSITY AXIAL: CPT

## 2025-06-11 PROCEDURE — 77063 BREAST TOMOSYNTHESIS BI: CPT

## 2025-06-11 PROCEDURE — 99001 SPECIMEN HANDLING PT-LAB: CPT

## 2025-06-12 LAB
ESTIMATED AVERAGE GLUCOSE: 151 MG/DL (ref 91–123)
HBA1C MFR BLD: 6.9 % (ref 4.8–5.6)

## 2025-06-13 ENCOUNTER — RESULTS FOLLOW-UP (OUTPATIENT)
Facility: CLINIC | Age: 70
End: 2025-06-13

## 2025-06-13 NOTE — RESULT ENCOUNTER NOTE
Please schedule appointment to address abnormal results bone density results AND to follow up diabetes (labs already done). (30) Thanks, KJS

## 2025-06-15 LAB — SENTARA SPECIMEN COLLECTION: NORMAL

## 2025-06-17 DIAGNOSIS — J45.30 MILD PERSISTENT ASTHMA WITHOUT COMPLICATION: Primary | ICD-10-CM

## 2025-06-17 RX ORDER — ALBUTEROL SULFATE 0.83 MG/ML
2.5 SOLUTION RESPIRATORY (INHALATION) 4 TIMES DAILY PRN
Qty: 120 EACH | Refills: 0 | Status: SHIPPED | OUTPATIENT
Start: 2025-06-17

## 2025-06-17 NOTE — TELEPHONE ENCOUNTER
Mild persistent asthma without complication  Assessment & Plan:  Refilling as requested. She missed her asthma follow up. Please reschedule.     Orders:  -     albuterol (PROVENTIL) (2.5 MG/3ML) 0.083% nebulizer solution; Take 3 mLs by nebulization 4 times daily as needed for Wheezing, Disp-120 each, R-0Print    Yolanda Vegas MD

## 2025-06-17 NOTE — TELEPHONE ENCOUNTER
Received order from [facility] for Nebulizer, patient will need a printed script to be sent back with order form. Please review.

## 2025-07-03 ENCOUNTER — TELEPHONE (OUTPATIENT)
Facility: CLINIC | Age: 70
End: 2025-07-03

## 2025-07-03 DIAGNOSIS — E11.9 TYPE 2 DIABETES MELLITUS WITHOUT COMPLICATION, WITHOUT LONG-TERM CURRENT USE OF INSULIN (HCC): ICD-10-CM

## 2025-07-03 NOTE — TELEPHONE ENCOUNTER
This patient contacted office for the following prescriptions to be filled:    Medication requested : metFORMIN (GLUCOPHAGE-XR) 500 MG extended release tablet   PCP: Stone  Pharmacy or Print: Pharmacy   Mail order or Local pharmacy Local ([facility] [id number] - [place], [place] - [address] -     Scheduled appointment if not seen by current providers in office: LOV: 3/18/25 [id number]

## 2025-07-08 RX ORDER — METFORMIN HYDROCHLORIDE 500 MG/1
TABLET, EXTENDED RELEASE ORAL
Qty: 180 TABLET | Refills: 0 | Status: SHIPPED | OUTPATIENT
Start: 2025-07-08

## 2025-07-25 ENCOUNTER — OFFICE VISIT (OUTPATIENT)
Facility: CLINIC | Age: 70
End: 2025-07-25

## 2025-07-25 VITALS
OXYGEN SATURATION: 95 % | BODY MASS INDEX: 27.62 KG/M2 | WEIGHT: 161.8 LBS | HEIGHT: 64 IN | DIASTOLIC BLOOD PRESSURE: 60 MMHG | HEART RATE: 72 BPM | TEMPERATURE: 97.3 F | SYSTOLIC BLOOD PRESSURE: 124 MMHG

## 2025-07-25 DIAGNOSIS — B37.83 ANGULAR CHEILITIS WITH CANDIDIASIS: ICD-10-CM

## 2025-07-25 DIAGNOSIS — I50.32 CHRONIC HEART FAILURE WITH PRESERVED EJECTION FRACTION (HCC): ICD-10-CM

## 2025-07-25 DIAGNOSIS — I10 PRIMARY HYPERTENSION: ICD-10-CM

## 2025-07-25 DIAGNOSIS — E55.9 VITAMIN D DEFICIENCY: ICD-10-CM

## 2025-07-25 DIAGNOSIS — D50.0 IRON DEFICIENCY ANEMIA DUE TO CHRONIC BLOOD LOSS: ICD-10-CM

## 2025-07-25 DIAGNOSIS — M81.0 AGE-RELATED OSTEOPOROSIS WITHOUT CURRENT PATHOLOGICAL FRACTURE: ICD-10-CM

## 2025-07-25 DIAGNOSIS — E78.5 HYPERLIPIDEMIA, UNSPECIFIED HYPERLIPIDEMIA TYPE: ICD-10-CM

## 2025-07-25 DIAGNOSIS — J45.30 MILD PERSISTENT ASTHMA WITHOUT COMPLICATION: ICD-10-CM

## 2025-07-25 DIAGNOSIS — J30.1 NON-SEASONAL ALLERGIC RHINITIS DUE TO POLLEN: ICD-10-CM

## 2025-07-25 DIAGNOSIS — E83.42 HYPOMAGNESEMIA: ICD-10-CM

## 2025-07-25 DIAGNOSIS — Z77.120 MOLD EXPOSURE: ICD-10-CM

## 2025-07-25 DIAGNOSIS — M85.80 OSTEOPENIA, UNSPECIFIED LOCATION: ICD-10-CM

## 2025-07-25 DIAGNOSIS — E11.9 TYPE 2 DIABETES MELLITUS WITHOUT COMPLICATION, WITHOUT LONG-TERM CURRENT USE OF INSULIN (HCC): Primary | ICD-10-CM

## 2025-07-25 RX ORDER — IBANDRONATE SODIUM 150 MG/1
150 TABLET, FILM COATED ORAL
Qty: 3 TABLET | Refills: 3 | Status: SHIPPED | OUTPATIENT
Start: 2025-07-25

## 2025-07-25 RX ORDER — ROSUVASTATIN CALCIUM 20 MG/1
20 TABLET, COATED ORAL DAILY
Qty: 90 TABLET | Refills: 3 | Status: SHIPPED | OUTPATIENT
Start: 2025-07-25

## 2025-07-25 RX ORDER — MAGNESIUM OXIDE 400 MG/1
400 TABLET ORAL 2 TIMES DAILY
Qty: 180 TABLET | Refills: 3 | Status: SHIPPED | OUTPATIENT
Start: 2025-07-25

## 2025-07-25 RX ORDER — ALBUTEROL SULFATE 90 UG/1
2 INHALANT RESPIRATORY (INHALATION) EVERY 4 HOURS PRN
Qty: 1 EACH | Refills: 3 | Status: SHIPPED | OUTPATIENT
Start: 2025-07-25

## 2025-07-25 RX ORDER — LOSARTAN POTASSIUM 50 MG/1
50 TABLET ORAL DAILY
Qty: 90 TABLET | Refills: 3 | Status: SHIPPED | OUTPATIENT
Start: 2025-07-25

## 2025-07-25 RX ORDER — METFORMIN HYDROCHLORIDE 500 MG/1
TABLET, EXTENDED RELEASE ORAL
Qty: 180 TABLET | Refills: 3 | Status: SHIPPED | OUTPATIENT
Start: 2025-07-25

## 2025-07-25 RX ORDER — FLUTICASONE PROPIONATE 50 MCG
SPRAY, SUSPENSION (ML) NASAL
Qty: 48 G | Refills: 3 | Status: SHIPPED | OUTPATIENT
Start: 2025-07-25

## 2025-07-25 RX ORDER — KETOCONAZOLE 20 MG/G
CREAM TOPICAL 2 TIMES DAILY PRN
Qty: 15 G | Refills: 5 | Status: SHIPPED | OUTPATIENT
Start: 2025-07-25

## 2025-07-25 RX ORDER — BUTYROSPERMUM PARKII(SHEA BUTTER), SIMMONDSIA CHINENSIS (JOJOBA) SEED OIL, ALOE BARBADENSIS LEAF EXTRACT .01; 1; 3.5 G/100G; G/100G; G/100G
5000 LIQUID TOPICAL DAILY
Qty: 90 CAPSULE | Refills: 3 | Status: SHIPPED | OUTPATIENT
Start: 2025-07-25

## 2025-07-25 NOTE — PROGRESS NOTES
Chief Complaint   Patient presents with    Diabetes    Results     Patient here today to discuss her Bone Density results and DM follow up.        Have you been to the ER, urgent care clinic since your last visit?  Hospitalized since your last visit?   YES - When: approximately 2  weeks ago.  Where and Why: ER, toe fracture.    Have you seen or consulted any other health care providers outside our system since your last visit?   YES - When: approximately 2  weeks ago.  Where and Why: Pain Management .      “Have you had a diabetic eye exam?”    NO     Date of last diabetic eye exam: 5/20/2024

## 2025-07-25 NOTE — ASSESSMENT & PLAN NOTE
Context HFpEF for which she sees Dr. Peng and takes furosemide 20 mg daily. Well controlled.  - Continue losartan 50 mg, metoprolol XL 50 mg. Clonidine 0.3 mg being prescribed by her mental health provider for insomnia.   
Context osteoporosis. Well controlled, continue present management D3 5000 daily   
New  - Start Boniva, to be taken once a month in the morning with a full glass of water before breakfast or any other medications. Treatment will continue for 5 years (2030) to reduce the risk of bone fractures.  - Continue daily intake of vitamin D 5000 IU.  
Well controlled   - Continue Arnuity once daily  - continue albuterol prn  - follow up 6  month     
Well controlled. Continue mag ox 400 mg bid   
With CKD and HF. DM well controlled A1c 6.9  - Continue metformin 1000 mg daily  - Increase empagliflozin to 25 mg daily  - follow up 6 months   
[facility]

## 2025-07-25 NOTE — PROGRESS NOTES
Hillary Santoyo (: 1955) is a 70 y.o. female, established patient, here for:    ASSESSMENT/PLAN:  Type 2 diabetes mellitus without complication, without long-term current use of insulin ([facility])  Assessment & Plan:  With CKD and HF. DM well controlled A1c 6.9  - Continue metformin 1000 mg daily  - Increase empagliflozin to 25 mg daily  - follow up 6 months   Orders:  -     empagliflozin (JARDIANCE) 25 MG tablet; Take 1 tablet by mouth daily, Disp-90 tablet, R-3Normal  -     metFORMIN (GLUCOPHAGE-XR) 500 MG extended release tablet; TAKE 2 TABLETS BY MOUTH DAILY WITH DINNER, Disp-180 tablet, R-3Normal  -     rosuvastatin (CRESTOR) 20 MG tablet; Take 1 tablet by mouth daily, Disp-90 tablet, R-3Normal  -     Comprehensive Metabolic Panel; Future  -     Albumin/Creatinine Ratio, Urine; Future  -     Lipid Panel; Future  -     Hemoglobin A1C; Future  Age-related osteoporosis without current pathological fracture  Assessment & Plan:  New  - Start Boniva, to be taken once a month in the morning with a full glass of water before breakfast or any other medications. Treatment will continue for 5 years () to reduce the risk of bone fractures.  - Continue daily intake of vitamin D 5000 IU.  Orders:  -     ibandronate (BONIVA) 150 MG tablet; Take 1 tablet by mouth every 30 days Take one (1) tablet once per month in the morning with a full glass of water, on an empty stomach, and do not take anything else by mouth or lie down for the next 60 minutes., Disp-3 tablet, R-3Normal  -     Vitamin D 25 Hydroxy; Future  Vitamin D deficiency  Assessment & Plan:  Context osteoporosis. Well controlled, continue present management D3 5000 daily   Orders:  -     vitamin D (D3 5000) 125 MCG (5000 UT) CAPS capsule; Take 1 capsule by mouth daily, Disp-90 capsule, R-3Normal  Osteopenia, unspecified location  -     vitamin D (D3 5000) 125 MCG (5000 UT) CAPS capsule; Take 1 capsule by mouth daily, Disp-90 capsule, R-3Normal  Primary

## 2025-08-21 LAB
ANION GAP SERPL CALCULATED.3IONS-SCNC: 15 MMOL/L (ref 3–15)
BUN BLDV-MCNC: 28 MG/DL (ref 6–22)
CALCIUM SERPL-MCNC: 9.1 MG/DL (ref 8.4–10.5)
CHLORIDE BLD-SCNC: 103 MMOL/L (ref 98–110)
CO2: 23 MMOL/L (ref 20–32)
CREAT SERPL-MCNC: 1.1 MG/DL (ref 0.8–1.4)
ESTIMATED AVERAGE GLUCOSE: 147 MG/DL (ref 91–123)
GFR, ESTIMATED: 51.4 ML/MIN/1.73 SQ.M.
GLUCOSE: 184 MG/DL (ref 70–99)
HBA1C MFR BLD: 6.8 % (ref 4.8–5.6)
POTASSIUM SERPL-SCNC: 4.6 MMOL/L (ref 3.5–5.5)
SODIUM BLD-SCNC: 141 MMOL/L (ref 133–145)

## 2025-08-26 ENCOUNTER — TELEPHONE (OUTPATIENT)
Facility: CLINIC | Age: 70
End: 2025-08-26

## (undated) DEVICE — BASIN EMESIS 500CC ROSE 250/CS 60/PLT: Brand: MEDEGEN MEDICAL PRODUCTS, LLC

## (undated) DEVICE — CATHETER SUCT TR FL TIP 14FR W/ O CTRL

## (undated) DEVICE — FORCEPS BX L240CM JAW DIA2.4MM ORNG L CAP W/ NDL DISP RAD

## (undated) DEVICE — SNARE POLYP M W27MMXL240CM OVL STIFF DISP CAPTIVATOR

## (undated) DEVICE — FLEX ADVANTAGE 3000CC: Brand: FLEX ADVANTAGE

## (undated) DEVICE — SUTURE VCRL SZ 0 L18IN ABSRB UD POLYGLACTIN 910 BRAID TIE J912G

## (undated) DEVICE — SYRINGE MED 25GA 3ML L5/8IN SUBQ PLAS W/ DETACH NDL SFTY

## (undated) DEVICE — SYRINGE MED 10ML LUERLOCK TIP W/O SFTY DISP

## (undated) DEVICE — LINER SUCT CANSTR 3000CC PLAS SFT PRE ASSEMB W/OUT TBNG W/

## (undated) DEVICE — PACK PROCEDURE SURG VASC CATH 161 MMC LF

## (undated) DEVICE — BAND HEMOSTAT DRY D-STAT RAD --

## (undated) DEVICE — GUIDEWIRE VASC L260CM DIA0035IN TIP L3MM PTFE J STD TAPR FIX

## (undated) DEVICE — SYR 10ML LUER LOK 1/5ML GRAD --

## (undated) DEVICE — RADIFOCUS OPTITORQUE ANGIOGRAPHIC CATHETER: Brand: OPTITORQUE

## (undated) DEVICE — SOLUTION IRRIG 1000ML H2O STRL BLT

## (undated) DEVICE — GAUZE,SPONGE,4"X4",16PLY,STRL,LF,10/TRAY: Brand: MEDLINE

## (undated) DEVICE — SYR 20ML LL STRL LF --

## (undated) DEVICE — SYRINGE 20ML LL S/C 50

## (undated) DEVICE — Device

## (undated) DEVICE — MEDI-VAC NON-CONDUCTIVE SUCTION TUBING: Brand: CARDINAL HEALTH

## (undated) DEVICE — AIRLIFE™ NASAL OXYGEN CANNULA CURVED, NONFLARED TIP WITH 14 FOOT (4.3 M) CRUSH-RESISTANT TUBING, OVER-THE-EAR STYLE: Brand: AIRLIFE™

## (undated) DEVICE — SET FLD ADMIN 3 W STPCOCK FIX FEM L BOR 1IN

## (undated) DEVICE — DERMABOND SKIN ADH 0.7ML -- DERMABOND ADVANCED 12/BX

## (undated) DEVICE — YANKAUER,SMOOTH HANDLE,HIGH CAPACITY: Brand: MEDLINE INDUSTRIES, INC.

## (undated) DEVICE — FLUFF AND POLYMER UNDERPAD,EXTRA HEAVY: Brand: WINGS

## (undated) DEVICE — SYR 50ML SLIP TIP NSAF LF STRL --

## (undated) DEVICE — BLADELESS OPTICAL TROCAR WITH FIXATION CANNULA: Brand: VERSAPORT

## (undated) DEVICE — FORCEPS BX L240CM JAW DIA2.8MM L CAP W/ NDL MIC MESH TOOTH

## (undated) DEVICE — GOWN ISOL IMPERV UNIV, DISP, OPEN BACK, BLUE --

## (undated) DEVICE — SYRINGE MED 50ML LUERSLIP TIP

## (undated) DEVICE — DRAPE TWL SURG 16X26IN BLU ORB04] ALLCARE INC]

## (undated) DEVICE — ELECTRO LUBE IS A SINGLE PATIENT USE DEVICE THAT IS INTENDED TO BE USED ON ELECTROSURGICAL ELECTRODES TO REDUCE STICKING.: Brand: KEY SURGICAL ELECTRO LUBE

## (undated) DEVICE — FLEX ADVANTAGE 1500CC: Brand: FLEX ADVANTAGE

## (undated) DEVICE — CANNULA ORIG TL CLR W FOAM CUSHIONS AND 14FT SUPL TB 3 CHN

## (undated) DEVICE — UNDERPAD INCONT W23XL36IN STD BLU POLYPR BK FLUF SFT

## (undated) DEVICE — 3M™ BAIR PAWS FLEX™ WARMING GOWN, STANDARD, 20 PER CASE 81003: Brand: BAIR PAWS™

## (undated) DEVICE — AIRLIFE™ NASAL OXYGEN CANNULA CURVED, FLARED TIP WITH 14 FOOT (4.3 M) CRUSH-RESISTANT TUBING, OVER-THE-EAR STYLE: Brand: AIRLIFE™

## (undated) DEVICE — REM POLYHESIVE ADULT PATIENT RETURN ELECTRODE: Brand: VALLEYLAB

## (undated) DEVICE — (D)SYR 10ML 1/5ML GRAD NSAF -- PKGING CHANGE USE ITEM 338027

## (undated) DEVICE — BASIN EMSIS 16OZ GRAPHITE PLAS KID SHP MOLD GRAD FOR ORAL

## (undated) DEVICE — FCPS RAD JAW 4LC 240CM W/NDL -- BX/20 RADIAL JAW 4

## (undated) DEVICE — ARM DRAPE

## (undated) DEVICE — TIP COVER ACCESSORY

## (undated) DEVICE — CATH IV SAFE STR 22GX1IN BLU -- PROTECTIV PLUS

## (undated) DEVICE — MEDI-VAC SUCTION HIGH CAPACITY: Brand: CARDINAL HEALTH

## (undated) DEVICE — BITE BLOCK ENDOSCP UNIV AD 6 TO 9.4 MM

## (undated) DEVICE — (D)DRSG BORD MPLX SCRM 18X18CM -- DISC BY MFR USE ITEM 346511

## (undated) DEVICE — KIT CLN UP BON SECOURS MARYV

## (undated) DEVICE — PREP SKN CHLRAPRP 26ML TNT -- CONVERT TO ITEM 373320

## (undated) DEVICE — AIRSEAL 5 MM ACCESS PORT AND LOW PROFILE OBTURATOR WITH BLADELESS OPTICAL TIP, 120 MM LENGTH: Brand: AIRSEAL

## (undated) DEVICE — PROCEDURE KIT FLUID MGMT 10 FR CUST MAINFOLD

## (undated) DEVICE — ENDOSCOPY PUMP TUBING/ CAP SET: Brand: ERBE

## (undated) DEVICE — STERILE POLYISOPRENE POWDER-FREE SURGICAL GLOVES: Brand: PROTEXIS

## (undated) DEVICE — MAYO STAND COVER: Brand: CONVERTORS

## (undated) DEVICE — NEEDLE HYPO 25GA L1.5IN BVL ORIENTED ECLIPSE

## (undated) DEVICE — COVER LT HNDL BLU STRL -- MEDICHOICE

## (undated) DEVICE — INTENDED FOR TISSUE SEPARATION, AND OTHER PROCEDURES THAT REQUIRE A SHARP SURGICAL BLADE TO PUNCTURE OR CUT.: Brand: BARD-PARKER ®  SAFETY SCALPED

## (undated) DEVICE — GOWN PLASTIC FILM THMBHKS UNIV BLUE: Brand: CARDINAL HEALTH

## (undated) DEVICE — AIRSEAL BIFURCATED SMOKE EVAC FILTERED TUBE SET: Brand: AIRSEAL

## (undated) DEVICE — DRAPE,ANGIO,BRACH,STERILE,38X44: Brand: MEDLINE

## (undated) DEVICE — CANNULA NSL AD TBNG L14FT STD PVC O2 CRV CONN NONFLARED NSL

## (undated) DEVICE — SEAL UNIV 5-8MM DISP BX/10 -- DA VINCI XI - SNGL USE

## (undated) DEVICE — SUTURE MCRYL SZ 4-0 L27IN ABSRB UD L24MM PS-1 3/8 CIR PRIM Y935H

## (undated) DEVICE — KENDALL SCD EXPRESS SLEEVES, KNEE LENGTH, MEDIUM: Brand: KENDALL SCD

## (undated) DEVICE — COLUMN DRAPE

## (undated) DEVICE — SOLUTION IV 1000ML 0.9% SOD CHL

## (undated) DEVICE — CORD ES L10FT MPLR LAP

## (undated) DEVICE — GLOVE SURG BIOGEL 8.0 STRL -- SKINSENSE

## (undated) DEVICE — CARTRIDGE CLP LIG HEMLOK GRN --

## (undated) DEVICE — GLIDESHEATH SS KIT HYDROPHILIC COATED INTRODUCER SHEATH: Brand: GLIDESHEATH

## (undated) DEVICE — PRESSURE MONITORING SET: Brand: TRUWAVE